# Patient Record
Sex: MALE | Race: WHITE
[De-identification: names, ages, dates, MRNs, and addresses within clinical notes are randomized per-mention and may not be internally consistent; named-entity substitution may affect disease eponyms.]

---

## 2017-01-10 ENCOUNTER — HOSPITAL ENCOUNTER (OUTPATIENT)
Dept: HOSPITAL 47 - RADCTMAIN | Age: 57
Discharge: HOME | End: 2017-01-10
Payer: COMMERCIAL

## 2017-01-10 DIAGNOSIS — N13.30: ICD-10-CM

## 2017-01-10 DIAGNOSIS — N28.89: ICD-10-CM

## 2017-01-10 DIAGNOSIS — N28.1: Primary | ICD-10-CM

## 2017-01-10 LAB
BUN SERPL-SCNC: 16 MG/DL (ref 9–20)
NON-AFRICAN AMERICAN GFR(MDRD): >60

## 2017-01-10 PROCEDURE — 84520 ASSAY OF UREA NITROGEN: CPT

## 2017-01-10 PROCEDURE — 36415 COLL VENOUS BLD VENIPUNCTURE: CPT

## 2017-01-10 PROCEDURE — 74178 CT ABD&PLV WO CNTR FLWD CNTR: CPT

## 2017-01-10 PROCEDURE — 82565 ASSAY OF CREATININE: CPT

## 2017-01-10 NOTE — CT
EXAMINATION TYPE: CT abdomen pelvis wo/w con

 

DATE OF EXAM: 1/10/2017 5:12 PM

 

COMPARISON: NONE

 

HISTORY: Pt states of renal cysts.

 

CT DLP: 1239.2 mGycm

Automated exposure control for dose reduction was used.

 

TECHNIQUE:  Helical acquisition of images was performed from the lung bases through the pelvis.

 

CONTRAST: 

Performed with Oral Contrast and with IV Contrast, patient injected with 100 mL of Omnipaque 300.

 

FINDINGS: 

 

There are multiple subpleural densities in the lower lung fields that measure up to 2.5 cm. There are
 small bilateral pleural effusions.

 

There is no pericardial effusion.

 

Liver shows no focal defect. Spleen appears normal. There is no pancreatic mass. Gallbladder is contr
acted. There is no adrenal mass. Kidneys show satisfactory contrast opacification. There is no eviden
ce of a cortical mass. There are bilateral large calculi at the ureteropelvic junction of both kidney
s. These measure up to 1.5 cm. Ureters are not dilated. There is a 3 mm calcification in the lower po
le left kidney. There is some fullness of the left renal collecting system. There is a 3 cm cortical 
cyst on the upper pole left kidney. There is no retroperitoneal adenopathy.

There is no ascites. Bladder distends smoothly. There is no sign of a pelvic mass. I see no intestina
l wall thickening. There are no dilated loops. Appendix is not seen. There is no sign of appendicitis
. There is degenerative disc space narrowing at L4-5.

IMPRESSION: 

BILATERAL LARGE CALCIFICATIONS AT THE URETEROPELVIC JUNCTION. MILD LEFT-SIDED HYDRONEPHROSIS. NO BENTON
L ATROPHY. LEFT RENAL CORTICAL CYST. THERE IS EVIDENCE OF A MILD OBSTRUCTION OF THE LEFT KIDNEY. I SE
E NO EVIDENCE OF OBSTRUCTION OF THE RIGHT KIDNEY.

 

PLEURAL EFFUSIONS WITH MULTIPLE SUBPLEURAL MASSES IN THE VISUALIZED LOWER LOBES. The clinical signifi
cance of these densities is not clear. I would consider both inflammatory disease and neoplasm in the
 differential diagnosis.

## 2017-02-27 ENCOUNTER — HOSPITAL ENCOUNTER (OUTPATIENT)
Dept: HOSPITAL 47 - RADXRMAIN | Age: 57
End: 2017-02-27
Payer: COMMERCIAL

## 2017-02-27 DIAGNOSIS — N20.0: Primary | ICD-10-CM

## 2017-02-27 PROCEDURE — 74000: CPT

## 2017-02-27 NOTE — XR
EXAMINATION TYPE: XR KUB

 

DATE OF EXAM: 2/27/2017 12:10 PM

 

COMPARISON: 12/12/2016 KUB, CT scan 1/10/2017

 

HISTORY: Renal stone

 

TECHNIQUE: One view abdominal series

 

FINDINGS:  

The osseous structures are intact.  The bowel gas pattern is nonspecific. Changes of arthropathy of t
he hips and sacroiliitis noted. Degenerative change of the spine.

 

Right kidney:

1. There is a stable appearing 1.8 cm calcification lower pole.

2. 9 mm lower pole calcification.

 

Left kidney:

1. There is an 11 mm lower pole calcification.

2. 2 mm lower pole calcification.

3. Suspected mid pole 3 mm calcification.

 

Pelvis: Stable vascular appearing calcification in the right hemipelvis.

 

Subsegmental consolidation noted both lung bases.

 

 

IMPRESSION:  

1. Stable bilateral renal calculi.

2. Bilateral subsegmental atelectasis or infiltrate

## 2017-03-13 ENCOUNTER — HOSPITAL ENCOUNTER (OUTPATIENT)
Dept: HOSPITAL 47 - RADXRMAIN | Age: 57
Discharge: HOME | End: 2017-03-13
Payer: COMMERCIAL

## 2017-03-13 DIAGNOSIS — N20.0: Primary | ICD-10-CM

## 2017-03-13 PROCEDURE — 74000: CPT

## 2017-03-13 NOTE — XR
Abdomen

 

HISTORY: Renal calculus

 

Frontal view of the abdomen on 2 images correlated to previous exam 27th of February 2017 plain film 
abdomen, CT scan 10 January 2017

 

The largest calculus on the right within the renal pelvis measures approximately 2 cm. The lower pole
 calculus on the right measures approximately 11 to 12 mm in greatest dimension by 7 to 8 mm. Proxima
l left ureteral calculus measures approximately 11.5 x 6 mm.

 

There is a bone island in the left ilium. Phlebolith is present in the right hemipelvis, there are pr
ostatic calcifications. Degenerative disc changes in the visualized spine. Lung bases show pleural ef
fusion, patient has lung nodules on prior CT that are not seen definitively but are suggested posteri
karlos.

 

IMPRESSION: Bilateral nephrolithiasis. Findings at the lung bases as described.

## 2017-03-27 ENCOUNTER — HOSPITAL ENCOUNTER (OUTPATIENT)
Dept: HOSPITAL 47 - RADXRMAIN | Age: 57
Discharge: HOME | End: 2017-03-27
Payer: COMMERCIAL

## 2017-03-27 DIAGNOSIS — N20.0: Primary | ICD-10-CM

## 2017-03-27 PROCEDURE — 74000: CPT

## 2017-03-27 NOTE — XR
EXAMINATION TYPE: XR abdomen 1V

 

DATE OF EXAM ORDERED: 3/27/2017 12:08 PM

 

HISTORY: N20.0 kidney stones.

 

COMPARISON: Previous study dated 3/13/2017.

 

FINDINGS:  There is bilateral nephrolithiasis. The largest calcification on the right measures 2.2 cm
. Largest on the left measures 5.6 mm. There are phleboliths within the pelvis. There is amorphous ca
lcification overlying the left psoas shadow. This has migrated somewhat distally in comparison with t
he previous study..

 

There are degenerative changes in the lower lumbar spine.

 

IMPRESSION: 

1. BILATERAL NEPHROLITHIASIS.

2. SOMEWHAT IRREGULAR LEFT URETERIC CALCULUS HAS MIGRATED SOMEWHAT DISTALLY IN COMPARISON TO PREVIOUS
.

## 2017-03-30 ENCOUNTER — HOSPITAL ENCOUNTER (OUTPATIENT)
Dept: HOSPITAL 47 - OR | Age: 57
Discharge: HOME | End: 2017-03-30
Payer: COMMERCIAL

## 2017-03-30 VITALS — DIASTOLIC BLOOD PRESSURE: 89 MMHG | SYSTOLIC BLOOD PRESSURE: 138 MMHG | RESPIRATION RATE: 18 BRPM | HEART RATE: 103 BPM

## 2017-03-30 VITALS — TEMPERATURE: 98.1 F

## 2017-03-30 VITALS — BODY MASS INDEX: 30.9 KG/M2

## 2017-03-30 DIAGNOSIS — Z79.52: ICD-10-CM

## 2017-03-30 DIAGNOSIS — N13.2: Primary | ICD-10-CM

## 2017-03-30 DIAGNOSIS — Z79.899: ICD-10-CM

## 2017-03-30 DIAGNOSIS — K21.9: ICD-10-CM

## 2017-03-30 DIAGNOSIS — Z87.891: ICD-10-CM

## 2017-03-30 DIAGNOSIS — R31.0: ICD-10-CM

## 2017-03-30 DIAGNOSIS — E03.9: ICD-10-CM

## 2017-03-30 DIAGNOSIS — Z87.442: ICD-10-CM

## 2017-03-30 DIAGNOSIS — M06.9: ICD-10-CM

## 2017-03-30 DIAGNOSIS — M41.9: ICD-10-CM

## 2017-03-30 LAB — GLUCOSE BLD-MCNC: 79 MG/DL (ref 75–99)

## 2017-03-30 PROCEDURE — 50590 FRAGMENTING OF KIDNEY STONE: CPT

## 2017-03-30 PROCEDURE — 52332 CYSTOSCOPY AND TREATMENT: CPT

## 2017-03-30 PROCEDURE — 74000: CPT

## 2017-03-30 NOTE — XR
EXAMINATION TYPE: XR KUB

 

DATE OF EXAM: 3/30/2017 1:24 PM

 

COMPARISON: 3/27/2017

 

HISTORY: Renal stone

 

TECHNIQUE: One view abdominal series

 

FINDINGS:  

The osseous structures are intact.  The bowel gas pattern is nonspecific. Pleural-based thickening an
d emphysematous changes are seen involving both lung bases. Hypertrophic change of the spine noted.

 

Right kidney: There remain 2 calcifications stable in appearance measuring 12 mm and 8 mm overlying t
he mid and lower pole the right kidney.

 

Left kidney: Punctate 2 to 3 mm mid pole calcification suspected. Calcification along the left parasp
inal line may lie outside the course of the ureter but appears stable from the previous exam.

 

Pelvis: Calcification overlying the right hemipelvis is likely vascular.

 

 

 

IMPRESSION:  

1.  Nonspecific abdomen. Stable nephrolithiasis.

## 2017-03-30 NOTE — P.OP
Date of Procedure: 03/30/17


Preoperative Diagnosis: 


Left ureteral calculi, left renal calculi


Postoperative Diagnosis: 


Same


Procedure(s) Performed: 


Cystoscopy, left ureteroscopy with Holmium laser lithotripsy, left ureteral 

stent insertion


Anesthesia: REMI


Surgeon: Tanmay Lira


Estimated Blood Loss (ml): 5


IV fluids (ml): 1,300


Pathology: none sent


Condition: stable


Disposition: PACU


Indications for Procedure: 


He is a 56-year-old male with recurrent gross hematuria. A CT scan of the 

abdomen and pelvis showed multiple right renal calculi, one of which he passed 

in 2014. Urine cytology was suspicious in 2014, and cstoscopy was normal. He 

was given dietary advice for kidney stone prevention. He now presents back with 

gross hematuria, associated with right flank pain. A KUB x-ray and CT scan show 

a 12 mm left UPJ calculus causing mild left hydronephrosis, as well as an 18 mm 

right UPJ calculus. Treatment options were reviewed in detail, and he underwent 

left ESWL. The left stone is smaller and is causing obstruction, and this is 

the reason for treating it first. The calculus fragmented incompletely, and I 

suggested he undergo repeat left ESWL. However, the calculus is essentially 

unchanged in appearance, and I have thus adivsed him to undergo left 

ureteroscopy with laser lithotripsy.


Operative Findings: 


Large left mid ureteral calculus, with a second calculus immediately proximal 

to it.  Several small left renal calculi.  All calculi fragmented to completion.


Description of Procedure: 


The patient was taken to the operating room and placed in the dorsolithotomy 

position, with legs supported in Garth stirrups.  The external genitalia was 

prepped and draped sterilely.  The 30 lens was used to introduce the 19-Beninese 

Stortz cystoscopic sheath through the urethra and into the bladder under direct 

vision.  The prostatic urethra showed evidence of mild lateral lobe 

enlargement.  The bladder was examined in its entirety.  Both ureteral orifices 

were normal anatomic location and configuration, and clear urine effluxed from 

both.  No tumors or foreign bodies were seen.   





A 0.038 inch Glidewire was passed through the cystoscope.  The left ureteral 

orifice was cannulated, and the Glidewire was slowly advanced.  It passed 

beyond the mid ureteral calculus and up to the left renal pelvis.  The 

cystoscope was removed, and an 11/13-Beninese ureteral access catheter was passed 

over the wire, up to the mid ureter.  The Olympus flexible ureteroscope was 

then passed through the ureteral access catheter sheath.  The ureteroscope was 

advanced up to the level of the calculus. The 200 micron Holmium laser probe 

was passed through the ureteroscope, and lithotripsy was performed.  The 

calculus fragmented well.  Once the calculus had been fragmented, a second 

calculus immediately cephalad to this was identified.  This also was 

fragmented.  Lithotripsy was continued until all calculus fragments were no 

larger than 1-2 mm in size.  The ureteroscope was then slowly advanced up to 

the left renal pelvis.  Each calyx was examined.  Several small renal calculi 

were identified, all of which were fragmented to completion.  The ureteroscope 

was then slowly withdrawn under direct vision.  Any remaining calculi exceeding 

1-2 mm in size were fragmented at this time, and most of the calculus fragments 

passed distally into the bladder.  There was no evidence of ureteral 

perforation.





The cystoscope was replaced into the bladder.  The Glidewire was passed up to 

the left renal pelvis, and a 28 cm, 4.8-Beninese double-J ureteral stent was 

placed over the wire.  Proper stent positioning was verified fluoroscopically 

and endoscopically.  The bladder was emptied and the cystoscope removed.  The 

patient tolerated the procedure well and was taken to the recovery room in 

stable condition.

## 2017-03-31 NOTE — FL
EXAMINATION TYPE: FL guidance operating room

 

DATE OF EXAM: 3/30/2017 6:50 PM

 

HISTORY: Flouroscopy  time

 

Less than 1 minute of fluoroscopy provided. 

 

IMPRESSION:

1. Fluoroscopy time.

## 2017-04-19 ENCOUNTER — HOSPITAL ENCOUNTER (OUTPATIENT)
Dept: HOSPITAL 47 - LABPAT | Age: 57
Discharge: HOME | End: 2017-04-19
Payer: COMMERCIAL

## 2017-04-19 DIAGNOSIS — E03.9: ICD-10-CM

## 2017-04-19 DIAGNOSIS — N20.0: ICD-10-CM

## 2017-04-19 DIAGNOSIS — R35.0: ICD-10-CM

## 2017-04-19 DIAGNOSIS — Z01.812: Primary | ICD-10-CM

## 2017-04-19 LAB
ANION GAP SERPL CALC-SCNC: 10 MMOL/L
BASOPHILS # BLD AUTO: 0.1 K/UL (ref 0–0.2)
BASOPHILS NFR BLD AUTO: 1 %
BUN SERPL-SCNC: 16 MG/DL (ref 9–20)
CALCIUM SPEC-MCNC: 9 MG/DL (ref 8.4–10.2)
CH: 27.7
CHCM: 30.7
CHLORIDE SERPL-SCNC: 107 MMOL/L (ref 98–107)
CO2 SERPL-SCNC: 24 MMOL/L (ref 22–30)
EOSINOPHIL # BLD AUTO: 0.2 K/UL (ref 0–0.7)
EOSINOPHIL NFR BLD AUTO: 2 %
ERYTHROCYTE [DISTWIDTH] IN BLOOD BY AUTOMATED COUNT: 4.81 M/UL (ref 4.3–5.9)
ERYTHROCYTE [DISTWIDTH] IN BLOOD: 16.7 % (ref 11.5–15.5)
GLUCOSE SERPL-MCNC: 98 MG/DL (ref 74–99)
HCT VFR BLD AUTO: 43.6 % (ref 39–53)
HDW: 3.03
HGB BLD-MCNC: 13.2 GM/DL (ref 13–17.5)
LUC NFR BLD AUTO: 1 %
LYMPHOCYTES # SPEC AUTO: 1.4 K/UL (ref 1–4.8)
LYMPHOCYTES NFR SPEC AUTO: 16 %
MCH RBC QN AUTO: 27.4 PG (ref 25–35)
MCHC RBC AUTO-ENTMCNC: 30.3 G/DL (ref 31–37)
MCV RBC AUTO: 90.6 FL (ref 80–100)
MONOCYTES # BLD AUTO: 0.6 K/UL (ref 0–1)
MONOCYTES NFR BLD AUTO: 7 %
NEUTROPHILS # BLD AUTO: 6.8 K/UL (ref 1.3–7.7)
NEUTROPHILS NFR BLD AUTO: 74 %
NON-AFRICAN AMERICAN GFR(MDRD): >60
PARTICLE COUNT: 1618
PH UR: 5.5 [PH] (ref 5–8)
POTASSIUM SERPL-SCNC: 3.7 MMOL/L (ref 3.5–5.1)
RBC UR QL: 141 /HPF (ref 0–5)
SODIUM SERPL-SCNC: 141 MMOL/L (ref 137–145)
SP GR UR: 1.01 (ref 1–1.03)
UA BILLING (MACRO VS. MICRO): (no result)
UROBILINOGEN UR QL STRIP: <2 MG/DL (ref ?–2)
WBC # BLD AUTO: 0.1 10*3/UL
WBC # BLD AUTO: 9.2 K/UL (ref 3.8–10.6)
WBC #/AREA URNS HPF: 29 /HPF (ref 0–5)
WBC (PEROX): 9.39

## 2017-04-19 PROCEDURE — 85025 COMPLETE CBC W/AUTO DIFF WBC: CPT

## 2017-04-19 PROCEDURE — 86900 BLOOD TYPING SEROLOGIC ABO: CPT

## 2017-04-19 PROCEDURE — 86850 RBC ANTIBODY SCREEN: CPT

## 2017-04-19 PROCEDURE — 81001 URINALYSIS AUTO W/SCOPE: CPT

## 2017-04-19 PROCEDURE — 80048 BASIC METABOLIC PNL TOTAL CA: CPT

## 2017-04-19 PROCEDURE — 86901 BLOOD TYPING SEROLOGIC RH(D): CPT

## 2017-04-26 ENCOUNTER — HOSPITAL ENCOUNTER (OUTPATIENT)
Dept: HOSPITAL 47 - OR | Age: 57
Setting detail: OBSERVATION
LOS: 1 days | Discharge: HOME | End: 2017-04-27
Payer: COMMERCIAL

## 2017-04-26 VITALS — RESPIRATION RATE: 16 BRPM

## 2017-04-26 VITALS — BODY MASS INDEX: 30.9 KG/M2

## 2017-04-26 DIAGNOSIS — Z87.891: ICD-10-CM

## 2017-04-26 DIAGNOSIS — E03.9: ICD-10-CM

## 2017-04-26 DIAGNOSIS — Z79.899: ICD-10-CM

## 2017-04-26 DIAGNOSIS — M41.9: ICD-10-CM

## 2017-04-26 DIAGNOSIS — K21.9: ICD-10-CM

## 2017-04-26 DIAGNOSIS — Z79.84: ICD-10-CM

## 2017-04-26 DIAGNOSIS — Z80.1: ICD-10-CM

## 2017-04-26 DIAGNOSIS — M06.9: ICD-10-CM

## 2017-04-26 DIAGNOSIS — N20.0: Primary | ICD-10-CM

## 2017-04-26 DIAGNOSIS — Z80.8: ICD-10-CM

## 2017-04-26 DIAGNOSIS — Z79.52: ICD-10-CM

## 2017-04-26 PROCEDURE — 86850 RBC ANTIBODY SCREEN: CPT

## 2017-04-26 PROCEDURE — 86901 BLOOD TYPING SEROLOGIC RH(D): CPT

## 2017-04-26 PROCEDURE — 86900 BLOOD TYPING SEROLOGIC ABO: CPT

## 2017-04-26 PROCEDURE — 50395: CPT

## 2017-04-26 PROCEDURE — 50080 PERQ NL/PL LITHOTRP SMPL<2CM: CPT

## 2017-04-26 PROCEDURE — 50432 PLMT NEPHROSTOMY CATHETER: CPT

## 2017-04-26 PROCEDURE — 96376 TX/PRO/DX INJ SAME DRUG ADON: CPT

## 2017-04-26 PROCEDURE — 74000: CPT

## 2017-04-26 PROCEDURE — 96374 THER/PROPH/DIAG INJ IV PUSH: CPT

## 2017-04-26 PROCEDURE — 82365 CALCULUS SPECTROSCOPY: CPT

## 2017-04-26 RX ADMIN — POTASSIUM CHLORIDE SCH MLS: 14.9 INJECTION, SOLUTION INTRAVENOUS at 08:55

## 2017-04-26 RX ADMIN — POTASSIUM CHLORIDE SCH: 14.9 INJECTION, SOLUTION INTRAVENOUS at 13:53

## 2017-04-26 RX ADMIN — KETOROLAC TROMETHAMINE SCH MG: 30 INJECTION, SOLUTION INTRAMUSCULAR at 12:11

## 2017-04-26 RX ADMIN — HYDROMORPHONE HYDROCHLORIDE PRN MG: 1 INJECTION, SOLUTION INTRAMUSCULAR; INTRAVENOUS; SUBCUTANEOUS at 12:11

## 2017-04-26 RX ADMIN — POTASSIUM CHLORIDE SCH MLS/HR: 14.9 INJECTION, SOLUTION INTRAVENOUS at 22:41

## 2017-04-26 RX ADMIN — KETOROLAC TROMETHAMINE SCH MG: 30 INJECTION, SOLUTION INTRAMUSCULAR at 18:17

## 2017-04-26 RX ADMIN — HYDROMORPHONE HYDROCHLORIDE PRN MG: 1 INJECTION, SOLUTION INTRAMUSCULAR; INTRAVENOUS; SUBCUTANEOUS at 12:05

## 2017-04-26 RX ADMIN — KETOROLAC TROMETHAMINE SCH MG: 30 INJECTION, SOLUTION INTRAMUSCULAR at 23:28

## 2017-04-26 NOTE — FL
Fluoroscopy

 

INDICATION: Pain

 

FINDINGS:

 

Fluoroscopy time: 11 minutes 49 seconds.

 

Images obtained: 2.

 

IMPRESSIONS:

1. Documentation of fluoroscopy.

## 2017-04-26 NOTE — XR
EXAMINATION TYPE: XR KUB

 

DATE OF EXAM: 4/26/2017 7:38 AM

 

COMPARISON: 3/30/2017

 

INDICATION: Kidney stones

 

TECHNIQUE: Single view abdomen supine

 

FINDINGS:  

There is a normal bowel gas pattern.

Psoas margins are normal.

No organomegaly is present.

2 right-sided renal calcifications. Present measuring 1.9 and 1.0 cm present previously. Phlebolith i
s within the right hemipelvis.

 

IMPRESSION: 

1. Right renal stones

## 2017-04-26 NOTE — P.OP
Date of Procedure: 04/26/17


Preoperative Diagnosis: 


Right renal calculi large


Postoperative Diagnosis: 


Same


Procedure(s) Performed: 


Cystoscopy, placement of 5-Bangladeshi occluding balloon catheter right, 

percutaneous nephrostomy (Dr. sandhu), percutaneous nephrostolithotomy with 

ultrasound, placement of 10-Bangladeshi J nephrostomy


Anesthesia: REMI


Surgeon: Dewayne Love


Estimated Blood Loss (ml): 50


Pathology: other (Stones)


Condition: stable


Disposition: PACU


Indications for Procedure: 


The patient is a 56-year-old gentleman with active kidney stone disease.  He 

has an 18 mm and 10 mm right  renal pelvic stone and comes for percutaneous 

nephrostolithotomy


Description of Procedure: 


The patient is brought to the operating suite he is given a successful general 

endotracheal anesthesia on the transport gurney.  He's placed in a frog 

position with rolls under his hips a sterile prep and drape.  Cystoscopy with 

Foroblique lens and 22-Bangladeshi sheath identifies a normal anterior urethra a 

minimally obstructing prostate.  The right ureteral orifice is identified and 

intubated with a 5-Bangladeshi occluding balloon catheter which is passed up in the 

renal pelvis it is secured to a Marino catheter after the cystoscope was removed





The patient is placed in a prone position with care to airways and extremities.

  Dr. Sandhu of radiology performed percutaneous nephrostomy access to a right 

middle pole calyx.  I then dilate the tract to 30-Bangladeshi and introduced the 

working sheath into the collecting system.  I'm able to see the larger of the 2 

stones and with the ultrasonic probe grind and suction as well as grasp the 

larger fragments out of the collecting system.  I then advanced the rigid scope 

further and identify the second stone in the renal pelvis and similarly used 

the ultrasound to fracture and remove the stone.  At the end of the procedure I 

look through the collecting system with the flexible scope and see no remaining 

fragments.  I fluoroscoped the renal pelvis and do not see any remaining 

fragments.  I then place a 10-Bangladeshi J nephrostomy tube over working wire that 

coils in the renal pelvis and the secured the skin with 2-0 silk.  The patient'

s awake and returned recovery room good condition.  Blood loss is approximately 

50 mL.  Patient will be placed in the hospital postoperatively.

## 2017-04-27 VITALS — TEMPERATURE: 97.6 F | HEART RATE: 85 BPM | SYSTOLIC BLOOD PRESSURE: 129 MMHG | DIASTOLIC BLOOD PRESSURE: 84 MMHG

## 2017-04-27 RX ADMIN — KETOROLAC TROMETHAMINE SCH MG: 30 INJECTION, SOLUTION INTRAMUSCULAR at 06:26

## 2017-04-27 RX ADMIN — POTASSIUM CHLORIDE SCH: 14.9 INJECTION, SOLUTION INTRAVENOUS at 04:46

## 2017-04-27 RX ADMIN — POTASSIUM CHLORIDE SCH: 14.9 INJECTION, SOLUTION INTRAVENOUS at 09:57

## 2017-04-27 NOTE — P.DS
Providers


Date of admission: 


04/26/17 16:54





Attending physician: 


Dewayne Love





Primary care physician: 


Stated None





Hospital Course: 





The patient was admitted 04/26/2017 for right percutaneous nephrostolithotomy.  

He underwent this without event.  Postoperatively he tolerated his diet, 

ambulated and had minimal discomfort.  He'll be discharged home later this 

morning with his nephrostomy tube.  He'll follow-up in the office Monday for 

nephrostomy tube removal.  Postoperative instructions have been given.  His 

diet is regular, his activity is limited, he has pain medicine at home.  His 

condition is good upon discharge.


Patient Condition at Discharge: Good





Plan - Discharge Summary


Discharge Medication List





Hydroxychloroquine Sulfate [Plaquenil] 200 mg PO DAILY 03/29/17 [History]


Leflunomide [Arava] 20 mg PO DAILY 03/29/17 [History]


Levothyroxine Sodium [Synthroid] 150 mcg PO TUWETH 03/29/17 [History]


Levothyroxine Sodium [Synthroid] 175 mcg PO SUMOFRSA 03/29/17 [History]


Tofacitinib Citrate [Xeljanz] 5 mg PO DAILY 03/29/17 [History]


methylPREDNISolone [Medrol] 4 mg PO DAILY 03/29/17 [History]








Follow up Appointment(s)/Referral(s): 


Dewayne Love MD [STAFF PHYSICIAN] - 05/01/17


Activity/Diet/Wound Care/Special Instructions: 


The patient may shower, resume his home medications,


Discharge Disposition: HOME SELF-CARE

## 2018-02-27 ENCOUNTER — HOSPITAL ENCOUNTER (OUTPATIENT)
Dept: HOSPITAL 47 - RADCTMAIN | Age: 58
Discharge: HOME | End: 2018-02-27
Payer: COMMERCIAL

## 2018-02-27 DIAGNOSIS — M77.31: ICD-10-CM

## 2018-02-27 DIAGNOSIS — S93.321A: Primary | ICD-10-CM

## 2018-02-27 DIAGNOSIS — M19.071: ICD-10-CM

## 2018-02-27 NOTE — CT
History foot pain. The left comparison none.

 

TECHNIQUE:

Multiple axial sections were obtained from the distal tibia to the bottom of the foot with no contras
t. There are reconstructed images. Images include all the metatarsals.

 

FINDINGS:

There is some deformity and fragmentation at the tarsometatarsal joints involving the second third fo
urth and fifth metatarsals. There is an old ununited transverse fracture of the base of the fifth met
atarsal. The first tarsometatarsal joint is anatomic. There is lateral subluxation of the second thir
d fourth and fifth metatarsal bases.

 

There is an Achilles calcaneal spur. There is a small plantar calcaneal spur. Calcaneus is intact. Th
e talus is intact. The distal tibia and fibula appear intact. The talonavicular joint is anatomic.

 

CONCLUSION:

Fragmentation and subluxation seen at the 2nd-5th tarsometatarsal joints. This is consistent with a L
isfranc deformity. The possibility of neuropathic arthritis should also be considered. Old ununited p
roximal fifth metatarsal fracture.

 

Calcaneal spurring.

 

There is mild osteoarthritis at the first MP joint. I do not see subluxation at the MP joints to vivi
est rheumatoid arthritis.

## 2018-04-13 ENCOUNTER — HOSPITAL ENCOUNTER (OUTPATIENT)
Dept: HOSPITAL 47 - RADECHMAIN | Age: 58
Discharge: HOME | End: 2018-04-13
Payer: COMMERCIAL

## 2018-04-13 DIAGNOSIS — I70.8: ICD-10-CM

## 2018-04-13 DIAGNOSIS — I07.1: Primary | ICD-10-CM

## 2018-04-13 PROCEDURE — 93306 TTE W/DOPPLER COMPLETE: CPT

## 2018-04-17 NOTE — ECHOF
Referral Reason:I52.7 cardiomegaly



MEASUREMENTS

--------

HEIGHT: 185.4 cm

WEIGHT: 111.1 kg

BP: 188/104

RVIDd:   3.4 cm     (< 3.3)

IVSd:   0.9 cm     (0.6 - 1.1)

LVIDd:   5.4 cm     (3.9 - 5.3)

LVPWd:   1.0 cm     (0.6 - 1.1)

IVSs:   1.4 cm

LVIDs:   4.1 cm

LVPWs:   1.4 cm

LA Diam:   3.6 cm     (2.7 - 3.8)

LAESV Index (A-L):   26.70 ml/m

Ao Diam:   3.8 cm     (2.0 - 3.7)

AV Cusp:   2.7 cm     (1.5 - 2.6)

MV EXCURSION:   21.171 mm     (> 18.000)

MV EF SLOPE:   191 mm/s     (70 - 150)

EPSS:   0.9 cm

MV E Chance:   1.12 m/s

MV DecT:   122 ms

MV A Chance:   0.60 m/s

MV E/A Ratio:   1.85 

RAP:   5.00 mmHg

RVSP:   24.14 mmHg







FINDINGS

--------

Resting tachycardia (HR>100bpm).

This was a technically adequate study.

The left ventricular size is normal.   Left ventricular wall thickness is normal.   Overall left vent
ricular systolic function is low-normal with, an EF between 50 - 55 %.

The right ventricle is mildly enlarged.

Normal LA  size by volume 22+/-6 ml/m2.

The right atrium is normal in size.

The aortic valve is trileaflet and appears structurally normal.

Mild mitral annular calcification present.

Mild tricuspid regurgitation present.   Right ventricular systolic pressure is normal at < 35 mmHg.

The pulmonic valve was not well visualized.

The aortic root is dilated measuring 3.8cm.

The inferior vena cava is mildly dilated.

There is no pericardial effusion.



CONCLUSIONS

--------

1. Resting tachycardia (HR>100bpm).

2. This was a technically adequate study.

3. The left ventricular size is normal.

4. Left ventricular wall thickness is normal.

5. Overall left ventricular systolic function is low-normal with, an EF between 50 - 55 %.

6. The right ventricle is mildly enlarged.

7. Normal LA size by volume 22+/-6 ml/m2.

8. The right atrium is normal in size.

9. The aortic valve is trileaflet and appears structurally normal.

10. Mild mitral annular calcification present.

11. Mild tricuspid regurgitation present.

12. Right ventricular systolic pressure is normal at < 35 mmHg.

13. The pulmonic valve was not well visualized.

14. The aortic root is dilated measuring 3.8cm.

15. The inferior vena cava is mildly dilated.

16. There is no pericardial effusion.





SONOGRAPHER: Isabel Lozano RDCS

## 2018-04-19 ENCOUNTER — HOSPITAL ENCOUNTER (OUTPATIENT)
Dept: HOSPITAL 47 - RADCTMAIN | Age: 58
Discharge: HOME | End: 2018-04-19
Payer: COMMERCIAL

## 2018-04-19 DIAGNOSIS — J90: Primary | ICD-10-CM

## 2018-04-19 DIAGNOSIS — R91.8: ICD-10-CM

## 2018-04-19 DIAGNOSIS — J92.9: ICD-10-CM

## 2018-04-19 PROCEDURE — 71250 CT THORAX DX C-: CPT

## 2018-04-19 NOTE — CT
EXAMINATION TYPE: CT chest wo con

 

DATE OF EXAM: 4/19/2018

 

COMPARISON: NONE

 

HISTORY: Shortness of breath for 1-2 months

 

CT DLP: 302 mGycm

 

High-resolution noncontrast CT of the chest was performed with the patient in the prone and supine po
sitions.  Lung and mediastinal window settings are submitted. 

 

There are multiple pulmonary nodules most of which could be pleural-based and some of which demonstra
te cavitation. On the left the number of nodules is estimated at 16-18 with the largest nodules ident
ified within the left lower lobe measuring approximately 2.5 cm. There is associated left sided pleur
al thickening and left pleural effusion. Right-sided pulmonary nodules a total approximately 10-12 an
d number with the largest nodule identified within the right lower lobe with underlying cavitation an
d measures 2.5 cm. Small right-sided pleural effusion and pleural thickening noted.

 

Incidental azygos lobe and fissure.

 

Mild lower lobe bronchiectasis.

 

No evidence for fibrosis.

 

No evidence of adenopathy.

 

Mild cardiomegaly.

 

Simple cyst upper pole left kidney.

 

IMPRESSION:

1. Multiple bilateral pulmonary nodules some of which demonstrate cavitation could reflect rheumatoid
 nodules. Nodules of other etiology including metastatic disease not excluded.

2. Small pleural effusions with associated pleural thickening.

## 2018-05-09 ENCOUNTER — HOSPITAL ENCOUNTER (OUTPATIENT)
Dept: HOSPITAL 47 - LABWHC1 | Age: 58
Discharge: HOME | End: 2018-05-09
Payer: COMMERCIAL

## 2018-05-09 DIAGNOSIS — E05.90: Primary | ICD-10-CM

## 2018-05-09 LAB — T4 FREE SERPL-MCNC: 1.28 NG/DL (ref 0.78–2.19)

## 2018-05-09 PROCEDURE — 84443 ASSAY THYROID STIM HORMONE: CPT

## 2018-05-09 PROCEDURE — 84439 ASSAY OF FREE THYROXINE: CPT

## 2018-05-09 PROCEDURE — 36415 COLL VENOUS BLD VENIPUNCTURE: CPT

## 2018-05-24 ENCOUNTER — HOSPITAL ENCOUNTER (OUTPATIENT)
Dept: HOSPITAL 47 - LABPAT | Age: 58
Discharge: HOME | End: 2018-05-24
Payer: COMMERCIAL

## 2018-05-24 DIAGNOSIS — Z01.812: Primary | ICD-10-CM

## 2018-05-24 DIAGNOSIS — I25.10: ICD-10-CM

## 2018-05-24 LAB
ANION GAP SERPL CALC-SCNC: 12 MMOL/L
BUN SERPL-SCNC: 16 MG/DL (ref 9–20)
CHLORIDE SERPL-SCNC: 106 MMOL/L (ref 98–107)
CO2 SERPL-SCNC: 27 MMOL/L (ref 22–30)
ERYTHROCYTE [DISTWIDTH] IN BLOOD BY AUTOMATED COUNT: 4.72 M/UL (ref 4.3–5.9)
ERYTHROCYTE [DISTWIDTH] IN BLOOD: 17.8 % (ref 11.5–15.5)
HCT VFR BLD AUTO: 40.2 % (ref 39–53)
HGB BLD-MCNC: 12.5 GM/DL (ref 13–17.5)
MCH RBC QN AUTO: 26.4 PG (ref 25–35)
MCHC RBC AUTO-ENTMCNC: 31.1 G/DL (ref 31–37)
MCV RBC AUTO: 85 FL (ref 80–100)
PLATELET # BLD AUTO: 331 K/UL (ref 150–450)
POTASSIUM SERPL-SCNC: 4.1 MMOL/L (ref 3.5–5.1)
SODIUM SERPL-SCNC: 145 MMOL/L (ref 137–145)
WBC # BLD AUTO: 11.7 K/UL (ref 3.8–10.6)

## 2018-05-24 PROCEDURE — 84520 ASSAY OF UREA NITROGEN: CPT

## 2018-05-24 PROCEDURE — 36415 COLL VENOUS BLD VENIPUNCTURE: CPT

## 2018-05-24 PROCEDURE — 80051 ELECTROLYTE PANEL: CPT

## 2018-05-24 PROCEDURE — 85027 COMPLETE CBC AUTOMATED: CPT

## 2018-06-01 ENCOUNTER — HOSPITAL ENCOUNTER (OUTPATIENT)
Dept: HOSPITAL 47 - CATHCVL | Age: 58
End: 2018-06-01
Payer: COMMERCIAL

## 2018-06-01 VITALS — BODY MASS INDEX: 33.2 KG/M2

## 2018-06-01 VITALS — RESPIRATION RATE: 18 BRPM | TEMPERATURE: 98.1 F

## 2018-06-01 VITALS — DIASTOLIC BLOOD PRESSURE: 68 MMHG | SYSTOLIC BLOOD PRESSURE: 130 MMHG | HEART RATE: 81 BPM

## 2018-06-01 DIAGNOSIS — M05.9: ICD-10-CM

## 2018-06-01 DIAGNOSIS — I10: ICD-10-CM

## 2018-06-01 DIAGNOSIS — Z79.52: ICD-10-CM

## 2018-06-01 DIAGNOSIS — M05.10: ICD-10-CM

## 2018-06-01 DIAGNOSIS — Z79.899: ICD-10-CM

## 2018-06-01 DIAGNOSIS — E05.90: ICD-10-CM

## 2018-06-01 DIAGNOSIS — I25.110: Primary | ICD-10-CM

## 2018-06-01 DIAGNOSIS — Z87.891: ICD-10-CM

## 2018-06-01 DIAGNOSIS — Z79.890: ICD-10-CM

## 2018-06-01 PROCEDURE — 93458 L HRT ARTERY/VENTRICLE ANGIO: CPT

## 2018-06-01 RX ADMIN — VERAPAMIL HYDROCHLORIDE ONE ML: 2.5 INJECTION, SOLUTION INTRAVENOUS at 11:19

## 2018-06-01 RX ADMIN — VERAPAMIL HYDROCHLORIDE ONE ML: 2.5 INJECTION, SOLUTION INTRAVENOUS at 11:35

## 2018-06-01 NOTE — CC
CARDIAC CATHETERIZATION REPORT



DATE OF SERVICE:

06/01/2018.



PROCEDURE:

Left heart catheterization, coronary angiography and left ventriculography.



PERFORMED BY:

Dr. VALDO Santo.



ANESTHESIA:

Moderate conscious sedation time was 23 minutes.  Patient was given fentanyl and

Benadryl.



CLINICAL INFORMATION:

Mr. Trevon Kaufman is a 57-year-old gentleman with rheumatoid arthritis and rheumatoid

lung.  He also has history of chest pain with an abnormal stress test suggestive of

probably nonischemic cardiomyopathy but areas of reversibility.  He was therefore

advised coronary angiography.  Risks, benefits, options and rationale were explained.



PROCEDURE NOTE:

Under local anesthesia and strict aseptic precautions, a 6-Bulgarian introducer was placed

in the right radial artery.  Using an Ultimate 1 catheter, I performed selective

coronary angiography of both coronary arteries and a pigtail catheter was used to check

LV pressures and LV-gram was performed in 30-degree VALLADARES projection.  Patient tolerated

the procedure well without complications.  The sheath was taken out and TR band applied

as per protocol and saturation in the fingers of the right hand was about 94%.



CARDIAC CATHETERIZATION FINDINGS:

The left ventricular end-diastolic pressure was about 20 mmHg without any gradient

across the aortic valve.



CORONARY ANGIOGRAPHY FINDINGS:

RIGHT CORONARY ARTERY:  Technically a dominant vessel.  No significant disease, gives

off a large acute marginal branch and then distally a large PLV, smaller PDA, minor

irregularities.  No significant disease is noted.



LEFT MAIN CORONARY ARTERY: Short patent disease-free vessel that bifurcates into LAD

and circumflex.



LEFT ANTERIOR DESCENDING CORONARY ARTERY:  Good caliber vessel gives off septal and

diagonal branches.  There is a large diagonal and next to it, the second diagonal is

somewhat smaller.  Both of these are free of significant disease and supplies a sizable

amount of myocardium.  No significant disease is noted in the LAD system other than

minor irregularities up to 30% or so.



LAD:  Therefore is relatively disease-free system with minor irregularities and a good-

sized septal and diagonal branch is noted which has no significant disease.



LEFT POSTERIOR CIRCUMFLEX CORONARY ARTERY:  Technically, a nondominant vessel, smaller

in distribution but fair in caliber, gives off a single obtuse marginal that runs

laterally.  Then there is an AV groove branch and left atrial circumflex branch.  All

of these are free of significant disease with minor irregularities.



LEFT VENTRICULOGRAM: This was performed in 30-degree VALLADARES projection, revealed left

ventricle is of normal size with good systolic function.  Ejection fraction is at the

at the low end of normal of 50% without mitral regurgitation.



FINAL IMPRESSION:

This patient has a right dominant system, minor irregularities, but no significant

obstructive CAD.  Ejection fraction is 50% which is at low end of normal.  Filling

pressures are mildly elevated.



RECOMMENDATION:

Continued medical therapy with risk factor modification is advised.  I will add 50 mg

of losartan to his regimen.  He is advised to take aspirin 81 mg daily and continue the

beta blocker at about 75 mg b.i.d. as ordered.

Patient will be discharged later on today if he remains stable.





MMODL / IJN: [id number] / Job#: [id number]

## 2018-06-01 NOTE — LTR
DATE OF SERVICE:  06/01/2018





RE:  Trevon Kaufman





Dear Dr. Smith:



Thank you for the opportunity to participate in the care of Mr. Kaufman.  I am pleased

to report to you that he does not have any obstructive CAD.  He has what seems to be an

early nonischemic cardiomyopathy-type picture.  I have added losartan to beta blocker.

Will continue same medications.  I will discharge him later on today and will see him

in the office in one week.



I have advised him to follow up with you on a regular basis.



Thank you for your referral and please call for questions.  With kindest regards,



Sincerely yours,





VITA Santo MD





MMBEAUL / KATLYNN: [id number] / Job#: [id number]

## 2018-11-26 ENCOUNTER — HOSPITAL ENCOUNTER (OUTPATIENT)
Dept: HOSPITAL 47 - RADCTMAIN | Age: 58
Discharge: HOME | End: 2018-11-26
Attending: FAMILY MEDICINE
Payer: COMMERCIAL

## 2018-11-26 DIAGNOSIS — R91.8: ICD-10-CM

## 2018-11-26 DIAGNOSIS — J90: ICD-10-CM

## 2018-11-26 DIAGNOSIS — J98.11: Primary | ICD-10-CM

## 2018-11-26 PROCEDURE — 71270 CT THORAX DX C-/C+: CPT

## 2018-11-27 NOTE — CT
EXAMINATION TYPE: CT chest wo/w con

 

DATE OF EXAM: 11/27/2018

 

COMPARISON: 4/19/2018

 

HISTORY: lung mass. Pt pre-op

 

CT DLP: 1212.50 mGycm

Automated exposure control for dose reduction was used.

 

CONTRAST: 

CT scan of the chest is performed without and with with IV Contrast, patient injected with 100 mL of 
Isovue 300.

 

FINDINGS:

 

LUNGS: Multiple pulmonary nodules redemonstrated most of which are pleural-based. Several of the nodu
les again demonstrate cavitation. The overall number of the nodules is felt to be stable with an jc
mated 16-18 nodules noted within the left lung and at 10-12 nodules right lung. The largest nodule le
ft lower lobe measured 2.5 cm previously currently measures 2.4 cm. The largest nodule within the rig
ht lung is noted within the right lower lobe posteriorly and demonstrates cavitation currently measur
es 2.1 cm versus 2.5 cm previously. Small loculated pleural effusions noted.

 

MEDIASTINUM: There are no greater than 1 cm hilar or mediastinal lymph nodes.   No pericardial effusi
on is seen.  Thoracic aorta is of normal caliber. The heart is not enlarged.

 

UPPER ABDOMEN: Simple cyst upper pole left kidney measures 2.9 cm.

 

OTHER:  No additional significant abnormality is seen.

 

IMPRESSION:

1. Overall number of pulmonary nodules remains essentially unchanged although several of the nodules 
appear slightly smaller in size. Areas of cavitation persist. Small pleural effusions and mild compre
ssive atelectasis again noted.

## 2019-01-04 ENCOUNTER — HOSPITAL ENCOUNTER (INPATIENT)
Dept: HOSPITAL 47 - EC | Age: 59
LOS: 3 days | Discharge: HOME | DRG: 863 | End: 2019-01-07
Attending: HOSPITALIST | Admitting: HOSPITALIST
Payer: COMMERCIAL

## 2019-01-04 VITALS — BODY MASS INDEX: 30.9 KG/M2

## 2019-01-04 DIAGNOSIS — E83.42: ICD-10-CM

## 2019-01-04 DIAGNOSIS — T81.40XA: Primary | ICD-10-CM

## 2019-01-04 DIAGNOSIS — E66.9: ICD-10-CM

## 2019-01-04 DIAGNOSIS — E03.9: ICD-10-CM

## 2019-01-04 DIAGNOSIS — Z87.442: ICD-10-CM

## 2019-01-04 DIAGNOSIS — E87.6: ICD-10-CM

## 2019-01-04 DIAGNOSIS — L03.115: ICD-10-CM

## 2019-01-04 DIAGNOSIS — Z79.899: ICD-10-CM

## 2019-01-04 DIAGNOSIS — M06.9: ICD-10-CM

## 2019-01-04 DIAGNOSIS — Z79.890: ICD-10-CM

## 2019-01-04 DIAGNOSIS — E46: ICD-10-CM

## 2019-01-04 DIAGNOSIS — Z87.891: ICD-10-CM

## 2019-01-04 DIAGNOSIS — Z98.1: ICD-10-CM

## 2019-01-04 DIAGNOSIS — N17.9: ICD-10-CM

## 2019-01-04 DIAGNOSIS — Z80.9: ICD-10-CM

## 2019-01-04 DIAGNOSIS — D63.8: ICD-10-CM

## 2019-01-04 DIAGNOSIS — E86.0: ICD-10-CM

## 2019-01-04 LAB
ALBUMIN SERPL-MCNC: 2.6 G/DL (ref 3.5–5)
ALP SERPL-CCNC: 138 U/L (ref 38–126)
ALT SERPL-CCNC: 32 U/L (ref 21–72)
ANION GAP SERPL CALC-SCNC: 10 MMOL/L
APTT BLD: 28.5 SEC (ref 22–30)
AST SERPL-CCNC: 73 U/L (ref 17–59)
BASOPHILS # BLD AUTO: 0.1 K/UL (ref 0–0.2)
BASOPHILS NFR BLD AUTO: 1 %
BUN SERPL-SCNC: 10 MG/DL (ref 9–20)
CALCIUM SPEC-MCNC: 8.9 MG/DL (ref 8.4–10.2)
CHLORIDE SERPL-SCNC: 105 MMOL/L (ref 98–107)
CK SERPL-CCNC: 90 U/L (ref 55–170)
CO2 SERPL-SCNC: 22 MMOL/L (ref 22–30)
D DIMER PPP FEU-MCNC: 3.13 MG/L FEU (ref ?–0.6)
EOSINOPHIL # BLD AUTO: 1.2 K/UL (ref 0–0.7)
EOSINOPHIL NFR BLD AUTO: 14 %
ERYTHROCYTE [DISTWIDTH] IN BLOOD BY AUTOMATED COUNT: 4.46 M/UL (ref 4.3–5.9)
ERYTHROCYTE [DISTWIDTH] IN BLOOD: 16.3 % (ref 11.5–15.5)
GLUCOSE SERPL-MCNC: 131 MG/DL (ref 74–99)
HCT VFR BLD AUTO: 38.1 % (ref 39–53)
HGB BLD-MCNC: 11.6 GM/DL (ref 13–17.5)
INR PPP: 2.4 (ref ?–1.2)
LYMPHOCYTES # SPEC AUTO: 1.2 K/UL (ref 1–4.8)
LYMPHOCYTES NFR SPEC AUTO: 13 %
MAGNESIUM SPEC-SCNC: 1.2 MG/DL (ref 1.6–2.3)
MCH RBC QN AUTO: 26.1 PG (ref 25–35)
MCHC RBC AUTO-ENTMCNC: 30.5 G/DL (ref 31–37)
MCV RBC AUTO: 85.4 FL (ref 80–100)
MONOCYTES # BLD AUTO: 0.7 K/UL (ref 0–1)
MONOCYTES NFR BLD AUTO: 7 %
NEUTROPHILS # BLD AUTO: 5.6 K/UL (ref 1.3–7.7)
NEUTROPHILS NFR BLD AUTO: 64 %
PLATELET # BLD AUTO: 245 K/UL (ref 150–450)
POTASSIUM SERPL-SCNC: 3.2 MMOL/L (ref 3.5–5.1)
PROT SERPL-MCNC: 6.3 G/DL (ref 6.3–8.2)
PT BLD: 23.2 SEC (ref 9–12)
SODIUM SERPL-SCNC: 137 MMOL/L (ref 137–145)
TROPONIN I SERPL-MCNC: <0.012 NG/ML (ref 0–0.03)
WBC # BLD AUTO: 8.9 K/UL (ref 3.8–10.6)

## 2019-01-04 PROCEDURE — 87086 URINE CULTURE/COLONY COUNT: CPT

## 2019-01-04 PROCEDURE — 87040 BLOOD CULTURE FOR BACTERIA: CPT

## 2019-01-04 PROCEDURE — 87324 CLOSTRIDIUM AG IA: CPT

## 2019-01-04 PROCEDURE — 85379 FIBRIN DEGRADATION QUANT: CPT

## 2019-01-04 PROCEDURE — 81003 URINALYSIS AUTO W/O SCOPE: CPT

## 2019-01-04 PROCEDURE — 36415 COLL VENOUS BLD VENIPUNCTURE: CPT

## 2019-01-04 PROCEDURE — 71046 X-RAY EXAM CHEST 2 VIEWS: CPT

## 2019-01-04 PROCEDURE — 80053 COMPREHEN METABOLIC PANEL: CPT

## 2019-01-04 PROCEDURE — 96360 HYDRATION IV INFUSION INIT: CPT

## 2019-01-04 PROCEDURE — 96361 HYDRATE IV INFUSION ADD-ON: CPT

## 2019-01-04 PROCEDURE — 87502 INFLUENZA DNA AMP PROBE: CPT

## 2019-01-04 PROCEDURE — 85610 PROTHROMBIN TIME: CPT

## 2019-01-04 PROCEDURE — 83880 ASSAY OF NATRIURETIC PEPTIDE: CPT

## 2019-01-04 PROCEDURE — 84484 ASSAY OF TROPONIN QUANT: CPT

## 2019-01-04 PROCEDURE — 82550 ASSAY OF CK (CPK): CPT

## 2019-01-04 PROCEDURE — 93005 ELECTROCARDIOGRAM TRACING: CPT

## 2019-01-04 PROCEDURE — 83735 ASSAY OF MAGNESIUM: CPT

## 2019-01-04 PROCEDURE — 85730 THROMBOPLASTIN TIME PARTIAL: CPT

## 2019-01-04 PROCEDURE — 85025 COMPLETE CBC W/AUTO DIFF WBC: CPT

## 2019-01-04 PROCEDURE — 82553 CREATINE MB FRACTION: CPT

## 2019-01-04 PROCEDURE — 80048 BASIC METABOLIC PNL TOTAL CA: CPT

## 2019-01-04 PROCEDURE — 99285 EMERGENCY DEPT VISIT HI MDM: CPT

## 2019-01-04 PROCEDURE — 83605 ASSAY OF LACTIC ACID: CPT

## 2019-01-04 RX ADMIN — POTASSIUM CHLORIDE SCH MLS/HR: 14.9 INJECTION, SOLUTION INTRAVENOUS at 23:18

## 2019-01-04 NOTE — XR
EXAMINATION TYPE: XR chest 2V

 

DATE OF EXAM: 1/4/2019

 

COMPARISON: 4/13/2018

 

HISTORY: Hypoxemia

 

TECHNIQUE:  Frontal and lateral views of the chest are obtained.

 

FINDINGS:  There is patchy pleural thickening along the left and right lateral chest wall. There is n
o heart failure. Heart is enlarged. There is chest leads. There is some blunting of the costophrenic 
angles.

 

IMPRESSION:  There are pleural and peripheral pulmonary infiltrates similar to old exam. Small pleura
l effusions. No heart failure. The vascularity is improved compared to last exam.

## 2019-01-04 NOTE — ED
Recheck HPI





- General


Chief Complaint: Recheck/Abnormal Lab/Rx


Stated Complaint: low blood pressure


Time Seen by Provider: 01/04/19 18:55


Source: patient, RN notes reviewed, old records reviewed


Mode of arrival: wheelchair


Limitations: no limitations





- History of Present Illness


Initial Comments: 





This is a 58-year-old male to the ER for evaluation.  States presenting for 

evaluation regards to weakness.  Not eating, weight loss, anorexia.  Patient 

does have, can recent medical history with right foot surgery multiple postop 

complications including infection.  Patient has noted fever at home, was seen 

by home care nurse today and sent in for evaluation secondary to low blood 

pressure, low oxygen level, fever.  Patient himself states he just feels weak 

denies cough congestion or shortness of breath.  No is no spreading erythema 

from his right lower extremity.  No nausea vomiting or diarrhea


MD Complaint: other (Evaluation regarding abnormal vital signs)


-: unknown


Initial Visit For: other (Right lower extremity surgery, right foot surgery 

with follow-up)


Returns Today for: wound recheck, other (Abnormal vital signs)


Symptoms Since Prior Visit: no new symptoms


Associated Symptoms: fever, chills


Treatments Prior to Arrival: other





- Related Data


 Home Medications











 Medication  Instructions  Recorded  Confirmed


 


Leflunomide [Arava] 20 mg PO DAILY 03/29/17 01/04/19


 


Levothyroxine Sodium [Synthroid] 150 mcg PO DAILY 03/29/17 01/04/19


 


Tofacitinib Citrate [Xeljanz] 11 mg PO DAILY 03/29/17 01/04/19


 


Amoxic-Pot Clav 875-125Mg 1 tab PO Q12HR 01/04/19 01/04/19





[Augmentin 875-125]   


 


Doxycycline [Vibramycin] 100 mg PO BID 01/04/19 01/04/19


 


Ibuprofen [Motrin Ib] 800 mg PO Q4H PRN 01/04/19 01/04/19


 


Metoprolol Tartrate [Lopressor] 25 mg PO BID 01/04/19 01/04/19


 


Sildenafil Citrate [Viagra] 100 mg PO ONCE 01/04/19 01/04/19


 


Sodium Chloride [Ocean] 1 spray EA NOSTRIL DAILY 01/04/19 01/04/19











 Allergies











Allergy/AdvReac Type Severity Reaction Status Date / Time


 


No Known Allergies Allergy   Verified 01/04/19 19:00














Review of Systems


ROS Statement: 


Those systems with pertinent positive or pertinent negative responses have been 

documented in the HPI.





ROS Other: All systems not noted in ROS Statement are negative.





Past Medical History


Past Medical History: Rheumatoid Arthritis (RA), Thyroid Disorder


Additional Past Medical History / Comment(s): right foot infection, KIDNEY 

STONES


History of Any Multi-Drug Resistant Organisms: None Reported


Past Surgical History: Tonsillectomy


Additional Past Surgical History / Comment(s): right foot surgery


Past Anesthesia/Blood Transfusion Reactions: No Reported Reaction


Past Psychological History: No Psychological Hx Reported


Smoking Status: Former smoker


Past Alcohol Use History: Occasional


Past Drug Use History: None Reported





- Past Family History


  ** Mother


Family Medical History: Cancer





General Exam





- General Exam Comments


Initial Comments: 





she does have right lower extremity wound bandaged currently.


Limitations: no limitations


General appearance: alert, lethargic, in distress


Head exam: Present: atraumatic, normocephalic, normal inspection


Eye exam: Present: normal appearance, PERRL, EOMI.  Absent: scleral icterus, 

conjunctival injection, periorbital swelling


ENT exam: Present: normal exam, mucous membranes dry


Neck exam: Present: normal inspection.  Absent: tenderness, meningismus, 

lymphadenopathy


Respiratory exam: Present: normal lung sounds bilaterally.  Absent: respiratory 

distress, wheezes, rales, rhonchi, stridor


Cardiovascular Exam: Present: normal rhythm, tachycardia, normal heart sounds.  

Absent: systolic murmur, diastolic murmur, rubs, gallop, clicks


GI/Abdominal exam: Present: soft, normal bowel sounds.  Absent: distended, 

tenderness, guarding, rebound, rigid


Extremities exam: Present: normal inspection, full ROM, normal capillary 

refill.  Absent: tenderness, pedal edema, joint swelling, calf tenderness


Back exam: Present: normal inspection


Neurological exam: Present: alert, oriented X3, CN II-XII intact


Psychiatric exam: Present: normal affect, normal mood


Skin exam: Present: warm, dry, intact, normal color.  Absent: rash





Course


 Vital Signs











  01/04/19 01/04/19 01/04/19





  18:37 19:10 19:35


 


Temperature 98.6 F  98.3 F


 


Pulse Rate 114 H 112 H 


 


Respiratory 18 18 





Rate   


 


Blood Pressure 108/69 110/75 


 


O2 Sat by Pulse 95 97 





Oximetry   














  01/04/19





  20:30


 


Temperature 


 


Pulse Rate 109 H


 


Respiratory 20





Rate 


 


Blood Pressure 118/72


 


O2 Sat by Pulse 97





Oximetry 














- Reevaluation(s)


Reevaluation #1: 





01/04/19 20:25


Medical record is reviewed, prior pictures of right foot injury are reviewed


Reevaluation #2: 





01/04/19 21:43


Spoke with family at length, patient states he still feels weak deathly denies 

appetite.  Remains tachycardic and malnourished.  Patient will be admitted for 

continued treatment





- Consultations


Consultation #1: 





Spoke with Dr. Vasquez, acceptable of admission





Medical Decision Making





- Medical Decision Making





58 male the ER for evaluation.  Patient presents today for evaluation regards 

to weakness.  Patient is significantly dehydrated not eating well malnourished 

and not completing his ADLs.  Patient is to be admitted for rehydration R 

replacement and continued IV antibiotics





- Lab Data


Result diagrams: 


 01/04/19 19:20





 01/04/19 19:20


 Lab Results











  01/04/19 01/04/19 01/04/19 Range/Units





  19:20 19:20 19:20 


 


WBC   8.9   (3.8-10.6)  k/uL


 


RBC   4.46   (4.30-5.90)  m/uL


 


Hgb   11.6 L   (13.0-17.5)  gm/dL


 


Hct   38.1 L   (39.0-53.0)  %


 


MCV   85.4   (80.0-100.0)  fL


 


MCH   26.1   (25.0-35.0)  pg


 


MCHC   30.5 L   (31.0-37.0)  g/dL


 


RDW   16.3 H   (11.5-15.5)  %


 


Plt Count   245   (150-450)  k/uL


 


Neutrophils %   64   %


 


Lymphocytes %   13   %


 


Monocytes %   7   %


 


Eosinophils %   14   %


 


Basophils %   1   %


 


Neutrophils #   5.6   (1.3-7.7)  k/uL


 


Lymphocytes #   1.2   (1.0-4.8)  k/uL


 


Monocytes #   0.7   (0-1.0)  k/uL


 


Eosinophils #   1.2 H   (0-0.7)  k/uL


 


Basophils #   0.1   (0-0.2)  k/uL


 


Hypochromasia   Slight   


 


Anisocytosis   Slight   


 


PT     (9.0-12.0)  sec


 


INR     (<1.2)  


 


APTT     (22.0-30.0)  sec


 


D-Dimer     (<0.60)  mg/L FEU


 


Sodium    137  (137-145)  mmol/L


 


Potassium    3.2 L  (3.5-5.1)  mmol/L


 


Chloride    105  ()  mmol/L


 


Carbon Dioxide    22  (22-30)  mmol/L


 


Anion Gap    10  mmol/L


 


BUN    10  (9-20)  mg/dL


 


Creatinine    1.51 H  (0.66-1.25)  mg/dL


 


Est GFR (CKD-EPI)AfAm    59  (>60 ml/min/1.73 sqM)  


 


Est GFR (CKD-EPI)NonAf    51  (>60 ml/min/1.73 sqM)  


 


Glucose    131 H  (74-99)  mg/dL


 


Plasma Lactic Acid Naif     (0.7-2.0)  mmol/L


 


Calcium    8.9  (8.4-10.2)  mg/dL


 


Magnesium    1.2 L  (1.6-2.3)  mg/dL


 


Total Bilirubin    1.1  (0.2-1.3)  mg/dL


 


AST    73 H  (17-59)  U/L


 


ALT    32  (21-72)  U/L


 


Alkaline Phosphatase    138 H  ()  U/L


 


Total Creatine Kinase  90    ()  U/L


 


CK-MB (CK-2)  1.2    (0.0-2.4)  ng/mL


 


CK-MB (CK-2) Rel Index  1.3    


 


Troponin I  <0.012    (0.000-0.034)  ng/mL


 


NT-Pro-B Natriuret Pep     pg/mL


 


Total Protein    6.3  (6.3-8.2)  g/dL


 


Albumin    2.6 L  (3.5-5.0)  g/dL


 


Influenza Type A RNA     (Not Detectd)  


 


Influenza Type B (PCR)     (Not Detectd)  














  01/04/19 01/04/19 01/04/19 Range/Units





  19:20 19:20 19:20 


 


WBC     (3.8-10.6)  k/uL


 


RBC     (4.30-5.90)  m/uL


 


Hgb     (13.0-17.5)  gm/dL


 


Hct     (39.0-53.0)  %


 


MCV     (80.0-100.0)  fL


 


MCH     (25.0-35.0)  pg


 


MCHC     (31.0-37.0)  g/dL


 


RDW     (11.5-15.5)  %


 


Plt Count     (150-450)  k/uL


 


Neutrophils %     %


 


Lymphocytes %     %


 


Monocytes %     %


 


Eosinophils %     %


 


Basophils %     %


 


Neutrophils #     (1.3-7.7)  k/uL


 


Lymphocytes #     (1.0-4.8)  k/uL


 


Monocytes #     (0-1.0)  k/uL


 


Eosinophils #     (0-0.7)  k/uL


 


Basophils #     (0-0.2)  k/uL


 


Hypochromasia     


 


Anisocytosis     


 


PT  23.2 H    (9.0-12.0)  sec


 


INR  2.4 H    (<1.2)  


 


APTT  28.5    (22.0-30.0)  sec


 


D-Dimer  3.13 H    (<0.60)  mg/L FEU


 


Sodium     (137-145)  mmol/L


 


Potassium     (3.5-5.1)  mmol/L


 


Chloride     ()  mmol/L


 


Carbon Dioxide     (22-30)  mmol/L


 


Anion Gap     mmol/L


 


BUN     (9-20)  mg/dL


 


Creatinine     (0.66-1.25)  mg/dL


 


Est GFR (CKD-EPI)AfAm     (>60 ml/min/1.73 sqM)  


 


Est GFR (CKD-EPI)NonAf     (>60 ml/min/1.73 sqM)  


 


Glucose     (74-99)  mg/dL


 


Plasma Lactic Acid Naif    2.3 H*  (0.7-2.0)  mmol/L


 


Calcium     (8.4-10.2)  mg/dL


 


Magnesium     (1.6-2.3)  mg/dL


 


Total Bilirubin     (0.2-1.3)  mg/dL


 


AST     (17-59)  U/L


 


ALT     (21-72)  U/L


 


Alkaline Phosphatase     ()  U/L


 


Total Creatine Kinase     ()  U/L


 


CK-MB (CK-2)     (0.0-2.4)  ng/mL


 


CK-MB (CK-2) Rel Index     


 


Troponin I     (0.000-0.034)  ng/mL


 


NT-Pro-B Natriuret Pep   1410   pg/mL


 


Total Protein     (6.3-8.2)  g/dL


 


Albumin     (3.5-5.0)  g/dL


 


Influenza Type A RNA     (Not Detectd)  


 


Influenza Type B (PCR)     (Not Detectd)  














  01/04/19 Range/Units





  20:45 


 


WBC   (3.8-10.6)  k/uL


 


RBC   (4.30-5.90)  m/uL


 


Hgb   (13.0-17.5)  gm/dL


 


Hct   (39.0-53.0)  %


 


MCV   (80.0-100.0)  fL


 


MCH   (25.0-35.0)  pg


 


MCHC   (31.0-37.0)  g/dL


 


RDW   (11.5-15.5)  %


 


Plt Count   (150-450)  k/uL


 


Neutrophils %   %


 


Lymphocytes %   %


 


Monocytes %   %


 


Eosinophils %   %


 


Basophils %   %


 


Neutrophils #   (1.3-7.7)  k/uL


 


Lymphocytes #   (1.0-4.8)  k/uL


 


Monocytes #   (0-1.0)  k/uL


 


Eosinophils #   (0-0.7)  k/uL


 


Basophils #   (0-0.2)  k/uL


 


Hypochromasia   


 


Anisocytosis   


 


PT   (9.0-12.0)  sec


 


INR   (<1.2)  


 


APTT   (22.0-30.0)  sec


 


D-Dimer   (<0.60)  mg/L FEU


 


Sodium   (137-145)  mmol/L


 


Potassium   (3.5-5.1)  mmol/L


 


Chloride   ()  mmol/L


 


Carbon Dioxide   (22-30)  mmol/L


 


Anion Gap   mmol/L


 


BUN   (9-20)  mg/dL


 


Creatinine   (0.66-1.25)  mg/dL


 


Est GFR (CKD-EPI)AfAm   (>60 ml/min/1.73 sqM)  


 


Est GFR (CKD-EPI)NonAf   (>60 ml/min/1.73 sqM)  


 


Glucose   (74-99)  mg/dL


 


Plasma Lactic Acid Naif   (0.7-2.0)  mmol/L


 


Calcium   (8.4-10.2)  mg/dL


 


Magnesium   (1.6-2.3)  mg/dL


 


Total Bilirubin   (0.2-1.3)  mg/dL


 


AST   (17-59)  U/L


 


ALT   (21-72)  U/L


 


Alkaline Phosphatase   ()  U/L


 


Total Creatine Kinase   ()  U/L


 


CK-MB (CK-2)   (0.0-2.4)  ng/mL


 


CK-MB (CK-2) Rel Index   


 


Troponin I   (0.000-0.034)  ng/mL


 


NT-Pro-B Natriuret Pep   pg/mL


 


Total Protein   (6.3-8.2)  g/dL


 


Albumin   (3.5-5.0)  g/dL


 


Influenza Type A RNA  Not Detected  (Not Detectd)  


 


Influenza Type B (PCR)  Not Detected  (Not Detectd)  














- EKG Data


-: EKG Interpreted by Me (EKG shows sinus tachycardia rate of 113, , QRS 

96, QTc 458)





- Radiology Data


Radiology results: report reviewed (Chest x-rays negative for acute disease), 

image reviewed





Disposition


Clinical Impression: 


 Fever, Malnutrition, Dehydration, Hypomagnesemia, Hypokalemia





Disposition: ADMITTED AS IP TO THIS HOSP


Condition: Fair


Is patient prescribed a controlled substance at d/c from ED?: No


Referrals: 


Walker Smith DO [Primary Care Provider] - 1-2 days

## 2019-01-05 LAB
PH UR: 5.5 [PH] (ref 5–8)
SP GR UR: 1 (ref 1–1.03)
UROBILINOGEN UR QL STRIP: <2 MG/DL (ref ?–2)

## 2019-01-05 RX ADMIN — POTASSIUM CHLORIDE SCH MLS/HR: 14.9 INJECTION, SOLUTION INTRAVENOUS at 00:54

## 2019-01-05 RX ADMIN — AMPICILLIN SODIUM AND SULBACTAM SODIUM SCH MLS/HR: 2; 1 INJECTION, POWDER, FOR SOLUTION INTRAMUSCULAR; INTRAVENOUS at 17:31

## 2019-01-05 RX ADMIN — DOXYCYCLINE SCH MLS/HR: 100 INJECTION, POWDER, LYOPHILIZED, FOR SOLUTION INTRAVENOUS at 20:43

## 2019-01-05 RX ADMIN — MAGNESIUM SULFATE IN DEXTROSE SCH MLS/HR: 10 INJECTION, SOLUTION INTRAVENOUS at 00:29

## 2019-01-05 RX ADMIN — PANTOPRAZOLE SODIUM SCH MG: 40 INJECTION, POWDER, FOR SOLUTION INTRAVENOUS at 20:45

## 2019-01-05 RX ADMIN — LEVOTHYROXINE SODIUM SCH MCG: 75 TABLET ORAL at 13:18

## 2019-01-05 RX ADMIN — DOXYCYCLINE SCH MLS/HR: 100 INJECTION, POWDER, LYOPHILIZED, FOR SOLUTION INTRAVENOUS at 08:20

## 2019-01-05 RX ADMIN — MAGNESIUM SULFATE IN DEXTROSE SCH MLS/HR: 10 INJECTION, SOLUTION INTRAVENOUS at 01:21

## 2019-01-05 RX ADMIN — AMPICILLIN SODIUM AND SULBACTAM SODIUM SCH MLS/HR: 2; 1 INJECTION, POWDER, FOR SOLUTION INTRAMUSCULAR; INTRAVENOUS at 11:24

## 2019-01-05 RX ADMIN — AMPICILLIN SODIUM AND SULBACTAM SODIUM SCH MLS/HR: 2; 1 INJECTION, POWDER, FOR SOLUTION INTRAMUSCULAR; INTRAVENOUS at 05:39

## 2019-01-05 RX ADMIN — METOPROLOL TARTRATE SCH MG: 25 TABLET, FILM COATED ORAL at 20:44

## 2019-01-05 NOTE — CT
EXAMINATION TYPE: CT foot RT wo con

 

DATE OF EXAM: 1/5/2019

 

COMPARISON: CT 2/27/2018

 

HISTORY: 58-year-old male with history of surgery to RT foot,, pain, has infection

 

TECHNIQUE: Contiguous axial scanning of the right foot without IV contrast. Coronal and sagittal luca
nstructions performed. 3-D reconstructions generated on a dedicated independent workstation.

 

CT DLP: 315.80 mGycm

Automated exposure control for dose reduction was used.

 

 

FINDINGS: 

Postsurgical changes of midfoot surgical arthrodesis. Bony bridging is incomplete. We demonstrate ext
ensive fragmentation along the midfoot. Interval osteotomies at the proximal fourth and fifth metatar
sals.. Postinflammatory or traumatic deformity redemonstrated at the fifth MTP joint, unchanged. Dege
nerative changes at the first MTP joint.

 

Prominent dorsal soft tissue swelling.

 

No soft tissue air. There are extensive metal hardware artifacts are present limiting assessment.

 

 

 

IMPRESSION: 

 

1. Extensive postsurgical midfoot arthrodesis with plate and screw fixation. Extensive metal hardware
 artifact limiting assessment. No obvious large abscess though, again, assessment is limited.

2. Prominent soft tissue swelling along the mid foot.

3. Interval osteotomies at the proximal fourth and fifth metatarsals.

## 2019-01-05 NOTE — HP
HISTORY AND PHYSICAL



DATE OF SERVICE:

01/05/2019



CHIEF COMPLAINTS:

Weakness and  foot infection.



HISTORY OF PRESENT ILLNESS:

This 58-year-old gentleman with a past medical history of multiple medical 
problems

including history of rheumatoid arthritis, hypothyroidism, history of kidney 
stones

being followed by Dr. Smith in the outpatient setting recently had a foot 
surgery

from [facility].  Subsequently patient had infection.  The patient 
admitted with

 for several days and the patient was recently discharged on doxycycline and

amoxicillin.  The details are not noted at this time.  The patient went home and

subsequently was noted to have weakness and fever.  The patient came to [facility] and admitted for further evaluation.  Patient also had some weight 
loss also.

The  foot is infected, swollen and the patient's white count is 8.9 and 
hemoglobin

11.6.  Lactic acid is elevated and INR is 2.4.  A chest x-ray done on admission 
showed

some old pleural pulmonary infiltrates and small effusions.  There is no 
history of any

headache, loss of consciousness or seizures. No history of chest pain, 
palpitations,

hematochezia or melena at this time.



PAST MEDICAL HISTORY:

History of recent foot surgery, history of rheumatoid arthritis, hypothyroidism
, kidney

stone, tonsillectomy.



MEDICATIONS:

Medications are home medications are:

1. Ocean spray daily.

2. Viagra p.r.n.

3. Motrin 800 mg q.4 p.r.n.

4. Vibramycin 100 mg p.o. b.i.d.

5. Xeljanz 11 mg p.o. daily.

6. Lopressor 25 mg p.o. b.i.d.

7. Synthroid 150 mcg p.o. daily.

8. Arava 20 mg p.o. daily.

9. Augmentin 875 mg 1 p.o. b.i.d.



ALLERGIES:

Allergies are none.



FAMILY HISTORY:

History of cancer in the family.



SOCIAL HISTORY:

Previous history of smoking. No history of current smoking or alcohol intake.



REVIEW OF SYSTEMS:

ENT: No diminished hearing or diminished vision.

CARDIOVASCULAR SYSTEM: No angina.

RESPIRATORY SYSTEM: As mentioned earlier.

GI: No nausea.

: As mentioned earlier.

NERVOUS SYSTEM:  No numbness or weakness.

ALLERGY/IMMUNOLOGY:  No history of asthma.

MUSCULOSKELETAL: As mentioned earlier.

HEMATOLOGY/ONCOLOGY:  No history of anemia.

ENDOCRINE: No history of diabetes mellitus.  Hypothyroidism.

CONSTITUTIONAL:  As mentioned earlier.

DERMATOLOGY: Negative.

RHEUMATOLOGY:  As mentioned earlier.

PSYCHIATRY: As mentioned earlier.



PHYSICAL EXAMINATION:

The patient is alert and oriented x3. Pulse 106, blood pressure 100/65, 
respiration 18,

temperature 98.2, pulse ox 92% on room air.

HEENT: Conjunctivae normal. Oral mucosa moist.

Neck is no jugular venous distention.  No carotid bruit. No lymph node 
enlargement.

CARDIOVASCULAR:  S1, S2 muffled.

RESPIRATORY SYSTEM: Breath sounds diminished at the bases. A few scattered 
rhonchi.  No

crackles.

ABDOMEN:  Soft, obese, nontender.

LEGS:  leg significant swelling and infection, erythema and also sutured wounds

also 2 wounds present. Minimal discharge noted.  Pulses diminished.

NERVOUS SYSTEM:  Higher function as mentioned. Moves all 4 limbs. No focal motor

deficit.

LYMPHATICS:  No lymphadenopathy of the neck, axillae or groin.

SKIN:  No ulcer, rash or bleeding.



LABS:

WBC 8.9, hemoglobin 11.6. INR 2.4.  Sodium 137, potassium 3.2.  Creatinine is 
1.51.

Plasma lactic acid 2.3.



ASSESSMENT:

1. rt foot infection with failure of outpatient treatment with possible sepsis.

2. History of recent foot surgery infection from [facility].

3. Increased creatinine with acute renal failure possibly prerenal acute tubular

    necrosis.

4. Hypokalemia.

5. Anemia, normocytic anemia of chronic disease.

6. History of rheumatoid arthritis.

7. Hypothyroidism.

8. History of kidney stones.

9. Coumadin monitoring.



RECOMMENDATIONS AND DISCUSSION:

This 58-year-old gentleman who presented with multiple complex medical issues, 
will

monitor the patient closely. Continue the current medications. Continues 
symptomatic

treatment. Recommend to resume the home medication, IV fluids, potassium

supplementation, IV antibiotics.  I would also recommend a CAT scan of the foot 
and

Infectious Disease and as well as Orthopedic evaluation also.  Prognosis guarded

because of multiple complex medical issues. Further recommendations to follow. 
A copy

of dictation forwarded to Dr. Smith, who is the primary physician.





MMBEAUL / KATLYNN: [id number] / Job#: [id number]

MTDD

## 2019-01-06 LAB
ANION GAP SERPL CALC-SCNC: 8 MMOL/L
BASOPHILS # BLD AUTO: 0.1 K/UL (ref 0–0.2)
BASOPHILS NFR BLD AUTO: 1 %
BUN SERPL-SCNC: 8 MG/DL (ref 9–20)
CALCIUM SPEC-MCNC: 8.8 MG/DL (ref 8.4–10.2)
CHLORIDE SERPL-SCNC: 105 MMOL/L (ref 98–107)
CO2 SERPL-SCNC: 25 MMOL/L (ref 22–30)
EOSINOPHIL # BLD AUTO: 1.3 K/UL (ref 0–0.7)
EOSINOPHIL NFR BLD AUTO: 17 %
ERYTHROCYTE [DISTWIDTH] IN BLOOD BY AUTOMATED COUNT: 3.86 M/UL (ref 4.3–5.9)
ERYTHROCYTE [DISTWIDTH] IN BLOOD: 16.2 % (ref 11.5–15.5)
GLUCOSE SERPL-MCNC: 100 MG/DL (ref 74–99)
HCT VFR BLD AUTO: 33.8 % (ref 39–53)
HGB BLD-MCNC: 10.1 GM/DL (ref 13–17.5)
INR PPP: 2 (ref ?–1.2)
LYMPHOCYTES # SPEC AUTO: 1 K/UL (ref 1–4.8)
LYMPHOCYTES NFR SPEC AUTO: 14 %
MCH RBC QN AUTO: 26.2 PG (ref 25–35)
MCHC RBC AUTO-ENTMCNC: 30 G/DL (ref 31–37)
MCV RBC AUTO: 87.6 FL (ref 80–100)
MONOCYTES # BLD AUTO: 0.5 K/UL (ref 0–1)
MONOCYTES NFR BLD AUTO: 7 %
NEUTROPHILS # BLD AUTO: 4.2 K/UL (ref 1.3–7.7)
NEUTROPHILS NFR BLD AUTO: 58 %
PLATELET # BLD AUTO: 204 K/UL (ref 150–450)
POTASSIUM SERPL-SCNC: 3.5 MMOL/L (ref 3.5–5.1)
PT BLD: 19.3 SEC (ref 9–12)
SODIUM SERPL-SCNC: 138 MMOL/L (ref 137–145)
WBC # BLD AUTO: 7.2 K/UL (ref 3.8–10.6)

## 2019-01-06 RX ADMIN — SALINE NASAL SPRAY SCH SPRAY: 1.5 SOLUTION NASAL at 09:26

## 2019-01-06 RX ADMIN — AMPICILLIN SODIUM AND SULBACTAM SODIUM SCH MLS/HR: 2; 1 INJECTION, POWDER, FOR SOLUTION INTRAMUSCULAR; INTRAVENOUS at 23:43

## 2019-01-06 RX ADMIN — PANTOPRAZOLE SODIUM SCH MG: 40 INJECTION, POWDER, FOR SOLUTION INTRAVENOUS at 20:21

## 2019-01-06 RX ADMIN — ASPIRIN SCH: 325 TABLET ORAL at 09:26

## 2019-01-06 RX ADMIN — AMPICILLIN SODIUM AND SULBACTAM SODIUM SCH MLS/HR: 2; 1 INJECTION, POWDER, FOR SOLUTION INTRAMUSCULAR; INTRAVENOUS at 05:21

## 2019-01-06 RX ADMIN — DOXYCYCLINE SCH MLS/HR: 100 INJECTION, POWDER, LYOPHILIZED, FOR SOLUTION INTRAVENOUS at 20:21

## 2019-01-06 RX ADMIN — METOPROLOL TARTRATE SCH MG: 25 TABLET, FILM COATED ORAL at 20:21

## 2019-01-06 RX ADMIN — HYDROCODONE BITARTRATE AND ACETAMINOPHEN PRN EACH: 5; 325 TABLET ORAL at 20:25

## 2019-01-06 RX ADMIN — DOXYCYCLINE SCH MLS/HR: 100 INJECTION, POWDER, LYOPHILIZED, FOR SOLUTION INTRAVENOUS at 09:27

## 2019-01-06 RX ADMIN — AMPICILLIN SODIUM AND SULBACTAM SODIUM SCH MLS/HR: 2; 1 INJECTION, POWDER, FOR SOLUTION INTRAMUSCULAR; INTRAVENOUS at 00:40

## 2019-01-06 RX ADMIN — AMPICILLIN SODIUM AND SULBACTAM SODIUM SCH MLS/HR: 2; 1 INJECTION, POWDER, FOR SOLUTION INTRAMUSCULAR; INTRAVENOUS at 11:42

## 2019-01-06 RX ADMIN — METOPROLOL TARTRATE SCH MG: 25 TABLET, FILM COATED ORAL at 09:26

## 2019-01-06 RX ADMIN — LEVOTHYROXINE SODIUM SCH MCG: 75 TABLET ORAL at 05:21

## 2019-01-06 RX ADMIN — PANTOPRAZOLE SODIUM SCH MG: 40 INJECTION, POWDER, FOR SOLUTION INTRAVENOUS at 09:25

## 2019-01-06 RX ADMIN — AMPICILLIN SODIUM AND SULBACTAM SODIUM SCH MLS/HR: 2; 1 INJECTION, POWDER, FOR SOLUTION INTRAMUSCULAR; INTRAVENOUS at 17:21

## 2019-01-06 NOTE — P.CNOR
History of Present Illness





- \[facility]\""


Consult date: 01/06/19


Consult reason: other (Right foot post surgical infection)


History of present illness: 





The patient is a 58-year-old male who presented to the hospital yesterday with 

a fever and weakness.  The patient states that he did undergo a mid foot fusion 

on 12/12/2018 by a physician at [place].  The patient was seen postoperatively 

and was placed in a cast.  The patient follow-up 1 week later for cast removal 

and was found to have cellulitis to the foot.  The patient was admitted at 

[place] for IV antibiotics.  The patient was released 2 days ago and was seen 

by home care yesterday.  The patient was weak and hypotensive, and the nurse 

suggested him go to the emergency department.  The patient was admitted for IV 

antibiotics and further evaluation.  The patient has been seen by Dr. Sheets, 

infectious disease.  Antibiotics have been adjusted to Unasyn and doxycycline.  

Today, the patient states that he is feeling slightly better.  He denies fever, 

chills, rigors, shortness breath, abdominal pain, and chest pain today.





Review of Systems


Constitutional: Denies chills, Denies fatigue, Denies fever


Cardiovascular: Denies chest pain, Denies shortness of breath


Respiratory: Denies cough


Gastrointestinal: Denies diarrhea, Denies nausea, Denies vomiting


Musculoskeletal: right: foot pain, foot stiffness, foot swelling





Past Medical History


Past Medical History: Rheumatoid Arthritis (RA), Thyroid Disorder


Additional Past Medical History / Comment(s): right foot infection, KIDNEY 

STONES


History of Any Multi-Drug Resistant Organisms: None Reported


Past Surgical History: Tonsillectomy


Additional Past Surgical History / Comment(s): right foot surgery


Past Anesthesia/Blood Transfusion Reactions: No Reported Reaction


Past Psychological History: No Psychological Hx Reported


Smoking Status: Former smoker


Past Alcohol Use History: Occasional


Additional Past Alcohol Use History / Comment(s): QUIT SMOKING 2004


Past Drug Use History: None Reported





- Past Family History


  ** Mother


Family Medical History: Cancer





Medications and Allergies


 Home Medications











 Medication  Instructions  Recorded  Confirmed  Type


 


Leflunomide [Arava] 20 mg PO DAILY 03/29/17 01/04/19 History


 


Levothyroxine Sodium [Synthroid] 150 mcg PO DAILY 03/29/17 01/04/19 History


 


Tofacitinib Citrate [Xeljanz] 11 mg PO DAILY 03/29/17 01/04/19 History


 


Amoxic-Pot Clav 875-125Mg 1 tab PO Q12HR 01/04/19 01/04/19 History





[Augmentin 875-125]    


 


Doxycycline [Vibramycin] 100 mg PO BID 01/04/19 01/04/19 History


 


Ibuprofen [Motrin Ib] 800 mg PO Q4H PRN 01/04/19 01/04/19 History


 


Metoprolol Tartrate [Lopressor] 25 mg PO BID 01/04/19 01/04/19 History


 


Sildenafil Citrate [Viagra] 100 mg PO ONCE 01/04/19 01/04/19 History


 


Sodium Chloride [Ocean] 1 spray EA NOSTRIL DAILY 01/04/19 01/04/19 History











 Allergies











Allergy/AdvReac Type Severity Reaction Status Date / Time


 


No Known Allergies Allergy   Verified 01/04/19 19:00














Physical Examination





The patient does not appear in acute distress.  Alert and orientated x3.  

Incisions appear fine.  There appears to be old blister sites that are healing.

  No obvious purulence noted. There is diffuse swelling and slight erythema to 

the foot.  No abscess is seen.  Sutures are still in place.  Calf is soft and 

nontender.  He is able to wiggle his toes slightly without difficulty.  

Sensation and circulatory status is intact.





Results





- Labs


Labs: 


 Abnormal Lab Results - Last 24 Hours (Table)











  01/05/19 01/06/19 01/06/19 Range/Units





  14:51 06:29 06:29 


 


RBC   3.86 L   (4.30-5.90)  m/uL


 


Hgb   10.1 L   (13.0-17.5)  gm/dL


 


Hct   33.8 L   (39.0-53.0)  %


 


MCHC   30.0 L   (31.0-37.0)  g/dL


 


RDW   16.2 H   (11.5-15.5)  %


 


Eosinophils #   1.3 H   (0-0.7)  k/uL


 


PT    19.3 H  (9.0-12.0)  sec


 


INR    2.0 H  (<1.2)  


 


BUN     (9-20)  mg/dL


 


Creatinine     (0.66-1.25)  mg/dL


 


Glucose     (74-99)  mg/dL


 


Urine Protein  Trace H    (Negative)  














  01/06/19 Range/Units





  06:29 


 


RBC   (4.30-5.90)  m/uL


 


Hgb   (13.0-17.5)  gm/dL


 


Hct   (39.0-53.0)  %


 


MCHC   (31.0-37.0)  g/dL


 


RDW   (11.5-15.5)  %


 


Eosinophils #   (0-0.7)  k/uL


 


PT   (9.0-12.0)  sec


 


INR   (<1.2)  


 


BUN  8 L  (9-20)  mg/dL


 


Creatinine  1.40 H  (0.66-1.25)  mg/dL


 


Glucose  100 H  (74-99)  mg/dL


 


Urine Protein   (Negative)  








 Microbiology - Last 24 Hours (Table)











 01/04/19 19:20 Blood Culture - Preliminary





 Blood    No Growth after 24 hours


 


 01/05/19 02:30 Urine Culture - Preliminary





 Urine,Clean Catch 








 H & H











  01/04/19 01/06/19 Range/Units





  19:20 06:29 


 


Hgb  11.6 L  10.1 L  (13.0-17.5)  gm/dL


 


Hct  38.1 L  33.8 L  (39.0-53.0)  %








 Coagulation











  01/04/19 01/06/19 Range/Units





  19:20 06:29 


 


INR  2.4 H  2.0 H  (<1.2)  











Result Diagrams: 


 01/06/19 06:29





 01/06/19 06:29





- Diagnostic results


Ankle/Foot CT: image reviewed (No large abscesses or fluid collections noted.  

The patient is status post midfoot arthrodesis.)





Assessment and Plan


(1) Right foot infection


Current Visit: Yes   Status: Acute   Code(s): L08.9 - LOCAL INFECTION OF THE 

SKIN AND SUBCUTANEOUS TISSUE, UNSP   SNOMED Code(s): 417025566


   





(2) Fever


Current Visit: Yes   Status: Acute   Code(s): R50.9 - FEVER, UNSPECIFIED   

SNOMED Code(s): 078697590


   


Plan: 





The clinical and CT findings were discussed with the patient.  The case was 

also discussed with Dr. Yonathan Mederos.  No surgical intervention is warranted at 

this time.  The patient will continue IV antibiotics per infectious disease.  

He was encouraged to elevate the foot.  Continue nonweightbearing to the right 

lower extremity.  The patient does have a follow-up with his surgeon on 

Thursday.  Ideally, if we can get the patient discharged home on either oral or 

IV antibiotics with a follow-up with his surgeon this week.  If the infection 

continues to worsen, the patient may be need a transfer to [facility].  

We will continue to follow patient closely and make further recommendations as 

needed.

## 2019-01-06 NOTE — CONS
CONSULTATION



DATE OF SERVICE:

01/05/2019



REASON FOR CONSULTATION:

Left foot postsurgical infection.



HISTORY OF PRESENT ILLNESS:

The patient is a 58-year-old  male who is status post a left ankle 
fusion done

at [facility].  The patient did mention that his postop course was 
complicated

by developing of an infection that needs debridement and drainage of an abscess.

However, the patient is not sure about the name of this infection.  He did have 
another

admission to the hospital and was just released from the [facility] 
yesterday

on oral Augmentin and doxycycline.  The patient did have a home care arranged. 
When the

home care nurse came to open him to home care,  noted the patient was slightly

hypertensive.  The patient said he was feeling weak and tired and no energy.  
The home

care nurse did advise the patient to go to the hospital to be evaluated.  The 
patient

denies high-grade fever, rigors or chills.  The patient did have mild dull 
aching pain

to his right foot with intensity of about 4 to 5/10, and no radiation.  The 
patient did

have a necrotic patch on the dorsum of his right foot as well as on his middle 
part of

the foot.  The patient said that his surgeon was keeping it dry and let the 
skin dry

off and peel by itself and was not applying any specific lotions to it.  Once 
again,

the patient did not remember if there was any specific bacteria growing in those

cultures.  However, he was advised Augmentin and doxycycline.  The patient 
denies

having any chest pain or shortness of breath.  No cough.  No abdominal pain.  No

nausea, vomiting, or any diarrhea.  The patient was evaluated by the ER 
physician.  The

patient was started on Unasyn and Doxycycline.  Infectious Disease  was 
consulted

for further recommendation regarding antibiotic therapy.  The patient did have 
a CT of

the right foot completed which shows extensive postsurgical midfoot arthrodesis 
with

plate and screw fixation extensive metal hardware.  Soft tissue swelling but no 
large

abscess.



REVIEW OF SYSTEMS:

CONSTITUTIONAL: Positive for weakness.  Denies any fever.  Eyes no complaint.  
ENT no

complaint.  Respiratory no complaint.  Cardiovascular no complaint.  
Genitourinary no

complaint.

GASTROINTESTINAL:  No complaint. Musculoskeletal as per HPI.

INTEGUMENTARY as per HPI.  Psychological no complaint.  Endocrine no complaint.

Neurological no complaint.



PAST MEDICAL HISTORY:

Rheumatoid arthritis, hypothyroidism, kidneys stones.  Right foot infection.



PAST SURGICAL HISTORY:

Tonsillectomy, right foot arthrodesis.



SOCIAL HISTORY:

Remote history of smoking.  Occasionally drinks.  No drug use.



FAMILY HISTORY:

Mother with history of cancer.



ALLERGIES:

No known drug allergies.



MEDICATION:

Medications include the patient is currently on Norco, Unasyn 3 g q.6h, 
Doxycycline

 Dilaudid. Synthroid, Lopressor, Zofran, Protonix and Restoril.



EXAMINATION:

Blood pressure is 120/60 with a pulse of 111, temperature 98.1.  He is 94% on 
room air.

General description is a middle-aged male lying in bed in no distress.  No 
tachypnea or

accessory muscles of respiration use.

HEENT:  Shows slight pallor.  No scleral icterus.  Oral mucosa membranes are 
dry.  No

pharyngeal erythema or thrush.  Neck:  Trachea central.  No thyromegaly.  Lungs

unlabored breathing.  Clear to auscultation anteriorly.  Heart S1, S2.  Regular 
rate

and rhythm.

ABDOMEN:  Soft, no tenderness.  Extremities:  No edema of the feet.  
Examination of the

right foot,  the patient did have a necrotic wound on dorsum of the right foot 
with

minimal surrounding swelling.  No significant redness.  Minimal warm to touch 
and foul-

smelling drainage.  Neurological:  Patient is awake, alert, oriented times 
three. Mood

and affect normal.



LABS:

Hemoglobin is 11.1, white count of 8.9 with a BUN of 10, creatinine 1.51.  AST 
slightly

elevated.  UA has been negative.  Lactic acid 2.3.  CT report as mentioned 
above.



DIAGNOSTIC IMPRESSION AND PLAN:

Patient with recent right foot arthrodesis, fusion in this patient whose 
clinical

course complicated by postop infection for which the patient was managed at 
[facility].  The patient was supposed to be on oral Augmentin in the outpatient 
setting.

The patient not sure the type of infection he has.  CT of the foot did not show 
any

deep abscess.



PLAN:

1. We will try to obtain the culture data from [facility].

2. We will keep the patient on Unasyn 3 g q.6h and Doxycycline which can be

    transitioned to oral.

3. We will apply dry dressings to keep the area dry.  Ideally, would have 
advised

    Santyl to the necrotic skin to take the slough off.  However, the patient 
did

    mention that his orthopedic surgeon did not want him having any cream 
applied to

    it.

4. The patient may benefit to be seen by a general surgeon and possible 
transfer to

    the [facility].

5. We will follow up on his clinical condition to further adjust medication if 
needed.

Thank you for this consultation. We will follow this patient with you.





AMIRAL / IJN: [id number] / Job#: [id number]

MTDD

## 2019-01-06 NOTE — PN
PROGRESS NOTE



DATE OF SERVICE:

01/06/2019.



REASON FOR FOLLOWUP:

Left foot postsurgical infection.



INTERVAL HISTORY:

The patient is currently afebrile.  He has been breathing comfortably.  Denies 
having

any chest pain or shortness of breath, cough, abdominal pain. Mostly has pain 
to the

left foot area.



PHYSICAL EXAMINATION:

Blood pressure 110/72 with a pulse of 105, temperature 98.4.

GENERAL DESCRIPTION: A middle-aged male lying in bed in no distress.

RESPIRATORY SYSTEM:  Unlabored breathing. Clear to auscultation anteriorly.

HEART: S1, S2.  Regular rate and rhythm.

ABDOMEN: Soft, no tenderness.

EXTREMITIES: Right foot is currently dressed up, no obvious drainage on the 
dressing.



LABS:

Blood culture has been negative so far.  No cultures has been provided from 
[facility].



DIAGNOSTIC IMPRESSION AND PLAN:

Patient with right foot fusion with postop surgical infection.  The patient did 
have a

necrotic patch on the dorsum of the right foot with necrotic patch seen on the 
right foot

lateral border.  The patient is currently running low-grade fever, however, 
white count

has been normal.  Blood culture has been negative.  We will try to obtain the 
micro

data from [facility] tomorrow and the patient remains to be afebrile he 
can

transitioned to oral antibiotics and maybe to go home with instructions to 
follow up

with his physician at [facility].  Continue supportive care.





MMODL / IJN: [id number] / Job#: [id number]

MTDD

## 2019-01-07 VITALS — RESPIRATION RATE: 20 BRPM | SYSTOLIC BLOOD PRESSURE: 110 MMHG | TEMPERATURE: 98 F | DIASTOLIC BLOOD PRESSURE: 72 MMHG

## 2019-01-07 LAB
ANION GAP SERPL CALC-SCNC: 7 MMOL/L
BASOPHILS # BLD AUTO: 0 K/UL (ref 0–0.2)
BASOPHILS NFR BLD AUTO: 1 %
BUN SERPL-SCNC: 6 MG/DL (ref 9–20)
CALCIUM SPEC-MCNC: 8.5 MG/DL (ref 8.4–10.2)
CHLORIDE SERPL-SCNC: 107 MMOL/L (ref 98–107)
CO2 SERPL-SCNC: 26 MMOL/L (ref 22–30)
EOSINOPHIL # BLD AUTO: 1.2 K/UL (ref 0–0.7)
EOSINOPHIL NFR BLD AUTO: 14 %
ERYTHROCYTE [DISTWIDTH] IN BLOOD BY AUTOMATED COUNT: 3.82 M/UL (ref 4.3–5.9)
ERYTHROCYTE [DISTWIDTH] IN BLOOD: 16.2 % (ref 11.5–15.5)
GLUCOSE SERPL-MCNC: 99 MG/DL (ref 74–99)
HCT VFR BLD AUTO: 32.7 % (ref 39–53)
HGB BLD-MCNC: 9.9 GM/DL (ref 13–17.5)
INR PPP: 1.9 (ref ?–1.2)
LYMPHOCYTES # SPEC AUTO: 1.1 K/UL (ref 1–4.8)
LYMPHOCYTES NFR SPEC AUTO: 14 %
MCH RBC QN AUTO: 25.9 PG (ref 25–35)
MCHC RBC AUTO-ENTMCNC: 30.3 G/DL (ref 31–37)
MCV RBC AUTO: 85.6 FL (ref 80–100)
MONOCYTES # BLD AUTO: 0.6 K/UL (ref 0–1)
MONOCYTES NFR BLD AUTO: 7 %
NEUTROPHILS # BLD AUTO: 5 K/UL (ref 1.3–7.7)
NEUTROPHILS NFR BLD AUTO: 62 %
PLATELET # BLD AUTO: 212 K/UL (ref 150–450)
POTASSIUM SERPL-SCNC: 3.1 MMOL/L (ref 3.5–5.1)
PT BLD: 18.7 SEC (ref 9–12)
SODIUM SERPL-SCNC: 140 MMOL/L (ref 137–145)
WBC # BLD AUTO: 8 K/UL (ref 3.8–10.6)

## 2019-01-07 RX ADMIN — HYDROCODONE BITARTRATE AND ACETAMINOPHEN PRN EACH: 5; 325 TABLET ORAL at 08:36

## 2019-01-07 RX ADMIN — SALINE NASAL SPRAY SCH SPRAY: 1.5 SOLUTION NASAL at 08:33

## 2019-01-07 RX ADMIN — PANTOPRAZOLE SODIUM SCH MG: 40 INJECTION, POWDER, FOR SOLUTION INTRAVENOUS at 08:33

## 2019-01-07 RX ADMIN — DOXYCYCLINE SCH MLS/HR: 100 INJECTION, POWDER, LYOPHILIZED, FOR SOLUTION INTRAVENOUS at 08:33

## 2019-01-07 RX ADMIN — LEVOTHYROXINE SODIUM SCH MCG: 75 TABLET ORAL at 06:01

## 2019-01-07 RX ADMIN — METOPROLOL TARTRATE SCH MG: 25 TABLET, FILM COATED ORAL at 08:33

## 2019-01-07 RX ADMIN — ASPIRIN SCH: 325 TABLET ORAL at 08:32

## 2019-01-07 RX ADMIN — AMPICILLIN SODIUM AND SULBACTAM SODIUM SCH MLS/HR: 2; 1 INJECTION, POWDER, FOR SOLUTION INTRAMUSCULAR; INTRAVENOUS at 13:21

## 2019-01-07 RX ADMIN — AMPICILLIN SODIUM AND SULBACTAM SODIUM SCH MLS/HR: 2; 1 INJECTION, POWDER, FOR SOLUTION INTRAMUSCULAR; INTRAVENOUS at 06:01

## 2019-01-07 NOTE — PN
PROGRESS NOTE



DATE OF SERVICE:

01/06/2019



HISTORY OF PRESENT ILLNESS:

This 58-year-old gentleman admitted with right foot infection with failure of

outpatient treatment possible sepsis. Multiple consultants are following the 
patient

closely.  Infectious Disease, Dr. Sheets, has also seen the patient.  Patient had

previous surgery, recent surgery at [facility]. The patient was 
also

admitted at [facility] for some infection, details are not 
available at

this time.  The patient came over the weekend. The patient had necrotic  on the

dorsum of the foot.



PHYSICAL EXAMINATION:

On exam, alert and oriented x3.  Pulse is 105, blood pressure 110/62, 
respirations 16,

temperature 99.4, pulse ox 96% on room air.

HEENT: Conjunctivae normal.

NECK: No jugular venous distention.

CARDIOVASCULAR:  S1, S2 muffled.

RESPIRATORY:  Breath sounds diminished at the bases. No rhonchi, no crackles.

ABDOMEN: Soft, nontender.

LEGS: No edema, no swelling.

NERVOUS SYSTEM: No focal deficits.



The foot CAT scan showed extensive postsurgical midfoot arthrodesis and 
extensive

hardware and interval osteotomies also noted.



ASSESSMENT:

1. Right foot infection post surgery with failure of outpatient treatment with

    possible sepsis, present on admission.

2. History of recent foot surgery and infection at [facility].

3. Increased creatinine with acute renal failure, possibly prerenal, acute 
tubular

    necrosis, present on admission.

4. Hypokalemia.

5. Anemia, normocytic anemia of chronic disease.

6. History of rheumatoid arthritis.

7. History of hypothyroidism.

8. History of kidney stones.

9. Coumadin monitoring.



RECOMMENDATIONS AND DISCUSSION:

Recommend to continue current medications. Continued with monitoring and 
symptomatic

treatment.  Otherwise cultures are negative so far.  Continue with broad-
spectrum IV

antibiotics and we will follow the patient closely with consultants and will 
consider

transfer to [facility]. Guarded prognosis. Further 
recommendations to

follow.





MMODL / IJN: [id number] / Job#: [id number]

SURAJ

## 2019-01-07 NOTE — PN
PROGRESS NOTE



DATE OF SERVICE:

01/07/2019



REASON FOR FOLLOWUP:

Right foot cellulitis, post surgical.



INTERVAL HISTORY:

The patient is currently afebrile.  He is breathing comfortably.  Denies having any

chest pain or shortness of breath or cough. No abdominal pain or any worsening pain to

the right foot area.



PHYSICAL EXAMINATION:

Blood pressure 110/72 with a pulse of 98, temperature 98. He is 93% on room air.

General description is a middle-aged male lying in bed in no distress.

RESPIRATORY SYSTEM: Unlabored breathing. Clear to auscultation anteriorly.

HEART: S1, S2.  Regular rate and rhythm.

ABDOMEN:  Soft.

Right foot overall swelling and redness have improved.  He did have a necrotic wound on

the dorsum of the right foot but no surrounding cellulitis or any drainage.  The right

foot lateral medial border incision is currently intact with some dried-up blood but no

definite cellulitis.



LABS:

Hemoglobin is 9.9, white count 8.0, with a BUN of 6, creatinine 1.12.



DIAGNOSTIC IMPRESSION AND PLAN:

Patient with right foot cellulitis, post surgical.  The patient did have extensive

surgery done at [facility] and was on oral Augmentin and doxycycline, currently

on Unasyn and doxycycline. That will be transitioned to oral Augmentin and doxycycline.

Local care with non-adherent dressing and mild Ace wrap to keep some of the swelling

down. Followup with his surgeon next week.  Plan of care was discussed in detail with

the family members as well as the attending physician working on discharge.





MMODL / IJN: [id number] / Job#: [id number]

## 2019-01-08 NOTE — DS
DISCHARGE SUMMARY



DATE OF ADMISSION:

January 4, 2019.



DATE OF DISCHARGE:

January 7, 2019.



FINAL DIAGNOSES:

1. Acute right foot wound with cellulitis from recent surgery.

2. Obesity BMI 31.0.

3. Recent right foot surgery done at [facility] on December 11.

4. Chronic rheumatoid arthritis.

5. Hypothyroid.



CONSULTATIONS:

Dr. Sheets from Infectious Disease, Dr. Mederos from [facility].



HOSPITAL COURSE:

This patient had a foot surgery done at [facility] around December 11, which was

primarily a midfoot fusion.  The patient was put in a cast and after a week, the cast

was removed and patient found have cellulitis of the foot.  The patient was admitted to

[place] for IV antibiotics and was discharged 2 days prior to admission here with

Augmentin and doxycycline.  The patient was admitted here and started on antibiotics

and responded well to the same.  Doing much better.  Today, the wound was looking much

better than when he had come in.  I discussed with Dr. Sheets from Infectious Disease.

The patient is to be discharged on Augmentin and doxycycline and follow up with the

surgeon, which he has an appointment in the next 3 days.  The patient also seen by

Orthopedics and no further surgical intervention by them.  Overall feeling better.

Pain is much controlled.  The patient's blood cultures were negative.



PHYSICAL EXAMINATION:

Temperature 98, pulse 98, respiration 20, blood pressure 110/72, pulse ox 93 percent.

Right foot in a dressing.  Lungs fair entry.



LABS:

White count 8, hemoglobin 9.9, potassium 3.1, BUN 6, creatinine 1.12.



Discussion and discharge planning more than 35 minutes.



DISCHARGE MEDICATIONS:

1. Arava 20 mg daily.

2. Synthroid 150 mcg daily.

3. Xeljanz 11 mg p.o. daily.

4. Augmentin 875 1 tablet p.o. q.12h.

5. Doxycycline 100 mg b.i.d.

6. Motrin 800 mg q.4 p.r.n.

7. Lopressor 25 mg b.i.d.

8. Viagra p.r.n.

9. Ocean spray as before.



FOLLOWUP:

With his [place], where he had surgery in 3 days.  Dr. Smith in 1 week.  [facility] to continue.



Copy to Dr. Smith.





MMODL / IJN: [id number] / Job#: [id number]

## 2019-01-09 NOTE — CDI
Documentation Clarification Form



Date: 1/9/2019 9:31:38 AM

From: Katja Celestin

Phone: If you have a question about this query, please contact Katelynn Nagy 
 at [phone] between 8am and 5pm.

MRN: [id number]

Admit Date: 1/4/2019 9:43:00 PM

Patient Name: Trevon Kaufman

Visit Number: [id number]

Discharge Date:  1/7/2019 5:55:00 PM



ATTENTION: The Clinical Documentation Specialists (CDI) and [facility] Coding Staff 
appreciate your assistance in clarifying documentation. Please respond to the 
clarification below the line at the bottom and electronically sign. The CDI & 
[facility] Coding staff will review the response and follow-up if needed. Please note: 
Queries are made part of the Legal Health Record. If you have any questions, 
please contact the author of this message via ITS.

Dr. Tatianna Myers



Acute Renal failure possibly prerenal ATN was documented in the H and P.  
Please clarify if patient at prerenal ATN

Patients admitting BUN/CR/GFR:  BUN 10  Creat/ 1.51  GRF 51

Clinical Indicators:  dehydration, hypomag, malnutrit

Treatment:   hydration

IVF   YES





In order to capture the severity of condition, please clarify if the condition 
signifies:

 Acute renal failure, Please specify etiology (if known): 

       Cortical Necrosis

?    Medullary Necrosis

?    Tubular Necrosis

   Other, please specify _________

Unable to determine





__________________________________________________________________________



Unable to determine
MTDD

## 2019-01-09 NOTE — CDI
Documentation Clarification Form



Date: 1/9/2019 9:15:35 AM

From: Katja Celestin

Phone: If you have a question about this query, please contact Katelynn Nagy, 
 at [phone] between 8am and 5pm.

Katelynn Nagy MBA, RHIA, CCS, CCDS



[facility]

Juan Arias

[email or web address]

[phone]

MRN: [id number]

Admit Date: 1/4/2019 9:43:00 PM

Patient Name: Trevon Kaufman

Visit Number: [id number]

Discharge Date:  1/7/2019 5:55:00 PM



ATTENTION: The Clinical Documentation Specialists (CDI) and [facility] Coding Staff 
appreciate your assistance in clarifying documentation. Please respond to the 
clarification below the line at the bottom and electronically sign. The CDI & 
[facility] Coding staff will review the response and follow-up if needed. Please note: 
Queries are made part of the Legal Health Record. If you have any questions, 
please contact the author of this message via ITS.

Dr. Myers:



The patient presented with the following post foot fusion cellulitis.  H and P 
documents possible sepsis.  Please clarify if patient had sepsis or was it 
ruled out.  Not carried to [facility].

History/Risk Factors: postoperative cellulitis

Clinical Indicators: failed outpatient treatment, cellulitis, tachcardia 114

WBC 8.9       

Lactic acid: 

Blood cultures: negative

Vitals signs on admission: 98.6F, 114, 18, 108/69, 95%

Treatment: IV abx.

ID Consult: post of infection

Antibiotics: Unasyn and Doxicycline

Other: Monitored

In your professional opinion, please clarify if patient had sepsis or was it 
ruled out.

Condition

   Sepsis ruled out

      Sepsis

      Other, please specify  _____________

   Unable to determine

SIRS Criteria (2 or more of the following may indicate SIRS):

-Temperature   < 96.8F (36C) or > 101.0F (38.3C)

-Heart Rate   > 90 bpm

-Respiratory Rate   > 20 breaths/min or PaCO2 < 32 mmHg

-White Blood Cell Count   > 12,000 or < 4,000 cells/mm3 or > 10% bands

-Lactate   >2.0 mmol/L (>4.0 is equivalent to septic shock)

___________________________________________________________________

Unable to determine
MTDD

## 2019-09-04 ENCOUNTER — HOSPITAL ENCOUNTER (OUTPATIENT)
Dept: HOSPITAL 47 - RADCTMAIN | Age: 59
End: 2019-09-04
Attending: INTERNAL MEDICINE
Payer: COMMERCIAL

## 2019-09-04 DIAGNOSIS — J90: ICD-10-CM

## 2019-09-04 DIAGNOSIS — R91.8: ICD-10-CM

## 2019-09-04 DIAGNOSIS — M05.10: ICD-10-CM

## 2019-09-04 DIAGNOSIS — I31.3: Primary | ICD-10-CM

## 2019-09-04 PROCEDURE — 71260 CT THORAX DX C+: CPT

## 2019-09-05 NOTE — CT
EXAMINATION TYPE: CT chest w con

 

DATE OF EXAM: 9/4/2019

 

COMPARISON: 11/26/2018

 

HISTORY: Rheumatoid lung disease. Cough.

 

CT DLP: 845.6 mGycm, Automated exposure control for dose reduction was used.

 

CONTRAST: Performed injected with 100ml mL of Isovue 300.

 

TECHNIQUE: Axial images were obtained at 5 mm thick sections.  Reconstructed images are reviewed on [facility] computer in the coronal plane. 

 

FINDINGS: Portion of the thyroid visualized is normal.

 

Tiny pleural-based nodule may be present in the upper outer right chest. This was present previously 
and unchanged. Series 4 image 16. 

 

Azygos fissure is present, normal variant.

 

There is a 0.5 cm nodule within the medial left upper lobe. Series 4 image 21. This is a new finding 
from 2018. There is a 0.7 cm nodule adjacent to the aortic arch and the left apex, series 4 image 21.
 This is a new finding.

 

There is a stable pleural-based nodular type density measuring 0.9 cm anterior left lung. Series 4 im
age 29.

 

There is a 0.9 cm nodular density which appears pleural-based in the posterior left mid lung. Series 
4 image 34. This was present previously. A lobular density in the periphery of the right posterior la
teral lung measures 1.8 cm and is somewhat smaller than the comparison study. Series 4 image 35. Smal
l consolidation or density is in the posterior medial left lung, series 4 image 35. This area is smal
ler than the comparison.

 

There is a cavitary lesion within the periphery of the left mid to lower lung field adjacent to the p
leural margin measuring 2.9 cm. This area is stable in size and cavitation appears to be new. A simil
ar cavitary area is in the posterior medial left lung measures 1.3 cm and is smaller than the compari
son study, Cavitation is new. Pleural-based density measuring 1.3 cm and the left lower lobe. Series 
4 image 42 has a crescentic lucency may be a small developing cavitary lesion. This nodule is smaller
 than comparison.

 

There is a 0.9 cm nodule in the right lower lobe. Series 4 image 42.  This may be slightly larger and
 more dense than the comparison's 0.5 cm.

 

There is a pleural-based nodule measuring 2.9 cm x 1.2 cm which is smaller than the comparison study.
 Series 4 image 45. There are larger cavitary lesions present previously in the posterior bilateral l
heladio bases. The cavitation on the right may be new. Series 4 image 54. This pleural-based nodule measu
res 2.2 cm which is slightly larger than 1.8 cm previous.

 

There is a lobular density within the posterior medial right lower lobe measuring 2.5 x 1.1 cm presen
t previously.

 

No enlarged mediastinal or hilar adenopathy is evident.   The ascending aorta diameter at the level o
f the main pulmonary artery is 4.0 cm.  The main pulmonary artery diameter at the bifurcation is 3.2 
cm.

 

Very subtle minimal pericardial effusion may be present. Small lateral pleural effusions are present 
slightly larger on the left.

 

Limited CT sections are obtained through the upper abdomen. A cyst is superior lateral left kidney cm
perior pole measuring 3.8 cm and 7 Hounsfield units

 

IMPRESSIONS:

1. Multiple bilateral lung nodules. Couple of small subcentimeter nodules are new. There is some new 
nodular cavitations present. Majority of the nodules previously identified remain present and stable 
from comparison.

2. Minimal pericardial effusion may be present. Small left and minimal right pleural effusion may be 
present.

## 2019-09-28 ENCOUNTER — HOSPITAL ENCOUNTER (INPATIENT)
Dept: HOSPITAL 47 - EC | Age: 59
LOS: 3 days | Discharge: HOME | DRG: 872 | End: 2019-10-01
Attending: HOSPITALIST | Admitting: HOSPITALIST
Payer: COMMERCIAL

## 2019-09-28 VITALS — BODY MASS INDEX: 32.9 KG/M2

## 2019-09-28 DIAGNOSIS — L84: ICD-10-CM

## 2019-09-28 DIAGNOSIS — E03.9: ICD-10-CM

## 2019-09-28 DIAGNOSIS — Z98.1: ICD-10-CM

## 2019-09-28 DIAGNOSIS — Z79.52: ICD-10-CM

## 2019-09-28 DIAGNOSIS — L03.115: ICD-10-CM

## 2019-09-28 DIAGNOSIS — E87.2: ICD-10-CM

## 2019-09-28 DIAGNOSIS — Z79.899: ICD-10-CM

## 2019-09-28 DIAGNOSIS — D63.8: ICD-10-CM

## 2019-09-28 DIAGNOSIS — Z87.442: ICD-10-CM

## 2019-09-28 DIAGNOSIS — Z79.890: ICD-10-CM

## 2019-09-28 DIAGNOSIS — E66.9: ICD-10-CM

## 2019-09-28 DIAGNOSIS — L97.419: ICD-10-CM

## 2019-09-28 DIAGNOSIS — G89.29: ICD-10-CM

## 2019-09-28 DIAGNOSIS — I10: ICD-10-CM

## 2019-09-28 DIAGNOSIS — S90.821A: ICD-10-CM

## 2019-09-28 DIAGNOSIS — A41.9: Primary | ICD-10-CM

## 2019-09-28 DIAGNOSIS — Z87.891: ICD-10-CM

## 2019-09-28 DIAGNOSIS — R91.8: ICD-10-CM

## 2019-09-28 DIAGNOSIS — M06.9: ICD-10-CM

## 2019-09-28 LAB
ALBUMIN SERPL-MCNC: 3.3 G/DL (ref 3.5–5)
ALP SERPL-CCNC: 123 U/L (ref 38–126)
ALT SERPL-CCNC: 68 U/L (ref 21–72)
ANION GAP SERPL CALC-SCNC: 9 MMOL/L
AST SERPL-CCNC: 50 U/L (ref 17–59)
BASOPHILS # BLD AUTO: 0.1 K/UL (ref 0–0.2)
BASOPHILS NFR BLD AUTO: 1 %
BUN SERPL-SCNC: 21 MG/DL (ref 9–20)
CALCIUM SPEC-MCNC: 8.8 MG/DL (ref 8.4–10.2)
CHLORIDE SERPL-SCNC: 106 MMOL/L (ref 98–107)
CO2 SERPL-SCNC: 27 MMOL/L (ref 22–30)
EOSINOPHIL # BLD AUTO: 0.1 K/UL (ref 0–0.7)
EOSINOPHIL NFR BLD AUTO: 1 %
ERYTHROCYTE [DISTWIDTH] IN BLOOD BY AUTOMATED COUNT: 4.43 M/UL (ref 4.3–5.9)
ERYTHROCYTE [DISTWIDTH] IN BLOOD: 18.3 % (ref 11.5–15.5)
GLUCOSE SERPL-MCNC: 101 MG/DL (ref 74–99)
HCT VFR BLD AUTO: 39 % (ref 39–53)
HGB BLD-MCNC: 12.3 GM/DL (ref 13–17.5)
LYMPHOCYTES # SPEC AUTO: 1.1 K/UL (ref 1–4.8)
LYMPHOCYTES NFR SPEC AUTO: 7 %
MCH RBC QN AUTO: 27.8 PG (ref 25–35)
MCHC RBC AUTO-ENTMCNC: 31.6 G/DL (ref 31–37)
MCV RBC AUTO: 87.9 FL (ref 80–100)
MONOCYTES # BLD AUTO: 0.8 K/UL (ref 0–1)
MONOCYTES NFR BLD AUTO: 5 %
NEUTROPHILS # BLD AUTO: 13.2 K/UL (ref 1.3–7.7)
NEUTROPHILS NFR BLD AUTO: 85 %
PLATELET # BLD AUTO: 177 K/UL (ref 150–450)
POTASSIUM SERPL-SCNC: 3.8 MMOL/L (ref 3.5–5.1)
PROT SERPL-MCNC: 6.5 G/DL (ref 6.3–8.2)
SODIUM SERPL-SCNC: 142 MMOL/L (ref 137–145)
WBC # BLD AUTO: 15.5 K/UL (ref 3.8–10.6)

## 2019-09-28 PROCEDURE — 36415 COLL VENOUS BLD VENIPUNCTURE: CPT

## 2019-09-28 PROCEDURE — 96368 THER/DIAG CONCURRENT INF: CPT

## 2019-09-28 PROCEDURE — 83605 ASSAY OF LACTIC ACID: CPT

## 2019-09-28 PROCEDURE — 80202 ASSAY OF VANCOMYCIN: CPT

## 2019-09-28 PROCEDURE — 80048 BASIC METABOLIC PNL TOTAL CA: CPT

## 2019-09-28 PROCEDURE — 96365 THER/PROPH/DIAG IV INF INIT: CPT

## 2019-09-28 PROCEDURE — 96376 TX/PRO/DX INJ SAME DRUG ADON: CPT

## 2019-09-28 PROCEDURE — 99285 EMERGENCY DEPT VISIT HI MDM: CPT

## 2019-09-28 PROCEDURE — 86140 C-REACTIVE PROTEIN: CPT

## 2019-09-28 PROCEDURE — 87040 BLOOD CULTURE FOR BACTERIA: CPT

## 2019-09-28 PROCEDURE — 96366 THER/PROPH/DIAG IV INF ADDON: CPT

## 2019-09-28 PROCEDURE — 96375 TX/PRO/DX INJ NEW DRUG ADDON: CPT

## 2019-09-28 PROCEDURE — 85025 COMPLETE CBC W/AUTO DIFF WBC: CPT

## 2019-09-28 PROCEDURE — 80053 COMPREHEN METABOLIC PANEL: CPT

## 2019-09-28 RX ADMIN — LEVOTHYROXINE SODIUM SCH MCG: 75 TABLET ORAL at 14:05

## 2019-09-28 RX ADMIN — ACETAMINOPHEN PRN MG: 325 TABLET, FILM COATED ORAL at 17:19

## 2019-09-28 RX ADMIN — CEFAZOLIN SCH MLS/HR: 330 INJECTION, POWDER, FOR SOLUTION INTRAMUSCULAR; INTRAVENOUS at 20:40

## 2019-09-28 RX ADMIN — SODIUM CHLORIDE SCH MLS/HR: 9 INJECTION, SOLUTION INTRAVENOUS at 22:12

## 2019-09-28 RX ADMIN — ASPIRIN SCH: 325 TABLET ORAL at 13:46

## 2019-09-28 RX ADMIN — MORPHINE SULFATE PRN MG: 4 INJECTION, SOLUTION INTRAMUSCULAR; INTRAVENOUS at 14:29

## 2019-09-28 RX ADMIN — LEFLUNOMIDE SCH MG: 20 TABLET ORAL at 14:04

## 2019-09-28 RX ADMIN — MORPHINE SULFATE PRN MG: 4 INJECTION, SOLUTION INTRAMUSCULAR; INTRAVENOUS at 19:26

## 2019-09-28 RX ADMIN — CEFAZOLIN SCH MLS/HR: 330 INJECTION, POWDER, FOR SOLUTION INTRAMUSCULAR; INTRAVENOUS at 10:44

## 2019-09-28 RX ADMIN — ENOXAPARIN SODIUM SCH MG: 40 INJECTION SUBCUTANEOUS at 22:19

## 2019-09-28 RX ADMIN — MORPHINE SULFATE PRN MG: 4 INJECTION, SOLUTION INTRAMUSCULAR; INTRAVENOUS at 09:15

## 2019-09-28 RX ADMIN — METOPROLOL SUCCINATE SCH MG: 50 TABLET, EXTENDED RELEASE ORAL at 14:04

## 2019-09-28 RX ADMIN — HYDROCORTISONE SODIUM SUCCINATE SCH MG: 100 INJECTION, POWDER, FOR SOLUTION INTRAMUSCULAR; INTRAVENOUS at 22:19

## 2019-09-28 NOTE — P.HPIM
History of Present Illness


H&P Date: 19


Chief Complaint: Right foot pain


History of present complaint:


This is a very pleasant 58-year-old patient of Dr. Smith.  Follows with 

rheumatologist Dr. Florecita Rider.  Patient lost his arch of his right foot 

and had surgery done at [facility] in 2018.  This was mid foot 

fusion.  Patient had a wound and was admitted in January of this year to our 

hospital.  Tensing the Dr. Sheets from infectious disease.  And was discharged 

back to her surgeon.  Patient continued to well.  Patient about a new set of 

compression stockings 3 days ago.  Had it on for work the next day.  Came home 

took a shower and then he noticed right foot to be swollen and painful.  Also 

developed a fever.  No drainage from the foot.  Admitted for the same.  Patient 

started on antibiotics..  Appetite is decreased.  No chills.





Review of systems:


GEN.:  Fever


EYES: None


HEENT: None


NECK: None


RESPIRATORY: None


CARDIOVASCULAR: None


GASTROINTESTINAL: None


GENITOURINARY: None


MUSCULOSKELETAL: As above


LYMPHATICS: None


HEMATOLOGICAL: None  


PSYCHIATRY: None


NEUROLOGICAL: None





Past medical history to include:


Rheumatoid arthritis, hypothyroid, right foot infection, kidney stones, 

bilateral lung nodules, hypertension





Social history:


Does smoke in the past.  Stopped in .  Alcohol occasionally.





Family history:


Reviewed, noncontributory to presentation








Physical examination:


VITAL SIGNS: 100.3, 116, 16, 140/82, 96% room air


GENERAL: BMI 33.0, laying in bed a bit anxious.


EYES: Pupils equal.  Conjunctiva normal.


HEENT: External appearance of nose and ears normal, oral cavity grossly normal.


NECK: JVD not raised; masses not palpable.


HEART: First and second heart sounds are normal;  no edema.  


LUNGS: Respiratory rate normal; clear to auscultation.  


ABDOMEN: Soft,  nontender, liver spleen not palpable, no masses palpable.  


PSYCH: Alert and oriented x3;  mood  and affect normal.  


NEUROLOGICAL: Cranial nerves grossly intact; no facial asymmetry,   power and se

nsation grossly intact. 


LYMPHATICS: No lymph nodes palpable in the axilla and neck


MUSCULOSKELETAL: Right foot is swollen, some tenderness just anterior to the 

ankle.  Slight increased redness





INVESTIGATIONS, reviewed in the clinical context:


White count 15.5 hemoglobin 12.3 potassium 3.8 121 creatinine 1.03


Lactic acid 2.2


Right foot x-ray-shows some soft tissue swelling evidence of previous surgery





Assessment:


-This patient with known rheumatoid arthritis who had loss of arch followed by 

mid foot fusion, border.  A new compression stockings and patient wore it for a 

day.  By evening patient's pain and swelling in the foot had increased also 

developed fever with a sepsis-like picture.  Patient definitely has cellulitis 

cannot rule out underlying infection.  Patient has sepsis


-Lactic acidosis from sepsis


-Chronic rheumatoid arthritis


-Hypothyroid


-Essential hypertension


-Obesity BMI 33.0





Plan:


Consultation orthopedics and infectious disease was done.  Patient is on IV 

vancomycin.  Home medications resumed.  Patient be put on stress dose of IV 

hydrocortisone.  Care was discussed with the patient.  Questions were answered. 

Lovenox for DVT prophylaxis.  Patient put on IV fluids.





Past Medical History


Past Medical History: Hypertension, Pneumonia, Rheumatoid Arthritis (RA), 

Thyroid Disorder


Additional Past Medical History / Comment(s): right foot infection, KIDNEY 

STONES, nodules in bilat lungs.


History of Any Multi-Drug Resistant Organisms: None Reported


Past Surgical History: Back Surgery, Tonsillectomy


Additional Past Surgical History / Comment(s): right foot surgery, ganglion cyst

removal from bilat wrists.  back surgery secondary to herniated disc.  arthritis

nodule removal from bilat arms and right thigh.


Past Anesthesia/Blood Transfusion Reactions: No Reported Reaction


Past Psychological History: No Psychological Hx Reported


Smoking Status: Former smoker


Past Alcohol Use History: Occasional


Additional Past Alcohol Use History / Comment(s): QUIT SMOKING 


Past Drug Use History: None Reported





- Past Family History


  ** Mother


Family Medical History: Cancer





  ** Father


Family Medical History: No Reported History


Additional Family Medical History / Comment(s): states, "he just ."





Medications and Allergies


                                Home Medications











 Medication  Instructions  Recorded  Confirmed  Type


 


Leflunomide [Arava] 20 mg PO DAILY 17 History


 


Levothyroxine Sodium [Synthroid] 150 mcg PO DAILY 17 History


 


Metoprolol Succinate (ER) [Toprol 50 mg PO DAILY 19 History





Xl]    


 


Tofacitinib Citrate [Xeljanz Xr] 11 mg PO DAILY 19 History


 


methylPREDNISolone [Medrol] 4 mg PO DAILY 19 History








                                    Allergies











Allergy/AdvReac Type Severity Reaction Status Date / Time


 


No Known Allergies Allergy   Verified 19 08:21














Physical Exam


Vitals: 


                                   Vital Signs











  Temp Pulse Pulse Resp BP BP Pulse Ox


 


 19 14:36  100.3 F H   116 H  16   140/82 


 


 19 13:49  98.2 F   117 H  20   154/77  96


 


 19 13:21   70   16  129/76  


 


 19 09:19   60   16  111/68  


 


 19 05:32  98.6 F  109 H   20  100/64   96








                                Intake and Output











 19





 06:59 14:59 22:59


 


Other:   


 


  Weight 113.398 kg  














Results


CBC & Chem 7: 


                                 19 07:10





                                 19 07:10


Labs: 


                  Abnormal Lab Results - Last 24 Hours (Table)











  19 Range/Units





  07:10 07:10 16:11 


 


WBC  15.5 H    (3.8-10.6)  k/uL


 


Hgb  12.3 L    (13.0-17.5)  gm/dL


 


RDW  18.3 H    (11.5-15.5)  %


 


Neutrophils #  13.2 H    (1.3-7.7)  k/uL


 


BUN   21 H   (9-20)  mg/dL


 


Glucose   101 H   (74-99)  mg/dL


 


Plasma Lactic Acid Naif    2.2 H*  (0.7-2.0)  mmol/L


 


Total Bilirubin   1.4 H   (0.2-1.3)  mg/dL


 


Albumin   3.3 L   (3.5-5.0)  g/dL














  19 Range/Units





  20:14 


 


WBC   (3.8-10.6)  k/uL


 


Hgb   (13.0-17.5)  gm/dL


 


RDW   (11.5-15.5)  %


 


Neutrophils #   (1.3-7.7)  k/uL


 


BUN   (9-20)  mg/dL


 


Glucose   (74-99)  mg/dL


 


Plasma Lactic Acid Naif  2.2 H*  (0.7-2.0)  mmol/L


 


Total Bilirubin   (0.2-1.3)  mg/dL


 


Albumin   (3.5-5.0)  g/dL














Thrombosis Risk Factor Assmnt





- Choose All That Apply


Any of the Below Risk Factors Present?: Yes


Each Factor Represents 1 point: Age 41-60 years, Obesity (BMI >25), Swollen legs

 (current)


Other Risk Factors: Yes


Each Risk Factor Represents 2 Points: Patient confined to bed


Other congenital or acquired thrombophilia - If yes, enter type in comment: No


Thrombosis Risk Factor Assessment Total Risk Factor Score: 5


Thrombosis Risk Factor Assessment Level: High Risk

## 2019-09-28 NOTE — XR
EXAMINATION TYPE: XR foot complete RT

 

DATE OF EXAM: 9/28/2019

 

COMPARISON: NONE

 

HISTORY: Foot pain

 

TECHNIQUE: 3 views

 

FINDINGS: There is a plate with multiple screws fixing the talonavicular joint and the first tarsomet
atarsal joint. There is plate fixing also the second and third tarsometatarsal joints. There is later
al subluxation of the second third fourth fifth metatarsals. There are plantar and Achilles calcaneal
 spurs. There is soft tissue swelling of the forefoot. There is erosion of the fifth metatarsal head.
 There is narrowing and spurring at the first MP joint. There is mild pes planus.

 

IMPRESSION: Midfoot extensive fusion surgery. Pes planus. There is Lisfranc dislocations of the tarso
metatarsal joints. Comparison with an old exam would be helpful. No definite sign of osteomyelitis. S
oft tissue swelling.

## 2019-09-28 NOTE — P.CNOR
History of Present Illness





- HPI


Consult date: 09/28/19


Consult reason: joint pain


History of present illness: 





Patient is a 58-year-old male who presented to [facility] early

this morning with regards to pain and swelling involving his right foot.  Jessie osei is a very extensive past medical history involving his right foot.  He 

underwent a significant reconstructive surgery on his right foot in December 2018 by foot and ankle orthopedic specialist at [facility] in [place].  He 

did develop an infection involving the dorsal incision at 2 weeks, and this was 

treated with antibiotics and is done well since then.  He's had chronic swelling

in that right foot since the surgery.  He does utilize a compression stocking 

normally which does help.  He recently purchased a new pair of compression 

stockings this week, and this seemed to really help.





Patient does well for his primary job, he is on his feet quite a bit.  He also 

cut lawns on the side.  He states after purchasing the new compression 

stockings, the swelling was significantly improved.  He noted when he got home 

on Friday after work and took the compression stockings off after the shower he 

noted significant swelling.





Patient had developed a blood blister on the plantar aspect of the right foot in

August, he has utilized callus pads in the area.





He currently denies any fevers or chills.  He notes most discomfort when 

ambulating.  No discomfort when no weight is placed on the foot.  He has no 

other orthopedic complaints at this time.





Review of Systems


Constitutional: Reports as per HPI





Past Medical History


Past Medical History: Rheumatoid Arthritis (RA), Thyroid Disorder


Additional Past Medical History / Comment(s): right foot infection, KIDNEY 

STONES


History of Any Multi-Drug Resistant Organisms: None Reported


Past Surgical History: Tonsillectomy


Additional Past Surgical History / Comment(s): right foot surgery


Past Anesthesia/Blood Transfusion Reactions: No Reported Reaction


Past Psychological History: No Psychological Hx Reported


Smoking Status: Former smoker


Past Alcohol Use History: Occasional


Past Drug Use History: None Reported





- Past Family History


  ** Mother


Family Medical History: Cancer





Medications and Allergies


                                Home Medications











 Medication  Instructions  Recorded  Confirmed  Type


 


Leflunomide [Arava] 20 mg PO DAILY 03/29/17 09/28/19 History


 


Levothyroxine Sodium [Synthroid] 150 mcg PO DAILY 03/29/17 09/28/19 History


 


Metoprolol Succinate (ER) [Toprol 50 mg PO DAILY 09/28/19 09/28/19 History





Xl]    


 


Tofacitinib Citrate [Xeljanz Xr] 11 mg PO DAILY 09/28/19 09/28/19 History


 


methylPREDNISolone [Medrol] 4 mg PO DAILY 09/28/19 09/28/19 History








                                    Allergies











Allergy/AdvReac Type Severity Reaction Status Date / Time


 


No Known Allergies Allergy   Verified 09/28/19 08:21














Physical Examination





Right lower extremity:


Multiple surgical scars are noted, one on the dorsal aspect, one on the medial 

aspect and one on the lateral aspect of the foot.  All are well-healed at this 

time.  There is no significant areas of erythema or soft tissue swelling 

surrounding those areas, there is no tenderness with palpation in those areas.  

Obvious soft tissue swelling noted throughout the foot.  There is a callus 

present on the plantar aspect of the foot, near the first metatarsal.  There is 

some slight tenderness with palpation in that area.  I am unable to appreciate 

any fluctuance in that area, there is no active drainage present from the 

callus.  Skin is warm to touch at this time, dorsal pedis pulses 2+.  Calf is 

soft, no tenderness with palpation.  Logroll maneuver of the hip reproduces no 

discomfort, no joint effusion or tenderness with palpation surrounding the knee.





Results





- Labs


Labs: 


                  Abnormal Lab Results - Last 24 Hours (Table)











  09/28/19 09/28/19 Range/Units





  07:10 07:10 


 


WBC  15.5 H   (3.8-10.6)  k/uL


 


Hgb  12.3 L   (13.0-17.5)  gm/dL


 


RDW  18.3 H   (11.5-15.5)  %


 


Neutrophils #  13.2 H   (1.3-7.7)  k/uL


 


BUN   21 H  (9-20)  mg/dL


 


Glucose   101 H  (74-99)  mg/dL


 


Total Bilirubin   1.4 H  (0.2-1.3)  mg/dL


 


Albumin   3.3 L  (3.5-5.0)  g/dL








                                      H & H











  09/28/19 Range/Units





  07:10 


 


Hgb  12.3 L  (13.0-17.5)  gm/dL


 


Hct  39.0  (39.0-53.0)  %











Result Diagrams: 


                                 09/28/19 07:10





                                 09/28/19 07:10





- Diagnostic results


Ankle/Foot x-ray: report reviewed, image reviewed





Assessment and Plan


Plan: 





Imaging:


Multiple views of the right foot were obtained.  Evidence of significant 

surgical hardware noted throughout the foot.  Report notes no obvious 

osteomyelitis changes.





Assessment:


1.  Right foot pain/swelling


2.  History of significant right foot reconstructive surgery


3.  Other medical comorbidities





Plan:


I was able to discuss the case, including both physical exam findings and 

imaging studies my attending Dr. Garcia.  No orthopedic surgical intervention

 recommended at this time.  





Recommend further workup from other medical specialties regarding his current 

physical exam findings and lab studies





Recommended icing and elevating along with use of compression stocking





We'll go any further questions or in this patient


Time with Patient: Less than 30

## 2019-09-28 NOTE — P.CONS
History of Present Illness





- Reason for Consult


Consult date: 19


right foot cellulitis 


Requesting physician: Jerry Grijalva





- Chief Complaint


right foot pain , swelling and redness x few days





- History of Present Illness


Patient is a 58-year-old  male with a past medical history significant 

for extensive right foot surgery and previous history of infection to the same 

foot patient presenting to the [facility] ER with chief complaints of 

right foot pain patient apparently has been using offloading shoes and has devel

oped a callus on the plantar aspect of his right foot however there is currently

no drainage subsequently developing pain swelling and redness of the right foot 

area pain describing to be throbbing to dull aching intensity about 5-6 out of 

10 and no radiation and pain on walking patient having currently complaining of 

fever with rigors and chills with the symptom the patient presented to the ER on

arrival to the ER patient has been running low-grade fever of 100.3 he was 

slightly tachycardic and did have elevated white count and elevated lactic acid 

patient did have x-rays of the right foot with tissues extensive soft tissue 

swelling but no osseous signs of osteomyelitis lower extremity Doppler was 

negative for DVT patient has been started on vancomycin infectious disease was 

consulted for further recommendation about antibiotic therapy.








Review of Systems





CONSTITUTIONAL: Positive for weakness. low grade Fever


EYES:  No complaint. 


ENT:No complaint.  


RESPIRATORY: No complaint. 


CARDIOVASCULAR: No complaint. 


GENITOURINARY: No complaint. 


GASTROINTESTINAL: No complaint. 


MUSCULOSKELET :as per history of present illness


INTEGUMENTARY: as per history of present illness


PSYCHOLOGICAL: No complaint. 


ENDOCRINE: No complaint. 


NEUROLOGIC: No complaint.








Past Medical History


Past Medical History: Rheumatoid Arthritis (RA), Thyroid Disorder


Additional Past Medical History / Comment(s): right foot infection, KIDNEY 

STONES


History of Any Multi-Drug Resistant Organisms: None Reported


Past Surgical History: Tonsillectomy


Additional Past Surgical History / Comment(s): right foot surgery


Past Anesthesia/Blood Transfusion Reactions: No Reported Reaction


Past Psychological History: No Psychological Hx Reported


Smoking Status: Former smoker


Past Alcohol Use History: Occasional


Past Drug Use History: None Reported





- Past Family History


  ** Mother


Family Medical History: Cancer





  ** Father


Family Medical History: No Reported History


Additional Family Medical History / Comment(s): states, "he just ."





Medications and Allergies


                                Home Medications











 Medication  Instructions  Recorded  Confirmed  Type


 


Leflunomide [Arava] 20 mg PO DAILY 17 History


 


Levothyroxine Sodium [Synthroid] 150 mcg PO DAILY 17 History


 


Metoprolol Succinate (ER) [Toprol 50 mg PO DAILY 19 History





Xl]    


 


Tofacitinib Citrate [Xeljanz Xr] 11 mg PO DAILY 19 History


 


methylPREDNISolone [Medrol] 4 mg PO DAILY 19 History








                                    Allergies











Allergy/AdvReac Type Severity Reaction Status Date / Time


 


No Known Allergies Allergy   Verified 19 08:21














Physical Exam


Vitals: 


                                   Vital Signs











  Temp Pulse Resp BP Pulse Ox


 


 19 09:   60  16  111/68 


 


 19 05:32  98.6 F  109 H  20  100/64  96








                                Intake and Output











 19





 22:59 06:59 14:59


 


Other:   


 


  Weight  113.398 kg 














GENERAL DESCRIPTION: Middle-aged male lying in bed, no distress. No tachypnea or

 accessory muscle of respiration use.


HEENT: Shows Pallor , no scleral icterus. Oral mucous membrane is dry. No 

pharyngeal erythema or thrush


NECK: Trachea central, no thyromegaly.


LUNGS: Unlabored breathing. Clear to auscultation anteriorly. No wheeze or 

crackle.


HEART: S1, S2, regular rate and rhythm. No loud murmur


ABDOMEN: Soft, no tenderness , guarding or rigidity, no organomegaly


EXTREMITIES right foot planter calus , right foot dorsum is swollen , warm and 

tender to touch


SKIN: No rash, no masses palpable.


NEUROLOGICAL: The patient is awake, alert, oriented x3, mood and affect normal.














Results


CBC & Chem 7: 


                                 19 07:10





                                 19 07:10


Labs: 


                  Abnormal Lab Results - Last 24 Hours (Table)











  19 Range/Units





  07:10 07:10 


 


WBC  15.5 H   (3.8-10.6)  k/uL


 


Hgb  12.3 L   (13.0-17.5)  gm/dL


 


RDW  18.3 H   (11.5-15.5)  %


 


Neutrophils #  13.2 H   (1.3-7.7)  k/uL


 


BUN   21 H  (9-20)  mg/dL


 


Glucose   101 H  (74-99)  mg/dL


 


Total Bilirubin   1.4 H  (0.2-1.3)  mg/dL


 


Albumin   3.3 L  (3.5-5.0)  g/dL














Assessment and Plan


Assessment: 


-patient admitted hospital with sepsis in this patient who did have a fever 

elevated white count tachycardia source is extensive right foot cellulitis in 

this patient who do have a complicated history of right foot multiple surgeries 

and infection with recent developing callus on the plantar aspect likely the 

source of entry of this infection and likely from gram-positive skin sophia 

suggest strep and will need to cover for MRSA in view of previous history of 

infections and antibiotic exposure





(1) Sepsis


Current Visit: Yes   Status: Acute   Code(s): A41.9 - SEPSIS, UNSPECIFIED 

ORGANISM   SNOMED Code(s): 64802090


   





(2) Cellulitis of right foot


Current Visit: Yes   Status: Acute   Code(s): L03.115 - CELLULITIS OF RIGHT 

LOWER LIMB   SNOMED Code(s): 472149050


   


Plan: 


1-vancomycin pharmacy to dose with a target trough of 15 while watching his 

kidney function and Vanco trough closely


2-marked the area of the redness


3-obtain baseline CRP and sed rate


4-May benefit from a CT to make sure no evidence of any abscess though 

clinically doubt so


We will follow on clinical condition and cultures to further adjust medication 

if needed


Thank you for this consultation we will follow the patient along with you





Time with Patient: Greater than 30

## 2019-09-28 NOTE — ED
Extremity Problem HPI





- General


Source: patient


Mode of arrival: ambulatory


Limitations: no limitations





<Paula Ferrara - Last Filed: 09/28/19 09:23>





<Henri Jordan - Last Filed: 09/28/19 09:45>





- General


Chief complaint: Extremity Problem,Nontraumatic


Stated complaint: R foot pain


Time Seen by Provider: 09/28/19 06:09





- History of Present Illness


Initial comments: 


58-year-old male history of hypertension and rheumatoid arthritis as well as 

previous right foot reconstruction-performed a [facility] presenting to 

emergency department today for chief complaint of right foot pain.  Patient 

states that he has been struggling with chronic foot pain and swelling since 

December 2018 after a reconstructive surgery was performed due to bone 

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  She 

states that since the surgery/1 post operative infection patient has had chronic

swelling of the foot and has been wearing compression stockings.  His wife 

states that she brought him a new pair of tighter compression stockings that he 

began wearing Friday, patient states that Friday throughout the day he had no 

pain. When he took the stockings off and patient had increasing pain. Patient 

denies coolness or pallor, denies increased swelling, redness, fever, flu like 

symptoms. Patient states that pain is diffuse, however he did note some draining

and pain at the site of a calus with an ulcerated center. Denies calf pain or 

swelling. Denies history of DVT/PE. Remaining ROS (-) no chest pain or SOB, 

abdominal pain. Upon arrival patient appears well. No signs of acute distress


 (Paula Ferrara)





- Related Data


                                Home Medications











 Medication  Instructions  Recorded  Confirmed


 


Leflunomide [Arava] 20 mg PO DAILY 03/29/17 09/28/19


 


Levothyroxine Sodium [Synthroid] 150 mcg PO DAILY 03/29/17 09/28/19


 


Metoprolol Succinate (ER) [Toprol 50 mg PO DAILY 09/28/19 09/28/19





Xl]   


 


Tofacitinib Citrate [Xeljanz Xr] 11 mg PO DAILY 09/28/19 09/28/19


 


methylPREDNISolone [Medrol] 4 mg PO DAILY 09/28/19 09/28/19











                                    Allergies











Allergy/AdvReac Type Severity Reaction Status Date / Time


 


No Known Allergies Allergy   Verified 09/28/19 08:21














Review of Systems


ROS Other: All systems not noted in ROS Statement are negative.





<Paula Ferrara - Last Filed: 09/28/19 09:23>


ROS Other: All systems not noted in ROS Statement are negative.





<Henri Jordan - Last Filed: 09/28/19 09:45>


ROS Statement: 


Those systems with pertinent positive or pertinent negative responses have been 

documented in the HPI.








Past Medical History


Past Medical History: Rheumatoid Arthritis (RA), Thyroid Disorder


Additional Past Medical History / Comment(s): right foot infection, KIDNEY 

STONES


History of Any Multi-Drug Resistant Organisms: None Reported


Past Surgical History: Tonsillectomy


Additional Past Surgical History / Comment(s): right foot surgery


Past Anesthesia/Blood Transfusion Reactions: No Reported Reaction


Past Psychological History: No Psychological Hx Reported


Smoking Status: Former smoker


Past Alcohol Use History: Occasional


Past Drug Use History: None Reported





- Past Family History


  ** Mother


Family Medical History: Cancer





<Paula Ferrara - Last Filed: 09/28/19 09:23>





General Exam


Limitations: no limitations





<Paula Ferrara - Last Filed: 09/28/19 09:23>





- General Exam Comments


Initial Comments: 


General:  The patient is awake and alert, in no distress, and does not appear 

acutely ill. 


Eye:  +3 mm pupils are equal, round and reactive to light, extra-ocular 

movements are intact.  No nystagmus.  There is normal conjunctiva bilaterally.  

No signs of icterus.  


Ears, nose, mouth and throat:  There are moist mucous membranes and no oral 

lesions. 


Neck:  The neck is supple, there is no tenderness or JVD.  


Cardiovascular:  There is a regular rate and rhythm. No murmur, rub or gallop is

 appreciated.


Respiratory:  Lungs are clear to auscultation, respirations are non-labored, 

breath sounds are equal.  No wheezes, stridor, rales, or rhonchi.


Musculoskeletal:   On inspection of the feet bilaterally there is significant 

soft tissue swelling of the right foot in comparison the left with scarring.  No

 erythema.  warmth to palpation and a hard callus with soft center, dried serous

 fluid present. +2 DP pulse b/l.


Neurological:  A&O x 3. CN II-XII grossly. There are no obvious motor or sensory

 deficits. Coordination appears grossly intact. Speech is normal.


Skin:  Skin is warm and dry and no rashes or lesions are noted. 


Psychiatric:  Cooperative, appropriate mood & affect, normal judgment.  


 (Paula Ferrara)





Course





                                   Vital Signs











  09/28/19 09/28/19





  05:32 09:19


 


Temperature 98.6 F 


 


Pulse Rate 109 H 60


 


Respiratory 20 16





Rate  


 


Blood Pressure 100/64 111/68


 


O2 Sat by Pulse 96 





Oximetry  














Medical Decision Making





- Lab Data


Result diagrams: 


                                 09/28/19 07:10





                                 09/28/19 07:10





<Paula Ferrara - Last Filed: 09/28/19 09:23>





- Lab Data


Result diagrams: 


                                 09/28/19 07:10





                                 09/28/19 07:10





<Henri Jordan - Last Filed: 09/28/19 09:45>





- Medical Decision Making


38-year-old male presenting for evaluation of increasing right foot pain.  

Patient states he is unable to weight-bear.  Patient states he appears to 

reverse without difficulty.  Patient has noted that he has a small callus with 

central ulceration that has spontaneously drained.  Patient states he is unsure 

if this is the cause.  Patient denies a fluids symptoms fever does not appear 

toxic.  Patient does have significant warmth to palpation of the foot increasing

 pain and inability to weight-bear.  Laboratory studies show leukocytosis.  

Patient was evaluated and person him attending provider who recommends admission

 with vancomycin and Rocephin for possible infection of a foot ulceration. 

Patient is agreeable with admission. Attending spoke with Dr. Grijalva who a

ccepted admission. 


 (Paula Ferrara)


Patient reevaluated by myself, Dr. Jordan.  Patient does have chronic right foot 

changes.  Patient does have callus formation on the right plantar aspect.  There

 is some mild erythema.  Patient states he is unable to bear weight.  There is 

concern for early infection.  Case was discussed in detail with Dr. Grijalva, who

 will admit covering for Dr. Smith.  Patient reevaluated and reexamined by 

myself.  I do agree with PA findings.  This includes diagnostic interpretation 

and treatment plan. (Henri Jordan)





- Lab Data





                                   Lab Results











  09/28/19 09/28/19 Range/Units





  07:10 07:10 


 


WBC  15.5 H   (3.8-10.6)  k/uL


 


RBC  4.43   (4.30-5.90)  m/uL


 


Hgb  12.3 L   (13.0-17.5)  gm/dL


 


Hct  39.0   (39.0-53.0)  %


 


MCV  87.9   (80.0-100.0)  fL


 


MCH  27.8   (25.0-35.0)  pg


 


MCHC  31.6   (31.0-37.0)  g/dL


 


RDW  18.3 H   (11.5-15.5)  %


 


Plt Count  177   (150-450)  k/uL


 


Neutrophils %  85   %


 


Lymphocytes %  7   %


 


Monocytes %  5   %


 


Eosinophils %  1   %


 


Basophils %  1   %


 


Neutrophils #  13.2 H   (1.3-7.7)  k/uL


 


Lymphocytes #  1.1   (1.0-4.8)  k/uL


 


Monocytes #  0.8   (0-1.0)  k/uL


 


Eosinophils #  0.1   (0-0.7)  k/uL


 


Basophils #  0.1   (0-0.2)  k/uL


 


Hypochromasia  Marked   


 


Anisocytosis  Slight   


 


Sodium   142  (137-145)  mmol/L


 


Potassium   3.8  (3.5-5.1)  mmol/L


 


Chloride   106  ()  mmol/L


 


Carbon Dioxide   27  (22-30)  mmol/L


 


Anion Gap   9  mmol/L


 


BUN   21 H  (9-20)  mg/dL


 


Creatinine   1.03  (0.66-1.25)  mg/dL


 


Est GFR (CKD-EPI)AfAm   >90  (>60 ml/min/1.73 sqM)  


 


Est GFR (CKD-EPI)NonAf   81  (>60 ml/min/1.73 sqM)  


 


Glucose   101 H  (74-99)  mg/dL


 


Calcium   8.8  (8.4-10.2)  mg/dL


 


Total Bilirubin   1.4 H  (0.2-1.3)  mg/dL


 


AST   50  (17-59)  U/L


 


ALT   68  (21-72)  U/L


 


Alkaline Phosphatase   123  ()  U/L


 


Total Protein   6.5  (6.3-8.2)  g/dL


 


Albumin   3.3 L  (3.5-5.0)  g/dL














Disposition


Is patient prescribed a controlled substance at d/c from ED?: No


Time of Disposition: 09:01


Decision to Admit Reason: Admit from EC


Decision Date: 09/28/19


Decision Time: 09:01





<Paula Ferrara L - Last Filed: 09/28/19 09:23>





<Henri Jordan - Last Filed: 09/28/19 09:45>


Clinical Impression: 


 Foot ulcer, Foot pain, Foot swelling, Leukocytosis





Disposition: ADMITTED AS IP TO THIS [facility]


Condition: Good


Referrals: 


Walker Smith DO [Primary Care Provider] - 1-2 days

## 2019-09-28 NOTE — US
EXAMINATION TYPE: US venous doppler duplex LE RT

 

DATE OF EXAM: 9/28/2019 6:40 AM

 

COMPARISON: NONE

 

CLINICAL HISTORY: swelling right foot. Right ankle pain and swelling x 2 days

 

SIDE PERFORMED: Right  

 

TECHNIQUE:  The lower extremity deep venous system is examined utilizing real time linear array sonog
paul with graded compression, doppler sonography and color-flow sonography.

 

VESSELS IMAGED:

External Iliac Vein (EIV)

Common Femoral Vein

Deep Femoral Vein

Greater Saphenous Vein *

Femoral Vein

Popliteal Vein

Small Saphenous Vein *

Proximal Calf Veins

(* superficial vessels)

 

Right Leg:  Appears negative for DVT

 

No popliteal fossa lesion is seen.

 

IMPRESSION:

 

THIS EXAMINATION IS NEGATIVE FOR DVT IN THE RIGHT LEG.

## 2019-09-29 RX ADMIN — ENOXAPARIN SODIUM SCH MG: 40 INJECTION SUBCUTANEOUS at 20:46

## 2019-09-29 RX ADMIN — HYDROCORTISONE SODIUM SUCCINATE SCH MG: 100 INJECTION, POWDER, FOR SOLUTION INTRAMUSCULAR; INTRAVENOUS at 13:54

## 2019-09-29 RX ADMIN — SODIUM CHLORIDE SCH MLS/HR: 9 INJECTION, SOLUTION INTRAVENOUS at 07:43

## 2019-09-29 RX ADMIN — HYDROCORTISONE SODIUM SUCCINATE SCH MG: 100 INJECTION, POWDER, FOR SOLUTION INTRAMUSCULAR; INTRAVENOUS at 20:46

## 2019-09-29 RX ADMIN — METOPROLOL SUCCINATE SCH MG: 50 TABLET, EXTENDED RELEASE ORAL at 07:51

## 2019-09-29 RX ADMIN — SODIUM CHLORIDE SCH MLS/HR: 9 INJECTION, SOLUTION INTRAVENOUS at 21:00

## 2019-09-29 RX ADMIN — HYDROCORTISONE SODIUM SUCCINATE SCH MG: 100 INJECTION, POWDER, FOR SOLUTION INTRAMUSCULAR; INTRAVENOUS at 06:19

## 2019-09-29 RX ADMIN — LEVOTHYROXINE SODIUM SCH MCG: 75 TABLET ORAL at 06:25

## 2019-09-29 RX ADMIN — ACETAMINOPHEN PRN MG: 325 TABLET, FILM COATED ORAL at 20:46

## 2019-09-29 RX ADMIN — CEFAZOLIN SCH MLS/HR: 330 INJECTION, POWDER, FOR SOLUTION INTRAMUSCULAR; INTRAVENOUS at 05:50

## 2019-09-29 RX ADMIN — CEFAZOLIN SCH: 330 INJECTION, POWDER, FOR SOLUTION INTRAMUSCULAR; INTRAVENOUS at 13:54

## 2019-09-29 RX ADMIN — ASPIRIN SCH: 325 TABLET ORAL at 07:51

## 2019-09-29 RX ADMIN — LEFLUNOMIDE SCH MG: 20 TABLET ORAL at 07:50

## 2019-09-29 NOTE — P.PN
Progress Note - Text


Progress Note Date: 09/29/19





Chief Complaint: Right foot pain





History of present complaint:


This is a very pleasant 58-year-old patient of Dr. Smith.  Follows with 

rheumatologist Dr. Florecita Rider.  Patient lost his arch of his right foot 

and had surgery done at [facility] in December 2018.  This was mid foot 

fusion.  Patient had a wound and was admitted in January of this year to our 

hospital.  Tensing the Dr. Sheets from infectious disease.  And was discharged 

back to her surgeon.  Patient continued to well.  Patient about a new set of 

compression stockings 3 days ago.  Had it on for work the next day.  Came home 

took a shower and then he noticed right foot to be swollen and painful.  Also 

developed a fever.  No drainage from the foot.  Admitted for the same.  Patient 

started on antibiotics..  Appetite is decreased.  No chills.


Admitted with acute infection of the right foot


Today-some decrease in pain and swelling of the right foot.  Could move it bit 

better.  Did tolerate some diet.





Review of systems: Was done for constitutional, cardiovascular, GI, pulmonary. 

relevant finding as above





Active Medications





Acetaminophen (Tylenol Tab)  650 mg PO Q6HR PRN


   PRN Reason: Fever and/ or Pain


   Last Admin: 09/29/19 20:46 Dose:  650 mg


   Documented by: 


Enoxaparin Sodium (Lovenox)  40 mg SQ Q24H [facility]


   Last Admin: 09/29/19 20:46 Dose:  40 mg


   Documented by: 


Hydrocortisone Sodium Succinate (Solu-Cortef)  50 mg IV Q8H [facility]


   Last Admin: 09/29/19 20:46 Dose:  50 mg


   Documented by: 


Sodium Chloride (Saline 0.9%)  1,000 mls @ 100 mls/hr IV .Q10H [facility]


   Last Admin: 09/29/19 13:54 Dose:  Not Given


   Documented by: 


Vancomycin HCl 2,250 mg/ (Sodium Chloride)  500 mls @ 167 mls/hr IVPB Q12H [facility]


   Last Admin: 09/29/19 21:00 Dose:  167 mls/hr


   Documented by: 


Leflunomide (Arava)  20 mg PO DAILY [facility]


   Last Admin: 09/29/19 07:50 Dose:  20 mg


   Documented by: 


Levothyroxine Sodium (Synthroid)  150 mcg PO DAILY@0630 [facility]


   Last Admin: 09/29/19 06:25 Dose:  150 mcg


   Documented by: 


Metoprolol Succinate (Toprol Xl)  50 mg PO DAILY [facility]


   Last Admin: 09/29/19 07:51 Dose:  50 mg


   Documented by: 


Miscellaneous Information (Vancomycin Trough Due)  0 each MISCELLANE AS DIRECTED

ONE


   Stop: 09/30/19 08:01


Morphine Sulfate (Morphine Sulfate (Inj))  4 mg IV Q4HR PRN


   PRN Reason: Severe Pain


   Last Admin: 09/28/19 19:26 Dose:  4 mg


   Documented by: 


Naloxone HCl (Narcan)  0.2 mg IV Q2M PRN


   PRN Reason: Opioid Reversal


Non-Formulary Medication (Tofacitinib Citrate [Xeljanz Xr])  11 mg PO DAILY [facility]


   Last Admin: 09/29/19 07:51 Dose:  Not Given


   Documented by: 














Physical examination:


VITAL SIGNS: 99.4, 104, 16, 125/81, 97% room air


GENERAL: Laying in bed,


EYES: Pupils equal.  Conjunctiva normal.


HEENT: External appearance of nose and ears normal, oral cavity grossly normal.


NECK: JVD not raised; masses not palpable.


HEART: First and second heart sounds are normal;  no edema.  


LUNGS: Respiratory rate normal; clear to auscultation.  


ABDOMEN: Soft,  nontender, liver spleen not palpable, no masses palpable.  


PSYCH: Alert and oriented x3;  mood  and affect normal.  


MUSCULOSKELETAL: Right foot is swollen, some tenderness just anterior to the 

ankle.  Slight increased redness





INVESTIGATIONS, reviewed in the clinical context:


White count 15.5 hemoglobin 12.3 potassium 3.8 121 creatinine 1.03


Lactic acid 2.2


Right foot x-ray-shows some soft tissue swelling evidence of previous surgery





Assessment:


-This patient with known rheumatoid arthritis who had loss of arch followed by 

mid foot fusion, border.  A new compression stockings and patient wore it for a 

day.  By evening patient's pain and swelling in the foot had increased also 

developed fever with a sepsis-like picture.  Patient definitely has cellulitis 

cannot rule out underlying early abscess.  Patient has sepsis, slow to respond


-Lactic acidosis from sepsis


-Chronic rheumatoid arthritis


-Hypothyroid


-Essential hypertension


-Obesity BMI 33.0





Plan:


Patient is on IV vancomycin.  Computed tomography scan is being ordered by ID.  

Other medications to continue.  Care was discussed with the patient.  Repeat 

labs.

## 2019-09-29 NOTE — P.PN
Subjective


Progress Note Date: 09/29/19


Principal diagnosis: 





Right foot pain/swelling, history of significant reconstructive surgery right 

foot, other medical comorbidities





Patient evaluated at bedside today, he is resting comfortably.  He notes that 

the discomfort has significantly improved in the foot since yesterday.  Denies 

any current fevers or chills. 





Objective





- Vital Signs


Vital signs: 


                                   Vital Signs











Temp  98.0 F   09/29/19 11:41


 


Pulse  101 H  09/29/19 11:41


 


Resp  20   09/29/19 11:41


 


BP  129/88   09/29/19 11:41


 


Pulse Ox  94 L  09/29/19 11:41








                                 Intake & Output











 09/28/19 09/29/19 09/29/19





 18:59 06:59 18:59


 


Intake Total  590 


 


Balance  590 


 


Intake:   


 


  Oral  590 


 


Other:   


 


  Voiding Method  Urinal Bedside Commode


 


  # Voids  2 














- Exam





Right foot:


Generalized swelling of the foot remains, I'm unable to appreciate any 

significant areas of erythema, unable to appreciate any areas of fluctuance.  No

significant tenderness with palpation throughout the foot.  Distal neurovascular

exam is intact





- Labs


CBC & Chem 7: 


                                 09/28/19 07:10





                                 09/28/19 07:10


Labs: 


                  Abnormal Lab Results - Last 24 Hours (Table)











  09/28/19 09/28/19 Range/Units





  16:11 20:14 


 


Plasma Lactic Acid Naif  2.2 H*  2.2 H*  (0.7-2.0)  mmol/L














Assessment and Plan


Plan: 





Assessment:


1.  Right foot pain/swelling


2.  History of significant right foot reconstructive surgery


3.  Other medical comorbidities





Plan:


On a general orthopedic standpoint, no orthopedic surgical intervention 

recommended.  I discussed with the patient that he needs to contact his foot and

ankle specialist that did initial surgery and schedule a follow-up to discuss 

most recent issue with his foot.





Recommended icing and elevating along with use of compression stocking





Weight-bear as tolerated





At this time will be signing off patient, available for any further questions


Time with Patient: Less than 30

## 2019-09-30 LAB
ANION GAP SERPL CALC-SCNC: 7 MMOL/L
BASOPHILS # BLD AUTO: 0 K/UL (ref 0–0.2)
BASOPHILS NFR BLD AUTO: 0 %
BUN SERPL-SCNC: 12 MG/DL (ref 9–20)
CALCIUM SPEC-MCNC: 8.8 MG/DL (ref 8.4–10.2)
CHLORIDE SERPL-SCNC: 110 MMOL/L (ref 98–107)
CO2 SERPL-SCNC: 26 MMOL/L (ref 22–30)
EOSINOPHIL # BLD AUTO: 0.1 K/UL (ref 0–0.7)
EOSINOPHIL NFR BLD AUTO: 1 %
ERYTHROCYTE [DISTWIDTH] IN BLOOD BY AUTOMATED COUNT: 3.94 M/UL (ref 4.3–5.9)
ERYTHROCYTE [DISTWIDTH] IN BLOOD: 17.9 % (ref 11.5–15.5)
GLUCOSE SERPL-MCNC: 100 MG/DL (ref 74–99)
HCT VFR BLD AUTO: 34 % (ref 39–53)
HGB BLD-MCNC: 10.8 GM/DL (ref 13–17.5)
LYMPHOCYTES # SPEC AUTO: 0.6 K/UL (ref 1–4.8)
LYMPHOCYTES NFR SPEC AUTO: 6 %
MCH RBC QN AUTO: 27.5 PG (ref 25–35)
MCHC RBC AUTO-ENTMCNC: 31.8 G/DL (ref 31–37)
MCV RBC AUTO: 86.4 FL (ref 80–100)
MONOCYTES # BLD AUTO: 0.4 K/UL (ref 0–1)
MONOCYTES NFR BLD AUTO: 5 %
NEUTROPHILS # BLD AUTO: 8 K/UL (ref 1.3–7.7)
NEUTROPHILS NFR BLD AUTO: 87 %
PLATELET # BLD AUTO: 162 K/UL (ref 150–450)
POTASSIUM SERPL-SCNC: 3.6 MMOL/L (ref 3.5–5.1)
SODIUM SERPL-SCNC: 143 MMOL/L (ref 137–145)
WBC # BLD AUTO: 9.2 K/UL (ref 3.8–10.6)

## 2019-09-30 RX ADMIN — ASPIRIN SCH: 325 TABLET ORAL at 08:03

## 2019-09-30 RX ADMIN — SODIUM CHLORIDE SCH MLS/HR: 9 INJECTION, SOLUTION INTRAVENOUS at 21:16

## 2019-09-30 RX ADMIN — LEFLUNOMIDE SCH MG: 20 TABLET ORAL at 08:02

## 2019-09-30 RX ADMIN — SODIUM CHLORIDE SCH MLS/HR: 9 INJECTION, SOLUTION INTRAVENOUS at 08:13

## 2019-09-30 RX ADMIN — HYDROCORTISONE SODIUM SUCCINATE SCH MG: 100 INJECTION, POWDER, FOR SOLUTION INTRAMUSCULAR; INTRAVENOUS at 05:42

## 2019-09-30 RX ADMIN — ENOXAPARIN SODIUM SCH MG: 40 INJECTION SUBCUTANEOUS at 21:16

## 2019-09-30 RX ADMIN — CEFAZOLIN SCH MLS/HR: 330 INJECTION, POWDER, FOR SOLUTION INTRAMUSCULAR; INTRAVENOUS at 23:39

## 2019-09-30 RX ADMIN — LEVOTHYROXINE SODIUM SCH MCG: 75 TABLET ORAL at 05:42

## 2019-09-30 RX ADMIN — CEFAZOLIN SCH MLS/HR: 330 INJECTION, POWDER, FOR SOLUTION INTRAMUSCULAR; INTRAVENOUS at 04:03

## 2019-09-30 RX ADMIN — ACETAMINOPHEN PRN MG: 325 TABLET, FILM COATED ORAL at 19:06

## 2019-09-30 RX ADMIN — CEFAZOLIN SCH: 330 INJECTION, POWDER, FOR SOLUTION INTRAMUSCULAR; INTRAVENOUS at 08:14

## 2019-09-30 RX ADMIN — METOPROLOL SUCCINATE SCH MG: 50 TABLET, EXTENDED RELEASE ORAL at 08:02

## 2019-09-30 RX ADMIN — HYDROCORTISONE SODIUM SUCCINATE SCH MG: 100 INJECTION, POWDER, FOR SOLUTION INTRAMUSCULAR; INTRAVENOUS at 16:03

## 2019-09-30 RX ADMIN — HYDROCORTISONE SODIUM SUCCINATE SCH MG: 100 INJECTION, POWDER, FOR SOLUTION INTRAMUSCULAR; INTRAVENOUS at 21:17

## 2019-09-30 NOTE — PN
PROGRESS NOTE



DATE OF SERVICE:

09/29/2019



REASON FOR FOLLOWUP:

Right foot cellulitis.



INTERVAL HISTORY:

The patient is currently afebrile.  Patient has been breathing comfortably.

Right foot pain, swelling and redness and discomfort have improved.  No nausea,

vomiting. No abdominal pain, no diarrhea.



PHYSICAL EXAMINATION:

Blood pressure is 115/76, pulse 107, temperature 99.1, he is 95% on room air.

General description is a middle-aged male, lying in bed in no distress.

RESPIRATORY SYSTEM: Unlabored breathing, clear to auscultation anteriorly.

HEART:  S1, S2.  Regular rate and rhythm.

ABDOMEN:  Soft, no tenderness.

EXTREMITIES:  Right foot still has some swelling, redness has improved.  _____ on

admission.



LABS:

Blood cultures have been negative so far.  No blood work done.



DIAGNOSTIC IMPRESSION AND PLAN:

Patient with right foot cellulitis.  The patient did have a complicated history in the

past with multiple surgeries with cellulitis, possible source being plantar callus.

The patient to continue with vancomycin, pharmacy to dose.  Repeat CBC and CRP in the

morning.  Continue supportive care.





MMODL / IJN: [id number] / Job#: [id number]

## 2019-09-30 NOTE — P.PN
Progress Note - Text


Progress Note Date: 09/30/19





Chief Complaint: Right foot pain





History of present complaint:


This is a very pleasant 58-year-old patient of Dr. Smith.  Follows with 

rheumatologist Dr. Florecita Rider.  Patient lost his arch of his right foot 

and had surgery done at [facility] in December 2018.  This was mid foot 

fusion.  Patient had a wound and was admitted in January of this year to our 

hospital.  Tensing the Dr. Sheets from infectious disease.  And was discharged 

back to her surgeon.  Patient continued to well.  Patient about a new set of 

compression stockings 3 days ago.  Had it on for work the next day.  Came home 

took a shower and then he noticed right foot to be swollen and painful.  Also 

developed a fever.  No drainage from the foot.  Admitted for the same.  Patient 

started on antibiotics..  Appetite is decreased.  No chills.


Admitted with acute infection of the right foot


Today-some further improvement in patient's foot pain.  Did actually walker a 

bit.  Today decreased fever.





Review of systems: Was done for constitutional, cardiovascular, GI, pulmonary. 

relevant finding as above





Active Medications





Acetaminophen (Tylenol Tab)  650 mg PO Q6HR PRN


   PRN Reason: Fever and/ or Pain


   Last Admin: 09/30/19 19:06 Dose:  650 mg


   Documented by: 


Enoxaparin Sodium (Lovenox)  40 mg SQ Q24H [facility]


   Last Admin: 09/30/19 21:16 Dose:  40 mg


   Documented by: 


Hydrocortisone Sodium Succinate (Solu-Cortef)  50 mg IV Q8H [facility]


   Last Admin: 09/30/19 21:17 Dose:  50 mg


   Documented by: 


Sodium Chloride (Saline 0.9%)  1,000 mls @ 100 mls/hr IV .Q10H [facility]


   Last Admin: 09/30/19 08:14 Dose:  Not Given


   Documented by: 


Vancomycin HCl 2,250 mg/ (Sodium Chloride)  500 mls @ 167 mls/hr IVPB Q12H [facility]


   Last Admin: 09/30/19 21:16 Dose:  167 mls/hr


   Documented by: 


Leflunomide (Arava)  20 mg PO DAILY [facility]


   Last Admin: 09/30/19 08:02 Dose:  20 mg


   Documented by: 


Levothyroxine Sodium (Synthroid)  150 mcg PO DAILY@0630 [facility]


   Last Admin: 09/30/19 05:42 Dose:  150 mcg


   Documented by: 


Metoprolol Succinate (Toprol Xl)  50 mg PO DAILY [facility]


   Last Admin: 09/30/19 08:02 Dose:  50 mg


   Documented by: 


Morphine Sulfate (Morphine Sulfate (Inj))  4 mg IV Q4HR PRN


   PRN Reason: Severe Pain


   Last Admin: 09/28/19 19:26 Dose:  4 mg


   Documented by: 


Naloxone HCl (Narcan)  0.2 mg IV Q2M PRN


   PRN Reason: Opioid Reversal


Non-Formulary Medication (Tofacitinib Citrate [Xeljanz Xr])  11 mg PO DAILY [facility]


   Last Admin: 09/30/19 08:03 Dose:  Not Given


   Documented by: 








Physical examination:


VITAL SIGNS: afebrile, 94, 18, 102/66, 95% on room air


GENERAL: Laying in bed,comfortable


EYES: Pupils equal.  Conjunctiva normal.


HEENT: External appearance of nose and ears normal, oral cavity grossly normal.


NECK: JVD not raised; masses not palpable.


HEART: First and second heart sounds are normal;  no edema.  


LUNGS: Respiratory rate normal; clear to auscultation.  


ABDOMEN: Soft,  nontender, liver spleen not palpable, no masses palpable.  


PSYCH: Alert and oriented x3;  mood  and affect normal.  


MUSCULOSKELETAL: Right foot is swollen, much decreased tenderness





INVESTIGATIONS, reviewed in the clinical context:


White count 9.2 hemoglobin 10.8 potassium 3.6 creatinine 0.69 C-reactive protein

241





Previous testing:


White count 15.5 hemoglobin 12.3 potassium 3.8 121 creatinine 1.03


Lactic acid 2.2


Right foot x-ray-shows some soft tissue swelling evidence of previous surgery





Assessment:


-This patient with known rheumatoid arthritis who had loss of arch followed by 

mid foot fusion, border.  A new compression stockings and patient wore it for a 

day.  By evening patient's pain and swelling in the foot had increased also 

developed fever with a sepsis-like picture.  Patient definitely has cellulitis 

with sepsis.


-Lactic acidosis from sepsis


-Chronic rheumatoid arthritis


-Hypothyroid


-Essential hypertension


-Obesity BMI 33.0


-Normocytic anemia likely of chronic disease





Plan:


patient is responding to IV vancomycin.  Discussed with Dr. sheets from ID.plan 

is to continue the same for at least another 24 hours.  This was  discussed with

the patient.

## 2019-09-30 NOTE — PN
PROGRESS NOTE



DATE OF SERVICE:

09/30/2019



REASON FOR FOLLOWUP:

Right foot cellulitis.



INTERVAL HISTORY:

The patient is currently afebrile.  Patient has been breathing comfortably.  The

patient said he is feeling much better and is insisting on going home.  He is able to

walk on the right foot without any pain.  Currently with no open wound or any drainage.

No chest pain.  No abdominal pain, no diarrhea.



PHYSICAL EXAMINATION:

Blood pressure 102/66, pulse of 94, temperature 97.2, he is 95% on room air.

General description is a middle-aged male, lying in bed in no distress.

RESPIRATORY SYSTEM: Unlabored breathing, clear to auscultation anteriorly.

HEART:  S1, S2.  Regular rate and rhythm..

ABDOMEN:  Soft.

EXTREMITY:  Right foot swelling persists.  Redness is improved, not as warm to touch.

No fluctuation, induration.



LABS:

Hemoglobin is 10.2, white count of 9.2 with BUN of 12, creatinine 0.69.  Blood culture

has been negative.



DIAGNOSTIC IMPRESSION AND PLAN:

Patient with acute right foot cellulitis in this patient did have a complicated history

as far as right foot infection is concerned.  The patient has been been advised to stay

in hospital for another 24 hours for IV antibiotic therapy; however, the patient is

insisting on going home today.  He is scheduled to see his surgeon this Thursday,

Bactrim DS prescription has been sent to the pharmacy with instructions if any

worsening, swelling, or redness to let me know.





MMODL / IJN: [id number] / Job#: [id number]

## 2019-10-01 VITALS
TEMPERATURE: 97.3 F | RESPIRATION RATE: 18 BRPM | HEART RATE: 95 BPM | SYSTOLIC BLOOD PRESSURE: 131 MMHG | DIASTOLIC BLOOD PRESSURE: 95 MMHG

## 2019-10-01 RX ADMIN — CEFAZOLIN SCH MLS/HR: 330 INJECTION, POWDER, FOR SOLUTION INTRAMUSCULAR; INTRAVENOUS at 07:55

## 2019-10-01 RX ADMIN — LEVOTHYROXINE SODIUM SCH MCG: 75 TABLET ORAL at 06:03

## 2019-10-01 RX ADMIN — ASPIRIN SCH: 325 TABLET ORAL at 07:50

## 2019-10-01 RX ADMIN — METOPROLOL SUCCINATE SCH MG: 50 TABLET, EXTENDED RELEASE ORAL at 07:50

## 2019-10-01 RX ADMIN — LEFLUNOMIDE SCH MG: 20 TABLET ORAL at 07:54

## 2019-10-01 RX ADMIN — SODIUM CHLORIDE SCH MLS/HR: 9 INJECTION, SOLUTION INTRAVENOUS at 07:50

## 2019-10-01 RX ADMIN — HYDROCORTISONE SODIUM SUCCINATE SCH MG: 100 INJECTION, POWDER, FOR SOLUTION INTRAMUSCULAR; INTRAVENOUS at 06:02

## 2019-10-01 NOTE — PN
PROGRESS NOTE



DATE OF SERVICE:

10/01/2019



REASON FOR FOLLOWUP:

Right foot cellulitis.



INTERVAL HISTORY:

The patient is currently afebrile.  Patient has been breathing comfortably.  Denies

having chest pain, no cough, no nausea, no vomiting or _____ has improved.



PHYSICAL EXAMINATION:

Blood pressure is 131/95 with a pulse of 95, temperature 97.3, he is 95% on room air.

General description is a middle-aged male up in the chair in no distress.

RESPIRATORY SYSTEM: Unlabored breathing, clear to auscultation.

HEART: S1, S2.  Regular rate and rhythm.

ABDOMEN:  Soft, no tenderness.

Right foot wound and redness has improved.  No open wound drainage.



LABS:

Hemoglobin 7.8, white count of 9.2 with a BUN of 12, creatinine 0.69, as of yesterday.

No blood cultures done today.



DIAGNOSTIC IMPRESSION AND PLAN:

Patient with acute right foot cellulitis.  This patient did have a complicated history

of right foot surgeries and hardware.  The patient is scheduled to see his surgeon on

Thursday.  Overall cellulitis has improved.  Will transition him to oral Bactrim DS for

infection. Will follow this conservatively right away and continue supportive care.





MMODL / IJN: [id number] / Job#: [id number]

## 2019-10-01 NOTE — P.DS
Providers


Date of admission: 


09/29/19 11:29





Expected date of discharge: 10/01/19


Attending physician: 


Jerry Grijalva





Consults: 





                                        





09/28/19 10:19


Consult Physician Routine 


   Consulting Provider: Param Garcia


   Consult Reason/Comments: foot pain- consult per herlinda


   Do you want consulting provider notified?: Yes, Notify in am





09/28/19 10:47


Consult Physician Routine 


   Consulting Provider: Monika Sheets


   Consult Reason/Comments: foot infection


   Do you want consulting provider notified?: Yes











Primary care physician: 


Walker Smith





[facility] Course: 





Chief Complaint: Right foot pain





Hospital course:


This is a very pleasant 58-year-old patient of Dr. Smith.  Follows with 

rheumatologist Dr. Florecita Rider.  Patient lost his arch of his right foot 

and had surgery done at [facility] in December 2018.  This was mid foot 

fusion.  Patient had a wound and was admitted in January of this year to our 

hospital.  Tensing the Dr. Sheets from infectious disease.  And was discharged 

back to her surgeon.  Patient continued to well.  Patient about a new set of 

compression stockings 3 days ago.  Had it on for work the next day.  Came home 

took a shower and then he noticed right foot to be swollen and painful.  Also 

developed a fever.  No drainage from the foot.  Admitted for the same.  Patient 

started on antibiotics..  Appetite is decreased.  No chills.


Admitted with acute infection of the right foot/cellulitis. treated with 

vancomycin.  Much improved.


today-D much better. up to the bathroom.  Pain greatly improved.very keen to go 

home  Discussed with Dr. Charles





Consultation:


Dr. valdes ID








Physical examination:


VITAL SIGNS: 97.3, 95, 18, 131/95, 95% room air


GENERAL: Laying in bed,comfortable


EYES: Pupils equal.  Conjunctiva normal.


HEENT: External appearance of nose and ears normal, oral cavity grossly normal.


NECK: JVD not raised; masses not palpable.


HEART: First and second heart sounds are normal;  no edema.  


LUNGS: Respiratory rate normal; clear to auscultation.  


ABDOMEN: Soft,  nontender, liver spleen not palpable, no masses palpable.  


PSYCH: Alert and oriented x3;  mood  and affect normal.  


MUSCULOSKELETAL: Right foot is less swollen, much decreased tenderness





INVESTIGATIONS, reviewed in the clinical context:


White count 9.2 hemoglobin 10.8 potassium 3.6 creatinine 0.69 C-reactive protein

241


blood culture negative


Previous testing:


White count 15.5 hemoglobin 12.3 potassium 3.8 121 creatinine 1.03


Lactic acid 2.2


Right foot x-ray-shows some soft tissue swelling evidence of previous surgery





discharge diagnosis:


-acute right foot cellulitis causing sepsis, POA


-Lactic acidosis from sepsis


-Chronic rheumatoid arthritis


-Hypothyroid


-Essential hypertension


-Obesity BMI 33.0


-Normocytic anemia likely of chronic disease





disposition:


home





Patient Condition at Discharge: Stable





Plan - Discharge Summary


Discharge Rx Participant: No


New Discharge Prescriptions: 


New


   Sulfamethox-Tmp 800-160Mg [Bactrim -160 mg] 1 tab PO Q12HR #20 tab





Continue


   Leflunomide [Arava] 20 mg PO DAILY


   Levothyroxine Sodium [Synthroid] 150 mcg PO DAILY


   methylPREDNISolone [Medrol] 4 mg PO DAILY


   Tofacitinib Citrate [Xeljanz Xr] 11 mg PO DAILY


   Metoprolol Succinate (ER) [Toprol XL] 50 mg PO DAILY


Discharge Medication List





Leflunomide [Arava] 20 mg PO DAILY 03/29/17 [History]


Levothyroxine Sodium [Synthroid] 150 mcg PO DAILY 03/29/17 [History]


Metoprolol Succinate (ER) [Toprol XL] 50 mg PO DAILY 09/28/19 [History]


Tofacitinib Citrate [Xeljanz Xr] 11 mg PO DAILY 09/28/19 [History]


methylPREDNISolone [Medrol] 4 mg PO DAILY 09/28/19 [History]


Sulfamethox-Tmp 800-160Mg [Bactrim -160 mg] 1 tab PO Q12HR #20 tab 

09/30/19 [Rx]








Follow up Appointment(s)/Referral(s): 


orthodic-dr chucho [Other] - 10/03/19


Walker Smith DO [Primary Care Provider] - 1-2 days


Patient Instructions/Handouts:  Cellulitis (DC)


Activity/Diet/Wound Care/Special Instructions: 


activity as tolerated





regular diet as tolerated





elevate right foot  


Discharge Disposition: HOME SELF-CARE

## 2019-11-05 ENCOUNTER — HOSPITAL ENCOUNTER (OUTPATIENT)
Dept: HOSPITAL 47 - CATHCVL | Age: 59
Discharge: HOME HEALTH | End: 2019-11-05
Attending: RADIOLOGY
Payer: COMMERCIAL

## 2019-11-05 VITALS — BODY MASS INDEX: 33.2 KG/M2

## 2019-11-05 VITALS
HEART RATE: 86 BPM | SYSTOLIC BLOOD PRESSURE: 170 MMHG | TEMPERATURE: 98.5 F | DIASTOLIC BLOOD PRESSURE: 98 MMHG | RESPIRATION RATE: 16 BRPM

## 2019-11-05 DIAGNOSIS — L03.115: Primary | ICD-10-CM

## 2019-11-05 DIAGNOSIS — M06.9: ICD-10-CM

## 2019-11-05 DIAGNOSIS — I10: ICD-10-CM

## 2019-11-05 DIAGNOSIS — Z79.899: ICD-10-CM

## 2019-11-05 DIAGNOSIS — Z91.018: ICD-10-CM

## 2019-11-05 DIAGNOSIS — Z79.890: ICD-10-CM

## 2019-11-05 DIAGNOSIS — E07.9: ICD-10-CM

## 2019-11-05 LAB
ANION GAP SERPL CALC-SCNC: 11 MMOL/L
BASOPHILS # BLD AUTO: 0.1 K/UL (ref 0–0.2)
BASOPHILS NFR BLD AUTO: 1 %
BUN SERPL-SCNC: 19 MG/DL (ref 9–20)
CHLORIDE SERPL-SCNC: 108 MMOL/L (ref 98–107)
CO2 SERPL-SCNC: 23 MMOL/L (ref 22–30)
EOSINOPHIL # BLD AUTO: 0.3 K/UL (ref 0–0.7)
EOSINOPHIL NFR BLD AUTO: 3 %
ERYTHROCYTE [DISTWIDTH] IN BLOOD BY AUTOMATED COUNT: 4.7 M/UL (ref 4.3–5.9)
ERYTHROCYTE [DISTWIDTH] IN BLOOD: 16.7 % (ref 11.5–15.5)
HCT VFR BLD AUTO: 42.2 % (ref 39–53)
HGB BLD-MCNC: 12.8 GM/DL (ref 13–17.5)
LYMPHOCYTES # SPEC AUTO: 2 K/UL (ref 1–4.8)
LYMPHOCYTES NFR SPEC AUTO: 18 %
MCH RBC QN AUTO: 27.2 PG (ref 25–35)
MCHC RBC AUTO-ENTMCNC: 30.3 G/DL (ref 31–37)
MCV RBC AUTO: 89.8 FL (ref 80–100)
MONOCYTES # BLD AUTO: 0.7 K/UL (ref 0–1)
MONOCYTES NFR BLD AUTO: 6 %
NEUTROPHILS # BLD AUTO: 7.6 K/UL (ref 1.3–7.7)
NEUTROPHILS NFR BLD AUTO: 70 %
PLATELET # BLD AUTO: 242 K/UL (ref 150–450)
POTASSIUM SERPL-SCNC: 4.1 MMOL/L (ref 3.5–5.1)
SODIUM SERPL-SCNC: 142 MMOL/L (ref 137–145)
WBC # BLD AUTO: 10.8 K/UL (ref 3.8–10.6)

## 2019-11-05 PROCEDURE — 85025 COMPLETE CBC W/AUTO DIFF WBC: CPT

## 2019-11-05 PROCEDURE — 84520 ASSAY OF UREA NITROGEN: CPT

## 2019-11-05 PROCEDURE — 36573 INSJ PICC RS&I 5 YR+: CPT

## 2019-11-05 PROCEDURE — 80051 ELECTROLYTE PANEL: CPT

## 2019-11-05 PROCEDURE — 82565 ASSAY OF CREATININE: CPT

## 2019-11-05 NOTE — IR
EXAMINATION TYPE: IR cvc insert >=5 years

 

DATE OF EXAM: 11/5/2019

 

COMPARISON: NONE

 

CLINICAL HISTORY: Infection Needs long-term intravenous access for antibiotics.

 

PROCEDURE:

After informed consent, the skin overlying the left basilic vein was localized with ultrasound and no
maia to be compressible and patent.  An ultrasound image was obtained and submitted on the patient's c
cruz.  The overlying skin was prepped and draped and Lidocaine was used for local anesthesia.  A skin
 nick was made with a scalpel.  Access was gained to the vein under ultrasound guidance with a 21 gau
ge needle and a 0.018 inch wire was advanced.  Access site was dilated with Peel-Away sheath and cath
eter tailored to the appropriate length and advanced such that the distal tip is at the cavoatrial ju
nction.  Spot image was obtained verifying placement.  Catheter was fixed to the skin and a sterile d
ressing was placed following hemostasis.  Catheter was aspirated and flushed with saline.  Patient wa
s discharged in stable condition without complication. Maximal barrier technique is utilized.  Ultras
ound image is documented on the chart. Ultrasound used with sterile technique. 

 

Fluoro time and fluoroscopic images submitted to document procedure: 4 intraoperative C-arm images do
cument the procedure, 0.7 minutes fluoroscopy time

 

IMPRESSION: STATUS POST ULTRASOUND AND FLUOROSCOPIC GUIDED PICC LINE PLACEMENT, READY FOR USE.  THIS 
PROCEDURE WAS PERFORMED BY THE UNDERSIGNED.

## 2019-11-16 ENCOUNTER — HOSPITAL ENCOUNTER (EMERGENCY)
Dept: HOSPITAL 47 - EC | Age: 59
Discharge: HOME | End: 2019-11-16
Payer: COMMERCIAL

## 2019-11-16 VITALS — TEMPERATURE: 98.2 F

## 2019-11-16 VITALS — SYSTOLIC BLOOD PRESSURE: 131 MMHG | RESPIRATION RATE: 18 BRPM | DIASTOLIC BLOOD PRESSURE: 83 MMHG | HEART RATE: 87 BPM

## 2019-11-16 DIAGNOSIS — Z98.890: ICD-10-CM

## 2019-11-16 DIAGNOSIS — Z79.890: ICD-10-CM

## 2019-11-16 DIAGNOSIS — Z87.891: ICD-10-CM

## 2019-11-16 DIAGNOSIS — M06.9: ICD-10-CM

## 2019-11-16 DIAGNOSIS — E07.9: ICD-10-CM

## 2019-11-16 DIAGNOSIS — Z95.828: ICD-10-CM

## 2019-11-16 DIAGNOSIS — I10: ICD-10-CM

## 2019-11-16 DIAGNOSIS — L08.9: ICD-10-CM

## 2019-11-16 DIAGNOSIS — Z79.52: ICD-10-CM

## 2019-11-16 DIAGNOSIS — Z79.899: ICD-10-CM

## 2019-11-16 DIAGNOSIS — I82.622: Primary | ICD-10-CM

## 2019-11-16 PROCEDURE — 99283 EMERGENCY DEPT VISIT LOW MDM: CPT

## 2019-11-16 NOTE — ED
Recheck HPI





- General


Chief Complaint: Recheck/Abnormal Lab/Rx


Stated Complaint: picc line swelling


Time Seen by Provider: 19 12:19


Source: patient


Mode of arrival: ambulatory


Limitations: no limitations





- History of Present Illness


Initial Comments: 





Patient is a 59-year-old male presenting to the emergency Department with 

complaints of pain and swelling in his left arm since yesterday.  Patient states

he does have a PICC line in place secondary to a foot infection.  PICC line was 

placed approximately 2 weeks ago.  Patient denies any fever, chills, nausea, 

vomiting.  Patient states he spoke with his PCP today regarding the pain and 

swelling and they recommended him coming to the ER for an ultrasound to rule out

a clot.  Patient denies history of blood clots.  Patient has no other complaints

today.  Upon arrival to ER, vital signs are stable.





- Related Data


                                Home Medications











 Medication  Instructions  Recorded  Confirmed


 


Levothyroxine Sodium [Synthroid] 150 mcg PO DAILY 03/29/17 10/31/19


 


Metoprolol Succinate (ER) [Toprol 50 mg PO DAILY 09/28/19 10/31/19





XL]   


 


methylPREDNISolone [Medrol] 4 mg PO DAILY 09/28/19 10/31/19








                                  Previous Rx's











 Medication  Instructions  Recorded


 


Rivaroxaban [Xarelto] 15 mg PO BID 21 Days #42 tab 19











                                    Allergies











Allergy/AdvReac Type Severity Reaction Status Date / Time


 


No Known Allergies Allergy   Verified 19 12:16














Review of Systems


ROS Statement: 


Those systems with pertinent positive or pertinent negative responses have been 

documented in the HPI.





ROS Other: All systems not noted in ROS Statement are negative.





Past Medical History


Past Medical History: Hypertension, Rheumatoid Arthritis (RA), Thyroid Disorder


Additional Past Medical History / Comment(s): right foot infection, KIDNEY 

STONES


History of Any Multi-Drug Resistant Organisms: None Reported


Past Surgical History: Back Surgery, Tonsillectomy


Additional Past Surgical History / Comment(s): right foot surgery, repair 

herniated disc, plate removed rt foot


Past Anesthesia/Blood Transfusion Reactions: No Reported Reaction


Past Psychological History: No Psychological Hx Reported


Smoking Status: Former smoker





- Past Family History


  ** Mother


Family Medical History: Cancer





  ** Father


Family Medical History: No Reported History


Additional Family Medical History / Comment(s): states, "he just ."





General Exam





- General Exam Comments


Initial Comments: 





GENERAL: 


Well-appearing, well-nourished and in no acute distress.





HEAD: 


Atraumatic, normocephalic.





EYES:


Pupils equal round and reactive to light, extraocular movements intact, sclera 

anicteric, conjunctiva are normal.





ENT: 


 Moist mucous membranes.





NECK: 


Normal range of motion, supple without lymphadenopathy or JVD.





LUNGS:


 Breath sounds clear to auscultation bilaterally and equal.  No wheezes rales or

 rhonchi.





HEART:


Regular rate and rhythm without murmurs, rubs or gallops.





ABDOMEN: 


Soft, nontender, normoactive bowel sounds.  No guarding, no rebound.  No masses 

appreciated.





EXTREMITIES: 


Patient has a mild pain with palpation of the left antecubital area as well as 

some mild erythema and swelling.  Patient has full range of motion.  There is a 

PICC line in place in the left antecubital space.  





NEUROLOGICAL: 


Cranial nerves II through XII grossly intact.  Normal speech, normal gait.





PSYCH:


Normal mood, normal affect.





SKIN:


 Warm, Dry, normal turgor, no rashes or lesions noted.


Limitations: no limitations





Course


                                   Vital Signs











  19





  12:15 14:08


 


Temperature 98.2 F 


 


Pulse Rate 69 87


 


Respiratory 16 18





Rate  


 


Blood Pressure 111/73 131/83


 


O2 Sat by Pulse 98 98





Oximetry  














Medical Decision Making





- Medical Decision Making





Patient is a 59-year-old male presenting with mild pain, erythema, swelling of 

his left antecubital area 2 days.  Patient has had a PICC line in place for an

tibiotics for the past 2 weeks secondary to a foot infection.  Patient denies 

history of DVTs.  Ultrasound of the left upper extremity states the exam is 

negative for acute occlusive thrombus however is positive for a chronic 

nonocclusive thrombus.  This finding was discussed with the patient and given 

the PICC line has only been in place for 10 days, we will treat this as an acute

 DVT.  Patient will be started on Xaralto and will follow-up with his PCP on 

Monday.  Patient is agreeable with this plan of care.  Case was discussed with 

Dr. Morales who agrees this plan of care.  Patient is stable for discharge at this 

time.  Return parameters were discussed with the patient and he verbalized 

understanding.





Disposition


Clinical Impression: 


 Left upper extremity deep vein thrombosis





Disposition: HOME SELF-CARE


Condition: Stable


Instructions (If sedation given, give patient instructions):  Deep Vein 

Thrombosis (ED)


Additional Instructions: 


Please return to the Emergency Department if symptoms worsen or any other 

concerns.


Start Xarelto as prescribed.


Follow-up with PCP or Dr. Lance as discussed.


Prescriptions: 


Rivaroxaban [Xarelto] 15 mg PO BID 21 Days #42 tab


Is patient prescribed a controlled substance at d/c from ED?: No


Referrals: 


Walker Smith DO [Primary Care Provider] - 1-2 days


Monika Sheets MD [Family Provider] - 1-2 days

## 2019-11-16 NOTE — US
EXAMINATION TYPE: US venous Doppler duplex UE LT

 

DATE OF EXAM: 11/16/2019

 

COMPARISON: NONE

 

CLINICAL HISTORY: pain, PICC line present . Patient noted pain and swelling x 3 days at left upper ar
m PICC site; receiving antibiotics for foot infection

 

SIDE PERFORMED: left

 

Left Arm: Is Positive for non occluding DVT in distal Left Axillary Vein as echoes are noted surround
ing PICC line, but posterior color flow present.  Then wall echoes are seen in superficial Left Basil
ic Vein surrounding PICC with minimal color flow present in superficial Left Basilic Vein. 

 

IMPRESSION:

1. THIS EXAMINATION IS NEGATIVE FOR ACUTE OCCLUSIVE THROMBUS WITHIN THE LEFT ARM.

2. THIS EXAMINATION IS POSITIVE FOR CHRONIC NONOCCLUSIVE THROMBUS WITHIN THE LEFT ARM.

## 2019-11-20 ENCOUNTER — HOSPITAL ENCOUNTER (OUTPATIENT)
Dept: HOSPITAL 47 - CATHCVL | Age: 59
End: 2019-11-20
Attending: INTERNAL MEDICINE
Payer: COMMERCIAL

## 2019-11-20 DIAGNOSIS — I10: ICD-10-CM

## 2019-11-20 DIAGNOSIS — E07.9: ICD-10-CM

## 2019-11-20 DIAGNOSIS — Z79.890: ICD-10-CM

## 2019-11-20 DIAGNOSIS — Z79.899: ICD-10-CM

## 2019-11-20 DIAGNOSIS — M86.171: ICD-10-CM

## 2019-11-20 DIAGNOSIS — M06.9: ICD-10-CM

## 2019-11-20 DIAGNOSIS — L03.115: ICD-10-CM

## 2019-11-20 DIAGNOSIS — B95.61: ICD-10-CM

## 2019-11-20 DIAGNOSIS — I80.8: Primary | ICD-10-CM

## 2019-11-20 PROCEDURE — 76937 US GUIDE VASCULAR ACCESS: CPT

## 2019-11-20 PROCEDURE — 36410 VNPNXR 3YR/> PHY/QHP DX/THER: CPT

## 2020-02-06 ENCOUNTER — HOSPITAL ENCOUNTER (OUTPATIENT)
Dept: HOSPITAL 47 - ORWHC2ENDO | Age: 60
Discharge: HOME | End: 2020-02-06
Attending: INTERNAL MEDICINE
Payer: COMMERCIAL

## 2020-02-06 VITALS — BODY MASS INDEX: 33 KG/M2

## 2020-02-06 VITALS — HEART RATE: 107 BPM | RESPIRATION RATE: 18 BRPM | DIASTOLIC BLOOD PRESSURE: 67 MMHG | SYSTOLIC BLOOD PRESSURE: 106 MMHG

## 2020-02-06 VITALS — TEMPERATURE: 98.7 F

## 2020-02-06 DIAGNOSIS — Z79.899: ICD-10-CM

## 2020-02-06 DIAGNOSIS — M05.10: Primary | ICD-10-CM

## 2020-02-06 DIAGNOSIS — Z87.891: ICD-10-CM

## 2020-02-06 DIAGNOSIS — J20.9: ICD-10-CM

## 2020-02-06 DIAGNOSIS — Z98.890: ICD-10-CM

## 2020-02-06 DIAGNOSIS — Z79.1: ICD-10-CM

## 2020-02-06 DIAGNOSIS — R91.8: ICD-10-CM

## 2020-02-06 DIAGNOSIS — I10: ICD-10-CM

## 2020-02-06 DIAGNOSIS — E03.9: ICD-10-CM

## 2020-02-06 DIAGNOSIS — Z80.9: ICD-10-CM

## 2020-02-06 DIAGNOSIS — Z90.89: ICD-10-CM

## 2020-02-06 DIAGNOSIS — J84.9: ICD-10-CM

## 2020-02-06 DIAGNOSIS — Z79.890: ICD-10-CM

## 2020-02-06 DIAGNOSIS — Z87.09: ICD-10-CM

## 2020-02-06 LAB
CELL CNT PNL FLD: 100
DEPRECATED POLYS # FLD: 85 %
GLUCOSE BLD-MCNC: 88 MG/DL (ref 75–99)
MONONUC CELLS # FLD: 15 %
NUC CELL # FLD: 88 /UL

## 2020-02-06 PROCEDURE — 87102 FUNGUS ISOLATION CULTURE: CPT

## 2020-02-06 PROCEDURE — 87206 SMEAR FLUORESCENT/ACID STAI: CPT

## 2020-02-06 PROCEDURE — 87529 HSV DNA AMP PROBE: CPT

## 2020-02-06 PROCEDURE — 87116 MYCOBACTERIA CULTURE: CPT

## 2020-02-06 PROCEDURE — 87496 CYTOMEG DNA AMP PROBE: CPT

## 2020-02-06 PROCEDURE — 87077 CULTURE AEROBIC IDENTIFY: CPT

## 2020-02-06 PROCEDURE — 94640 AIRWAY INHALATION TREATMENT: CPT

## 2020-02-06 PROCEDURE — 87186 SC STD MICRODIL/AGAR DIL: CPT

## 2020-02-06 PROCEDURE — 88108 CYTOPATH CONCENTRATE TECH: CPT

## 2020-02-06 PROCEDURE — 31624 DX BRONCHOSCOPE/LAVAGE: CPT

## 2020-02-06 PROCEDURE — 87798 DETECT AGENT NOS DNA AMP: CPT

## 2020-02-06 PROCEDURE — 88305 TISSUE EXAM BY PATHOLOGIST: CPT

## 2020-02-06 PROCEDURE — 89050 BODY FLUID CELL COUNT: CPT

## 2020-02-06 PROCEDURE — 87205 SMEAR GRAM STAIN: CPT

## 2020-02-06 PROCEDURE — 87502 INFLUENZA DNA AMP PROBE: CPT

## 2020-02-06 PROCEDURE — 87634 RSV DNA/RNA AMP PROBE: CPT

## 2020-02-06 PROCEDURE — 87070 CULTURE OTHR SPECIMN AEROBIC: CPT

## 2020-02-06 PROCEDURE — 87498 ENTEROVIRUS PROBE&REVRS TRNS: CPT

## 2020-02-06 PROCEDURE — 87252 VIRUS INOCULATION TISSUE: CPT

## 2020-02-06 NOTE — PCN
PROCEDURE NOTE



PROCEDURE:

Bronchoscopy, airway examination, therapeutic lavage, BAL right middle lobe.



PREOPERATIVE DIAGNOSIS:

Rheumatoid lung disease.



POSTOPERATIVE DIAGNOSIS:

Rheumatoid lung disease.



The patient's procedure too place in room #3. There was informed consent and universal

timeout.



ANESTHESIA:

Anesthesia provided general anesthesia/unconscious sedation.



DESCRIPTION OF PROCEDURE:

After the patient was adequately sedated and being fully monitored, the bronchoscope

was inserted through the right nostril.  It passed through the right nasopharynx into

the oropharynx.  Next, it went into the hypopharyngeal area.  The hypopharynx was

normal and the structures including anterior commissure, true cords, false cords,

arytenoids, piriform sinuses, right and left valleculae and epiglottis all appeared

normal.  After topicalization of the glottic opening, the bronchoscope was pushed

through the glottic opening into the trachea.  The trachea appeared normal.  There were

some purulent looking secretions noted in the trachea.  Tracheal mariza was sharp.  The

right and left mainstem were topicalized.  Right upper lobe and its 3 segments, right

middle lobe and its 2 segments, right lower lobe and its 5 segments, left upper lobe

proper and its 2 segments, lingula and its 2 segments and left lower lobe and its 4

segments all had similar findings of diffuse airway erythema and hyperemia.  There was

mucosal friability.  There were thick purulent secretions noted.  They were suctioned.

The bronchoscope was wedged into the right middle lobe, 30 mL was recovered and sent to

the laboratory for analysis.  Additional saline was used to remove any additional

secretions.  The patient tolerated the procedure well.  There was no dominant mass or

tumor.  There was no significant bleeding.  The bronchoscope was withdrawn and the

patient will be recovered.





MMODL / IJN: [id number] / Job#: [id number]

## 2020-06-10 ENCOUNTER — HOSPITAL ENCOUNTER (INPATIENT)
Dept: HOSPITAL 47 - EC | Age: 60
LOS: 6 days | Discharge: HOME HEALTH | DRG: 178 | End: 2020-06-16
Attending: HOSPITALIST | Admitting: HOSPITALIST
Payer: COMMERCIAL

## 2020-06-10 ENCOUNTER — HOSPITAL ENCOUNTER (OUTPATIENT)
Dept: HOSPITAL 47 - RADCTMAIN | Age: 60
Discharge: HOME | End: 2020-06-10
Attending: INTERNAL MEDICINE
Payer: COMMERCIAL

## 2020-06-10 DIAGNOSIS — R91.8: Primary | ICD-10-CM

## 2020-06-10 DIAGNOSIS — J98.4: ICD-10-CM

## 2020-06-10 DIAGNOSIS — B95.3: ICD-10-CM

## 2020-06-10 DIAGNOSIS — E66.9: ICD-10-CM

## 2020-06-10 DIAGNOSIS — Z79.899: ICD-10-CM

## 2020-06-10 DIAGNOSIS — Z80.1: ICD-10-CM

## 2020-06-10 DIAGNOSIS — J86.9: Primary | ICD-10-CM

## 2020-06-10 DIAGNOSIS — J98.11: ICD-10-CM

## 2020-06-10 DIAGNOSIS — R09.02: ICD-10-CM

## 2020-06-10 DIAGNOSIS — Z86.19: ICD-10-CM

## 2020-06-10 DIAGNOSIS — M05.10: ICD-10-CM

## 2020-06-10 DIAGNOSIS — Z20.828: ICD-10-CM

## 2020-06-10 DIAGNOSIS — E03.9: ICD-10-CM

## 2020-06-10 DIAGNOSIS — Z87.891: ICD-10-CM

## 2020-06-10 DIAGNOSIS — Z79.52: ICD-10-CM

## 2020-06-10 DIAGNOSIS — Z87.442: ICD-10-CM

## 2020-06-10 DIAGNOSIS — Z98.890: ICD-10-CM

## 2020-06-10 DIAGNOSIS — Z79.890: ICD-10-CM

## 2020-06-10 DIAGNOSIS — Z87.01: ICD-10-CM

## 2020-06-10 DIAGNOSIS — I11.9: ICD-10-CM

## 2020-06-10 DIAGNOSIS — Z98.1: ICD-10-CM

## 2020-06-10 DIAGNOSIS — Z90.89: ICD-10-CM

## 2020-06-10 DIAGNOSIS — J94.8: ICD-10-CM

## 2020-06-10 LAB
ALBUMIN SERPL-MCNC: 3.2 G/DL (ref 3.5–5)
ALP SERPL-CCNC: 208 U/L (ref 38–126)
ALT SERPL-CCNC: 71 U/L (ref 4–49)
ANION GAP SERPL CALC-SCNC: 10 MMOL/L
APTT BLD: 22.5 SEC (ref 22–30)
AST SERPL-CCNC: 29 U/L (ref 17–59)
BASOPHILS # BLD AUTO: 0.1 K/UL (ref 0–0.2)
BASOPHILS NFR BLD AUTO: 1 %
BUN SERPL-SCNC: 26 MG/DL (ref 9–20)
CALCIUM SPEC-MCNC: 8.6 MG/DL (ref 8.4–10.2)
CHLORIDE SERPL-SCNC: 101 MMOL/L (ref 98–107)
CO2 SERPL-SCNC: 27 MMOL/L (ref 22–30)
EOSINOPHIL # BLD AUTO: 0 K/UL (ref 0–0.7)
EOSINOPHIL NFR BLD AUTO: 0 %
ERYTHROCYTE [DISTWIDTH] IN BLOOD BY AUTOMATED COUNT: 4.86 M/UL (ref 4.3–5.9)
ERYTHROCYTE [DISTWIDTH] IN BLOOD: 16.3 % (ref 11.5–15.5)
GLUCOSE SERPL-MCNC: 115 MG/DL (ref 74–99)
HCT VFR BLD AUTO: 42.7 % (ref 39–53)
HGB BLD-MCNC: 12.8 GM/DL (ref 13–17.5)
INR PPP: 1 (ref ?–1.2)
LYMPHOCYTES # SPEC AUTO: 1.1 K/UL (ref 1–4.8)
LYMPHOCYTES NFR SPEC AUTO: 8 %
MCH RBC QN AUTO: 26.4 PG (ref 25–35)
MCHC RBC AUTO-ENTMCNC: 30.1 G/DL (ref 31–37)
MCV RBC AUTO: 87.8 FL (ref 80–100)
MONOCYTES # BLD AUTO: 1.2 K/UL (ref 0–1)
MONOCYTES NFR BLD AUTO: 9 %
NEUTROPHILS # BLD AUTO: 11.2 K/UL (ref 1.3–7.7)
NEUTROPHILS NFR BLD AUTO: 81 %
PLATELET # BLD AUTO: 333 K/UL (ref 150–450)
POTASSIUM SERPL-SCNC: 3.7 MMOL/L (ref 3.5–5.1)
PROT SERPL-MCNC: 6.8 G/DL (ref 6.3–8.2)
PT BLD: 10.3 SEC (ref 9–12)
SODIUM SERPL-SCNC: 138 MMOL/L (ref 137–145)
WBC # BLD AUTO: 13.8 K/UL (ref 3.8–10.6)

## 2020-06-10 PROCEDURE — 87502 INFLUENZA DNA AMP PROBE: CPT

## 2020-06-10 PROCEDURE — 82945 GLUCOSE OTHER FLUID: CPT

## 2020-06-10 PROCEDURE — 80202 ASSAY OF VANCOMYCIN: CPT

## 2020-06-10 PROCEDURE — 89050 BODY FLUID CELL COUNT: CPT

## 2020-06-10 PROCEDURE — 85025 COMPLETE CBC W/AUTO DIFF WBC: CPT

## 2020-06-10 PROCEDURE — 36415 COLL VENOUS BLD VENIPUNCTURE: CPT

## 2020-06-10 PROCEDURE — 71045 X-RAY EXAM CHEST 1 VIEW: CPT

## 2020-06-10 PROCEDURE — 83615 LACTATE (LD) (LDH) ENZYME: CPT

## 2020-06-10 PROCEDURE — 87116 MYCOBACTERIA CULTURE: CPT

## 2020-06-10 PROCEDURE — 36573 INSJ PICC RS&I 5 YR+: CPT

## 2020-06-10 PROCEDURE — 93005 ELECTROCARDIOGRAM TRACING: CPT

## 2020-06-10 PROCEDURE — 88108 CYTOPATH CONCENTRATE TECH: CPT

## 2020-06-10 PROCEDURE — 87070 CULTURE OTHR SPECIMN AEROBIC: CPT

## 2020-06-10 PROCEDURE — 87040 BLOOD CULTURE FOR BACTERIA: CPT

## 2020-06-10 PROCEDURE — 85730 THROMBOPLASTIN TIME PARTIAL: CPT

## 2020-06-10 PROCEDURE — 85610 PROTHROMBIN TIME: CPT

## 2020-06-10 PROCEDURE — 87206 SMEAR FLUORESCENT/ACID STAI: CPT

## 2020-06-10 PROCEDURE — 87798 DETECT AGENT NOS DNA AMP: CPT

## 2020-06-10 PROCEDURE — 87252 VIRUS INOCULATION TISSUE: CPT

## 2020-06-10 PROCEDURE — 71260 CT THORAX DX C+: CPT

## 2020-06-10 PROCEDURE — 80053 COMPREHEN METABOLIC PANEL: CPT

## 2020-06-10 PROCEDURE — 76942 ECHO GUIDE FOR BIOPSY: CPT

## 2020-06-10 PROCEDURE — 87498 ENTEROVIRUS PROBE&REVRS TRNS: CPT

## 2020-06-10 PROCEDURE — 87102 FUNGUS ISOLATION CULTURE: CPT

## 2020-06-10 PROCEDURE — 83605 ASSAY OF LACTIC ACID: CPT

## 2020-06-10 PROCEDURE — 96361 HYDRATE IV INFUSION ADD-ON: CPT

## 2020-06-10 PROCEDURE — 84145 PROCALCITONIN (PCT): CPT

## 2020-06-10 PROCEDURE — 82565 ASSAY OF CREATININE: CPT

## 2020-06-10 PROCEDURE — 88305 TISSUE EXAM BY PATHOLOGIST: CPT

## 2020-06-10 PROCEDURE — 87077 CULTURE AEROBIC IDENTIFY: CPT

## 2020-06-10 PROCEDURE — 87186 SC STD MICRODIL/AGAR DIL: CPT

## 2020-06-10 PROCEDURE — 76604 US EXAM CHEST: CPT

## 2020-06-10 PROCEDURE — 87496 CYTOMEG DNA AMP PROBE: CPT

## 2020-06-10 PROCEDURE — 71046 X-RAY EXAM CHEST 2 VIEWS: CPT

## 2020-06-10 PROCEDURE — 80048 BASIC METABOLIC PNL TOTAL CA: CPT

## 2020-06-10 PROCEDURE — 87075 CULTR BACTERIA EXCEPT BLOOD: CPT

## 2020-06-10 PROCEDURE — 84484 ASSAY OF TROPONIN QUANT: CPT

## 2020-06-10 PROCEDURE — 83880 ASSAY OF NATRIURETIC PEPTIDE: CPT

## 2020-06-10 PROCEDURE — 87634 RSV DNA/RNA AMP PROBE: CPT

## 2020-06-10 PROCEDURE — 87205 SMEAR GRAM STAIN: CPT

## 2020-06-10 PROCEDURE — 87529 HSV DNA AMP PROBE: CPT

## 2020-06-10 PROCEDURE — 84157 ASSAY OF PROTEIN OTHER: CPT

## 2020-06-10 PROCEDURE — 96365 THER/PROPH/DIAG IV INF INIT: CPT

## 2020-06-10 PROCEDURE — 99285 EMERGENCY DEPT VISIT HI MDM: CPT

## 2020-06-10 RX ADMIN — METOPROLOL TARTRATE SCH MG: 50 TABLET, FILM COATED ORAL at 22:14

## 2020-06-10 RX ADMIN — CEFAZOLIN SCH MLS/HR: 330 INJECTION, POWDER, FOR SOLUTION INTRAMUSCULAR; INTRAVENOUS at 22:15

## 2020-06-10 NOTE — ED
Recheck HPI





- General


Chief Complaint: Recheck/Abnormal Lab/Rx


Stated Complaint: Abnormal CT


Time Seen by Provider: 06/10/20 17:24


Source: patient


Mode of arrival: ambulatory


Limitations: no limitations





- History of Present Illness


Initial Comments: 


59-year-old male with history of pulmonary rheumatoid arthritis, HTN, thyroid 

disorder presents today for chief complaint of sent in by pulmonologist.  

Patient states that he had a bronchoscopy in February, 2 weeks after the 

bronchoscopy developed increased sputum he had seen Dr. Vides and at this 

time because his primary pulmonologist was not available.  Patient states that 

he was placed on a Z-Cesar steroids and had an IM shot of steroids in the office. 

Patient states he had minimal improvement he saw Dr. Gómez today who saw on x-

ray with an abnormality he was then ordered a CT which revealed hy

dropneumothorax bilaterally increased left side on comparison with the right. 

Patient states he has not had fevers, cough. Admits to sputum production. 

Patient denies significant shortness of breath increase, states he has SOB at 

baseline, denies leg swelling, hemoptysis, denies chest pain. Remaining ROS (-).








- Related Data


                                Home Medications











 Medication  Instructions  Recorded  Confirmed


 


Levothyroxine Sodium [Synthroid] 150 mcg PO DAILY 03/29/17 06/10/20


 


methylPREDNISolone [Medrol] 4 mg PO DAILY 09/28/19 06/10/20


 


Leflunomide [Arava] 20 mg PO DAILY 02/05/20 06/10/20


 


Tofacitinib Citrate [Xeljanz Xr] 11 mg PO DAILY 02/05/20 06/10/20


 


Albuterol Inhaler [Ventolin Hfa 1 puff INHALATION RT-Q4H PRN 06/10/20 06/10/20





Inhaler]   


 


Ibuprofen [Motrin] 800 mg PO TID PRN 06/10/20 06/10/20


 


Losartan-Hctz 50-12.5 mg [Hyzaar 1 tab PO DAILY 06/10/20 06/10/20





50-12.5]   


 


Metoprolol Tartrate [Lopressor] 50 mg PO TID 06/10/20 06/10/20











                                    Allergies











Allergy/AdvReac Type Severity Reaction Status Date / Time


 


No Known Allergies Allergy   Verified 06/10/20 19:03














Review of Systems


ROS Statement: 


Those systems with pertinent positive or pertinent negative responses have been 

documented in the HPI.





ROS Other: All systems not noted in ROS Statement are negative.





Past Medical History


Past Medical History: Hypertension, Rheumatoid Arthritis (RA), Thyroid Disorder


Additional Past Medical History / Comment(s): Mult Rt foot infections after 

surgery, last time 2019, had PICC Line for AB and developed bloot clot. Hx 

kidney stones. 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

 pneumonia."


History of Any Multi-Drug Resistant Organisms: None Reported


Past Surgical History: Back Surgery, Tonsillectomy


Additional Past Surgical History / Comment(s): Right foot surgery, repair 

herniated disc, plate removed rt foot.   CTR donny.  Mult RA nodules removed.


Past Anesthesia/Blood Transfusion Reactions: Previous Problems w/ Anesthesia


Additional Past Anesthesia/Blood Transfusion Reaction / Comment(s): slow to 

awaken x1.


Past Psychological History: No Psychological Hx Reported


Smoking Status: Former smoker


Past Alcohol Use History: None Reported


Past Drug Use History: None Reported





- Past Family History


  ** Mother


Family Medical History: Cancer





  ** Father


Family Medical History: No Reported History


Additional Family Medical History / Comment(s): states, "he just ."





General Exam





- General Exam Comments


Initial Comments: 


General:  The patient is awake and alert, in no distress


Eye:  +3 mm pupils are equal, round and reactive to light, extra-ocular mov

ements are intact.  No nystagmus.  There is normal conjunctiva bilaterally.  No 

signs of icterus.  


Ears, nose, mouth and throat:  There are moist mucous membranes and no oral 

lesions. 


Neck:  The neck is supple, there is no tenderness or JVD.  


Cardiovascular:  There is a regular rate and rhythm. No murmur, rub or gallop is

 appreciated.


Respiratory:  Respirations are non-labored, breath sounds are equal. Significant

 rales at base in right lung and left lower to mid lung.  No wheezes, stridor.


Gastrointestinal:  Soft, non-distended, non-tender abdomen without masses or 

organomegaly noted. There is no rebound or guarding present. 


Musculoskeletal:  Normal ROM, no tenderness.  Strength 5/5. Sensation intact. 

Radial pulses equal bilaterally 2+.  


Neurological:  A&O x 3. CN II-XII intact grossly, There are no obvious motor or 

sensory deficits. Coordination appears grossly intact. Speech is normal.


Skin:  Skin is warm and dry and no rashes or lesions are noted. 


Psychiatric:  Cooperative, appropriate mood & affect, normal judgment.  





Limitations: no limitations





Course


                                   Vital Signs











  06/10/20 06/10/20 06/10/20





  17:02 17:21 18:59


 


Temperature 98.7 F 98.1 F 


 


Pulse Rate 100 100 105 H


 


Respiratory 20  20





Rate   


 


Blood Pressure 95/61 111/73 121/73


 


O2 Sat by Pulse 92 L 93 L 





Oximetry   














Medical Decision Making





- Medical Decision Making


59-year-old male presenting today for chief complaint of sent by pulmonologist. 

CT outpatient revealed bilateral hydropneumothorax. Patient 92% on RA, no 

increased RR, does not appear in distress. Patient given levoquin in his 

physicians office. Patient states does not appears toxic. Patient had rocephin 

initiated in the ER. Imaging reviewd with my attending provider. Patient 

agreeable to admission. Dr. Gómez on consultation.








- Lab Data


Result diagrams: 


                                 06/10/20 18:28





                                 06/10/20 18:28


                                   Lab Results











  06/10/20 06/10/20 06/10/20 Range/Units





  18:28 18:28 18:28 


 


WBC  13.8 H    (3.8-10.6)  k/uL


 


RBC  4.86    (4.30-5.90)  m/uL


 


Hgb  12.8 L    (13.0-17.5)  gm/dL


 


Hct  42.7    (39.0-53.0)  %


 


MCV  87.8    (80.0-100.0)  fL


 


MCH  26.4    (25.0-35.0)  pg


 


MCHC  30.1 L    (31.0-37.0)  g/dL


 


RDW  16.3 H    (11.5-15.5)  %


 


Plt Count  333    (150-450)  k/uL


 


Hypochromasia  Moderate    


 


Anisocytosis  Slight    


 


PT   10.3   (9.0-12.0)  sec


 


INR   1.0   (<1.2)  


 


APTT   22.5   (22.0-30.0)  sec


 


Sodium    138  (137-145)  mmol/L


 


Potassium    3.7  (3.5-5.1)  mmol/L


 


Chloride    101  ()  mmol/L


 


Carbon Dioxide    27  (22-30)  mmol/L


 


Anion Gap    10  mmol/L


 


BUN    26 H  (9-20)  mg/dL


 


Creatinine    0.97  (0.66-1.25)  mg/dL


 


Est GFR (CKD-EPI)AfAm    >90  (>60 ml/min/1.73 sqM)  


 


Est GFR (CKD-EPI)NonAf    86  (>60 ml/min/1.73 sqM)  


 


Glucose    115 H  (74-99)  mg/dL


 


Plasma Lactic Acid Naif     (0.7-2.0)  mmol/L


 


Calcium    8.6  (8.4-10.2)  mg/dL


 


Total Bilirubin    0.7  (0.2-1.3)  mg/dL


 


AST    29  (17-59)  U/L


 


ALT    71 H  (4-49)  U/L


 


Alkaline Phosphatase    208 H  ()  U/L


 


Troponin I     (0.000-0.034)  ng/mL


 


Total Protein    6.8  (6.3-8.2)  g/dL


 


Albumin    3.2 L  (3.5-5.0)  g/dL














  06/10/20 06/10/20 Range/Units





  18:28 18:28 


 


WBC    (3.8-10.6)  k/uL


 


RBC    (4.30-5.90)  m/uL


 


Hgb    (13.0-17.5)  gm/dL


 


Hct    (39.0-53.0)  %


 


MCV    (80.0-100.0)  fL


 


MCH    (25.0-35.0)  pg


 


MCHC    (31.0-37.0)  g/dL


 


RDW    (11.5-15.5)  %


 


Plt Count    (150-450)  k/uL


 


Hypochromasia    


 


Anisocytosis    


 


PT    (9.0-12.0)  sec


 


INR    (<1.2)  


 


APTT    (22.0-30.0)  sec


 


Sodium    (137-145)  mmol/L


 


Potassium    (3.5-5.1)  mmol/L


 


Chloride    ()  mmol/L


 


Carbon Dioxide    (22-30)  mmol/L


 


Anion Gap    mmol/L


 


BUN    (9-20)  mg/dL


 


Creatinine    (0.66-1.25)  mg/dL


 


Est GFR (CKD-EPI)AfAm    (>60 ml/min/1.73 sqM)  


 


Est GFR (CKD-EPI)NonAf    (>60 ml/min/1.73 sqM)  


 


Glucose    (74-99)  mg/dL


 


Plasma Lactic Acid Naif  1.2   (0.7-2.0)  mmol/L


 


Calcium    (8.4-10.2)  mg/dL


 


Total Bilirubin    (0.2-1.3)  mg/dL


 


AST    (17-59)  U/L


 


ALT    (4-49)  U/L


 


Alkaline Phosphatase    ()  U/L


 


Troponin I   <0.012  (0.000-0.034)  ng/mL


 


Total Protein    (6.3-8.2)  g/dL


 


Albumin    (3.5-5.0)  g/dL














Disposition


Clinical Impression: 


 Hydropneumothorax, Dyspnea, Hypoxia, Leukocytosis





Disposition: ADMITTED AS IP TO THIS HOSP


Condition: Stable


Is patient prescribed a controlled substance at d/c from ED?: No


Referrals: 


Walker Smith DO [Primary Care Provider] - 1-2 days


Time of Disposition: 19:24


Decision to Admit Reason: Admit from EC


Decision Date: 06/10/20


Decision Time: 19:24

## 2020-06-10 NOTE — XR
EXAMINATION TYPE: XR chest 2V

 

DATE OF EXAM: 6/10/2020

 

COMPARISON: Today

 

HISTORY: Cough

 

TECHNIQUE: 2 views

 

FINDINGS: There are bilateral air-fluid levels in the posterior lung fields consistent with some locu
lated hydropneumothorax. This is seen on the posterior chest wall. Trachea is midline. There is infil
trate and atelectasis adjacent to the loculated pneumothoraces. There are chest leads. Heart size is 
normal. There is no heart failure.

 

IMPRESSION: Loculated bilateral posterior hydropneumothorax not changed compared to CT scan 4 hours a
go. Bilateral lower lobe infiltrates and atelectasis adjacent to the fluid.

## 2020-06-10 NOTE — CT
EXAMINATION TYPE: CT chest w con

 

DATE OF EXAM: 6/10/2020

 

COMPARISON: 9/4/2019

 

HISTORY: fluid in lungs, cough, abnormal cxr at office

 

CT DLP: 558.3 mGycm, Automated exposure control for dose reduction was used.

 

CONTRAST: Performed injected with 100 mL of Isovue 300.

 

TECHNIQUE: Axial images were obtained at 5 mm thick sections.  Reconstructed images are reviewed on [employer] computer in the coronal plane. 

 

FINDINGS: Portion of the thyroid visualized is normal.

 

No suspicious lung nodules or focal infiltrates are present. There is an azygos fissure, normal varia
nt.

 

There is a 0.4 cm peripheral nodule in the right apex. Series 4 image 16. This was present previously
.

There is an irregular 0.4 cm density anterior left upper lobe. Series 4 image 17.

There is a 0.8 cm nodule within the anterior left upper lung field. Series 4 image 24. This is slight
ly smaller than comparison.

There is an irregular pleural-based density measuring 2.1 x 1.1 cm posterior lateral right midlung. S
eries 4 image 33. This has enlarged from comparison.

Additional nodules previously identified are not as apparent. Cavitary lesion to have diminished in s
ize over the interval.

 

Posterior pleural thickening and atelectasis is present. Loculated pneumothorax is present posteriorl
y with hydropneumothorax within these areas. These aren't interval finding from comparison.

 

No enlarged mediastinal or hilar adenopathy is evident.   The ascending aorta diameter at the level o
f the main pulmonary artery is 3.9 cm.  The main pulmonary artery diameter at the bifurcation is 3.2 
cm.

 

Limited CT sections are obtained through the upper abdomen. No suspicious acute changes are evident w
ithin the abdomen.

 

Report was called and case discussed with Dr. Gómez by telephone 1445 hours 6/10/2020 by Dr. Smith.
 As requested by Dr. Gómez, patient was instructed to follow-up with Dr. Gómez at his scheduled appoi
ntment the next day.

 

IMPRESSIONS:

1. There appears to be interval development of a loculated hydropneumothorax bilaterally at the lung 
bases. This has a depth of approximately 3.5 cm on the right and 4.8 cm on the left.

2. Multiple bilateral pulmonary nodules. Larger prior cavitary lesions are smaller or resolved.

3. One irregular density along the right posterior lateral has enlarged over the interval.

## 2020-06-11 LAB
CELL CNT PNL FLD: 100
DEPRECATED POLYS # FLD: 96 %
GLUCOSE FLD-MCNC: 50 MG/DL
MONONUC CELLS # FLD: 4 %
NUC CELL # FLD: (no result) /UL
PROT FLD-MCNC: 1520 MG/DL

## 2020-06-11 PROCEDURE — 0W9930Z DRAINAGE OF RIGHT PLEURAL CAVITY WITH DRAINAGE DEVICE, PERCUTANEOUS APPROACH: ICD-10-PCS

## 2020-06-11 RX ADMIN — CEFAZOLIN SCH MLS/HR: 330 INJECTION, POWDER, FOR SOLUTION INTRAMUSCULAR; INTRAVENOUS at 20:00

## 2020-06-11 RX ADMIN — LEFLUNOMIDE SCH MG: 20 TABLET ORAL at 09:03

## 2020-06-11 RX ADMIN — ENOXAPARIN SODIUM SCH MG: 40 INJECTION SUBCUTANEOUS at 19:55

## 2020-06-11 RX ADMIN — SODIUM CHLORIDE SCH MLS/HR: 9 INJECTION, SOLUTION INTRAVENOUS at 22:34

## 2020-06-11 RX ADMIN — CEFAZOLIN SCH: 330 INJECTION, POWDER, FOR SOLUTION INTRAMUSCULAR; INTRAVENOUS at 05:07

## 2020-06-11 RX ADMIN — METOPROLOL TARTRATE SCH MG: 50 TABLET, FILM COATED ORAL at 22:34

## 2020-06-11 RX ADMIN — PIPERACILLIN AND TAZOBACTAM SCH MLS/HR: 3; .375 INJECTION, POWDER, FOR SOLUTION INTRAVENOUS at 12:48

## 2020-06-11 RX ADMIN — CEFAZOLIN SCH MLS/HR: 330 INJECTION, POWDER, FOR SOLUTION INTRAMUSCULAR; INTRAVENOUS at 14:45

## 2020-06-11 RX ADMIN — SODIUM CHLORIDE SCH MLS/HR: 9 INJECTION, SOLUTION INTRAVENOUS at 13:10

## 2020-06-11 RX ADMIN — METOPROLOL TARTRATE SCH MG: 50 TABLET, FILM COATED ORAL at 16:58

## 2020-06-11 RX ADMIN — PIPERACILLIN AND TAZOBACTAM SCH MLS/HR: 3; .375 INJECTION, POWDER, FOR SOLUTION INTRAVENOUS at 19:56

## 2020-06-11 RX ADMIN — LOSARTAN POTASSIUM AND HYDROCHLOROTHIAZIDE SCH EACH: 12.5; 5 TABLET ORAL at 09:03

## 2020-06-11 RX ADMIN — METOPROLOL TARTRATE SCH MG: 50 TABLET, FILM COATED ORAL at 09:03

## 2020-06-11 RX ADMIN — LEVOTHYROXINE SODIUM SCH MCG: 75 TABLET ORAL at 06:32

## 2020-06-11 NOTE — US
Ultrasound

 

INDICATION: Procedure guidance, bilateral hydropneumothorax

 

FINDINGS: Real-time ultrasound is performed provided to the procedure physician for procedure sandip delgado

 

IMPRESSIONS:

1. Documentation of ultrasound for procedure guidance.

## 2020-06-11 NOTE — P.HPIM
History of Present Illness


H&P Date: 20


Chief Complaint: Cough, congested





History of present complaint:


This is a very pleasant 59-year-old patient of Dr. Smith.  Follows with 

rheumatologist Dr. Florecita Rider.  Patient lost his arch of his right foot 

and had surgery done at [facility] in 2018.  This was mid foot 

fusion.  Patient had a wound and was admitted in 2019 to our hospital.  

Also did cellulitis of the right foot.  2019.  Patient also has known 

rheumatoid lung disease and pleural effusions.  He had a bronchoscopy with 

lavage in 2020.  He states since December he did have a respiratory 

symptoms off and on.  Including cough and congestion.  No obvious fever and 

chills.  In the interim he is recently see Dr. Vides and given some 

antibiotics.  Also be of this year he was seen by Dr. Kaye from cardiothoracic

surgery and given a Z-Cesar and steroids.  SLIGHT improvement.  Patient was seen 

by Dr. Almanza yesterday and a computed tomography scan of the chest revealed 

loculated hydropneumothorax bilaterally.  There are no pulmonary abnormalities 

on computed tomography scan.  Patient had about 20 mL of left-sided 

thoracentesis done today.  Possible empyema.  Rheumatoid fluid cannot be ruled 

out.  Patient's appetite has been okay.  Bronchoscopy cultures in February of 

this year did reveal Streptococcus pneumoniae and Candida albicans.  This was 

done by Dr. Almanza.





Review of systems:


GEN.:  Tired.


EYES: None


HEENT: None


NECK: None


RESPIRATORY: Congested cough shortness of breath.  No sputum production.


CARDIOVASCULAR: None


GASTROINTESTINAL: None


GENITOURINARY: None


MUSCULOSKELETAL: Joint pains


LYMPHATICS: None


HEMATOLOGICAL: None  


PSYCHIATRY: None


NEUROLOGICAL: None





Past medical history to include:


Rheumatoid arthritis, rheumatoid lung, hypothyroid, right foot infection, kidney

stones, bilateral lung nodules, hypertension





Social history:


Does smoke in the past.  Stopped in .  Alcohol occasionally.





Family history:


Reviewed, noncontributory to presentation








Physical examination:


VITAL SIGNS: 98.7, 100, 20, 111 was 73, 93% on room air


GENERAL: BMI 31.5, sitting up in a chair, awake


EYES: Pupils equal.  Conjunctiva normal.


HEENT: External appearance of nose and ears normal, oral cavity grossly normal.


NECK: JVD not raised; masses not palpable.


HEART: First and second heart sounds are normal;  no edema.  


LUNGS: Respiratory rate increased, diminished breath sounds


ABDOMEN: Soft,  nontender, liver spleen not palpable, no masses palpable.  


PSYCH: Alert and oriented x3;  mood  and affect normal.  


NEUROLOGICAL: Cranial nerves grossly intact; no facial asymmetry,   power and 

sensation grossly intact. 


LYMPHATICS: No lymph nodes palpable in the axilla and neck








INVESTIGATIONS, reviewed in the clinical context:


White count 13.8 hemoglobin 12.8 potassium 3.7 creatinine 0.97 albumin 3.2


COVID -19 PCF-not detected


EKG tracing personally reviewed by me-normal sinus rhythm nonspecific T-wave 

changes


Chest x-ray film personally reviewed by me-bilateral florid with horizontal 

level





Assessment:


-This patient with known rheumatoid arthritis .  Patient had respiratory 

symptoms on and off for last 6 months.  Patient also had a bronchoscopy in 

February of this year growing Candida albicans and Streptococcus pneumoniae.  H

as had intermittent courses ofantibiotics.  Chest x-ray did show bilateral 

hydropneumothorax.  Puslike material was drained today.  Rheumatoid pleural 

effusion cannot be ruled out.


-Chronic rheumatoid arthritis with rheumatoid lung


-Hypothyroid


-Essential hypertension


-Obesity BMI 31.5





Plan:


Status post thoracentesis.  Fluid has been sent off.  Home medications resumed. 

Patient started on vancomycin and Zosyn.  Discussed with the patient.  

Cardiothoracic was consulted.  And pulmonary.





Past Medical History


Past Medical History: Hypertension, Rheumatoid Arthritis (RA), Thyroid Disorder


Additional Past Medical History / Comment(s): Mult Rt foot infections after 

surgery, last time 2019, had PICC Line for AB and developed bloot clot. Hx 

kidney stones. 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

pneumonia."


History of Any Multi-Drug Resistant Organisms: None Reported


Past Surgical History: Back Surgery, Tonsillectomy


Additional Past Surgical History / Comment(s): Right foot surgery, repair 

herniated disc, plate removed rt foot.   CTR donny.  Mult RA nodules removed.


Past Anesthesia/Blood Transfusion Reactions: Previous Problems w/ Anesthesia


Additional Past Anesthesia/Blood Transfusion Reaction / Comment(s): slow to 

awaken x1.


Past Psychological History: No Psychological Hx Reported


Smoking Status: Former smoker


Past Alcohol Use History: None Reported


Additional Past Alcohol Use History / Comment(s): QUIT SMOKING , smoked 15 

years, 1 - 1 1/2 ppd


Past Drug Use History: None Reported





- Past Family History


  ** Mother


Family Medical History: Cancer





  ** Father


Family Medical History: No Reported History


Additional Family Medical History / Comment(s): states, "he just ."





Medications and Allergies


                                Home Medications











 Medication  Instructions  Recorded  Confirmed  Type


 


Levothyroxine Sodium [Synthroid] 150 mcg PO DAILY 03/29/17 06/10/20 History


 


methylPREDNISolone [Medrol] 4 mg PO DAILY 09/28/19 06/10/20 History


 


Leflunomide [Arava] 20 mg PO DAILY 02/05/20 06/10/20 History


 


Tofacitinib Citrate [Xeljanz Xr] 11 mg PO DAILY 02/05/20 06/10/20 History


 


Albuterol Inhaler [Ventolin Hfa 1 puff INHALATION RT-Q4H PRN 06/10/20 06/10/20 

History





Inhaler]    


 


Ibuprofen [Motrin] 800 mg PO TID PRN 06/10/20 06/10/20 History


 


Losartan-Hctz 50-12.5 mg [Hyzaar 1 tab PO DAILY 06/10/20 06/10/20 History





50-12.5]    


 


Metoprolol Tartrate [Lopressor] 50 mg PO TID 06/10/20 06/10/20 History








                                    Allergies











Allergy/AdvReac Type Severity Reaction Status Date / Time


 


No Known Allergies Allergy   Verified 06/10/20 19:03














Physical Exam


Vitals: 


                                   Vital Signs











  Temp Pulse Pulse Resp BP BP Pulse Ox


 


 20 07:47  98.3 F   63  16   134/79  97


 


 20 04:00  98.2 F   104 H  19   115/68  92 L


 


 20 00:00  98.2 F   78  19   126/83  95


 


 06/10/20 20:35  98.5 F   101 H  19   141/75  96


 


 06/10/20 20:00  98.1 F  101 H   20  108/72   94 L


 


 06/10/20 18:59   105 H   20  121/73  


 


 06/10/20 17:21  98.1 F  100    111/73   93 L


 


 06/10/20 17:02  98.7 F  100   20  95/61   92 L








                                Intake and Output











 06/10/20 06/11/20 06/11/20





 22:59 06:59 14:59


 


Other:   


 


  Voiding Method  Toilet 


 


  # Voids  3 


 


  Weight 111.13 kg 108.3 kg 














Results


CBC & Chem 7: 


                                 06/10/20 18:28





                                 06/10/20 18:28


Labs: 


                  Abnormal Lab Results - Last 24 Hours (Table)











  06/10/20 06/10/20 Range/Units





  18:28 18:28 


 


WBC  13.8 H   (3.8-10.6)  k/uL


 


Hgb  12.8 L   (13.0-17.5)  gm/dL


 


MCHC  30.1 L   (31.0-37.0)  g/dL


 


RDW  16.3 H   (11.5-15.5)  %


 


Neutrophils #  11.2 H   (1.3-7.7)  k/uL


 


Monocytes #  1.2 H   (0-1.0)  k/uL


 


BUN   26 H  (9-20)  mg/dL


 


Glucose   115 H  (74-99)  mg/dL


 


ALT   71 H  (4-49)  U/L


 


Alkaline Phosphatase   208 H  ()  U/L


 


Albumin   3.2 L  (3.5-5.0)  g/dL














Thrombosis Risk Factor Assmnt





- Choose All That Apply


Each Factor Represents 1 point: Age 41-60 years


Thrombosis Risk Factor Assessment Total Risk Factor Score: 1


Thrombosis Risk Factor Assessment Level: Low Risk

## 2020-06-11 NOTE — P.CNPUL
History of Present Illness


Consult date: 20


Requesting physician: Jerry Grijalva


Reason for consult: dyspnea, cough, abnormal CXR/CT


Chief complaint: Shortness of breath, cough, fatigue


History of present illness: 





This is a very pleasant 59-year-old gentleman who follows with Dr. Smith as 

his primary care provider.  He has a history of hypertension, hypothyroidism, 

rheumatoid arthritis and is maintained on prednisone and Xeljanz.  He has a 

history of rheumatoid lung disease with pleural effusions and previous 

Streptococcus pneumoniae found in a bronchoscopy wash and 2020.  

Recently he had been having issues with increasing shortness of breath cough, 

fatigue and congestion with greenish productive sputum.  He had been seen by Dr. Kaye on 2020 and prescribed a Z-Cesar and treated with steroids.  He 

improved briefly but then symptoms had recurred and he was seen yesterday by Dr. Gómez with ongoing complaints of greenish yellow phlegm with chest congestion.  

He had an abnormal chest x-ray and was sent for computed tomography scan of the 

chest which revealed development of loculated hydropneumothorax bilaterally at 

the lung bases.  There was the multiple bilateral pulmonary nodules and the 

larger prior cavitary lesions were resolved.  However 1 irregular density on the

right posterior lateral area has enlarged.  He was informed to report to the 

emergency room and subsequently admitted.  He is seen today in consultation on 

the selective care unit.  He is currently sitting up in a chair at the bedside. 

Awake and alert in no acute distress.  Maintaining O2 saturations in the 90s on 

room air.  He's been afebrile.  Slightly tachycardic.  White count 13.8.  

Hemoglobin 12.8.  Sodium 138.  Potassium 3.7.  Creatinine 0.97.  AST 29.  ALT 

71.  Troponin negative.  ProBNP 230.  Ultrasound of the chest reveals a small 

right pleural effusion measuring 1.8 cm.  There is a left pleural effusion 

measuring 4.9 cm.  Hydropneumothorax noted.  Dr. De La Rosa did perform a left-sided

thoracentesis today and the return appeared to resemble empyema versus 

rheumatoid fluid.  Dyspneic, yellowish-green fluid.  Approximately 270 mL 

removed.  Cultures, fluid analysis and pH were sent.





Review of Systems





REVIEW OF SYSTEMS:


CONSTITUTIONAL: Increased fatigue and weakness. Denies any recent significant 

weight loss or weight gain.


EYES: Denies change in vision.


EARS, NOSE, MOUTH, THROAT: Denies headaches, denies sore throat.


CARDIOVASCULAR: Denies chest pain, palpitations or syncopal episodes.


RESPIRATORY Positive for  shortness of breath, cough, congestion no hemoptysis.


GASTROINTESTINAL: Denies change in appetite, denies abdominal pain


GENITOURINARY: Denies hematuria, denies infections.


MUSKULOSKELETAL: Denies pain, denies swelling.


INTEGUMENTARY: Denies rash, denies eczema.


NEUROLOGICAL: Denies recent memory loss, no recent seizure activity. 


PSYCHIATRIC: Denies anxiety, denies depression.


HEMATOLOGIC/LYMPHATIC: Denies anemia, denies enlarged lymph nodes.








Past Medical History


Past Medical History: Hypertension, Rheumatoid Arthritis (RA), Thyroid Disorder


Additional Past Medical History / Comment(s): Mult Rt foot infections after 

surgery, last time 2019, had PICC Line for AB and developed bloot clot. Hx 

kidney stones. 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

pneumonia."


History of Any Multi-Drug Resistant Organisms: None Reported


Past Surgical History: Back Surgery, Tonsillectomy


Additional Past Surgical History / Comment(s): Right foot surgery, repair 

herniated disc, plate removed rt foot.   CTR donny.  Mult RA nodules removed.


Past Anesthesia/Blood Transfusion Reactions: Previous Problems w/ Anesthesia


Additional Past Anesthesia/Blood Transfusion Reaction / Comment(s): slow to 

awaken x1.


Past Psychological History: No Psychological Hx Reported


Smoking Status: Former smoker


Past Alcohol Use History: None Reported


Additional Past Alcohol Use History / Comment(s): QUIT SMOKING , smoked 15 

years, 1 - 1 1/2 ppd


Past Drug Use History: None Reported





- Past Family History


  ** Mother


Family Medical History: Cancer





  ** Father


Family Medical History: No Reported History


Additional Family Medical History / Comment(s): states, "he just ."





Medications and Allergies


                                Home Medications











 Medication  Instructions  Recorded  Confirmed  Type


 


Levothyroxine Sodium [Synthroid] 150 mcg PO DAILY 03/29/17 06/10/20 History


 


methylPREDNISolone [Medrol] 4 mg PO DAILY 09/28/19 06/10/20 History


 


Leflunomide [Arava] 20 mg PO DAILY 02/05/20 06/10/20 History


 


Tofacitinib Citrate [Xeljanz Xr] 11 mg PO DAILY 02/05/20 06/10/20 History


 


Albuterol Inhaler [Ventolin Hfa 1 puff INHALATION RT-Q4H PRN 06/10/20 06/10/20 

History





Inhaler]    


 


Ibuprofen [Motrin] 800 mg PO TID PRN 06/10/20 06/10/20 History


 


Losartan-Hctz 50-12.5 mg [Hyzaar 1 tab PO DAILY 06/10/20 06/10/20 History





50-12.5]    


 


Metoprolol Tartrate [Lopressor] 50 mg PO TID 06/10/20 06/10/20 History








                                    Allergies











Allergy/AdvReac Type Severity Reaction Status Date / Time


 


No Known Allergies Allergy   Verified 06/10/20 19:03














Physical Exam


Vitals: 


                                   Vital Signs











  Temp Pulse Pulse Resp BP BP Pulse Ox


 


 20 10:17  98.4 F   60  16   124/85  98


 


 20 07:47  98.3 F   63  16   134/79  97


 


 20 04:00  98.2 F   104 H  19   115/68  92 L


 


 20 00:00  98.2 F   78  19   126/83  95


 


 06/10/20 20:35  98.5 F   101 H  19   141/75  96


 


 06/10/20 20:00  98.1 F  101 H   20  108/72   94 L


 


 06/10/20 18:59   105 H   20  121/73  


 


 06/10/20 17:21  98.1 F  100    111/73   93 L


 


 06/10/20 17:02  98.7 F  100   20  95/61   92 L








                                Intake and Output











 06/10/20 06/11/20 06/11/20





 22:59 06:59 14:59


 


Other:   


 


  Voiding Method  Toilet 


 


  # Voids  3 


 


  Weight 111.13 kg 108.3 kg 














GENERAL EXAM: Alert, very pleasant 59-year-old gentleman, on room air, 

comfortable in no apparent distress.


HEAD: Normocephalic.


EYES: Normal reaction of pupils, equal size.


NOSE: Clear with pink turbinates.


THROAT: No erythema or exudates.


NECK: No masses, no JVD.


CHEST: No chest wall deformity.


LUNGS: Equal air entry with basilar crackles, diminished, more so on the left.


CVS: S1 and S2 normal with no audible murmur, regular rhythm.


ABDOMEN: No hepatosplenomegaly, normal bowel sounds, no guarding or rigidity.


SPINE: No scoliosis or deformity


SKIN: No rashes


CENTRAL NERVOUS SYSTEM: No focal deficits, tone is normal in all 4 extremities.


EXTREMITIES: There is no peripheral edema.  No clubbing, no cyanosis.  

Peripheral pulses are intact.





Results





- Laboratory Findings


CBC and BMP: 


                                 06/10/20 18:28





                                 06/10/20 18:28


PT/INR, D-dimer











PT  10.3 sec (9.0-12.0)   06/10/20  18:    


 


INR  1.0  (<1.2)   06/10/20  18:28    








Abnormal lab findings: 


                                  Abnormal Labs











  06/10/20 06/10/20





  18:28 18:28


 


WBC  13.8 H 


 


Hgb  12.8 L 


 


MCHC  30.1 L 


 


RDW  16.3 H 


 


Neutrophils #  11.2 H 


 


Monocytes #  1.2 H 


 


BUN   26 H


 


Glucose   115 H


 


ALT   71 H


 


Alkaline Phosphatase   208 H


 


Albumin   3.2 L














- Diagnostic Findings


Chest x-ray: image reviewed


CT scan - chest: image reviewed





Assessment and Plan


Assessment: 





1 Dyspnea, cough congestion secondary to loculated hydropneumothorax bilaterally

 at the lung bases.  Left greater than right.





2 History of Streptococcus pneumoniae a on bronchial wash in 2020





3 Multiple bilateral pulmonary nodules including one irregular density along the

 right posterior lateral that has enlarged over time, rheumatoid lung





4 Rheumatoid arthritis maintained on Xeljanz and prednisone in the outpatient 

setting





5 Hypertension 





6 Hypothyroidism 





7 Former smoker





Plan:





The patient was seen and evaluated by Dr. De La Rosa


Chest x-ray, CAT scans and labs reviewed


Left-sided thoracentesis performed, possible empyema versus rheumatoid lung


Cultures, fluid analysis, cytology, pH pending


Initiated vancomycin and Zosyn


Consult infectious disease


Consult cardiothoracic surgery


We'll continue to follow make further recommendations based on his clinical 

status





I, the cosigning physician, performed a history & physical examination of the 

patient. Lungs with crackles in the bilateral bases left greater than right, 

diminished left greater than right.  Maintaining good O2 saturations in the 90s 

on room air.  I discussed the assessment and plan of care with my nurse 

practitioner, Rupa Flood. I attest to the above consultation as dictated by her.





Time with Patient: Greater than 30

## 2020-06-11 NOTE — XR
EXAMINATION TYPE: XR chest 1V portable

 

DATE OF EXAM: 6/11/2020

 

COMPARISON: 6/10/2020 chest x-ray

 

INDICATION: Status post left thoracentesis

 

TECHNIQUE: Single frontal view of the chest is obtained.

 

FINDINGS:  

The heart size is enlarged.  

The pulmonary vasculature is normal.  

No suspicious pneumothorax is evident. Mild bibasilar infiltrates are present. Previous Hydro pneumot
horax is not identified.  

 

 

IMPRESSION:  

1. No pneumothorax postthoracentesis.

## 2020-06-11 NOTE — US
EXAMINATION TYPE: US chest

 

DATE OF EXAM: 6/11/2020

 

COMPARISON: CXR

 

CLINICAL HISTORY: pleural effusions. Effusions 

 

TECHNIQUE:  Targeted ultrasound of the posterior lower bilateral hemithoraces

 

EXAM MEASUREMENTS:

 

Right Pleural Effusion pocket size:  1.8 cm

 

Left Pleural Effusion pocket size:  4.9 cm

**  Left skin surface to fluid distance:  4.6 cm

 

 

Right side NOT marked for possible thoracentesis outside the dept.

 

Left side marked for possible thoracentesis outside the dept.

 

Pulmonologists are able to review the images in the patient?s EMR.  

 

 

 

 

 

IMPRESSIONS: 

1. Pleural effusions. Consider hydropneumothorax within the differential. Please see CT report 6/10/2
020.

## 2020-06-11 NOTE — OP
OPERATIVE REPORT



OPERATIVE PROCEDURE:

Left-sided thoracentesis.



PREOPERATIVE DIAGNOSIS:

Left-sided hydropneumothorax.



POSTOPERATIVE DIAGNOSIS:

Suspect left-sided empyema.



ANESTHESIA USED:

2 mL of 1% lidocaine.



PROCEDURE DESCRIPTION:

The patient was placed in a sitting-upright position. The area below the left scapula

was prepared in a sterile fashion and drapes were applied.  The area which was marked

by ultrasound technician was locally anesthetized.  Then a 26-gauge needle was inserted

at the right side, advanced into the pleural space, and multiple attempts were made;

however, could not reach the fluid pocket.  Then we called the ultrasound technician

again, and a bedside ultrasound was done while I was at the patient's bedside.  The

other ultrasound which was done while I was at bedside showed a pocket of fluid just 1-

2 cm below the initial markings.  The area was again locally anesthetized, and a needle

was inserted into the pleural space; could not reach the fluid.  Then a small tiny

incision was made at that site, and a standard thoracentesis catheter and needle were

used, advanced into the pleural space, and as soon as the pleural space was entered, I

advanced the catheter out of the needle, and the needle was pulled out of the pleural

space.  Roughly 270 mL of thick purulent fluid was drained from the left pleural space,

and it is clearly suspicious for empyema.  No more fluid could be drained after 270 mL

were drained, and I was able to evacuate some of the loculated pneumothorax which was

present before the procedure.  The fluid was sent for different diagnostic studies.

Postoperative chest x-ray showed complete resolution of the fluid. There is some

possibly small tiny loculation of pneumothorax in the left lower lobe. Although the

radiologist felt that there was no evidence of pneumothorax, but I am convinced there

is a loculated tiny pneumothorax at the left base.  This is again not iatrogenic

pneumothorax.  This was present before the procedure.  The procedure again was well

tolerated, and the fluid was sent for different diagnostic studies.  Patient was

started on antibiotics for presumptive empyema. Infectious disease consultation was

initiated. Thoracic surgery consultation was also initiated.





MMODL / KATLYNN: [id number] / Job#: [id number]

## 2020-06-11 NOTE — P.CONS
History of Present Illness





- Reason for Consult


Consult date: 20


empyema


Requesting physician: Jay De La Rosa





- Chief Complaint


cough and shortness of breath x weeks





- History of Present Illness


Patient is a 59-year  male with a past medical history pertinent for 

rheumatoid arthritis rheumatoid lung disease he was admitted at this facility 

back in February with visual diagnosed with history of progressive MRI pneumonia

for the patient has completed his antibiotic therapy patient did mention that he

did have some improvement initially however his symptoms completely did not 

resolve patient complaining of persistent cough which has been moderate 

intensity and has been bringing up some yellowish to green sputum no hemoptysis 

some lower rib cage pleuritic chest pain from coughing patient has been 

evaluated outpatient setting by his pulmonologist with the patient has been 

treated with a Z-Cesar and a tapering course of steroids without any significant 

improvement patient has been evaluated by Dr. Gilbert from in the office for 

follow-up patient was sent for a CT of the chest which did shows interval 

development of loculated hydro-pneumothorax bilaterally at the lung base with a 

depth of 3.5 cm and multiple pulmonary nodules large prior cavitary lesion are 

smaller or resolved patient has been advised to go to the hospital for admission

and IV divided therapy patient has been admitted to hospital he did have a chest

ultrasound in the status post thoracocentesis with removal of cloudy pleural 

fluid patient has been started on vancomycin and Zosyn infectious disease has 

been consulted for further management of antibiotic therapy patient did have 

elevated from 13.8 blood cultures and pleural fluid cultures are currently 

pending.








Review of Systems


Positive point has been mentioned in HPI rest of the systems are negative








Past Medical History


Past Medical History: Hypertension, Rheumatoid Arthritis (RA), Thyroid Disorder


Additional Past Medical History / Comment(s): Mult Rt foot infections after 

surgery, last time 2019, had PICC Line for AB and developed bloot clot. Hx 

kidney stones. 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

pneumonia."


History of Any Multi-Drug Resistant Organisms: None Reported


Past Surgical History: Back Surgery, Tonsillectomy


Additional Past Surgical History / Comment(s): Right foot surgery, repair 

herniated disc, plate removed rt foot.   CTR donny.


Past Anesthesia/Blood Transfusion Reactions: Previous Problems w/ Anesthesia


Additional Past Anesthesia/Blood Transfusion Reaction / Comm: slow to awaken x1.


Past Psychological History: No Psychological Hx Reported


Smoking Status: Former smoker


Past Alcohol Use History: None Reported


Additional Past Alcohol Use History / Comment(s): QUIT SMOKING , smoked 15 

years, 1 - 1 1/2 ppd


Past Drug Use History: None Reported





- Past Family History


  ** Mother


Family Medical History: Cancer





  ** Father


Family Medical History: No Reported History


Additional Family Medical History / Comment(s): states, "he just ."





Medications and Allergies


                                Home Medications











 Medication  Instructions  Recorded  Confirmed  Type


 


Levothyroxine Sodium [Synthroid] 150 mcg PO DAILY 03/29/17 06/10/20 History


 


methylPREDNISolone [Medrol] 4 mg PO DAILY 09/28/19 06/10/20 History


 


Leflunomide [Arava] 20 mg PO DAILY 02/05/20 06/10/20 History


 


Tofacitinib Citrate [Xeljanz Xr] 11 mg PO DAILY 02/05/20 06/10/20 History


 


Albuterol Inhaler [Ventolin Hfa 1 puff INHALATION RT-Q4H PRN 06/10/20 06/10/20 

History





Inhaler]    


 


Ibuprofen [Motrin] 800 mg PO TID PRN 06/10/20 06/10/20 History


 


Losartan-Hctz 50-12.5 mg [Hyzaar 1 tab PO DAILY 06/10/20 06/10/20 History





50-12.5]    


 


Metoprolol Tartrate [Lopressor] 50 mg PO TID 06/10/20 06/10/20 History








                                    Allergies











Allergy/AdvReac Type Severity Reaction Status Date / Time


 


No Known Allergies Allergy   Verified 06/10/20 19:03














Physical Exam


Vitals: 


                                   Vital Signs











  Temp Pulse Pulse Resp BP BP Pulse Ox


 


 20 15:41  98.2 F   53 L  16   114/65  92 L


 


 20 12:00  98.2 F   106 H  16   113/76  93 L


 


 20 10:17  98.4 F   60  16   124/85  98


 


 20 07:47  98.3 F   63  16   134/79  97


 


 20 04:00  98.2 F   104 H  19   115/68  92 L


 


 20 00:00  98.2 F   78  19   126/83  95


 


 06/10/20 20:35  98.5 F   101 H  19   141/75  96


 


 06/10/20 20:00  98.1 F  101 H   20  108/72   94 L


 


 06/10/20 18:59   105 H   20  121/73  


 


 06/10/20 17:21  98.1 F  100    111/73   93 L


 


 06/10/20 17:02  98.7 F  100   20  95/61   92 L








                                Intake and Output











 20





 06:59 14:59 22:59


 


Intake Total  740 


 


Balance  740 


 


Intake:   


 


  Intake, IV Titration  520 





  Amount   


 


    Sodium Chloride 0.9% 1,  520 





    000 ml @ 999 mls/hr IV .   





    Q1H1M ONE Rx#:[id number]   


 


  Oral  220 


 


Other:   


 


  Voiding Method Toilet  


 


  # Voids 3  


 


  Weight 108.3 kg  











GENERAL DESCRIPTION: Middle-aged male up in the chair, no distress. No tachypnea

 or accessory muscle of respiration use.


HEENT: Shows Pallor , no scleral icterus. Oral mucous membrane is dry.


NECK: Trachea central, no thyromegaly.


LUNGS: Unlabored breathing.  Decreased breath sound at the base. No wheeze or 

crackle.


HEART: S1, S2, regular rate and rhythm.


ABDOMEN: Soft, no tenderness , guarding or rigidity


EXTREMITIES: No edema of feet.


SKIN: No rash, no masses palpable.


NEUROLOGICAL: The patient is awake, alert, oriented x3, mood and affect normal.








Results


CBC & Chem 7: 


                                 06/10/20 18:28





                                 06/10/20 18:28


Labs: 


                  Abnormal Lab Results - Last 24 Hours (Table)











  06/10/20 06/10/20 Range/Units





  18:28 18:28 


 


WBC  13.8 H   (3.8-10.6)  k/uL


 


Hgb  12.8 L   (13.0-17.5)  gm/dL


 


MCHC  30.1 L   (31.0-37.0)  g/dL


 


RDW  16.3 H   (11.5-15.5)  %


 


Neutrophils #  11.2 H   (1.3-7.7)  k/uL


 


Monocytes #  1.2 H   (0-1.0)  k/uL


 


BUN   26 H  (9-20)  mg/dL


 


Glucose   115 H  (74-99)  mg/dL


 


ALT   71 H  (4-49)  U/L


 


Alkaline Phosphatase   208 H  ()  U/L


 


Albumin   3.2 L  (3.5-5.0)  g/dL








                      Microbiology - Last 24 Hours (Table)











 20 10:58 Body Fluid Culture - Preliminary





 Pleural Fluid 


 


 20 10:58 Anaerobic Culture - Preliminary





 Pleural Fluid 


 


 20 10:58 Fungal Culture - Preliminary





 Pleural Fluid 


 


 20 10:58 Acid Fast Bacilli Culture - Preliminary





 Pleural Fluid 














Assessment and Plan


Assessment: 


patient presented to hospital with chronic cough increasing shortness of breath 

and this patient did have evidence of hydropneumothorax loculated status post 

thoracocentesis with evidence of purulent pleural fluid with the history of 

pneumonia few months ago with strep pneumo questionable same or different 

pathogen





(1) Empyema


Current Visit: Yes   Status: Acute   Code(s): J86.9 - PYOTHORAX WITHOUT FISTULA 

  SNOMED Code(s): 159694923


   


Plan: 


1-vancomycin pharmacy to dose her with a target trough of 15 while watching her 

kidney function and Vanco trough closely.  However switch Zosyn to cefepime to 

decrease risk of nephrotoxicity


2-patient may benefit from VATS procedure in view of the loculated pleural fluid


3-patient likely need PICC line outpatient antibiotic therapy


We will follow on clinical condition and cultures to further adjust medication 

if needed


Thank you for this consultation we will follow the patient along with you





Time with Patient: Greater than 30

## 2020-06-11 NOTE — P.GSCN
History of Present Illness


Consult date: 20


Reason for Consult: 





Bilateral empyema, status post left thoracentesis


Requesting physician: Jay De La Rosa


History of present illness: 





This is a 59-year-old active gentleman who follows on an outpatient basis with 

Dr. Smith and Dr. Gómez.  He has a previous medical history of rheumatoid 

arthritis treated with prednisone and Xeljanz, hypertension, hypothyroidism, 

previous foot surgery with subsequent infection and treatment with long-term IV 

antibiotics, current rheumatoid nodules in the lung, previous tobacco depen

dence, and family history of lung cancer.  He has been having issues with 

increased shortness of breath and productive cough.  He underwent bronchoscopy 

with bronchiolar lavage in 2020 which grew Streptococcus pneumoniae.  

He felt better for a short time, but unfortunately again began having productive

cough with greenish sputum and increased shortness of breath.  He was seen by 

pulmonology in May and placed on steroids and Z-Cesar.  Again his symptoms 

improved briefly but returned and he followed in the office with Dr. Gómez who 

sent the patient for computed tomography scan demonstrating bilateral loculated 

hydropneumothorax, left greater than right, multiple bilateral pulmonary nodu

les, and irregular density along the right posterior lateral right mid lung.  He

was encouraged to report to [facility] emergency room for admission with

consultation placed to pulmonology.  The patient denied any symptoms of fever, 

chills, lower extremity edema, or chest pain.  White blood cell count 13.8, 

hemoglobin 12.8, creatinine 0.97, lactic acid 1.2, Coronavirus PCR negative.  He

was afebrile and hemodynamically stable.  This morning he was seen by Dr. De La Rosa, left-sided thoracentesis was completed with removal of approximately 270

mL yellowish-green infected-looking fluid which was sent for cultures and fluid 

analysis.  Due to concern for empyema consultation was placed to Dr. Kaye for 

surgical recommendations as well as infectious disease.





Review of Systems





Review of systems was completed and was negative except as noted





- Respiratory


Reports as per HPI, Reports cough with sputum, Reports dyspnea, Reports excessiv

e sputum





Past Medical History


Past Medical History: Hypertension, Rheumatoid Arthritis (RA), Thyroid Disorder


Additional Past Medical History / Comment(s): Mult Rt foot infections after 

surgery, last time 2019, had PICC Line for AB and developed bloot clot. Hx 

kidney stones. 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

pneumonia."


History of Any Multi-Drug Resistant Organisms: None Reported


Past Surgical History: Back Surgery, Tonsillectomy


Additional Past Surgical History / Comment(s): Right foot surgery, repair 

herniated disc, plate removed rt foot.   CTR donny.


Past Anesthesia/Blood Transfusion Reactions: Previous Problems w/ Anesthesia


Additional Past Anesthesia/Blood Transfusion Reaction / Comm: slow to awaken x1.


Past Psychological History: No Psychological Hx Reported


Smoking Status: Former smoker


Past Alcohol Use History: None Reported


Additional Past Alcohol Use History / Comment(s): QUIT SMOKING , smoked 15 

years, 1 - 1 1/2 ppd


Past Drug Use History: None Reported





- Past Family History


  ** Mother


Family Medical History: Cancer





  ** Father


Family Medical History: No Reported History


Additional Family Medical History / Comment(s): states, "he just ."





Medications and Allergies


                                Home Medications











 Medication  Instructions  Recorded  Confirmed  Type


 


Levothyroxine Sodium [Synthroid] 150 mcg PO DAILY 03/29/17 06/10/20 History


 


methylPREDNISolone [Medrol] 4 mg PO DAILY 09/28/19 06/10/20 History


 


Leflunomide [Arava] 20 mg PO DAILY 02/05/20 06/10/20 History


 


Tofacitinib Citrate [Xeljanz Xr] 11 mg PO DAILY 02/05/20 06/10/20 History


 


Albuterol Inhaler [Ventolin Hfa 1 puff INHALATION RT-Q4H PRN 06/10/20 06/10/20 

History





Inhaler]    


 


Ibuprofen [Motrin] 800 mg PO TID PRN 06/10/20 06/10/20 History


 


Losartan-Hctz 50-12.5 mg [Hyzaar 1 tab PO DAILY 06/10/20 06/10/20 History





50-12.5]    


 


Metoprolol Tartrate [Lopressor] 50 mg PO TID 06/10/20 06/10/20 History








                                    Allergies











Allergy/AdvReac Type Severity Reaction Status Date / Time


 


No Known Allergies Allergy   Verified 06/10/20 19:03














Surgical - Exam


                                   Vital Signs











Temp Pulse Resp BP Pulse Ox


 


 98.7 F   100   20   95/61   92 L


 


 06/10/20 17:02  06/10/20 17:02  06/10/20 17:02  06/10/20 17:02  06/10/20 17:02














- General


well developed, well nourished, no distress, no pain





- Eyes


PERRL, normal ocular movement





- ENT


no hearing loss





- Neck


no masses, no bruits, trachea midline





- Respiratory





Lungs sounds diminished bilaterally with faint expiratory wheezes heard in the 

bases.  Respirations even, nonlabored.  Currently on room air with oxygen 

saturation 93%.  No chest wall deformities.





- Cardiovascular





S1, S2 present.  Regular rate and rhythm, sinus rhythm with occasional PACs on 

telemetry.  Palpable peripheral pulses bilaterally.  No edema present. 





- Abdomen


Abdomen: soft, non tender, bowel sounds





- Genitourinary





Deferred





- Rectum





Deferred





- Integumentary





Multiple areas of ecchymosis to bilateral arms


no rash, no growths





- Neurologic


normal sensation





- Musculoskeletal


normal posture





- Psychiatric


oriented to time, oriented to person, oriented to place, speech is normal, 

memory intact





Results





- Labs





                                 06/10/20 18:28





                                 06/10/20 18:28


                  Abnormal Lab Results - Last 24 Hours (Table)











  06/10/20 06/10/20 Range/Units





  18:28 18:28 


 


WBC  13.8 H   (3.8-10.6)  k/uL


 


Hgb  12.8 L   (13.0-17.5)  gm/dL


 


MCHC  30.1 L   (31.0-37.0)  g/dL


 


RDW  16.3 H   (11.5-15.5)  %


 


Neutrophils #  11.2 H   (1.3-7.7)  k/uL


 


Monocytes #  1.2 H   (0-1.0)  k/uL


 


BUN   26 H  (9-20)  mg/dL


 


Glucose   115 H  (74-99)  mg/dL


 


ALT   71 H  (4-49)  U/L


 


Alkaline Phosphatase   208 H  ()  U/L


 


Albumin   3.2 L  (3.5-5.0)  g/dL








                                 Diabetes panel











  06/10/20 Range/Units





  18:28 


 


Sodium  138  (137-145)  mmol/L


 


Potassium  3.7  (3.5-5.1)  mmol/L


 


Chloride  101  ()  mmol/L


 


Carbon Dioxide  27  (22-30)  mmol/L


 


BUN  26 H  (9-20)  mg/dL


 


Creatinine  0.97  (0.66-1.25)  mg/dL


 


Glucose  115 H  (74-99)  mg/dL


 


Calcium  8.6  (8.4-10.2)  mg/dL


 


AST  29  (17-59)  U/L


 


ALT  71 H  (4-49)  U/L


 


Alkaline Phosphatase  208 H  ()  U/L


 


Total Protein  6.8  (6.3-8.2)  g/dL


 


Albumin  3.2 L  (3.5-5.0)  g/dL








                                  Calcium panel











  06/10/20 Range/Units





  18:28 


 


Calcium  8.6  (8.4-10.2)  mg/dL


 


Albumin  3.2 L  (3.5-5.0)  g/dL








                                 Pituitary panel











  06/10/20 Range/Units





  18:28 


 


Sodium  138  (137-145)  mmol/L


 


Potassium  3.7  (3.5-5.1)  mmol/L


 


Chloride  101  ()  mmol/L


 


Carbon Dioxide  27  (22-30)  mmol/L


 


BUN  26 H  (9-20)  mg/dL


 


Creatinine  0.97  (0.66-1.25)  mg/dL


 


Glucose  115 H  (74-99)  mg/dL


 


Calcium  8.6  (8.4-10.2)  mg/dL








                                  Adrenal panel











  06/10/20 Range/Units





  18:28 


 


Sodium  138  (137-145)  mmol/L


 


Potassium  3.7  (3.5-5.1)  mmol/L


 


Chloride  101  ()  mmol/L


 


Carbon Dioxide  27  (22-30)  mmol/L


 


BUN  26 H  (9-20)  mg/dL


 


Creatinine  0.97  (0.66-1.25)  mg/dL


 


Glucose  115 H  (74-99)  mg/dL


 


Calcium  8.6  (8.4-10.2)  mg/dL


 


Total Bilirubin  0.7  (0.2-1.3)  mg/dL


 


AST  29  (17-59)  U/L


 


ALT  71 H  (4-49)  U/L


 


Alkaline Phosphatase  208 H  ()  U/L


 


Total Protein  6.8  (6.3-8.2)  g/dL


 


Albumin  3.2 L  (3.5-5.0)  g/dL














- Imaging


Chest x-ray: report reviewed, image reviewed


CT scan - chest: report reviewed, image reviewed





Assessment and Plan


Assessment: 





1.  Loculated bilateral hydropneumothorax, left wrist and right, status post 

left-sided thoracentesis, possible empyema, history of strep pneumoniae on 

bronchiolar lavage in 2020


2.  Rheumatoid arthritis with rheumatoid nodules in the lung, currently treated 

with prednisone and Xeljanz


3.  Hypertension


4.  Hypothyroidism


5.  Previous tobacco dependence


6.  Family history of lung cancer





Plan: 





The patient was seen and examined on the cardiac stepdown unit.  

Chart/diagnostics were reviewed.  The case will be discussed in detail with Dr. Kaye.  Recommend continuing IV antibiotic per infectious disease.  Incentive 

spirometry ordered and encouraged.  Medical management per primary care service.

  More recommendations to follow regarding appropriateness/timing for surgical 

intervention.





Thank you Dr. De La Rosa for this consult.  We look forward to working with you in 

the care of your patient.


Time with Patient: Greater than 30

## 2020-06-12 PROCEDURE — 0W9900Z DRAINAGE OF RIGHT PLEURAL CAVITY WITH DRAINAGE DEVICE, OPEN APPROACH: ICD-10-PCS

## 2020-06-12 PROCEDURE — 0W9B00Z DRAINAGE OF LEFT PLEURAL CAVITY WITH DRAINAGE DEVICE, OPEN APPROACH: ICD-10-PCS

## 2020-06-12 RX ADMIN — METOPROLOL TARTRATE SCH MG: 50 TABLET, FILM COATED ORAL at 20:52

## 2020-06-12 RX ADMIN — LOSARTAN POTASSIUM AND HYDROCHLOROTHIAZIDE SCH EACH: 12.5; 5 TABLET ORAL at 10:35

## 2020-06-12 RX ADMIN — LEFLUNOMIDE SCH MG: 20 TABLET ORAL at 10:35

## 2020-06-12 RX ADMIN — SODIUM CHLORIDE SCH MLS/HR: 9 INJECTION, SOLUTION INTRAVENOUS at 13:08

## 2020-06-12 RX ADMIN — CEFAZOLIN SCH MLS/HR: 330 INJECTION, POWDER, FOR SOLUTION INTRAMUSCULAR; INTRAVENOUS at 18:44

## 2020-06-12 RX ADMIN — POTASSIUM CHLORIDE ONE MLS: 14.9 INJECTION, SOLUTION INTRAVENOUS at 14:39

## 2020-06-12 RX ADMIN — PIPERACILLIN AND TAZOBACTAM SCH MLS/HR: 3; .375 INJECTION, POWDER, FOR SOLUTION INTRAVENOUS at 03:47

## 2020-06-12 RX ADMIN — ENOXAPARIN SODIUM SCH: 40 INJECTION SUBCUTANEOUS at 10:35

## 2020-06-12 RX ADMIN — POTASSIUM CHLORIDE ONE: 14.9 INJECTION, SOLUTION INTRAVENOUS at 18:43

## 2020-06-12 RX ADMIN — LEVOTHYROXINE SODIUM SCH MCG: 75 TABLET ORAL at 06:07

## 2020-06-12 RX ADMIN — METOPROLOL TARTRATE SCH MG: 50 TABLET, FILM COATED ORAL at 18:43

## 2020-06-12 RX ADMIN — METOPROLOL TARTRATE SCH MG: 50 TABLET, FILM COATED ORAL at 10:34

## 2020-06-12 RX ADMIN — CEFAZOLIN SCH MLS/HR: 330 INJECTION, POWDER, FOR SOLUTION INTRAMUSCULAR; INTRAVENOUS at 03:51

## 2020-06-12 RX ADMIN — CEFAZOLIN SCH MLS/HR: 330 INJECTION, POWDER, FOR SOLUTION INTRAMUSCULAR; INTRAVENOUS at 10:36

## 2020-06-12 RX ADMIN — CEFEPIME HYDROCHLORIDE SCH MLS/HR: 2 INJECTION, POWDER, FOR SOLUTION INTRAVENOUS at 20:53

## 2020-06-12 RX ADMIN — CEFEPIME HYDROCHLORIDE SCH MLS/HR: 2 INJECTION, POWDER, FOR SOLUTION INTRAVENOUS at 10:34

## 2020-06-12 NOTE — XR
EXAMINATION TYPE: XR chest 1V portable

 

DATE OF EXAM: 6/12/2020

 

COMPARISON: Today

 

HISTORY: Empyema. Chest tube placement

 

TECHNIQUE: Single view

 

FINDINGS: There are bilateral chest tubes. There is pleural thickening along the lower lateral chest 
walls. There is some atelectasis and infiltrate at the lung bases. There is no sign of pneumothorax. 
There is no gross heart failure. There are chest leads.

 

IMPRESSION: No pneumothorax. Pleural fluid improved compared to exam earlier this morning. Infiltrate
 and atelectasis at the lung bases unchanged.

## 2020-06-12 NOTE — P.PN
Progress Note - Text


Progress Note Date: 06/12/20





Chief Complaint: Cough, congested





History of present complaint:


This is a very pleasant 59-year-old patient of Dr. Smith.  Follows with 

rheumatologist Dr. Florecita Rider.  Patient lost his arch of his right foot 

and had surgery done at [facility] in December 2018.  This was mid foot 

fusion.  Patient had a wound and was admitted in January 2019 to our hospital.  

Also did cellulitis of the right foot.  September 2019.  Patient also has known 

rheumatoid lung disease and pleural effusions.  He had a bronchoscopy with 

lavage in February 2020.  He states since December he did have a respiratory 

symptoms off and on.  Including cough and congestion.  No obvious fever and 

chills.  In the interim he is recently see Dr. Vides and given some 

antibiotics.  Also be of this year he was seen by Dr. Kaye from cardiothoracic

surgery and given a Z-Cesar and steroids.  SLIGHT improvement.  Patient was seen 

by Dr. Almanza yesterday and a computed tomography scan of the chest revealed 

loculated hydropneumothorax bilaterally.  There are no pulmonary abnormalities o

n computed tomography scan.  Patient had about 270 mL of left-sided 

thoracentesis done today.  Possible empyema.  Rheumatoid fluid cannot be ruled 

out.  Patient's appetite has been okay.  Bronchoscopy cultures in February of 

this year did reveal Streptococcus pneumoniae and Candida albicans.  This was 

done by Dr. Almanza.


Today-so the patient this morning.  Sitting upon a chair.  Some shortness of 

breath.  Slight cough.  Pending chest tube placement on both the sides.  There 

was done later this afternoon.  This was for empyema.





Review of systems: Was done for constitutional, cardiovascular, GI, pulmonary. 

relevant finding as above





Active Medications





Enoxaparin Sodium (Lovenox)  40 mg SQ DAILY [facility]


   Last Admin: 06/12/20 10:35 Dose:  Not Given


   Documented by: 


HCTZ/Losartan Potassium (Hyzaar 50-12.5)  1 each PO DAILY [facility]ABBY


   Last Admin: 06/12/20 10:35 Dose:  1 each


   Documented by: 


Sodium Chloride (Saline 0.9%)  1,000 mls @ 130 mls/hr IV .Q7H42M [facility]ABBY


   Last Admin: 06/12/20 18:44 Dose:  130 mls/hr


   Documented by: 


Vancomycin HCl 1,750 mg/ (Sodium Chloride)  500 mls @ 167 mls/hr IVPB Q12H [facility]


   Last Admin: 06/12/20 13:08 Dose:  167 mls/hr


   Documented by: 


Cefepime HCl 2 gm/ Sodium (Chloride)  100 mls @ 200 mls/hr IVPB Q12HR [facility]


   Last Admin: 06/12/20 10:34 Dose:  200 mls/hr


   Documented by: 


Leflunomide (Arava)  20 mg PO DAILY [facility]


   Last Admin: 06/12/20 10:35 Dose:  20 mg


   Documented by: 


Levothyroxine Sodium (Synthroid)  150 mcg PO DAILY@0630 [facility]


   Last Admin: 06/12/20 06:07 Dose:  150 mcg


   Documented by: 


Methylprednisolone (Medrol)  4 mg PO DAILY [facility]


   Last Admin: 06/12/20 13:07 Dose:  4 mg


   Documented by: 


Metoprolol Tartrate (Lopressor)  50 mg PO TID [facility]


   Last Admin: 06/12/20 18:43 Dose:  50 mg


   Documented by: 


Naloxone HCl (Narcan)  0.2 mg IV Q2M PRN


   PRN Reason: Opioid Reversal


Tofacitinib Citrate ([Xeljanz Xr] 11 Mg)  11 mg PO DAILY [facility]


   Last Admin: 06/12/20 10:36 Dose:  Not Given


   Documented by: 














Physical examination:


VITAL SIGNS: 97.8, 64, 113/77, 92% room air


GENERAL: Sitting upon a chair, awake


EYES: Pupils equal.  Conjunctiva normal.


HEENT: External appearance of nose and ears normal, oral cavity grossly normal.


NECK: JVD not raised; masses not palpable.


HEART: First and second heart sounds are normal;  no edema.  


LUNGS: Respiratory rate increased, diminished breath sounds


ABDOMEN: Soft,  nontender, liver spleen not palpable, no masses palpable.  


PSYCH: Alert and oriented x3;  mood  and affect normal.  











INVESTIGATIONS, reviewed in the clinical context:


Pro calcitonin 0.13





Previous testing


White count 13.8 hemoglobin 12.8 potassium 3.7 creatinine 0.97 albumin 3.2


COVID -19 PCF-not detected


EKG tracing personally reviewed by me-normal sinus rhythm nonspecific T-wave 

changes


Chest x-ray film personally reviewed by me-bilateral florid with horizontal 

level





Assessment:


-Bilateral empyema with thoracentesis of 270 mL.  On the left side.  Today June 12 bilateral chest tubes were placed


-Chronic rheumatoid arthritis with rheumatoid lung


-Hypothyroid


-Essential hypertension


-Obesity BMI 31.5





Plan:


-Patient currently on vancomycin and cefepime.  Other medications to continue.  

Care was discussed with the patient.  Cut back rate of IV fluids.  Repeat labs 

in the morning.

## 2020-06-12 NOTE — P.PN
Subjective


Progress Note Date: 06/12/20


Principal diagnosis: 





Dyspnea secondary to bilateral pleural effusion





This is a very pleasant 59-year-old gentleman who follows with Dr. Smith as 

his primary care provider.  He has a history of hypertension, hypothyroidism, 

rheumatoid arthritis and is maintained on prednisone and Xeljanz.  He has a 

history of rheumatoid lung disease with pleural effusions and previous Strepto

coccus pneumoniae found in a bronchoscopy wash and February 2020.  Recently he 

had been having issues with increasing shortness of breath cough, fatigue and 

congestion with greenish productive sputum.  He had been seen by Dr. Vides on

05/28/2020 and prescribed a Z-Cesar and treated with steroids.  He improved 

briefly but then symptoms had recurred and he was seen yesterday by Dr. Gómez 

with ongoing complaints of greenish yellow phlegm with chest congestion.  He had

an abnormal chest x-ray and was sent for computed tomography scan of the chest 

which revealed development of loculated hydropneumothorax bilaterally at the 

lung bases.  There was the multiple bilateral pulmonary nodules and the larger 

prior cavitary lesions were resolved.  However 1 irregular density on the right 

posterior lateral area has enlarged.  He was informed to report to the emergency

room and subsequently admitted.  He is seen today in consultation on the 

selective care unit.  He is currently sitting up in a chair at the bedside.  

Awake and alert in no acute distress.  Maintaining O2 saturations in the 90s on 

room air.  He's been afebrile.  Slightly tachycardic.  White count 13.8.  

Hemoglobin 12.8.  Sodium 138.  Potassium 3.7.  Creatinine 0.97.  AST 29.  ALT 

71.  Troponin negative.  ProBNP 230.  Ultrasound of the chest reveals a small 

right pleural effusion measuring 1.8 cm.  There is a left pleural effusion 

measuring 4.9 cm.  Hydropneumothorax noted.  Dr. De La Rosa did perform a left-sided

thoracentesis today and the return appeared to resemble empyema versus 

rheumatoid fluid.  Dyspneic, yellowish-green fluid.  Approximately 270 mL 

removed.  Cultures, fluid analysis and pH were sent.





The patient is seen today 06/12/2020 in follow-up on the selective care unit.  

He is currently sitting up in a chair at the bedside.  Awake and alert in no 

acute distress.  He is maintaining good O2 saturations in the 90s on room air.  

He remains afebrile.  Hemodynamically stable.  Pleural fluid cultures are 

pending.  Preliminary results reveal many gram-positive cocci and few gram-

negative bacilli.  Blood cultures reveal no growth to date.  Creatinine 0.71.  

He remains on cefepime and vancomycin.  ID is on the case.  Today's chest x-ray 

reveals well-defined air-fluid levels at the lung bases.  Suspect hydropne

umothoraces.  Superior or midlung pneumothorax components are not identified.  

Diffuse interstitial opacities persist.





Objective





- Vital Signs


Vital signs: 


                                   Vital Signs











Temp  97.9 F   06/12/20 04:00


 


Pulse  85   06/12/20 04:00


 


Resp  16   06/12/20 04:00


 


BP  119/77   06/12/20 04:00


 


Pulse Ox  93 L  06/12/20 04:00








                                 Intake & Output











 06/11/20 06/12/20 06/12/20





 18:59 06:59 18:59


 


Intake Total 740 2780 


 


Balance 740 2780 


 


Weight  107.9 kg 


 


Intake:   


 


  Intake, IV Titration 520 2780 





  Amount   


 


    Piperacillin-Tazobactam 3  200 





    .375 gm In Sodium   





    Chloride 0.9% 100 ml @ 25   





    mls/hr IVPB Q8H [facility] Rx#:   





    [id number]   


 


    Sodium Chloride 0.9% 1,  2080 





    000 ml @ 130 mls/hr IV .   





    Q7H42M [facility] Rx#:[id number]   


 


    Sodium Chloride 0.9% 1, 520  





    000 ml @ 999 mls/hr IV .   





    Q1H1M [facility] Rx#:[id number]   


 


    Vancomycin 1,750 mg In  500 





    Sodium Chloride 0.9% 500   





    ml 500 ml @ 167 mls/hr   





    IVPB Q12H [facility] Rx#:   





    [id number]   


 


  Oral 220  


 


Other:   


 


  Voiding Method  Toilet 


 


  # Voids  2 














- Exam





GENERAL EXAM: Alert, very pleasant 59-year-old gentleman, on room air, 

comfortable in no apparent distress.


HEAD: Normocephalic.


EYES: Normal reaction of pupils, equal size.


NOSE: Clear with pink turbinates.


THROAT: No erythema or exudates.


NECK: No masses, no JVD.


CHEST: No chest wall deformity.


LUNGS: Equal air entry with basilar crackles, diminished, more so on the left.


CVS: S1 and S2 normal with no audible murmur, regular rhythm.


ABDOMEN: No hepatosplenomegaly, normal bowel sounds, no guarding or rigidity.


SPINE: No scoliosis or deformity


SKIN: No rashes


CENTRAL NERVOUS SYSTEM: No focal deficits, tone is normal in all 4 extremities.


EXTREMITIES: There is no peripheral edema.  No clubbing, no cyanosis.  

Peripheral pulses are intact.





- Labs


CBC & Chem 7: 


                                 06/10/20 18:28





                                 06/12/20 06:37


Labs: 


                      Microbiology - Last 24 Hours (Table)











 06/11/20 10:58 Acid Fast Bacilli Smear - Final





 Pleural Fluid Acid Fast Bacilli Culture - Preliminary


 


 06/10/20 18:55 Blood Culture - Preliminary





 Blood    No Growth after 24 hours


 


 06/11/20 10:58 Gram Stain - Preliminary





 Pleural Fluid Body Fluid Culture - Preliminary


 


 06/11/20 10:58 Anaerobic Culture - Preliminary





 Pleural Fluid 


 


 06/11/20 10:58 Fungal Culture - Preliminary





 Pleural Fluid 














Assessment and Plan


Assessment: 





1 Dyspnea, cough congestion secondary to loculated hydropneumothorax bilaterally

at the lung bases.  Left greater than right.  Status post thoracentesis with 

fluid resembling empyema, possible rheumatoid lung.  Cultures pending





2 History of Streptococcus pneumoniae a on bronchial wash in February 2020





3 Multiple bilateral pulmonary nodules including one irregular density along the

right posterior lateral that has enlarged over time, rheumatoid lung





4 Rheumatoid arthritis maintained on Xeljanz and prednisone in the outpatient 

setting





5 Hypertension 





6 Hypothyroidism 





7 Former smoker





Plan:





The patient was seen and evaluated by Dr. De La Rosa


Chest x-ray and labs reviewed


Cultures, cytology, pH pending


Continued on vancomycin and cefepime


Plan is for possible bilateral VATS procedures with the chest tube placements 

today


We'll continue to follow and make further recommendations based on his clinical 

status





I, the cosigning physician, performed a history & physical examination of the 

patient. Lungs with crackles in the bilateral bases left greater than right, 

diminished left greater than right.  Maintaining good O2 saturations in the 90s 

on room air.  I discussed the assessment and plan of care with my nurse 

practitioner, Rupa Flood. I attest to the above note as dictated by her.

## 2020-06-12 NOTE — P.PN
Subjective


Progress Note Date: 06/12/20


Principal diagnosis: 





This is a 59-year-old active gentleman who follows on an outpatient basis with 

Dr. Smith and Dr. Gómez.  He has a previous medical history of rheumatoid arth

ritis treated with prednisone and Xeljanz, hypertension, hypothyroidism, 

previous foot surgery with subsequent infection and treatment with long-term IV 

antibiotics, current rheumatoid nodules in the lung, previous tobacco 

dependence, and family history of lung cancer.  He has been having issues with 

increased shortness of breath and productive cough.  He underwent bronchoscopy 

with bronchiolar lavage in February 2020 which grew Streptococcus pneumoniae.  

He felt better for a short time, but unfortunately again began having productive

cough with greenish sputum and increased shortness of breath.  He was seen by 

pulmonology in May and placed on steroids and Z-Cesar.  Again his symptoms 

improved briefly but returned and he followed in the office with Dr. Gómez who 

sent the patient for computed tomography scan demonstrating bilateral loculated 

hydropneumothorax, left greater than right, multiple bilateral pulmonary 

nodules, and irregular density along the right posterior lateral right mid lung.

 He was encouraged to report to [facility] emergency room for admission 

with consultation placed to pulmonology.  The patient denied any symptoms of 

fever, chills, lower extremity edema, or chest pain.  White blood cell count 

13.8, hemoglobin 12.8, creatinine 0.97, lactic acid 1.2, Coronavirus PCR 

negative.  He was afebrile and hemodynamically stable.  This morning he was seen

by Dr. De La Rosa, left-sided thoracentesis was completed with removal of 

approximately 270 mL yellowish-green infected-looking fluid which was sent for 

cultures and fluid analysis.  Due to concern for empyema consultation was placed

to Dr. Kaye for surgical recommendations as well as infectious disease.








the patient is currently ambulating in his room on the cardiac stepdown unit in 

no acute distress.  Denies any pain, no increased shortness of breath.  States 

he feels little better everyday.  Remainsafebrile.  Currently receiving 

vancomycin and cefepime per infectious disease.  Continues with steroids, 

Xeljanz and Arava for his rheumatoid arthritis.  No new questions.





Objective





- Vital Signs


Vital signs: 


                                   Vital Signs











Temp  97.9 F   06/12/20 04:00


 


Pulse  85   06/12/20 04:00


 


Resp  16   06/12/20 04:00


 


BP  119/77   06/12/20 04:00


 


Pulse Ox  93 L  06/12/20 04:00








                                 Intake & Output











 06/11/20 06/12/20 06/12/20





 18:59 06:59 18:59


 


Intake Total 740 2780 


 


Balance 740 2780 


 


Weight  107.9 kg 


 


Intake:   


 


  Intake, IV Titration 520 2780 





  Amount   


 


    Piperacillin-Tazobactam 3  200 





    .375 gm In Sodium   





    Chloride 0.9% 100 ml @ 25   





    mls/hr IVPB Q8H [facility] Rx#:   





    [id number]   


 


    Sodium Chloride 0.9% 1,  2080 





    000 ml @ 130 mls/hr IV .   





    Q7H42M [facility] Rx#:[id number]   


 


    Sodium Chloride 0.9% 1, 520  





    000 ml @ 999 mls/hr IV .   





    Q1H1M [facility] Rx#:[id number]   


 


    Vancomycin 1,750 mg In  500 





    Sodium Chloride 0.9% 500   





    ml 500 ml @ 167 mls/hr   





    IVPB Q12H [facility] Rx#:   





    [id number]   


 


  Oral 220  


 


Other:   


 


  Voiding Method  Toilet 


 


  # Voids  2 














- Constitutional


General appearance: Present: cooperative, no acute distress





- Respiratory


Details: 





lungs sounds diminished bilaterally with faint expiratory wheezes heard 

posteriorly.  Respirations even, nonlabored.currently on room air with oxygen 

saturation 93-94%.  Able to achieve 1250 mL on his incentive spirometry.  Strong

cough.





- Cardiovascular


Details: 





S1, S2 present.  Regular rate and rhythm, sinus rhythm on telemetry with heart 

rate in the 90s.  Palpable peripheral pulses bilaterally.  No edema present. 








- Gastrointestinal


Gastrointestinal Comment(s): 





abdomen soft, nontender, nondistended.  Active bowel sounds present 4 

quadrants.  Tolerating diet.





- Genitourinary


Genitourinary Comment(s): 





continues to void





- Integumentary


Integumentary Comment(s): 





skin is warm and dry with evidence of good perfusion.  Multiple areas of ecc

hymosis present bilateral arms





- Neurologic


Neurologic: Present: CNII-XII intact





- Musculoskeletal


Musculoskeletal: Present: gait normal, strength equal bilaterally





- Psychiatric


Psychiatric: Present: A&O x's 3, appropriate affect, intact judgment & insight





- Allied health notes


Allied health notes reviewed: nursing





- Labs


CBC & Chem 7: 


                                 06/10/20 18:28





                                 06/12/20 06:37


Labs: 


                      Microbiology - Last 24 Hours (Table)











 06/11/20 10:58 Acid Fast Bacilli Smear - Final





 Pleural Fluid Acid Fast Bacilli Culture - Preliminary


 


 06/10/20 18:55 Blood Culture - Preliminary





 Blood    No Growth after 24 hours


 


 06/11/20 10:58 Gram Stain - Preliminary





 Pleural Fluid Body Fluid Culture - Preliminary


 


 06/11/20 10:58 Anaerobic Culture - Preliminary





 Pleural Fluid 


 


 06/11/20 10:58 Fungal Culture - Preliminary





 Pleural Fluid 














- Imaging and Cardiology


Chest x-ray: report reviewed, image reviewed





Assessment and Plan


Assessment: 





1.  Loculated bilateral hydropneumothorax, left wrist and right, status post 

left-sided thoracentesis, possible empyema, history of strep pneumoniae on 

bronchiolar lavage in February 2020


2.  Rheumatoid arthritis with rheumatoid nodules in the lung, currently treated 

with prednisone and Xeljanz


3.  Hypertension


4.  Hypothyroidism


5.  Previous tobacco dependence


6.  Family history of lung cancer





Plan: 





1.  Continue IV antibiotics per infectious disease


2.  Encourage incentive spirometry use 10 times every hour while awake


3.  Increase activity, ambulate as tolerated


4.  Patient will be seen with Dr. Kaye and recommendations will be made for 

surgery


5.  More recommendations to follow





Time with Patient: Greater than 30

## 2020-06-12 NOTE — P.OP
Date of Procedure: 06/12/20


Preoperative Diagnosis: 


Bilateral bibasilar hydropneumothorax with documented empyema on the left


Postoperative Diagnosis: 


Same


Procedure(s) Performed: 


Bilateral chest tube placement


Implants: 


Bilateral 28-Guyanese chest tube


Anesthesia: MAC


Surgeon: Arnold Kaye


Estimated Blood Loss (ml): 5


Pathology: none sent


Condition: stable


Disposition: PACU


Indications for Procedure: 


59-year-old male with rheumatoid disease on steroids and other anti-suppressants

with recent history of streptococcal pneumonia presents with CT demonstrating 

bilateral hydropneumothoraces at the bases.  Left thoracentesis was performed 

yesterday and demonstrated possible with positive bacteria including gram 

positives and gram negatives.  Suspicion of bilateral empyema was held and 

certainly we knew we had left-sided empyema and bilateral chest tube placement 

was indicated for drainage.


Operative Findings: 


Bilaterally the pleural spaces were fibrotic and we had to disconnect 

posteriorly in order to get into the free pleural spaces at the bases.  On the 

right there was minimal fluid and no purulence.  On the left there was 

purulence.  Both sides were irrigated out.  Both sides were connected to 

separate Pleur-evacs.


Description of Procedure: 


Patient was brought to the operating room placed supine the operating table.  IV

sedation was given.  The anterior chest was sterilely prepped and draped.  

Incision was made in the anterior axillary line first on the right.  Marcaine 

anesthesia was used.  Dissection was carried over the seventh rib posteriorly 

and into the pleural space.  Total space was fused.  Blunt dissection was used 

to dissected posteriorly into the free pleural space.  28-Guyanese chest tube was 

then placed in the pleural space and advanced.  Minimal fluid return came.  We 

irrigated and got some relatively serous looking returned.  Chest tube was 

secured with 2 suture ligatures of 0 Ethibond.  Was connected to a Pleur-evac.  

We then placed a similar chest tube on the left.  Again the pleural space was 

fairly fibrotic and dissection was carried posteriorly into the free pleural 

space.  This time we encountered a large amount of pus.  28-Guyanese chest tube 

was advanced.  Was secured with 2 Ethibond sutures.  We then irrigated out all 

the pus until clear with several 100 mL of warm saline.  This was connected to a

separate Pleur-evac.  Dry sterile dressings were applied and the patient was 

transferred to recovery room.

## 2020-06-12 NOTE — XR
EXAMINATION TYPE: XR chest 2V

 

DATE OF EXAM: 6/12/2020

 

COMPARISON: 6/11/2020

 

HISTORY: 59-year-old male empyema

 

TECHNIQUE:  PA and lateral views

 

FINDINGS:  

Heart borderline to mildly enlarged. Normal variant azygos fissure. Mild interstitial prominence guero
lar. Some slight increased patchy density in the lower lungs. Very well-defined air-fluid levels of t
he lung bases.

 

 

IMPRESSION:  

Well-defined air-fluid levels at the lung bases. Findings suggest localized hydropneumothoraces. Supe
rior or mid lung pneumothorax components are not identified. Clinically correlate. Diffuse interstiti
al opacities persist. Possible interstitial pulmonary edema.

## 2020-06-12 NOTE — PN
PROGRESS NOTE



DATE OF SERVICE:

06/12/2020



REASON FOR FOLLOWUP:

Empyema.



INTERVAL HISTORY:

The patient is currently afebrile.  The patient is breathing comfortably.  He is

complaining of cough with some sputum production.  No hemoptysis.  No nausea, no

vomiting.  No abdominal pain or diarrhea.



PHYSICAL EXAMINATION:

Blood pressure 117/68 with a pulse of 92, temperature 98.1.  He is 95% on room air.

General description is a middle-aged male up in the chair in no distress.

RESPIRATORY SYSTEM: Unlabored breathing with decreased breath sounds at the base. No

wheeze.

HEART: S1, S2.  Regular rate and rhythm.

ABDOMEN: Soft. No tenderness.



LABS:

Creatinine 0.71.  The pleural fluid culture so far pending.  Blood culture negative.



DIAGNOSTIC IMPRESSION AND PLAN:

Patient with recent history of Strep pneumonia, now admitted to hospital with

persistent and worsening symptoms with evidence of left-sided hydropneumothorax, status

post thoracocentesis.  Plan for chest tube placement.  Patient at this time is covered

with cefepime and vancomycin while waiting for the culture to finalize. Continue

supportive care.





MMODL / IJN: [id number] / Job#: [id number]

## 2020-06-13 LAB
ALBUMIN SERPL-MCNC: 2.9 G/DL (ref 3.5–5)
ALP SERPL-CCNC: 189 U/L (ref 38–126)
ALT SERPL-CCNC: 43 U/L (ref 4–49)
ANION GAP SERPL CALC-SCNC: 6 MMOL/L
AST SERPL-CCNC: 26 U/L (ref 17–59)
BUN SERPL-SCNC: 14 MG/DL (ref 9–20)
CALCIUM SPEC-MCNC: 8.6 MG/DL (ref 8.4–10.2)
CELLS COUNTED: 200
CHLORIDE SERPL-SCNC: 106 MMOL/L (ref 98–107)
CO2 SERPL-SCNC: 24 MMOL/L (ref 22–30)
EOSINOPHIL # BLD MANUAL: 0.15 K/UL (ref 0–0.7)
ERYTHROCYTE [DISTWIDTH] IN BLOOD BY AUTOMATED COUNT: 4.77 M/UL (ref 4.3–5.9)
ERYTHROCYTE [DISTWIDTH] IN BLOOD: 16 % (ref 11.5–15.5)
GLUCOSE SERPL-MCNC: 89 MG/DL (ref 74–99)
HCT VFR BLD AUTO: 42.7 % (ref 39–53)
HGB BLD-MCNC: 12.6 GM/DL (ref 13–17.5)
LYMPHOCYTES # BLD MANUAL: 0.76 K/UL (ref 1–4.8)
MCH RBC QN AUTO: 26.5 PG (ref 25–35)
MCHC RBC AUTO-ENTMCNC: 29.6 G/DL (ref 31–37)
MCV RBC AUTO: 89.4 FL (ref 80–100)
METAMYELOCYTES # BLD: 0.15 K/UL
MONOCYTES # BLD MANUAL: 1.52 K/UL (ref 0–1)
MYELOCYTES # BLD MANUAL: 0.61 K/UL
NEUTROPHILS NFR BLD MANUAL: 77 %
NEUTS SEG # BLD MANUAL: 12.3 K/UL (ref 1.3–7.7)
PLATELET # BLD AUTO: 294 K/UL (ref 150–450)
POTASSIUM SERPL-SCNC: 4.3 MMOL/L (ref 3.5–5.1)
PROT SERPL-MCNC: 6.4 G/DL (ref 6.3–8.2)
SODIUM SERPL-SCNC: 136 MMOL/L (ref 137–145)
WBC # BLD AUTO: 15.2 K/UL (ref 3.8–10.6)

## 2020-06-13 RX ADMIN — CEFEPIME HYDROCHLORIDE SCH MLS/HR: 2 INJECTION, POWDER, FOR SOLUTION INTRAVENOUS at 20:23

## 2020-06-13 RX ADMIN — ENOXAPARIN SODIUM SCH MG: 40 INJECTION SUBCUTANEOUS at 09:31

## 2020-06-13 RX ADMIN — POTASSIUM CHLORIDE SCH MLS/HR: 14.9 INJECTION, SOLUTION INTRAVENOUS at 09:35

## 2020-06-13 RX ADMIN — SODIUM CHLORIDE, SODIUM LACTATE, POTASSIUM CHLORIDE, AND CALCIUM CHLORIDE SCH: .6; .31; .03; .02 INJECTION, SOLUTION INTRAVENOUS at 17:00

## 2020-06-13 RX ADMIN — METOPROLOL TARTRATE SCH MG: 50 TABLET, FILM COATED ORAL at 16:22

## 2020-06-13 RX ADMIN — LEFLUNOMIDE SCH MG: 20 TABLET ORAL at 09:31

## 2020-06-13 RX ADMIN — SODIUM CHLORIDE SCH MLS/HR: 9 INJECTION, SOLUTION INTRAVENOUS at 00:02

## 2020-06-13 RX ADMIN — METOPROLOL TARTRATE SCH MG: 50 TABLET, FILM COATED ORAL at 20:24

## 2020-06-13 RX ADMIN — POTASSIUM CHLORIDE SCH MLS/HR: 14.9 INJECTION, SOLUTION INTRAVENOUS at 00:02

## 2020-06-13 RX ADMIN — SODIUM CHLORIDE SCH MLS/HR: 9 INJECTION, SOLUTION INTRAVENOUS at 12:45

## 2020-06-13 RX ADMIN — CEFEPIME HYDROCHLORIDE SCH MLS/HR: 2 INJECTION, POWDER, FOR SOLUTION INTRAVENOUS at 09:36

## 2020-06-13 RX ADMIN — LOSARTAN POTASSIUM AND HYDROCHLOROTHIAZIDE SCH EACH: 12.5; 5 TABLET ORAL at 09:33

## 2020-06-13 RX ADMIN — METOPROLOL TARTRATE SCH MG: 50 TABLET, FILM COATED ORAL at 09:33

## 2020-06-13 RX ADMIN — LEVOTHYROXINE SODIUM SCH MCG: 75 TABLET ORAL at 06:10

## 2020-06-13 RX ADMIN — POTASSIUM CHLORIDE SCH MLS/HR: 14.9 INJECTION, SOLUTION INTRAVENOUS at 12:45

## 2020-06-13 NOTE — P.PN
Subjective


Progress Note Date: 06/13/20


Principal diagnosis: 





Bilateral bibasilar hydropneumothorax with documented empyema on the left, 

status post left sided thoracentesis.  Present medical history of strep 

pneumonia on bronchiolar lavage in February 2028, rheumatoid arthritis with 

rheumatoid nodules in the lung currently treated with prednisone, Xeljanz, and 

Arava, history of hypertension, hypothyroidism, previous tobacco dependence, and

family history of lung cancer.





POD #1 bilateral chest tube placement








The patient is currently sitting up in the recliner on the cardiac stepdown unit

in no acute distress.  Denies any pain, shortness of breath.  States he feels 

little better everyday.  Bilateral chest tubes present, minimal drainage from 

the right chest tube, positive drainage left chest tube with air leak present.  

Remains afebrile.  Currently receiving vancomycin and cefepime per infectious 

disease.  Continues with steroids, Xeljanz and Arava for his rheumatoid 

arthritis.  No new concerns.





Objective





- Vital Signs


Vital signs: 


                                   Vital Signs











Temp  98.1 F   06/13/20 03:51


 


Pulse  110 H  06/13/20 03:51


 


Resp  18   06/13/20 03:51


 


BP  97/71   06/13/20 03:51


 


Pulse Ox  94 L  06/13/20 03:51








                                 Intake & Output











 06/12/20 06/13/20 06/13/20





 18:59 06:59 18:59


 


Intake Total 1880  


 


Output Total 10 1185 


 


Balance 1870 -1185 


 


Weight  108 kg 


 


Intake:   


 


    


 


  Intake, IV Titration 1140  





  Amount   


 


    Cefepime 2 gm In Sodium 100  





    Chloride 0.9% 100 ml @   





    200 mls/hr IVPB Q12HR [facility]   





    Rx#:[id number]   


 


    Sodium Chloride 0.9% 1, 1040  





    000 ml @ 130 mls/hr IV .   





    Q7H42M [facility] Rx#:[id number]   


 


  Oral 240  


 


Output:   


 


  Chest Tube Drainage  185 


 


    Chest Tube Left Anterior  125 





    Chest   


 


    Chest Tube Right Anterior  60 





    Chest   


 


  Urine  1000 


 


  Estimated Blood Loss 10  


 


Other:   


 


  Voiding Method  Toilet 


 


  # Voids  400 














- Constitutional


General appearance: Present: cooperative, no acute distress





- Respiratory


Details: 





Lungs sounds diminished bilaterally with faint expiratory wheezes heard 

posteriorly.  Respirations even, nonlabored.currently on room air with oxygen 

saturation 94%.  Able to achieve 1000 mL on his incentive spirometry.  Strong 

cough.  Right and left pleural chest tubes present to continuous wall suction, 

right pleural chest tube with 60 mL serosanguineous drainage overnight, 65 mL 

since surgery, no air leak present.  Left pleural chest tube with 125 mL 

serosanguineous drainage overnight, 140 mL since surgery, positive positional 

air leak present with expiration.





- Cardiovascular


Details: 





S1, S2 present.  Regular rate and rhythm, sinus rhythm on telemetry with heart 

rate in the 90s.  Palpable peripheral pulses bilaterally.  No edema present. 











- Gastrointestinal


Gastrointestinal Comment(s): 





abdomen soft, nontender, nondistended.  Active bowel sounds present 4 

quadrants.  Tolerating diet.








- Genitourinary


Genitourinary Comment(s): 





continues to void





- Integumentary


Integumentary Comment(s): 





skin is warm and dry with evidence of good perfusion.  Multiple areas of 

ecchymosis present bilateral arms








- Neurologic


Neurologic: Present: CNII-XII intact





- Musculoskeletal


Musculoskeletal: Present: gait normal, strength equal bilaterally





- Psychiatric


Psychiatric: Present: A&O x's 3, appropriate affect, intact judgment & insight





- Allied health notes


Allied health notes reviewed: nursing





- Labs


CBC & Chem 7: 


                                 06/13/20 07:29





                                 06/13/20 07:29


Labs: 


                  Abnormal Lab Results - Last 24 Hours (Table)











  06/12/20 06/13/20 Range/Units





  06:37 07:29 


 


Sodium   136 L  (137-145)  mmol/L


 


Creatinine   0.63 L  (0.66-1.25)  mg/dL


 


Alkaline Phosphatase   189 H  ()  U/L


 


Albumin   2.9 L  (3.5-5.0)  g/dL


 


Procalcitonin  0.13 H   (0.02-0.09)  ng/mL








                      Microbiology - Last 24 Hours (Table)











 06/10/20 18:55 Blood Culture - Preliminary





 Blood    No Growth after 48 hours


 


 06/11/20 10:58 Acid Fast Bacilli Smear - Final





 Pleural Fluid Acid Fast Bacilli Culture - Preliminary














- Imaging and Cardiology


Chest x-ray: report reviewed, image reviewed





Assessment and Plan


Assessment: 





1.  Loculated bilateral hydropneumothorax, left wrist and right, status post 

left-sided thoracentesis, possible empyema, history of strep pneumoniae on 

bronchiolar lavage in February 2020, status post bilateral chest tube placement


2.  Rheumatoid arthritis with rheumatoid nodules in the lung, currently treated 

with prednisone and Xeljanz


3.  Hypertension


4.  Hypothyroidism


5.  Previous tobacco dependence


6.  Family history of lung cancer





Plan: 





1.  Will place right chest tube to water seal, continue left chest tube to wall 

suction for another 24 hours.  Will monitor drainage and resolution of air leak 

in the left chest tube.


2.  Repeat chest x-ray in the morning


3.  Continue IV antibiotics per infectious disease


4.  Encourage incentive spirometry use 10 times every hour while awake


5.  Increase activity, ambulate as tolerated.  May take chest tubes off suction 

periodically for patient to ambulate.


6.  Continue medical management of other comorbidities per primary care service


7.  More recommendations to follow





Time with Patient: Greater than 30

## 2020-06-13 NOTE — P.PN
Subjective


Progress Note Date: 06/13/20


Principal diagnosis: 





Dyspnea secondary to bilateral pleural effusion





This is a very pleasant 59-year-old gentleman who follows with Dr. Smith as 

his primary care provider.  He has a history of hypertension, hypothyroidism, 

rheumatoid arthritis and is maintained on prednisone and Xeljanz.  He has a 

history of rheumatoid lung disease with pleural effusions and previous Strepto

coccus pneumoniae found in a bronchoscopy wash and February 2020.  Recently he 

had been having issues with increasing shortness of breath cough, fatigue and 

congestion with greenish productive sputum.  He had been seen by Dr. Vides on

05/28/2020 and prescribed a Z-Cesar and treated with steroids.  He improved 

briefly but then symptoms had recurred and he was seen yesterday by Dr. Gómez 

with ongoing complaints of greenish yellow phlegm with chest congestion.  He had

an abnormal chest x-ray and was sent for computed tomography scan of the chest 

which revealed development of loculated hydropneumothorax bilaterally at the 

lung bases.  There was the multiple bilateral pulmonary nodules and the larger 

prior cavitary lesions were resolved.  However 1 irregular density on the right 

posterior lateral area has enlarged.  He was informed to report to the emergency

room and subsequently admitted.  He is seen today in consultation on the 

selective care unit.  He is currently sitting up in a chair at the bedside.  

Awake and alert in no acute distress.  Maintaining O2 saturations in the 90s on 

room air.  He's been afebrile.  Slightly tachycardic.  White count 13.8.  

Hemoglobin 12.8.  Sodium 138.  Potassium 3.7.  Creatinine 0.97.  AST 29.  ALT 

71.  Troponin negative.  ProBNP 230.  Ultrasound of the chest reveals a small 

right pleural effusion measuring 1.8 cm.  There is a left pleural effusion 

measuring 4.9 cm.  Hydropneumothorax noted.  Dr. De La Rosa did perform a left-sided

thoracentesis today and the return appeared to resemble empyema versus 

rheumatoid fluid.  Dyspneic, yellowish-green fluid.  Approximately 270 mL 

removed.  Cultures, fluid analysis and pH were sent.





The patient is seen today 06/12/2020 in follow-up on the selective care unit.  

He is currently sitting up in a chair at the bedside.  Awake and alert in no 

acute distress.  He is maintaining good O2 saturations in the 90s on room air.  

He remains afebrile.  Hemodynamically stable.  Pleural fluid cultures are 

pending.  Preliminary results reveal many gram-positive cocci and few gram-

negative bacilli.  Blood cultures reveal no growth to date.  Creatinine 0.71.  

He remains on cefepime and vancomycin.  ID is on the case.  Today's chest x-ray 

reveals well-defined air-fluid levels at the lung bases.  Suspect hydropne

umothoraces.  Superior or midlung pneumothorax components are not identified.  

Diffuse interstitial opacities persist.





The patient is seen today 06/13/2020 in follow-up on the selective care unit.  JOSE sims did undergo bilateral chest tube placements yesterday. He is currently sitting

up in a chair at the bedside.  Awake and alert in no acute distress.  

Maintaining good O2 saturations in the mid 90s on room air.  He's afebrile.  

Hemodynamically stable.  Chest x-ray shows bibasilar lateral airspace disease 

with small effusions.  Preliminary pleural fluid cultures reveal many gram-

positive cocci, few gram-negative bacilli.  White count 15.2.  Hemoglobin 12.6. 

Sodium 136.  Potassium 4.3.  Creatinine 0.63.  He is continued on cefepime and 

vancomycin.  He is working well with the incentive spirometer.  Lovenox for DVT 

prophylaxis.





Objective





- Vital Signs


Vital signs: 


                                   Vital Signs











Temp  98.1 F   06/13/20 08:00


 


Pulse  100   06/13/20 08:00


 


Resp  16   06/13/20 08:00


 


BP  126/72   06/13/20 08:00


 


Pulse Ox  95   06/13/20 08:00








                                 Intake & Output











 06/12/20 06/13/20 06/13/20





 18:59 06:59 18:59


 


Intake Total 1880  


 


Output Total 10 1185 


 


Balance 1870 -1185 


 


Weight  108 kg 


 


Intake:   


 


    


 


  Intake, IV Titration 1140  





  Amount   


 


    Cefepime 2 gm In Sodium 100  





    Chloride 0.9% 100 ml @   





    200 mls/hr IVPB Q12HR [facility]   





    Rx#:[id number]   


 


    Sodium Chloride 0.9% 1, 1040  





    000 ml @ 130 mls/hr IV .   





    Q7H42M [facility] Rx#:[id number]   


 


  Oral 240  


 


Output:   


 


  Chest Tube Drainage  185 


 


    Chest Tube Left Anterior  125 





    Chest   


 


    Chest Tube Right Anterior  60 





    Chest   


 


  Urine  1000 


 


  Estimated Blood Loss 10  


 


Other:   


 


  Voiding Method  Toilet 


 


  # Voids  400 














- Exam





GENERAL EXAM: Alert, very pleasant 59-year-old gentleman, on room air, 

comfortable in no apparent distress.


HEAD: Normocephalic.


EYES: Normal reaction of pupils, equal size.


NOSE: Clear with pink turbinates.


THROAT: No erythema or exudates.


NECK: No masses, no JVD.


CHEST: No chest wall deformity.  Bilateral chest tubes remain in place.


LUNGS: Equal air entry with basilar crackles, diminished, more so on the left.


CVS: S1 and S2 normal with no audible murmur, regular rhythm.


ABDOMEN: No hepatosplenomegaly, normal bowel sounds, no guarding or rigidity.


SPINE: No scoliosis or deformity


SKIN: No rashes


CENTRAL NERVOUS SYSTEM: No focal deficits, tone is normal in all 4 extremities.


EXTREMITIES: There is no peripheral edema.  No clubbing, no cyanosis.  

Peripheral pulses are intact.





- Labs


CBC & Chem 7: 


                                 06/13/20 07:29





                                 06/13/20 07:29


Labs: 


                  Abnormal Lab Results - Last 24 Hours (Table)











  06/12/20 06/13/20 06/13/20 Range/Units





  06:37 07:29 07:29 


 


WBC    15.2 H  (3.8-10.6)  k/uL


 


Hgb    12.6 L  (13.0-17.5)  gm/dL


 


MCHC    29.6 L  (31.0-37.0)  g/dL


 


RDW    16.0 H  (11.5-15.5)  %


 


Neutrophils # (Manual)    12.30 H  (1.3-7.7)  k/uL


 


Lymphocytes # (Manual)    0.76 L  (1.0-4.8)  k/uL


 


Monocytes # (Manual)    1.52 H  (0-1.0)  k/uL


 


Metamyelocytes # (Man)    0.15 H  (0)  k/uL


 


Myelocytes # (Manual)    0.61 H  (0)  k/uL


 


Sodium   136 L   (137-145)  mmol/L


 


Creatinine   0.63 L   (0.66-1.25)  mg/dL


 


Alkaline Phosphatase   189 H   ()  U/L


 


Albumin   2.9 L   (3.5-5.0)  g/dL


 


Procalcitonin  0.13 H    (0.02-0.09)  ng/mL








                      Microbiology - Last 24 Hours (Table)











 06/10/20 18:55 Blood Culture - Preliminary





 Blood    No Growth after 48 hours














Assessment and Plan


Assessment: 





1 Dyspnea, cough congestion secondary to loculated hydropneumothorax bilaterally

at the lung bases.  Left greater than right.  Status post thoracentesis on 

06/11/2020 with fluid resembling empyema, possible rheumatoid lung.  Status post

bilateral chest tube placements on 06/12/2020.  Preliminary cultures are 

revealing many gram positive cocci and few negatives bacilli. 





2 History of Streptococcus pneumoniae a on bronchial wash in February 2020





3 Multiple bilateral pulmonary nodules including one irregular density along the

right posterior lateral that has enlarged over time, rheumatoid lung





4 Rheumatoid arthritis maintained on Xeljanz and prednisone in the outpatient 

setting





5 Hypertension 





6 Hypothyroidism 





7 Former smoker





Plan:





The patient was seen and evaluated by Dr. De La Rosa


Chest x-ray and labs reviewed


Status post bilateral chest tube placements per CT services


Cultures, cytology, pH pending


Continued on vancomycin and cefepime


Encouraged regarding the increased use the incentive spirometer and cough and 

deep breathing exercises


We'll continue to follow and make further recommendations based on his clinical 

status





I, the cosigning physician, performed a history & physical examination of the 

patient. Lungs with crackles in the bilateral bases left greater than right, 

diminished left greater than right.  Maintaining good O2 saturations in the 90s 

on room air.  I discussed the assessment and plan of care with my nurse 

practitioner, Rupa Flood. I attest to the above note as dictated by her.

## 2020-06-13 NOTE — PN
PROGRESS NOTE



DATE OF SERVICE:

06/13/2020



REASON FOR FOLLOWUP:

Left-sided empyema.



INTERVAL HISTORY:

The patient is currently afebrile.  The patient is status post chest tube placement

yesterday and patient tolerated the procedure.  Complaining of some chest pain but no

worsening.  Breathing has improved. Continues to have some cough but decreased

intensity.  No vomiting or diarrhea.



PHYSICAL EXAMINATION:

Blood pressure 110/75 with a pulse of 81, temperature 98, he is 95% on room air.

General description is a middle-aged male up in the chair in no distress. Respiratory

system: Unlabored breathing, decreased breath sounds in the base, no wheeze.  Heart S1,

S2.  Regular rate and rhythm.  Abdomen soft, no tenderness.



LABS:

Hemoglobin is 12.1, white count 15.2, BUN of 14, creatinine 0.63.  Fluid culture so far

pending.  Blood culture negative.



DIAGNOSTIC IMPRESSION AND PLAN:

Patient with left-sided empyema, status post chest tube placement.  Waiting for the

culture to finalize.  Patient is covered with cefepime and Zosyn to continue and

monitor his clinical course closely.





MMODL / IJN: [id number] / Job#: [id number]

## 2020-06-13 NOTE — P.PN
Progress Note - Text


Progress Note Date: 06/13/20





Chief Complaint: Cough, congested





History of present complaint:


This is a very pleasant 59-year-old patient of Dr. Smith.  Follows with 

rheumatologist Dr. Florecita Rider.  Patient lost his arch of his right foot 

and had surgery done at [facility] in December 2018.  This was mid foot 

fusion.  Patient had a wound and was admitted in January 2019 to our hospital.  

Also did cellulitis of the right foot.  September 2019.  Patient also has known 

rheumatoid lung disease and pleural effusions.  He had a bronchoscopy with 

lavage in February 2020.  He states since December he did have a respiratory 

symptoms off and on.  Including cough and congestion.  No obvious fever and 

chills.  In the interim he is recently see Dr. Vides and given some 

antibiotics.  Also be of this year he was seen by Dr. Kaye from cardiothoracic

surgery and given a Z-Cesar and steroids.  SLIGHT improvement.  Patient was seen 

by Dr. Almanza yesterday and a computed tomography scan of the chest revealed 

loculated hydropneumothorax bilaterally.  There are no pulmonary abnormalities o

n computed tomography scan.  Patient had about 270 mL of left-sided 

thoracentesis done today.  Possible empyema.  Rheumatoid fluid cannot be ruled 

out.  Patient's appetite has been okay.  Bronchoscopy cultures in February of 

this year did reveal Streptococcus pneumoniae and Candida albicans.  This was 

done by Dr. Almanza.


Today-chest tubes in place.  Breathing is fine.  Slight cough.  Tolerating a 

diet.  Watching television.





Review of systems: Was done for constitutional, cardiovascular, GI, pulmonary. 

relevant finding as above





Active Medications





Enoxaparin Sodium (Lovenox)  40 mg SQ DAILY [facility]


   Last Admin: 06/13/20 09:31 Dose:  40 mg


   Documented by: 


HCTZ/Losartan Potassium (Hyzaar 50-12.5)  1 each PO DAILY [facility]


   Last Admin: 06/13/20 09:33 Dose:  1 each


   Documented by: 


Vancomycin HCl 1,750 mg/ (Sodium Chloride)  500 mls @ 167 mls/hr IVPB Q12H [facility]


   Last Admin: 06/13/20 12:45 Dose:  167 mls/hr


   Documented by: 


Cefepime HCl 2 gm/ Sodium (Chloride)  100 mls @ 200 mls/hr IVPB Q12HR [facility]


   Last Admin: 06/13/20 09:36 Dose:  200 mls/hr


   Documented by: 


Lactated Ringer's (Lactated Ringers)  1,000 mls @ 125 mls/hr IV .Q8H [facility]


   Last Admin: 06/13/20 12:45 Dose:  125 mls/hr


   Documented by: 


Leflunomide (Arava)  20 mg PO DAILY [facility]


   Last Admin: 06/13/20 09:31 Dose:  20 mg


   Documented by: 


Levothyroxine Sodium (Synthroid)  150 mcg PO DAILY@0630 [facility]


   Last Admin: 06/13/20 06:10 Dose:  150 mcg


   Documented by: 


Methylprednisolone (Medrol)  4 mg PO DAILY [facility]


   Last Admin: 06/13/20 09:32 Dose:  4 mg


   Documented by: 


Metoprolol Tartrate (Lopressor)  50 mg PO TID [facility]


   Last Admin: 06/13/20 16:22 Dose:  50 mg


   Documented by: 


Miscellaneous Information (Vancomycin Trough Due)  0 each MISCELLANE AS DIRECTED

ONE


   Stop: 06/13/20 23:01


Naloxone HCl (Narcan)  0.2 mg IV Q2M PRN


   PRN Reason: Opioid Reversal


Tofacitinib Citrate ([Xeljanz Xr] 11 Mg)  11 mg PO DAILY [facility]


   Last Admin: 06/13/20 09:36 Dose:  Not Given


   Documented by: 


Tramadol HCl (Ultram)  50 mg PO Q6H PRN


   PRN Reason: Pain


   Last Admin: 06/12/20 21:43 Dose:  50 mg


   Documented by: 











Physical examination:


VITAL SIGNS: 98.5, 14, 89, 104/70, 95% room air


GENERAL: Sitting upon a chair, awake


EYES: Pupils equal.  Conjunctiva normal.


HEENT: External appearance of nose and ears normal, oral cavity grossly normal.


NECK: JVD not raised; masses not palpable.


HEART: First and second heart sounds are normal;  no edema.  


LUNGS: Respiratory rate increased, diminished breath sounds, right chest tube to

waterseal, left chest tube to suction


ABDOMEN: Soft,  nontender, liver spleen not palpable, no masses palpable.  


PSYCH: Alert and oriented x3;  mood  and affect normal.  











INVESTIGATIONS, reviewed in the clinical context:


White count 15.2 hemoglobin 12.6potassium 4.3 creatinine 0.63


pleural fluid cultures pending





Previous testing


White count 13.8 hemoglobin 12.8 potassium 3.7 creatinine 0.97 albumin 3.2


COVID -19 PCF-not detected


EKG tracing personally reviewed by me-normal sinus rhythm nonspecific T-wave 

changes


Chest x-ray film personally reviewed by me-bilateral florid with horizontal 

level





Assessment:


-Bilateral empyema with thoracentesis of 270 mL.  On the left side.  June 12 

bilateral chest tubes were placed


-Chronic rheumatoid arthritis with rheumatoid lung


-Hypothyroid


-Essential hypertension


-Obesity BMI 31.5





Plan:


-right chest tube to waterseal and left chest tube to suction.continue IV 

antibiotics including vancomycin and cefepime.  Cut back IV fluids.

## 2020-06-13 NOTE — XR
EXAMINATION TYPE: XR chest 1V portable

 

DATE OF EXAM: 6/13/2020

 

HISTORY: empyema.

 

REFERENCE: Previous study dated 6/12/2020.

 

FINDINGS: The heart is enlarged. There is worsening bibasilar airspace disease. A right pleural drain
 remains in place. There are small, bilateral effusions.

 

IMPRESSION: 

1. CARDIOMEGALY.

2. WORSENING BIBASILAR LATERAL AIRSPACE DISEASE.

3. SMALL, BILATERAL EFFUSIONS.

## 2020-06-14 RX ADMIN — METOPROLOL TARTRATE SCH MG: 50 TABLET, FILM COATED ORAL at 15:22

## 2020-06-14 RX ADMIN — SODIUM CHLORIDE SCH MLS/HR: 9 INJECTION, SOLUTION INTRAVENOUS at 11:37

## 2020-06-14 RX ADMIN — SODIUM CHLORIDE SCH MLS/HR: 9 INJECTION, SOLUTION INTRAVENOUS at 00:40

## 2020-06-14 RX ADMIN — METOPROLOL TARTRATE SCH MG: 50 TABLET, FILM COATED ORAL at 20:44

## 2020-06-14 RX ADMIN — SODIUM CHLORIDE SCH MLS/HR: 9 INJECTION, SOLUTION INTRAVENOUS at 20:44

## 2020-06-14 RX ADMIN — ENOXAPARIN SODIUM SCH MG: 40 INJECTION SUBCUTANEOUS at 08:11

## 2020-06-14 RX ADMIN — SODIUM CHLORIDE, SODIUM LACTATE, POTASSIUM CHLORIDE, AND CALCIUM CHLORIDE SCH: .6; .31; .03; .02 INJECTION, SOLUTION INTRAVENOUS at 11:39

## 2020-06-14 RX ADMIN — LEVOTHYROXINE SODIUM SCH MCG: 75 TABLET ORAL at 06:25

## 2020-06-14 RX ADMIN — LOSARTAN POTASSIUM AND HYDROCHLOROTHIAZIDE SCH EACH: 12.5; 5 TABLET ORAL at 08:12

## 2020-06-14 RX ADMIN — CEFEPIME HYDROCHLORIDE SCH MLS/HR: 2 INJECTION, POWDER, FOR SOLUTION INTRAVENOUS at 08:10

## 2020-06-14 RX ADMIN — CEFEPIME HYDROCHLORIDE SCH MLS/HR: 2 INJECTION, POWDER, FOR SOLUTION INTRAVENOUS at 20:02

## 2020-06-14 RX ADMIN — LEFLUNOMIDE SCH MG: 20 TABLET ORAL at 08:11

## 2020-06-14 RX ADMIN — METOPROLOL TARTRATE SCH MG: 50 TABLET, FILM COATED ORAL at 08:12

## 2020-06-14 NOTE — P.PN
Progress Note - Text


Progress Note Date: 06/14/20





Chief Complaint: Cough, congested





History of present complaint:


This is a very pleasant 59-year-old patient of Dr. Smith.  Follows with 

rheumatologist Dr. Florecita Rider.  Patient lost his arch of his right foot 

and had surgery done at [facility] in December 2018.  This was mid foot 

fusion.  Patient had a wound and was admitted in January 2019 to our hospital.  

Also did cellulitis of the right foot.  September 2019.  Patient also has known 

rheumatoid lung disease and pleural effusions.  He had a bronchoscopy with 

lavage in February 2020.  He states since December he did have a respiratory 

symptoms off and on.  Including cough and congestion.  No obvious fever and 

chills.  In the interim he is recently see Dr. Vides and given some 

antibiotics.  Also be of this year he was seen by Dr. Kaye from cardiothoracic

surgery and given a Z-Cesar and steroids.  SLIGHT improvement.  Patient was seen 

by Dr. Almanza yesterday and a computed tomography scan of the chest revealed 

loculated hydropneumothorax bilaterally.  There are no pulmonary abnormalities o

n computed tomography scan.  Patient had about 270 mL of left-sided 

thoracentesis done today.  Possible empyema.  Rheumatoid fluid cannot be ruled 

out.  Patient's appetite has been okay.  Bronchoscopy cultures in February of 

this year did reveal Streptococcus pneumoniae and Candida albicans.  

Bronchoscopy-by Dr. Gómez.


Today-right chest tube was removed today.  Left chest tube to wall suction.  Br

eathing better.  Slight cough.  Eating well.  Up in a chair.  Pain control.





Review of systems: Was done for constitutional, cardiovascular, GI, pulmonary. 

relevant finding as above





Active Medications





Enoxaparin Sodium (Lovenox)  40 mg SQ DAILY [facility]


   Last Admin: 06/14/20 08:11 Dose:  40 mg


   Documented by: 


HCTZ/Losartan Potassium (Hyzaar 50-12.5)  1 each PO DAILY [facility]


   Last Admin: 06/14/20 08:12 Dose:  1 each


   Documented by: 


Cefepime HCl 2 gm/ Sodium (Chloride)  100 mls @ 200 mls/hr IVPB Q12HR [facility]


   Last Admin: 06/14/20 08:10 Dose:  200 mls/hr


   Documented by: 


Lactated Ringer's (Lactated Ringers)  500 mls @ 20 mls/hr IV .Q24H [facility]


   Last Admin: 06/14/20 11:39 Dose:  Not Given


   Documented by: 


Vancomycin HCl 1,750 mg/ (Sodium Chloride)  500 mls @ 167 mls/hr IVPB Q8H [facility]


Leflunomide (Arava)  20 mg PO DAILY [facility]


   Last Admin: 06/14/20 08:11 Dose:  20 mg


   Documented by: 


Levothyroxine Sodium (Synthroid)  150 mcg PO DAILY@0630 [facility]


   Last Admin: 06/14/20 06:25 Dose:  150 mcg


   Documented by: 


Methylprednisolone (Medrol)  4 mg PO DAILY [facility]


   Last Admin: 06/14/20 09:38 Dose:  4 mg


   Documented by: 


Metoprolol Tartrate (Lopressor)  50 mg PO TID [facility]


   Last Admin: 06/14/20 15:22 Dose:  50 mg


   Documented by: 


Naloxone HCl (Narcan)  0.2 mg IV Q2M PRN


   PRN Reason: Opioid Reversal


Tofacitinib Citrate ([Xeljanz Xr] 11 Mg)  11 mg PO DAILY [facility]


   Last Admin: 06/14/20 08:13 Dose:  Not Given


   Documented by: 


Tramadol HCl (Ultram)  50 mg PO Q6H PRN


   PRN Reason: Pain


   Last Admin: 06/12/20 21:43 Dose:  50 mg


   Documented by: 











Physical examination:


VITAL SIGNS: 98.5, 89, 16, 111/70, 95% on room air


GENERAL: Sitting upon a chair, comfortable


EYES: Pupils equal.  Conjunctiva normal.


HEENT: External appearance of nose and ears normal, oral cavity grossly normal.


NECK: JVD not raised; masses not palpable.


HEART: First and second heart sounds are normal;  no edema.  


LUNGS: Respiratory rate increased, diminished breath sounds, right chest tube-

removed, left chest tube to suction


ABDOMEN: Soft,  nontender, liver spleen not palpable, no masses palpable.  


PSYCH: Alert and oriented x3;  mood  and affect normal.  











INVESTIGATIONS, reviewed in clinical context:


White count 15.2 hemoglobin 12.6potassium 4.3 creatinine 0.63


pleural fluid cultures pending





Previous testing


White count 13.8 hemoglobin 12.8 potassium 3.7 creatinine 0.97 albumin 3.2


COVID -19 PCF-not detected


EKG tracing personally reviewed by me-normal sinus rhythm nonspecific T-wave 

changes


Chest x-ray film personally reviewed by me-bilateral florid with horizontal 

level





Assessment:


-Bilateral empyema with thoracentesis of 270 mL.  On the left side.  June 12 

bilateral chest tubes were placed


-Chronic rheumatoid arthritis with rheumatoid lung


-Hypothyroid


-Essential hypertension


-Obesity BMI 31.5





Plan:


-right chest tube removed today.  left chest tube to suction.continue IV 

antibiotics including vancomycin and cefepime.  Pleural fluid cultures pending. 

Discussed with the patient.  Repeat labs in the morning.

## 2020-06-14 NOTE — P.PN
Subjective


Progress Note Date: 06/14/20


Principal diagnosis: 





Dyspnea secondary to bilateral pleural effusion





This is a very pleasant 59-year-old gentleman who follows with Dr. Smith as 

his primary care provider.  He has a history of hypertension, hypothyroidism, 

rheumatoid arthritis and is maintained on prednisone and Xeljanz.  He has a 

history of rheumatoid lung disease with pleural effusions and previous Strepto

coccus pneumoniae found in a bronchoscopy wash and February 2020.  Recently he 

had been having issues with increasing shortness of breath cough, fatigue and 

congestion with greenish productive sputum.  He had been seen by Dr. Vides on

05/28/2020 and prescribed a Z-Cesar and treated with steroids.  He improved 

briefly but then symptoms had recurred and he was seen yesterday by Dr. Gómez 

with ongoing complaints of greenish yellow phlegm with chest congestion.  He had

an abnormal chest x-ray and was sent for computed tomography scan of the chest 

which revealed development of loculated hydropneumothorax bilaterally at the 

lung bases.  There was the multiple bilateral pulmonary nodules and the larger 

prior cavitary lesions were resolved.  However 1 irregular density on the right 

posterior lateral area has enlarged.  He was informed to report to the emergency

room and subsequently admitted.  He is seen today in consultation on the 

selective care unit.  He is currently sitting up in a chair at the bedside.  

Awake and alert in no acute distress.  Maintaining O2 saturations in the 90s on 

room air.  He's been afebrile.  Slightly tachycardic.  White count 13.8.  

Hemoglobin 12.8.  Sodium 138.  Potassium 3.7.  Creatinine 0.97.  AST 29.  ALT 

71.  Troponin negative.  ProBNP 230.  Ultrasound of the chest reveals a small 

right pleural effusion measuring 1.8 cm.  There is a left pleural effusion 

measuring 4.9 cm.  Hydropneumothorax noted.  Dr. De La Rosa did perform a left-sided

thoracentesis today and the return appeared to resemble empyema versus 

rheumatoid fluid.  Dyspneic, yellowish-green fluid.  Approximately 270 mL 

removed.  Cultures, fluid analysis and pH were sent.





The patient is seen today 06/12/2020 in follow-up on the selective care unit.  

He is currently sitting up in a chair at the bedside.  Awake and alert in no 

acute distress.  He is maintaining good O2 saturations in the 90s on room air.  

He remains afebrile.  Hemodynamically stable.  Pleural fluid cultures are 

pending.  Preliminary results reveal many gram-positive cocci and few gram-

negative bacilli.  Blood cultures reveal no growth to date.  Creatinine 0.71.  

He remains on cefepime and vancomycin.  ID is on the case.  Today's chest x-ray 

reveals well-defined air-fluid levels at the lung bases.  Suspect hydropne

umothoraces.  Superior or midlung pneumothorax components are not identified.  

Diffuse interstitial opacities persist.





The patient is seen today 06/13/2020 in follow-up on the selective care unit.  JOSE sims did undergo bilateral chest tube placements yesterday. He is currently sitting

up in a chair at the bedside.  Awake and alert in no acute distress.  

Maintaining good O2 saturations in the mid 90s on room air.  He's afebrile.  

Hemodynamically stable.  Chest x-ray shows bibasilar lateral airspace disease 

with small effusions.  Preliminary pleural fluid cultures reveal many gram-

positive cocci, few gram-negative bacilli.  White count 15.2.  Hemoglobin 12.6. 

Sodium 136.  Potassium 4.3.  Creatinine 0.63.  He is continued on cefepime and 

vancomycin.  He is working well with the incentive spirometer.  Lovenox for DVT 

prophylaxis.





The patient is seen today 06/14/2020 in follow-up on the selective care unit.  

He is currently awake and alert in no acute distress.  He's been up in the 

chair.  He's been up ambulating in the hallway with assistance.  No worsening 

shortness of breath, cough or congestion.  Working well with the incentive 

spirometer.  Right sided chest tube was removed this morning.  Left chest tube 

remains in place.  Chest x-ray continues to show basilar airspace disease left 

greater than right.  Preliminary pleural fluid cultures reveal many gram-

positive cocci, few gram-negative bacilli.  Final ID still pending.  Creatinine 

0.62.  He remains on vancomycin and cefepime.  Lovenox for DVT prophylaxis.





Objective





- Vital Signs


Vital signs: 


                                   Vital Signs











Temp  98.2 F   06/14/20 11:46


 


Pulse  85   06/14/20 11:46


 


Resp  18   06/14/20 11:46


 


BP  112/73   06/14/20 11:46


 


Pulse Ox  95   06/14/20 11:46








                                 Intake & Output











 06/13/20 06/14/20 06/14/20





 18:59 06:59 18:59


 


Intake Total 1725 840 100


 


Output Total 638 560 380


 


Balance 1087 280 -280


 


Weight  107.7 kg 


 


Intake:   


 


  Intake, IV Titration 1125  100





  Amount   


 


    Cefepime 2 gm In Sodium 100  100





    Chloride 0.9% 100 ml @   





    200 mls/hr IVPB Q12HR GABBY   





    Rx#:[id number]   


 


    Lactated Ringers 1,000 ml 525  





    @ 125 mls/hr IV .Q8H GABBY   





    Rx#:[id number]   


 


    Vancomycin 1,750 mg In 500  





    Sodium Chloride 0.9% 500   





    ml 500 ml @ 167 mls/hr   





    IVPB Q12H GABBY Rx#:   





    [id number]   


 


  Oral 600 840 


 


Output:   


 


  Chest Tube Drainage 288 58 


 


    Chest Tube Left Anterior 279 52 





    Chest   


 


    Chest Tube Right Anterior 9 6 





    Chest   


 


  Urine 350 502 380


 


Other:   


 


  Voiding Method Urinal Urinal Urinal


 


  # Voids 1 900 














- Exam





GENERAL EXAM: Alert, very pleasant 59-year-old gentleman, on room air, 

comfortable in no apparent distress.


HEAD: Normocephalic.


EYES: Normal reaction of pupils, equal size.


NOSE: Clear with pink turbinates.


THROAT: No erythema or exudates.


NECK: No masses, no JVD.


CHEST: No chest wall deformity.  Right-sided chest tube removed.  Left chest 

tube remains in place.


LUNGS: Equal air entry with basilar crackles, diminished, more so on the left.


CVS: S1 and S2 normal with no audible murmur, regular rhythm.


ABDOMEN: No hepatosplenomegaly, normal bowel sounds, no guarding or rigidity.


SPINE: No scoliosis or deformity


SKIN: No rashes


CENTRAL NERVOUS SYSTEM: No focal deficits, tone is normal in all 4 extremities.


EXTREMITIES: There is no peripheral edema.  No clubbing, no cyanosis.  

Peripheral pulses are intact.





- Labs


CBC & Chem 7: 


                                 06/13/20 07:29





                                 06/14/20 10:37


Labs: 


                  Abnormal Lab Results - Last 24 Hours (Table)











  06/14/20 Range/Units





  10:37 


 


Creatinine  0.62 L  (0.66-1.25)  mg/dL








                      Microbiology - Last 24 Hours (Table)











 06/10/20 18:55 Blood Culture - Preliminary





 Blood    No Growth after 72 hours


 


 06/11/20 10:58 Anaerobic Culture - Preliminary





 Pleural Fluid 














Assessment and Plan


Assessment: 





1 Dyspnea, cough congestion secondary to loculated hydropneumothorax bilaterally

at the lung bases.  Left greater than right.  Status post thoracentesis on 

06/11/2020 with fluid resembling empyema, possible rheumatoid lung.  Status post

bilateral chest tube placements on 06/12/2020.  Preliminary cultures are 

revealing many gram positive cocci and few negatives bacilli.  Final ID still 

pending.





2 History of Streptococcus pneumoniae a on bronchial wash in February 2020





3 Multiple bilateral pulmonary nodules including one irregular density along the

right posterior lateral that has enlarged over time, rheumatoid lung





4 Rheumatoid arthritis maintained on Xeljanz and prednisone in the outpatient 

setting





5 Hypertension 





6 Hypothyroidism 





7 Former smoker





Plan:





The patient was seen and evaluated by Dr. De La Rosa


Right-sided chest tube removed, left chest tube remains in place


May need alteplase to the left chest


Chest x-ray in a.m.


Final culture ID and pH level pending, cytology negative for malignancy


Continued on vancomycin and cefepime


Encouraged regarding the increased use the incentive spirometer and cough and 

deep breathing exercises


We'll continue to follow and make further recommendations based on his clinical 

status





I, the cosigning physician, performed a history & physical examination of the 

patient. Lungs with crackles in the bilateral bases left greater than right, 

diminished left greater than right.  Maintaining good O2 saturations in the 90s 

on room air.  I discussed the assessment and plan of care with my nurse 

practitioner, Rupa Flood. I attest to the above note as dictated by her.

## 2020-06-14 NOTE — P.PN
Subjective


Progress Note Date: 06/14/20


Principal diagnosis: 





Bilateral bibasilar hydropneumothorax with documented empyema on the left, 

status post left sided thoracentesis.  Present medical history of strep 

pneumonia on bronchiolar lavage in February 2028, rheumatoid arthritis with 

rheumatoid nodules in the lung currently treated with prednisone, Xeljanz, and 

Arava, history of hypertension, hypothyroidism, previous tobacco dependence, and

family history of lung cancer.





POD #2 bilateral chest tube placement








The patient is currently sitting up in the recliner on the cardiac stepdown unit

in no acute distress.  Denies any pain, shortness of breath.  States he feels 

better everyday.  Bilateral chest tubes present, minimal drainage from the right

chest tube, positive drainage left chest tube with air leak present, right chest

tube placed to waterseal yesterday.  Remains afebrile.  Currently receiving 

vancomycin and cefepime per infectious disease.  Continues with steroids, 

Xeljanz and Arava for his rheumatoid arthritis.  No new concerns.





Objective





- Vital Signs


Vital signs: 


                                   Vital Signs











Temp  97.4 F L  06/14/20 08:00


 


Pulse  94   06/14/20 08:00


 


Resp  18   06/14/20 08:00


 


BP  124/83   06/14/20 08:00


 


Pulse Ox  95   06/14/20 08:00








                                 Intake & Output











 06/13/20 06/14/20 06/14/20





 18:59 06:59 18:59


 


Intake Total 1725 840 100


 


Output Total 638 560 380


 


Balance 1087 280 -280


 


Weight  107.7 kg 


 


Intake:   


 


  Intake, IV Titration 1125  100





  Amount   


 


    Cefepime 2 gm In Sodium 100  100





    Chloride 0.9% 100 ml @   





    200 mls/hr IVPB Q12HR GABBY   





    Rx#:[id number]   


 


    Lactated Ringers 1,000 ml 525  





    @ 125 mls/hr IV .Q8H GABBY   





    Rx#:[id number]   


 


    Vancomycin 1,750 mg In 500  





    Sodium Chloride 0.9% 500   





    ml 500 ml @ 167 mls/hr   





    IVPB Q12H GABBY Rx#:   





    [id number]   


 


  Oral 600 840 


 


Output:   


 


  Chest Tube Drainage 288 58 


 


    Chest Tube Left Anterior 279 52 





    Chest   


 


    Chest Tube Right Anterior 9 6 





    Chest   


 


  Urine 350 502 380


 


Other:   


 


  Voiding Method Urinal Urinal Urinal


 


  # Voids 1 900 














- Constitutional


General appearance: Present: cooperative, no acute distress





- Respiratory


Details: 





Lungs sounds diminished bilaterally with faint expiratory wheezes heard 

posteriorly.  Respirations even, nonlabored.currently on room air with oxygen 

saturation 92%.  Able to achieve 1250 mL on his incentive spirometry.  Strong 

cough.  Right and left pleural chest tubes present.  Right pleural chest tube to

waterseal with 10 mL serosanguineous drainage in the last 24 hours, no air leak 

present.  Left pleural chest tube to continuous wall suction with 315 mL 

serosanguineous drainage in the last 24 hours, positive positional air leak 

present with a strong cough.








- Cardiovascular


Details: 





S1, S2 present.  Regular rate and rhythm, sinus rhythm on telemetry with heart 

rate in the 80s.  Palpable peripheral pulses bilaterally.  No edema present. 








- Gastrointestinal


Gastrointestinal Comment(s): 





abdomen soft, nontender, nondistended.  Active bowel sounds present 4 quadrant

s.  Tolerating diet.





- Genitourinary


Genitourinary Comment(s): 





continues to void





- Integumentary


Integumentary Comment(s): 





skin is warm and dry with evidence of good perfusion.  Multiple areas of 

ecchymosis present bilateral arms








- Neurologic


Neurologic: Present: CNII-XII intact





- Musculoskeletal


Musculoskeletal: Present: gait normal, strength equal bilaterally





- Psychiatric


Psychiatric: Present: A&O x's 3, appropriate affect, intact judgment & insight





- Allied health notes


Allied health notes reviewed: nursing





- Labs


CBC & Chem 7: 


                                 06/13/20 07:29





                                 06/13/20 07:29


Labs: 


                  Abnormal Lab Results - Last 24 Hours (Table)











  06/13/20 Range/Units





  07:29 


 


Neutrophils # (Manual)  12.30 H  (1.3-7.7)  k/uL


 


Lymphocytes # (Manual)  0.76 L  (1.0-4.8)  k/uL


 


Monocytes # (Manual)  1.52 H  (0-1.0)  k/uL


 


Metamyelocytes # (Man)  0.15 H  (0)  k/uL


 


Myelocytes # (Manual)  0.61 H  (0)  k/uL








                      Microbiology - Last 24 Hours (Table)











 06/10/20 18:55 Blood Culture - Preliminary





 Blood    No Growth after 72 hours


 


 06/11/20 10:58 Anaerobic Culture - Preliminary





 Pleural Fluid 














- Imaging and Cardiology


Chest x-ray: report reviewed, image reviewed





Assessment and Plan


Assessment: 





1.  Loculated bilateral hydropneumothorax, left wrist and right, status post 

left-sided thoracentesis, possible empyema, history of strep pneumoniae on 

bronchiolar lavage in February 2020, status post bilateral chest tube placement


2.  Rheumatoid arthritis with rheumatoid nodules in the lung, currently treated 

with prednisone and Xeljanz


3.  Hypertension


4.  Hypothyroidism


5.  Previous tobacco dependence


6.  Family history of lung cancer





Plan: 





1.  Right pleural chest tube discontinued without incident, continue left chest 

tube to wall suction for another 24 hours.  Will monitor drainage and resolution

of air leak.


2.  Repeat chest x-ray in the morning


3.  Continue IV antibiotics per infectious disease


4.  Encourage incentive spirometry use 10 times every hour while awake


5.  Increase activity, ambulate as tolerated.  May take chest tube off suction 

periodically for patient to ambulate.


6.  Continue medical management of other comorbidities per primary care service


7.  More recommendations to follow





Time with Patient: Greater than 30

## 2020-06-14 NOTE — XR
EXAMINATION TYPE: XR chest 1V portable

 

DATE OF EXAM: 6/14/2020

 

HISTORY: empyema.

 

REFERENCE: Previous study dated 6/13/2020.

 

FINDINGS: Right pleural drain is in place.

 

The heart is enlarged. There is bibasilar airspace disease. There are bilateral effusions.

 

IMPRESSION: 

 

NO SIGNIFICANT INTERVAL CHANGE IN APPEARANCE OF THE CHEST.

## 2020-06-15 LAB
ANION GAP SERPL CALC-SCNC: 5 MMOL/L
BUN SERPL-SCNC: 13 MG/DL (ref 9–20)
CALCIUM SPEC-MCNC: 8.7 MG/DL (ref 8.4–10.2)
CELLS COUNTED: 200
CHLORIDE SERPL-SCNC: 104 MMOL/L (ref 98–107)
CO2 SERPL-SCNC: 29 MMOL/L (ref 22–30)
EOSINOPHIL # BLD MANUAL: 0.15 K/UL (ref 0–0.7)
ERYTHROCYTE [DISTWIDTH] IN BLOOD BY AUTOMATED COUNT: 4.67 M/UL (ref 4.3–5.9)
ERYTHROCYTE [DISTWIDTH] IN BLOOD: 15.7 % (ref 11.5–15.5)
GLUCOSE SERPL-MCNC: 90 MG/DL (ref 74–99)
HCT VFR BLD AUTO: 42.2 % (ref 39–53)
HGB BLD-MCNC: 12.9 GM/DL (ref 13–17.5)
LYMPHOCYTES # BLD MANUAL: 1.17 K/UL (ref 1–4.8)
MCH RBC QN AUTO: 27.6 PG (ref 25–35)
MCHC RBC AUTO-ENTMCNC: 30.6 G/DL (ref 31–37)
MCV RBC AUTO: 90.3 FL (ref 80–100)
METAMYELOCYTES # BLD: 0.29 K/UL
MONOCYTES # BLD MANUAL: 0.88 K/UL (ref 0–1)
MYELOCYTES # BLD MANUAL: 0.44 K/UL
NEUTROPHILS NFR BLD MANUAL: 81 %
NEUTS SEG # BLD MANUAL: 11.83 K/UL (ref 1.3–7.7)
PLATELET # BLD AUTO: 283 K/UL (ref 150–450)
POTASSIUM SERPL-SCNC: 4.2 MMOL/L (ref 3.5–5.1)
SODIUM SERPL-SCNC: 138 MMOL/L (ref 137–145)
WBC # BLD AUTO: 14.6 K/UL (ref 3.8–10.6)

## 2020-06-15 RX ADMIN — FAMOTIDINE SCH MG: 10 INJECTION, SOLUTION INTRAVENOUS at 22:28

## 2020-06-15 RX ADMIN — LOSARTAN POTASSIUM AND HYDROCHLOROTHIAZIDE SCH EACH: 12.5; 5 TABLET ORAL at 09:11

## 2020-06-15 RX ADMIN — LEFLUNOMIDE SCH MG: 20 TABLET ORAL at 09:12

## 2020-06-15 RX ADMIN — METOPROLOL TARTRATE SCH MG: 50 TABLET, FILM COATED ORAL at 15:45

## 2020-06-15 RX ADMIN — CEFEPIME HYDROCHLORIDE SCH MLS/HR: 2 INJECTION, POWDER, FOR SOLUTION INTRAVENOUS at 09:10

## 2020-06-15 RX ADMIN — ENOXAPARIN SODIUM SCH MG: 40 INJECTION SUBCUTANEOUS at 09:12

## 2020-06-15 RX ADMIN — SODIUM CHLORIDE, SODIUM LACTATE, POTASSIUM CHLORIDE, AND CALCIUM CHLORIDE SCH: .6; .31; .03; .02 INJECTION, SOLUTION INTRAVENOUS at 15:45

## 2020-06-15 RX ADMIN — METOPROLOL TARTRATE SCH MG: 50 TABLET, FILM COATED ORAL at 22:28

## 2020-06-15 RX ADMIN — LEVOTHYROXINE SODIUM SCH MCG: 75 TABLET ORAL at 05:55

## 2020-06-15 RX ADMIN — METOPROLOL TARTRATE SCH MG: 50 TABLET, FILM COATED ORAL at 09:11

## 2020-06-15 RX ADMIN — SODIUM CHLORIDE SCH MLS/HR: 9 INJECTION, SOLUTION INTRAVENOUS at 12:18

## 2020-06-15 RX ADMIN — SODIUM CHLORIDE SCH MLS/HR: 9 INJECTION, SOLUTION INTRAVENOUS at 03:42

## 2020-06-15 NOTE — PN
PROGRESS NOTE



DATE OF SERVICE:

06/15/2020



REASON FOR FOLLOWUP:

Left-sided empyema.



INTERVAL HISTORY:

The patient is currently afebrile.  The patient is breathing comfortably.  Chest pain

is currently controlled.  Minimal cough.  No nausea, no vomiting.  No abdominal pain or

diarrhea.



PHYSICAL EXAMINATION:

Blood pressure 105/70 with a pulse of 89, temperature is 97.9. He is 97% on room air.

General description is a middle-aged male up in the chair in no distress.

RESPIRATORY SYSTEM: Unlabored breathing, decreased breath sounds at the bases. No

wheeze.

HEART: S1, S2.  Regular rate and rhythm.

ABDOMEN: Soft, no tenderness.



LABS:

Hemoglobin is 12.9, white count 14.6, creatinine 0.65.  Pleural fluid cultures did show

strep pneumo.



DIAGNOSTIC IMPRESSION AND PLAN:

Patient with left-sided empyema.  Culture positive for Streptococcus pneumoniae, which

is sensitive pathogen.  Antibiotic will be adjusted to Rocephin 2 grams daily.  The

patient had a PICC line for outpatient IV antibiotic and monitor his clinical course

closely.  Continue supportive care.





MMODL / IJN: [id number] / Job#: [id number]

## 2020-06-15 NOTE — P.PN
Subjective





on-call hospitalist covering for Dr. Grijalva


From the records


This is a very pleasant 59-year-old patient of Dr. Smith.  Follows with 

rheumatologist Dr. Florecita Rider.  Patient lost his arch of his right foot 

and had surgery done at [facility] in December 2018.  This was mid foot 

fusion.  Patient had a wound and was admitted in January 2019 to our hospital.  

Also did cellulitis of the right foot.  September 2019.  Patient also has known 

rheumatoid lung disease and pleural effusions.  He had a bronchoscopy with 

lavage in February 2020.  He states since December he did have a respiratory 

symptoms off and on.  Including cough and congestion.  No obvious fever and 

chills.  In the interim he is recently see Dr. Vides and given some 

antibiotics.  Also be of this year he was seen by Dr. Kaye from cardiothoracic

surgery and given a Z-Cesar and steroids.  SLIGHT improvement.  Patient was seen 

by Dr. Almanza yesterday and a computed tomography scan of the chest revealed 

loculated hydropneumothorax bilaterally.  There are no pulmonary abnormalities 

on computed tomography scan.  Patient had about 270 mL of left-sided 

thoracentesis done today.  Possible empyema.  Rheumatoid fluid cannot be ruled 

out.  Patient's appetite has been okay.  Bronchoscopy cultures in February of 

this year did reveal Streptococcus pneumoniae and Candida albicans.  

Bronchoscopy-by Dr. Gómez.


Today-right chest tube was removed today.  Left chest tube to wall suction.  

Breathing better.  Slight cough.  Eating well.  Up in a chair.  Pain control.''





subjective


06/15/2020


patient admitted with bilateral empyema status post bilateral chest tube, the r

ight chest tube was removed yesterday on 6/14, however the left chest tube still

in place due to leaking air through the tube.patient also with the lateral 

pulmonary nodules secondary to rheumatoid lung.


Patient is currently sitting in chair with no distress.  No chest pain or 

dyspnea.vitals are stable.  WBC is 14.6 K,BMP is unremarkable.


pleural fluid culture is growing Streptococcus pneumoniae, is currently on 

cefepime.  The vancomycin was stopped.  Also he is on Medrol 4 mg daily by 

mouth.





Review of systems


CONSTITUTIONAL: No fever, no malaise, no fatigue. 


HEENT: No recent visual problems or hearing problems. Denied any sore throat. 


CARDIOVASCULAR: No  orthopnea, PND, no palpitations, no syncope. 


PULMONARY: No shortness of breath, no cough, no hemoptysis. 


GASTROINTESTINAL: No diarrhea, no nausea, no vomiting, no abdominal pain. 

Normoactive bowel sounds. 


NEUROLOGICAL: No headaches, no weakness, no numbness. 


HEMATOLOGICAL: Denies any bleeding or petechiae. 


GENITOURINARY: Denies any burning micturition, frequency, or urgency. 


MUSCULOSKELETAL/RHEUMATOLOGICAL: Denies any joint pain, swelling, or any muscle 

pain. 


ENDOCRINE: Denies any polyuria or polydipsia.


                               Active Medications











Generic Name Dose Route Start Last Admin





  Trade Name Freq  PRN Reason Stop Dose Admin


 


Enoxaparin Sodium  40 mg  06/11/20 19:00  06/15/20 09:12





  Lovenox  SQ   40 mg





  DAILY GABBY   Administration


 


HCTZ/Losartan Potassium  1 each  06/11/20 09:00  06/15/20 09:11





  Hyzaar 50-12.5  PO   1 each





  DAILY GABBY   Administration


 


Cefepime HCl 2 gm/ Sodium  100 mls @ 200 mls/hr  06/12/20 09:00  06/15/20 09:10





  Chloride  IVPB   200 mls/hr





  Q12HR GABBY   Administration


 


Lactated Ringer's  500 mls @ 20 mls/hr  06/13/20 17:00  06/14/20 11:39





  Lactated Ringers  IV   Not Given





  .Q24H GABBY  


 


Vancomycin HCl 1,750 mg/  500 mls @ 167 mls/hr  06/14/20 20:00  06/15/20 12:18





  Sodium Chloride  IVPB   167 mls/hr





  Q8H GABBY   Administration


 


Leflunomide  20 mg  06/11/20 09:00  06/15/20 09:12





  Arava  PO   20 mg





  DAILY GABBY   Administration


 


Levothyroxine Sodium  150 mcg  06/11/20 06:30  06/15/20 05:55





  Synthroid  PO   150 mcg





  DAILY@0630 GABBY   Administration


 


Methylprednisolone  4 mg  06/11/20 09:00  06/15/20 09:11





  Medrol  PO   4 mg





  DAILY GABBY   Administration


 


Metoprolol Tartrate  50 mg  06/10/20 22:00  06/15/20 09:11





  Lopressor  PO   50 mg





  TID GABBY   Administration


 


Miscellaneous Information  0 each  06/16/20 11:00 





  Vancomycin Trough Due  MISCELLANE  06/16/20 11:01 





  AS DIRECTED ONE  


 


Naloxone HCl  0.2 mg  06/10/20 19:12 





  Narcan  IV  





  Q2M PRN  





  Opioid Reversal  


 


Tofacitinib Citrate  11 mg  06/11/20 09:00  06/15/20 09:22





  [Xeljanz Xr] 11 Mg  PO   Not Given





  DAILY [facility]  


 


Tramadol HCl  50 mg  06/12/20 21:14  06/12/20 21:43





  Ultram  PO   50 mg





  Q6H PRN   Administration





  Pain  














Objective





- Vital Signs


Vital signs: 


                                   Vital Signs











Temp  97.9 F   06/15/20 12:00


 


Pulse  89   06/15/20 12:00


 


Resp  16   06/15/20 12:00


 


BP  105/70   06/15/20 12:00


 


Pulse Ox  97   06/15/20 12:00








                                 Intake & Output











 06/14/20 06/15/20 06/15/20





 18:59 06:59 18:59


 


Intake Total 400 1500 600


 


Output Total 1240 1210 


 


Balance -840 290 600


 


Weight  107.9 kg 


 


Intake:   


 


  Intake, IV Titration 100 1100 600





  Amount   


 


    Cefepime 2 gm In Sodium 100 100 100





    Chloride 0.9% 100 ml @   





    200 mls/hr IVPB Q12HR GABBY   





    Rx#:[id number]   


 


    Vancomycin 1,750 mg In  1000 500





    Sodium Chloride 0.9% 500   





    ml 500 ml @ 167 mls/hr   





    IVPB Q8H GABBY Rx#:   





    [id number]   


 


  Oral 300 400 


 


Output:   


 


  Chest Tube Drainage 10 10 


 


    Chest Tube Left Anterior 10 10 





    Chest   


 


  Urine 1230 1200 


 


Other:   


 


  Voiding Method Urinal Urinal Urinal


 


  # Voids 1  1














- Exam





GENERAL: The patient is alert and oriented x3, not in any acute distress. Well 

developed, well nourished. 


HEENT: Pupils are round and equally reacting to light. EOMI. No scleral icterus.

No conjunctival pallor. Normocephalic, atraumatic. No pharyngeal erythema. No 

thyromegaly. 


CARDIOVASCULAR: S1 and S2 present. No murmurs, rubs, or gallops. 


-PULMONARY: Chest is clear to auscultation, .  Bilateral crepitation.  This 

chest tube is in place


ABDOMEN: Soft, nontender, nondistended, normoactive bowel sounds. No palpable 

organomegaly. 


MUSCULOSKELETAL: No joint swelling or deformity. 


EXTREMITIES: No cyanosis, clubbing, or pedal edema. 


NEUROLOGICAL: Gross neurological examination did not reveal any focal deficits. 


SKIN: No rashes. no petechiae.





- Labs


CBC & Chem 7: 


                                 06/15/20 06:52





                                 06/15/20 06:52


Labs: 


                  Abnormal Lab Results - Last 24 Hours (Table)











  06/15/20 06/15/20 Range/Units





  06:52 06:52 


 


WBC  14.6 H   (3.8-10.6)  k/uL


 


Hgb  12.9 L   (13.0-17.5)  gm/dL


 


MCHC  30.6 L   (31.0-37.0)  g/dL


 


RDW  15.7 H   (11.5-15.5)  %


 


Neutrophils # (Manual)  11.83 H   (1.3-7.7)  k/uL


 


Metamyelocytes # (Man)  0.29 H   (0)  k/uL


 


Myelocytes # (Manual)  0.44 H   (0)  k/uL


 


Creatinine   0.65 L  (0.66-1.25)  mg/dL








                      Microbiology - Last 24 Hours (Table)











 06/11/20 10:58 Anaerobic Culture - Final





 Pleural Fluid 


 


 06/11/20 10:58 Gram Stain - Final





 Pleural Fluid Body Fluid Culture - Final





    Streptococcus pneumoniae


 


 06/10/20 18:55 Blood Culture - Preliminary





 Blood    No Growth after 96 hours














Assessment and Plan


Assessment: 





-Bilateral empyema with thoracentesis of 270 mL. Status post bilateral chest 

tubes were placed.R chest tube was removed


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


-Chronic rheumatoid arthritis 


-Hypothyroid


-Essential hypertension


-Obesity BMI 31.5


Plan: 





this is a pleasant 59 years old male who presents with bilateral empyema and 

hydropneumothorax secondary to bilateral lung nodules from rheumatoid lung.  

Continue with cefepime for Streptococcus pneumonia of the 30 fluids culture.  

Infectious disease and pulmonary team on the case.  Cardiothoracic surgery R 

following the patient while on chest tube.


Labs and medication were reviewed..  Continue same treatment.  Continue with 

symptomatic treatment.  Resume home medication.  Monitor lytes and vitals.  DVT 

and GI prophylaxis.  Further recommendations of the clinical course of the 

patient


DVT prophylaxis: Subcutaneous Lovenox


GI Prophylaxis: Pepcid

## 2020-06-15 NOTE — PN
PROGRESS NOTE



DATE OF SERVICE:

06/14/2020



REASON FOR FOLLOWUP:

Left-sided empyema.



INTERVAL HISTORY:

The patient is currently afebrile, has been breathing comfortably. One of the chest

tubes has been discontinued.  Denies having any chest pain.  Minimal cough.  No nausea,

no vomiting.  No abdominal pain, no diarrhea.



PHYSICAL EXAMINATION:

Blood pressure 108/71 with a pulse of 94, temperature 98.3.  He is 93% on room air.

General description is a middle-aged male lying in bed in no distress.

RESPIRATORY SYSTEM: Unlabored breathing, decreased breath sounds in the bases.  No

wheeze.

HEART: S1, S2.  Regular rate and rhythm.

ABDOMEN: Soft, no tenderness.



LABS:

Hemoglobin is 12.6, white count 15.2, creatinine 0.62. Pleural fluid culture so far

pending.



DIAGNOSTIC IMPRESSION AND PLAN:

Patient with left-sided empyema, status post chest tubes. One of them has been

discontinued. Culture so far pending.  Patient is covered cefepime and vancomycin to

continue and monitor clinical course closely.





MMODL / IJN: [id number] / Job#: [id number]

## 2020-06-15 NOTE — XR
EXAMINATION TYPE: XR chest 1V portable

 

DATE OF EXAM: 6/15/2020

 

Comparison: 6/14/2020

 

Clinical History: 59-year-old male Effusion

 

Findings:

Heart mildly enlarged. Normal variant azygos fissure. Diffuse interstitial density. Small left greate
r than right pleural effusions with bibasilar opacity. Left basilar chest tube remains in place. No a
ppreciable pneumothorax. Right-sided chest tube removed in the interval.

 

 

Impression:

 

1. Interstitial densities persist. Correlate for CHF with pulmonary vascular congestion.

2. Small left greater than right pleural effusions with adjacent atelectasis and/or consolidation sim
ilar to prior.

3. Right-sided chest tube removed. Left chest tube remains in place. No appreciable pneumothorax.

## 2020-06-15 NOTE — PN
PROGRESS NOTE



PULMONARY/CRITICAL CARE PROGRESS NOTE:



DATE OF SERVICE:

06/15/2020



This is a 59-year-old gentleman with a history of bilateral hydro 
pneumothoraces.  He

had bilateral chest tubes placed by Dr. Kaye.  He also had a left 
thoracentesis

performed by Dr. De La Rosa.  The fluid appeared to be infected.  So far 
microbiology is

negative.  The right chest tube has come out.  The left chest tube is still in 
place.

There is a small leak.  The patient has a previous history of Streptococcus 
pneumoniae

infection of the lung back in February 2020.  On May 6, he was seen by Dr. Vides in

the office and treated with Zithromax and steroids.  I saw him in the office and

switched him to Levaquin and ordered a CT scan which revealed the abnormalities.
 The

patient does have a history of rheumatoid arthritis and rheumatoid pulmonary 
nodules,

hypertension, hypothyroidism, and previous tobacco use.



PHYSICAL EXAMINATION:

VITAL SIGNS: Current vital signs are reviewed temperature 97.7 heart rate 80,

respiratory rate 15 blood pressure 107/72 mean 83, room air saturation 95%.  
Appears in

no acute distress HEENT: Examination is grossly unremarkable.

NECK:  Supple full range of motion.  No adenopathy.  Neck veins are flat.

CARDIOVASCULAR: Examination reveals regular rhythm and rate.  S1, S2 normal.

LUNGS: Reveal diffuse coarse bilateral rhonchi.  The abdomen abnormal.  Breath 
sounds

are worse on the left than on the right.  No crackles or wheezes.  Left chest 
tube

remains in place.

ABDOMEN:  Soft bowel sounds are heard.

EXTREMITIES are intact.  No cyanosis, clubbing, or edema.

SKIN: Without rash.

NEUROLOGIC: Examination is brief but nonfocal.



LABS:

Reviewed.  White count 14.6, hemoglobin 12.9, hematocrit 42.2, platelet count 
283,000.

Electrolytes look normal.

Pleural fluid is clearly negative today.  Total protein of 1.5 g and LDH greater
than

4500.



Microbiology is now showing strep pneumoniae in the pleural fluid.



Chest x-ray still showing small left greater than right pleural effusion.  
Current

antibiotics include Maxipime and vancomycin.



ASSESSMENT:

1. Strep pneumoniae, empyema with bilateral hydropneumothorax, status post

    thoracentesis on June 11 on the left and subsequent bilateral chest tube 
placement

    by Dr. Kaye.

2. Previous history of strep pneumoniae pneumonia in February 2020.

3. History of rheumatoid of pulmonary nodules in the lungs.

4. History of rheumatoid arthritis.

5. Essential hypertension.

6. Hypothyroidism.

7. Prior tobacco use.



Plan date 6/15/2020





    Currently, the microbiology is showing evidence of strep pneumoniae from the

    pleural space.  It is sensitive to just about everything including 
vancomycin and

    cefepime.  Additional recommendations and suggestions are forthcoming.  We 
will

    continue to follow.  ID is on the case.  The right chest tube has come out. 
Left

    to chest tube still has a small leak.  Hopefully that be able to come out 
tomorrow.

    The patient is anxious to get home.





MMODL / IJN: [id number] / Job#: [id number]

MTDDOM

## 2020-06-15 NOTE — P.PN
Subjective


Progress Note Date: 06/15/20


Principal diagnosis: 





Bilateral bibasilar hydropneumothorax with documented empyema on the left, 

status post left sided thoracentesis on 06/11/2020.  Past medical history 

significant for strep pneumoniae on culture from bronchiolar lavage in 

02/06/2020, rheumatoid arthritis with rheumatoid nodules in the lung currently 

treated with prednisone, Xeljanz, and Arava, history of hypertension, 

hypothyroidism, previous tobacco dependence, and family history of lung cancer.





POD #3 bilateral chest tube placement.





Patient was seen in follow-up today 06/15/2020 at his bedside on the cardiac 

stepdown unit.  Currently he is sitting up to the bedside chair and is in no 

acute distress.  He denies any complaints of pain or shortness of breath, oxygen

saturations are currently 94% on room air and he is achieving 1500 mL on his 

incentive spirometry.  Left pleural chest tube remains in place to low 

continuous wall suction -20 cm H2O.  Air leak is present.  Draining thin 

serosanguineous drainage with 30 mL output last 8 hours, and 120 mL output in 

the last 24 hours.  The patient is anxious to be discharged home.  He continues 

on Maxipime and vancomycin for antibiotic coverage which is managed by 

infectious disease.  He remains afebrile and he is hemodynamically stable.





Objective





- Vital Signs


Vital signs: 


                                   Vital Signs











Temp  97.9 F   06/15/20 03:46


 


Pulse  80   06/15/20 03:46


 


Resp  14   06/15/20 03:47


 


BP  110/72   06/15/20 03:46


 


Pulse Ox  94 L  06/15/20 03:46








                                 Intake & Output











 06/14/20 06/15/20 06/15/20





 18:59 06:59 18:59


 


Intake Total 400 1500 


 


Output Total 1240 1210 


 


Balance -840 290 


 


Weight  107.9 kg 


 


Intake:   


 


  Intake, IV Titration 100 1100 





  Amount   


 


    Cefepime 2 gm In Sodium 100 100 





    Chloride 0.9% 100 ml @   





    200 mls/hr IVPB Q12HR GABBY   





    Rx#:[id number]   


 


    Vancomycin 1,750 mg In  1000 





    Sodium Chloride 0.9% 500   





    ml 500 ml @ 167 mls/hr   





    IVPB Q8H GABBY Rx#:   





    [id number]   


 


  Oral 300 400 


 


Output:   


 


  Chest Tube Drainage 10 10 


 


    Chest Tube Left Anterior 10 10 





    Chest   


 


  Urine 1230 1200 


 


Other:   


 


  Voiding Method Urinal Urinal 


 


  # Voids 1  














- Constitutional


General appearance: Present: cooperative, no acute distress, obese





- EENT


Eyes: Present: PERRLA, normal appearance.  Absent: scleral icterus


ENT: Present: hearing grossly normal





- Neck


Details: 





Neck is supple, no lymphadenopathy.





- Respiratory


Details: 





Lung sounds with scattered with some expiratory wheezes throughout and 

diminished to his bilateral bases left greater than right.  Respirations are 

symmetrical and nonlabored.  Oxygen saturation is 94% on room air.  Left pleural

chest tube remains in place to low continuous wall suction -20 cm H2O.  Air leak

is present.  Draining thin serosanguineous drainage with 30 mL output in the 

last 8 hours and 120 mL output in the last 24 hours.  Achieving 1500 mL on his 

incentive spirometry.





- Cardiovascular


Details: 





Regular rhythm and rate.  S1 and S2 present, negative for S3, gallop or murmur. 

No edema present.





- Gastrointestinal


Gastrointestinal Comment(s): 





Abdomen is soft, nontender and nondistended.  Active bowel sounds present in all

4 abdominal quadrants.  No guarding or rigidity.  No organomegaly appreciated.





- Genitourinary


Genitourinary Comment(s): 





Voiding clear brittni urine.





- Integumentary


Integumentary Comment(s): 





Skin is warm and dry.  No clubbing or cyanosis present.  No rash or abnormal 

pigmentation is present.  Dressing is clean and dry to his previous right chest 

tube insertion site.  Band-Aid and placed to his left posterior chest from 

thoracentesis.  Dressing is clean and dry to his left chest tube insertion site.





- Neurologic


Neurologic: Present: CNII-XII intact





- Musculoskeletal


Musculoskeletal: Present: gait normal, strength equal bilaterally





- Psychiatric


Psychiatric: Present: A&O x's 3, appropriate affect, intact judgment & insight





- Allied health notes


Allied health notes reviewed: nursing





- Labs


CBC & Chem 7: 


                                 06/15/20 06:52





                                 06/15/20 06:52


Labs: 


                  Abnormal Lab Results - Last 24 Hours (Table)











  06/14/20 06/15/20 06/15/20 Range/Units





  10:37 06:52 06:52 


 


WBC   14.6 H   (3.8-10.6)  k/uL


 


Hgb   12.9 L   (13.0-17.5)  gm/dL


 


MCHC   30.6 L   (31.0-37.0)  g/dL


 


RDW   15.7 H   (11.5-15.5)  %


 


Neutrophils # (Manual)   11.83 H   (1.3-7.7)  k/uL


 


Metamyelocytes # (Man)   0.29 H   (0)  k/uL


 


Myelocytes # (Manual)   0.44 H   (0)  k/uL


 


Creatinine  0.62 L   0.65 L  (0.66-1.25)  mg/dL








                      Microbiology - Last 24 Hours (Table)











 06/10/20 18:55 Blood Culture - Preliminary





 Blood    No Growth after 96 hours














- Imaging and Cardiology


Chest x-ray: report reviewed, image reviewed





Assessment and Plan


Assessment: 





1.  Loculated bilateral hydropneumothorax, left worst then right, status post 

left-sided thoracentesis, possible empyema, history of strep pneumoniae on 

bronchiolar lavage in February 2020, status post bilateral chest tube placement


2.  Rheumatoid arthritis with rheumatoid nodules in the lung, currently treated 

with prednisone and Xeljanz


3.  Hypertension


4.  Hypothyroidism


5.  Previous tobacco dependence


6.  Family history of lung cancer


Plan: 





1.  Keep left pleural chest tube in place, place left pleural chest tube to 

waterseal.  Continue to monitor for airleak resolution.


2.  Repeat chest x-ray in the morning 06/16/2020.


3.  Continue IV antibiotics managed by infectious disease.


4.  Encourage incentive spirometry use 10 times every hour while awake.


5.  Increase activity, ambulate as tolerated.  Out of bed for all meals.


6.  Continue medical management of other comorbidities per primary care service.


7.  Methylprednisolone management per pulmonary care service.


8.  More recommendations to follow based on patient's clinical course.


Time with Patient: Less than 30

## 2020-06-16 VITALS
SYSTOLIC BLOOD PRESSURE: 102 MMHG | DIASTOLIC BLOOD PRESSURE: 68 MMHG | RESPIRATION RATE: 14 BRPM | TEMPERATURE: 97 F | HEART RATE: 98 BPM

## 2020-06-16 LAB
CELLS COUNTED: 200
ERYTHROCYTE [DISTWIDTH] IN BLOOD BY AUTOMATED COUNT: 4.95 M/UL (ref 4.3–5.9)
ERYTHROCYTE [DISTWIDTH] IN BLOOD: 16.2 % (ref 11.5–15.5)
HCT VFR BLD AUTO: 44 % (ref 39–53)
HGB BLD-MCNC: 13.2 GM/DL (ref 13–17.5)
INR PPP: 0.9 (ref ?–1.2)
LYMPHOCYTES # BLD MANUAL: 0.6 K/UL (ref 1–4.8)
MCH RBC QN AUTO: 26.7 PG (ref 25–35)
MCHC RBC AUTO-ENTMCNC: 30 G/DL (ref 31–37)
MCV RBC AUTO: 88.9 FL (ref 80–100)
METAMYELOCYTES # BLD: 0.15 K/UL
MONOCYTES # BLD MANUAL: 1.34 K/UL (ref 0–1)
MYELOCYTES # BLD MANUAL: 0.45 K/UL
NEUTROPHILS NFR BLD MANUAL: 82 %
NEUTS SEG # BLD MANUAL: 12.5 K/UL (ref 1.3–7.7)
PLATELET # BLD AUTO: 292 K/UL (ref 150–450)
PROMYELOCYTES # BLD: 0.15 K/UL
PT BLD: 9.8 SEC (ref 9–12)
WBC # BLD AUTO: 14.9 K/UL (ref 3.8–10.6)

## 2020-06-16 PROCEDURE — 02HV33Z INSERTION OF INFUSION DEVICE INTO SUPERIOR VENA CAVA, PERCUTANEOUS APPROACH: ICD-10-PCS

## 2020-06-16 RX ADMIN — METOPROLOL TARTRATE SCH MG: 50 TABLET, FILM COATED ORAL at 16:13

## 2020-06-16 RX ADMIN — LEVOTHYROXINE SODIUM SCH MCG: 75 TABLET ORAL at 06:22

## 2020-06-16 RX ADMIN — LEFLUNOMIDE SCH MG: 20 TABLET ORAL at 08:53

## 2020-06-16 RX ADMIN — METOPROLOL TARTRATE SCH MG: 50 TABLET, FILM COATED ORAL at 08:53

## 2020-06-16 RX ADMIN — LOSARTAN POTASSIUM AND HYDROCHLOROTHIAZIDE SCH EACH: 12.5; 5 TABLET ORAL at 08:53

## 2020-06-16 RX ADMIN — FAMOTIDINE SCH MG: 10 INJECTION, SOLUTION INTRAVENOUS at 08:53

## 2020-06-16 RX ADMIN — ENOXAPARIN SODIUM SCH MG: 40 INJECTION SUBCUTANEOUS at 08:53

## 2020-06-16 RX ADMIN — SODIUM CHLORIDE, SODIUM LACTATE, POTASSIUM CHLORIDE, AND CALCIUM CHLORIDE SCH: .6; .31; .03; .02 INJECTION, SOLUTION INTRAVENOUS at 16:39

## 2020-06-16 NOTE — PN
PROGRESS NOTE



DATE OF SERVICE:

06/16/2020



REASON FOR FOLLOWUP:

Empyema.



INTERVAL HISTORY:

The patient is currently afebrile.  The patient is breathing more comfortably.  The

chest tube has been discontinued.  Denies significant cough or sputum production.  No

nausea.  No vomiting. No abdominal pain or diarrhea.



PHYSICAL EXAMINATION:

Blood pressure 112/78 with a pulse of 82, temperature 97.5.  He is 96% on room air.

General description is a middle-aged male up in the chair in no distress.

RESPIRATORY SYSTEM: Unlabored breathing with decreased breath sounds at the base. No

wheeze.

HEART: S1, S2.  Regular rate and rhythm.

ABDOMEN: Soft. No tenderness.



LABS:

Hemoglobin 13.2, white count 14.9, creatinine 0.65. Vancomycin trough is low. Blood

culture negative.



DIAGNOSTIC IMPRESSION AND PLAN:

Patient with Streptococcus pneumoniae left-sided empyema, status post chest tube which

was subsequently discontinued, as _____ now stable. The patient is covered with

Rocephin 2 grams daily; to continue for at least 4 weeks and close outpatient followup.

Continue with supportive care.





MMODL / IJN: [id number] / Job#: [id number]

## 2020-06-16 NOTE — P.PN
Subjective


Progress Note Date: 06/16/20


Principal diagnosis: 





Bilateral bibasilar hydropneumothorax with documented empyema on the left, 

status post left sided thoracentesis.  Present medical history of strep 

pneumonia on bronchiolar lavage in February 2028, rheumatoid arthritis with 

rheumatoid nodules in the lung currently treated with prednisone, Xeljanz, and 

Arava, history of hypertension, hypothyroidism, previous tobacco dependence, and

family history of lung cancer.





POD #4 bilateral chest tube placement








The patient is currently sitting up in the recliner on the cardiac stepdown unit

in no acute distress.  Denies any pain, shortness of breath.  Left pleural chest

tube placed to waterseal yesterday, minimal drainage in the last 24 hours.  

Remains afebrile.  Culture growing strep pneumoniae, antibiotics changed to 

ceftriaxone per infectious disease.  Continues with steroids, Xeljanz and Arava 

for his rheumatoid arthritis.  Patient is anxious to go home, otherwise no new 

concerns.





Objective





- Vital Signs


Vital signs: 


                                   Vital Signs











Temp  97.5 F L  06/16/20 04:00


 


Pulse  87   06/16/20 04:00


 


Resp  18   06/16/20 04:00


 


BP  114/80   06/16/20 04:00


 


Pulse Ox  95   06/16/20 04:00








                                 Intake & Output











 06/15/20 06/16/20 06/16/20





 18:59 06:59 18:59


 


Intake Total 837  


 


Output Total 40 425 


 


Balance 797 -425 


 


Weight  107 kg 


 


Intake:   


 


  Intake, IV Titration 600  





  Amount   


 


    Cefepime 2 gm In Sodium 100  





    Chloride 0.9% 100 ml @   





    200 mls/hr IVPB Q12HR GABBY   





    Rx#:[id number]   


 


    Vancomycin 1,750 mg In 500  





    Sodium Chloride 0.9% 500   





    ml 500 ml @ 167 mls/hr   





    IVPB Q8H GABBY Rx#:   





    [id number]   


 


  Oral 237  


 


Output:   


 


  Chest Tube Drainage 40 0 


 


    Chest Tube Left Anterior 40 0 





    Chest   


 


  Urine  425 


 


Other:   


 


  Voiding Method Urinal Urinal 


 


  # Voids 2 1 














- Constitutional


General appearance: Present: cooperative, no acute distress





- Respiratory


Details: 





Lungs sounds diminished bilaterally with faint expiratory wheezes heard 

posteriorly.  Respirations even, nonlabored.currently on room air with oxygen 

saturation 95%.  Able to achieve 1500 mL on his incentive spirometry.  Strong 

cough.  Left pleural chest tube present to waterseal with no drainage overnight,

40 mL of serous drainage in the last 24 hours, trace positional air leak present

with a strong cough.








- Cardiovascular


Details: 





S1, S2 present.  Regular rate and rhythm, sinus rhythm on telemetry with heart 

rate in the 80s.  Palpable peripheral pulses bilaterally.  No edema present. 








- Gastrointestinal


Gastrointestinal Comment(s): 





Abdomen soft, nontender, nondistended.  Active bowel sounds present 4 quadrant

s.  Tolerating diet.





- Genitourinary


Genitourinary Comment(s): 





Continues to void clear, yellow urine per urinal





- Integumentary


Integumentary Comment(s): 





Skin is warm and dry with evidence of good perfusion





- Neurologic


Neurologic: Present: CNII-XII intact





- Musculoskeletal


Musculoskeletal: Present: gait normal, strength equal bilaterally





- Psychiatric


Psychiatric: Present: A&O x's 3, appropriate affect, intact judgment & insight





- Allied health notes


Allied health notes reviewed: nursing





- Labs


CBC & Chem 7: 


                                 06/16/20 06:16





                                 06/15/20 06:52


Labs: 


                  Abnormal Lab Results - Last 24 Hours (Table)











  06/15/20 06/16/20 Range/Units





  06:52 06:16 


 


WBC  14.6 H  14.9 H  (3.8-10.6)  k/uL


 


Hgb  12.9 L   (13.0-17.5)  gm/dL


 


MCHC  30.6 L  30.0 L  (31.0-37.0)  g/dL


 


RDW  15.7 H  16.2 H  (11.5-15.5)  %


 


Neutrophils # (Manual)  11.83 H   (1.3-7.7)  k/uL


 


Metamyelocytes # (Man)  0.29 H   (0)  k/uL


 


Myelocytes # (Manual)  0.44 H   (0)  k/uL








                      Microbiology - Last 24 Hours (Table)











 06/10/20 18:55 Blood Culture - Preliminary





 Blood    No Growth after 120 hours


 


 06/11/20 10:58 Anaerobic Culture - Final





 Pleural Fluid 


 


 06/11/20 10:58 Gram Stain - Final





 Pleural Fluid Body Fluid Culture - Final





    Streptococcus pneumoniae














- Imaging and Cardiology


Chest x-ray: report reviewed, image reviewed





Assessment and Plan


Assessment: 





1.  Loculated bilateral hydropneumothorax, left wrist and right, status post 

left-sided thoracentesis, possible empyema, history of strep pneumoniae on 

bronchiolar lavage in February 2020, status post bilateral chest tube placement,

culture positive for strep pneumoniae, pathology demonstrates acute inflammatory

cells consistent with empyema


2.  Rheumatoid arthritis with rheumatoid nodules in the lung, currently treated 

with prednisone and Xeljanz


3.  Hypertension


4.  Hypothyroidism


5.  Previous tobacco dependence


6.  Family history of lung cancer





Plan: 





1.  Will discontinue left pleural chest tube.  Repeat chest x-ray in 2 hours.  

If stable may discharge to home from cardiothoracic standpoint when okay with 

other services


2.  Continue IV antibiotics per infectious disease


3.  Encourage incentive spirometry use 10 times every hour while awake


4.  Increase activity, ambulate as tolerated.  


5.  Continue medical management of other comorbidities per primary care service


6.  More recommendations to follow





Time with Patient: Greater than 30

## 2020-06-16 NOTE — P.DS
Providers


Date of admission: 


06/12/20 08:56





Attending physician: 


Jerry Grijalva





Consults: 





                                        





06/10/20 19:13


Consult Physician Routine 


   Consulting Provider: Shad Gómez


   Consult Reason/Comments: hydropneumothorax


   Do you want consulting provider notified?: Yes





06/11/20 11:30


Consult Physician Routine 


   Consulting Provider: Arnold Kaye


   Consult Reason/Comments: bilateral empyema, S/P left thoracentesis


   Do you want consulting provider notified?: Yes





06/11/20 11:45


Consult Physician Routine 


   Consulting Provider: Monika Sheets


   Consult Reason/Comments: empyema


   Do you want consulting provider notified?: Yes











Primary care physician: 


[facility] Course: 








Diagnoses:


-Bilateral empyema with thoracentesis of 270 mL. Status post bilateral chest 

tubes were placed.R chest tube was removed


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


-Chronic rheumatoid arthritis 


-Hypothyroid


-Essential hypertension


-Obesity BMI 31.5





Hospital course:


This is a very pleasant 59-year-old patient of Dr. Smith.  Follows with 

rheumatologist Dr. Florecita Ridre.  Patient with past medical history of 

rheumatoid arthritis, hypertension, hypothyroidism.  Presents with respiratory 

symptoms like cough and chest congestion, computed tomography scan of the chest 

done by Dr. Stern his pulmonologist showing  loculated hydropneumothorax 

bilaterally.  Thoracocentesis was suspicious for empyema.  Bilateral chest tubes

were placed, registerable came out spontaneously and later on the left side was 

taken off.  Patient is followed closely by pulmonologist and cardiothoracic 

surgeon while in-house.  ID team also evaluated the patient and he was switched 

to cefepime and IV vancomycin went away and the culture came back positive for 

Streptococcus pneumoniae he was switched to IV ceftriaxone.


Eventually patient showed interval improvement and he was eager to go home or 

the last 2-3 days, he denies chest pain or dyspnea.  Only minimal cough.  No 

abdominal pain.  No nausea vomiting.  No change in urine or bowel habits.  No 

fever.


Patient was cleared for discharge by ID team, pulmonologist and cardiothoracic 

surgeon.  Patient will be discharged on antibiotics as per ID team with 

ceftriaxone intravenously, PICC line was placed for the patient and atelectasis 

the procedure well.  Patient told me he is able to infuse the IV medicine by 

himself as he has done before when he had foot infection, also patient will be 

discharged with home health care.


Problems and management plan were discussed with the patient and he verbalized 

understanding and acceptance


Patient was found stable and can be discharged home however he needs follow-up 

as an outpatient. Patient was instructed to follow up with PCP Dr. Smith 

within one week and patient agrees.  Also patient was instructed to follow up wi

th his pulmonologist Dr. Gómez in 1-2 weeks and his rheumatologist Dr. Bernabe in 

1-2 weeks and he agrees.  I reviewed the appointments Dr. Gómez and Dr. Sheets 

with the patient and he agrees to quit them and the timing and states he will 

follow-up


Patient did not need of prescription





Gen: patient is a AAOx3, no distress


CVS: S1-S2, RRR, no murmur


Lungs: B/L CTA, no wheezing


Abdomen: soft, no distention, no tenderness, positive bowel sounds


Extremity: no leg edema or induration.  PICC line in a Place





Time spent more than 35 minutes








Patient Condition at Discharge: Stable





Plan - Discharge Summary


Discharge Rx Participant: No


New Discharge Prescriptions: 


New


   cefTRIAXone [Rocephin] 2,000 mg IVP Q24HR #28 vial


   Famotidine [Pepcid] 20 mg PO BID #60 tablet





Continue


   Levothyroxine Sodium [Synthroid] 150 mcg PO DAILY


   methylPREDNISolone [Medrol] 4 mg PO DAILY


   Leflunomide [Arava] 20 mg PO DAILY


   Tofacitinib Citrate [Xeljanz Xr] 11 mg PO DAILY


   Albuterol Inhaler [Ventolin Hfa Inhaler] 1 puff INHALATION RT-Q4H PRN


     PRN Reason: Shortness Of Breath


   Losartan-Hctz 50-12.5 mg [Hyzaar 50-12.5] 1 tab PO DAILY


   Metoprolol Tartrate [Lopressor] 50 mg PO TID





Discontinued


   Ibuprofen [Motrin] 800 mg PO TID PRN


     PRN Reason: Pain


Discharge Medication List





Levothyroxine Sodium [Synthroid] 150 mcg PO DAILY 03/29/17 [History]


methylPREDNISolone [Medrol] 4 mg PO DAILY 09/28/19 [History]


Leflunomide [Arava] 20 mg PO DAILY 02/05/20 [History]


Tofacitinib Citrate [Xeljanz Xr] 11 mg PO DAILY 02/05/20 [History]


Albuterol Inhaler [Ventolin Hfa Inhaler] 1 puff INHALATION RT-Q4H PRN 06/10/20 

[History]


Losartan-Hctz 50-12.5 mg [Hyzaar 50-12.5] 1 tab PO DAILY 06/10/20 [History]


Metoprolol Tartrate [Lopressor] 50 mg PO TID 06/10/20 [History]


Famotidine [Pepcid] 20 mg PO BID #60 tablet 06/16/20 [Rx]


cefTRIAXone [Rocephin] 2,000 mg IVP Q24HR #28 vial 06/16/20 [Rx]








Follow up Appointment(s)/Referral(s): 


[facility], [NON-STAFF] - 


Walker Smith DO [Primary Care Provider] - 1-2 days


(office will call for follow-up appt.


)


Shad Gómez DO [Doctor of Osteopathic Medicine] - 06/26/20 9:45 am


(Follow-up on Friday June 26.


)


[facility] [NON-STAFF] - 


Monika Sheets MD [STAFF PHYSICIAN] - 06/23/20 10:00 am


(Follow-up on Tuesday June 23.


)


Florecita Jack DO [REFERRING] - 2 Weeks (Your rheumatologist)


Ambulatory/Diagnostic Orders: 


Basic Metabolic Panel [LAB.AMB] Location: None Selected


C Reactive Protein [LAB.AMB] Location: None Selected


Complete Blood Count w/diff [LAB.AMB] Location: None Selected


Erythrocyte Sedimentation Rate [LAB.AMB] Location: None Selected


Activity/Diet/Wound Care/Special Instructions: 


DISCHARGE INSTRUCTIONS: 


1.  No driving for 2 weeks, or until physician gives their ok.


2.  No lifting, pushing, or pulling more than 10 pounds for 2 weeks.  The 

physician will advise of any restriction changes.


3.  Continue pain control per as needed orders.  Alternate acetaminophen 

(Tylenol) and ibuprofen (Motrin/Advil) for pain.


4.  Continue with incentive spirometry and splinting until otherwise directed by

 the physician.


5.  Leave chest tube dressing for 48 hours.  After that, remove all dressings 

and shower daily.


6.  Routine incision care. No powders, lotions, ointments on incisions.


7.  Please call surgeon/NP for temp greater than 101 F or purulent drainage from

 incisions.


8.  Smoking cessation counseling and program information provided.





For any surgical questions or concerns please call Kamla NP at [phone] 

ordered David NP at [phone]





Discharge Disposition: HOME WITH HOME HEALTH SERVICES

## 2020-06-16 NOTE — XR
EXAMINATION TYPE: XR chest 1V portable

 

DATE OF EXAM: 6/16/2020

 

HISTORY: Shortness of breath. History of empyema

 

COMPARISON: 6/15/2020

 

TECHNIQUE: Single view of the chest is submitted.

 

FINDINGS:

Left-sided chest tube is noted and unchanged in position. Pleural-parenchymal density left lung base 
is unchanged. Increased density right lung base laterally.

 

The heart is stable.

 

Hilar and mediastinal structures are within normal limits.  

 

Degenerative changes are seen of the dorsal spine. 

 

 IMPRESSION: 

 

1.  Stable chest.

## 2020-06-16 NOTE — P.PN
Subjective


Progress Note Date: 06/16/20


Principal diagnosis: 


 Dyspnea related to bilateral pleural effusions, and empyema





This is a very pleasant 59-year-old gentleman who follows with Dr. Smith as 

his primary care provider.  He has a history of hypertension, hypothyroidism, 

rheumatoid arthritis and is maintained on prednisone and Xeljanz.  He has a 

history of rheumatoid lung disease with pleural effusions and previous 

Streptococcus pneumoniae found in a bronchoscopy wash and February 2020.  

Recently he had been having issues with increasing shortness of breath cough, 

fatigue and congestion with greenish productive sputum.  He had been seen by Dr. Vides on 05/28/2020 and prescribed a Z-Cesar and treated with steroids.  He 

improved briefly but then symptoms had recurred and he was seen yesterday by Dr. Gómez with ongoing complaints of greenish yellow phlegm with chest congestion.  

He had an abnormal chest x-ray and was sent for computed tomography scan of the 

chest which revealed development of loculated hydropneumothorax bilaterally at 

the lung bases.  There was the multiple bilateral pulmonary nodules and the 

larger prior cavitary lesions were resolved.  However 1 irregular density on the

right posterior lateral area has enlarged.  He was informed to report to the 

emergency room and subsequently admitted.  He is seen today in consultation on 

the selective care unit.  He is currently sitting up in a chair at the bedside. 

Awake and alert in no acute distress.  Maintaining O2 saturations in the 90s on 

room air.  He's been afebrile.  Slightly tachycardic.  White count 13.8.  

Hemoglobin 12.8.  Sodium 138.  Potassium 3.7.  Creatinine 0.97.  AST 29.  ALT 

71.  Troponin negative.  ProBNP 230.  Ultrasound of the chest reveals a small 

right pleural effusion measuring 1.8 cm.  There is a left pleural effusion 

measuring 4.9 cm.  Hydropneumothorax noted.  Dr. De La Rosa did perform a left-sided

thoracentesis today and the return appeared to resemble empyema versus 

rheumatoid fluid.  Dyspneic, yellowish-green fluid.  Approximately 270 mL 

removed.  Cultures, fluid analysis and pH were sent.





The patient is seen today 06/12/2020 in follow-up on the selective care unit.  

He is currently sitting up in a chair at the bedside.  Awake and alert in no 

acute distress.  He is maintaining good O2 saturations in the 90s on room air.  

He remains afebrile.  Hemodynamically stable.  Pleural fluid cultures are 

pending.  Preliminary results reveal many gram-positive cocci and few gram-

negative bacilli.  Blood cultures reveal no growth to date.  Creatinine 0.71.  

He remains on cefepime and vancomycin.  ID is on the case.  Today's chest x-ray 

reveals well-defined air-fluid levels at the lung bases.  Suspect 

hydropneumothoraces.  Superior or midlung pneumothorax components are not 

identified.  Diffuse interstitial opacities persist.





The patient is seen today 06/13/2020 in follow-up on the selective care unit.  

He did undergo bilateral chest tube placements yesterday. He is currently 

sitting up in a chair at the bedside.  Awake and alert in no acute distress.  

Maintaining good O2 saturations in the mid 90s on room air.  He's afebrile.  

Hemodynamically stable.  Chest x-ray shows bibasilar lateral airspace disease 

with small effusions.  Preliminary pleural fluid cultures reveal many 

gram-positive cocci, few gram-negative bacilli.  White count 15.2.  Hemoglobin 

12.6.  Sodium 136.  Potassium 4.3.  Creatinine 0.63.  He is continued on 

cefepime and vancomycin.  He is working well with the incentive spirometer.  

Lovenox for DVT prophylaxis.





The patient is seen today 06/14/2020 in follow-up on the selective care unit.  

He is currently awake and alert in no acute distress.  He's been up in the 

chair.  He's been up ambulating in the hallway with assistance.  No worsening 

shortness of breath, cough or congestion.  Working well with the incentive 

spirometer.  Right sided chest tube was removed this morning.  Left chest tube 

remains in place.  Chest x-ray continues to show basilar airspace disease left 

greater than right.  Preliminary pleural fluid cultures reveal many gram-

positive cocci, few gram-negative bacilli.  Final ID still pending.  Creatinine 

0.62.  He remains on vancomycin and cefepime.  Lovenox for DVT prophylaxis.





On 06/16/2020 patient seen in follow-up on selective care unit, he is calm and 

comfortable, is sitting up on the couch by the window, breathing is comfortable,

room air pulse ox is 96%, his been afebrile since admission.  His left chest 

tube has put out no output in the last 24 hours, and was discontinued.  Today's 

chest x-ray shows pleural parenchymal density at the left lung base, and 

increased density at the right lung base overall stable chest.  Patient 

continues on antibiotics, currently on ceftriaxone per ID service 

recommendations, pleural fluid cultures are positive for Streptococcus 

pneumonia.  Today's labs have been noted showing white blood cell count of 14.9,

hemoglobin at 13.2.  No acute events overnight.  Patient is awaiting placement 

of PICC line catheter and he will go home on IV Rocephin 2 g daily








Objective





- Vital Signs


Vital signs: 


                                   Vital Signs











Temp  97.5 F L  06/16/20 11:03


 


Pulse  82   06/16/20 11:03


 


Resp  18   06/16/20 11:03


 


BP  88/56   06/16/20 11:03


 


Pulse Ox  96   06/16/20 11:03








                                 Intake & Output











 06/15/20 06/16/20 06/16/20





 18:59 06:59 18:59


 


Intake Total 837  240


 


Output Total 40 425 


 


Balance 797 -425 240


 


Weight  107 kg 


 


Intake:   


 


  Intake, IV Titration 600  





  Amount   


 


    Cefepime 2 gm In Sodium 100  





    Chloride 0.9% 100 ml @   





    200 mls/hr IVPB Q12HR GABBY   





    Rx#:[id number]   


 


    Vancomycin 1,750 mg In 500  





    Sodium Chloride 0.9% 500   





    ml 500 ml @ 167 mls/hr   





    IVPB Q8H GABBY Rx#:   





    [id number]   


 


  Oral 237  240


 


Output:   


 


  Chest Tube Drainage 40 0 


 


    Chest Tube Left Anterior 40 0 





    Chest   


 


  Urine  425 


 


Other:   


 


  Voiding Method Urinal Urinal Toilet





   Urinal


 


  # Voids 2 1 














- Exam


 GENERAL EXAM: Alert, very pleasant, 59-year-old white male on room air the 

pulse ox of 95-96% comfortable in no apparent distress.


HEAD: Normocephalic/atraumatic.


EYES: Normal reaction of pupils, equal size.  Conjunctiva pink, sclera white.


NOSE: Clear with pink turbinates.


THROAT: No erythema or exudates.


NECK: No masses, no JVD, no thyroid enlargement, no adenopathy.


CHEST: No chest wall deformity.  Symmetrical expansion.  Right and left anterior

chest tube sites are clean dry and intact


LUNGS: Equal air entry with no crackles, wheeze, rhonchi or dullness.


CVS: Regular rate and rhythm, normal S1 and S2, no gallops, no murmurs, no rubs


ABDOMEN: Soft, nontender.  No hepatosplenomegaly, normal bowel sounds, no 

guarding or rigidity.


EXTREMITIES: No clubbing, no edema, no cyanosis, 2+ pulses and upper and lower 

extremities.


MUSCULOSKELETAL: Muscle strength and tone normal.


SPINE: No scoliosis or deformity


SKIN: No rashes


CENTRAL NERVOUS SYSTEM: Alert and oriented -3.  No focal deficits, tone is 

normal in all 4 extremities.


PSYCHIATRIC: Alert and oriented -3.  Appropriate affect.  Intact judgment and 

insight.











- Labs


CBC & Chem 7: 


                                 06/16/20 06:16





                                 06/15/20 06:52


Labs: 


                  Abnormal Lab Results - Last 24 Hours (Table)











  06/16/20 Range/Units





  06:16 


 


WBC  14.9 H  (3.8-10.6)  k/uL


 


MCHC  30.0 L  (31.0-37.0)  g/dL


 


RDW  16.2 H  (11.5-15.5)  %


 


Neutrophils # (Manual)  12.50 H  (1.3-7.7)  k/uL


 


Lymphocytes # (Manual)  0.60 L  (1.0-4.8)  k/uL


 


Monocytes # (Manual)  1.34 H  (0-1.0)  k/uL


 


Metamyelocytes # (Man)  0.15 H  (0)  k/uL


 


Myelocytes # (Manual)  0.45 H  (0)  k/uL


 


Promyelocytes # (Man)  0.15 H  (0)  k/uL








                      Microbiology - Last 24 Hours (Table)











 06/10/20 18:55 Blood Culture - Preliminary





 Blood    No Growth after 120 hours


 


 06/11/20 10:58 Anaerobic Culture - Final





 Pleural Fluid 


 


 06/11/20 10:58 Gram Stain - Final





 Pleural Fluid Body Fluid Culture - Final





    Streptococcus pneumoniae














Assessment and Plan


Plan: 


 Assessment:





1 Dyspnea, cough congestion secondary to loculated hydropneumothorax bilaterally

at the lung bases.  Left greater than right.  Status post thoracentesis on 

06/11/2020 with fluid resembling empyema, possible rheumatoid lung.  Status post

bilateral chest tube placements on 06/12/2020.  Pleural fluid cultures are 

revealing Streptococcus pneumonia.  Currently on Rocephin





2 History of Streptococcus pneumoniae a on bronchial wash in February 2020





3 Multiple bilateral pulmonary nodules including one irregular density along the

right posterior lateral that has enlarged over time, rheumatoid lung





4 Rheumatoid arthritis maintained on Xeljanz and prednisone in the outpatient 

setting





5 Hypertension 





6 Hypothyroidism 





7 Former smoker





Plan:





Patient is doing well, his left-sided chest tube has been removed, today's chest

x-ray has been reviewed showing stable small left greater than right pleural 

effusion.  Final pleural fluid culture identified strep pneumonia, patient is 

currently on Rocephin 2 g daily, patient is awaiting PICC line placement to go 

home on IV antibiotics.  No fever or chills, he is working on incentive 

spirometer, he is tolerating ambulation.  No acute events overnight, she will 

need follow-up in the office with Dr. Gómez next week.  Stable for discharge 

home from pulmonary perspective





I performed a history & physical examination of the patient and discussed their 

management with my nurse practitioner, Lorena Bonilla.  I reviewed the nurse 

practitioner's note and agree with the documented findings and plan of care.  

Lung sounds are positive for diminished breath sounds at the bases  The findings

and the impression was discussed with the patient.  I attest to the 

documentation by the nurse practitioner. 





Time with Patient: Less than 30

## 2020-06-16 NOTE — IR
EXAMINATION TYPE: IR cvc insert >=5 years

 

DATE OF EXAM: 6/16/2020

 

COMPARISON: NONE

 

CLINICAL HISTORY: Infection Needs long-term intravenous access for antibiotics.

 

PROCEDURE: Hand hygiene obtained with soap and water and alcohol-based hand rub.

After informed consent, the skin overlying the left basilic vein was localized with ultrasound and no
maia to be compressible and patent.  An ultrasound image was obtained and submitted on the patient's c
cruz.  The overlying skin was prepped and draped and Lidocaine was used for local anesthesia.  A skin
 nick was made with a scalpel.  Access was gained to the vein under ultrasound guidance with a 21 gau
ge needle and a 0.018 inch wire was advanced.  Access site was dilated with Peel-Away sheath and cath
eter tailored to the appropriate length and advanced such that the distal tip is at the cavoatrial ju
nction.  Spot image was obtained verifying placement.  Catheter was fixed to the skin and a sterile d
ressing was placed following hemostasis.  Catheter was aspirated and flushed with saline.  Patient wa
s discharged in stable condition without complication. Maximal barrier technique is utilized.  Ultras
ound image is documented on the chart. Ultrasound used with sterile technique. 

 

Fluoro time and fluoroscopic images submitted to document procedure: 63 intraoperative images, 0.4 mi
nutes fluoroscopy time

 

IMPRESSION: STATUS POST ULTRASOUND AND FLUOROSCOPIC GUIDED PICC LINE PLACEMENT, READY FOR USE.  THIS 
PROCEDURE WAS PERFORMED BY THE UNDERSIGNED.

## 2020-07-08 ENCOUNTER — HOSPITAL ENCOUNTER (OUTPATIENT)
Dept: HOSPITAL 47 - RADXRMAIN | Age: 60
Discharge: HOME | End: 2020-07-08
Attending: INTERNAL MEDICINE
Payer: COMMERCIAL

## 2020-07-08 DIAGNOSIS — J86.9: Primary | ICD-10-CM

## 2020-07-08 DIAGNOSIS — J30.81: ICD-10-CM

## 2020-07-08 PROCEDURE — 71046 X-RAY EXAM CHEST 2 VIEWS: CPT

## 2020-07-08 NOTE — XR
EXAMINATION TYPE: XR chest 2V

 

DATE OF EXAM: 7/8/2020

 

COMPARISON: Prior chest x-ray 6/16/2020, 6/26/2020

 

HISTORY: Pneumothorax

 

TECHNIQUE:  Frontal and lateral views of the chest are obtained.

 

FINDINGS:  The loculated hydropneumothoraces at the lung bases show a similar appearance. Left-sided 
PICC line is present in the distal tip is overlying the superior vena cava. Biapical pleural thickeni
ng is present, there is an azygos lobe present. Heart remains enlarged. Aorta is dense.

 

IMPRESSION:  No significant interval change. Basilar loculated hydropneumothoraces persist.

## 2020-10-28 ENCOUNTER — HOSPITAL ENCOUNTER (OUTPATIENT)
Dept: HOSPITAL 47 - ORWHC2ENDO | Age: 60
Discharge: HOME | End: 2020-10-28
Attending: INTERNAL MEDICINE
Payer: COMMERCIAL

## 2020-10-28 VITALS — HEART RATE: 119 BPM | DIASTOLIC BLOOD PRESSURE: 77 MMHG | SYSTOLIC BLOOD PRESSURE: 110 MMHG

## 2020-10-28 VITALS — TEMPERATURE: 98.4 F | RESPIRATION RATE: 18 BRPM

## 2020-10-28 VITALS — BODY MASS INDEX: 33 KG/M2

## 2020-10-28 DIAGNOSIS — Z87.891: ICD-10-CM

## 2020-10-28 DIAGNOSIS — Z98.890: ICD-10-CM

## 2020-10-28 DIAGNOSIS — Z87.01: ICD-10-CM

## 2020-10-28 DIAGNOSIS — I50.9: ICD-10-CM

## 2020-10-28 DIAGNOSIS — I11.0: ICD-10-CM

## 2020-10-28 DIAGNOSIS — M05.10: Primary | ICD-10-CM

## 2020-10-28 DIAGNOSIS — Z90.89: ICD-10-CM

## 2020-10-28 DIAGNOSIS — E03.9: ICD-10-CM

## 2020-10-28 DIAGNOSIS — Z79.1: ICD-10-CM

## 2020-10-28 DIAGNOSIS — R91.8: ICD-10-CM

## 2020-10-28 DIAGNOSIS — Z80.9: ICD-10-CM

## 2020-10-28 DIAGNOSIS — Z87.09: ICD-10-CM

## 2020-10-28 DIAGNOSIS — Z79.890: ICD-10-CM

## 2020-10-28 DIAGNOSIS — Z79.899: ICD-10-CM

## 2020-10-28 LAB
CELL CNT PNL FLD: 100
DEPRECATED POLYS # FLD: 66 %
GLUCOSE BLD-MCNC: 95 MG/DL (ref 75–99)
MONONUC CELLS # FLD: 33 %
NUC CELL # FLD: 115 /UL

## 2020-10-28 PROCEDURE — 87252 VIRUS INOCULATION TISSUE: CPT

## 2020-10-28 PROCEDURE — 87205 SMEAR GRAM STAIN: CPT

## 2020-10-28 PROCEDURE — 87498 ENTEROVIRUS PROBE&REVRS TRNS: CPT

## 2020-10-28 PROCEDURE — 89050 BODY FLUID CELL COUNT: CPT

## 2020-10-28 PROCEDURE — 88108 CYTOPATH CONCENTRATE TECH: CPT

## 2020-10-28 PROCEDURE — 87502 INFLUENZA DNA AMP PROBE: CPT

## 2020-10-28 PROCEDURE — 87206 SMEAR FLUORESCENT/ACID STAI: CPT

## 2020-10-28 PROCEDURE — 87798 DETECT AGENT NOS DNA AMP: CPT

## 2020-10-28 PROCEDURE — 31624 DX BRONCHOSCOPE/LAVAGE: CPT

## 2020-10-28 PROCEDURE — 88305 TISSUE EXAM BY PATHOLOGIST: CPT

## 2020-10-28 PROCEDURE — 87496 CYTOMEG DNA AMP PROBE: CPT

## 2020-10-28 PROCEDURE — 87529 HSV DNA AMP PROBE: CPT

## 2020-10-28 PROCEDURE — 87634 RSV DNA/RNA AMP PROBE: CPT

## 2020-10-28 PROCEDURE — 87070 CULTURE OTHR SPECIMN AEROBIC: CPT

## 2020-10-28 PROCEDURE — 87116 MYCOBACTERIA CULTURE: CPT

## 2020-10-28 PROCEDURE — 87075 CULTR BACTERIA EXCEPT BLOOD: CPT

## 2020-10-28 RX ADMIN — LIDOCAINE HYDROCHLORIDE ONE MG: 20 SOLUTION ORAL; TOPICAL at 14:10

## 2020-10-28 RX ADMIN — ATROPINE SULFATE ONE MG: 0.4 INJECTION, SOLUTION INTRAMUSCULAR; INTRAVENOUS; SUBCUTANEOUS at 14:03

## 2020-10-28 RX ADMIN — LIDOCAINE HYDROCHLORIDE ONE MG: 20 SOLUTION ORAL; TOPICAL at 14:03

## 2020-10-28 RX ADMIN — ATROPINE SULFATE ONE MG: 0.4 INJECTION, SOLUTION INTRAMUSCULAR; INTRAVENOUS; SUBCUTANEOUS at 14:08

## 2020-10-28 NOTE — OP
OPERATIVE REPORT



PULMONARY/CRITICAL CARE PROCEDURE NOTE:



PROCEDURE:

Bronchoscopy, airway examination, therapeutic lavage, BAL, right middle lobe.



PREOPERATIVE DIAGNOSIS:

Rheumatoid lung, retained secretions.



POSTOPERATIVE DIAGNOSIS:

Rheumatoid lung, retained secretions.



ANESTHESIA PROVIDED:

General anesthesia.



The patient's procedure took place in room #1. There was informed consent and universal

timeout.



:

Dr. Gómez.



PROCEDURE DESCRIPTION:

After the patient was adequately sedated and being fully monitored, the bronchoscope

was inserted through the right nostril.  It passed through the right nasopharynx into

the oropharynx.  The hypopharynx was identified and topicalized.  The hypopharyngeal

structures, including anterior commissure, true cords, false cords, arytenoids,

piriform sinuses, right and left valleculae and epiglottis, all appeared relatively

normal.  After topicalization, the bronchoscope was pushed through the glottic opening

into the trachea.  There were thick secretions noted throughout the trachea.  They were

suctioned with some difficulty.  The tracheal mariza was sharp.  The right and left

mainstems were normal, but there were thick secretions noted in both the right and left

mainstems.  The right and left mainstems were topicalized.  The right upper lobe and

its 3 segments, the right middle lobe and its 2 segments, the right lower lobe and its

5 segments, left upper lobe proper and its 2 segments, lingula and its 2 segments and

left lower lobe and its 4 segments all had similar findings of diffuse airway erythema

and hyperemia.  There were thick secretions noted throughout.  There was mucosal

friability.  There was vascular engorgement.  There was no dominant mass or tumor.  The

secretions were suctioned with some difficulty with saline lavage.  There was thicker

and more significant secretions noted on the left side than on the right side.  After

all the secretions were suctioned, the bronchoscope was wedged into the right middle

lobe and BAL took place.  The patient tolerated the procedure well.  Thirty milliliters

was recovered for the laboratory.  There was no immediate complication.  Again, the

patient tolerated the procedure very well.  The bronchoscope was withdrawn and the

patient will be recovered.





MMODL / IJN: [id number] / Job#: [id number]

## 2020-12-23 ENCOUNTER — HOSPITAL ENCOUNTER (INPATIENT)
Dept: HOSPITAL 47 - EC | Age: 60
LOS: 1 days | Discharge: HOME | DRG: 194 | End: 2020-12-24
Payer: COMMERCIAL

## 2020-12-23 DIAGNOSIS — M06.30: ICD-10-CM

## 2020-12-23 DIAGNOSIS — Z79.890: ICD-10-CM

## 2020-12-23 DIAGNOSIS — J94.8: ICD-10-CM

## 2020-12-23 DIAGNOSIS — Z87.891: ICD-10-CM

## 2020-12-23 DIAGNOSIS — I11.9: ICD-10-CM

## 2020-12-23 DIAGNOSIS — Z98.890: ICD-10-CM

## 2020-12-23 DIAGNOSIS — Z87.39: ICD-10-CM

## 2020-12-23 DIAGNOSIS — M06.9: ICD-10-CM

## 2020-12-23 DIAGNOSIS — Z87.01: ICD-10-CM

## 2020-12-23 DIAGNOSIS — Z90.89: ICD-10-CM

## 2020-12-23 DIAGNOSIS — Z20.828: ICD-10-CM

## 2020-12-23 DIAGNOSIS — J18.9: Primary | ICD-10-CM

## 2020-12-23 DIAGNOSIS — Z86.718: ICD-10-CM

## 2020-12-23 DIAGNOSIS — Z79.899: ICD-10-CM

## 2020-12-23 DIAGNOSIS — Z87.442: ICD-10-CM

## 2020-12-23 DIAGNOSIS — Z80.8: ICD-10-CM

## 2020-12-23 DIAGNOSIS — E03.9: ICD-10-CM

## 2020-12-23 DIAGNOSIS — Z80.9: ICD-10-CM

## 2020-12-23 DIAGNOSIS — M19.90: ICD-10-CM

## 2020-12-23 LAB
ALBUMIN SERPL-MCNC: 3.2 G/DL (ref 3.5–5)
ALP SERPL-CCNC: 116 U/L (ref 38–126)
ALT SERPL-CCNC: 19 U/L (ref 4–49)
ANION GAP SERPL CALC-SCNC: 9 MMOL/L
APTT BLD: 23.1 SEC (ref 22–30)
AST SERPL-CCNC: 22 U/L (ref 17–59)
BUN SERPL-SCNC: 16 MG/DL (ref 9–20)
CALCIUM SPEC-MCNC: 8.8 MG/DL (ref 8.4–10.2)
CELLS COUNTED: 200
CHLORIDE SERPL-SCNC: 101 MMOL/L (ref 98–107)
CO2 SERPL-SCNC: 25 MMOL/L (ref 22–30)
EOSINOPHIL # BLD MANUAL: 0.82 K/UL (ref 0–0.7)
ERYTHROCYTE [DISTWIDTH] IN BLOOD BY AUTOMATED COUNT: 4.32 M/UL (ref 4.3–5.9)
ERYTHROCYTE [DISTWIDTH] IN BLOOD: 17.8 % (ref 11.5–15.5)
GLUCOSE SERPL-MCNC: 100 MG/DL (ref 74–99)
HCT VFR BLD AUTO: 37.5 % (ref 39–53)
HGB BLD-MCNC: 11.9 GM/DL (ref 13–17.5)
INR PPP: 1.1 (ref ?–1.2)
LDH SPEC-CCNC: 593 U/L (ref 313–618)
LYMPHOCYTES # BLD MANUAL: 3.06 K/UL (ref 1–4.8)
MAGNESIUM SPEC-SCNC: 2 MG/DL (ref 1.6–2.3)
MCH RBC QN AUTO: 27.5 PG (ref 25–35)
MCHC RBC AUTO-ENTMCNC: 31.7 G/DL (ref 31–37)
MCV RBC AUTO: 86.7 FL (ref 80–100)
METAMYELOCYTES # BLD: 0.2 K/UL
MONOCYTES # BLD MANUAL: 1.84 K/UL (ref 0–1)
MYELOCYTES # BLD MANUAL: 0.41 K/UL
NEUTROPHILS NFR BLD MANUAL: 67 %
NEUTS SEG # BLD MANUAL: 14.6 K/UL (ref 1.3–7.7)
PLATELET # BLD AUTO: 352 K/UL (ref 150–450)
POTASSIUM SERPL-SCNC: 3.5 MMOL/L (ref 3.5–5.1)
PROT SERPL-MCNC: 6.7 G/DL (ref 6.3–8.2)
PT BLD: 10.9 SEC (ref 9–12)
SODIUM SERPL-SCNC: 135 MMOL/L (ref 137–145)
WBC # BLD AUTO: 20.4 K/UL (ref 3.8–10.6)

## 2020-12-23 PROCEDURE — 96367 TX/PROPH/DG ADDL SEQ IV INF: CPT

## 2020-12-23 PROCEDURE — 84484 ASSAY OF TROPONIN QUANT: CPT

## 2020-12-23 PROCEDURE — 86140 C-REACTIVE PROTEIN: CPT

## 2020-12-23 PROCEDURE — 85610 PROTHROMBIN TIME: CPT

## 2020-12-23 PROCEDURE — 85025 COMPLETE CBC W/AUTO DIFF WBC: CPT

## 2020-12-23 PROCEDURE — 96365 THER/PROPH/DIAG IV INF INIT: CPT

## 2020-12-23 PROCEDURE — 83615 LACTATE (LD) (LDH) ENZYME: CPT

## 2020-12-23 PROCEDURE — 87635 SARS-COV-2 COVID-19 AMP PRB: CPT

## 2020-12-23 PROCEDURE — 83735 ASSAY OF MAGNESIUM: CPT

## 2020-12-23 PROCEDURE — 71046 X-RAY EXAM CHEST 2 VIEWS: CPT

## 2020-12-23 PROCEDURE — 83605 ASSAY OF LACTIC ACID: CPT

## 2020-12-23 PROCEDURE — 99285 EMERGENCY DEPT VISIT HI MDM: CPT

## 2020-12-23 PROCEDURE — 80053 COMPREHEN METABOLIC PANEL: CPT

## 2020-12-23 PROCEDURE — 85730 THROMBOPLASTIN TIME PARTIAL: CPT

## 2020-12-23 PROCEDURE — 94640 AIRWAY INHALATION TREATMENT: CPT

## 2020-12-23 PROCEDURE — 87040 BLOOD CULTURE FOR BACTERIA: CPT

## 2020-12-23 PROCEDURE — 36415 COLL VENOUS BLD VENIPUNCTURE: CPT

## 2020-12-23 PROCEDURE — 93005 ELECTROCARDIOGRAM TRACING: CPT

## 2020-12-23 PROCEDURE — 83880 ASSAY OF NATRIURETIC PEPTIDE: CPT

## 2020-12-23 RX ADMIN — PIPERACILLIN AND TAZOBACTAM SCH MLS/HR: 3; .375 INJECTION, POWDER, FOR SOLUTION INTRAVENOUS at 17:57

## 2020-12-23 NOTE — ED
General Adult HPI





- General


Chief complaint: Shortness of Breath


Stated complaint: weakness


Time Seen by Provider: 20 10:40


Source: patient, family, RN notes reviewed


Mode of arrival: ambulatory


Limitations: no limitations





- History of Present Illness


Initial comments: 


This a 60-year-old male presents emergency Department chief complaint of body ac

hes, fatigue, generalized weakness not feeling well.  Patient states that she 

had an echocardiogram on Friday states he left office and felt very sick, states

that he basically been in bed ever since.  He states that he's been on multiple 

rounds of antibiotics, steroids and had 3 bronchoscopies with washing for 

recurrent pneumonia.  Patient states he ended his antibiotics on Friday when 

symptoms started.  He reports no definite fever.  He states he's had no sick 

contacts.  He denies any GI symptoms including nausea vomiting diarrhea 

constipation no chest pain.








- Related Data


                                Home Medications











 Medication  Instructions  Recorded  Confirmed


 


Levothyroxine Sodium [Synthroid] 150 mcg PO DAILY 17


 


Albuterol Inhaler [Ventolin Hfa 2 puff INHALATION RT-QID PRN 06/10/20 12/23/20





Inhaler]   


 


Losartan-Hctz 50-12.5 mg [Hyzaar 1 tab PO DAILY 06/10/20 12/23/20





50-12.5]   


 


Metoprolol Tartrate [Lopressor] 50 mg PO HS 06/10/20 12/23/20


 


Ascorbic Acid [Vitamin C] 2,000 mg PO HS 20


 


Cholecalciferol [Vitamin D3 (25 2,000 unit PO HS 20





Mcg = 1000 Iu)]   


 


Ipratropium-Albuterol Nebulize 3 ml INHALATION RT-QID PRN 20





[Duoneb 0.5 mg-3 mg/3 ml Soln]   











                                    Allergies











Allergy/AdvReac Type Severity Reaction Status Date / Time


 


No Known Allergies Allergy   Verified 20 11:47














Review of Systems


ROS Statement: 


Those systems with pertinent positive or pertinent negative responses have been 

documented in the HPI.





ROS Other: All systems not noted in ROS Statement are negative.





Past Medical History


Past Medical History: Hypertension, Rheumatoid Arthritis (RA), Thyroid Disorder


Additional Past Medical History / Comment(s): Mult Rt foot infections after 

surgery, last time 2019, had PICC Line for AB and developed bloot clot. Hx 

kidney stones. 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

pneumonia."


History of Any Multi-Drug Resistant Organisms: None Reported


Past Surgical History: Back Surgery, Tonsillectomy


Additional Past Surgical History / Comment(s): Right foot surgery, repair 

herniated disc, plate removed rt foot.   CTR donny,bronchoscopy


Past Anesthesia/Blood Transfusion Reactions: Previous Problems w/ Anesthesia


Additional Past Anesthesia/Blood Transfusion Reaction / Comment(s): slow to 

awaken x1.


Past Psychological History: No Psychological Hx Reported


Smoking Status: Former smoker





- Past Family History


  ** Mother


Family Medical History: Cancer


Additional Family Medical History / Comment(s): lung with mets brain





  ** Father


Family Medical History: No Reported History


Additional Family Medical History / Comment(s): states, "he just ."





General Exam


Limitations: no limitations


General appearance: alert, in no apparent distress


Head exam: Present: atraumatic, normocephalic, normal inspection


Eye exam: Present: normal appearance, PERRL, EOMI.  Absent: scleral icterus, c

onjunctival injection, periorbital swelling


ENT exam: Present: normal exam, normal oropharynx, mucous membranes moist


Neck exam: Present: normal inspection, full ROM.  Absent: tenderness, 

meningismus, lymphadenopathy


Respiratory exam: Present: rhonchi, decreased breath sounds.  Absent: normal 

lung sounds bilaterally, respiratory distress, wheezes, rales, stridor


Cardiovascular Exam: Present: normal rhythm, tachycardia, normal heart sounds.  

Absent: systolic murmur, diastolic murmur, rubs, gallop, clicks


GI/Abdominal exam: Present: soft, normal bowel sounds.  Absent: distended, 

tenderness, guarding, rebound, rigid





Course


                                   Vital Signs











  20





  10:34 11:08


 


Temperature 98.9 F 


 


Pulse Rate 110 H 106 H


 


Respiratory 20 18





Rate  


 


Blood Pressure 99/66 97/59


 


O2 Sat by Pulse 91 L 93 L





Oximetry  














EKG Findings





- EKG Comments:


EKG Findings:: EKG performed at 10:46 sinus tachycardia, rate of 1:15  QRS

88 QT//477 there is some ST depression noted in V4 through V6





Medical Decision Making





- Medical Decision Making


60-year-old presented increase weakness, fatigue.  Patient has bilateral 

pneumonia, patient has recurrent pneumonia.  Patient is shown multiple broad-

spectrum advice.  Patient has leukocytosis with a white count of 20.  Patient 

had multiple recent bronchoscopies and recurrent pneumonia.  Patient be admitted

for IV antibiotics, consult to his pulmonologist Dr. Gómez.








- Lab Data


Result diagrams: 


                                 20 10:59





                                 20 10:59


                                   Lab Results











  20 Range/Units





  10:59 10:59 10:59 


 


WBC  20.4 H    (3.8-10.6)  k/uL


 


RBC  4.32    (4.30-5.90)  m/uL


 


Hgb  11.9 L    (13.0-17.5)  gm/dL


 


Hct  37.5 L    (39.0-53.0)  %


 


MCV  86.7    (80.0-100.0)  fL


 


MCH  27.5    (25.0-35.0)  pg


 


MCHC  31.7    (31.0-37.0)  g/dL


 


RDW  17.8 H    (11.5-15.5)  %


 


Plt Count  352    (150-450)  k/uL


 


MPV  9.0    


 


Neutrophils % (Manual)  67    %


 


Band Neuts % (Manual)  5    %


 


Lymphocytes % (Manual)  15    %


 


Monocytes % (Manual)  9    %


 


Eosinophils % (Manual)  4    %


 


Metamyelocytes %  1    %


 


Myelocytes %  2    %


 


Neutrophils # (Manual)  14.60 H    (1.3-7.7)  k/uL


 


Lymphocytes # (Manual)  3.06    (1.0-4.8)  k/uL


 


Monocytes # (Manual)  1.84 H    (0-1.0)  k/uL


 


Eosinophils # (Manual)  0.82 H    (0-0.7)  k/uL


 


Metamyelocytes # (Man)  0.20 H    (0)  k/uL


 


Myelocytes # (Manual)  0.41 H    (0)  k/uL


 


Nucleated RBCs  0    (0-0)  /100 WBC


 


Manual Slide Review  Performed    


 


Hypochromasia  Slight    


 


Anisocytosis  Slight    


 


PT   10.9   (9.0-12.0)  sec


 


INR   1.1   (<1.2)  


 


APTT   23.1   (22.0-30.0)  sec


 


Sodium    135 L  (137-145)  mmol/L


 


Potassium    3.5  (3.5-5.1)  mmol/L


 


Chloride    101  ()  mmol/L


 


Carbon Dioxide    25  (22-30)  mmol/L


 


Anion Gap    9  mmol/L


 


BUN    16  (9-20)  mg/dL


 


Creatinine    1.37 H  (0.66-1.25)  mg/dL


 


Est GFR (CKD-EPI)AfAm    65  (>60 ml/min/1.73 sqM)  


 


Est GFR (CKD-EPI)NonAf    56  (>60 ml/min/1.73 sqM)  


 


Glucose    100 H  (74-99)  mg/dL


 


Plasma Lactic Acid Naif     (0.7-2.0)  mmol/L


 


Calcium    8.8  (8.4-10.2)  mg/dL


 


Magnesium    2.0  (1.6-2.3)  mg/dL


 


Total Bilirubin    1.5 H  (0.2-1.3)  mg/dL


 


AST    22  (17-59)  U/L


 


ALT    19  (4-49)  U/L


 


Alkaline Phosphatase    116  ()  U/L


 


Lactate Dehydrogenase    593  (313-618)  U/L


 


Troponin I     (0.000-0.034)  ng/mL


 


C-Reactive Protein    384.3 H  (<10.0)  mg/L


 


NT-Pro-B Natriuret Pep     pg/mL


 


Total Protein    6.7  (6.3-8.2)  g/dL


 


Albumin    3.2 L  (3.5-5.0)  g/dL


 


Coronavirus (PCR)     (Not Detectd)  














  20 Range/Units





  10:59 10:59 10:59 


 


WBC     (3.8-10.6)  k/uL


 


RBC     (4.30-5.90)  m/uL


 


Hgb     (13.0-17.5)  gm/dL


 


Hct     (39.0-53.0)  %


 


MCV     (80.0-100.0)  fL


 


MCH     (25.0-35.0)  pg


 


MCHC     (31.0-37.0)  g/dL


 


RDW     (11.5-15.5)  %


 


Plt Count     (150-450)  k/uL


 


MPV     


 


Neutrophils % (Manual)     %


 


Band Neuts % (Manual)     %


 


Lymphocytes % (Manual)     %


 


Monocytes % (Manual)     %


 


Eosinophils % (Manual)     %


 


Metamyelocytes %     %


 


Myelocytes %     %


 


Neutrophils # (Manual)     (1.3-7.7)  k/uL


 


Lymphocytes # (Manual)     (1.0-4.8)  k/uL


 


Monocytes # (Manual)     (0-1.0)  k/uL


 


Eosinophils # (Manual)     (0-0.7)  k/uL


 


Metamyelocytes # (Man)     (0)  k/uL


 


Myelocytes # (Manual)     (0)  k/uL


 


Nucleated RBCs     (0-0)  /100 WBC


 


Manual Slide Review     


 


Hypochromasia     


 


Anisocytosis     


 


PT     (9.0-12.0)  sec


 


INR     (<1.2)  


 


APTT     (22.0-30.0)  sec


 


Sodium     (137-145)  mmol/L


 


Potassium     (3.5-5.1)  mmol/L


 


Chloride     ()  mmol/L


 


Carbon Dioxide     (22-30)  mmol/L


 


Anion Gap     mmol/L


 


BUN     (9-20)  mg/dL


 


Creatinine     (0.66-1.25)  mg/dL


 


Est GFR (CKD-EPI)AfAm     (>60 ml/min/1.73 sqM)  


 


Est GFR (CKD-EPI)NonAf     (>60 ml/min/1.73 sqM)  


 


Glucose     (74-99)  mg/dL


 


Plasma Lactic Acid Naif  1.1    (0.7-2.0)  mmol/L


 


Calcium     (8.4-10.2)  mg/dL


 


Magnesium     (1.6-2.3)  mg/dL


 


Total Bilirubin     (0.2-1.3)  mg/dL


 


AST     (17-59)  U/L


 


ALT     (4-49)  U/L


 


Alkaline Phosphatase     ()  U/L


 


Lactate Dehydrogenase     (313-618)  U/L


 


Troponin I   <0.012   (0.000-0.034)  ng/mL


 


C-Reactive Protein     (<10.0)  mg/L


 


NT-Pro-B Natriuret Pep    232  pg/mL


 


Total Protein     (6.3-8.2)  g/dL


 


Albumin     (3.5-5.0)  g/dL


 


Coronavirus (PCR)     (Not Detectd)  














  20 Range/Units





  10:59 


 


WBC   (3.8-10.6)  k/uL


 


RBC   (4.30-5.90)  m/uL


 


Hgb   (13.0-17.5)  gm/dL


 


Hct   (39.0-53.0)  %


 


MCV   (80.0-100.0)  fL


 


MCH   (25.0-35.0)  pg


 


MCHC   (31.0-37.0)  g/dL


 


RDW   (11.5-15.5)  %


 


Plt Count   (150-450)  k/uL


 


MPV   


 


Neutrophils % (Manual)   %


 


Band Neuts % (Manual)   %


 


Lymphocytes % (Manual)   %


 


Monocytes % (Manual)   %


 


Eosinophils % (Manual)   %


 


Metamyelocytes %   %


 


Myelocytes %   %


 


Neutrophils # (Manual)   (1.3-7.7)  k/uL


 


Lymphocytes # (Manual)   (1.0-4.8)  k/uL


 


Monocytes # (Manual)   (0-1.0)  k/uL


 


Eosinophils # (Manual)   (0-0.7)  k/uL


 


Metamyelocytes # (Man)   (0)  k/uL


 


Myelocytes # (Manual)   (0)  k/uL


 


Nucleated RBCs   (0-0)  /100 WBC


 


Manual Slide Review   


 


Hypochromasia   


 


Anisocytosis   


 


PT   (9.0-12.0)  sec


 


INR   (<1.2)  


 


APTT   (22.0-30.0)  sec


 


Sodium   (137-145)  mmol/L


 


Potassium   (3.5-5.1)  mmol/L


 


Chloride   ()  mmol/L


 


Carbon Dioxide   (22-30)  mmol/L


 


Anion Gap   mmol/L


 


BUN   (9-20)  mg/dL


 


Creatinine   (0.66-1.25)  mg/dL


 


Est GFR (CKD-EPI)AfAm   (>60 ml/min/1.73 sqM)  


 


Est GFR (CKD-EPI)NonAf   (>60 ml/min/1.73 sqM)  


 


Glucose   (74-99)  mg/dL


 


Plasma Lactic Acid Naif   (0.7-2.0)  mmol/L


 


Calcium   (8.4-10.2)  mg/dL


 


Magnesium   (1.6-2.3)  mg/dL


 


Total Bilirubin   (0.2-1.3)  mg/dL


 


AST   (17-59)  U/L


 


ALT   (4-49)  U/L


 


Alkaline Phosphatase   ()  U/L


 


Lactate Dehydrogenase   (313-618)  U/L


 


Troponin I   (0.000-0.034)  ng/mL


 


C-Reactive Protein   (<10.0)  mg/L


 


NT-Pro-B Natriuret Pep   pg/mL


 


Total Protein   (6.3-8.2)  g/dL


 


Albumin   (3.5-5.0)  g/dL


 


Coronavirus (PCR)  Not Detected  (Not Detectd)  














Disposition


Clinical Impression: 


 Bilateral pneumonia, Hydropneumothorax, Leukocytosis





Disposition: ADMITTED AS IP TO THIS HOSP


Condition: Fair


Referrals: 


Walker Smith DO [Primary Care Provider] - 1-2 days

## 2020-12-23 NOTE — XR
EXAMINATION TYPE: XR chest 2V

 

DATE OF EXAM: 12/23/2020

 

COMPARISON: 7/8/2020

 

HISTORY: Shortness of breath

 

TECHNIQUE:  Frontal and lateral views of the chest are obtained.

 

FINDINGS:

 

Scattered senescent parenchymal changes noted. Hyperinflation compatible with COPD. 

 

Patchy perihilar and basilar infiltrates compatible with underlying pneumonia.

 

Heart size is stable.

 

Mediastinal structures are stable and grossly unremarkable.

 

No evidence for hilar prominence.

 

Degenerative changes dorsal spine. 

 

IMPRESSION:

1. Patchy perihilar and basilar infiltrates compatible with underlying pneumonia.

## 2020-12-24 VITALS — SYSTOLIC BLOOD PRESSURE: 103 MMHG | TEMPERATURE: 98.2 F | DIASTOLIC BLOOD PRESSURE: 57 MMHG

## 2020-12-24 VITALS — RESPIRATION RATE: 18 BRPM

## 2020-12-24 VITALS — HEART RATE: 72 BPM

## 2020-12-24 RX ADMIN — PIPERACILLIN AND TAZOBACTAM SCH MLS/HR: 3; .375 INJECTION, POWDER, FOR SOLUTION INTRAVENOUS at 00:00

## 2020-12-24 RX ADMIN — PIPERACILLIN AND TAZOBACTAM SCH: 3; .375 INJECTION, POWDER, FOR SOLUTION INTRAVENOUS at 13:21

## 2020-12-24 NOTE — P.HPIM
History of Present Illness


H&P Date: 20


Chief Complaint: Shortness of breath cough


 This is 60-year-old white male with history of hypertension, hypothyroidism, 

rheumatoid arthritis, rheumatoid lung disease, previous history of loculated 

bilateral empyema requiring is chest tube placement last 2020.  At that t

sh[place] patient was treated conservatively, did not require VATS, was treated with 

IV antibiotics, and recovered.  Patient is an ex-smoker, patient follows with 

Dr. Gómez in the pulmonary clinic for his pulmonary issues.  The past used to 

take prednisone and Arav4 rheumatoid arthritis, currently is off of them.  On 

2020 patient came into the emergency department with complaints of body 

aches, fatigue, generalized weakness and not feeling well.  Patient really had 

echocardiogram on Friday and he felt very sick after she left the office, and he

states he had been in bed ever since.  He had recently been on multiple rounds 

of antibiotics, he had bronchoscopy with BAL on 10/28/2020 by Dr. Gómez for 

rheumatoid lung, and retained secretions.  His cultures were negative except for

Candida albicans in the BAL.  No recent antibiotics or steroids.  His chest x-

ray showed patchy perihilar and basilar infiltrates compatible with underlying 

pneumonia.  He ruled out for COVID 19.  His white count was 20.4, hemoglobin was

11.9, sodium is 135, the rest of the electrolytes were within normal limits, BUN

was 16 creatinine is 1.37, troponin was less than 0.012, CRP was 384, LDH was 

593.  He did have a low-grade fevers on admission, he is afebrile this morning, 

hemodynamically stable, overall he states he is feeling much better.  Recently 

department he was started on antibiotics in the form of Zosyn and vancomycin.  

He received breathing treatments and IV hydration.  He sitting up in the chair, 

appears to be very comfortable, some bibasilar crackles, but no rhonchi or 

wheezing. 








Review of Systems


All systems: negative





Past Medical History


Past Medical History: Deep Vein Thrombosis (DVT), Hypertension, Rheumatoid 

Arthritis (RA), Thyroid Disorder


Additional Past Medical History / Comment(s): Mult Rt foot infections after 

surgery, last time 2019, had PICC Line for AB and developed bloot clot. Hx 

kidney stones. 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

pneumonia.


History of Any Multi-Drug Resistant Organisms: None Reported


Past Surgical History: Back Surgery, Tonsillectomy


Additional Past Surgical History / Comment(s): Right foot surgery, repair 

herniated disc, plate removed rt foot.   CTR donny,bronchoscopy X4


Past Anesthesia/Blood Transfusion Reactions: Previous Problems w/ Anesthesia


Additional Past Anesthesia/Blood Transfusion Reaction / Comment(s): slow to 

awaken x1.


Past Psychological History: No Psychological Hx Reported


Smoking Status: Former smoker


Past Alcohol Use History: None Reported


Additional Past Alcohol Use History / Comment(s): QUIT SMOKING , smoked 15 

years, 1 - 1 1/2 ppd


Past Drug Use History: None Reported





- Past Family History


  ** Mother


Family Medical History: Cancer


Additional Family Medical History / Comment(s): lung with mets brain





  ** Father


Family Medical History: No Reported History


Additional Family Medical History / Comment(s): states, "he just ."





Medications and Allergies


                                Home Medications











 Medication  Instructions  Recorded  Confirmed  Type


 


Levothyroxine Sodium [Synthroid] 150 mcg PO DAILY 17 History


 


Albuterol Inhaler [Ventolin Hfa 2 puff INHALATION RT-QID PRN 06/10/20 12/23/20 

History





Inhaler]    


 


Losartan-Hctz 50-12.5 mg [Hyzaar 1 tab PO DAILY 06/10/20 12/23/20 History





50-12.5]    


 


Metoprolol Tartrate [Lopressor] 50 mg PO HS 06/10/20 12/23/20 History


 


Ascorbic Acid [Vitamin C] 2,000 mg PO HS 20 History


 


Cholecalciferol [Vitamin D3 (25 2,000 unit PO HS 20 History





Mcg = 1000 Iu)]    


 


Ipratropium-Albuterol Nebulize 3 ml INHALATION RT-QID PRN 20 

History





[Duoneb 0.5 mg-3 mg/3 ml Soln]    


 


Azithromycin [Zithromax] 500 mg PO DAILY 10 Days #10 tab 20  Rx


 


Cefdinir [Omnicef] 300 mg PO Q12HR 10 Days #20 capsule 20  Rx


 


predniSONE 40 mg PO AS DIRECTED 7 Days #7 tab 20  Rx








                                    Allergies











Allergy/AdvReac Type Severity Reaction Status Date / Time


 


No Known Allergies Allergy   Verified 20 11:47














Physical Exam


Vitals: 


                                   Vital Signs











  Temp Pulse Pulse Resp BP BP Pulse Ox


 


 20 11:17     18    90 L


 


 20 08:51   72     


 


 20 08:35   72     


 


 20 08:00  98.2 F   100  16   103/57  94 L


 


 20 02:02  99.1 F   104 H    95/57  95


 


 20 20:00     18   


 


 20 17:40  99.1 F   109 H  18   94/56  97


 


 20 14:46  100.3 F H   112 H  19   104/69  88 L


 


 20 14:07  99.3 F  109 H   18  106/63   93 L


 


 20 13:26   105 H   18  114/72   94 L








                                Intake and Output











 20





 22:59 06:59 14:59


 


Other:   


 


  Voiding Method Toilet  


 


  # Voids 2  














- Constitutional


General appearance: average body habitus, disheveled





- EENT


Eyes: PERRLA


Ears: bilateral: normal





- Neck


Carotids: bilateral: upstroke normal


Thyroid: bilateral: normal size





- Respiratory


Respiratory: bilateral: diminished





- Cardiovascular


Rhythm: regular


Heart sounds: normal: S1, S2





- Gastrointestinal


General gastrointestinal: normal bowel sounds, soft





- Neurologic


Neurologic: CNII-XII intact





- Musculoskeletal


Musculoskeletal: gait normal, generalized weakness, strength equal bilaterally





- Psychiatric


Psychiatric: A&O x's 3, appropriate affect, intact judgment & insight





Results


CBC & Chem 7: 


                                 20 10:59





                                 20 10:59


Chest x-ray: report reviewed, image reviewed (Patchy perihilar and bibasilar 

infiltrate consistent with pneumonia)





Thrombosis Risk Factor Assmnt





- Choose All That Apply


Each Factor Represents 1 point: Age 41-60 years, Obesity (BMI >25), Serious lung

 disease incl. pneumonia (< 1month)


Each Risk Factor Represents 3 Points: History of DVT/PE


Thrombosis Risk Factor Assessment Total Risk Factor Score: 6


Thrombosis Risk Factor Assessment Level: High Risk





Assessment and Plan


Assessment: 


Community-acquired pneumonia





Advanced with diet arthritis with history of rheumatoid lung





Hypertension hypertensive cardiovascular disease





Hypothyroidism





History of prior immunosuppression with chronic steroid and Arava currently off 

of it





Plan: 





Broad-spectrum IV antibiotics





Primary Pulmonary evaluation





Continue home medicines





Other recommendations pending plan of care as per clinical response of patient


Time with Patient: Greater than 30

## 2020-12-24 NOTE — P.CNPUL
History of Present Illness


Consult date: 20


Reason for consult: dyspnea


Chief complaint: Dyspnea


History of present illness: 


 This is 60-year-old white male with history of hypertension, hypothyroidism, 

rheumatoid arthritis, rheumatoid lung disease, previous history of loculated 

bilateral empyema requiring is chest tube placement last 2020.  At that 

time patient was treated conservatively, did not require VATS, was treated with 

IV antibiotics, and recovered.  Patient is an ex-smoker, patient follows with 

Dr. Gómez in the pulmonary clinic for his pulmonary issues.  The past used to 

take prednisone and Arav4 rheumatoid arthritis, currently is off of them.  On 

2020 patient came into the emergency department with complaints of body 

aches, fatigue, generalized weakness and not feeling well.  Patient really had 

echocardiogram on Friday and he felt very sick after she left the office, and he

states he had been in bed ever since.  He had recently been on multiple rounds 

of antibiotics, he had bronchoscopy with BAL on 10/28/2020 by Dr. Gómez for r

heumatoid lung, and retained secretions.  His cultures were negative except for 

Candida albicans in the BAL.  No recent antibiotics or steroids.  His chest x-

ray showed patchy perihilar and basilar infiltrates compatible with underlying 

pneumonia.  He ruled out for COVID 19.  His white count was 20.4, hemoglobin was

11.9, sodium is 135, the rest of the electrolytes were within normal limits, BUN

was 16 creatinine is 1.37, troponin was less than 0.012, CRP was 384, LDH was 

593.  He did have a low-grade fevers on admission, he is afebrile this morning, 

hemodynamically stable, overall he states he is feeling much better.  Recently 

department he was started on antibiotics in the form of Zosyn and vancomycin.  

He received breathing treatments and IV hydration.  He sitting up in the chair, 

appears to be very comfortable, some bibasilar crackles, but no rhonchi or 

wheezing.  








Review of Systems


All systems: negative


Constitutional: Reports fatigue, Reports fever, Reports weakness, Denies chills


Eyes: denies blurred vision, denies pain


Ears, nose, mouth and throat: Denies headache, Denies sore throat


Cardiovascular: Denies chest pain, Denies shortness of breath


Respiratory: Reports dyspnea, Denies cough


Gastrointestinal: Denies abdominal pain, Denies diarrhea, Denies nausea, Denies 

vomiting


Musculoskeletal: Denies myalgias


Integumentary: Denies pruritus, Denies rash


Neurological: Denies numbness, Denies weakness


Psychiatric: Denies anxiety, Denies depression


Endocrine: Denies fatigue, Denies weight change





Past Medical History


Past Medical History: Deep Vein Thrombosis (DVT), Hypertension, Rheumatoid 

Arthritis (RA), Thyroid Disorder


Additional Past Medical History / Comment(s): Mult Rt foot infections after 

surgery, last time 2019, had PICC Line for AB and developed bloot clot. Hx 

kidney stones. 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

pneumonia.


History of Any Multi-Drug Resistant Organisms: None Reported


Past Surgical History: Back Surgery, Tonsillectomy


Additional Past Surgical History / Comment(s): Right foot surgery, repair 

herniated disc, plate removed rt foot.   CTR donny,bronchoscopy X4


Past Anesthesia/Blood Transfusion Reactions: Previous Problems w/ Anesthesia


Additional Past Anesthesia/Blood Transfusion Reaction / Comment(s): slow to 

awaken x1.


Past Psychological History: No Psychological Hx Reported


Smoking Status: Former smoker


Past Alcohol Use History: None Reported


Additional Past Alcohol Use History / Comment(s): QUIT SMOKING , smoked 15 

years, 1 - 1 1/2 ppd


Past Drug Use History: None Reported





- Past Family History


  ** Mother


Family Medical History: Cancer


Additional Family Medical History / Comment(s): lung with mets brain





  ** Father


Family Medical History: No Reported History


Additional Family Medical History / Comment(s): states, "he just ."





Medications and Allergies


                                Home Medications











 Medication  Instructions  Recorded  Confirmed  Type


 


Levothyroxine Sodium [Synthroid] 150 mcg PO DAILY 17 History


 


Albuterol Inhaler [Ventolin Hfa 2 puff INHALATION RT-QID PRN 06/10/20 12/23/20 

History





Inhaler]    


 


Losartan-Hctz 50-12.5 mg [Hyzaar 1 tab PO DAILY 06/10/20 12/23/20 History





50-12.5]    


 


Metoprolol Tartrate [Lopressor] 50 mg PO HS 06/10/20 12/23/20 History


 


Ascorbic Acid [Vitamin C] 2,000 mg PO HS 20 History


 


Cholecalciferol [Vitamin D3 (25 2,000 unit PO HS 20 History





Mcg = 1000 Iu)]    


 


Ipratropium-Albuterol Nebulize 3 ml INHALATION RT-QID PRN 20 

History





[Duoneb 0.5 mg-3 mg/3 ml Soln]    


 


Azithromycin [Zithromax] 500 mg PO DAILY 10 Days #10 tab 20  Rx


 


Cefdinir [Omnicef] 300 mg PO Q12HR 10 Days #20 capsule 20  Rx


 


predniSONE 40 mg PO AS DIRECTED 7 Days #7 tab 20  Rx








                                    Allergies











Allergy/AdvReac Type Severity Reaction Status Date / Time


 


No Known Allergies Allergy   Verified 20 11:47














Physical Exam


Vitals: 


                                   Vital Signs











  Temp Pulse Pulse Resp BP BP Pulse Ox


 


 20 11:17     18    90 L


 


 20 08:51   72     


 


 20 08:35   72     


 


 20 08:00  98.2 F   100  16   103/57  94 L


 


 20 02:02  99.1 F   104 H    95/57  95


 


 20 20:00     18   


 


 20 17:40  99.1 F   109 H  18   94/56  97


 


 20 14:46  100.3 F H   112 H  19   104/69  88 L


 


 20 14:07  99.3 F  109 H   18  106/63   93 L


 


 20 13:26   105 H   18  114/72   94 L








                                Intake and Output











 20





 22:59 06:59 14:59


 


Other:   


 


  Voiding Method Toilet  


 


  # Voids 2  











 GENERAL EXAM: Alert, very pleasant, 60-year-old white male, on room air, with 

pulse ox of 95% comfortable in no apparent distress.


HEAD: Normocephalic/atraumatic.


EYES: Normal reaction of pupils, equal size.  Conjunctiva pink, sclera white.


NOSE: Clear with pink turbinates.


THROAT: No erythema or exudates.


NECK: No masses, no JVD, no thyroid enlargement, no adenopathy.


CHEST: No chest wall deformity.  Symmetrical expansion. 


LUNGS: Equal air entry with some bibasilar crackles, no major wheezing or 

coughing or rhonchi


CVS: Regular rate and rhythm, normal S1 and S2, no gallops, no murmurs, no rubs


ABDOMEN: Soft, nontender.  No hepatosplenomegaly, normal bowel sounds, no 

guarding or rigidity.


EXTREMITIES: No clubbing, no edema, no cyanosis, 2+ pulses and upper and lower 

extremities.


MUSCULOSKELETAL: Muscle strength and tone normal.


SPINE: No scoliosis or deformity


SKIN: No rashes


CENTRAL NERVOUS SYSTEM: Alert and oriented -3.  No focal deficits, tone is 

normal in all 4 extremities.


PSYCHIATRIC: Alert and oriented -3.  Appropriate affect.  Intact judgment and 

insight.











Results





- Laboratory Findings


CBC and BMP: 


                                 20 10:59





                                 20 10:59


PT/INR, D-dimer











PT  10.9 sec (9.0-12.0)   20  10:59    


 


INR  1.1  (<1.2)   20  10:59    








Abnormal lab findings: 


                                  Abnormal Labs











  20





  10:59 10:59


 


WBC  20.4 H 


 


Hgb  11.9 L 


 


Hct  37.5 L 


 


RDW  17.8 H 


 


Neutrophils # (Manual)  14.60 H 


 


Monocytes # (Manual)  1.84 H 


 


Eosinophils # (Manual)  0.82 H 


 


Metamyelocytes # (Man)  0.20 H 


 


Myelocytes # (Manual)  0.41 H 


 


Sodium   135 L


 


Creatinine   1.37 H


 


Glucose   100 H


 


Total Bilirubin   1.5 H


 


C-Reactive Protein   384.3 H


 


Albumin   3.2 L














- Diagnostic Findings


Chest x-ray: report reviewed, image reviewed





Assessment and Plan


Plan: 


 Assessment:





#1.  Possible community-acquired pneumonia, COVID 19 was negative.





#2.  Fever, weakness, fatigue, shortness of breath, related to the above, 

improved





#3.  History of rheumatoid arthritis, and rheumatoid lung, previously on Arava 

and chronic prednisone, currently off 





#4.  Previous history of bilateral lung empyema related to strep pneumonia in 

2020, treated with done chest tube drainage, and IV antibiotics, did not 

require VATS





#5. Former smoker





#6.  Hypertension





#7.  Hypothyroidism





Plan:





Patient is feeling much better, breathing easier, afebrile this morning, vital 

signs are stable, altered mentation, no worsening dyspnea, from pulmonary pers

pective he can be discharged home on a 10 day course of Omnicef 300 mg twice 

daily for 10 days and azithromycin 500 mg daily for 10 days, and prednisone 40 

mg daily in addition to his regular updrafts.  Patient will be seen by Dr. Gómez

 in the office on 2020 at 8:30 in the morning. 





I performed a history & physical examination of the patient and discussed their 

management with my nurse practitioner, Lorena Bonilla.  I reviewed the nurse 

practitioner's note and agree with the documented findings and plan of care.  

Lung sounds are positive for bibasilar crackles.  The findings and the imp

ression was discussed with the patient.  I attest to the documentation by the 

nurse practitioner. 





Time with Patient: Greater than 30

## 2020-12-24 NOTE — P.DS
Providers


Date of admission: 


12/23/20 12:30





Expected date of discharge: 12/24/20


Attending physician: 


Melvin Thompson





Consults: 





                                        





12/23/20 12:32


Consult Physician Urgent 


   Consulting Provider: Shad Gómez Reason/Comments: Established patient, recent bronchoscopy, pneumonia


   Do you want consulting provider notified?: Yes











Primary care physician: 


[facility] Course: 


12/24/2020, patient sitting upright on the chair breathing comfortably, at room 

air, denies any chest pain cough congestion much better now, patient has been 

evaluated by his primary pulmonologist who recommended to discharge on oral 

Omnicef , oral steroids and Zithromax with follow-up in the outpatient, patient 

agree with current plan of care he will be discharged to follow-up with him and 

primary service





This is 60-year-old white male with history of hypertension, hypothyroidism, 

rheumatoid arthritis, rheumatoid lung disease, previous history of loculated 

bilateral empyema requiring is chest tube placement last June 2020.  At that 

time patient was treated conservatively, did not require VATS, was treated with 

IV antibiotics, and recovered.  Patient is an ex-smoker, patient follows with Dr Tai Gómez in the pulmonary clinic for his pulmonary issues.  The past used to take

prednisone and Arav4 rheumatoid arthritis, currently is off of them.  On 

12/23/2020 patient came into the emergency department with complaints of body 

aches, fatigue, generalized weakness and not feeling well.  Patient really had 

echocardiogram on Friday and he felt very sick after she left the office, and he

states he had been in bed ever since.  He had recently been on multiple rounds 

of antibiotics, he had bronchoscopy with BAL on 10/28/2020 by Dr. Gómez for 

rheumatoid lung, and retained secretions.  His cultures were negative except for

Candida albicans in the BAL.  No recent antibiotics or steroids.  His chest x-

ray showed patchy perihilar and basilar infiltrates compatible with underlying 

pneumonia.  He ruled out for COVID 19.  His white count was 20.4, hemoglobin was

11.9, sodium is 135, the rest of the electrolytes were within normal limits, BUN

was 16 creatinine is 1.37, troponin was less than 0.012, CRP was 384, LDH was 

593.  He did have a low-grade fevers on admission, he is afebrile this morning, 

hemodynamically stable, overall he states he is feeling much better.  Recently 

department he was started on antibiotics in the form of Zosyn and vancomycin.  

He received breathing treatments and IV hydration.  He sitting up in the chair, 

appears to be very comfortable, some bibasilar crackles, but no rhonchi or 

wheezing. 


Assessment: 


Community-acquired pneumonia





Leukocytosis





Advanced with diet arthritis with history of rheumatoid lung





Hypertension hypertensive cardiovascular disease





Hypothyroidism





History of prior immunosuppression with chronic steroid and Arava currently off 

of it


Patient Condition at Discharge: Good





Plan - Discharge Summary


New Discharge Prescriptions: 


New


   Cefdinir [Omnicef] 300 mg PO Q12HR 10 Days #20 capsule


   predniSONE 40 mg PO AS DIRECTED 7 Days #7 tab


   Azithromycin [Zithromax] 500 mg PO DAILY 10 Days #10 tab





Continue


   Levothyroxine Sodium [Synthroid] 150 mcg PO DAILY


   Albuterol Inhaler [Ventolin Hfa Inhaler] 2 puff INHALATION RT-QID PRN


     PRN Reason: Shortness Of Breath


   Losartan-Hctz 50-12.5 mg [Hyzaar 50-12.5] 1 tab PO DAILY


   Metoprolol Tartrate [Lopressor] 50 mg PO HS


   Cholecalciferol [Vitamin D3 (25 Mcg = 1000 Iu)] 2,000 unit PO HS


   Ipratropium-Albuterol Nebulize [Duoneb 0.5 mg-3 mg/3 ml Soln] 3 ml INHALATION

RT-QID PRN


     PRN Reason: Shortness Of Breath


   Ascorbic Acid [Vitamin C] 2,000 mg PO HS


Discharge Medication List





Levothyroxine Sodium [Synthroid] 150 mcg PO DAILY 03/29/17 [History]


Albuterol Inhaler [Ventolin Hfa Inhaler] 2 puff INHALATION RT-QID PRN 06/10/20 [

History]


Losartan-Hctz 50-12.5 mg [Hyzaar 50-12.5] 1 tab PO DAILY 06/10/20 [History]


Metoprolol Tartrate [Lopressor] 50 mg PO HS 06/10/20 [History]


Ascorbic Acid [Vitamin C] 2,000 mg PO HS 12/23/20 [History]


Cholecalciferol [Vitamin D3 (25 Mcg = 1000 Iu)] 2,000 unit PO HS 12/23/20 

[History]


Ipratropium-Albuterol Nebulize [Duoneb 0.5 mg-3 mg/3 ml Soln] 3 ml INHALATION 

RT-QID PRN 12/23/20 [History]


Azithromycin [Zithromax] 500 mg PO DAILY 10 Days #10 tab 12/24/20 [Rx]


Cefdinir [Omnicef] 300 mg PO Q12HR 10 Days #20 capsule 12/24/20 [Rx]


predniSONE 40 mg PO AS DIRECTED 7 Days #7 tab 12/24/20 [Rx]








Follow up Appointment(s)/Referral(s): 


Walker Smith DO [Primary Care Provider] - 1-2 days (PLEASE CALL OFFICE AND 

SCHEDULE APPOINTMENT )


Shad Gómez DO [Doctor of Osteopathic Medicine] - 12/29/20 8:00 am (CALL 

OFFICE TO VERIFY TIME )


Patient Instructions/Handouts:  Bacterial Pneumonia (DC)


Discharge Disposition: HOME SELF-CARE

## 2020-12-28 ENCOUNTER — HOSPITAL ENCOUNTER (INPATIENT)
Dept: HOSPITAL 47 - EC | Age: 60
LOS: 5 days | Discharge: HOME | DRG: 871 | End: 2021-01-02
Payer: COMMERCIAL

## 2020-12-28 DIAGNOSIS — Z87.891: ICD-10-CM

## 2020-12-28 DIAGNOSIS — E87.2: ICD-10-CM

## 2020-12-28 DIAGNOSIS — J18.9: ICD-10-CM

## 2020-12-28 DIAGNOSIS — J44.9: ICD-10-CM

## 2020-12-28 DIAGNOSIS — Z20.822: ICD-10-CM

## 2020-12-28 DIAGNOSIS — Z87.442: ICD-10-CM

## 2020-12-28 DIAGNOSIS — J96.01: ICD-10-CM

## 2020-12-28 DIAGNOSIS — I13.0: ICD-10-CM

## 2020-12-28 DIAGNOSIS — Z79.890: ICD-10-CM

## 2020-12-28 DIAGNOSIS — E86.0: ICD-10-CM

## 2020-12-28 DIAGNOSIS — R65.21: ICD-10-CM

## 2020-12-28 DIAGNOSIS — Z90.89: ICD-10-CM

## 2020-12-28 DIAGNOSIS — A41.9: Primary | ICD-10-CM

## 2020-12-28 DIAGNOSIS — E86.1: ICD-10-CM

## 2020-12-28 DIAGNOSIS — E78.5: ICD-10-CM

## 2020-12-28 DIAGNOSIS — E87.5: ICD-10-CM

## 2020-12-28 DIAGNOSIS — Z80.1: ICD-10-CM

## 2020-12-28 DIAGNOSIS — E83.42: ICD-10-CM

## 2020-12-28 DIAGNOSIS — N17.0: ICD-10-CM

## 2020-12-28 DIAGNOSIS — Z86.718: ICD-10-CM

## 2020-12-28 DIAGNOSIS — Z79.2: ICD-10-CM

## 2020-12-28 DIAGNOSIS — M05.10: ICD-10-CM

## 2020-12-28 DIAGNOSIS — I50.9: ICD-10-CM

## 2020-12-28 DIAGNOSIS — E03.9: ICD-10-CM

## 2020-12-28 DIAGNOSIS — Z79.899: ICD-10-CM

## 2020-12-28 DIAGNOSIS — Z87.01: ICD-10-CM

## 2020-12-28 DIAGNOSIS — E66.01: ICD-10-CM

## 2020-12-28 DIAGNOSIS — J44.0: ICD-10-CM

## 2020-12-28 DIAGNOSIS — N18.9: ICD-10-CM

## 2020-12-28 LAB
ALBUMIN SERPL-MCNC: 2.8 G/DL (ref 3.5–5)
ALP SERPL-CCNC: 116 U/L (ref 38–126)
ALT SERPL-CCNC: 14 U/L (ref 4–49)
ANION GAP SERPL CALC-SCNC: 15 MMOL/L
APTT BLD: 27.6 SEC (ref 22–30)
AST SERPL-CCNC: 30 U/L (ref 17–59)
BASOPHILS # BLD AUTO: 0.1 K/UL (ref 0–0.2)
BASOPHILS NFR BLD AUTO: 1 %
BILIRUB UR QL STRIP.AUTO: (no result)
BUN SERPL-SCNC: 35 MG/DL (ref 9–20)
CALCIUM SPEC-MCNC: 8 MG/DL (ref 8.4–10.2)
CHLORIDE SERPL-SCNC: 102 MMOL/L (ref 98–107)
CO2 SERPL-SCNC: 18 MMOL/L (ref 22–30)
EOSINOPHIL # BLD AUTO: 0.7 K/UL (ref 0–0.7)
EOSINOPHIL NFR BLD AUTO: 5 %
ERYTHROCYTE [DISTWIDTH] IN BLOOD BY AUTOMATED COUNT: 3.83 M/UL (ref 4.3–5.9)
ERYTHROCYTE [DISTWIDTH] IN BLOOD: 17.4 % (ref 11.5–15.5)
GLUCOSE BLD-MCNC: 105 MG/DL (ref 75–99)
GLUCOSE SERPL-MCNC: 106 MG/DL (ref 74–99)
HCT VFR BLD AUTO: 33.5 % (ref 39–53)
HGB BLD-MCNC: 10.3 GM/DL (ref 13–17.5)
HYALINE CASTS UR QL AUTO: 11 /LPF (ref 0–2)
INR PPP: 1.2 (ref ?–1.2)
LYMPHOCYTES # SPEC AUTO: 1.6 K/UL (ref 1–4.8)
LYMPHOCYTES NFR SPEC AUTO: 10 %
MAGNESIUM SPEC-SCNC: 2.2 MG/DL (ref 1.6–2.3)
MCH RBC QN AUTO: 26.9 PG (ref 25–35)
MCHC RBC AUTO-ENTMCNC: 30.8 G/DL (ref 31–37)
MCV RBC AUTO: 87.5 FL (ref 80–100)
MONOCYTES # BLD AUTO: 0.5 K/UL (ref 0–1)
MONOCYTES NFR BLD AUTO: 4 %
NEUTROPHILS # BLD AUTO: 12.1 K/UL (ref 1.3–7.7)
NEUTROPHILS NFR BLD AUTO: 79 %
PH UR: 5 [PH] (ref 5–8)
PLATELET # BLD AUTO: 262 K/UL (ref 150–450)
POTASSIUM SERPL-SCNC: 3.9 MMOL/L (ref 3.5–5.1)
PROT SERPL-MCNC: 6.2 G/DL (ref 6.3–8.2)
PROT UR QL: (no result)
PT BLD: 12.4 SEC (ref 9–12)
RBC UR QL: 4 /HPF (ref 0–5)
SODIUM SERPL-SCNC: 135 MMOL/L (ref 137–145)
SP GR UR: 1.03 (ref 1–1.03)
UROBILINOGEN UR QL STRIP: <2 MG/DL (ref ?–2)
WBC # BLD AUTO: 15.3 K/UL (ref 3.8–10.6)
WBC #/AREA URNS HPF: 10 /HPF (ref 0–5)

## 2020-12-28 PROCEDURE — 83605 ASSAY OF LACTIC ACID: CPT

## 2020-12-28 PROCEDURE — 87635 SARS-COV-2 COVID-19 AMP PRB: CPT

## 2020-12-28 PROCEDURE — 74019 RADEX ABDOMEN 2 VIEWS: CPT

## 2020-12-28 PROCEDURE — 87040 BLOOD CULTURE FOR BACTERIA: CPT

## 2020-12-28 PROCEDURE — 84484 ASSAY OF TROPONIN QUANT: CPT

## 2020-12-28 PROCEDURE — 36415 COLL VENOUS BLD VENIPUNCTURE: CPT

## 2020-12-28 PROCEDURE — 76770 US EXAM ABDO BACK WALL COMP: CPT

## 2020-12-28 PROCEDURE — 96368 THER/DIAG CONCURRENT INF: CPT

## 2020-12-28 PROCEDURE — 94640 AIRWAY INHALATION TREATMENT: CPT

## 2020-12-28 PROCEDURE — 99291 CRITICAL CARE FIRST HOUR: CPT

## 2020-12-28 PROCEDURE — 90935 HEMODIALYSIS ONE EVALUATION: CPT

## 2020-12-28 PROCEDURE — 71046 X-RAY EXAM CHEST 2 VIEWS: CPT

## 2020-12-28 PROCEDURE — 71250 CT THORAX DX C-: CPT

## 2020-12-28 PROCEDURE — 80048 BASIC METABOLIC PNL TOTAL CA: CPT

## 2020-12-28 PROCEDURE — 81001 URINALYSIS AUTO W/SCOPE: CPT

## 2020-12-28 PROCEDURE — 85027 COMPLETE CBC AUTOMATED: CPT

## 2020-12-28 PROCEDURE — 71045 X-RAY EXAM CHEST 1 VIEW: CPT

## 2020-12-28 PROCEDURE — 93005 ELECTROCARDIOGRAM TRACING: CPT

## 2020-12-28 PROCEDURE — 96367 TX/PROPH/DG ADDL SEQ IV INF: CPT

## 2020-12-28 PROCEDURE — 83735 ASSAY OF MAGNESIUM: CPT

## 2020-12-28 PROCEDURE — 96366 THER/PROPH/DIAG IV INF ADDON: CPT

## 2020-12-28 PROCEDURE — 85379 FIBRIN DEGRADATION QUANT: CPT

## 2020-12-28 PROCEDURE — 85610 PROTHROMBIN TIME: CPT

## 2020-12-28 PROCEDURE — 86706 HEP B SURFACE ANTIBODY: CPT

## 2020-12-28 PROCEDURE — 85025 COMPLETE CBC W/AUTO DIFF WBC: CPT

## 2020-12-28 PROCEDURE — 96365 THER/PROPH/DIAG IV INF INIT: CPT

## 2020-12-28 PROCEDURE — 87340 HEPATITIS B SURFACE AG IA: CPT

## 2020-12-28 PROCEDURE — 80053 COMPREHEN METABOLIC PANEL: CPT

## 2020-12-28 PROCEDURE — 85730 THROMBOPLASTIN TIME PARTIAL: CPT

## 2020-12-28 PROCEDURE — 83880 ASSAY OF NATRIURETIC PEPTIDE: CPT

## 2020-12-28 RX ADMIN — NOREPINEPHRINE BITARTRATE ONE MLS/HR: 1 INJECTION, SOLUTION, CONCENTRATE INTRAVENOUS at 12:45

## 2020-12-28 RX ADMIN — OXYCODONE HYDROCHLORIDE AND ACETAMINOPHEN SCH MG: 500 TABLET ORAL at 20:02

## 2020-12-28 RX ADMIN — METHYLPREDNISOLONE SODIUM SUCCINATE SCH MG: 40 INJECTION, POWDER, FOR SOLUTION INTRAMUSCULAR; INTRAVENOUS at 20:02

## 2020-12-28 RX ADMIN — IPRATROPIUM BROMIDE AND ALBUTEROL SULFATE SCH ML: .5; 3 SOLUTION RESPIRATORY (INHALATION) at 20:27

## 2020-12-28 RX ADMIN — NOREPINEPHRINE BITARTRATE ONE MLS/HR: 1 INJECTION, SOLUTION, CONCENTRATE INTRAVENOUS at 20:07

## 2020-12-28 NOTE — ED
SOB HPI





- General


Source: EMS


Mode of arrival: EMS


Limitations: no limitations





<Paula Ferrara - Last Filed: 20 13:14>





<Justin Morales - Last Filed: 20 13:28>





- General


Chief Complaint: Shortness of Breath


Stated Complaint: SOB


Time Seen by Provider: 20 10:19





- History of Present Illness


Initial Comments: 


60-year-old male history of hypertension dyslipidemia, left-sided hyphema in the

past from suspected rheumatoid disease, recent diagnosis of pneumonia and 

discharged from the hospital presents emergency room today for chief complaint 

of worsening shortness of breath.  Patient states that he was admitted and 

diagnosed with pneumonia after experiencing shortness of breath approximately o

ne week ago.  Patient states he is discharged on antibiotic steroids and 

breathing treatments and states he do not seem to be helping.  She states his 

lips are extremely dry he has not been eating or drinking very much for the past

few days.  Patient states he cannot remember the last time he urinated.  Patient

denies fevers cough he states he does have some difficulty lying flat.  He 

states he has noticed his legs are more swollen than usual.  Patient denies any 

chest pain pressure jaw pain or arm pain and back pain.  He denies any 

indigestion nausea upper abdominal pain.  Patient denies any pain deep 

inspiration hemoptysis.  Patient states he had a DVT in the past secondary to a 

PICC line he states he is not currently on any anticoagulation therapy. Pt 

denies additional complaints. Upon arrival pt hypoxic but can speak complete 

sentences and went up to 100%  on 5L nasal cannula. 


 (Paula Ferrara)





- Related Data


                                Home Medications











 Medication  Instructions  Recorded  Confirmed


 


Levothyroxine Sodium [Synthroid] 150 mcg PO DAILY 17


 


Albuterol Inhaler [Ventolin Hfa 2 puff INHALATION RT-QID PRN 06/10/20 12/28/20





Inhaler]   


 


Losartan-Hctz 50-12.5 mg [Hyzaar 1 tab PO DAILY 06/10/20 12/28/20





50-12.5]   


 


Metoprolol Tartrate [Lopressor] 50 mg PO HS 06/10/20 12/28/20


 


Ascorbic Acid [Vitamin C] 2,000 mg PO HS 20


 


Cholecalciferol [Vitamin D3 (25 2,000 unit PO HS 20





Mcg = 1000 Iu)]   


 


Ipratropium-Albuterol Nebulize 3 ml INHALATION RT-QID PRN 20





[Duoneb 0.5 mg-3 mg/3 ml Soln]   








                                  Previous Rx's











 Medication  Instructions  Recorded


 


Azithromycin [Zithromax] 500 mg PO DAILY 10 Days #10 tab 20


 


Cefdinir [Omnicef] 300 mg PO Q12HR 10 Days #20 capsule 20











                                    Allergies











Allergy/AdvReac Type Severity Reaction Status Date / Time


 


No Known Allergies Allergy   Verified 20 10:37














Review of Systems


ROS Other: All systems not noted in ROS Statement are negative.





<Paula Ferrara - Last Filed: 20 13:14>


ROS Other: All systems not noted in ROS Statement are negative.





<Justin Morales - Last Filed: 20 13:28>


ROS Statement: 


Those systems with pertinent positive or pertinent negative responses have been 

documented in the HPI.








Past Medical History


Past Medical History: Deep Vein Thrombosis (DVT), Hypertension, Rheumatoid 

Arthritis (RA), Thyroid Disorder


Additional Past Medical History / Comment(s): Mult Rt foot infections after 

surgery, last time 2019, had PICC Line for AB and developed bloot clot. Hx 

kidney stones. 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

 pneumonia.


History of Any Multi-Drug Resistant Organisms: None Reported


Past Surgical History: Back Surgery, Tonsillectomy


Additional Past Surgical History / Comment(s): Right foot surgery, repair 

herniated disc, plate removed rt foot.   CTR donny,bronchoscopy X4


Past Anesthesia/Blood Transfusion Reactions: Previous Problems w/ Anesthesia


Additional Past Anesthesia/Blood Transfusion Reaction / Comment(s): slow to 

awaken x1.


Past Psychological History: No Psychological Hx Reported


Smoking Status: Former smoker


Past Alcohol Use History: None Reported


Past Drug Use History: None Reported





- Past Family History


  ** Mother


Family Medical History: Cancer


Additional Family Medical History / Comment(s): lung with mets brain





  ** Father


Family Medical History: No Reported History


Additional Family Medical History / Comment(s): states, "he just ."





<Paula Ferrara - Last Filed: 12/28/20 13:14>





General Exam


Limitations: no limitations





<VjlarsPaula JUSTIN - Last Filed: 20 13:14>





- General Exam Comments


Initial Comments: 


General:  The patient is awake and alert, in no distress


Eye:  Pupils are equal, round and reactive to light, extra-ocular movements are 

intact.  No nystagmus.  There is normal conjunctiva bilaterally.  No signs of 

icterus.  


Ears, nose, mouth and throat:  There are moist mucous membranes and no oral 

lesions. 


Neck:  The neck is supple, there is no tenderness or JVD.  


Cardiovascular:  There is a regular rate and rhythm. No murmur, rub or gallop is

 appreciated.


Respiratory:  Lungs are clear to auscultation, respirations are non-labored, 

breath sounds are equal. Mild wheeze, diffuse crackles. NO retractions or 

abdominal breathing.


Gastrointestinal:  Soft, non-distended, non-tender abdomen without masses or 

organomegaly noted. There is no rebound or guarding present. 


Musculoskeletal:  Normal ROM, no tenderness.  Strength 5/5. Sensation intact. 

Radial and DP pulses equal bilaterally 2+.  


Neurological:  A&O x 3. CN II-XII intact grossly, There are no obvious motor or 

sensory deficits. Coordination appears grossly intact. Speech is normal.


Skin:  Skin is warm and dry and no rashes or lesions are noted. There is siffuse

 LE edema, left slightly worse than the right.


Psychiatric:  Cooperative, appropriate mood & affect, normal judgment.  


 (Paula Ferrara)





Course





<AlemPaula JUSTIN - Last Filed: 20 13:14>





                                   Vital Signs











  20





  10:05 11:33 12:16


 


Temperature 97.5 F L  


 


Pulse Rate 107 H 96 98


 


Respiratory 18 18 22





Rate   


 


Blood Pressure 99/56 75/48 91/52


 


O2 Sat by Pulse 89 L 96 96





Oximetry   














  20





  12:57


 


Temperature 


 


Pulse Rate 98


 


Respiratory 20





Rate 


 


Blood Pressure 74/51


 


O2 Sat by Pulse 96





Oximetry 














- Reevaluation(s)


Reevaluation #1: 


Nurse updated BP, low, discussed with attending Dr Morales immediately-- he will 

assist with choice of vasopressor.


20 11:56


 (Paula Ferrara)


Reevaluation #2: 


nephrology and vascular on consult, Dr Morales spoke with nephrology. CUrrently 

contacted intensivist


20 13:14


 (Paula Ferrara)





Medical Decision Making





- Lab Data


Result diagrams: 


                                 20 10:32





                                 20 10:32





<Paula Ferrara - Last Filed: 20 13:14>





- Lab Data


Result diagrams: 


                                 20 10:32





                                 20 10:32





<Justin Morales - Last Filed: 20 13:28>





- Medical Decision Making


Surprisingly pleasant nontoxic appearing 59yo male. cc dyspnea. hx of recurrent 

left-sided hyphema. recent pneumonia diagnosis. appears to be in ARF of at this 

time unknown etiology, no production of urine in 3-4 days and patient 

catheterization in the emergency department revealed only 100 mL. US no 

obstructive process. no hydronephrosis. pt CXR concerning for pneumonia, vscular

 congestion. Pt case discussed at length and great detail with attending Dr. Morales who recommended peripheral levophed for BP management and IV abx (levoquin 

and zosyn).  (Paula Ferrara)


I, Bonifacio Morales, personally saw and examined the patient. I have reviewed 

and agree with the PA findings, including all diagnostic interpretations and 

treatment plans as written unless otherwise stated. I was present for the key 

portions of any procedures performed and  the inclusive time noted for any 

critical care statement. 





I spoke with Dr. Pearl about dialyzing the patient I also spoke with Dr. Marino 

about a dialysis  catheter.  I also spoke with Dr. Vides about sending the 

patient to the intensive care unit.  Patient was started on Levophed for 

hypotension. (Justin Morales)





- Lab Data





                                   Lab Results











  20 Range/Units





  10:32 10:32 10:32 


 


WBC  15.3 H    (3.8-10.6)  k/uL


 


RBC  3.83 L    (4.30-5.90)  m/uL


 


Hgb  10.3 L    (13.0-17.5)  gm/dL


 


Hct  33.5 L    (39.0-53.0)  %


 


MCV  87.5    (80.0-100.0)  fL


 


MCH  26.9    (25.0-35.0)  pg


 


MCHC  30.8 L    (31.0-37.0)  g/dL


 


RDW  17.4 H    (11.5-15.5)  %


 


Plt Count  262    (150-450)  k/uL


 


MPV  8.8    


 


Neutrophils %  79    %


 


Lymphocytes %  10    %


 


Monocytes %  4    %


 


Eosinophils %  5    %


 


Basophils %  1    %


 


Neutrophils #  12.1 H    (1.3-7.7)  k/uL


 


Lymphocytes #  1.6    (1.0-4.8)  k/uL


 


Monocytes #  0.5    (0-1.0)  k/uL


 


Eosinophils #  0.7    (0-0.7)  k/uL


 


Basophils #  0.1    (0-0.2)  k/uL


 


Hypochromasia  Marked    


 


Anisocytosis  Slight    


 


PT   12.4 H   (9.0-12.0)  sec


 


INR   1.2 H   (<1.2)  


 


APTT   27.6   (22.0-30.0)  sec


 


D-Dimer   3.20 H   (<0.60)  mg/L FEU


 


Sodium    135 L  (137-145)  mmol/L


 


Potassium    3.9  (3.5-5.1)  mmol/L


 


Chloride    102  ()  mmol/L


 


Carbon Dioxide    18 L  (22-30)  mmol/L


 


Anion Gap    15  mmol/L


 


BUN    35 H  (9-20)  mg/dL


 


Creatinine    11.38 H*  (0.66-1.25)  mg/dL


 


Est GFR (CKD-EPI)AfAm    5  (>60 ml/min/1.73 sqM)  


 


Est GFR (CKD-EPI)NonAf    4  (>60 ml/min/1.73 sqM)  


 


Glucose    106 H  (74-99)  mg/dL


 


Lactic Ac Sepsis Rflx     


 


Plasma Lactic Acid Naif     (0.7-2.0)  mmol/L


 


Calcium    8.0 L  (8.4-10.2)  mg/dL


 


Magnesium    2.2  (1.6-2.3)  mg/dL


 


Total Bilirubin    0.7  (0.2-1.3)  mg/dL


 


AST    30  (17-59)  U/L


 


ALT    14  (4-49)  U/L


 


Alkaline Phosphatase    116  ()  U/L


 


Troponin I     (0.000-0.034)  ng/mL


 


NT-Pro-B Natriuret Pep     pg/mL


 


Total Protein    6.2 L  (6.3-8.2)  g/dL


 


Albumin    2.8 L  (3.5-5.0)  g/dL


 


Urine Color     


 


Urine Appearance     (Clear)  


 


Urine pH     (5.0-8.0)  


 


Ur Specific Gravity     (1.001-1.035)  


 


Urine Protein     (Negative)  


 


Urine Glucose (UA)     (Negative)  


 


Urine Ketones     (Negative)  


 


Urine Blood     (Negative)  


 


Urine Nitrite     (Negative)  


 


Urine Bilirubin     (Negative)  


 


Urine Urobilinogen     (<2.0)  mg/dL


 


Ur Leukocyte Esterase     (Negative)  


 


Urine RBC     (0-5)  /hpf


 


Urine WBC     (0-5)  /hpf


 


Amorphous Sediment     (None)  /hpf


 


Urine Bacteria     (None)  /hpf


 


Hyaline Casts     (0-2)  /lpf


 


Urine Mucus     (None)  /hpf


 


Coronavirus (PCR)     (Not Detectd)  














  20 Range/Units





  10:32 10:32 10:32 


 


WBC     (3.8-10.6)  k/uL


 


RBC     (4.30-5.90)  m/uL


 


Hgb     (13.0-17.5)  gm/dL


 


Hct     (39.0-53.0)  %


 


MCV     (80.0-100.0)  fL


 


MCH     (25.0-35.0)  pg


 


MCHC     (31.0-37.0)  g/dL


 


RDW     (11.5-15.5)  %


 


Plt Count     (150-450)  k/uL


 


MPV     


 


Neutrophils %     %


 


Lymphocytes %     %


 


Monocytes %     %


 


Eosinophils %     %


 


Basophils %     %


 


Neutrophils #     (1.3-7.7)  k/uL


 


Lymphocytes #     (1.0-4.8)  k/uL


 


Monocytes #     (0-1.0)  k/uL


 


Eosinophils #     (0-0.7)  k/uL


 


Basophils #     (0-0.2)  k/uL


 


Hypochromasia     


 


Anisocytosis     


 


PT     (9.0-12.0)  sec


 


INR     (<1.2)  


 


APTT     (22.0-30.0)  sec


 


D-Dimer     (<0.60)  mg/L FEU


 


Sodium     (137-145)  mmol/L


 


Potassium     (3.5-5.1)  mmol/L


 


Chloride     ()  mmol/L


 


Carbon Dioxide     (22-30)  mmol/L


 


Anion Gap     mmol/L


 


BUN     (9-20)  mg/dL


 


Creatinine     (0.66-1.25)  mg/dL


 


Est GFR (CKD-EPI)AfAm     (>60 ml/min/1.73 sqM)  


 


Est GFR (CKD-EPI)NonAf     (>60 ml/min/1.73 sqM)  


 


Glucose     (74-99)  mg/dL


 


Lactic Ac Sepsis Rflx     


 


Plasma Lactic Acid Naif  2.1 H*    (0.7-2.0)  mmol/L


 


Calcium     (8.4-10.2)  mg/dL


 


Magnesium     (1.6-2.3)  mg/dL


 


Total Bilirubin     (0.2-1.3)  mg/dL


 


AST     (17-59)  U/L


 


ALT     (4-49)  U/L


 


Alkaline Phosphatase     ()  U/L


 


Troponin I   0.040 H*   (0.000-0.034)  ng/mL


 


NT-Pro-B Natriuret Pep    4190  pg/mL


 


Total Protein     (6.3-8.2)  g/dL


 


Albumin     (3.5-5.0)  g/dL


 


Urine Color     


 


Urine Appearance     (Clear)  


 


Urine pH     (5.0-8.0)  


 


Ur Specific Gravity     (1.001-1.035)  


 


Urine Protein     (Negative)  


 


Urine Glucose (UA)     (Negative)  


 


Urine Ketones     (Negative)  


 


Urine Blood     (Negative)  


 


Urine Nitrite     (Negative)  


 


Urine Bilirubin     (Negative)  


 


Urine Urobilinogen     (<2.0)  mg/dL


 


Ur Leukocyte Esterase     (Negative)  


 


Urine RBC     (0-5)  /hpf


 


Urine WBC     (0-5)  /hpf


 


Amorphous Sediment     (None)  /hpf


 


Urine Bacteria     (None)  /hpf


 


Hyaline Casts     (0-2)  /lpf


 


Urine Mucus     (None)  /hpf


 


Coronavirus (PCR)     (Not Detectd)  














  20 Range/Units





  10:54 11:38 12:20 


 


WBC     (3.8-10.6)  k/uL


 


RBC     (4.30-5.90)  m/uL


 


Hgb     (13.0-17.5)  gm/dL


 


Hct     (39.0-53.0)  %


 


MCV     (80.0-100.0)  fL


 


MCH     (25.0-35.0)  pg


 


MCHC     (31.0-37.0)  g/dL


 


RDW     (11.5-15.5)  %


 


Plt Count     (150-450)  k/uL


 


MPV     


 


Neutrophils %     %


 


Lymphocytes %     %


 


Monocytes %     %


 


Eosinophils %     %


 


Basophils %     %


 


Neutrophils #     (1.3-7.7)  k/uL


 


Lymphocytes #     (1.0-4.8)  k/uL


 


Monocytes #     (0-1.0)  k/uL


 


Eosinophils #     (0-0.7)  k/uL


 


Basophils #     (0-0.2)  k/uL


 


Hypochromasia     


 


Anisocytosis     


 


PT     (9.0-12.0)  sec


 


INR     (<1.2)  


 


APTT     (22.0-30.0)  sec


 


D-Dimer     (<0.60)  mg/L FEU


 


Sodium     (137-145)  mmol/L


 


Potassium     (3.5-5.1)  mmol/L


 


Chloride     ()  mmol/L


 


Carbon Dioxide     (22-30)  mmol/L


 


Anion Gap     mmol/L


 


BUN     (9-20)  mg/dL


 


Creatinine     (0.66-1.25)  mg/dL


 


Est GFR (CKD-EPI)AfAm     (>60 ml/min/1.73 sqM)  


 


Est GFR (CKD-EPI)NonAf     (>60 ml/min/1.73 sqM)  


 


Glucose     (74-99)  mg/dL


 


Lactic Ac Sepsis Rflx  Y    


 


Plasma Lactic Acid Naif     (0.7-2.0)  mmol/L


 


Calcium     (8.4-10.2)  mg/dL


 


Magnesium     (1.6-2.3)  mg/dL


 


Total Bilirubin     (0.2-1.3)  mg/dL


 


AST     (17-59)  U/L


 


ALT     (4-49)  U/L


 


Alkaline Phosphatase     ()  U/L


 


Troponin I     (0.000-0.034)  ng/mL


 


NT-Pro-B Natriuret Pep     pg/mL


 


Total Protein     (6.3-8.2)  g/dL


 


Albumin     (3.5-5.0)  g/dL


 


Urine Color    Dark Brown  


 


Urine Appearance    Cloudy  (Clear)  


 


Urine pH    5.0  (5.0-8.0)  


 


Ur Specific Gravity    1.028  (1.001-1.035)  


 


Urine Protein    1+ H  (Negative)  


 


Urine Glucose (UA)    Trace H  (Negative)  


 


Urine Ketones    Trace H  (Negative)  


 


Urine Blood    Small H  (Negative)  


 


Urine Nitrite    Negative  (Negative)  


 


Urine Bilirubin    1+ H  (Negative)  


 


Urine Urobilinogen    <2.0  (<2.0)  mg/dL


 


Ur Leukocyte Esterase    Trace H  (Negative)  


 


Urine RBC    4  (0-5)  /hpf


 


Urine WBC    10 H  (0-5)  /hpf


 


Amorphous Sediment    Rare H  (None)  /hpf


 


Urine Bacteria    Rare H  (None)  /hpf


 


Hyaline Casts    11 H  (0-2)  /lpf


 


Urine Mucus    Rare H  (None)  /hpf


 


Coronavirus (PCR)   Not Detected   (Not Detectd)  














Critical Care Time


Critical Care Time: Yes


Total Critical Care Time: 35





<Justin Morales - Last Filed: 20 13:28>





Disposition


Is patient prescribed a controlled substance at d/c from ED?: No


Time of Disposition: 13:15


Decision to Admit Reason: Admit from EC


Decision Date: 20


Decision Time: 13:16





<Paula Ferrara - Last Filed: 20 13:14>





<Justin Morales - Last Filed: 20 13:28>


Clinical Impression: 


 Acute renal failure, Pneumonia, Pleural effusion, Leg swelling, Dyspnea, Passes

 no urine, Hypomagnesemia, Hypotension, Leukocytosis





Disposition: ADMITTED AS IP TO THIS [facility]


Condition: Serious


Referrals: 


Walker Smith DO [Primary Care Provider] - 1-2 days

## 2020-12-28 NOTE — US
EXAMINATION TYPE: US venous doppler duplex LE LT

 

DATE OF EXAM: 12/28/2020 10:29 AM

 

COMPARISON: NONE

 

CLINICAL HISTORY: swelling.

 

SIDE PERFORMED: Left  

 

TECHNIQUE:  The lower extremity deep venous system is examined utilizing real time linear array sonog
paul with graded compression, doppler sonography and color-flow sonography.

 

VESSELS IMAGED:

Common Femoral Vein

Deep Femoral Vein

Greater Saphenous Vein *

Femoral Vein

Popliteal Vein

Small Saphenous Vein *

Proximal Calf Veins

(* superficial vessels)

 

 

 

Rouleaux flow noted throughout leg.

 

Left Leg:  Currently appears negative for DVT.

 

Grayscale, color doppler, spectral doppler imaging performed of the deep veins of the left lower extr
emity.  There is normal flow, compressibility, vascular waveforms.

 

 

IMPRESSION:  No ultrasound evidence for acute DVT in left lower extremity.

## 2020-12-28 NOTE — XR
EXAMINATION TYPE: XR chest 2V

 

DATE OF EXAM: 12/28/2020

 

COMPARISON: Chest x-ray 4 days ago on older studies. Most recent CT Lisa 10, 2020

 

HISTORY: History of rheumatoid lung disease. Shortness of breath.

 

TECHNIQUE:  Frontal and lateral views of the chest are obtained.

 

FINDINGS:  Persistent azygos lobe/fissure. Osseous structures remain intact. Persistent small sized p
osterior bilateral pleural spaces containing air and fluid left slightly larger than right seen best 
on lateral x-ray. Background cardiomegaly and chronic parenchymal changes with increasing bilateral o
pacities.

 

IMPRESSION:  Worsening bilateral edema and/or infiltrates, correlate for worsening CHF exacerbation o
r bilateral acute infectious spread or progression. Background pleural collections containing air and
 fluid posterior lung bases is presumed product of rheumatoid disease. Correlate clinically.

## 2020-12-28 NOTE — P.PCN
Description of Procedure: 


Preoperative diagnoses is acute chronic renal failure with be on of 35 and 

creatinine of 11.38 posterior diabetes same





Procedure patient was seen in the emergency room right groin were prepped and 

draped applied usual sterile manner.  1% lidocaine for an infected the right 

groin ultrasound-guided micropuncture introducer right femoral vein.  After that

4-Georgian dilator advanced on the top of guidewire then we passed a regular 

guidewire without any resistance dilator was advanced and then we placed a 

dialysis catheter free flow noted flushed with heparin saline and Hep-Lock 

secured with 3-0 nylon patient are to the procedure well

## 2020-12-28 NOTE — XR
EXAMINATION TYPE: XR chest 2V

 

DATE OF EXAM: 12/28/2020

 

COMPARISON: Chest x-ray from yesterday and older studies. Chest CT Lisa 10, 2020.

 

HISTORY: Pneumonia.

 

TECHNIQUE:  Frontal and lateral views of the chest are obtained.

 

FINDINGS:  Persistent azygos lobe/fissure. Osseous structures remain intact. Persistent small sized p
osterior bilateral pleural spaces containing air and fluid left slightly larger than right. Backgroun
d mild cardiomegaly with central opacities bilaterally. Fluid-filled layering in fissures is noted. P
osterior basilar volume loss is redemonstrated.

 

IMPRESSION:  Chronic parenchymal changes and cardiomegaly with persistent bilateral perihilar edema a
nd/or infiltrates. Latter similar to most recent x-ray. Favor CHF exacerbation. 5 focal infiltrates n
ot excluded. There are posterior basilar pleural collections of air and fluid unchanged from multiple
 prior studies.

## 2020-12-28 NOTE — P.HPIM
History of Present Illness


H&P Date: 20


Chief Complaint: Generalized weakness fatigue





This is a 60-year-old obese male with past medical history of dyslipidemia 

hypertension hypertensive cardiovascular disease also has a history of the 

rheumatoid arthritis with recent pneumonia, came into the hospital with increas

ing shortness of breath high doesn't fatigue, it appears that patient presented 

with pneumonia a week ago was treated with outpatient antibiotics and steroids 

symptoms did not resolve but continued to get worse, he start retaining more 

fluids increased swelling of the lower extremity has been noted, his significant

labs were elevated BUN and creatinine nephrology has been consulted along with 

vascular surgery to put hemodialysis catheter and start hemodialysis currently 

at the time of evaluation patient is undergoing hemodialysis he is on small dose

7 mics of levo fed to keep blood pressure/map over 65-75, 





Review of Systems


All systems: negative





Past Medical History


Past Medical History: Deep Vein Thrombosis (DVT), Hypertension, Rheumatoid 

Arthritis (RA), Thyroid Disorder


Additional Past Medical History / Comment(s): Mult Rt foot infections after 

surgery, last time 2019, had PICC Line for AB and developed bloot clot. Hx 

kidney stones. 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

pneumonia.


History of Any Multi-Drug Resistant Organisms: None Reported


Past Surgical History: Back Surgery, Tonsillectomy


Additional Past Surgical History / Comment(s): Right foot surgery, repair javed

iated disc, plate removed rt foot.   CTR donny,bronchoscopy X4


Past Anesthesia/Blood Transfusion Reactions: Previous Problems w/ Anesthesia


Additional Past Anesthesia/Blood Transfusion Reaction / Comment(s): slow to 

awaken x1.


Past Psychological History: No Psychological Hx Reported


Smoking Status: Former smoker


Past Alcohol Use History: None Reported


Additional Past Alcohol Use History / Comment(s): QUIT SMOKING , smoked 15 

years, 1 - 1 1/2 ppd


Past Drug Use History: None Reported





- Past Family History


  ** Mother


Family Medical History: Cancer


Additional Family Medical History / Comment(s): lung with mets brain





  ** Father


Family Medical History: No Reported History


Additional Family Medical History / Comment(s): states, "he just ."





Medications and Allergies


                                Home Medications











 Medication  Instructions  Recorded  Confirmed  Type


 


Levothyroxine Sodium [Synthroid] 150 mcg PO DAILY 17 History


 


Albuterol Inhaler [Ventolin Hfa 2 puff INHALATION RT-QID PRN 06/10/20 12/28/20 

History





Inhaler]    


 


Losartan-Hctz 50-12.5 mg [Hyzaar 1 tab PO DAILY 06/10/20 12/28/20 History





50-12.5]    


 


Metoprolol Tartrate [Lopressor] 50 mg PO HS 06/10/20 12/28/20 History


 


Ascorbic Acid [Vitamin C] 2,000 mg PO HS 20 History


 


Cholecalciferol [Vitamin D3 (25 2,000 unit PO HS 20 History





Mcg = 1000 Iu)]    


 


Ipratropium-Albuterol Nebulize 3 ml INHALATION RT-QID PRN 20 

History





[Duoneb 0.5 mg-3 mg/3 ml Soln]    


 


Azithromycin [Zithromax] 500 mg PO DAILY 10 Days #10 tab 20 Rx


 


Cefdinir [Omnicef] 300 mg PO Q12HR 10 Days #20 capsule 20 Rx








                                    Allergies











Allergy/AdvReac Type Severity Reaction Status Date / Time


 


No Known Allergies Allergy   Verified 20 10:37














Physical Exam


Vitals: 


                                   Vital Signs











  Temp Pulse Resp BP Pulse Ox


 


 20 17:30   104 H  25 H  81/46  96


 


 20 17:00   105 H  19  102/70  93 L


 


 20 16:30   101 H  26 H  97/52  92 L


 


 20 16:00  97.7 F  98  18  84/48  94 L


 


 20 15:33  97.2 F L    


 


 20 15:30   95  22  103/69  98


 


 20 15:00  97.2 F L  97  22  94/82  96


 


 20 14:52  98.6 F  96  20  94/68  97


 


 20 13:58   96  22  95/52  96


 


 20 12:57   98  20  74/51  96


 


 20 12:16   98  22  91/52  96


 


 20 11:33   96  18  75/48  96


 


 20 10:05  97.5 F L  107 H  18  99/56  89 L








                                Intake and Output











 20





 06:59 14:59 22:59


 


Intake Total  100 518.001


 


Output Total   110


 


Balance  100 408.001


 


Intake:   


 


  IV  100 400


 


    Sodium Chloride 0.9% 1,  100 400





    000 ml @ 75 mls/hr IV .   





    [id number] STA Rx#:[id number]   


 


  Intake, IV Titration   118.001





  Amount   


 


    Norepinephrine 4 mg In   118.001





    Sodium Chloride 0.9% 250   





    ml @ 0.05 MCG/KG/MIN 20.   





    738 mls/hr IV .[id number]   





    ONE Rx#:[id number]   


 


Output:   


 


  Urine   110


 


Other:   


 


  Voiding Method   Indwelling Catheter


 


  Weight  108.862 kg 110.7 kg














- Constitutional


General appearance: morbidly obese





- EENT


Eyes: EOMI, PERRLA


Ears: bilateral: normal





- Neck


Carotids: bilateral: upstroke normal


Thyroid: bilateral: normal size





- Respiratory


Respiratory: bilateral: diminished





- Cardiovascular


Rhythm: regular


Heart sounds: normal: S1, S2





- Gastrointestinal


General gastrointestinal: distended





- Integumentary


Integumentary: decreased turgor





- Neurologic


Neurologic: CNII-XII intact





- Musculoskeletal


Musculoskeletal: gait normal, generalized weakness, strength equal bilaterally





- Psychiatric


Psychiatric: A&O x's 3, appropriate affect, intact judgment & insight





Results


CBC & Chem 7: 


                                 20 10:32





                                 20 10:32


Labs: 


                  Abnormal Lab Results - Last 24 Hours (Table)











  20 Range/Units





  10:32 10:32 10:32 


 


WBC  15.3 H    (3.8-10.6)  k/uL


 


RBC  3.83 L    (4.30-5.90)  m/uL


 


Hgb  10.3 L    (13.0-17.5)  gm/dL


 


Hct  33.5 L    (39.0-53.0)  %


 


MCHC  30.8 L    (31.0-37.0)  g/dL


 


RDW  17.4 H    (11.5-15.5)  %


 


Neutrophils #  12.1 H    (1.3-7.7)  k/uL


 


PT   12.4 H   (9.0-12.0)  sec


 


INR   1.2 H   (<1.2)  


 


D-Dimer   3.20 H   (<0.60)  mg/L FEU


 


Sodium    135 L  (137-145)  mmol/L


 


Carbon Dioxide    18 L  (22-30)  mmol/L


 


BUN    35 H  (9-20)  mg/dL


 


Creatinine    11.38 H*  (0.66-1.25)  mg/dL


 


Glucose    106 H  (74-99)  mg/dL


 


POC Glucose (mg/dL)     (75-99)  mg/dL


 


Plasma Lactic Acid Naif     (0.7-2.0)  mmol/L


 


Calcium    8.0 L  (8.4-10.2)  mg/dL


 


Troponin I     (0.000-0.034)  ng/mL


 


Total Protein    6.2 L  (6.3-8.2)  g/dL


 


Albumin    2.8 L  (3.5-5.0)  g/dL


 


Urine Protein     (Negative)  


 


Urine Glucose (UA)     (Negative)  


 


Urine Ketones     (Negative)  


 


Urine Blood     (Negative)  


 


Urine Bilirubin     (Negative)  


 


Ur Leukocyte Esterase     (Negative)  


 


Urine WBC     (0-5)  /hpf


 


Amorphous Sediment     (None)  /hpf


 


Urine Bacteria     (None)  /hpf


 


Hyaline Casts     (0-2)  /lpf


 


Urine Mucus     (None)  /hpf














  20 Range/Units





  10:32 10:32 12:20 


 


WBC     (3.8-10.6)  k/uL


 


RBC     (4.30-5.90)  m/uL


 


Hgb     (13.0-17.5)  gm/dL


 


Hct     (39.0-53.0)  %


 


MCHC     (31.0-37.0)  g/dL


 


RDW     (11.5-15.5)  %


 


Neutrophils #     (1.3-7.7)  k/uL


 


PT     (9.0-12.0)  sec


 


INR     (<1.2)  


 


D-Dimer     (<0.60)  mg/L FEU


 


Sodium     (137-145)  mmol/L


 


Carbon Dioxide     (22-30)  mmol/L


 


BUN     (9-20)  mg/dL


 


Creatinine     (0.66-1.25)  mg/dL


 


Glucose     (74-99)  mg/dL


 


POC Glucose (mg/dL)     (75-99)  mg/dL


 


Plasma Lactic Acid Naif  2.1 H*    (0.7-2.0)  mmol/L


 


Calcium     (8.4-10.2)  mg/dL


 


Troponin I   0.040 H*   (0.000-0.034)  ng/mL


 


Total Protein     (6.3-8.2)  g/dL


 


Albumin     (3.5-5.0)  g/dL


 


Urine Protein    1+ H  (Negative)  


 


Urine Glucose (UA)    Trace H  (Negative)  


 


Urine Ketones    Trace H  (Negative)  


 


Urine Blood    Small H  (Negative)  


 


Urine Bilirubin    1+ H  (Negative)  


 


Ur Leukocyte Esterase    Trace H  (Negative)  


 


Urine WBC    10 H  (0-5)  /hpf


 


Amorphous Sediment    Rare H  (None)  /hpf


 


Urine Bacteria    Rare H  (None)  /hpf


 


Hyaline Casts    11 H  (0-2)  /lpf


 


Urine Mucus    Rare H  (None)  /hpf














  20 Range/Units





  15:11 


 


WBC   (3.8-10.6)  k/uL


 


RBC   (4.30-5.90)  m/uL


 


Hgb   (13.0-17.5)  gm/dL


 


Hct   (39.0-53.0)  %


 


MCHC   (31.0-37.0)  g/dL


 


RDW   (11.5-15.5)  %


 


Neutrophils #   (1.3-7.7)  k/uL


 


PT   (9.0-12.0)  sec


 


INR   (<1.2)  


 


D-Dimer   (<0.60)  mg/L FEU


 


Sodium   (137-145)  mmol/L


 


Carbon Dioxide   (22-30)  mmol/L


 


BUN   (9-20)  mg/dL


 


Creatinine   (0.66-1.25)  mg/dL


 


Glucose   (74-99)  mg/dL


 


POC Glucose (mg/dL)  105 H  (75-99)  mg/dL


 


Plasma Lactic Acid Naif   (0.7-2.0)  mmol/L


 


Calcium   (8.4-10.2)  mg/dL


 


Troponin I   (0.000-0.034)  ng/mL


 


Total Protein   (6.3-8.2)  g/dL


 


Albumin   (3.5-5.0)  g/dL


 


Urine Protein   (Negative)  


 


Urine Glucose (UA)   (Negative)  


 


Urine Ketones   (Negative)  


 


Urine Blood   (Negative)  


 


Urine Bilirubin   (Negative)  


 


Ur Leukocyte Esterase   (Negative)  


 


Urine WBC   (0-5)  /hpf


 


Amorphous Sediment   (None)  /hpf


 


Urine Bacteria   (None)  /hpf


 


Hyaline Casts   (0-2)  /lpf


 


Urine Mucus   (None)  /hpf











Chest x-ray: report reviewed, image reviewed





Thrombosis Risk Factor Assmnt





- Choose All That Apply


Any of the Below Risk Factors Present?: Yes


Each Factor Represents 1 point: Age 41-60 years


Other Risk Factors: Yes


Each Risk Factor Represents 2 Points: Central venous access


Other congenital or acquired thrombophilia - If yes, enter type in comment: No


Thrombosis Risk Factor Assessment Total Risk Factor Score: 3


Thrombosis Risk Factor Assessment Level: Moderate Risk





Assessment and Plan


Assessment: 





Acute renal failure





Hypotension





Recent pneumonia





COPD





Hypertension hypertensive cardiovascular disease








Plan: 





Hemodialysis as planned





Vasopressors as needed





Repeat labs in the morning including chest x-ray





Consult vascular surgery renal and intensivist


Time with Patient: Greater than 30

## 2020-12-28 NOTE — P.GSCN
History of Present Illness


History of present illness: 


60-year-old white male, patient came with history of shortness of breath history

of pneumonia.  Patient also has history of hypertension patient came with began 

of 35 and creatinine was 11.38 hours consulted for placement of a dialysis 

catheter





Neck examination neck is supple no bruit appreciated





Chest patient has a crackles at the border both lung bases first and second 

sound is normal





Abdomen is soft nontender





Vascular examination femorals are palpable bilateral





Impression is acute chronic renal failure plan is placement dialysis catheter 

risk and complication discussed








Past Medical History


Past Medical History: Deep Vein Thrombosis (DVT), Hypertension, Rheumatoid 

Arthritis (RA), Thyroid Disorder


Additional Past Medical History / Comment(s): Mult Rt foot infections after 

surgery, last time 2019, had PICC Line for AB and developed bloot clot. Hx 

kidney stones. 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

pneumonia.


History of Any Multi-Drug Resistant Organisms: None Reported


Past Surgical History: Back Surgery, Tonsillectomy


Additional Past Surgical History / Comment(s): Right foot surgery, repair herni

ated disc, plate removed rt foot.   CTR donny,bronchoscopy X4


Past Anesthesia/Blood Transfusion Reactions: Previous Problems w/ Anesthesia


Additional Past Anesthesia/Blood Transfusion Reaction / Comm: slow to awaken x1.


Past Psychological History: No Psychological Hx Reported


Smoking Status: Former smoker


Past Alcohol Use History: None Reported


Additional Past Alcohol Use History / Comment(s): QUIT SMOKING , smoked 15 

years, 1 - 1 1/2 ppd


Past Drug Use History: None Reported





- Past Family History


  ** Mother


Family Medical History: Cancer


Additional Family Medical History / Comment(s): lung with mets brain





  ** Father


Family Medical History: No Reported History


Additional Family Medical History / Comment(s): states, "he just ."





Medications and Allergies


                                Home Medications











 Medication  Instructions  Recorded  Confirmed  Type


 


Levothyroxine Sodium [Synthroid] 150 mcg PO DAILY 17 History


 


Albuterol Inhaler [Ventolin Hfa 2 puff INHALATION RT-QID PRN 06/10/20 12/28/20 

History





Inhaler]    


 


Losartan-Hctz 50-12.5 mg [Hyzaar 1 tab PO DAILY 06/10/20 12/28/20 History





50-12.5]    


 


Metoprolol Tartrate [Lopressor] 50 mg PO HS 06/10/20 12/28/20 History


 


Ascorbic Acid [Vitamin C] 2,000 mg PO HS 20 History


 


Cholecalciferol [Vitamin D3 (25 2,000 unit PO HS 20 History





Mcg = 1000 Iu)]    


 


Ipratropium-Albuterol Nebulize 3 ml INHALATION RT-QID PRN 20 

History





[Duoneb 0.5 mg-3 mg/3 ml Soln]    


 


Azithromycin [Zithromax] 500 mg PO DAILY 10 Days #10 tab 20 Rx


 


Cefdinir [Omnicef] 300 mg PO Q12HR 10 Days #20 capsule 20 Rx








                                    Allergies











Allergy/AdvReac Type Severity Reaction Status Date / Time


 


No Known Allergies Allergy   Verified 20 10:37














Surgical - Exam


                                   Vital Signs











Temp Pulse Resp BP Pulse Ox


 


 97.5 F L  107 H  18   99/56   89 L


 


 20 10:05  20 10:05  20 10:05  20 10:05  20 10:05














Results





- Labs





                                 20 10:32





                                 20 10:32


                  Abnormal Lab Results - Last 24 Hours (Table)











  20 Range/Units





  10:32 10:32 10:32 


 


WBC  15.3 H    (3.8-10.6)  k/uL


 


RBC  3.83 L    (4.30-5.90)  m/uL


 


Hgb  10.3 L    (13.0-17.5)  gm/dL


 


Hct  33.5 L    (39.0-53.0)  %


 


MCHC  30.8 L    (31.0-37.0)  g/dL


 


RDW  17.4 H    (11.5-15.5)  %


 


Neutrophils #  12.1 H    (1.3-7.7)  k/uL


 


PT   12.4 H   (9.0-12.0)  sec


 


INR   1.2 H   (<1.2)  


 


D-Dimer   3.20 H   (<0.60)  mg/L FEU


 


Sodium    135 L  (137-145)  mmol/L


 


Carbon Dioxide    18 L  (22-30)  mmol/L


 


BUN    35 H  (9-20)  mg/dL


 


Creatinine    11.38 H*  (0.66-1.25)  mg/dL


 


Glucose    106 H  (74-99)  mg/dL


 


POC Glucose (mg/dL)     (75-99)  mg/dL


 


Plasma Lactic Acid Naif     (0.7-2.0)  mmol/L


 


Calcium    8.0 L  (8.4-10.2)  mg/dL


 


Troponin I     (0.000-0.034)  ng/mL


 


Total Protein    6.2 L  (6.3-8.2)  g/dL


 


Albumin    2.8 L  (3.5-5.0)  g/dL


 


Urine Protein     (Negative)  


 


Urine Glucose (UA)     (Negative)  


 


Urine Ketones     (Negative)  


 


Urine Blood     (Negative)  


 


Urine Bilirubin     (Negative)  


 


Ur Leukocyte Esterase     (Negative)  


 


Urine WBC     (0-5)  /hpf


 


Amorphous Sediment     (None)  /hpf


 


Urine Bacteria     (None)  /hpf


 


Hyaline Casts     (0-2)  /lpf


 


Urine Mucus     (None)  /hpf














  20 Range/Units





  10:32 10:32 12:20 


 


WBC     (3.8-10.6)  k/uL


 


RBC     (4.30-5.90)  m/uL


 


Hgb     (13.0-17.5)  gm/dL


 


Hct     (39.0-53.0)  %


 


MCHC     (31.0-37.0)  g/dL


 


RDW     (11.5-15.5)  %


 


Neutrophils #     (1.3-7.7)  k/uL


 


PT     (9.0-12.0)  sec


 


INR     (<1.2)  


 


D-Dimer     (<0.60)  mg/L FEU


 


Sodium     (137-145)  mmol/L


 


Carbon Dioxide     (22-30)  mmol/L


 


BUN     (9-20)  mg/dL


 


Creatinine     (0.66-1.25)  mg/dL


 


Glucose     (74-99)  mg/dL


 


POC Glucose (mg/dL)     (75-99)  mg/dL


 


Plasma Lactic Acid Naif  2.1 H*    (0.7-2.0)  mmol/L


 


Calcium     (8.4-10.2)  mg/dL


 


Troponin I   0.040 H*   (0.000-0.034)  ng/mL


 


Total Protein     (6.3-8.2)  g/dL


 


Albumin     (3.5-5.0)  g/dL


 


Urine Protein    1+ H  (Negative)  


 


Urine Glucose (UA)    Trace H  (Negative)  


 


Urine Ketones    Trace H  (Negative)  


 


Urine Blood    Small H  (Negative)  


 


Urine Bilirubin    1+ H  (Negative)  


 


Ur Leukocyte Esterase    Trace H  (Negative)  


 


Urine WBC    10 H  (0-5)  /hpf


 


Amorphous Sediment    Rare H  (None)  /hpf


 


Urine Bacteria    Rare H  (None)  /hpf


 


Hyaline Casts    11 H  (0-2)  /lpf


 


Urine Mucus    Rare H  (None)  /hpf














  20 Range/Units





  15:11 


 


WBC   (3.8-10.6)  k/uL


 


RBC   (4.30-5.90)  m/uL


 


Hgb   (13.0-17.5)  gm/dL


 


Hct   (39.0-53.0)  %


 


MCHC   (31.0-37.0)  g/dL


 


RDW   (11.5-15.5)  %


 


Neutrophils #   (1.3-7.7)  k/uL


 


PT   (9.0-12.0)  sec


 


INR   (<1.2)  


 


D-Dimer   (<0.60)  mg/L FEU


 


Sodium   (137-145)  mmol/L


 


Carbon Dioxide   (22-30)  mmol/L


 


BUN   (9-20)  mg/dL


 


Creatinine   (0.66-1.25)  mg/dL


 


Glucose   (74-99)  mg/dL


 


POC Glucose (mg/dL)  105 H  (75-99)  mg/dL


 


Plasma Lactic Acid Naif   (0.7-2.0)  mmol/L


 


Calcium   (8.4-10.2)  mg/dL


 


Troponin I   (0.000-0.034)  ng/mL


 


Total Protein   (6.3-8.2)  g/dL


 


Albumin   (3.5-5.0)  g/dL


 


Urine Protein   (Negative)  


 


Urine Glucose (UA)   (Negative)  


 


Urine Ketones   (Negative)  


 


Urine Blood   (Negative)  


 


Urine Bilirubin   (Negative)  


 


Ur Leukocyte Esterase   (Negative)  


 


Urine WBC   (0-5)  /hpf


 


Amorphous Sediment   (None)  /hpf


 


Urine Bacteria   (None)  /hpf


 


Hyaline Casts   (0-2)  /lpf


 


Urine Mucus   (None)  /hpf








                                 Diabetes panel











  20 Range/Units





  10:32 


 


Sodium  135 L  (137-145)  mmol/L


 


Potassium  3.9  (3.5-5.1)  mmol/L


 


Chloride  102  ()  mmol/L


 


Carbon Dioxide  18 L  (22-30)  mmol/L


 


BUN  35 H  (9-20)  mg/dL


 


Creatinine  11.38 H*  (0.66-1.25)  mg/dL


 


Glucose  106 H  (74-99)  mg/dL


 


Calcium  8.0 L  (8.4-10.2)  mg/dL


 


AST  30  (17-59)  U/L


 


ALT  14  (4-49)  U/L


 


Alkaline Phosphatase  116  ()  U/L


 


Total Protein  6.2 L  (6.3-8.2)  g/dL


 


Albumin  2.8 L  (3.5-5.0)  g/dL








                                  Calcium panel











  20 Range/Units





  10:32 


 


Calcium  8.0 L  (8.4-10.2)  mg/dL


 


Albumin  2.8 L  (3.5-5.0)  g/dL








                                 Pituitary panel











  20 Range/Units





  10:32 


 


Sodium  135 L  (137-145)  mmol/L


 


Potassium  3.9  (3.5-5.1)  mmol/L


 


Chloride  102  ()  mmol/L


 


Carbon Dioxide  18 L  (22-30)  mmol/L


 


BUN  35 H  (9-20)  mg/dL


 


Creatinine  11.38 H*  (0.66-1.25)  mg/dL


 


Glucose  106 H  (74-99)  mg/dL


 


Calcium  8.0 L  (8.4-10.2)  mg/dL








                                  Adrenal panel











  20 Range/Units





  10:32 


 


Sodium  135 L  (137-145)  mmol/L


 


Potassium  3.9  (3.5-5.1)  mmol/L


 


Chloride  102  ()  mmol/L


 


Carbon Dioxide  18 L  (22-30)  mmol/L


 


BUN  35 H  (9-20)  mg/dL


 


Creatinine  11.38 H*  (0.66-1.25)  mg/dL


 


Glucose  106 H  (74-99)  mg/dL


 


Calcium  8.0 L  (8.4-10.2)  mg/dL


 


Total Bilirubin  0.7  (0.2-1.3)  mg/dL


 


AST  30  (17-59)  U/L


 


ALT  14  (4-49)  U/L


 


Alkaline Phosphatase  116  ()  U/L


 


Total Protein  6.2 L  (6.3-8.2)  g/dL


 


Albumin  2.8 L  (3.5-5.0)  g/dL

## 2020-12-28 NOTE — US
EXAMINATION TYPE: US renals and bladder

 

DATE OF EXAM: 12/28/2020

 

COMPARISON: NONE

 

CLINICAL HISTORY: ION.

 

EXAM MEASUREMENTS:

 

Right Kidney:  14.4 x 6.1 x 6.4 cm

Left Kidney: 14.0 x 6.4 x 7.8 cm

 

 

 

Right Kidney: No hydronephrosis or masses seen  

Left Kidney: exophytic cyst upper pole measures 3.9 x 4.1 x 4.6 cm   

Bladder: wnl

**Bilateral Jets seen: No

 

 

There is no evidence for hydronephrosis at this point in time.  No nephrolithiasis is seen. Technolog
ist marked 4.1 cm exophytic simple appearing thin-walled cyst upper pole left kidney.  The urinary bl
adder is satisfactorily distended.  Bilateral ureteral jets are not seen.

 

 

 

IMPRESSION: No hydronephrosis is noted bilaterally.

## 2020-12-29 LAB
ANION GAP SERPL CALC-SCNC: 19 MMOL/L
BASOPHILS # BLD AUTO: 0.1 K/UL (ref 0–0.2)
BASOPHILS NFR BLD AUTO: 1 %
BUN SERPL-SCNC: 26 MG/DL (ref 9–20)
CALCIUM SPEC-MCNC: 7.9 MG/DL (ref 8.4–10.2)
CHLORIDE SERPL-SCNC: 103 MMOL/L (ref 98–107)
CO2 SERPL-SCNC: 14 MMOL/L (ref 22–30)
EOSINOPHIL # BLD AUTO: 0 K/UL (ref 0–0.7)
EOSINOPHIL NFR BLD AUTO: 0 %
ERYTHROCYTE [DISTWIDTH] IN BLOOD BY AUTOMATED COUNT: 3.94 M/UL (ref 4.3–5.9)
ERYTHROCYTE [DISTWIDTH] IN BLOOD: 17.4 % (ref 11.5–15.5)
GLUCOSE SERPL-MCNC: 163 MG/DL (ref 74–99)
HBV SURFACE AB SERPL IA-ACNC: 3.5 MIU/ML
HCT VFR BLD AUTO: 36.2 % (ref 39–53)
HGB BLD-MCNC: 10.6 GM/DL (ref 13–17.5)
LYMPHOCYTES # SPEC AUTO: 0.4 K/UL (ref 1–4.8)
LYMPHOCYTES NFR SPEC AUTO: 2 %
MCH RBC QN AUTO: 26.9 PG (ref 25–35)
MCHC RBC AUTO-ENTMCNC: 29.3 G/DL (ref 31–37)
MCV RBC AUTO: 91.7 FL (ref 80–100)
MONOCYTES # BLD AUTO: 0.2 K/UL (ref 0–1)
MONOCYTES NFR BLD AUTO: 1 %
NEUTROPHILS # BLD AUTO: 19.2 K/UL (ref 1.3–7.7)
NEUTROPHILS NFR BLD AUTO: 96 %
PLATELET # BLD AUTO: 308 K/UL (ref 150–450)
POTASSIUM SERPL-SCNC: 5.2 MMOL/L (ref 3.5–5.1)
SODIUM SERPL-SCNC: 136 MMOL/L (ref 137–145)
WBC # BLD AUTO: 20.1 K/UL (ref 3.8–10.6)

## 2020-12-29 RX ADMIN — IPRATROPIUM BROMIDE AND ALBUTEROL SULFATE SCH ML: .5; 3 SOLUTION RESPIRATORY (INHALATION) at 20:19

## 2020-12-29 RX ADMIN — METHYLPREDNISOLONE SODIUM SUCCINATE SCH MG: 40 INJECTION, POWDER, FOR SOLUTION INTRAMUSCULAR; INTRAVENOUS at 20:34

## 2020-12-29 RX ADMIN — NOREPINEPHRINE BITARTRATE SCH MLS/HR: 1 INJECTION, SOLUTION, CONCENTRATE INTRAVENOUS at 19:04

## 2020-12-29 RX ADMIN — NOREPINEPHRINE BITARTRATE SCH MLS/HR: 1 INJECTION, SOLUTION, CONCENTRATE INTRAVENOUS at 20:22

## 2020-12-29 RX ADMIN — POTASSIUM CHLORIDE SCH MLS/HR: 14.9 INJECTION, SOLUTION INTRAVENOUS at 11:40

## 2020-12-29 RX ADMIN — IPRATROPIUM BROMIDE AND ALBUTEROL SULFATE SCH ML: .5; 3 SOLUTION RESPIRATORY (INHALATION) at 13:37

## 2020-12-29 RX ADMIN — OXYCODONE HYDROCHLORIDE AND ACETAMINOPHEN SCH MG: 500 TABLET ORAL at 20:33

## 2020-12-29 RX ADMIN — LEVOTHYROXINE SODIUM SCH MCG: 75 TABLET ORAL at 06:35

## 2020-12-29 RX ADMIN — NOREPINEPHRINE BITARTRATE SCH MLS/HR: 1 INJECTION, SOLUTION, CONCENTRATE INTRAVENOUS at 03:48

## 2020-12-29 RX ADMIN — CEFAZOLIN SCH MLS/HR: 330 INJECTION, POWDER, FOR SOLUTION INTRAMUSCULAR; INTRAVENOUS at 00:00

## 2020-12-29 RX ADMIN — NOREPINEPHRINE BITARTRATE SCH MLS/HR: 1 INJECTION, SOLUTION, CONCENTRATE INTRAVENOUS at 07:11

## 2020-12-29 RX ADMIN — METHYLPREDNISOLONE SODIUM SUCCINATE SCH MG: 40 INJECTION, POWDER, FOR SOLUTION INTRAMUSCULAR; INTRAVENOUS at 08:35

## 2020-12-29 RX ADMIN — IPRATROPIUM BROMIDE AND ALBUTEROL SULFATE SCH ML: .5; 3 SOLUTION RESPIRATORY (INHALATION) at 07:57

## 2020-12-29 RX ADMIN — CEFAZOLIN SCH: 330 INJECTION, POWDER, FOR SOLUTION INTRAMUSCULAR; INTRAVENOUS at 11:09

## 2020-12-29 RX ADMIN — NOREPINEPHRINE BITARTRATE SCH MLS/HR: 1 INJECTION, SOLUTION, CONCENTRATE INTRAVENOUS at 11:21

## 2020-12-29 NOTE — XR
EXAMINATION TYPE: XR abdomen 2V

 

DATE OF EXAM: 12/29/2020

 

COMPARISON: 4/26/2017

 

INDICATION: Abdominal distention

 

TECHNIQUE: Abdomen examined in the upright view. Supine view is obtained. Respiratory motion limits t
he exam

 

FINDINGS:  

There is within the stomach. There is air within the colon. Nonspecific small bowel gas is present. N
o suspicious differential air-fluid levels are evident. Free air is not evident.

Psoas margins are normal.

No organomegaly is present.

Right femoral catheter is evident.

 

IMPRESSION: 

1. Nonspecific abdomen.

## 2020-12-29 NOTE — CONS
CONSULTATION



REASON FOR CONSULT:

Renal failure.



HISTORY OF PRESENT ILLNESS:

Patient is a 60-year-old male who was admitted to the hospital yesterday with

complaints of generalized weakness and fatigue.  He has a history of recent pneumonia.

The patient did have some cough. He was increasingly short of breath. At this time he

was not sure if he had good urine output prior to admission.



Serum creatinine was noted to be 11.3 mg/dL on admission.  Previous creatinine on

12/23/2020 was 1.37.  The patient was noted to be hypotensive, and his lactic acid was

also elevated.  Chest x-ray showed bilateral infiltrates with pleural effusions, and

since patient was quite dyspneic yesterday with low urine output, he did get dialysis

yesterday.  Overnight, however, urine output has picked up with urine output at 45 to

50 mL/hour now.  The patient had been maintained on IV fluids.  It appears that most of

the infiltrates are infectious and patient also has a previous history of pneumonia

with air trapping in the lungs previously.  There is no ongoing GI bleed.  No

significant nausea or vomiting.  Serum creatinine today was 7.4.



PAST MEDICAL HISTORY:

DVT, hypertension, rheumatoid arthritis, hypothyroidism, recent pneumonia, rheumatoid

nodules in the lungs.



PAST SURGICAL HISTORY:

Tonsillectomy, right foot surgery, repair of herniated disc, previous bronchoscopies.



SOCIAL HISTORY:

Positive for previous smoking; recently quit.  No history of alcohol abuse.



MEDICATIONS:

Medications prior to admission included Hyzaar, Lopressor, albuterol, Synthroid,

vitamin C, Zithromax, Omnicef.



ALLERGIES:

NONE.



REVIEW OF SYSTEMS:

As per HPI.  Other systems negative.



PHYSICAL EXAMINATION:

Patient is currently comfortable, awake. He is not in any acute distress.  He was seen

this morning. Blood pressure was 102/57, heart rate 90 per minute. Patient is afebrile.

EXAMINATION OF THE HEART: S1 and S2.

EXAMINATION OF LUNGS: Decreased breath sounds at bilateral bases. No wheezing is heard.

ABDOMEN:  Distended, soft, non-tender.

Examination of lower extremities shows no significant edema.

CNS exam shows patient is moving all 4 extremities.



LABS:

Labs show sodium 136, potassium 5.2, chloride 103. CO2 is 14, BUN 26, serum creatinine

7.47.  UA shows trace small blood and 1+ protein.  Calcium 7.9, hemoglobin 10.6, white

cell count 20.1.



ASSESSMENT:

1. Acute kidney injury, mostly acute tubular necrosis, associated with ongoing sepsis,

    hypotension.  No evidence of obstruction on ultrasound.  Urine output has improved.

    I will hold off on hemodialysis today and repeat labs tomorrow.  Continue to hold

    off on the ACE inhibitors and avoid any other nephrotoxic agents.

2. Sepsis, most likely secondary to pneumonia, maintained on antibiotics.  Chest CT

    has been ordered.

3. Metabolic acidosis, anion gap associated with renal failure.  There is also lactic

    acidosis.  I will start the patient on bicarb drip.

4. Mild hyperkalemia associated acute kidney injury and metabolic acidosis.  Expect

    improvement with correction of acidosis and improving urine output.

5. Rheumatoid arthritis with history of rheumatoid nodules.

6. Hypothyroidism.

7. Acute hypoxic respiratory failure, currently somewhat improved.



PLAN:

Continue IV fluids.  Continue empiric antibiotics.  Await chest CT result.  Hold off on

dialysis.  Start bicarb drip.  Repeat labs in a.m.



Thank you for this consultation.  Will continue to follow the patient with you during

his hospitalization.





MMODL / IJN: [id number] / Job#: [id number]

## 2020-12-29 NOTE — P.PN
Subjective


Progress Note Date: 12/29/20








A 60-year-old male patient who came into the emergency department with worsening

shortness of breath.  The patient also had diminished urine output and he was 

getting progressively more dry and he was not eating or drinking much over the 

past few days.  He cannot even remember the last time he urinated.  The patient 

denied having any fever.  No reported cough.  He did have some exertional 

dyspnea and shortness of breath whenever he laid down flat.  He also noted that 

his legs are somewhat more swollen than the usual.  Denied having any chest 

pain.  No jaw pain.  No back pain.  No nausea.  No vomiting.  No abdominal pain.

 No altered mentation.  The patient when he came into the emergency, he was 

quite hypoxic and was unable to speak up.  This is and he was placed on 5 L 

about 2 by nasal cannula.  Noted the patient was in the hospital back in 

12/24/2020 and at that time the patient was seen by our service.  He came in for

symptoms of generalized weakness and fatigue and not feeling well.  The patient 

has been known to our practice and he was seen by Dr. fang and the patient was 

ruled out for coronary bypass/Covid 19 infection.  His chest x-ray showed some 

patchy perihilar and basilar pulmonary infiltrates compatible with pneumonia.  

The patient was discharged home with the next 24 hours on a combination of 

Omnicef and Zithromax and the patient was also given a prednisone burst taper.  

Nevertheless, it seems that his condition has progressed.  Note that the patient

is known to have rheumatoid arthritis and previous history of rheumatoid lung 

disease in addition to previous history of a loculated empyema requiring a chest

tube placement back in July 2020.  This was attributed to Streptococcus 

pneumonia.  He has history of hypertension, hypothyroidism and previous history 

of DVT.  He has had multiple bronchoscopies in the past and the last 

bronchoscopy was done in November 2070 and at that time the patient had some 

Candida albicans.  Note that the current admission showed significantly abnormal

blood work.  The patient had seen a bicarb of 18 with an anion gap of 15.  

Creatinine was 11.3, lactic acid level was at 2.1 drop down to 1.6.  Troponin 

was at 0.04, proBNP level was 4190.  D-dimer is at 3.2.  Correlation profile is 

within normal limits.  The white cell count is at 15.3 with a hemoglobin of 10.3

and platelets are at 262.  UA was also abnormal and showed +1 bilirubin, rare 

WBCs and bacteria and rare RBCs.  There was hiding casts.  Coronavirus, Covid 19

screening came back negative.  The patient is being seen by nephrology.  The 

patient is being considered for hemodialysis.  The chest x-ray showed perihilar 

pulmonary infiltrates and bilateral pleural effusions and there is worsening and

bilateral edema/infiltrate consistent with fluid overload.  There is also an 

air-fluid level in the left lung base that was seen on previous chest x-rays.  





On 12/29/2020 the patient is currently in the intensive care unit.  The patient 

got admitted to the ICU and the patient was given a session of hemodialysis.  He

has a temporary dialysis cath in his right femoral vein.  A total of 1.5 L of 

fluid was ultrafiltrate that along with dialysis.  On today's blood work, his 

creatinine is at 7.47 and his potassium level is at 5.2 and the sodium level is 

at 136.  Serum bicarbonate of 15.  His IV fluids are running at the rate of 100 

mL an hour of normal saline.  Urine output is improving.  He was quite low 

overnight and currently is in the order of 10-20 mL an hour his UA did not 

indicate any potential infection.  There was +1 bilirubin, 10 WBCs, 4 RBCs.  His

lactic acid level is down to 1.6.  His white cell count is at 20.1 with a 

hemoglobin of 10.6.  He is resting comfortably in bed.  Hemodynamically stable. 

He is requiring norepinephrine infusion which is running at 0.19 micrograms per 

kilogram per minute.  I feel like his abdomen is slightly distended.  

Nevertheless, the patient although this is his normal abdominal distention and 

he denies having any nausea or emesis.  His last bowel movement was few days 

back.





Objective





- Vital Signs


Vital signs: 


                                   Vital Signs











Temp  99 F   12/29/20 04:00


 


Pulse  84   12/29/20 07:00


 


Resp  21   12/29/20 07:00


 


BP  116/75   12/29/20 07:00


 


Pulse Ox  94 L  12/29/20 07:00








                                 Intake & Output











 12/28/20 12/29/20 12/29/20





 18:59 06:59 18:59


 


Intake Total 447.039 8253.747 


 


Output Total 1910 435 


 


Balance -502.087 7030.747 


 


Weight 110.7 kg 112 kg 


 


Intake:   


 


   1300 


 


    .9  800 


 


    Sodium Chloride 0.9% 1, 500 500 





    000 ml @ 75 mls/hr IV .   





    [id number] [facility] Rx#:[id number]   


 


  Intake, IV Titration 167.150 484.747 





  Amount   


 


    Norepinephrine 4 mg In 167.150 230.747 





    Sodium Chloride 0.9% 250   





    ml @ 0.05 MCG/KG/MIN 20.   





    738 mls/hr IV .[id number]   





    ONE Rx#:[id number]   


 


    Norepinephrine 4 mg In  254 





    Sodium Chloride 0.9% 250   





    ml @ 0.05 MCG/KG/MIN 21.   





    088 mls/hr IV .Q12H3M [facility]   





    Rx#:[id number]   


 


  Hemodialysis 300  


 


Output:   


 


  Urine 110 435 


 


  Hemodialysis 1800  


 


Other:   


 


  Voiding Method Indwelling Catheter Indwelling Catheter 














- Exam








 GENERAL EXAM: Alert, very pleasant, 60-year-old white male, on 2 L about 2 by 

nasal cannula with pulse ox of 95% comfortable in no apparent distress.


HEAD: Normocephalic/atraumatic.


EYES: Normal reaction of pupils, equal size.  Conjunctiva pink, sclera white.


NOSE: Clear with pink turbinates.


THROAT: No erythema or exudates.


NECK: No masses, no JVD, no thyroid enlargement, no adenopathy.


CHEST: No chest wall deformity.  Symmetrical expansion. 


LUNGS: Equal air entry with some bibasilar crackles, no major wheezing or 

coughing or rhonchi


CVS: Regular rate and rhythm, normal S1 and S2, no gallops, no murmurs, no rubs


ABDOMEN: Soft, nontender.  No hepatosplenomegaly, normal bowel sounds, no 

guarding or rigidity.  Abdomen is slightly distended and tympanic on palpation.


EXTREMITIES: No clubbing, no edema, no cyanosis, 2+ pulses and upper and lower 

extremities.


MUSCULOSKELETAL: Muscle strength and tone normal.


SPINE: No scoliosis or deformity


SKIN: No rashes


CENTRAL NERVOUS SYSTEM: Alert and oriented -3.  No focal deficits, tone is 

normal in all 4 extremities.


PSYCHIATRIC: Alert and oriented -3.  Appropriate affect.  Intact judgment and 

insight.





- Labs


CBC & Chem 7: 


                                 12/29/20 03:21





                                 12/29/20 03:21


Labs: 


                  Abnormal Lab Results - Last 24 Hours (Table)











  12/28/20 12/28/20 12/28/20 Range/Units





  10:32 10:32 10:32 


 


WBC  15.3 H    (3.8-10.6)  k/uL


 


RBC  3.83 L    (4.30-5.90)  m/uL


 


Hgb  10.3 L    (13.0-17.5)  gm/dL


 


Hct  33.5 L    (39.0-53.0)  %


 


MCHC  30.8 L    (31.0-37.0)  g/dL


 


RDW  17.4 H    (11.5-15.5)  %


 


Neutrophils #  12.1 H    (1.3-7.7)  k/uL


 


Lymphocytes #     (1.0-4.8)  k/uL


 


PT   12.4 H   (9.0-12.0)  sec


 


INR   1.2 H   (<1.2)  


 


D-Dimer   3.20 H   (<0.60)  mg/L FEU


 


Sodium    135 L  (137-145)  mmol/L


 


Potassium     (3.5-5.1)  mmol/L


 


Carbon Dioxide    18 L  (22-30)  mmol/L


 


BUN    35 H  (9-20)  mg/dL


 


Creatinine    11.38 H*  (0.66-1.25)  mg/dL


 


Glucose    106 H  (74-99)  mg/dL


 


POC Glucose (mg/dL)     (75-99)  mg/dL


 


Plasma Lactic Acid Naif     (0.7-2.0)  mmol/L


 


Calcium    8.0 L  (8.4-10.2)  mg/dL


 


Troponin I     (0.000-0.034)  ng/mL


 


Total Protein    6.2 L  (6.3-8.2)  g/dL


 


Albumin    2.8 L  (3.5-5.0)  g/dL


 


Urine Protein     (Negative)  


 


Urine Glucose (UA)     (Negative)  


 


Urine Ketones     (Negative)  


 


Urine Blood     (Negative)  


 


Urine Bilirubin     (Negative)  


 


Ur Leukocyte Esterase     (Negative)  


 


Urine WBC     (0-5)  /hpf


 


Amorphous Sediment     (None)  /hpf


 


Urine Bacteria     (None)  /hpf


 


Hyaline Casts     (0-2)  /lpf


 


Urine Mucus     (None)  /hpf














  12/28/20 12/28/20 12/28/20 Range/Units





  10:32 10:32 12:20 


 


WBC     (3.8-10.6)  k/uL


 


RBC     (4.30-5.90)  m/uL


 


Hgb     (13.0-17.5)  gm/dL


 


Hct     (39.0-53.0)  %


 


MCHC     (31.0-37.0)  g/dL


 


RDW     (11.5-15.5)  %


 


Neutrophils #     (1.3-7.7)  k/uL


 


Lymphocytes #     (1.0-4.8)  k/uL


 


PT     (9.0-12.0)  sec


 


INR     (<1.2)  


 


D-Dimer     (<0.60)  mg/L FEU


 


Sodium     (137-145)  mmol/L


 


Potassium     (3.5-5.1)  mmol/L


 


Carbon Dioxide     (22-30)  mmol/L


 


BUN     (9-20)  mg/dL


 


Creatinine     (0.66-1.25)  mg/dL


 


Glucose     (74-99)  mg/dL


 


POC Glucose (mg/dL)     (75-99)  mg/dL


 


Plasma Lactic Acid Naif  2.1 H*    (0.7-2.0)  mmol/L


 


Calcium     (8.4-10.2)  mg/dL


 


Troponin I   0.040 H*   (0.000-0.034)  ng/mL


 


Total Protein     (6.3-8.2)  g/dL


 


Albumin     (3.5-5.0)  g/dL


 


Urine Protein    1+ H  (Negative)  


 


Urine Glucose (UA)    Trace H  (Negative)  


 


Urine Ketones    Trace H  (Negative)  


 


Urine Blood    Small H  (Negative)  


 


Urine Bilirubin    1+ H  (Negative)  


 


Ur Leukocyte Esterase    Trace H  (Negative)  


 


Urine WBC    10 H  (0-5)  /hpf


 


Amorphous Sediment    Rare H  (None)  /hpf


 


Urine Bacteria    Rare H  (None)  /hpf


 


Hyaline Casts    11 H  (0-2)  /lpf


 


Urine Mucus    Rare H  (None)  /hpf














  12/28/20 12/29/20 12/29/20 Range/Units





  15:11 03:21 03:21 


 


WBC   20.1 H   (3.8-10.6)  k/uL


 


RBC   3.94 L   (4.30-5.90)  m/uL


 


Hgb   10.6 L   (13.0-17.5)  gm/dL


 


Hct   36.2 L   (39.0-53.0)  %


 


MCHC   29.3 L   (31.0-37.0)  g/dL


 


RDW   17.4 H   (11.5-15.5)  %


 


Neutrophils #   19.2 H   (1.3-7.7)  k/uL


 


Lymphocytes #   0.4 L   (1.0-4.8)  k/uL


 


PT     (9.0-12.0)  sec


 


INR     (<1.2)  


 


D-Dimer     (<0.60)  mg/L FEU


 


Sodium    136 L  (137-145)  mmol/L


 


Potassium    5.2 H  (3.5-5.1)  mmol/L


 


Carbon Dioxide    14 L  (22-30)  mmol/L


 


BUN    26 H  (9-20)  mg/dL


 


Creatinine    7.47 H*  (0.66-1.25)  mg/dL


 


Glucose    163 H  (74-99)  mg/dL


 


POC Glucose (mg/dL)  105 H    (75-99)  mg/dL


 


Plasma Lactic Acid Naif     (0.7-2.0)  mmol/L


 


Calcium    7.9 L  (8.4-10.2)  mg/dL


 


Troponin I     (0.000-0.034)  ng/mL


 


Total Protein     (6.3-8.2)  g/dL


 


Albumin     (3.5-5.0)  g/dL


 


Urine Protein     (Negative)  


 


Urine Glucose (UA)     (Negative)  


 


Urine Ketones     (Negative)  


 


Urine Blood     (Negative)  


 


Urine Bilirubin     (Negative)  


 


Ur Leukocyte Esterase     (Negative)  


 


Urine WBC     (0-5)  /hpf


 


Amorphous Sediment     (None)  /hpf


 


Urine Bacteria     (None)  /hpf


 


Hyaline Casts     (0-2)  /lpf


 


Urine Mucus     (None)  /hpf














Assessment and Plan


Plan: 








1 acute kidney injury.  The patient's creatinine was up to 11 and the patient is

a component of metabolic acidosis and the creatinine is down to 7.47 and the 

serum bicarb is at 14.  The patient had an ultrasound the kidneys that showed no

evidence of any hydronephrosis.  Consider ATN.  No signs of uremia.  The patient

arrived to emergency department the patient was also hypoxemic and the patient 

was hypotensive.  The patient is still on normal saline infusion.  The patient 

underwent hemodialysis yesterday with 1.5 L of ultrafiltration.  The patient had

a temporary dialysis catheter in place.  Urine output is improving.  We are 

going to give him another session of hemodialysis today.





2 bilateral perihilar pulmonary infiltrates, consider interstitial edema versus 

pneumonia of the Covid 19 testing came back negative.  





3 history of left lung empyema related to Streptococcus pneumonia back in June 2020 requiring insertion and subsequent removal of chest tube





4 air-fluid level involving the left lung base, consider ongoing fluid collecti

on/abscess





5 acute hypoxemic respiratory failure currently on 2 L of oxygen by nasal 

cannula





6 hypotension, likely secondary to intravascular volume depletion/dehydration.  

.  The patient remains on IV fluids at others an hour of normal saline and the 

patient is still requiring low-dose pressors.





7 rheumatoid arthritis history of rheumatoid lungs/fibrosis with previous 

treatment with Arava and prednisone





8 hypertension





9 hypothyroidism





10 generalized  weakness and fatigue and shortness of breath or related to above





Plan





Continue IV fluids and wean off the pressors as the patient has been started on 

norepinephrine infusion for blood pressure control.


Initiate hemodialysis per nephrology a second session is anticipated today 


Monitor electrolytes


CAT scan of the chest without contrast to evaluate the air-fluid level seen in 

the left lung this will be ordered for today in addition to platinum of the 

abdomen 


Covid 19 evaluation came back negative


No need for any active antibiotic treatment for now.  The patient completed a 

course of Omnicef and Zithromax on outpatient basis in addition to a prednisone 

burst taper.  He needs to be placed back on some degree of steroids and I would 

suggest putting him back on 40 mg of Isordil Medrol every 12 hours.


Resume Synthroid


Resume DuoNeb nebulizer was around the clock 4 times a day


Hold metoprolol based on his underlying hypotension


Hold Hyzaar for now


 keep the patient and the intensive care unit and will continue to follow.

## 2020-12-29 NOTE — CT
EXAMINATION TYPE: CT chest wo con

 

DATE OF EXAM: 12/29/2020

 

COMPARISON: Chest CT Lisa 10, 2020 and older CTs.

 

HISTORY: Pneumonia, history of rheumatoid lung.

 

CT DLP: 617.4 mGycm.  Automated Exposure Control for Dose Reduction was Utilized.

 

 

TECHNIQUE:  CT scan of the thorax is performed without IV contrast.

 

FINDINGS:

 

LUNGS: Redemonstration of azygos lobe/fissure which is normal variant. Redemonstration mild emphysema
tous change in the upper lungs. There are persistent irregular thick-walled pleural spaces and the po
sterior lung bases containing fluid and air with adjacent anterior lung chronic consolidation or atel
ectasis. In the left mid lung there is no consolidation with air bronchograms extending from the hilu
m. There are additional multifocal areas of groundglass opacity and organizing consolidations majorit
y in the right upper lobe.

 

MEDIASTINUM: Lack of IV contrast is noted to limit evaluation for mediastinal and especially hilar ad
enopathy. There are no definitive greater than 1 cm hilar or mediastinal lymph nodes. Tiny pericardia
l effusion is stable. Cardiomegaly is redemonstrated. Coronary artery calcification again seen. Enlar
ged main pulmonary artery of 0.7 cm axial image 22, CT findings consistent with underlying pulmonary 
hypertension.

 

OTHER: Visualized liver is low dense consistent with diffuse fatty infiltration. There is 3.7 cm thin
-walled cyst exophytically from the upper lateral aspect left kidney.

 

IMPRESSION: Chronic changes to lower lungs thought to be product of rheumatoid lung disease. New cons
olidation with air bronchograms left hilar region. New multifocal groundglass opacities organizing co
nsolidation in the right upper lobe.

## 2020-12-29 NOTE — P.PN
Subjective


Progress Note Date: 12/29/20


Principal diagnosis: 


Acute renal failure





Hypotension





Recent pneumonia





COPD





Hypertension hypertensive cardiovascular disease








December 29 2020, patient seen eval examined during the rounds labs reviewed 

medications reviewed, patient has no dialysis today, hemodynamic status status 

still marginal remains on 10 mics of levo fed, patient will be monitored observe

in the ICU





This is a 60-year-old obese male with past medical history of dyslipidemia 

hypertension hypertensive cardiovascular disease also has a history of the 

rheumatoid arthritis with recent pneumonia, came into the hospital with 

increasing shortness of breath high doesn't fatigue, it appears that patient pre

sented with pneumonia a week ago was treated with outpatient antibiotics and 

steroids symptoms did not resolve but continued to get worse, he start retaining

more fluids increased swelling of the lower extremity has been noted, his 

significant labs were elevated BUN and creatinine nephrology has been consulted 

along with vascular surgery to put hemodialysis catheter and start hemodialysis 

currently at the time of evaluation patient is undergoing hemodialysis he is on 

small dose 7 mics of levo fed to keep blood pressure/map over 65-75, 











Objective





- Vital Signs


Vital signs: 


                                   Vital Signs











Temp  98.4 F   12/29/20 08:00


 


Pulse  84   12/29/20 11:00


 


Resp  19   12/29/20 11:00


 


BP  104/75   12/29/20 11:00


 


Pulse Ox  96   12/29/20 11:00








                                 Intake & Output











 12/28/20 12/29/20 12/29/20





 18:59 06:59 18:59


 


Intake Total 957.746 1303.747 554.000


 


Output Total 1910 435 225


 


Balance -357.387 0165.747 329.000


 


Weight 110.7 kg 112 kg 


 


Intake:   


 


   1300 


 


    .9  800 


 


    Sodium Chloride 0.9% 1, 500 500 





    000 ml @ 75 mls/hr IV .   





    [id number] [facility] Rx#:[id number]   


 


  Intake, IV Titration 167.150 484.747 554.000





  Amount   


 


    Norepinephrine 4 mg In 167.150 230.747 





    Sodium Chloride 0.9% 250   





    ml @ 0.05 MCG/KG/MIN 20.   





    738 mls/hr IV .[id number]   





    ONE Rx#:[id number]   


 


    Norepinephrine 4 mg In  254 254.000





    Sodium Chloride 0.9% 250   





    ml @ 0.05 MCG/KG/MIN 21.   





    088 mls/hr IV .Q12H3M [facility]   





    Rx#:[id number]   


 


    Sodium Chloride 0.9% 1,   300





    000 ml @ 100 mls/hr IV .   





    Q10H [facility] Rx#:[id number]   


 


  Hemodialysis 300  


 


Output:   


 


  Urine 110 435 225


 


  Hemodialysis 1800  


 


Other:   


 


  Voiding Method Indwelling Catheter Indwelling Catheter Indwelling Catheter














- Exam


- Constitutional


General appearance: morbidly obese





- EENT


Eyes: EOMI, PERRLA


Ears: bilateral: normal





- Neck


Carotids: bilateral: upstroke normal


Thyroid: bilateral: normal size





- Respiratory


Respiratory: bilateral: diminished





- Cardiovascular


Rhythm: regular


Heart sounds: normal: S1, S2





- Gastrointestinal


General gastrointestinal: distended





- Integumentary


Integumentary: decreased turgor





- Neurologic


Neurologic: CNII-XII intact





- Musculoskeletal


Musculoskeletal: gait normal, generalized weakness, strength equal bilaterally





- Psychiatric


Psychiatric: A&O x's 3, appropriate affect, intact judgment & insight











- Labs


CBC & Chem 7: 


                                 12/29/20 03:21





                                 12/29/20 03:21


Labs: 


                  Abnormal Lab Results - Last 24 Hours (Table)











  12/28/20 12/29/20 12/29/20 Range/Units





  15:11 03:21 03:21 


 


WBC   20.1 H   (3.8-10.6)  k/uL


 


RBC   3.94 L   (4.30-5.90)  m/uL


 


Hgb   10.6 L   (13.0-17.5)  gm/dL


 


Hct   36.2 L   (39.0-53.0)  %


 


MCHC   29.3 L   (31.0-37.0)  g/dL


 


RDW   17.4 H   (11.5-15.5)  %


 


Neutrophils #   19.2 H   (1.3-7.7)  k/uL


 


Lymphocytes #   0.4 L   (1.0-4.8)  k/uL


 


Sodium    136 L  (137-145)  mmol/L


 


Potassium    5.2 H  (3.5-5.1)  mmol/L


 


Carbon Dioxide    14 L  (22-30)  mmol/L


 


BUN    26 H  (9-20)  mg/dL


 


Creatinine    7.47 H*  (0.66-1.25)  mg/dL


 


Glucose    163 H  (74-99)  mg/dL


 


POC Glucose (mg/dL)  105 H    (75-99)  mg/dL


 


Calcium    7.9 L  (8.4-10.2)  mg/dL








                      Microbiology - Last 24 Hours (Table)











 12/28/20 10:48 Blood Culture - Preliminary





 Blood    No Growth after 24 hours














Assessment and Plan


Assessment: 





Acute renal failure





Hypotension





Recent pneumonia





COPD





Hypertension hypertensive cardiovascular disease








Plan: 





Hemodialysis as planned





Vasopressors as needed





Repeat labs in the morning including chest x-ray





Consult vascular surgery renal and intensivist


Time with Patient: Greater than 30

## 2020-12-29 NOTE — XR
EXAMINATION TYPE: XR chest 1V portable

 

DATE OF EXAM: 12/29/2020

 

COMPARISON: 12/28/2020

 

INDICATION: ICU management difficulty breathing abdominal distention

 

TECHNIQUE: Single frontal view of the chest is obtained.

 

FINDINGS:  

The heart size is enlarged.  

The pulmonary vasculature is normal.  

Patchy infiltrates are present within the perihilar regions and in the periphery extending into the r
ight lower lobe. Findings are similar to comparison. Azygos fissure is noted, a normal variant.

 

 

IMPRESSION:  

1. Patchy peripheral midlung infiltrates. Correlate for atypical pneumonia.

## 2020-12-30 LAB
ANION GAP SERPL CALC-SCNC: 8 MMOL/L
BUN SERPL-SCNC: 39 MG/DL (ref 9–20)
CALCIUM SPEC-MCNC: 7.8 MG/DL (ref 8.4–10.2)
CHLORIDE SERPL-SCNC: 102 MMOL/L (ref 98–107)
CO2 SERPL-SCNC: 25 MMOL/L (ref 22–30)
ERYTHROCYTE [DISTWIDTH] IN BLOOD BY AUTOMATED COUNT: 3.52 M/UL (ref 4.3–5.9)
ERYTHROCYTE [DISTWIDTH] IN BLOOD: 17.6 % (ref 11.5–15.5)
GLUCOSE SERPL-MCNC: 265 MG/DL (ref 74–99)
HCT VFR BLD AUTO: 31.5 % (ref 39–53)
HGB BLD-MCNC: 9.2 GM/DL (ref 13–17.5)
MCH RBC QN AUTO: 26.2 PG (ref 25–35)
MCHC RBC AUTO-ENTMCNC: 29.3 G/DL (ref 31–37)
MCV RBC AUTO: 89.4 FL (ref 80–100)
PLATELET # BLD AUTO: 236 K/UL (ref 150–450)
POTASSIUM SERPL-SCNC: 3.8 MMOL/L (ref 3.5–5.1)
SODIUM SERPL-SCNC: 135 MMOL/L (ref 137–145)
WBC # BLD AUTO: 10.1 K/UL (ref 3.8–10.6)

## 2020-12-30 RX ADMIN — OXYCODONE HYDROCHLORIDE AND ACETAMINOPHEN SCH MG: 500 TABLET ORAL at 21:30

## 2020-12-30 RX ADMIN — IPRATROPIUM BROMIDE AND ALBUTEROL SULFATE SCH ML: .5; 3 SOLUTION RESPIRATORY (INHALATION) at 07:45

## 2020-12-30 RX ADMIN — CEFAZOLIN SCH MLS/HR: 330 INJECTION, POWDER, FOR SOLUTION INTRAMUSCULAR; INTRAVENOUS at 21:30

## 2020-12-30 RX ADMIN — CEFAZOLIN SCH MLS/HR: 330 INJECTION, POWDER, FOR SOLUTION INTRAMUSCULAR; INTRAVENOUS at 09:19

## 2020-12-30 RX ADMIN — IPRATROPIUM BROMIDE AND ALBUTEROL SULFATE SCH ML: .5; 3 SOLUTION RESPIRATORY (INHALATION) at 21:45

## 2020-12-30 RX ADMIN — POTASSIUM CHLORIDE SCH MEQ: 20 TABLET, EXTENDED RELEASE ORAL at 12:05

## 2020-12-30 RX ADMIN — METHYLPREDNISOLONE SODIUM SUCCINATE SCH MG: 40 INJECTION, POWDER, FOR SOLUTION INTRAMUSCULAR; INTRAVENOUS at 09:18

## 2020-12-30 RX ADMIN — POTASSIUM CHLORIDE SCH MLS/HR: 14.9 INJECTION, SOLUTION INTRAVENOUS at 00:05

## 2020-12-30 RX ADMIN — IPRATROPIUM BROMIDE AND ALBUTEROL SULFATE SCH ML: .5; 3 SOLUTION RESPIRATORY (INHALATION) at 11:30

## 2020-12-30 RX ADMIN — LEVOTHYROXINE SODIUM SCH MCG: 75 TABLET ORAL at 07:14

## 2020-12-30 RX ADMIN — METHYLPREDNISOLONE SODIUM SUCCINATE SCH MG: 40 INJECTION, POWDER, FOR SOLUTION INTRAMUSCULAR; INTRAVENOUS at 21:30

## 2020-12-30 RX ADMIN — POTASSIUM CHLORIDE SCH: 20 TABLET, EXTENDED RELEASE ORAL at 15:07

## 2020-12-30 NOTE — XR
EXAMINATION TYPE: XR chest 1V portable

 

DATE OF EXAM: 12/30/2020

 

Comparison: 12/29/2020

 

Clinical History: 60-year-old male shortness of breath

 

Findings:

Heart remains enlarged. Multifocal mid and lower lung opacities persist. There may be slight improvem
ent in aeration of the left midlung but with increasing opacity at the left lower lung.

 

 

Impression:

Continued mid and lower lung areas of patchy and confluent airspace disease.

## 2020-12-30 NOTE — P.PN
Subjective


Progress Note Date: 12/30/20


Principal diagnosis: 


Acute renal failure





Hypotension





Recent pneumonia





COPD





Hypertension hypertensive cardiovascular disease





12/30/2020, awake and alert sitting upright on the bed breathing comfortably, 

presence of pressors are off patient will be transferred out of the ICU patient 

is being monitored off of hemodialysis, labs reviewed





December 29 2020, patient seen eval examined during the rounds labs reviewed 

medications reviewed, patient has no dialysis today, hemodynamic status status 

still marginal remains on 10 mics of levo fed, patient will be monitored observe

in the ICU





This is a 60-year-old obese male with past medical history of dyslipidemia 

hypertension hypertensive cardiovascular disease also has a history of the 

rheumatoid arthritis with recent pneumonia, came into the hospital with 

increasing shortness of breath high doesn't fatigue, it appears that patient 

presented with pneumonia a week ago was treated with outpatient antibiotics and 

steroids symptoms did not resolve but continued to get worse, he start retaining

more fluids increased swelling of the lower extremity has been noted, his 

significant labs were elevated BUN and creatinine nephrology has been consulted 

along with vascular surgery to put hemodialysis catheter and start hemodialysis 

currently at the time of evaluation patient is undergoing hemodialysis he is on 

small dose 7 mics of levo fed to keep blood pressure/map over 65-75, 











Objective





- Vital Signs


Vital signs: 


                                   Vital Signs











Temp  97.5 F L  12/30/20 08:00


 


Pulse  82   12/30/20 11:45


 


Resp  8 L  12/30/20 09:30


 


BP  96/76   12/30/20 09:30


 


Pulse Ox  94 L  12/30/20 09:30








                                 Intake & Output











 12/29/20 12/30/20 12/30/20





 18:59 06:59 18:59


 


Intake Total 1939.371 5827.016 433.310


 


Output Total 600 605 90


 


Balance 1028.000 818.016 343.310


 


Weight  110.8 kg 


 


Intake:   


 


  IV  1100 200


 


    Dextrose 5% in Water 1,  1100 200





    000 ml @ 100 mls/hr IV .   





    [id number] GABBY with Sodium   





    Bicarb (1 Meq/ml) 150 ml   





    Rx#:[id number]   


 


  Intake, IV Titration 1508.000 123.016 113.310





  Amount   


 


    Dextrose 5% in Water 1, 700  





    000 ml @ 100 mls/hr IV .   





    [id number] GABBY with Sodium   





    Bicarb (1 Meq/ml) 150 ml   





    Rx#:[id number]   


 


    Norepinephrine 4 mg In 508.000 123.016 38.310





    Sodium Chloride 0.9% 250   





    ml @ 0.05 MCG/KG/MIN 21.   





    088 mls/hr IV .Q12H3M [facility]   





    Rx#:[id number]   


 


    Sodium Chloride 0.9% 1, 300  





    000 ml @ 100 mls/hr IV .   





    Q10H GABBY Rx#:[id number]   


 


    Sodium Chloride 0.9% 1,   75





    000 ml @ 75 mls/hr IV .   





    [id number] GABBY Rx#:[id number]   


 


  Oral 120 200 120


 


Output:   


 


  Urine 600 605 90


 


Other:   


 


  Voiding Method Indwelling Catheter Indwelling Catheter Indwelling Catheter














- Exam


- Constitutional


General appearance: morbidly obese





- EENT


Eyes: EOMI, PERRLA


Ears: bilateral: normal





- Neck


Carotids: bilateral: upstroke normal


Thyroid: bilateral: normal size





- Respiratory


Respiratory: bilateral: diminished





- Cardiovascular


Rhythm: regular


Heart sounds: normal: S1, S2





- Gastrointestinal


General gastrointestinal: distended





- Integumentary


Integumentary: decreased turgor





- Neurologic


Neurologic: CNII-XII intact





- Musculoskeletal


Musculoskeletal: gait normal, generalized weakness, strength equal bilaterally





- Psychiatric


Psychiatric: A&O x's 3, appropriate affect, intact judgment & insight











- Labs


CBC & Chem 7: 


                                 12/30/20 03:37





                                 12/30/20 03:37


Labs: 


                  Abnormal Lab Results - Last 24 Hours (Table)











  12/30/20 12/30/20 Range/Units





  03:37 03:37 


 


RBC  3.52 L   (4.30-5.90)  m/uL


 


Hgb  9.2 L   (13.0-17.5)  gm/dL


 


Hct  31.5 L   (39.0-53.0)  %


 


MCHC  29.3 L   (31.0-37.0)  g/dL


 


RDW  17.6 H   (11.5-15.5)  %


 


Sodium   135 L  (137-145)  mmol/L


 


BUN   39 H  (9-20)  mg/dL


 


Creatinine   3.81 H  (0.66-1.25)  mg/dL


 


Glucose   265 H  (74-99)  mg/dL


 


Calcium   7.8 L  (8.4-10.2)  mg/dL








                      Microbiology - Last 24 Hours (Table)











 12/28/20 10:48 Blood Culture - Preliminary





 Blood    No Growth after 48 hours














Assessment and Plan


Assessment: 





Acute renal failure





Hypotension





Recent pneumonia





COPD





Hypertension hypertensive cardiovascular disease








Plan: 





Hemodialysis as needed





Vasopressors as needed





Repeat labs in the morning including chest x-ray





Patient can be moved out of the ICU


Time with Patient: Greater than 30

## 2020-12-30 NOTE — CDI
Documentation Clarification Form



Date: 12/30/2020 03:05:40 PM

From: Lizet Gilbert RN CCDS

Phone: [phone]

MRN: [id number]

Admit Date: 12/28/2020 01:24:00 PM

Patient Name: Trevon Kaufman

Visit Number: [id number]

Discharge Date:  



ATTENTION: The Clinical Documentation Specialists (CDI) and [facility] Coding Staff 
appreciate your assistance in clarifying documentation. Please respond to the 
clarification below the line at the bottom and electronically sign. The CDI & 
[facility] Coding staff will review the response and follow-up if needed. Please note: 
Queries are made part of the Legal Health Record. If you have any questions, 
please contact the author of this message via ITS.



Dr. Melvin Thompson



Acute kidney injury, acute tubular necrosis, from sepsis and hypotension 
currently improved. Is documented in the Nephrology consult 12/29 and progress 
note 12/30.



History/Risk Factors: 60-year-old male presents to the ED worsening shortness of
breath. The patient was recently diagnosed with pneumonia approximately a week 
ago. 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 

Clinical Indicators:

12/28 WBC: 15.3  

12/28 Lactic acid: 2.1

12/28 Blood cultures: No growth after 28 hours 

12/28 Vital signs on admission: B/P 99/56; 107; 97.5 F Oral; RR 18; SpO2 89% 3L 
room air

Treatment:

Antibiotics: 12/28 Zosyn Ivpb x 1; Ceftriaxone Ivpb x 1; Levaquin Ivpb x 1

IV Bolus: 12/28 0.9ns 2L bolus followed by 75cc/hr



In your professional opinion, please clarify if these findings signify one of 
the following conditions, whether the condition is POA, and cause, if known:



   

   Sepsis related to Pneumonia



Present on Admission

   Yes

   



Identify the (suspected) organism___unknown___________

Link or clarify if there is associated (due to/with): 

     Organ failure : Hypoxia



 

SIRS Criteria (2 or more of the following may indicate SIRS):

-Temperature   < 96.8F (36C) or > 101.0F (38.3C)

-Heart Rate   > 90 bpm

-Respiratory Rate   > 20 breaths/min or PaCO2 < 32 mmHg

-White Blood Cell Count   > 12,000 or < 4,000 cells/mm3 or > 10% bands

-Lactate   >2.0 mmol/L (>4.0 is equivalent to septic shock)

MTDD

## 2020-12-30 NOTE — PN
PROGRESS NOTE



Patient is seen for followup for acute kidney injury.  The patient was admitted to the

hospital with a creatinine of 11.  He did get dialysis; however, his urine output

started to improve and renal function has improved.  He is maintained on IV fluids.

Currently being treated for pneumonia.  The patient does have a previous history of

pneumonia and chronic changes on his lungs.  He did have a CAT scan of his chest done

yesterday which showed chronic changes to both lower lungs, possibly secondary to

rheumatoid lung disease and new air bronchograms in the left hilar and the right upper

lobe.  The patient is maintained on IV fluids.  He was switched to bicarb drip

yesterday for metabolic acidosis.



Urine output is good, currently at 75 to 30 mL an hour.



PHYSICAL EXAMINATION:

On examination today, patient is comfortable, awake. He is not in any acute distress.

Blood pressure is 96/76, heart rate 84 per minute. He is afebrile.

EXAMINATION OF THE HEART: S1, S2.

EXAMINATION OF THE LUNGS:  Decreased breath sounds at bases.

Abdomen is soft, distended, nontender.

Examination of lower extremities shows no significant edema.

CNS EXAM:  Grossly intact. Patient moving all 4 extremities.



LABS:

Labs show sodium 135, potassium 3.8, chloride 102. BUN 39. Serum creatinine 3.8.

Calcium 7.8.



ASSESSMENT:

1. Acute kidney injury, acute tubular necrosis, from sepsis and hypotension,

    currently improved.

2. Hypovolemia, status post IV fluids.

3. Metabolic acidosis, anion gap associated with renal failure and lactic acidosis,

    currently improved.

4. Hyperkalemia associated with acute kidney injury and acidosis, now improved.

5. Acute hypoxic respiratory failure secondary to pneumonia maintained on antibiotics,

    currently improving.

6. Rheumatoid arthritis with history of rheumatoid lung nodules.



PLAN:

Change IV fluids to normal saline.  Continue to hold off on dialysis.  We will likely

remove the dialysis catheter tomorrow.  Monitor urine output as it has dropped slightly

since the blood pressure was on the lower side.  Patient is off of Levophed.  We can

add midodrine if his blood pressure remains low.  Replace potassium cautiously.





MMODL / IJN: [id number] / Job#: [id number]

## 2020-12-30 NOTE — P.PN
Subjective


Progress Note Date: 12/30/20


Principal diagnosis: 





Acute kidney injury, CHF





A 60-year-old male patient who came into the emergency department with worsening

shortness of breath.  The patient also had diminished urine output and he was 

getting progressively more dry and he was not eating or drinking much over the 

past few days.  He cannot even remember the last time he urinated.  The patient 

denied having any fever.  No reported cough.  He did have some exertional 

dyspnea and shortness of breath whenever he laid down flat.  He also noted that 

his legs are somewhat more swollen than the usual.  Denied having any chest 

pain.  No jaw pain.  No back pain.  No nausea.  No vomiting.  No abdominal pain.

 No altered mentation.  The patient when he came into the emergency, he was 

quite hypoxic and was unable to speak up.  This is and he was placed on 5 L 

about 2 by nasal cannula.  Noted the patient was in the hospital back in 

12/24/2020 and at that time the patient was seen by our service.  He came in for

symptoms of generalized weakness and fatigue and not feeling well.  The patient 

has been known to our practice and he was seen by Dr. fang and the patient was 

ruled out for coronary bypass/Covid 19 infection.  His chest x-ray showed some 

patchy perihilar and basilar pulmonary infiltrates compatible with pneumonia.  

The patient was discharged home with the next 24 hours on a combination of 

Omnicef and Zithromax and the patient was also given a prednisone burst taper.  

Nevertheless, it seems that his condition has progressed.  Note that the patient

is known to have rheumatoid arthritis and previous history of rheumatoid lung 

disease in addition to previous history of a loculated empyema requiring a chest

tube placement back in July 2020.  This was attributed to Streptococcus 

pneumonia.  He has history of hypertension, hypothyroidism and previous history 

of DVT.  He has had multiple bronchoscopies in the past and the last 

bronchoscopy was done in November 2070 and at that time the patient had some 

Candida albicans.  Note that the current admission showed significantly abnormal

blood work.  The patient had seen a bicarb of 18 with an anion gap of 15.  

Creatinine was 11.3, lactic acid level was at 2.1 drop down to 1.6.  Troponin 

was at 0.04, proBNP level was 4190.  D-dimer is at 3.2.  Correlation profile is 

within normal limits.  The white cell count is at 15.3 with a hemoglobin of 10.3

and platelets are at 262.  UA was also abnormal and showed +1 bilirubin, rare 

WBCs and bacteria and rare RBCs.  There was hiding casts.  Coronavirus, Covid 19

screening came back negative.  The patient is being seen by nephrology.  The 

patient is being considered for hemodialysis.  The chest x-ray showed perihilar 

pulmonary infiltrates and bilateral pleural effusions and there is worsening and

bilateral edema/infiltrate consistent with fluid overload.  There is also an 

air-fluid level in the left lung base that was seen on previous chest x-rays.  





On 12/29/2020 the patient is currently in the intensive care unit.  The patient 

got admitted to the ICU and the patient was given a session of hemodialysis.  He

has a temporary dialysis cath in his right femoral vein.  A total of 1.5 L of f

luid was ultrafiltrate that along with dialysis.  On today's blood work, his 

creatinine is at 7.47 and his potassium level is at 5.2 and the sodium level is 

at 136.  Serum bicarbonate of 15.  His IV fluids are running at the rate of 100 

mL an hour of normal saline.  Urine output is improving.  He was quite low 

overnight and currently is in the order of 10-20 mL an hour his UA did not 

indicate any potential infection.  There was +1 bilirubin, 10 WBCs, 4 RBCs.  His

lactic acid level is down to 1.6.  His white cell count is at 20.1 with a 

hemoglobin of 10.6.  He is resting comfortably in bed.  Hemodynamically stable. 

He is requiring norepinephrine infusion which is running at 0.19 micrograms per 

kilogram per minute.  I feel like his abdomen is slightly distended.  

Nevertheless, the patient although this is his normal abdominal distention and 

he denies having any nausea or emesis.  His last bowel movement was few days 

back.





The patient is seen today 12/30/2020 in follow-up in the intensive care unit.  

He is currently awake and alert oriented 3.  Resting comfortably in bed.  He 

did receive hemodialysis times one.  He currently is maintaining O2 saturation 

in the 90s on 2 L/m per nasal cannula.  He has D5W with 3 A of bicarb at 100 

ML's per hour.  Norepinephrine is currently off.  This x-ray continues to reveal

some mid and lower lung areas of patchy and confluent airspace disease.  Blood 

culture reveals no growth.  White count 10.1.  Hemoglobin 9.2.  Platelets 236.  

Sodium 135.  Potassium 3.8.  Bicarb 25.  Creatinine 3.81.  Glucose 265.  He 

remains on bronchodilators, IV Solu-Medrol.





Objective





- Vital Signs


Vital signs: 


                                   Vital Signs











Temp  97.5 F L  12/30/20 08:00


 


Pulse  84   12/30/20 09:30


 


Resp  8 L  12/30/20 09:30


 


BP  96/76   12/30/20 09:30


 


Pulse Ox  94 L  12/30/20 09:30








                                 Intake & Output











 12/29/20 12/30/20 12/30/20





 18:59 06:59 18:59


 


Intake Total 2605.369 2100.016 433.310


 


Output Total 600 605 90


 


Balance 1028.000 818.016 343.310


 


Weight  110.8 kg 


 


Intake:   


 


  IV  1100 200


 


    Dextrose 5% in Water 1,  1100 200





    000 ml @ 100 mls/hr IV .   





    [id number] GABBY with Sodium   





    Bicarb (1 Meq/ml) 150 ml   





    Rx#:[id number]   


 


  Intake, IV Titration 1508.000 123.016 113.310





  Amount   


 


    Dextrose 5% in Water 1, 700  





    000 ml @ 100 mls/hr IV .   





    [id number] GABBY with Sodium   





    Bicarb (1 Meq/ml) 150 ml   





    Rx#:[id number]   


 


    Norepinephrine 4 mg In 508.000 123.016 38.310





    Sodium Chloride 0.9% 250   





    ml @ 0.05 MCG/KG/MIN 21.   





    088 mls/hr IV .Q12H3M GABBY   





    Rx#:[id number]   


 


    Sodium Chloride 0.9% 1, 300  





    000 ml @ 100 mls/hr IV .   





    Q10H GABBY Rx#:[id number]   


 


    Sodium Chloride 0.9% 1,   75





    000 ml @ 75 mls/hr IV .   





    [id number] GABBY Rx#:[id number]   


 


  Oral 120 200 120


 


Output:   


 


  Urine 600 605 90


 


Other:   


 


  Voiding Method Indwelling Catheter Indwelling Catheter Indwelling Catheter














- Exam





GENERAL EXAM: Alert, oriented 60-year-old gentleman, on 2 L nasal cannula, 

comfortable in no apparent distress.


HEAD: Normocephalic.


EYES: Normal reaction of pupils, equal size.


NOSE: Clear with pink turbinates.


THROAT: No erythema or exudates.


NECK: No masses, no JVD.


CHEST: No chest wall deformity.


LUNGS: Equal air entry with coarse crackles in the bilateral posterior bases


CVS: S1 and S2 normal with no audible murmur, regular rhythm.


ABDOMEN: No hepatosplenomegaly, normal bowel sounds, no guarding or rigidity.


SPINE: No scoliosis or deformity


SKIN: No rashes


CENTRAL NERVOUS SYSTEM: No focal deficits, tone is normal in all 4 extremities.


EXTREMITIES: There is 1+ peripheral edema.  No clubbing, no cyanosis.  

Peripheral pulses are intact.





- Labs


CBC & Chem 7: 


                                 12/30/20 03:37





                                 12/30/20 03:37


Labs: 


                  Abnormal Lab Results - Last 24 Hours (Table)











  12/30/20 12/30/20 Range/Units





  03:37 03:37 


 


RBC  3.52 L   (4.30-5.90)  m/uL


 


Hgb  9.2 L   (13.0-17.5)  gm/dL


 


Hct  31.5 L   (39.0-53.0)  %


 


MCHC  29.3 L   (31.0-37.0)  g/dL


 


RDW  17.6 H   (11.5-15.5)  %


 


Sodium   135 L  (137-145)  mmol/L


 


BUN   39 H  (9-20)  mg/dL


 


Creatinine   3.81 H  (0.66-1.25)  mg/dL


 


Glucose   265 H  (74-99)  mg/dL


 


Calcium   7.8 L  (8.4-10.2)  mg/dL








                      Microbiology - Last 24 Hours (Table)











 12/28/20 10:48 Blood Culture - Preliminary





 Blood    No Growth after 24 hours














Assessment and Plan


Assessment: 





1 Acute kidney injury.  The patient's creatinine was up to 11 and the patient is

a component of metabolic acidosis and the creatinine is down to 3.81 and the 

serum bicarb is at 25.  The patient had an ultrasound the kidneys that showed no

evidence of any hydronephrosis.  Consider ATN.  No signs of uremia.  The patient

arrived to emergency department the patient was also hypoxemic and the patient 

was hypotensive. The patient underwent hemodialysis 12/28/2020 with 1.5 L of 

ultrafiltration. Urine output is improving.





2 Bilateral perihilar pulmonary infiltrates, consider interstitial edema versus 

pneumonia of the Covid 19 testing came back negative.  History of rheumatoid 

lung





3 History of left lung empyema related to Streptococcus pneumonia back in June 2020 requiring insertion and subsequent removal of chest tube





4 Air-fluid level involving the left lung base, consider ongoing fluid 

collection/abscess





5 Acute hypoxemic respiratory failure currently on 2 L of oxygen by nasal 

cannula





6 Hypotension, likely secondary to intravascular volume depletion/dehydration.  

Recovered and off pressors





7 Rheumatoid arthritis history of rheumatoid lungs/fibrosis with previous 

treatment with Arava and prednisone





8 Hypertension





9 Hypothyroidism





10 Generalized  weakness and fatigue related to above





Plan





The patient was seen and evaluated by Dr. Gómez


Chest x-ray and labs reviewed


We will discontinue the bicarb drip


Currently off pressors


At 0.9 normal saline at 100 ML's per hour


Increase diet as tolerated


Nephrology is on the case


We will continue to follow





I, the cosigning physician, performed a history & physical examination of the 

patient. Lungs sounds with crackles in the bilateral posterior bases.  

Maintaining good O2 saturations in the 90s on 2 L/m per nasal cannula.  I 

discussed the assessment and plan of care with my nurse practitioner, Rupa Flood. I attest to the above note as dictated by her.

## 2020-12-31 LAB
ANION GAP SERPL CALC-SCNC: 8 MMOL/L
BASOPHILS # BLD AUTO: 0 K/UL (ref 0–0.2)
BASOPHILS NFR BLD AUTO: 0 %
BUN SERPL-SCNC: 52 MG/DL (ref 9–20)
CALCIUM SPEC-MCNC: 8.2 MG/DL (ref 8.4–10.2)
CHLORIDE SERPL-SCNC: 102 MMOL/L (ref 98–107)
CO2 SERPL-SCNC: 25 MMOL/L (ref 22–30)
EOSINOPHIL # BLD AUTO: 0 K/UL (ref 0–0.7)
EOSINOPHIL NFR BLD AUTO: 0 %
ERYTHROCYTE [DISTWIDTH] IN BLOOD BY AUTOMATED COUNT: 3.63 M/UL (ref 4.3–5.9)
ERYTHROCYTE [DISTWIDTH] IN BLOOD: 17.6 % (ref 11.5–15.5)
GLUCOSE SERPL-MCNC: 146 MG/DL (ref 74–99)
HCT VFR BLD AUTO: 31.4 % (ref 39–53)
HGB BLD-MCNC: 9.6 GM/DL (ref 13–17.5)
LYMPHOCYTES # SPEC AUTO: 0.5 K/UL (ref 1–4.8)
LYMPHOCYTES NFR SPEC AUTO: 4 %
MCH RBC QN AUTO: 26.4 PG (ref 25–35)
MCHC RBC AUTO-ENTMCNC: 30.6 G/DL (ref 31–37)
MCV RBC AUTO: 86.4 FL (ref 80–100)
MONOCYTES # BLD AUTO: 0.4 K/UL (ref 0–1)
MONOCYTES NFR BLD AUTO: 3 %
NEUTROPHILS # BLD AUTO: 12.5 K/UL (ref 1.3–7.7)
NEUTROPHILS NFR BLD AUTO: 92 %
PLATELET # BLD AUTO: 251 K/UL (ref 150–450)
POTASSIUM SERPL-SCNC: 3.8 MMOL/L (ref 3.5–5.1)
SODIUM SERPL-SCNC: 135 MMOL/L (ref 137–145)
WBC # BLD AUTO: 13.5 K/UL (ref 3.8–10.6)

## 2020-12-31 RX ADMIN — NOREPINEPHRINE BITARTRATE SCH: 1 INJECTION, SOLUTION, CONCENTRATE INTRAVENOUS at 21:29

## 2020-12-31 RX ADMIN — IPRATROPIUM BROMIDE AND ALBUTEROL SULFATE SCH: .5; 3 SOLUTION RESPIRATORY (INHALATION) at 20:49

## 2020-12-31 RX ADMIN — IPRATROPIUM BROMIDE AND ALBUTEROL SULFATE SCH ML: .5; 3 SOLUTION RESPIRATORY (INHALATION) at 13:23

## 2020-12-31 RX ADMIN — OXYCODONE HYDROCHLORIDE AND ACETAMINOPHEN SCH MG: 500 TABLET ORAL at 21:20

## 2020-12-31 RX ADMIN — METHYLPREDNISOLONE SODIUM SUCCINATE SCH MG: 40 INJECTION, POWDER, FOR SOLUTION INTRAMUSCULAR; INTRAVENOUS at 09:10

## 2020-12-31 RX ADMIN — IPRATROPIUM BROMIDE AND ALBUTEROL SULFATE SCH ML: .5; 3 SOLUTION RESPIRATORY (INHALATION) at 07:25

## 2020-12-31 RX ADMIN — METHYLPREDNISOLONE SODIUM SUCCINATE SCH MG: 40 INJECTION, POWDER, FOR SOLUTION INTRAMUSCULAR; INTRAVENOUS at 21:20

## 2020-12-31 RX ADMIN — NOREPINEPHRINE BITARTRATE SCH: 1 INJECTION, SOLUTION, CONCENTRATE INTRAVENOUS at 16:27

## 2020-12-31 RX ADMIN — LEVOTHYROXINE SODIUM SCH MCG: 75 TABLET ORAL at 06:26

## 2020-12-31 NOTE — XR
EXAMINATION TYPE: XR chest 1V

 

DATE OF EXAM: 12/31/2020

 

COMPARISON: 12/30/2020

 

INDICATION: Follow-up

 

TECHNIQUE: Single frontal view of the chest is obtained.

 

FINDINGS:  

The heart size is enlarged.  

The pulmonary vasculature is normal.  

Bibasilar infiltrates are present. These are stable from comparison  

 

 

IMPRESSION:  

1. Stable bibasilar infiltrates and cardiomegaly.

## 2020-12-31 NOTE — P.PN
Subjective


Progress Note Date: 12/31/20


Principal diagnosis: 





Acute kidney injury, CHF





A 60-year-old male patient who came into the emergency department with worsening

shortness of breath.  The patient also had diminished urine output and he was 

getting progressively more dry and he was not eating or drinking much over the 

past few days.  He cannot even remember the last time he urinated.  The patient 

denied having any fever.  No reported cough.  He did have some exertional 

dyspnea and shortness of breath whenever he laid down flat.  He also noted that 

his legs are somewhat more swollen than the usual.  Denied having any chest 

pain.  No jaw pain.  No back pain.  No nausea.  No vomiting.  No abdominal pain.

 No altered mentation.  The patient when he came into the emergency, he was 

quite hypoxic and was unable to speak up.  This is and he was placed on 5 L 

about 2 by nasal cannula.  Noted the patient was in the hospital back in 

12/24/2020 and at that time the patient was seen by our service.  He came in for

symptoms of generalized weakness and fatigue and not feeling well.  The patient 

has been known to our practice and he was seen by Dr. fang and the patient was 

ruled out for coronary bypass/Covid 19 infection.  His chest x-ray showed some 

patchy perihilar and basilar pulmonary infiltrates compatible with pneumonia.  

The patient was discharged home with the next 24 hours on a combination of 

Omnicef and Zithromax and the patient was also given a prednisone burst taper.  

Nevertheless, it seems that his condition has progressed.  Note that the patient

is known to have rheumatoid arthritis and previous history of rheumatoid lung 

disease in addition to previous history of a loculated empyema requiring a chest

tube placement back in July 2020.  This was attributed to Streptococcus 

pneumonia.  He has history of hypertension, hypothyroidism and previous history 

of DVT.  He has had multiple bronchoscopies in the past and the last 

bronchoscopy was done in November 2070 and at that time the patient had some 

Candida albicans.  Note that the current admission showed significantly abnormal

blood work.  The patient had seen a bicarb of 18 with an anion gap of 15.  

Creatinine was 11.3, lactic acid level was at 2.1 drop down to 1.6.  Troponin 

was at 0.04, proBNP level was 4190.  D-dimer is at 3.2.  Correlation profile is 

within normal limits.  The white cell count is at 15.3 with a hemoglobin of 10.3

and platelets are at 262.  UA was also abnormal and showed +1 bilirubin, rare 

WBCs and bacteria and rare RBCs.  There was hiding casts.  Coronavirus, Covid 19

screening came back negative.  The patient is being seen by nephrology.  The 

patient is being considered for hemodialysis.  The chest x-ray showed perihilar 

pulmonary infiltrates and bilateral pleural effusions and there is worsening and

bilateral edema/infiltrate consistent with fluid overload.  There is also an 

air-fluid level in the left lung base that was seen on previous chest x-rays.  





On 12/29/2020 the patient is currently in the intensive care unit.  The patient 

got admitted to the ICU and the patient was given a session of hemodialysis.  He

has a temporary dialysis cath in his right femoral vein.  A total of 1.5 L of f

luid was ultrafiltrate that along with dialysis.  On today's blood work, his 

creatinine is at 7.47 and his potassium level is at 5.2 and the sodium level is 

at 136.  Serum bicarbonate of 15.  His IV fluids are running at the rate of 100 

mL an hour of normal saline.  Urine output is improving.  He was quite low 

overnight and currently is in the order of 10-20 mL an hour his UA did not 

indicate any potential infection.  There was +1 bilirubin, 10 WBCs, 4 RBCs.  His

lactic acid level is down to 1.6.  His white cell count is at 20.1 with a 

hemoglobin of 10.6.  He is resting comfortably in bed.  Hemodynamically stable. 

He is requiring norepinephrine infusion which is running at 0.19 micrograms per 

kilogram per minute.  I feel like his abdomen is slightly distended.  

Nevertheless, the patient although this is his normal abdominal distention and 

he denies having any nausea or emesis.  His last bowel movement was few days 

back.





The patient is seen today 12/30/2020 in follow-up in the intensive care unit.  

He is currently awake and alert oriented 3.  Resting comfortably in bed.  He 

did receive hemodialysis times one.  He currently is maintaining O2 saturation 

in the 90s on 2 L/m per nasal cannula.  He has D5W with 3 A of bicarb at 100 

ML's per hour.  Norepinephrine is currently off.  This x-ray continues to reveal

some mid and lower lung areas of patchy and confluent airspace disease.  Blood 

culture reveals no growth.  White count 10.1.  Hemoglobin 9.2.  Platelets 236.  

Sodium 135.  Potassium 3.8.  Bicarb 25.  Creatinine 3.81.  Glucose 265.  He 

remains on bronchodilators, IV Solu-Medrol.





The patient is seen today 12/31/2020 in follow-up in the intensive care unit.  

He is awake and alert in no acute distress.  Currently sitting up in a chair at 

the bedside.  Denies any worsening shortness of breath, cough or congestion.  He

is maintaining good O2 saturations in the 90s on 2 L/m per nasal cannula.  0.9 

normal saline at 75 ML's per hour.  He has been off his norepinephrine for 

greater than 12 hours.  Blood cultures reveal no growth to date.  He has 

received hemodialysis once since admission.  White count 13.5.  Hemoglobin 9.6. 

Sodium 135.  Potassium 3.8.  Creatinine 2.77.  His only complaint today is 

difficulty sleeping at night.  Chest x-ray reveals stable bibasilar infiltrates,

cardiomegaly.





Objective





- Vital Signs


Vital signs: 


                                   Vital Signs











Temp  98.2 F   12/31/20 04:00


 


Pulse  96   12/31/20 07:36


 


Resp  19   12/31/20 07:00


 


BP  94/81   12/31/20 07:00


 


Pulse Ox  97   12/31/20 07:00








                                 Intake & Output











 12/30/20 12/31/20 12/31/20





 18:59 06:59 18:59


 


Intake Total 6370.529 3245 75


 


Output Total 330 450 40


 


Balance 1258.310 690 35


 


Intake:   


 


    


 


    Dextrose 5% in Water 1, 200  





    000 ml @ 100 mls/hr IV .   





    [id number] GABBY with Sodium   





    Bicarb (1 Meq/ml) 150 ml   





    Rx#:[id number]   


 


  Intake, IV Titration 788.310 900 75





  Amount   


 


    Norepinephrine 4 mg In 38.310  





    Sodium Chloride 0.9% 250   





    ml @ 0.05 MCG/KG/MIN 21.   





    088 mls/hr IV .Q12H3M GABBY   





    Rx#:[id number]   


 


    Sodium Chloride 0.9% 1, 750 900 75





    000 ml @ 75 mls/hr IV .   





    [id number] GABBY Rx#:[id number]   


 


  Oral 600 240 


 


Output:   


 


  Urine 330 450 40


 


Other:   


 


  Voiding Method Indwelling Catheter Indwelling Catheter 


 


  # Bowel Movements  1 














- Exam





GENERAL EXAM: Alert, oriented 60-year-old gentleman, on 2 L nasal cannula, up in

a chair at the bedside, comfortable in no apparent distress.


HEAD: Normocephalic.


EYES: Normal reaction of pupils, equal size.


NOSE: Clear with pink turbinates.


THROAT: No erythema or exudates.


NECK: No masses, no JVD.


CHEST: No chest wall deformity.


LUNGS: Equal air entry with coarse crackles in the bilateral posterior bases


CVS: S1 and S2 normal with no audible murmur, regular rhythm.


ABDOMEN: No hepatosplenomegaly, normal bowel sounds, no guarding or rigidity.


SPINE: No scoliosis or deformity


SKIN: No rashes


CENTRAL NERVOUS SYSTEM: No focal deficits, tone is normal in all 4 extremities.


EXTREMITIES: There is 1+ peripheral edema.  No clubbing, no cyanosis.  

Peripheral pulses are intact.





- Labs


CBC & Chem 7: 


                                 12/31/20 03:56





                                 12/31/20 03:56


Labs: 


                  Abnormal Lab Results - Last 24 Hours (Table)











  12/31/20 12/31/20 Range/Units





  03:56 03:56 


 


WBC  13.5 H   (3.8-10.6)  k/uL


 


RBC  3.63 L   (4.30-5.90)  m/uL


 


Hgb  9.6 L   (13.0-17.5)  gm/dL


 


Hct  31.4 L   (39.0-53.0)  %


 


MCHC  30.6 L   (31.0-37.0)  g/dL


 


RDW  17.6 H   (11.5-15.5)  %


 


Neutrophils #  12.5 H   (1.3-7.7)  k/uL


 


Lymphocytes #  0.5 L   (1.0-4.8)  k/uL


 


Sodium   135 L  (137-145)  mmol/L


 


BUN   52 H  (9-20)  mg/dL


 


Creatinine   2.77 H  (0.66-1.25)  mg/dL


 


Glucose   146 H  (74-99)  mg/dL


 


Calcium   8.2 L  (8.4-10.2)  mg/dL








                      Microbiology - Last 24 Hours (Table)











 12/28/20 10:48 Blood Culture - Preliminary





 Blood    No Growth after 48 hours














Assessment and Plan


Assessment: 





1 Acute kidney injury.  The patient's creatinine was up to 11 and the patient is

a component of metabolic acidosis and the creatinine is down to 2.77 and the 

serum bicarb is at 25.  The patient had an ultrasound the kidneys that showed no

evidence of any hydronephrosis.  Consider ATN.  No signs of uremia. The patient 

underwent hemodialysis 12/28/2020 with 1.5 L of ultrafiltration. Urine output is

improving.





2 Bilateral perihilar pulmonary infiltrates, consider interstitial edema versus 

pneumonia of the Covid 19 testing came back negative.  History of rheumatoid 

lung





3 History of left lung empyema related to Streptococcus pneumonia back in June 2020 requiring insertion and subsequent removal of chest tube





4 Air-fluid level involving the left lung base, consider ongoing fluid 

collection/abscess





5 Acute hypoxemic respiratory failure currently on 2 L of oxygen by nasal 

cannula





6 Hypotension, likely secondary to intravascular volume depletion/dehydration.  

Recovered and off pressors





7 Rheumatoid arthritis history of rheumatoid lungs/fibrosis with previous thom

atment with Arava and prednisone





8 Hypertension





9 Hypothyroidism





10 Generalized  weakness and fatigue related to above





Plan





The patient was seen and evaluated by Dr. Gómez


Chest x-ray and labs reviewed


Continue bronchodilators and steroids


0.9 normal saline at 75 ML's per hour


Restoril for insomnia


Transfer out of the ICU today


We will continue to follow





I, the cosigning physician, performed a history & physical examination of the 

patient. Lungs sounds with crackles in the bilateral posterior bases.  

Maintaining good O2 saturations in the 90s on 2 L/m per nasal cannula.  I 

discussed the assessment and plan of care with my nurse practitioner, Rupa Flood. I attest to the above note as dictated by her.

## 2020-12-31 NOTE — PN
PROGRESS NOTE



Patient is seen for followup for acute kidney injury.  The patient is currently sitting

up on a bedside chair.  He is comfortable.  He denies any significant complaints.  He

is maintained on IV fluids.



EXAMINATION:

Blood pressure was on examination 116/78, heart rate 89 per minute.  He is afebrile.

Urine output is at about 40-50 mL an hour.  Examination of the heart S1, S2.

Examination of the lungs, bilateral breath sounds are heard.  Abdomen is soft,

nontender.  Examination of lower extremities shows edema 2+ bilaterally.  CNS exam

grossly intact.



LABS:

Show sodium 135, potassium 3.8, chloride 102, BUN 52, creatinine 2.7, hemoglobin 9.6

g/dL.



ASSESSMENT:

1. Acute kidney injury, acute tubular necrosis  currently improving, status post one

    treatment of hemodialysis.  Urine output is maintained and renal function continues

    to improve.  I will discontinue the IV fluids.

2. Pneumonia maintained on antibiotics.

3. Acute hypoxic respiratory failure, currently improved.

4. Sepsis with septic shock, currently off pressors, now improved.

5. History of left lung empyema related to Streptococcus pneumonia in June of 2020

    with placement of chest tube and removal of chest tube.

6. Metabolic acidosis, now resolved.  The patient is status post bicarb drip.

7. Rheumatoid arthritis with history of rheumatoid lung and nodules and fibrosis.

8. Hypervolemia.



PLAN:

Discontinue IV fluids Lasix IV x1.  Repeat labs in a.m. Continue to avoid nephrotoxic

agents.  I will discontinue the dialysis catheter as well.





MMODL / IJN: [id number] / Job#: [id number]

## 2021-01-01 LAB
ALBUMIN SERPL-MCNC: 3.1 G/DL (ref 3.5–5)
ALP SERPL-CCNC: 99 U/L (ref 38–126)
ALT SERPL-CCNC: 16 U/L (ref 4–49)
ANION GAP SERPL CALC-SCNC: 10 MMOL/L
AST SERPL-CCNC: 26 U/L (ref 17–59)
BASOPHILS # BLD AUTO: 0 K/UL (ref 0–0.2)
BASOPHILS NFR BLD AUTO: 0 %
BUN SERPL-SCNC: 65 MG/DL (ref 9–20)
CALCIUM SPEC-MCNC: 8.7 MG/DL (ref 8.4–10.2)
CHLORIDE SERPL-SCNC: 105 MMOL/L (ref 98–107)
CO2 SERPL-SCNC: 25 MMOL/L (ref 22–30)
EOSINOPHIL # BLD AUTO: 0 K/UL (ref 0–0.7)
EOSINOPHIL NFR BLD AUTO: 0 %
ERYTHROCYTE [DISTWIDTH] IN BLOOD BY AUTOMATED COUNT: 3.9 M/UL (ref 4.3–5.9)
ERYTHROCYTE [DISTWIDTH] IN BLOOD: 17.4 % (ref 11.5–15.5)
GLUCOSE SERPL-MCNC: 154 MG/DL (ref 74–99)
HCT VFR BLD AUTO: 34.1 % (ref 39–53)
HGB BLD-MCNC: 10.6 GM/DL (ref 13–17.5)
LYMPHOCYTES # SPEC AUTO: 0.5 K/UL (ref 1–4.8)
LYMPHOCYTES NFR SPEC AUTO: 5 %
MCH RBC QN AUTO: 27.2 PG (ref 25–35)
MCHC RBC AUTO-ENTMCNC: 31.1 G/DL (ref 31–37)
MCV RBC AUTO: 87.5 FL (ref 80–100)
MONOCYTES # BLD AUTO: 0.4 K/UL (ref 0–1)
MONOCYTES NFR BLD AUTO: 4 %
NEUTROPHILS # BLD AUTO: 9.1 K/UL (ref 1.3–7.7)
NEUTROPHILS NFR BLD AUTO: 90 %
PLATELET # BLD AUTO: 279 K/UL (ref 150–450)
POTASSIUM SERPL-SCNC: 3.8 MMOL/L (ref 3.5–5.1)
PROT SERPL-MCNC: 6.7 G/DL (ref 6.3–8.2)
SODIUM SERPL-SCNC: 140 MMOL/L (ref 137–145)
WBC # BLD AUTO: 10.1 K/UL (ref 3.8–10.6)

## 2021-01-01 RX ADMIN — IPRATROPIUM BROMIDE AND ALBUTEROL SULFATE SCH: .5; 3 SOLUTION RESPIRATORY (INHALATION) at 08:01

## 2021-01-01 RX ADMIN — LEVOTHYROXINE SODIUM SCH: 75 TABLET ORAL at 08:37

## 2021-01-01 RX ADMIN — IPRATROPIUM BROMIDE AND ALBUTEROL SULFATE SCH ML: .5; 3 SOLUTION RESPIRATORY (INHALATION) at 11:39

## 2021-01-01 RX ADMIN — OXYCODONE HYDROCHLORIDE AND ACETAMINOPHEN SCH MG: 500 TABLET ORAL at 20:00

## 2021-01-01 RX ADMIN — IPRATROPIUM BROMIDE AND ALBUTEROL SULFATE SCH: .5; 3 SOLUTION RESPIRATORY (INHALATION) at 21:35

## 2021-01-01 NOTE — P.PN
Subjective


Progress Note Date: 01/01/21


Principal diagnosis: 





Acute kidney injury, CHF





A 60-year-old male patient who came into the emergency department with worsening

shortness of breath.  The patient also had diminished urine output and he was 

getting progressively more dry and he was not eating or drinking much over the 

past few days.  He cannot even remember the last time he urinated.  The patient 

denied having any fever.  No reported cough.  He did have some exertional 

dyspnea and shortness of breath whenever he laid down flat.  He also noted that 

his legs are somewhat more swollen than the usual.  Denied having any chest 

pain.  No jaw pain.  No back pain.  No nausea.  No vomiting.  No abdominal pain.

 No altered mentation.  The patient when he came into the emergency, he was 

quite hypoxic and was unable to speak up.  This is and he was placed on 5 L 

about 2 by nasal cannula.  Noted the patient was in the hospital back in 

12/24/2020 and at that time the patient was seen by our service.  He came in for

symptoms of generalized weakness and fatigue and not feeling well.  The patient 

has been known to our practice and he was seen by Dr. fang and the patient was 

ruled out for coronary bypass/Covid 19 infection.  His chest x-ray showed some 

patchy perihilar and basilar pulmonary infiltrates compatible with pneumonia.  

The patient was discharged home with the next 24 hours on a combination of 

Omnicef and Zithromax and the patient was also given a prednisone burst taper.  

Nevertheless, it seems that his condition has progressed.  Note that the patient

is known to have rheumatoid arthritis and previous history of rheumatoid lung 

disease in addition to previous history of a loculated empyema requiring a chest

tube placement back in July 2020.  This was attributed to Streptococcus 

pneumonia.  He has history of hypertension, hypothyroidism and previous history 

of DVT.  He has had multiple bronchoscopies in the past and the last 

bronchoscopy was done in November 2070 and at that time the patient had some 

Candida albicans.  Note that the current admission showed significantly abnormal

blood work.  The patient had seen a bicarb of 18 with an anion gap of 15.  

Creatinine was 11.3, lactic acid level was at 2.1 drop down to 1.6.  Troponin 

was at 0.04, proBNP level was 4190.  D-dimer is at 3.2.  Correlation profile is 

within normal limits.  The white cell count is at 15.3 with a hemoglobin of 10.3

and platelets are at 262.  UA was also abnormal and showed +1 bilirubin, rare 

WBCs and bacteria and rare RBCs.  There was hiding casts.  Coronavirus, Covid 19

screening came back negative.  The patient is being seen by nephrology.  The 

patient is being considered for hemodialysis.  The chest x-ray showed perihilar 

pulmonary infiltrates and bilateral pleural effusions and there is worsening and

bilateral edema/infiltrate consistent with fluid overload.  There is also an 

air-fluid level in the left lung base that was seen on previous chest x-rays.  





On 12/29/2020 the patient is currently in the intensive care unit.  The patient 

got admitted to the ICU and the patient was given a session of hemodialysis.  He

has a temporary dialysis cath in his right femoral vein.  A total of 1.5 L of f

luid was ultrafiltrate that along with dialysis.  On today's blood work, his 

creatinine is at 7.47 and his potassium level is at 5.2 and the sodium level is 

at 136.  Serum bicarbonate of 15.  His IV fluids are running at the rate of 100 

mL an hour of normal saline.  Urine output is improving.  He was quite low 

overnight and currently is in the order of 10-20 mL an hour his UA did not 

indicate any potential infection.  There was +1 bilirubin, 10 WBCs, 4 RBCs.  His

lactic acid level is down to 1.6.  His white cell count is at 20.1 with a 

hemoglobin of 10.6.  He is resting comfortably in bed.  Hemodynamically stable. 

He is requiring norepinephrine infusion which is running at 0.19 micrograms per 

kilogram per minute.  I feel like his abdomen is slightly distended.  

Nevertheless, the patient although this is his normal abdominal distention and 

he denies having any nausea or emesis.  His last bowel movement was few days 

back.





The patient is seen today 12/30/2020 in follow-up in the intensive care unit.  

He is currently awake and alert oriented 3.  Resting comfortably in bed.  He 

did receive hemodialysis times one.  He currently is maintaining O2 saturation 

in the 90s on 2 L/m per nasal cannula.  He has D5W with 3 A of bicarb at 100 

ML's per hour.  Norepinephrine is currently off.  This x-ray continues to reveal

some mid and lower lung areas of patchy and confluent airspace disease.  Blood 

culture reveals no growth.  White count 10.1.  Hemoglobin 9.2.  Platelets 236.  

Sodium 135.  Potassium 3.8.  Bicarb 25.  Creatinine 3.81.  Glucose 265.  He 

remains on bronchodilators, IV Solu-Medrol.





The patient is seen today 12/31/2020 in follow-up in the intensive care unit.  

He is awake and alert in no acute distress.  Currently sitting up in a chair at 

the bedside.  Denies any worsening shortness of breath, cough or congestion.  He

is maintaining good O2 saturations in the 90s on 2 L/m per nasal cannula.  0.9 

normal saline at 75 ML's per hour.  He has been off his norepinephrine for 

greater than 12 hours.  Blood cultures reveal no growth to date.  He has 

received hemodialysis once since admission.  White count 13.5.  Hemoglobin 9.6. 

Sodium 135.  Potassium 3.8.  Creatinine 2.77.  His only complaint today is 

difficulty sleeping at night.  Chest x-ray reveals stable bibasilar infiltrates,

cardiomegaly.





The patient is seen today 01/01/2021 in follow-up in the intensive care unit.  

He is currently sitting up at the bedside.  Awake and alert in no acute 

distress.  Maintaining O2 saturation in the 90s on room air.  No IV fluids.  

Appetite is good.  He did have some episode of confusion last night after 

receiving Restoril for his prior complaints of inability to sleep.  Safety 

sitter is at the bedside.  This morning he is appropriate neurologically.  Blood

cultures reveal no growth.  White count 10.1.  Hemoglobin 10.6.  Sodium 140.  

Potassium 3.8.  Creatinine 1.93.  He is making good urine on his own.  Still 

with some lower extremity edema.





Objective





- Vital Signs


Vital signs: 


                                   Vital Signs











Temp  96.5 F L  01/01/21 02:00


 


Pulse  68   01/01/21 02:00


 


Resp  20   01/01/21 02:00


 


BP  115/72   01/01/21 02:00


 


Pulse Ox  98   01/01/21 02:00








                                 Intake & Output











 12/31/20 01/01/21 01/01/21





 18:59 06:59 18:59


 


Intake Total 450  


 


Output Total 1070 300 450


 


Balance -620 -300 -450


 


Intake:   


 


  Intake, IV Titration 300  





  Amount   


 


    Sodium Chloride 0.9% 1, 300  





    000 ml @ 75 mls/hr IV .   





    [id number] [facility] Rx#:[id number]   


 


  Oral 150  


 


Output:   


 


  Urine 1070 300 450


 


Other:   


 


  Voiding Method Toilet Toilet Toilet





 Urinal Urinal Urinal


 


  # Voids 1 1 


 


  # Bowel Movements 1  














- Exam





GENERAL EXAM: Alert, oriented 60-year-old gentleman, on room air, up in a chair 

at the bedside, comfortable in no apparent distress.


HEAD: Normocephalic.


EYES: Normal reaction of pupils, equal size.


NOSE: Clear with pink turbinates.


THROAT: No erythema or exudates.


NECK: No masses, no JVD.


CHEST: No chest wall deformity.


LUNGS: Equal air entry with coarse crackles in the bilateral posterior bases


CVS: S1 and S2 normal with no audible murmur, regular rhythm.


ABDOMEN: No hepatosplenomegaly, normal bowel sounds, no guarding or rigidity.


SPINE: No scoliosis or deformity


SKIN: No rashes


CENTRAL NERVOUS SYSTEM: No focal deficits, tone is normal in all 4 extremities.


EXTREMITIES: There is 1+ peripheral edema.  No clubbing, no cyanosis.  Periph

eral pulses are intact.





- Labs


CBC & Chem 7: 


                                 01/01/21 08:03





                                 01/01/21 08:03


Labs: 


                  Abnormal Lab Results - Last 24 Hours (Table)











  01/01/21 01/01/21 Range/Units





  08:03 08:03 


 


RBC  3.90 L   (4.30-5.90)  m/uL


 


Hgb  10.6 L   (13.0-17.5)  gm/dL


 


Hct  34.1 L   (39.0-53.0)  %


 


RDW  17.4 H   (11.5-15.5)  %


 


Neutrophils #  9.1 H   (1.3-7.7)  k/uL


 


Lymphocytes #  0.5 L   (1.0-4.8)  k/uL


 


BUN   65 H  (9-20)  mg/dL


 


Creatinine   1.93 H  (0.66-1.25)  mg/dL


 


Glucose   154 H  (74-99)  mg/dL


 


Albumin   3.1 L  (3.5-5.0)  g/dL








                      Microbiology - Last 24 Hours (Table)











 12/28/20 10:48 Blood Culture - Preliminary





 Blood    No Growth after 72 hours














Assessment and Plan


Assessment: 





1 Acute kidney injury.  The patient's creatinine was up to 11 and the patient is

a component of metabolic acidosis and the creatinine is down to 1.93 and the 

serum bicarb is at 25.  The patient had an ultrasound the kidneys that showed no

evidence of any hydronephrosis.  Consider ATN.  No signs of uremia. The patient 

underwent hemodialysis 12/28/2020 with 1.5 L of ultrafiltration. Urine output is

improving.





2 Bilateral perihilar pulmonary infiltrates, consider interstitial edema versus 

pneumonia of the Covid 19 testing came back negative.  History of rheumatoid 

lung





3 History of left lung empyema related to Streptococcus pneumonia back in June 2020 requiring insertion and subsequent removal of chest tube





4 Air-fluid level involving the left lung base, consider ongoing fluid 

collection/abscess





5 Acute hypoxemic respiratory failure currently on 2 L of oxygen by nasal 

cannula





6 Hypotension, likely secondary to intravascular volume depletion/dehydration.  

Recovered and off pressors





7 Rheumatoid arthritis history of rheumatoid lungs/fibrosis with previous thom

atment with Arava and prednisone





8 Hypertension





9 Hypothyroidism





10 Generalized  weakness and fatigue related to above





Plan





The patient was seen and evaluated by Dr. Gómez


The patient did develop some confusion after receiving Restoril last night


Discontinue Restoril


Discontinue steroids


Lasix 40 mg IVP 1


He remains medical floor overflow


Safety sitter at the bedside


We will continue to follow





I, the cosigning physician, performed a history & physical examination of the 

patient. Lungs sounds with crackles in the bilateral posterior bases.  

Maintaining good O2 saturations in the 90s on room air.  I discussed the 

assessment and plan of care with my nurse practitioner, Rupa Flood. I attest to 

the above note as dictated by her.

## 2021-01-01 NOTE — PN
PROGRESS NOTE



Patient is seen for followup for acute kidney injury.  Patient was admitted to the

hospital with mental status changes, severe renal failure, metabolic acidosis.  He did

have 1 treatment of hemodialysis on initial admission.  Serum creatinine has since then

improved with creatinine going down to 1.9 now from 11.38.  Dialysis catheter was

removed yesterday.  Patient continues to have good urine output.



PHYSICAL EXAMINATION:

On examination today, blood pressure 115/72, heart rate 68 per minute. He is afebrile.

Examination of the heart S1, S2.  Examination of the lungs, bilateral breath sounds are

heard.  Decreased breath sounds at bases.  Abdomen is soft, nontender.  Examination of

lower extremities shows edema 2+ bilaterally.  CNS exam grossly intact.



LAB:

Show sodium 140, potassium 3.8, chloride 105, BUN 65, creatinine 1.93, hemoglobin 10.6

g/dL.



ASSESSMENT:

1. Acute kidney injury, acute tubular necrosis, currently improved.  Dialysis catheter

    has been discontinued.  Patient had only one treatment of dialysis on initial

    admission.  Etiologies acute tubular necrosis.

2. Volume overload.  I will add Lasix.

3. Mild hyperkalemia associated with acute kidney injury, now resolved.

4. Pneumonia, maintained on antibiotics, currently improving.

5. Acute hypoxic respiratory failure secondary to pneumonia, now improved.

6. Sepsis with septic shock, off pressors.

7. History of left lung empyema secondary to Streptococcus pneumonia in June of 2020

    with placement and removal of chest tube.

8. Rheumatoid arthritis with a history of rheumatoid lung and nodules and fibrosis.



PLAN:

Repeat IV Lasix x1 today.  Continue off IV fluids.  Encourage increased oral intake.





MMODL / IJN: [id number] / Job#: [id number]

## 2021-01-01 NOTE — P.PN
Subjective


Progress Note Date: 01/01/21


Principal diagnosis: 


Acute renal failure





Hypotension





Recent pneumonia





COPD





Hypertension hypertensive cardiovascular disease





01/01/2021, patient seen eval examined off of from your sitting upright in chair

breathing comfortably, bedside sitter is present as morning noted to be more 

agitated and confused and refused a new IV line steroids has been switched to by

mouth, patient can be moved out of the ICU appears to having ICU psychosis and 

delirium,





12/31/2020, patient seen eval examined awake and alert sitting upright in chair 

breathing comfortably, no chest pain is present hemodialysis catheter to be 

removed patient to be transferred out of the ICU,





12/30/2020, awake and alert sitting upright on the bed breathing comfortably, 

presence of pressors are off patient will be transferred out of the ICU patient 

is being monitored off of hemodialysis, labs reviewed





December 29 2020, patient seen eval examined during the rounds labs reviewed 

medications reviewed, patient has no dialysis today, hemodynamic status status 

still marginal remains on 10 mics of levo fed, patient will be monitored observe

in the ICU





This is a 60-year-old obese male with past medical history of dyslipidemia 

hypertension hypertensive cardiovascular disease also has a history of the rheu

matoid arthritis with recent pneumonia, came into the hospital with increasing 

shortness of breath high doesn't fatigue, it appears that patient presented with

pneumonia a week ago was treated with outpatient antibiotics and steroids 

symptoms did not resolve but continued to get worse, he start retaining more 

fluids increased swelling of the lower extremity has been noted, his significant

labs were elevated BUN and creatinine nephrology has been consulted along with 

vascular surgery to put hemodialysis catheter and start hemodialysis currently 

at the time of evaluation patient is undergoing hemodialysis he is on small dose

7 mics of levo fed to keep blood pressure/map over 65-75, 











Objective





- Vital Signs


Vital signs: 


                                   Vital Signs











Temp  98 F   01/01/21 14:00


 


Pulse  96   01/01/21 14:00


 


Resp  16   01/01/21 14:00


 


BP  104/83   01/01/21 14:00


 


Pulse Ox  96   01/01/21 14:00








                                 Intake & Output











 12/31/20 01/01/21 01/01/21





 18:59 06:59 18:59


 


Intake Total 450  


 


Output Total 1070 300 450


 


Balance -620 -300 -450


 


Intake:   


 


  Intake, IV Titration 300  





  Amount   


 


    Sodium Chloride 0.9% 1, 300  





    000 ml @ 75 mls/hr IV .   





    [id number] [facility] Rx#:[id number]   


 


  Oral 150  


 


Output:   


 


  Urine 1070 300 450


 


Other:   


 


  Voiding Method Toilet Toilet Toilet





 Urinal Urinal Urinal


 


  # Voids 1 1 


 


  # Bowel Movements 1  














- Exam


- Constitutional


General appearance: morbidly obese





- EENT


Eyes: EOMI, PERRLA


Ears: bilateral: normal





- Neck


Carotids: bilateral: upstroke normal


Thyroid: bilateral: normal size





- Respiratory


Respiratory: bilateral: diminished





- Cardiovascular


Rhythm: regular


Heart sounds: normal: S1, S2





- Gastrointestinal


General gastrointestinal: distended





- Integumentary


Integumentary: decreased turgor





- Neurologic


Neurologic: CNII-XII intact





- Musculoskeletal


Musculoskeletal: gait normal, generalized weakness, strength equal bilaterally





- Psychiatric


Psychiatric: A&O x's 3, appropriate affect, intact judgment & insight











- Labs


CBC & Chem 7: 


                                 01/01/21 08:03





                                 01/01/21 08:03


Labs: 


                  Abnormal Lab Results - Last 24 Hours (Table)











  01/01/21 01/01/21 Range/Units





  08:03 08:03 


 


RBC  3.90 L   (4.30-5.90)  m/uL


 


Hgb  10.6 L   (13.0-17.5)  gm/dL


 


Hct  34.1 L   (39.0-53.0)  %


 


RDW  17.4 H   (11.5-15.5)  %


 


Neutrophils #  9.1 H   (1.3-7.7)  k/uL


 


Lymphocytes #  0.5 L   (1.0-4.8)  k/uL


 


BUN   65 H  (9-20)  mg/dL


 


Creatinine   1.93 H  (0.66-1.25)  mg/dL


 


Glucose   154 H  (74-99)  mg/dL


 


Albumin   3.1 L  (3.5-5.0)  g/dL








                      Microbiology - Last 24 Hours (Table)











 12/28/20 10:48 Blood Culture - Preliminary





 Blood    No Growth after 96 hours














Assessment and Plan


Assessment: 


ICU delirium





Acute renal failure





Hypotension





Recent pneumonia





COPD





Hypertension hypertensive cardiovascular disease








Plan: 


Out of the ICU as planned





Prednisone can be changed to oral and quickly tapered





Hemodialysis as needed





Vasopressors as needed





Repeat labs in the morning including chest x-ray





Time with Patient: Greater than 30

## 2021-01-01 NOTE — P.PN
Subjective


Progress Note Date: 12/31/20


Principal diagnosis: 


Acute renal failure





Hypotension





Recent pneumonia





COPD





Hypertension hypertensive cardiovascular disease





12/31/2020, patient seen eval examined awake and alert sitting upright in chair 

breathing comfortably, no chest pain is present hemodialysis catheter to be 

removed patient to be transferred out of the ICU,





12/30/2020, awake and alert sitting upright on the bed breathing comfortably, 

presence of pressors are off patient will be transferred out of the ICU patient 

is being monitored off of hemodialysis, labs reviewed





December 29 2020, patient seen eval examined during the rounds labs reviewed 

medications reviewed, patient has no dialysis today, hemodynamic status status 

still marginal remains on 10 mics of levo fed, patient will be monitored observe

in the ICU





This is a 60-year-old obese male with past medical history of dyslipidemia 

hypertension hypertensive cardiovascular disease also has a history of the 

rheumatoid arthritis with recent pneumonia, came into the hospital with 

increasing shortness of breath high doesn't fatigue, it appears that patient 

presented with pneumonia a week ago was treated with outpatient antibiotics and 

steroids symptoms did not resolve but continued to get worse, he start retaining

more fluids increased swelling of the lower extremity has been noted, his 

significant labs were elevated BUN and creatinine nephrology has been consulted 

along with vascular surgery to put hemodialysis catheter and start hemodialysis 

currently at the time of evaluation patient is undergoing hemodialysis he is on 

small dose 7 mics of levo fed to keep blood pressure/map over 65-75, 











Objective





- Vital Signs


Vital signs: 


                                   Vital Signs











Temp  97.7 F   12/31/20 15:00


 


Pulse  100   12/31/20 15:00


 


Resp  9 L  12/31/20 15:00


 


BP  115/87   12/31/20 15:00


 


Pulse Ox  93 L  12/31/20 15:00








                                 Intake & Output











 12/30/20 12/31/20 12/31/20





 18:59 06:59 18:59


 


Intake Total 7063.959 9608 350


 


Output Total 330 450 170


 


Balance 1258.310 690 180


 


Weight  110.8 kg 


 


Intake:   


 


    


 


    Dextrose 5% in Water 1, 200  





    000 ml @ 100 mls/hr IV .   





    [id number] with Sodium   





    Bicarb (1 Meq/ml) 150 ml   





    Rx#:[id number]   


 


  Intake, IV Titration 788.310 900 300





  Amount   


 


    Norepinephrine 4 mg In 38.310  





    Sodium Chloride 0.9% 250   





    ml @ 0.05 MCG/KG/MIN 21.   





    088 mls/hr IV .[id number] [facility]   





    Rx#:[id number]   


 


    Sodium Chloride 0.9% 1, 750 900 300





    000 ml @ 75 mls/hr IV .   





    [id number] [facility] Rx#:[id number]   


 


  Oral 600 240 50


 


Output:   


 


  Urine 330 450 170


 


Other:   


 


  Voiding Method Indwelling Catheter Indwelling Catheter Toilet





   Urinal


 


  # Bowel Movements  1 1














- Exam


- Constitutional


General appearance: morbidly obese





- EENT


Eyes: EOMI, PERRLA


Ears: bilateral: normal





- Neck


Carotids: bilateral: upstroke normal


Thyroid: bilateral: normal size





- Respiratory


Respiratory: bilateral: diminished





- Cardiovascular


Rhythm: regular


Heart sounds: normal: S1, S2





- Gastrointestinal


General gastrointestinal: distended





- Integumentary


Integumentary: decreased turgor





- Neurologic


Neurologic: CNII-XII intact





- Musculoskeletal


Musculoskeletal: gait normal, generalized weakness, strength equal bilaterally





- Psychiatric


Psychiatric: A&O x's 3, appropriate affect, intact judgment & insight











- Labs


CBC & Chem 7: 


                                 01/01/21 08:03





                                 01/01/21 08:03


Labs: 


                  Abnormal Lab Results - Last 24 Hours (Table)











  12/31/20 12/31/20 Range/Units





  03:56 03:56 


 


WBC  13.5 H   (3.8-10.6)  k/uL


 


RBC  3.63 L   (4.30-5.90)  m/uL


 


Hgb  9.6 L   (13.0-17.5)  gm/dL


 


Hct  31.4 L   (39.0-53.0)  %


 


MCHC  30.6 L   (31.0-37.0)  g/dL


 


RDW  17.6 H   (11.5-15.5)  %


 


Neutrophils #  12.5 H   (1.3-7.7)  k/uL


 


Lymphocytes #  0.5 L   (1.0-4.8)  k/uL


 


Sodium   135 L  (137-145)  mmol/L


 


BUN   52 H  (9-20)  mg/dL


 


Creatinine   2.77 H  (0.66-1.25)  mg/dL


 


Glucose   146 H  (74-99)  mg/dL


 


Calcium   8.2 L  (8.4-10.2)  mg/dL








                      Microbiology - Last 24 Hours (Table)











 12/28/20 10:48 Blood Culture - Preliminary





 Blood    No Growth after 72 hours














Assessment and Plan


Assessment: 





Acute renal failure





Hypotension





Recent pneumonia





COPD





Hypertension hypertensive cardiovascular disease








Plan: 





Hemodialysis as needed





Vasopressors as needed





Repeat labs in the morning including chest x-ray





Patient can be moved out of the ICU


Time with Patient: Greater than 30

## 2021-01-02 VITALS
DIASTOLIC BLOOD PRESSURE: 65 MMHG | SYSTOLIC BLOOD PRESSURE: 123 MMHG | HEART RATE: 92 BPM | RESPIRATION RATE: 16 BRPM | TEMPERATURE: 97.3 F

## 2021-01-02 RX ADMIN — IPRATROPIUM BROMIDE AND ALBUTEROL SULFATE SCH ML: .5; 3 SOLUTION RESPIRATORY (INHALATION) at 07:45

## 2021-01-02 RX ADMIN — IPRATROPIUM BROMIDE AND ALBUTEROL SULFATE SCH: .5; 3 SOLUTION RESPIRATORY (INHALATION) at 12:34

## 2021-01-02 RX ADMIN — LEVOTHYROXINE SODIUM SCH MCG: 75 TABLET ORAL at 05:24

## 2021-01-02 NOTE — P.PN
Subjective


Progress Note Date: 01/02/21


Principal diagnosis: 





This is a 60-year-old male known with rheumatoid arthritis previous empyema who 

came in with pneumonia and was seen with acute kidney injury from ATN from low 

blood pressure and sepsis, although blood cultures negative he had 1 dialysis 

session and then recovered renal function.





Currently he has minimal cough is on room air denies any shortness of breath is 

able to walk around





Good appetite no nausea vomiting diarrhea





Blood pressure is normal, in the 120s otherwise while signs stable





This baseline creatinine is 0.6 on 07/28/2020.  Was 1.37 on 12/23/2020











Objective





- Vital Signs


Vital signs: 


                                   Vital Signs











Temp  97.3 F L  01/02/21 08:00


 


Pulse  92   01/02/21 08:00


 


Resp  16   01/02/21 08:00


 


BP  123/65   01/02/21 08:00


 


Pulse Ox  92 L  01/02/21 08:00








                                 Intake & Output











 01/01/21 01/02/21 01/02/21





 18:59 06:59 18:59


 


Output Total 450  


 


Balance -450  


 


Output:   


 


  Urine 450  


 


Other:   


 


  Voiding Method Toilet Toilet 





 Urinal Urinal 


 


  # Voids 3 0 








Examination awake alert oriented comfortable on room air





HEENT exam no JVP neck is supple no facial asymmetry





Lungs are significant for bilateral wheezing with fair air entry bilaterally 





Heart sounds unremarkable for any murmur rub gallop





Abdomen soft nontender





Extremity exam was 2+ edema





Neurologically awake alert oriented comfortable





- Labs


CBC & Chem 7: 


                                 01/01/21 08:03





                                 01/01/21 08:03


Labs: 


                  Abnormal Lab Results - Last 24 Hours (Table)











  01/01/21 Range/Units





  08:03 


 


BUN  65 H  (9-20)  mg/dL


 


Creatinine  1.93 H  (0.66-1.25)  mg/dL


 


Glucose  154 H  (74-99)  mg/dL


 


Albumin  3.1 L  (3.5-5.0)  g/dL








                      Microbiology - Last 24 Hours (Table)











 12/28/20 10:48 Blood Culture - Preliminary





 Blood    No Growth after 96 hours














Assessment and Plan


Plan: 





Impression





1.  Acute kidney injury with severe ATN requiring one session of dialysis.  

Etiology is combination of low blood pressure pneumonia.  Improved.  Creatinine 

down to 1.93





2.  Baseline renal function is normal





3.  Blood pressure is controlled





4.  Likely had COPD, has current wheezing.





5.  Edema secondary to COPD, acute kidney injury, possibly cardiomyopathy





Recommendation





1.  We'll start Lasix 20 mg daily to improve his edema but watch carefully with 

blood pressure and renal function





2.  Check urine protein to creatinine ratio

## 2021-01-02 NOTE — P.PN
Subjective


Progress Note Date: 01/02/21


Principal diagnosis: 





Acute kidney injury, CHF





A 60-year-old male patient who came into the emergency department with worsening

shortness of breath.  The patient also had diminished urine output and he was 

getting progressively more dry and he was not eating or drinking much over the 

past few days.  He cannot even remember the last time he urinated.  The patient 

denied having any fever.  No reported cough.  He did have some exertional 

dyspnea and shortness of breath whenever he laid down flat.  He also noted that 

his legs are somewhat more swollen than the usual.  Denied having any chest 

pain.  No jaw pain.  No back pain.  No nausea.  No vomiting.  No abdominal pain.

 No altered mentation.  The patient when he came into the emergency, he was 

quite hypoxic and was unable to speak up.  This is and he was placed on 5 L 

about 2 by nasal cannula.  Noted the patient was in the hospital back in 

12/24/2020 and at that time the patient was seen by our service.  He came in for

symptoms of generalized weakness and fatigue and not feeling well.  The patient 

has been known to our practice and he was seen by Dr. fang and the patient was 

ruled out for coronary bypass/Covid 19 infection.  His chest x-ray showed some 

patchy perihilar and basilar pulmonary infiltrates compatible with pneumonia.  

The patient was discharged home with the next 24 hours on a combination of 

Omnicef and Zithromax and the patient was also given a prednisone burst taper.  

Nevertheless, it seems that his condition has progressed.  Note that the patient

is known to have rheumatoid arthritis and previous history of rheumatoid lung 

disease in addition to previous history of a loculated empyema requiring a chest

tube placement back in July 2020.  This was attributed to Streptococcus 

pneumonia.  He has history of hypertension, hypothyroidism and previous history 

of DVT.  He has had multiple bronchoscopies in the past and the last 

bronchoscopy was done in November 2070 and at that time the patient had some 

Candida albicans.  Note that the current admission showed significantly abnormal

blood work.  The patient had seen a bicarb of 18 with an anion gap of 15.  

Creatinine was 11.3, lactic acid level was at 2.1 drop down to 1.6.  Troponin 

was at 0.04, proBNP level was 4190.  D-dimer is at 3.2.  Correlation profile is 

within normal limits.  The white cell count is at 15.3 with a hemoglobin of 10.3

and platelets are at 262.  UA was also abnormal and showed +1 bilirubin, rare 

WBCs and bacteria and rare RBCs.  There was hiding casts.  Coronavirus, Covid 19

screening came back negative.  The patient is being seen by nephrology.  The 

patient is being considered for hemodialysis.  The chest x-ray showed perihilar 

pulmonary infiltrates and bilateral pleural effusions and there is worsening and

bilateral edema/infiltrate consistent with fluid overload.  There is also an 

air-fluid level in the left lung base that was seen on previous chest x-rays.  





On 12/29/2020 the patient is currently in the intensive care unit.  The patient 

got admitted to the ICU and the patient was given a session of hemodialysis.  He

has a temporary dialysis cath in his right femoral vein.  A total of 1.5 L of f

luid was ultrafiltrate that along with dialysis.  On today's blood work, his 

creatinine is at 7.47 and his potassium level is at 5.2 and the sodium level is 

at 136.  Serum bicarbonate of 15.  His IV fluids are running at the rate of 100 

mL an hour of normal saline.  Urine output is improving.  He was quite low 

overnight and currently is in the order of 10-20 mL an hour his UA did not 

indicate any potential infection.  There was +1 bilirubin, 10 WBCs, 4 RBCs.  His

lactic acid level is down to 1.6.  His white cell count is at 20.1 with a 

hemoglobin of 10.6.  He is resting comfortably in bed.  Hemodynamically stable. 

He is requiring norepinephrine infusion which is running at 0.19 micrograms per 

kilogram per minute.  I feel like his abdomen is slightly distended.  

Nevertheless, the patient although this is his normal abdominal distention and 

he denies having any nausea or emesis.  His last bowel movement was few days 

back.





The patient is seen today 12/30/2020 in follow-up in the intensive care unit.  

He is currently awake and alert oriented 3.  Resting comfortably in bed.  He 

did receive hemodialysis times one.  He currently is maintaining O2 saturation 

in the 90s on 2 L/m per nasal cannula.  He has D5W with 3 A of bicarb at 100 

ML's per hour.  Norepinephrine is currently off.  This x-ray continues to reveal

some mid and lower lung areas of patchy and confluent airspace disease.  Blood 

culture reveals no growth.  White count 10.1.  Hemoglobin 9.2.  Platelets 236.  

Sodium 135.  Potassium 3.8.  Bicarb 25.  Creatinine 3.81.  Glucose 265.  He 

remains on bronchodilators, IV Solu-Medrol.





The patient is seen today 12/31/2020 in follow-up in the intensive care unit.  

He is awake and alert in no acute distress.  Currently sitting up in a chair at 

the bedside.  Denies any worsening shortness of breath, cough or congestion.  He

is maintaining good O2 saturations in the 90s on 2 L/m per nasal cannula.  0.9 

normal saline at 75 ML's per hour.  He has been off his norepinephrine for 

greater than 12 hours.  Blood cultures reveal no growth to date.  He has 

received hemodialysis once since admission.  White count 13.5.  Hemoglobin 9.6. 

Sodium 135.  Potassium 3.8.  Creatinine 2.77.  His only complaint today is 

difficulty sleeping at night.  Chest x-ray reveals stable bibasilar infiltrates,

cardiomegaly.





The patient is seen today 01/01/2021 in follow-up in the intensive care unit.  

He is currently sitting up at the bedside.  Awake and alert in no acute 

distress.  Maintaining O2 saturation in the 90s on room air.  No IV fluids.  

Appetite is good.  He did have some episode of confusion last night after 

receiving Restoril for his prior complaints of inability to sleep.  Safety 

sitter is at the bedside.  This morning he is appropriate neurologically.  Blood

cultures reveal no growth.  White count 10.1.  Hemoglobin 10.6.  Sodium 140.  

Potassium 3.8.  Creatinine 1.93.  He is making good urine on his own.  Still 

with some lower extremity edema.





Patient is seen today 01/02/2021 in follow-up on the regular medical floor.  He 

is currently sitting up in a chair at the bedside.  Awake and alert in no acute 

distress.  He is anxious to go home.  Continue O2 saturations in the 90s on room

air.  He's been afebrile.  Hemodynamically stable.  Blood culture reveals no 

growth.  No new labs today.  Remains on DuoNeb inhalations, Lasix 20 mg oral 

daily.





Objective





- Vital Signs


Vital signs: 


                                   Vital Signs











Temp  97.3 F L  01/02/21 08:00


 


Pulse  92   01/02/21 08:00


 


Resp  16   01/02/21 08:00


 


BP  123/65   01/02/21 08:00


 


Pulse Ox  92 L  01/02/21 08:00








                                 Intake & Output











 01/01/21 01/02/21 01/02/21





 18:59 06:59 18:59


 


Intake Total   100


 


Output Total 450  


 


Balance -450  100


 


Intake:   


 


  Oral   100


 


Output:   


 


  Urine 450  


 


Other:   


 


  Voiding Method Toilet Toilet 





 Urinal Urinal 


 


  # Voids 3 0 














- Exam





GENERAL EXAM: Alert, oriented 60-year-old gentleman, on room air, up in a chair 

at the bedside, comfortable in no apparent distress.


HEAD: Normocephalic.


EYES: Normal reaction of pupils, equal size.


NOSE: Clear with pink turbinates.


THROAT: No erythema or exudates.


NECK: No masses, no JVD.


CHEST: No chest wall deformity.


LUNGS: Equal air entry with coarse crackles in the bilateral posterior bases


CVS: S1 and S2 normal with no audible murmur, regular rhythm.


ABDOMEN: No hepatosplenomegaly, normal bowel sounds, no guarding or rigidity.


SPINE: No scoliosis or deformity


SKIN: No rashes


CENTRAL NERVOUS SYSTEM: No focal deficits, tone is normal in all 4 extremities.


EXTREMITIES: There is 1+ peripheral edema.  No clubbing, no cyanosis.  Perip

heral pulses are intact.





- Labs


CBC & Chem 7: 


                                 01/01/21 08:03





                                 01/01/21 08:03


Labs: 


                      Microbiology - Last 24 Hours (Table)











 12/28/20 10:48 Blood Culture - Preliminary





 Blood    No Growth after 96 hours














Assessment and Plan


Assessment: 





1 Acute kidney injury.  The patient's creatinine was up to 11 and the patient is

a component of metabolic acidosis and the creatinine is down to 1.93 and the 

serum bicarb is at 25.  The patient had an ultrasound the kidneys that showed no

evidence of any hydronephrosis.  Consider ATN.  No signs of uremia. The patient 

underwent hemodialysis 12/28/2020 with 1.5 L of ultrafiltration. Urine output is

improving.





2 Bilateral perihilar pulmonary infiltrates, consider interstitial edema versus 

pneumonia of the Covid 19 testing came back negative.  History of rheumatoid 

lung





3 History of left lung empyema related to Streptococcus pneumonia back in June 2020 requiring insertion and subsequent removal of chest tube





4 Air-fluid level involving the left lung base, consider ongoing fluid 

collection/abscess





5 Acute hypoxemic respiratory failure currently on 2 L of oxygen by nasal 

cannula





6 Hypotension, likely secondary to intravascular volume depletion/dehydration.  

Recovered and off pressors





7 Rheumatoid arthritis history of rheumatoid lungs/fibrosis with previous 

treatment with Arava and prednisone





8 Hypertension





9 Hypothyroidism





10 Generalized  weakness and fatigue related to above





Plan





The patient was seen and evaluated by Dr. Dalia Gustafson for discharge from the pulmonary standpoint


Follow up in our office in 1-2 weeks' time





I, the cosigning physician, performed a history & physical examination of the 

patient. Lungs sounds with crackles in the bilateral posterior bases.  

Maintaining good O2 saturations in the 90s on room air.  I discussed the 

assessment and plan of care with my nurse practitioner, Rupa Flood. I attest to 

the above note as dictated by her.

## 2021-01-02 NOTE — P.DS
Providers


Date of admission: 


12/28/20 13:24





Expected date of discharge: 01/02/21


Attending physician: 


Melvin Thompson





Consults: 





                                        





12/28/20 12:41


Consult Physician Stat 


   Consulting Provider: Andres Vides


   Consult Reason/Comments: acute renal failure, dyspnea, pleural effusions, 

pneumonia


   Do you want consulting provider notified?: Yes


Consult Physician Stat 


   Consulting Provider: Astrid Pearl


   Consult Reason/Comments: ARF


   Do you want consulting provider notified?: Already Contacted





12/28/20 13:40


Consult Physician Routine 


   Consulting Provider: Karli Marino


   Consult Reason/Comments: vsacular access for dialysis


   Do you want consulting provider notified?: Yes, Notify in am





12/28/20 14:45


Consult Physician Routine 


   Consulting Provider: Storm Villalobos


   Consult Reason/Comments: renal failure


   Do you want consulting provider notified?: Already Contacted











Primary care physician: 


[facility] Course: 


01/02/2021, patient seen eval examined, history status remains stable renal 

function continued to improve, patient is stable from renal pulmonary standpoint

for discharge temporary dialysis catheter has been removed by vascular





01/01/2021, patient seen eval examined off of from your sitting upright in chair

breathing comfortably, bedside sitter is present as morning noted to be more 

agitated and confused and refused a new IV line steroids has been switched to by

mouth, patient can be moved out of the ICU appears to having ICU psychosis and 

delirium,





12/31/2020, patient seen eval examined awake and alert sitting upright in chair 

breathing comfortably, no chest pain is present hemodialysis catheter to be 

removed patient to be transferred out of the ICU,





12/30/2020, awake and alert sitting upright on the bed breathing comfortably, 

presence of pressors are off patient will be transferred out of the ICU patient 

is being monitored off of hemodialysis, labs reviewed





December 29 2020, patient seen eval examined during the rounds labs reviewed 

medications reviewed, patient has no dialysis today, hemodynamic status status 

still marginal remains on 10 mics of levo fed, patient will be monitored observe

in the ICU





This is a 60-year-old obese male with past medical history of dyslipidemia 

hypertension hypertensive cardiovascular disease also has a history of the 

rheumatoid arthritis with recent pneumonia, came into the hospital with 

increasing shortness of breath high doesn't fatigue, it appears that patient 

presented with pneumonia a week ago was treated with outpatient antibiotics and 

steroids symptoms did not resolve but continued to get worse, he start retaining

more fluids increased swelling of the lower extremity has been noted, his 

significant labs were elevated BUN and creatinine nephrology has been consulted 

along with vascular surgery to put hemodialysis catheter and start hemodialysis 

currently at the time of evaluation patient is undergoing hemodialysis he is on 

small dose 7 mics of levo fed to keep blood pressure/map over 65-75, 





- Vital Signs


Vital signs: 


                                   Vital Signs











Temp  98 F   01/01/21 14:00


 


Pulse  96   01/01/21 14:00


 


Resp  16   01/01/21 14:00


 


BP  104/83   01/01/21 14:00


 


Pulse Ox  96   01/01/21 14:00








                                 Intake & Output











 12/31/20 01/01/21 01/01/21





 18:59 06:59 18:59


 


Intake Total 450  


 


Output Total 1070 300 450


 


Balance -620 -300 -450


 


Intake:   


 


  Intake, IV Titration 300  





  Amount   


 


    Sodium Chloride 0.9% 1, 300  





    000 ml @ 75 mls/hr IV .   





    [id number] [facility] Rx#:[id number]   


 


  Oral 150  


 


Output:   


 


  Urine 1070 300 450


 


Other:   


 


  Voiding Method Toilet Toilet Toilet





 Urinal Urinal Urinal


 


  # Voids 1 1 


 


  # Bowel Movements 1  














- Exam


- Constitutional


General appearance: morbidly obese





- EENT


Eyes: EOMI, PERRLA


Ears: bilateral: normal





- Neck


Carotids: bilateral: upstroke normal


Thyroid: bilateral: normal size





- Respiratory


Respiratory: bilateral: diminished





- Cardiovascular


Rhythm: regular


Heart sounds: normal: S1, S2





- Gastrointestinal


General gastrointestinal: distended





- Integumentary


Integumentary: decreased turgor





- Neurologic


Neurologic: CNII-XII intact





- Musculoskeletal


Musculoskeletal: gait normal, generalized weakness, strength equal bilaterally





- Psychiatric


Psychiatric: A&O x's 3, appropriate affect, intact judgment & insight





CBC & Chem 7: 


                                 01/01/21 08:03





                                 01/01/21 08:03


Labs: 


                  Abnormal Lab Results - Last 24 Hours (Table)











  01/01/21 01/01/21 Range/Units





  08:03 08:03 


 


RBC  3.90 L   (4.30-5.90)  m/uL


 


Hgb  10.6 L   (13.0-17.5)  gm/dL


 


Hct  34.1 L   (39.0-53.0)  %


 


RDW  17.4 H   (11.5-15.5)  %


 


Neutrophils #  9.1 H   (1.3-7.7)  k/uL


 


Lymphocytes #  0.5 L   (1.0-4.8)  k/uL


 


BUN   65 H  (9-20)  mg/dL


 


Creatinine   1.93 H  (0.66-1.25)  mg/dL


 


Glucose   154 H  (74-99)  mg/dL


 


Albumin   3.1 L  (3.5-5.0)  g/dL








                      Microbiology - Last 24 Hours (Table)











 12/28/20 10:48 Blood Culture - Preliminary





 Blood    No Growth after 96 hours














Assessment: 


ICU delirium





Acute renal failure





Hypotension





Recent pneumonia





COPD





Hypertension hypertensive cardiovascular disease





Procedures: 





Hemodialysis catheter, hemodialysis


Patient Condition at Discharge: Good





Plan - Discharge Summary


Discharge Rx Participant: Yes


New Discharge Prescriptions: 


New


   Furosemide [Lasix] 20 mg PO DAILY #30 tab





Continue


   Levothyroxine Sodium [Synthroid] 150 mcg PO DAILY


   Albuterol Inhaler [Ventolin Hfa Inhaler] 2 puff INHALATION RT-QID PRN


     PRN Reason: Shortness Of Breath


   Losartan-Hctz 50-12.5 mg [Hyzaar 50-12.5] 1 tab PO DAILY


   Metoprolol Tartrate [Lopressor] 50 mg PO HS


   Cholecalciferol [Vitamin D3 (25 Mcg = 1000 Iu)] 2,000 unit PO HS


   Ipratropium-Albuterol Nebulize [Duoneb 0.5 mg-3 mg/3 ml Soln] 3 ml INHALATION

RT-QID PRN


     PRN Reason: Shortness Of Breath


   Ascorbic Acid [Vitamin C] 2,000 mg PO HS


   Cefdinir [Omnicef] 300 mg PO Q12HR 10 Days #20 capsule


   Azithromycin [Zithromax] 500 mg PO DAILY 10 Days #10 tab


Discharge Medication List





Levothyroxine Sodium [Synthroid] 150 mcg PO DAILY 03/29/17 [History]


Albuterol Inhaler [Ventolin Hfa Inhaler] 2 puff INHALATION RT-QID PRN 06/10/20 

[History]


Losartan-Hctz 50-12.5 mg [Hyzaar 50-12.5] 1 tab PO DAILY 06/10/20 [History]


Metoprolol Tartrate [Lopressor] 50 mg PO HS 06/10/20 [History]


Ascorbic Acid [Vitamin C] 2,000 mg PO HS 12/23/20 [History]


Cholecalciferol [Vitamin D3 (25 Mcg = 1000 Iu)] 2,000 unit PO HS 12/23/20 

[History]


Ipratropium-Albuterol Nebulize [Duoneb 0.5 mg-3 mg/3 ml Soln] 3 ml INHALATION 

RT-QID PRN 12/23/20 [History]


Azithromycin [Zithromax] 500 mg PO DAILY 10 Days #10 tab 12/24/20 [Rx]


Cefdinir [Omnicef] 300 mg PO Q12HR 10 Days #20 capsule 12/24/20 [Rx]


Furosemide [Lasix] 20 mg PO DAILY #30 tab 01/02/21 [Rx]








Follow up Appointment(s)/Referral(s): 


Walker Smith DO [Primary Care Provider] - 1-2 days


Discharge Disposition: HOME SELF-CARE

## 2021-01-18 ENCOUNTER — HOSPITAL ENCOUNTER (INPATIENT)
Dept: HOSPITAL 47 - EC | Age: 61
LOS: 3 days | Discharge: HOME | DRG: 871 | End: 2021-01-21
Attending: INTERNAL MEDICINE | Admitting: INTERNAL MEDICINE
Payer: COMMERCIAL

## 2021-01-18 DIAGNOSIS — N17.0: ICD-10-CM

## 2021-01-18 DIAGNOSIS — A41.50: Primary | ICD-10-CM

## 2021-01-18 DIAGNOSIS — E86.1: ICD-10-CM

## 2021-01-18 DIAGNOSIS — Z20.822: ICD-10-CM

## 2021-01-18 DIAGNOSIS — E83.42: ICD-10-CM

## 2021-01-18 DIAGNOSIS — Z87.01: ICD-10-CM

## 2021-01-18 DIAGNOSIS — I50.9: ICD-10-CM

## 2021-01-18 DIAGNOSIS — J98.4: ICD-10-CM

## 2021-01-18 DIAGNOSIS — M05.10: ICD-10-CM

## 2021-01-18 DIAGNOSIS — E78.5: ICD-10-CM

## 2021-01-18 DIAGNOSIS — Z79.890: ICD-10-CM

## 2021-01-18 DIAGNOSIS — E03.9: ICD-10-CM

## 2021-01-18 DIAGNOSIS — Z80.1: ICD-10-CM

## 2021-01-18 DIAGNOSIS — Z87.891: ICD-10-CM

## 2021-01-18 DIAGNOSIS — R65.21: ICD-10-CM

## 2021-01-18 DIAGNOSIS — J15.6: ICD-10-CM

## 2021-01-18 DIAGNOSIS — I11.0: ICD-10-CM

## 2021-01-18 DIAGNOSIS — R57.1: ICD-10-CM

## 2021-01-18 DIAGNOSIS — Z86.718: ICD-10-CM

## 2021-01-18 DIAGNOSIS — E86.0: ICD-10-CM

## 2021-01-18 DIAGNOSIS — I45.10: ICD-10-CM

## 2021-01-18 DIAGNOSIS — Z90.89: ICD-10-CM

## 2021-01-18 DIAGNOSIS — Z79.52: ICD-10-CM

## 2021-01-18 DIAGNOSIS — Z87.442: ICD-10-CM

## 2021-01-18 DIAGNOSIS — E87.2: ICD-10-CM

## 2021-01-18 DIAGNOSIS — K52.9: ICD-10-CM

## 2021-01-18 DIAGNOSIS — J96.01: ICD-10-CM

## 2021-01-18 DIAGNOSIS — J84.89: ICD-10-CM

## 2021-01-18 LAB
ALBUMIN SERPL-MCNC: 2.7 G/DL (ref 3.5–5)
ALP SERPL-CCNC: 112 U/L (ref 38–126)
ALT SERPL-CCNC: 13 U/L (ref 4–49)
ANION GAP SERPL CALC-SCNC: 18 MMOL/L
APTT BLD: 19.3 SEC (ref 22–30)
AST SERPL-CCNC: 19 U/L (ref 17–59)
BASOPHILS # BLD AUTO: 0.1 K/UL (ref 0–0.2)
BASOPHILS NFR BLD AUTO: 1 %
BUN SERPL-SCNC: 72 MG/DL (ref 9–20)
CALCIUM SPEC-MCNC: 8.5 MG/DL (ref 8.4–10.2)
CHLORIDE SERPL-SCNC: 100 MMOL/L (ref 98–107)
CK SERPL-CCNC: 36 U/L (ref 55–170)
CO2 SERPL-SCNC: 16 MMOL/L (ref 22–30)
EOSINOPHIL # BLD AUTO: 0.5 K/UL (ref 0–0.7)
EOSINOPHIL NFR BLD AUTO: 2 %
ERYTHROCYTE [DISTWIDTH] IN BLOOD BY AUTOMATED COUNT: 4.1 M/UL (ref 4.3–5.9)
ERYTHROCYTE [DISTWIDTH] IN BLOOD: 17.5 % (ref 11.5–15.5)
GLUCOSE BLD-MCNC: 83 MG/DL (ref 75–99)
GLUCOSE SERPL-MCNC: 106 MG/DL (ref 74–99)
HCT VFR BLD AUTO: 34.7 % (ref 39–53)
HGB BLD-MCNC: 10.6 GM/DL (ref 13–17.5)
HYALINE CASTS UR QL AUTO: 89 /LPF (ref 0–2)
INR PPP: 1.1 (ref ?–1.2)
LYMPHOCYTES # SPEC AUTO: 3.5 K/UL (ref 1–4.8)
LYMPHOCYTES NFR SPEC AUTO: 17 %
MAGNESIUM SPEC-SCNC: 1.6 MG/DL (ref 1.6–2.3)
MCH RBC QN AUTO: 25.8 PG (ref 25–35)
MCHC RBC AUTO-ENTMCNC: 30.5 G/DL (ref 31–37)
MCV RBC AUTO: 84.7 FL (ref 80–100)
MONOCYTES # BLD AUTO: 1.8 K/UL (ref 0–1)
MONOCYTES NFR BLD AUTO: 9 %
NEUTROPHILS # BLD AUTO: 14.5 K/UL (ref 1.3–7.7)
NEUTROPHILS NFR BLD AUTO: 70 %
PH UR: 5 [PH] (ref 5–8)
PLATELET # BLD AUTO: 345 K/UL (ref 150–450)
POTASSIUM SERPL-SCNC: 3.6 MMOL/L (ref 3.5–5.1)
PROT SERPL-MCNC: 6.5 G/DL (ref 6.3–8.2)
PROT UR QL: (no result)
PT BLD: 11.7 SEC (ref 9–12)
RBC UR QL: 6 /HPF (ref 0–5)
SODIUM SERPL-SCNC: 134 MMOL/L (ref 137–145)
SP GR UR: 1.02 (ref 1–1.03)
SQUAMOUS UR QL AUTO: 1 /HPF (ref 0–4)
UROBILINOGEN UR QL STRIP: <2 MG/DL (ref ?–2)
WBC # BLD AUTO: 20.7 K/UL (ref 3.8–10.6)
WBC # UR AUTO: 19 /HPF (ref 0–5)

## 2021-01-18 PROCEDURE — 93005 ELECTROCARDIOGRAM TRACING: CPT

## 2021-01-18 PROCEDURE — 80048 BASIC METABOLIC PNL TOTAL CA: CPT

## 2021-01-18 PROCEDURE — 87324 CLOSTRIDIUM AG IA: CPT

## 2021-01-18 PROCEDURE — 84100 ASSAY OF PHOSPHORUS: CPT

## 2021-01-18 PROCEDURE — 85027 COMPLETE CBC AUTOMATED: CPT

## 2021-01-18 PROCEDURE — 93306 TTE W/DOPPLER COMPLETE: CPT

## 2021-01-18 PROCEDURE — 96365 THER/PROPH/DIAG IV INF INIT: CPT

## 2021-01-18 PROCEDURE — 71045 X-RAY EXAM CHEST 1 VIEW: CPT

## 2021-01-18 PROCEDURE — 81001 URINALYSIS AUTO W/SCOPE: CPT

## 2021-01-18 PROCEDURE — 85610 PROTHROMBIN TIME: CPT

## 2021-01-18 PROCEDURE — 96361 HYDRATE IV INFUSION ADD-ON: CPT

## 2021-01-18 PROCEDURE — 83735 ASSAY OF MAGNESIUM: CPT

## 2021-01-18 PROCEDURE — 96375 TX/PRO/DX INJ NEW DRUG ADDON: CPT

## 2021-01-18 PROCEDURE — 02HV33Z INSERTION OF INFUSION DEVICE INTO SUPERIOR VENA CAVA, PERCUTANEOUS APPROACH: ICD-10-PCS

## 2021-01-18 PROCEDURE — 87635 SARS-COV-2 COVID-19 AMP PRB: CPT

## 2021-01-18 PROCEDURE — 87070 CULTURE OTHR SPECIMN AEROBIC: CPT

## 2021-01-18 PROCEDURE — 82550 ASSAY OF CK (CPK): CPT

## 2021-01-18 PROCEDURE — 3E033XZ INTRODUCTION OF VASOPRESSOR INTO PERIPHERAL VEIN, PERCUTANEOUS APPROACH: ICD-10-PCS

## 2021-01-18 PROCEDURE — 99291 CRITICAL CARE FIRST HOUR: CPT

## 2021-01-18 PROCEDURE — 96374 THER/PROPH/DIAG INJ IV PUSH: CPT

## 2021-01-18 PROCEDURE — 87040 BLOOD CULTURE FOR BACTERIA: CPT

## 2021-01-18 PROCEDURE — 87205 SMEAR GRAM STAIN: CPT

## 2021-01-18 PROCEDURE — 83605 ASSAY OF LACTIC ACID: CPT

## 2021-01-18 PROCEDURE — 85730 THROMBOPLASTIN TIME PARTIAL: CPT

## 2021-01-18 PROCEDURE — 84484 ASSAY OF TROPONIN QUANT: CPT

## 2021-01-18 PROCEDURE — 80053 COMPREHEN METABOLIC PANEL: CPT

## 2021-01-18 PROCEDURE — 96368 THER/DIAG CONCURRENT INF: CPT

## 2021-01-18 PROCEDURE — 87086 URINE CULTURE/COLONY COUNT: CPT

## 2021-01-18 PROCEDURE — 36415 COLL VENOUS BLD VENIPUNCTURE: CPT

## 2021-01-18 PROCEDURE — 76770 US EXAM ABDO BACK WALL COMP: CPT

## 2021-01-18 PROCEDURE — 36556 INSERT NON-TUNNEL CV CATH: CPT

## 2021-01-18 PROCEDURE — 85025 COMPLETE CBC W/AUTO DIFF WBC: CPT

## 2021-01-18 RX ADMIN — NOREPINEPHRINE BITARTRATE SCH MLS/HR: 1 INJECTION, SOLUTION, CONCENTRATE INTRAVENOUS at 15:42

## 2021-01-18 RX ADMIN — MORPHINE SULFATE PRN MG: 4 INJECTION, SOLUTION INTRAMUSCULAR; INTRAVENOUS at 18:24

## 2021-01-18 RX ADMIN — POTASSIUM CHLORIDE SCH MLS/HR: 14.9 INJECTION, SOLUTION INTRAVENOUS at 20:49

## 2021-01-18 NOTE — ED
General Adult HPI





- General


Stated complaint: Weakness


Time Seen by Provider: 21 13:12


Source: patient, RN notes reviewed


Limitations: no limitations





- History of Present Illness


Initial comments: 





Patient is a pleasant 60-year-old male presenting to the emergency Department 

with general weakness.  Onset of symptoms was over the past few days.  Patient 

has having nausea vomiting and diarrhea.  Patient states he has been vomiting 

for about 3 days, more than 4-5 times a day.  Decreased oral intake.  Patient is

also having diarrhea a couple times per day.  No abdominal pain.  Patient feels 

fatigued and achy throughout.  Patient was in the hospital several weeks ago 

with lung infection.  Patient did not have coronavirus.





- Related Data


                                Home Medications











 Medication  Instructions  Recorded  Confirmed


 


Levothyroxine Sodium [Synthroid] 150 mcg PO DAILY 17


 


Albuterol Inhaler [Ventolin Hfa 2 puff INHALATION RT-QID PRN 06/10/20 01/18/21





Inhaler]   


 


Losartan-Hctz 50-12.5 mg [Hyzaar 1 tab PO DAILY 06/10/20 01/18/21





50-12.5]   


 


Metoprolol Tartrate [Lopressor] 50 mg PO HS 06/10/20 01/18/21


 


Ascorbic Acid [Vitamin C] 2,000 mg PO HS 20


 


Cholecalciferol [Vitamin D3 (25 2,000 unit PO HS 20





Mcg = 1000 Iu)]   


 


Ipratropium-Albuterol Nebulize 3 ml INHALATION RT-QID PRN 20





[Duoneb 0.5 mg-3 mg/3 ml Soln]   


 


Furosemide [Lasix] 20 mg PO DAILY PRN 21











                                    Allergies











Allergy/AdvReac Type Severity Reaction Status Date / Time


 


No Known Allergies Allergy   Verified 21 14:29














Review of Systems


ROS Statement: 


Those systems with pertinent positive or pertinent negative responses have been 

documented in the HPI.





ROS Other: All systems not noted in ROS Statement are negative.


Constitutional: Denies: fever, chills


Eyes: Denies: eye pain


ENT: Denies: ear pain


Respiratory: Denies: dyspnea


Cardiovascular: Denies: chest pain


Endocrine: Reports: fatigue


Gastrointestinal: Reports: nausea, vomiting, diarrhea.  Denies: abdominal pain


Genitourinary: Denies: dysuria


Musculoskeletal: Denies: back pain


Skin: Denies: rash


Neurological: Denies: headache, confusion





Past Medical History


Past Medical History: Deep Vein Thrombosis (DVT), Hypertension, Rheumatoid 

Arthritis (RA), Thyroid Disorder


Additional Past Medical History / Comment(s): Mult Rt foot infections after 

surgery, last time 2019, had PICC Line for AB and developed bloot clot. Hx 

kidney stones. 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

 pneumonia.


History of Any Multi-Drug Resistant Organisms: None Reported


Past Surgical History: Back Surgery, Tonsillectomy


Additional Past Surgical History / Comment(s): Right foot surgery, repair 

herniated disc, plate removed rt foot.   CTR donny,bronchoscopy X4


Past Anesthesia/Blood Transfusion Reactions: Previous Problems w/ Anesthesia


Additional Past Anesthesia/Blood Transfusion Reaction / Comment(s): slow to 

awaken x1.


Past Psychological History: No Psychological Hx Reported


Smoking Status: Former smoker


Past Alcohol Use History: None Reported


Additional Past Alcohol Use History / Comment(s): QUIT SMOKING , smoked 15 

years, 1 - 1 1/2 ppd


Past Drug Use History: None Reported





- Past Family History


  ** Mother


Family Medical History: Cancer


Additional Family Medical History / Comment(s): lung with mets brain





  ** Father


Family Medical History: No Reported History


Additional Family Medical History / Comment(s): states, "he just ."





General Exam


Limitations: no limitations


General appearance: alert, in no apparent distress


Head exam: Present: normocephalic


Eye exam: Present: normal appearance, PERRL, EOMI.  Absent: nystagmus


ENT exam: Present: normal oropharynx


Neck exam: Present: normal inspection


Respiratory exam: Present: normal lung sounds bilaterally


Cardiovascular Exam: Present: tachycardia


GI/Abdominal exam: Present: soft.  Absent: tenderness


Extremities exam: Present: normal inspection.  Absent: pedal edema, calf 

tenderness


Neurological exam: Present: alert, CN II-XII intact.  Absent: motor sensory 

deficit


  ** Expanded


Neurological exam: Present: protecting the airway


Patient oriented to: Present: person, place, time


Speech: Present: fluid speech


Sensory exam: Upper Extremity Light Touch: Normal, Lower Extremity Light Touch: 

Normal


Motor strength exam: RUE: 5, LUE: 5, RLE: 5, LLE: 5


Eye Response: (4) open spontaneously


Motor Response: (6) obeys commands


Verbal Response: (5) oriented


Psychiatric exam: Present: normal affect, normal mood


Skin exam: Present: normal color





Course


                                   Vital Signs











  21





  13:11 13:13 13:20


 


Temperature  98.5 F 


 


Pulse Rate  109 H 111 H


 


Respiratory  18 18





Rate   


 


Blood Pressure  69/43 66/39


 


O2 Sat by Pulse 73 L 95 98





Oximetry   














  21





  13:30 13:40 13:50


 


Temperature   


 


Pulse Rate 110 H 106 H 107 H


 


Respiratory 18 17 17





Rate   


 


Blood Pressure 69/39 65/55 67/46


 


O2 Sat by Pulse 97 97 97





Oximetry   














  21





  14:00 14:10 14:20


 


Temperature   


 


Pulse Rate 106 H 107 H 105 H


 


Respiratory 18 18 17





Rate   


 


Blood Pressure 64/39 70/36 64/40


 


O2 Sat by Pulse 98 97 97





Oximetry   














  21





  14:30


 


Temperature 


 


Pulse Rate 105 H


 


Respiratory 18





Rate 


 


Blood Pressure 70/41


 


O2 Sat by Pulse 97





Oximetry 














- Reevaluation(s)


Reevaluation #1: 





21 15:37


Age remains hypotensive despite 3 and half liters and Decadron.  Central line 

will be placed.


21 15:48


Case was discussed with Dr. Myers, who will admit.  Patient recently transferred

 to his care.  Dr. Vides has been paged for consult.


Ur is concern for septic shock diagnosed at 1540.  Blood culture and lactic acid

 have been ordered.  IV antibiotics have been ordered.  Fluid bolus has been 

done as well as levophed and central line.





EKG Findings





- EKG Comments:


EKG Findings:: Sinus tach cardia 112.  .  QRS 94.  .  .  

Normal axis.  Incomplete right bundle-branch block.  Nonspecific ST-T.





Procedures





- Central Line Placement


  ** Right SC


Consent Obtained: verbal consent, written consent, emergent situation


Patient Placed on Monitor/Pulse Ox: Yes


MD Prep: mask, gown, gloves


Central Line Prep: Chlorhexidine scrub


Local Anesthesia Used: Lidocaine 1%


Amount of Anesthesia Used (mls): 2


Ultrasound Used for Placement: No


Central Line Lumen Inserted: triple


Central Line Position: good blood return, all ports aspirated, flushed, capped, 

sutured in place with 3-0 nylon


Dressing Applied: Tegaderm


Patient Tolerated Procedure: well


Complications: none





Medical Decision Making





- Medical Decision Making





Patient and family were reevaluated multiple times.  Also they are updated on 

results and plan.





- Lab Data


Result diagrams: 


                                 21 13:24





                                 21 13:24


                                   Lab Results











  21 Range/Units





  13:24 13:24 13:24 


 


WBC  20.7 H    (3.8-10.6)  k/uL


 


RBC  4.10 L    (4.30-5.90)  m/uL


 


Hgb  10.6 L    (13.0-17.5)  gm/dL


 


Hct  34.7 L    (39.0-53.0)  %


 


MCV  84.7    (80.0-100.0)  fL


 


MCH  25.8    (25.0-35.0)  pg


 


MCHC  30.5 L    (31.0-37.0)  g/dL


 


RDW  17.5 H    (11.5-15.5)  %


 


Plt Count  345    (150-450)  k/uL


 


MPV  9.5    


 


Neutrophils %  70    %


 


Lymphocytes %  17    %


 


Monocytes %  9    %


 


Eosinophils %  2    %


 


Basophils %  1    %


 


Neutrophils #  14.5 H    (1.3-7.7)  k/uL


 


Lymphocytes #  3.5    (1.0-4.8)  k/uL


 


Monocytes #  1.8 H    (0-1.0)  k/uL


 


Eosinophils #  0.5    (0-0.7)  k/uL


 


Basophils #  0.1    (0-0.2)  k/uL


 


Hypochromasia  Moderate    


 


Anisocytosis  Slight    


 


PT   11.7   (9.0-12.0)  sec


 


INR   1.1   (<1.2)  


 


APTT   19.3 L   (22.0-30.0)  sec


 


Sodium    134 L  (137-145)  mmol/L


 


Potassium    3.6  (3.5-5.1)  mmol/L


 


Chloride    100  ()  mmol/L


 


Carbon Dioxide    16 L  (22-30)  mmol/L


 


Anion Gap    18  mmol/L


 


BUN    72 H  (9-20)  mg/dL


 


Creatinine    7.63 H*  (0.66-1.25)  mg/dL


 


Est GFR (CKD-EPI)AfAm    8  (>60 ml/min/1.73 sqM)  


 


Est GFR (CKD-EPI)NonAf    7  (>60 ml/min/1.73 sqM)  


 


Glucose    106 H  (74-99)  mg/dL


 


Plasma Lactic Acid Naif     (0.7-2.0)  mmol/L


 


Calcium    8.5  (8.4-10.2)  mg/dL


 


Magnesium    1.6  (1.6-2.3)  mg/dL


 


Total Bilirubin    0.7  (0.2-1.3)  mg/dL


 


AST    19  (17-59)  U/L


 


ALT    13  (4-49)  U/L


 


Alkaline Phosphatase    112  ()  U/L


 


Creatine Kinase    36 L  ()  U/L


 


Troponin I     (0.000-0.034)  ng/mL


 


Total Protein    6.5  (6.3-8.2)  g/dL


 


Albumin    2.7 L  (3.5-5.0)  g/dL


 


Coronavirus (PCR)     (Not Detectd)  














  21 Range/Units





  13:24 13:24 13:37 


 


WBC     (3.8-10.6)  k/uL


 


RBC     (4.30-5.90)  m/uL


 


Hgb     (13.0-17.5)  gm/dL


 


Hct     (39.0-53.0)  %


 


MCV     (80.0-100.0)  fL


 


MCH     (25.0-35.0)  pg


 


MCHC     (31.0-37.0)  g/dL


 


RDW     (11.5-15.5)  %


 


Plt Count     (150-450)  k/uL


 


MPV     


 


Neutrophils %     %


 


Lymphocytes %     %


 


Monocytes %     %


 


Eosinophils %     %


 


Basophils %     %


 


Neutrophils #     (1.3-7.7)  k/uL


 


Lymphocytes #     (1.0-4.8)  k/uL


 


Monocytes #     (0-1.0)  k/uL


 


Eosinophils #     (0-0.7)  k/uL


 


Basophils #     (0-0.2)  k/uL


 


Hypochromasia     


 


Anisocytosis     


 


PT     (9.0-12.0)  sec


 


INR     (<1.2)  


 


APTT     (22.0-30.0)  sec


 


Sodium     (137-145)  mmol/L


 


Potassium     (3.5-5.1)  mmol/L


 


Chloride     ()  mmol/L


 


Carbon Dioxide     (22-30)  mmol/L


 


Anion Gap     mmol/L


 


BUN     (9-20)  mg/dL


 


Creatinine     (0.66-1.25)  mg/dL


 


Est GFR (CKD-EPI)AfAm     (>60 ml/min/1.73 sqM)  


 


Est GFR (CKD-EPI)NonAf     (>60 ml/min/1.73 sqM)  


 


Glucose     (74-99)  mg/dL


 


Plasma Lactic Acid Naif  2.4 H*    (0.7-2.0)  mmol/L


 


Calcium     (8.4-10.2)  mg/dL


 


Magnesium     (1.6-2.3)  mg/dL


 


Total Bilirubin     (0.2-1.3)  mg/dL


 


AST     (17-59)  U/L


 


ALT     (4-49)  U/L


 


Alkaline Phosphatase     ()  U/L


 


Creatine Kinase     ()  U/L


 


Troponin I   <0.012   (0.000-0.034)  ng/mL


 


Total Protein     (6.3-8.2)  g/dL


 


Albumin     (3.5-5.0)  g/dL


 


Coronavirus (PCR)    Not Detected  (Not Detectd)  














Critical Care Time


Critical Care Time: Yes


Total Critical Care Time: 35





Disposition


Clinical Impression: 


 Pneumonia, Septic shock, Acute renal failure (ARF)





Disposition: ADMITTED AS IP TO THIS [facility]


Condition: Critical


Is patient prescribed a controlled substance at d/c from ED?: No


Referrals: 


Walker Smith DO [STAFF PHYSICIAN] - 1-2 days


Decision Time: 15:50

## 2021-01-18 NOTE — XR
EXAMINATION TYPE: XR chest 1V portable

 

DATE OF EXAM: 1/18/2021

 

Comparison: 1/18/2021

 

Clinical History: 60-year-old male central line placement

 

Findings:

Right subclavian CVC tip in the mid SVC. Heart mildly enlarged. Patchy mid and lower lung opacities a
nd small left effusion persists. No appreciable pneumothorax.

 

 

Impression:

New right subclavian CVC with tip at the mid SVC. Otherwise, exam unchanged from earlier today.

## 2021-01-18 NOTE — XR
EXAMINATION TYPE: XR chest 1V portable

 

DATE OF EXAM: 1/18/2021

 

Comparison: 12/31/2020

 

Clinical History: 60-year-old male weak

 

Findings:

Heart remains enlarged. Patchy mid and lower lung opacity shows slight improvement. There is redemons
trated small left effusion.

 

 

Impression:

Cardiomegaly with patchy mid and lower lung opacities with small left effusion. Consider pneumonia or
 sequela of CHF.

## 2021-01-18 NOTE — XR
EXAMINATION TYPE: XR chest 1V portable

 

DATE OF EXAM: 1/18/2021

 

COMPARISON: Chest x-ray from earlier today.

 

HISTORY: Some onset chest pain status post central line placement.

 

TECHNIQUE: Single AP portable frontal upright view of the chest is obtained.

 

FINDINGS: Stable right subclavian central venous catheter. Persistent cardiomegaly with atherosclerot
ic thoracic aorta and moderate mid to lower lung opacities bilaterally. Small left pleural effusion r
edemonstrated. Background chronic parenchymal change. Osseous structures intact. An azygos lobe/fissu
re again seen, normal variant.

 

IMPRESSION:  Findings consistent with CHF exacerbation remain present. Persistent cardiomegaly with m
ild to moderate alveolar and interstitial edema and small left pleural effusion. Areas of some underl
nini acute infiltrate not excluded. No significant change from x-rays earlier today.

## 2021-01-18 NOTE — P.CNPUL
History of Present Illness


Consult date: 21


Reason for consult: dyspnea


Chief complaint: Generalized weakness, fatigue, acute kidney injury


History of present illness: 





60-year-old male patient presented emergency department because of generalized 

weakness and fatigue.  Symptoms over the past few days.  He had increased nausea

vomiting and diarrhea.  He was vomiting for the past 3 days around 4-5 times on 

a daily basis.  He also obviously had a diminished oral intake.  He had couple 

of diarrhea episodes on a daily basis.  No abdominal pain.  The patient is known

to have history of rheumatoid arthritis along with previous history of 

rheumatoid lung disease in addition to a previous history of loculated empyema 

requiring chest tube placement back in 2020.  This was attributed to 

streptococcal pneumonia.  He has also history of hypertension, hypothyroidism 

and previous history of DVT.  He has undergone previous bronchoscopies in the 

past and the last bronchoscopy was in 2020 and it showed to have 

Candida albicans.  He was subsequently hospitalized on 2020 and his 

coronavirus/Covid 19 testing came back negative.  He did have an acute kidney 

injury and his creatinine was up to 11 and he also had a component of metabolic 

acidosis.  The patient was given IV fluids and pressors and his condition was 

stabilized.  A CAT scan of the chest that was done at that time showed chronic 

changes in lower lungs related to rheumatoid arthritis.  He also had a new 

consolidation and elbow grams in the left hilar region and new multifocal grou

ndglass organizing pneumonia in the right upper lobe for which she was treated. 

Doppler of the lower extremity showed no evidence of any DVT..





During this current admission, the patient's creatinine was again up to his BUN 

is at 72 and this serum bicarbonate 16 and there is an anion gap of 18.  His CPK

was 36, liver function tests are within normal limits.  Lactic acid level of 2.4

and troponins were negative.  The repeat Covid 19 testing came back again 

negative.  The chest x-ray shows a component of CHF with cardiomegaly and mild 

to moderate alveolar and interstitial edema and a small left-sided pleural 

effusion.





Review of Systems








Constitutional: Reports fatigue, Reports fever, Reports weakness, Denies chills


Eyes: denies blurred vision, denies pain


Ears, nose, mouth and throat: Denies headache, Denies sore throat


Cardiovascular: Denies chest pain, Denies shortness of breath


Respiratory: Reports dyspnea, Denies cough


Gastrointestinal: Denies abdominal pain, Denies diarrhea, Denies nausea, Denies 

vomiting


Musculoskeletal: Denies myalgias


Integumentary: Denies pruritus, Denies rash


Neurological: Denies numbness, Denies weakness


Psychiatric: Denies anxiety, Denies depression


Endocrine: Denies fatigue, Denies weight change





Past Medical History


Past Medical History: Deep Vein Thrombosis (DVT), Hypertension, Neurologic 

Disorder, Rheumatoid Arthritis (RA), Thyroid Disorder


Additional Past Medical History / Comment(s): Rheumatoid arthritis, rheumatoid 

lungs with chronic interstitial lung disease maintained on Arava and chronic 

steroids, previous history of pneumonia/empyema with Streptococcus and the 

patient required chest tube drainage, retention, hypothyroidism, previous 

history of foot infections requiring PICC line insertion, history of DVT, 

nephrolithiasis,


History of Any Multi-Drug Resistant Organisms: None Reported


Past Surgical History: Back Surgery, Tonsillectomy


Additional Past Surgical History / Comment(s): Right foot surgery, repair 

herniated disc, plate removed rt foot.   CTR donny,bronchoscopy X4


Past Anesthesia/Blood Transfusion Reactions: Previous Problems w/ Anesthesia


Additional Past Anesthesia/Blood Transfusion Reaction / Comment(s): slow to 

awaken x1.


Past Psychological History: No Psychological Hx Reported


Smoking Status: Former smoker


Past Alcohol Use History: None Reported


Additional Past Alcohol Use History / Comment(s): QUIT SMOKING , smoked 15 

years, 1 - 1 1/2 ppd


Past Drug Use History: None Reported





- Past Family History


  ** Mother


Family Medical History: Cancer


Additional Family Medical History / Comment(s): lung with mets brain





  ** Father


Family Medical History: No Reported History


Additional Family Medical History / Comment(s): states, "he just ."





Medications and Allergies


                                Home Medications











 Medication  Instructions  Recorded  Confirmed  Type


 


Levothyroxine Sodium [Synthroid] 150 mcg PO DAILY 17 History


 


Albuterol Inhaler [Ventolin Hfa 2 puff INHALATION RT-QID PRN 06/10/20 01/18/21 

History





Inhaler]    


 


Losartan-Hctz 50-12.5 mg [Hyzaar 1 tab PO DAILY 06/10/20 01/18/21 History





50-12.5]    


 


Metoprolol Tartrate [Lopressor] 50 mg PO HS 06/10/20 01/18/21 History


 


Ascorbic Acid [Vitamin C] 2,000 mg PO HS 20 History


 


Cholecalciferol [Vitamin D3 (25 2,000 unit PO HS 20 History





Mcg = 1000 Iu)]    


 


Ipratropium-Albuterol Nebulize 3 ml INHALATION RT-QID PRN 20 

History





[Duoneb 0.5 mg-3 mg/3 ml Soln]    


 


Furosemide [Lasix] 20 mg PO DAILY PRN 21 History








                                    Allergies











Allergy/AdvReac Type Severity Reaction Status Date / Time


 


No Known Allergies Allergy   Verified 21 14:29














Physical Exam


Vitals: 


                                   Vital Signs











  Temp Pulse Resp BP Pulse Ox


 


 21 16:32   103 H  18  71/46  99


 


 21 16:07   107 H   79/52  99


 


 21 16:00   105 H  18  83/55  100


 


 21 15:55   85  18  122/72  99


 


 21 15:49   107 H  18  66/40  95


 


 21 15:35   108 H  18  66/40  95


 


 21 14:30   105 H  18  70/41  97


 


 21 14:20   105 H  17  64/40  97


 


 21 14:10   107 H  18  70/36  97


 


 21 14:00   106 H  18  64/39  98


 


 21 13:50   107 H  17  67/46  97


 


 21 13:40   106 H  17  65/55  97


 


 21 13:30   110 H  18  69/39  97


 


 21 13:20   111 H  18  66/39  98


 


 21 13:13  98.5 F  109 H  18  69/43  95


 


 21 13:11      73 L








                                Intake and Output











 21





 06:59 14:59 22:59


 


Intake Total   2.037


 


Balance   2.037


 


Intake:   


 


  Intake, IV Titration   2.037





  Amount   


 


    Norepinephrine 32 mg In   2.037





    Sodium Chloride 0.9% 218   





    ml @ 0.05 MCG/KG/MIN 2.   





    445 mls/hr IV .Q24H [facility]   





    Rx#:[id number]   


 


Other:   


 


  Weight  104.326 kg 




















 GENERAL EXAM: Alert, very pleasant, 60-year-old white male, on 4 L of Oxymizer 

nasal cannula, with pulse ox of 95% comfortable in no apparent distress.


HEAD: Normocephalic/atraumatic.


EYES: Normal reaction of pupils, equal size.  Conjunctiva pink, sclera white.


NOSE: Clear with pink turbinates.


THROAT: No erythema or exudates.


NECK: No masses, no JVD, no thyroid enlargement, no adenopathy.


CHEST: No chest wall deformity.  Symmetrical expansion. 


LUNGS: Equal air entry with some bibasilar crackles, no major wheezing or 

coughing or rhonchi


CVS: Regular rate and rhythm, normal S1 and S2, no gallops, no murmurs, no rubs


ABDOMEN: Soft, nontender.  No hepatosplenomegaly, normal bowel sounds, no 

guarding or rigidity.


EXTREMITIES: No clubbing, no edema, no cyanosis, 2+ pulses and upper and lower 

extremities.


MUSCULOSKELETAL: Muscle strength and tone normal.


SPINE: No scoliosis or deformity


SKIN: No rashes


CENTRAL NERVOUS SYSTEM: Alert and oriented -3.  No focal deficits, tone is 

normal in all 4 extremities.


PSYCHIATRIC: Alert and oriented -3.  Appropriate affect.  Intact judgment and 

insight.





Results





- Laboratory Findings


CBC and BMP: 


                                 21 13:24





                                 21 13:24


PT/INR, D-dimer











PT  11.7 sec (9.0-12.0)   21  13:24    


 


INR  1.1  (<1.2)   21  13:24    








Abnormal lab findings: 


                                  Abnormal Labs











  21





  13:24 13:24 13:24


 


WBC  20.7 H  


 


RBC  4.10 L  


 


Hgb  10.6 L  


 


Hct  34.7 L  


 


MCHC  30.5 L  


 


RDW  17.5 H  


 


Neutrophils #  14.5 H  


 


Monocytes #  1.8 H  


 


APTT   19.3 L 


 


Sodium    134 L


 


Carbon Dioxide    16 L


 


BUN    72 H


 


Creatinine    7.63 H*


 


Glucose    106 H


 


Plasma Lactic Acid Naif   


 


Creatine Kinase    36 L


 


Albumin    2.7 L














  21





  13:24


 


WBC 


 


RBC 


 


Hgb 


 


Hct 


 


MCHC 


 


RDW 


 


Neutrophils # 


 


Monocytes # 


 


APTT 


 


Sodium 


 


Carbon Dioxide 


 


BUN 


 


Creatinine 


 


Glucose 


 


Plasma Lactic Acid Naif  2.4 H*


 


Creatine Kinase 


 


Albumin 














- Diagnostic Findings


Chest x-ray: image reviewed





Assessment and Plan


Plan: 











1 acute kidney injury secondary to intravascular volume depletion/dehydration.  

Previous ultrasound the kidneys showed no evidence of any hydronephrosis.  The 

patient has had a similar episode of acute kidney injury/ATN on previous 

hospitalization the patient renal function remained recovered with IV fluids.





2 acute hypotension, probably secondary to intravascular volume depletion and 

the patient is currently on IV fluids and pressors





3 chronic perihilar pulmonary infiltrates with increased interstitial changes in

 the lung bases more so on the right, probably a combination of rheumatoid lung 

and chronic scarring related to a previous pneumonia/empyema





4 previous tests normal, and pneumonia back in 2020 requiring a chest tube 

insertion for an empyema





5 acute hypoxic respiratory failure currently on 4 L about 2 by nasal cannula





6 rheumatoid arthritis with rheumatoid lungs/fibrosis maintained on a 

combination of Arava and prednisone outpatient basis





7 hypertension





8 hypothyroidism





9 previous history of foot infections requiring of antibiotics





10 previous history of DVT





11 persistent nausea and emesis and diarrhea.  Consider underlying C. diff 

colitis





12 leukocytosis





13 mild chronic normocytic anemia





14 mild lactic acidosis with anion gap metabolic acidosis








Plan





The patient has already received a total of 3 L.  We'll continue normal saline 

at the rate of 150 mL an hour


Monitor urine output and creatinine


Repeat ultrasound the kidneys


Continue pressors and monitor CVP and wean off the pressors as needed to main

tain a mean arterial pressure above 60


Continue Zosyn continue Levaquin


Resume all medications including Synthroid and hold the metoprolol and Hyzaar 

for now

## 2021-01-19 LAB
ALBUMIN SERPL-MCNC: 2.9 G/DL (ref 3.5–5)
ALP SERPL-CCNC: 131 U/L (ref 38–126)
ALT SERPL-CCNC: 15 U/L (ref 4–49)
ANION GAP SERPL CALC-SCNC: 12 MMOL/L
ANION GAP SERPL CALC-SCNC: 18 MMOL/L
AST SERPL-CCNC: 23 U/L (ref 17–59)
BASOPHILS # BLD AUTO: 0.1 K/UL (ref 0–0.2)
BASOPHILS NFR BLD AUTO: 0 %
BUN SERPL-SCNC: 52 MG/DL (ref 9–20)
BUN SERPL-SCNC: 62 MG/DL (ref 9–20)
CALCIUM SPEC-MCNC: 8.3 MG/DL (ref 8.4–10.2)
CALCIUM SPEC-MCNC: 8.4 MG/DL (ref 8.4–10.2)
CHLORIDE SERPL-SCNC: 102 MMOL/L (ref 98–107)
CHLORIDE SERPL-SCNC: 96 MMOL/L (ref 98–107)
CO2 SERPL-SCNC: 16 MMOL/L (ref 22–30)
CO2 SERPL-SCNC: 30 MMOL/L (ref 22–30)
EOSINOPHIL # BLD AUTO: 0 K/UL (ref 0–0.7)
EOSINOPHIL NFR BLD AUTO: 0 %
ERYTHROCYTE [DISTWIDTH] IN BLOOD BY AUTOMATED COUNT: 4.51 M/UL (ref 4.3–5.9)
ERYTHROCYTE [DISTWIDTH] IN BLOOD: 17.6 % (ref 11.5–15.5)
GLUCOSE SERPL-MCNC: 153 MG/DL (ref 74–99)
GLUCOSE SERPL-MCNC: 266 MG/DL (ref 74–99)
HCT VFR BLD AUTO: 39.8 % (ref 39–53)
HGB BLD-MCNC: 11.9 GM/DL (ref 13–17.5)
LYMPHOCYTES # SPEC AUTO: 0.8 K/UL (ref 1–4.8)
LYMPHOCYTES NFR SPEC AUTO: 5 %
MAGNESIUM SPEC-SCNC: 1.7 MG/DL (ref 1.6–2.3)
MCH RBC QN AUTO: 26.4 PG (ref 25–35)
MCHC RBC AUTO-ENTMCNC: 30 G/DL (ref 31–37)
MCV RBC AUTO: 88.3 FL (ref 80–100)
MONOCYTES # BLD AUTO: 0.5 K/UL (ref 0–1)
MONOCYTES NFR BLD AUTO: 3 %
NEUTROPHILS # BLD AUTO: 16.3 K/UL (ref 1.3–7.7)
NEUTROPHILS NFR BLD AUTO: 92 %
PLATELET # BLD AUTO: 335 K/UL (ref 150–450)
POTASSIUM SERPL-SCNC: 3.5 MMOL/L (ref 3.5–5.1)
POTASSIUM SERPL-SCNC: 4.3 MMOL/L (ref 3.5–5.1)
PROT SERPL-MCNC: 6.8 G/DL (ref 6.3–8.2)
SODIUM SERPL-SCNC: 136 MMOL/L (ref 137–145)
SODIUM SERPL-SCNC: 138 MMOL/L (ref 137–145)
WBC # BLD AUTO: 17.8 K/UL (ref 3.8–10.6)

## 2021-01-19 RX ADMIN — NOREPINEPHRINE BITARTRATE SCH: 1 INJECTION, SOLUTION, CONCENTRATE INTRAVENOUS at 17:39

## 2021-01-19 RX ADMIN — PIPERACILLIN AND TAZOBACTAM SCH MLS/HR: 3; .375 INJECTION, POWDER, FOR SOLUTION INTRAVENOUS at 20:44

## 2021-01-19 RX ADMIN — POTASSIUM CHLORIDE SCH MLS/HR: 14.9 INJECTION, SOLUTION INTRAVENOUS at 04:40

## 2021-01-19 RX ADMIN — POTASSIUM CHLORIDE SCH: 14.9 INJECTION, SOLUTION INTRAVENOUS at 18:41

## 2021-01-19 RX ADMIN — CEFAZOLIN SCH MLS/HR: 330 INJECTION, POWDER, FOR SOLUTION INTRAMUSCULAR; INTRAVENOUS at 18:35

## 2021-01-19 RX ADMIN — MORPHINE SULFATE PRN MG: 4 INJECTION, SOLUTION INTRAMUSCULAR; INTRAVENOUS at 20:29

## 2021-01-19 RX ADMIN — POTASSIUM CHLORIDE SCH MLS/HR: 14.9 INJECTION, SOLUTION INTRAVENOUS at 11:45

## 2021-01-19 RX ADMIN — PANTOPRAZOLE SODIUM SCH MG: 40 INJECTION, POWDER, FOR SOLUTION INTRAVENOUS at 09:46

## 2021-01-19 RX ADMIN — PIPERACILLIN AND TAZOBACTAM SCH MLS/HR: 3; .375 INJECTION, POWDER, FOR SOLUTION INTRAVENOUS at 00:15

## 2021-01-19 RX ADMIN — PIPERACILLIN AND TAZOBACTAM SCH MLS/HR: 3; .375 INJECTION, POWDER, FOR SOLUTION INTRAVENOUS at 09:46

## 2021-01-19 NOTE — ECHOF
Referral Reason:chest pain



MEASUREMENTS

--------

HEIGHT: 180.3 cm

WEIGHT: 94.8 kg

BP: 

IVSd:   0.8 cm     (0.6 - 1.1)

LVIDd:   4.1 cm     (3.9 - 5.3)

LVPWd:   1.1 cm     (0.6 - 1.1)

IVSs:   0.8 cm

LVIDs:   3.5 cm

LVPWs:   1.0 cm

LAESV Index (A-L):   26.30 ml/m

Ao Diam:   3.7 cm     (2.0 - 3.7)

AV Cusp:   2.1 cm     (1.5 - 2.6)

LA Diam:   2.9 cm     (2.7 - 3.8)

MV EXCURSION:   19.783 mm     (> 18.000)

MV EF SLOPE:   100 mm/s     (70 - 150)

EPSS:   2.4 cm

MV E Chance:   0.75 m/s

MV DecT:   217 ms

MV A Chance:   0.75 m/s

MV E/A Ratio:   1.00 

AR PHT:   922 ms

RAP:   5.00 mmHg

RVSP:   14.31 mmHg







FINDINGS

--------

Sinus rhythm.

This was a technically adequate study.

The left ventricular size is normal.   Left ventricular wall thickness is normal.   Overall left vent
ricular systolic function is mild-moderately impaired with, an EF between 40 - 45 %.   Basal inferose
ptal LV wall motion is hypokinetic.    Basal anteroseptal LV wall motion is hypokinetic.    Mid infer
oseptal LV wall motion is hypokinetic.    Mid anteroseptal LV wall motion is hypokinetic.

The right ventricle is normal in size.

Normal LA  size by volume 22+/-6 ml/m2.

The right atrial size is normal.

There is mild aortic valve sclerosis.   There is mild aortic regurgitation.

Mild mitral annular calcification present.   Mild mitral regurgitation is present.

The tricuspid valve appears structurally normal.   Trace tricuspid regurgitation present.   Right argenis
tricular systolic pressure is normal at < 35 mmHg.

The pulmonic valve was not well visualized.   There is no pulmonic regurgitation present.

The aortic root size is normal.

Normal inferior vena cava with normal inspiratory collapse consistent with estimated right atrial pre
ssure of  5 mmHg.

There is no pericardial effusion.



CONCLUSIONS

--------

1. Left ventricular wall thickness is normal.

2. Overall left ventricular systolic function is mild-moderately impaired with, an EF between 40 - 45
 %.

3. Basal inferoseptal LV wall motion is hypokinetic.

4. Basal anteroseptal LV wall motion is hypokinetic.

5. Mid inferoseptal LV wall motion is hypokinetic.

6. Mid anteroseptal LV wall motion is hypokinetic.

7. Normal LA size by volume 22+/-6 ml/m2.

8. There is mild aortic regurgitation.

9. Mild mitral regurgitation is present.

10. Trace tricuspid regurgitation present.

11. There is no pericardial effusion.





SONOGRAPHER: Kamla Flores RDCS

## 2021-01-19 NOTE — P.CRDCN
History of Present Illness


Consult date: 21


History of present illness: 


This is a 60-year-old gentleman who was recently in the hospital for pneumonia, 

comes back to the hospital with complaints of generalized weakness and fatigue. 

Patient had poor appetite and also had diarrhea for 2 days.  Patient denied any 

chest pain.  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  On 

admission patient was found to be in acute renal failure with creatinine of 

about 5.  This may be partly related to dehydration.  Patient was given IV 

fluids with improvement of his general condition.  In fact, he claims that he is

feeling much better.  We're asked to see the patient because of abnormal EKG 

showing some ischemic changes.  However, repeat EKG showed normal findings.  Is 

not having any chest pain or shortness of breath at rest at this time.  His 

troponin 2 are negative.  We're going to get an echocardiogram to assess LV 

function and to see if there are any wall motion normalities.  If there is no 

evidence of wall motion abnormalities, continue current medical therapy to 

stabilize his general condition.  When patient is stable for evaluation to rule 

out underlying ischemic heart disease with a pharmacological stress test could 

be considered.  His repeat echo with test is also came back as negative.  Chest 

x-ray does show some evidence of pneumonia.  His white count is about 17,000








Review of Systems





As per the chart





Past Medical History


Past Medical History: Deep Vein Thrombosis (DVT), Hypertension, Neurologic 

Disorder, Rheumatoid Arthritis (RA), Thyroid Disorder


Additional Past Medical History / Comment(s): Rheumatoid arthritis, rheumatoid 

lungs with chronic interstitial lung disease maintained on Arava and chronic 

steroids, previous history of pneumonia/empyema with Streptococcus and the pat

ient required chest tube drainage - 2020, retention, hypothyroidism, 

previous history of foot infections requiring PICC line insertion, history of 

DVT, nephrolithiasis,


History of Any Multi-Drug Resistant Organisms: None Reported


Past Surgical History: Back Surgery, Tonsillectomy


Additional Past Surgical History / Comment(s): Right foot surgery, repair 

herniated disc, plate removed rt foot.   CTR donny,bronchoscopy X4


Past Anesthesia/Blood Transfusion Reactions: Previous Problems w/ Anesthesia


Additional Past Anesthesia/Blood Transfusion Reaction / Comment(s): slow to 

awaken x1.


Past Psychological History: No Psychological Hx Reported


Smoking Status: Former smoker


Past Alcohol Use History: None Reported


Additional Past Alcohol Use History / Comment(s): QUIT SMOKING , smoked 15 

years, 1 - 1 1/2 ppd


Past Drug Use History: None Reported





- Past Family History


  ** Mother


Family Medical History: Cancer


Additional Family Medical History / Comment(s): lung with mets brain





  ** Father


Family Medical History: No Reported History


Additional Family Medical History / Comment(s): states, "he just ."





Medications and Allergies


                                Home Medications











 Medication  Instructions  Recorded  Confirmed  Type


 


Levothyroxine Sodium [Synthroid] 150 mcg PO DAILY 17 History


 


Albuterol Inhaler [Ventolin Hfa 2 puff INHALATION RT-QID PRN 06/10/20 01/18/21 

History





Inhaler]    


 


Losartan-Hctz 50-12.5 mg [Hyzaar 1 tab PO DAILY 06/10/20 01/18/21 History





50-12.5]    


 


Metoprolol Tartrate [Lopressor] 50 mg PO HS 06/10/20 01/18/21 History


 


Ascorbic Acid [Vitamin C] 2,000 mg PO HS 20 History


 


Cholecalciferol [Vitamin D3 (25 2,000 unit PO HS 20 History





Mcg = 1000 Iu)]    


 


Ipratropium-Albuterol Nebulize 3 ml INHALATION RT-QID PRN 20 

History





[Duoneb 0.5 mg-3 mg/3 ml Soln]    


 


Furosemide [Lasix] 20 mg PO DAILY PRN 21 History








                                    Allergies











Allergy/AdvReac Type Severity Reaction Status Date / Time


 


No Known Allergies Allergy   Verified 21 14:29














Physical Exam


Vitals: 


                                   Vital Signs











  Temp Pulse Resp BP Pulse Ox


 


 21 09:00   81  16  109/79  99


 


 21 08:30   83  18  116/88  100


 


 21 08:00  97.5 F L  85  16  135/81  100


 


 21 07:30   90  17  128/96  99


 


 21 07:00   85  19  132/92  99


 


 21 06:30   77  15  145/111  100


 


 21 06:00   91  20  141/102  100


 


 21 05:30   87  16  137/98  100


 


 21 05:00   88  17  139/87  100


 


 21 04:30   86  20  133/91  100


 


 21 04:00  97.8 F  72  15  137/94  100


 


 21 03:30   70  15  151/98  100


 


 21 03:00   92  14  133/98  98


 


 21 02:30   93  22  143/89  97


 


 21 02:00   89  15  132/81  89 L


 


 21 01:30   76  13  136/77  98


 


 21 01:00   90  13  139/85  94 L


 


 21 00:30   77  12  115/80  98


 


 21 00:00  98.2 F  86  13  117/83  98


 


 21 23:30   89  11 L  120/80  96


 


 21 23:00   88  12  113/78  98


 


 21 22:30   94  16  112/70  83 L


 


 21 22:00   91  14  103/68  95


 


 21 21:35   93  13  110/69  96


 


 21 21:30   96  14  101/69  97


 


 21 21:00   100  16  95/68  93 L


 


 21 20:30   95  18  104/62  96


 


 21 20:00  98.5 F  98  15  101/61  94 L


 


 21 19:30   96  13  95/61  98


 


 21 19:00   98  17  91/54  100


 


 21 18:40   98  20  92/53  100


 


 21 18:20   99  15  89/58  99


 


 21 18:00   102 H  18  83/59  100


 


 21 17:40   100  34 H  93/60  100


 


 21 17:20   104 H  15  96/61  99


 


 21 17:10  98.7 F  103 H  23  80/53  95


 


 21 16:49     76/52 


 


 21 16:32   103 H  18  71/46  99


 


 21 16:07   107 H   79/52  99


 


 21 16:00   105 H  18  83/55  100


 


 21 15:55   85  18  122/72  99


 


 21 15:49   107 H  18  66/40  95


 


 21 15:35   108 H  18  66/40  95


 


 21 14:30   105 H  18  70/41  97


 


 21 14:20   105 H  17  64/40  97


 


 21 14:10   107 H  18  70/36  97


 


 21 14:00   106 H  18  64/39  98


 


 21 13:50   107 H  17  67/46  97


 


 21 13:40   106 H  17  65/55  97


 


 21 13:30   110 H  18  69/39  97


 


 21 13:20   111 H  18  66/39  98


 


 21 13:13  98.5 F  109 H  18  69/43  95


 


 21 13:11      73 L








                                Intake and Output











 21





 22:59 06:59 14:59


 


Intake Total 038.511 7074.392 300


 


Output Total 1075 2725 590


 


Balance -97.535 -1224.608 -290


 


Intake:   


 


   1440 300


 


    0.9 Na Cl KVO 30 90 


 


    Dextrose 5% in Water 1, 450 1350 300





    000 ml @ 150 mls/hr IV .   





    Q7H40M GABBY with Sodium   





    Bicarb (1 Meq/ml) 150 ml   





    Rx#:[id number]   


 


  Intake, IV Titration 497.465 60.392 





  Amount   


 


    Levofloxacin 500Mg-D5w 100  





    Pmx 500 mg In Dextrose/   





    Water 1 100ml.bag @ 100   





    mls/hr IVPB ONCE STA Rx#:   





    [id number]   


 


    Norepinephrine 32 mg In 7.465 60.392 





    Sodium Chloride 0.9% 218   





    ml @ 0.05 MCG/KG/MIN 2.   





    445 mls/hr IV .Q24H GABBY   





    Rx#:[id number]   


 


    Sodium Chloride 0.9% 1, 390  





    000 ml @ 130 mls/hr IV .   





    Q7H42M GABBY Rx#:[id number]   


 


Output:   


 


  Urine 1075 2725 590


 


Other:   


 


  Voiding Method Indwelling Catheter Indwelling Catheter 


 


  Weight 104.326 kg 95 kg 














GENERAL EXAM: Patient is alert and oriented and doesn't appear to be in any 

acute distress


HEENT: Normocephalic. Normal reaction of pupils, equal size, normal range of 

extraocular motion. No erythema or exudates in the throat.


NECK: No masses, no nuchal rigidity.


CHEST: No chest wall deformity.


LUNGS: Equal air entry with no crackles or wheeze.  Scattered rhonchi


HEART: S1 and S2 normal with no audible mumurs or gallops. Regular rhythm, 

femorals equal on both sides..


ABDOMEN: No hepatosplenomegaly, normal bowel sounds, no guarding or rigidity.


SKIN: No rashes


CENTRAL NERVOUS SYSTEM: No focal deficits.


EXTREMITIES: No cyanosis, clubbing or edema.





Results





                                 21 03:15





                                 21 03:15


                                 Cardiac Enzymes











  21 Range/Units





  13:24 13:24 16:04 


 


AST  19    (17-59)  U/L


 


Troponin I   <0.012  <0.012  (0.000-0.034)  ng/mL














  21 Range/Units





  03:15 


 


AST  23  (17-59)  U/L


 


Troponin I   (0.000-0.034)  ng/mL








                                   Coagulation











  21 Range/Units





  13:24 


 


PT  11.7  (9.0-12.0)  sec


 


APTT  19.3 L  (22.0-30.0)  sec








                                       CBC











  21 Range/Units





  13:24 03:15 


 


WBC  20.7 H  17.8 H  (3.8-10.6)  k/uL


 


RBC  4.10 L  4.51  (4.30-5.90)  m/uL


 


Hgb  10.6 L  11.9 L  (13.0-17.5)  gm/dL


 


Hct  34.7 L  39.8  (39.0-53.0)  %


 


Plt Count  345  335  (150-450)  k/uL








                          Comprehensive Metabolic Panel











  21 Range/Units





  13:24 03:15 


 


Sodium  134 L  136 L  (137-145)  mmol/L


 


Potassium  3.6  4.3  (3.5-5.1)  mmol/L


 


Chloride  100  102  ()  mmol/L


 


Carbon Dioxide  16 L  16 L  (22-30)  mmol/L


 


BUN  72 H  62 H  (9-20)  mg/dL


 


Creatinine  7.63 H*  5.13 H  (0.66-1.25)  mg/dL


 


Glucose  106 H  266 H  (74-99)  mg/dL


 


Calcium  8.5  8.3 L  (8.4-10.2)  mg/dL


 


AST  19  23  (17-59)  U/L


 


ALT  13  15  (4-49)  U/L


 


Alkaline Phosphatase  112  131 H  ()  U/L


 


Total Protein  6.5  6.8  (6.3-8.2)  g/dL


 


Albumin  2.7 L  2.9 L  (3.5-5.0)  g/dL








                               Current Medications











Generic Name Dose Route Start Last Admin





  Trade Name Freq  PRN Reason Stop Dose Admin


 


Norepinephrine Bitartrate 32  250 mls @ 2.445 mls/hr  21 15:45  21 

06:49





  mg/ Sodium Chloride  IV   0.07 mcg/kg/min





  .Q24H GABBY   3.423 mls/hr





    Titration





  Protocol  





  0.05 MCG/KG/MIN  


 


Piperacillin Sod/Tazobactam  100 mls @ 25 mls/hr  21 00:00  21 00:15





  Sod 3.375 gm/ Sodium Chloride  IVPB  21 23:00  25 mls/hr





  Q12HR GABBY   Administration


 


Sodium Bicarbonate 150 ml/  1,150 mls @ 150 mls/hr  21 19:00  21 

04:40





  Dextrose/Water  IV   150 mls/hr





  .Q7H40M GABBY   Administration


 


Miscellaneous Information  1 each  21 15:42 





  Pneumonia Protocol Utilized 1 Each Misc  PO  





  ONCE PRN  





  Per Protocol  


 


Morphine Sulfate  3 mg  21 15:44  21 18:24





  Morphine Sulfate 4 Mg/Ml Syringe  IV   3 mg





  Q2HR PRN   Administration





  Pain Scale 6 to 7  


 


Naloxone HCl  0.2 mg  21 15:44 





  Naloxone 0.4 Mg/Ml 1 Ml Vial  IV  





  Q2M PRN  





  Opioid Reversal  


 


Pantoprazole Sodium  40 mg  21 09:00 





  Pantoprazole 40 Mg/10 Ml Vial  IV  





  DAILY GABBY  








                                Intake and Output











 21





 22:59 06:59 14:59


 


Intake Total 719.227 6765.392 300


 


Output Total 1075 5135 590


 


Balance -97.535 -1224.608 -290


 


Intake:   


 


   1440 300


 


    0.9 Na Cl KVO 30 90 


 


    Dextrose 5% in Water 1, 450 1350 300





    000 ml @ 150 mls/hr IV .   





    Q7H40M GABBY with Sodium   





    Bicarb (1 Meq/ml) 150 ml   





    Rx#:[id number]   


 


  Intake, IV Titration 497.465 60.392 





  Amount   


 


    Levofloxacin 500Mg-D5w 100  





    Pmx 500 mg In Dextrose/   





    Water 1 100ml.bag @ 100   





    mls/hr IVPB ONCE STA Rx#:   





    [id number]   


 


    Norepinephrine 32 mg In 7.465 60.392 





    Sodium Chloride 0.9% 218   





    ml @ 0.05 MCG/KG/MIN 2.   





    445 mls/hr IV .Q24H GABBY   





    Rx#:[id number]   


 


    Sodium Chloride 0.9% 1, 390  





    000 ml @ 130 mls/hr IV .   





    Q7H42M GABBY Rx#:[id number]   


 


Output:   


 


  Urine 1075 2725 590


 


Other:   


 


  Voiding Method Indwelling Catheter Indwelling Catheter 


 


  Weight 104.326 kg 95 kg 








                                        





                                 21 03:15 





                                 21 03:15 











EKG Interpretations (text)





Sinus rhythm.  Initial EKG showed mild ST-T abnormalities and repeat EKG was 

normal





Assessment and Plan


(1) Essential hypertension


Current Visit: Yes   Status: Acute   Code(s): I10 - ESSENTIAL (PRIMARY) 

HYPERTENSION   SNOMED Code(s): 57607775


   





(2) Acute renal failure


Current Visit: Yes   Status: Acute   Code(s): N17.9 - ACUTE KIDNEY FAILURE, 

UNSPECIFIED   SNOMED Code(s): 35053235


   





(3) Pneumonia


Current Visit: Yes   Status: Acute   Code(s): J18.9 - PNEUMONIA, UNSPECIFIED 

ORGANISM   SNOMED Code(s): 856400785


   





(4) Dehydration


Current Visit: No   Status: Acute   Code(s): E86.0 - DEHYDRATION   SNOMED 

Code(s): 84849147


   





(5) Abnormal EKG


Current Visit: Yes   Status: Acute   Code(s): R94.31 - ABNORMAL 

ELECTROCARDIOGRAM [ECG] [EKG]   SNOMED Code(s): 394088116


   


Plan: 


At this point patient doesn't have any symptoms of angina and his troponins 2 

are negative.  We will get an echocardiogram to assess LV function and rule out 

any segmental wall motion defects.  If there are no segmental wall motion 

defects, no further evaluation at this time, as long as patient is chest pain-

free.  Continue rest of the supportive care treatment for possible pneumonia

## 2021-01-19 NOTE — P.PN
Subjective


Progress Note Date: 01/19/21








60-year-old male patient presented emergency department because of generalized 

weakness and fatigue.  Symptoms over the past few days.  He had increased nausea

vomiting and diarrhea.  He was vomiting for the past 3 days around 4-5 times on 

a daily basis.  He also obviously had a diminished oral intake.  He had couple 

of diarrhea episodes on a daily basis.  No abdominal pain.  The patient is known

to have history of rheumatoid arthritis along with previous history of 

rheumatoid lung disease in addition to a previous history of loculated empyema 

requiring chest tube placement back in July 2020.  This was attributed to 

streptococcal pneumonia.  He has also history of hypertension, hypothyroidism 

and previous history of DVT.  He has undergone previous bronchoscopies in the 

past and the last bronchoscopy was in November 2020 and it showed to have 

Candida albicans.  He was subsequently hospitalized on 12/28/2020 and his 

coronavirus/Covid 19 testing came back negative.  He did have an acute kidney 

injury and his creatinine was up to 11 and he also had a component of metabolic 

acidosis.  The patient was given IV fluids and pressors and his condition was 

stabilized.  A CAT scan of the chest that was done at that time showed chronic 

changes in lower lungs related to rheumatoid arthritis.  He also had a new 

consolidation and elbow grams in the left hilar region and new multifocal 

groundglass organizing pneumonia in the right upper lobe for which she was 

treated.  Doppler of the lower extremity showed no evidence of any DVT..





During this current admission, the patient's creatinine was again up to his BUN 

is at 72 and this serum bicarbonate 16 and there is an anion gap of 18.  His CPK

was 36, liver function tests are within normal limits.  Lactic acid level of 2.4

and troponins were negative.  The repeat Covid 19 testing came back again 

negative.  The chest x-ray shows a component of CHF with cardiomegaly and mild 

to moderate alveolar and interstitial edema and a small left-sided pleural 

effusion.





His evaluation 01/19/2021, the patient is feeling great.  Overnight, he was 

resuscitated with IV fluids and the patient receiving D5W with sodium 

bicarbonate at the rate of 150 mL an hour.  He is producing urine output 

adequate and the serum bicarb is at 16.  His urine output is in order of 300 mL 

an hour.  The creatinine is down to 5.13.  The rest of the electrolytes are 

essentially within normal limits.  Serum bicarb is at 16.  Anion gap is at 18.  

The UA was showing 19 WBCs and urine cultures are still pending for now.  His 

white cell count is down to 17.8 with a hemoglobin of 11.9.  No further episodes

of nausea or vomiting or diarrhea or abdominal pain.  No chest pain.  His chest 

x-ray from today, is showing no acute abnormalities.  He has chronic changes and

scarring in the right lung base related to previous bouts of pneumonia/empyema. 

Otherwise, the patient is still requiring pressors and he is running on no low-

dose norepinephrine at 0.05 mcg/kg per minute.  He is coronavirus/Covid 19 

testing came back negative.  His lactic acid level is normalized on to 1.1.





Objective





- Vital Signs


Vital signs: 


                                   Vital Signs











Temp  97.8 F   01/19/21 04:00


 


Pulse  85   01/19/21 07:00


 


Resp  19   01/19/21 07:00


 


BP  132/92   01/19/21 07:00


 


Pulse Ox  99   01/19/21 07:00








                                 Intake & Output











 01/18/21 01/19/21 01/19/21





 18:59 06:59 18:59


 


Intake Total 215.090 7110.392 


 


Output Total 325 3475 


 


Balance 172.465 -1494.608 


 


Weight 104.326 kg 95 kg 


 


Intake:   


 


  IV  1920 


 


    0.9 Na Cl KVO  120 


 


    Dextrose 5% in Water 1,  1800 





    000 ml @ 150 mls/hr IV .   





    Q7H40M GABBY with Sodium   





    Bicarb (1 Meq/ml) 150 ml   





    Rx#:[id number]   


 


  Intake, IV Titration 497.465 60.392 





  Amount   


 


    Levofloxacin 500Mg-D5w 100  





    Pmx 500 mg In Dextrose/   





    Water 1 100ml.bag @ 100   





    mls/hr IVPB ONCE STA Rx#:   





    [id number]   


 


    Norepinephrine 32 mg In 7.465 60.392 





    Sodium Chloride 0.9% 218   





    ml @ 0.05 MCG/KG/MIN 2.   





    445 mls/hr IV .Q24H GABBY   





    Rx#:[id number]   


 


    Sodium Chloride 0.9% 1, 390  





    000 ml @ 130 mls/hr IV .   





    Q7H42M GABBY Rx#:[id number]   


 


Output:   


 


  Urine 325 3475 


 


Other:   


 


  Voiding Method Indwelling Catheter Indwelling Catheter 














- Exam

















 GENERAL EXAM: Alert, very pleasant, 60-year-old white male, on 4 L of Oxymizer 

nasal cannula, with pulse ox of 95% comfortable in no apparent distress.


HEAD: Normocephalic/atraumatic.


EYES: Normal reaction of pupils, equal size.  Conjunctiva pink, sclera white.


NOSE: Clear with pink turbinates.


THROAT: No erythema or exudates.


NECK: No masses, no JVD, no thyroid enlargement, no adenopathy.


CHEST: No chest wall deformity.  Symmetrical expansion. 


LUNGS: Equal air entry with some bibasilar crackles, no major wheezing or 

coughing or rhonchi


CVS: Regular rate and rhythm, normal S1 and S2, no gallops, no murmurs, no rubs


ABDOMEN: Soft, nontender.  No hepatosplenomegaly, normal bowel sounds, no 

guarding or rigidity.


EXTREMITIES: No clubbing, no edema, no cyanosis, 2+ pulses and upper and lower 

extremities.


MUSCULOSKELETAL: Muscle strength and tone normal.


SPINE: No scoliosis or deformity


SKIN: No rashes


CENTRAL NERVOUS SYSTEM: Alert and oriented -3.  No focal deficits, tone is 

normal in all 4 extremities.


PSYCHIATRIC: Alert and oriented -3.  Appropriate affect.  Intact judgment and 

insight.








- Labs


CBC & Chem 7: 


                                 01/19/21 03:15





                                 01/19/21 03:15


Labs: 


                  Abnormal Lab Results - Last 24 Hours (Table)











  01/18/21 01/18/21 01/18/21 Range/Units





  13:24 13:24 13:24 


 


WBC  20.7 H    (3.8-10.6)  k/uL


 


RBC  4.10 L    (4.30-5.90)  m/uL


 


Hgb  10.6 L    (13.0-17.5)  gm/dL


 


Hct  34.7 L    (39.0-53.0)  %


 


MCHC  30.5 L    (31.0-37.0)  g/dL


 


RDW  17.5 H    (11.5-15.5)  %


 


Neutrophils #  14.5 H    (1.3-7.7)  k/uL


 


Lymphocytes #     (1.0-4.8)  k/uL


 


Monocytes #  1.8 H    (0-1.0)  k/uL


 


APTT   19.3 L   (22.0-30.0)  sec


 


Sodium    134 L  (137-145)  mmol/L


 


Carbon Dioxide    16 L  (22-30)  mmol/L


 


BUN    72 H  (9-20)  mg/dL


 


Creatinine    7.63 H*  (0.66-1.25)  mg/dL


 


Glucose    106 H  (74-99)  mg/dL


 


Plasma Lactic Acid Naif     (0.7-2.0)  mmol/L


 


Calcium     (8.4-10.2)  mg/dL


 


Phosphorus     (2.5-4.5)  mg/dL


 


Alkaline Phosphatase     ()  U/L


 


Creatine Kinase    36 L  ()  U/L


 


Albumin    2.7 L  (3.5-5.0)  g/dL


 


Urine Protein     (Negative)  


 


Urine Blood     (Negative)  


 


Ur Leukocyte Esterase     (Negative)  


 


Urine RBC     (0-5)  /hpf


 


Urine WBC     (0-5)  /hpf


 


Amorphous Sediment     (None)  /hpf


 


Urine Bacteria     (None)  /hpf


 


Hyaline Casts     (0-2)  /lpf


 


Urine Mucus     (None)  /hpf














  01/18/21 01/18/21 01/19/21 Range/Units





  13:24 17:14 03:15 


 


WBC    17.8 H  (3.8-10.6)  k/uL


 


RBC     (4.30-5.90)  m/uL


 


Hgb    11.9 L  (13.0-17.5)  gm/dL


 


Hct     (39.0-53.0)  %


 


MCHC    30.0 L  (31.0-37.0)  g/dL


 


RDW    17.6 H  (11.5-15.5)  %


 


Neutrophils #    16.3 H  (1.3-7.7)  k/uL


 


Lymphocytes #    0.8 L  (1.0-4.8)  k/uL


 


Monocytes #     (0-1.0)  k/uL


 


APTT     (22.0-30.0)  sec


 


Sodium     (137-145)  mmol/L


 


Carbon Dioxide     (22-30)  mmol/L


 


BUN     (9-20)  mg/dL


 


Creatinine     (0.66-1.25)  mg/dL


 


Glucose     (74-99)  mg/dL


 


Plasma Lactic Acid Naif  2.4 H*    (0.7-2.0)  mmol/L


 


Calcium     (8.4-10.2)  mg/dL


 


Phosphorus     (2.5-4.5)  mg/dL


 


Alkaline Phosphatase     ()  U/L


 


Creatine Kinase     ()  U/L


 


Albumin     (3.5-5.0)  g/dL


 


Urine Protein   1+ H   (Negative)  


 


Urine Blood   Small H   (Negative)  


 


Ur Leukocyte Esterase   Small H   (Negative)  


 


Urine RBC   6 H   (0-5)  /hpf


 


Urine WBC   19 H   (0-5)  /hpf


 


Amorphous Sediment   Rare H   (None)  /hpf


 


Urine Bacteria   Occasional H   (None)  /hpf


 


Hyaline Casts   89 H   (0-2)  /lpf


 


Urine Mucus   Few H   (None)  /hpf














  01/19/21 Range/Units





  03:15 


 


WBC   (3.8-10.6)  k/uL


 


RBC   (4.30-5.90)  m/uL


 


Hgb   (13.0-17.5)  gm/dL


 


Hct   (39.0-53.0)  %


 


MCHC   (31.0-37.0)  g/dL


 


RDW   (11.5-15.5)  %


 


Neutrophils #   (1.3-7.7)  k/uL


 


Lymphocytes #   (1.0-4.8)  k/uL


 


Monocytes #   (0-1.0)  k/uL


 


APTT   (22.0-30.0)  sec


 


Sodium  136 L  (137-145)  mmol/L


 


Carbon Dioxide  16 L  (22-30)  mmol/L


 


BUN  62 H  (9-20)  mg/dL


 


Creatinine  5.13 H  (0.66-1.25)  mg/dL


 


Glucose  266 H  (74-99)  mg/dL


 


Plasma Lactic Acid Naif   (0.7-2.0)  mmol/L


 


Calcium  8.3 L  (8.4-10.2)  mg/dL


 


Phosphorus  8.6 H  (2.5-4.5)  mg/dL


 


Alkaline Phosphatase  131 H  ()  U/L


 


Creatine Kinase   ()  U/L


 


Albumin  2.9 L  (3.5-5.0)  g/dL


 


Urine Protein   (Negative)  


 


Urine Blood   (Negative)  


 


Ur Leukocyte Esterase   (Negative)  


 


Urine RBC   (0-5)  /hpf


 


Urine WBC   (0-5)  /hpf


 


Amorphous Sediment   (None)  /hpf


 


Urine Bacteria   (None)  /hpf


 


Hyaline Casts   (0-2)  /lpf


 


Urine Mucus   (None)  /hpf








                      Microbiology - Last 24 Hours (Table)











 01/18/21 17:14 Urine Culture - Preliminary





 Urine,Clean Catch 














Assessment and Plan


Plan: 











1 acute kidney injury secondary to intravascular volume depletion/dehydration.  

Previous ultrasound the kidneys showed no evidence of any hydronephrosis.  The 

patient has had a similar episode of acute kidney injury/ATN on previous 

hospitalization the patient renal function remained recovered with IV fluids.  

The patient is still being resuscitated IV fluids and the patient is receiving 

bicarb infusion at the rate of 150 mL an hour and a serum bicarbonate 16 and the

patient is producing adequate amount of urine output with improvement in the 

creatinine is down to 5.3.





2 acute hypotension, probably secondary to intravascular volume depletion and th

e patient is currently on IV fluids and pressors approving and the patient is on

lower doses of pressors for now





3 chronic perihilar pulmonary infiltrates with increased interstitial changes in

the lung bases more so on the right, probably a combination of rheumatoid lung 

and chronic scarring related to a previous pneumonia/empyema





4 previous tests normal, and pneumonia back in June 2020 requiring a chest tube 

insertion for an empyema





5 acute hypoxic respiratory failure currently on  2 by nasal cannula





6 rheumatoid arthritis with rheumatoid lungs/fibrosis maintained on a 

combination of Arava and prednisone outpatient basis





7 hypertension





8 hypothyroidism





9 previous history of foot infections requiring of antibiotics





10 previous history of DVT





11 persistent nausea and emesis and diarrhea.  Consider underlying C. diff 

colitis





12 leukocytosis





13 mild chronic normocytic anemia





14 mild lactic acidosis with anion gap metabolic acidosis








Plan





The patient has already received a total of 2 L.  We'll continue the bicarb at 

the rate of 150 mL an hour


Monitor urine output and creatinine


Repeat ultrasound the kidneys


Continue pressors and monitor CVP and wean off the pressors 


Continue Zosyn and stop the Levaquin


Resume all medications including Synthroid and hold the metoprolol and Hyzaar 

for now

## 2021-01-19 NOTE — P.NPCON
History of Present Illness





- Reason for Consult


acute renal failure





- History of Present Illness





 reason for consultation: Acute kidney injury





 history of present illness:


Patient is a 60-year-old male seen in renal consultation for acute kidney 

injury.  Patient's creatinine was 7.63 on admission is 5.13 today.  Patient's 

creatinine from 2020 was 0.8.  It appears patient had a similar episode of

acute kidney injury in the past in 2020 when his creatinine was 11.38 

on admission and subsequently improved to 1.93 upon discharge.  Patient 

presented to the hospital with vomiting and diarrhea going on for about 4 days. 

Patient states he did not eat anything for 4 days.  He also had vomiting and 

diarrhea for 2 days.  He admits to taking one tablet of Motrin.  No abdominal 

pain.  No hematuria or dysuria.  No history of diabetes.  No family history of 

renal disease.  He did receive 3-1/2 L bolus of normal saline on admission and 

is currently maintained on bicarb drip for maintenance fluids.  His bicarb level

from this morning was 16.  Creatinine is down to 5.13.  Nonoliguric.  He is also

on low-dose Levophed.  He was also on Hyzaar as well as Lasix as needed 

outpatient which are both currently held at this time.  





Vital signs are stable.


General: The patient appeared well nourished and normally developed. 


HEENT: Head exam is unremarkable. Neck is without jugular venous distension.


LUNGS: Breath sounds decreased.


HEART: Rate and Rhythm are regular.


ABDOMEN: Soft, nontender.


EXTREMITITES: No edema.





Past Medical History


Past Medical History: Deep Vein Thrombosis (DVT), Hypertension, Neurologic 

Disorder, Rheumatoid Arthritis (RA), Thyroid Disorder


Additional Past Medical History / Comment(s): Rheumatoid arthritis, rheumatoid 

lungs with chronic interstitial lung disease maintained on Arava and chronic 

steroids, previous history of pneumonia/empyema with Streptococcus and the 

patient required chest tube drainage - 2020, retention, hypothyroidism, 

previous history of foot infections requiring PICC line insertion, history of 

DVT, nephrolithiasis,


History of Any Multi-Drug Resistant Organisms: None Reported


Past Surgical History: Back Surgery, Tonsillectomy


Additional Past Surgical History / Comment(s): Right foot surgery, repair 

herniated disc, plate removed rt foot.   CTR donny,bronchoscopy X4


Past Anesthesia/Blood Transfusion Reactions: Previous Problems w/ Anesthesia


Additional Past Anesthesia/Blood Transfusion Reaction / Comment(s): slow to 

awaken x1.


Past Psychological History: No Psychological Hx Reported


Smoking Status: Former smoker


Past Alcohol Use History: None Reported


Additional Past Alcohol Use History / Comment(s): QUIT SMOKING , smoked 15 

years, 1 - 1 1/2 ppd


Past Drug Use History: None Reported





- Past Family History


  ** Mother


Family Medical History: Cancer


Additional Family Medical History / Comment(s): lung with mets brain





  ** Father


Family Medical History: No Reported History


Additional Family Medical History / Comment(s): states, "he just ."





Medications and Allergies


                                Home Medications











 Medication  Instructions  Recorded  Confirmed  Type


 


Levothyroxine Sodium [Synthroid] 150 mcg PO DAILY 17 History


 


Albuterol Inhaler [Ventolin Hfa 2 puff INHALATION RT-QID PRN 06/10/20 01/18/21 

History





Inhaler]    


 


Losartan-Hctz 50-12.5 mg [Hyzaar 1 tab PO DAILY 06/10/20 01/18/21 History





50-12.5]    


 


Metoprolol Tartrate [Lopressor] 50 mg PO HS 06/10/20 01/18/21 History


 


Ascorbic Acid [Vitamin C] 2,000 mg PO HS 20 History


 


Cholecalciferol [Vitamin D3 (25 2,000 unit PO HS 20 History





Mcg = 1000 Iu)]    


 


Ipratropium-Albuterol Nebulize 3 ml INHALATION RT-QID PRN 20 

History





[Duoneb 0.5 mg-3 mg/3 ml Soln]    


 


Furosemide [Lasix] 20 mg PO DAILY PRN 21 History








                                    Allergies











Allergy/AdvReac Type Severity Reaction Status Date / Time


 


No Known Allergies Allergy   Verified 21 14:29














Physical Exam


Vitals: 


                                   Vital Signs











  Temp Pulse Resp BP Pulse Ox


 


 21 09:00   81  16  109/79  99


 


 21 08:30   83  18  116/88  100


 


 21 08:00  97.5 F L  85  16  135/81  100


 


 21 07:30   90  17  128/96  99


 


 21 07:00   85  19  132/92  99


 


 21 06:30   77  15  145/111  100


 


 21 06:00   91  20  141/102  100


 


 21 05:30   87  16  137/98  100


 


 21 05:00   88  17  139/87  100


 


 21 04:30   86  20  133/91  100


 


 21 04:00  97.8 F  72  15  137/94  100


 


 21 03:30   70  15  151/98  100


 


 21 03:00   92  14  133/98  98


 


 21 02:30   93  22  143/89  97


 


 21 02:00   89  15  132/81  89 L


 


 21 01:30   76  13  136/77  98


 


 21 01:00   90  13  139/85  94 L


 


 21 00:30   77  12  115/80  98


 


 21 00:00  98.2 F  86  13  117/83  98


 


 21 23:30   89  11 L  120/80  96


 


 21 23:00   88  12  113/78  98


 


 21 22:30   94  16  112/70  83 L


 


 21 22:00   91  14  103/68  95


 


 21 21:35   93  13  110/69  96


 


 21 21:30   96  14  101/69  97


 


 21 21:00   100  16  95/68  93 L


 


 21 20:30   95  18  104/62  96


 


 21 20:00  98.5 F  98  15  101/61  94 L


 


 21 19:30   96  13  95/61  98


 


 21 19:00   98  17  91/54  100


 


 21 18:40   98  20  92/53  100


 


 21 18:20   99  15  89/58  99


 


 21 18:00   102 H  18  83/59  100


 


 21 17:40   100  34 H  93/60  100


 


 21 17:20   104 H  15  96/61  99


 


 21 17:10  98.7 F  103 H  23  80/53  95


 


 21 16:49     76/52 


 


 21 16:32   103 H  18  71/46  99


 


 21 16:07   107 H   79/52  99


 


 21 16:00   105 H  18  83/55  100


 


 21 15:55   85  18  122/72  99


 


 21 15:49   107 H  18  66/40  95


 


 21 15:35   108 H  18  66/40  95


 


 21 14:30   105 H  18  70/41  97


 


 21 14:20   105 H  17  64/40  97


 


 21 14:10   107 H  18  70/36  97


 


 21 14:00   106 H  18  64/39  98


 


 21 13:50   107 H  17  67/46  97


 


 21 13:40   106 H  17  65/55  97


 


 21 13:30   110 H  18  69/39  97


 


 21 13:20   111 H  18  66/39  98


 


 21 13:13  98.5 F  109 H  18  69/43  95


 


 21 13:11      73 L








                                Intake and Output











 21





 22:59 06:59 14:59


 


Intake Total 676.194 0407.392 300


 


Output Total 1075 4325 590


 


Balance -97.535 -1224.608 -290


 


Intake:   


 


   1440 300


 


    0.9 Na Cl KVO 30 90 


 


    Dextrose 5% in Water 1, 450 1350 300





    000 ml @ 150 mls/hr IV .   





    Q7H40M GABBY with Sodium   





    Bicarb (1 Meq/ml) 150 ml   





    Rx#:[id number]   


 


  Intake, IV Titration 497.465 60.392 





  Amount   


 


    Levofloxacin 500Mg-D5w 100  





    Pmx 500 mg In Dextrose/   





    Water 1 100ml.bag @ 100   





    mls/hr IVPB ONCE STA Rx#:   





    [id number]   


 


    Norepinephrine 32 mg In 7.465 60.392 





    Sodium Chloride 0.9% 218   





    ml @ 0.05 MCG/KG/MIN 2.   





    445 mls/hr IV .Q24H GABBY   





    Rx#:[id number]   


 


    Sodium Chloride 0.9% 1, 390  





    000 ml @ 130 mls/hr IV .   





    Q7H42M GABBY Rx#:[id number]   


 


Output:   


 


  Urine 1075 7826 590


 


Other:   


 


  Voiding Method Indwelling Catheter Indwelling Catheter 


 


  Weight 104.326 kg 95 kg 














Results





- Lab Results


                             Most recent lab results











Calcium  8.3 mg/dL (8.4-10.2)  L  21  03:15    


 


Phosphorus  8.6 mg/dL (2.5-4.5)  H  21  03:15    


 


Magnesium  1.7 mg/dL (1.6-2.3)   21  03:15    














                                 21 03:15





                                 21 03:15





Assessment and Plan


Plan: 





Assessment:


1.  Acute kidney injury mostly prerenal secondary to hypovolemia from vomiting, 

diarrhea and Hyzaar.  Creatinine 7.63 on admission and is 5.13 today.  Baseline 

creatinine near 1 from 2020.


2.  Metabolic acidosis secondary to acute kidney injury and diarrhea.


3.  Shock maintained on Levophed.


4.  History of rheumatoid arthritis.


5.  Possible pneumonia maintained on antibiotics.


6.  Vomiting and diarrhea.  Questionable gastroenteritis.  Rule out C. diff.





Plan:


Maintain bicarb drip.


Wean vasopressors.


Follow-up renal ultrasound.


Avoid nephrotoxins.


Continue to monitor renal function and urine output.


Repeat BMP this evening.





Thank you for the consultation.  I will continue to follow the patient with you 

during his hospital stay.

## 2021-01-19 NOTE — XR
EXAMINATION TYPE: XR chest 1V

 

DATE OF EXAM: 1/19/2021

 

COMPARISON: Chest x-ray 1/18/2021

 

HISTORY: Pneumonia

 

TECHNIQUE: Single frontal view of the chest is obtained.

 

FINDINGS:  Findings are similar to prior exam. Patchy bibasilar density persists. There are overlying
 leads. Right subclavian central venous catheter shows the distal tip overlying the superior vena cav
a level. There is no pneumothorax. There is persistent blunting of the left costophrenic angle. Heart
 remains enlarged. Aorta is dense. Pleural thickening present along the left lateral chest margin.

 

IMPRESSION:  There are likely chronic pleural parenchymal changes, difficult to exclude pneumonia, ae
ration appears improved. Stable cardiomegaly.

## 2021-01-20 LAB
ALBUMIN SERPL-MCNC: 2.6 G/DL (ref 3.5–5)
ALP SERPL-CCNC: 119 U/L (ref 38–126)
ALT SERPL-CCNC: 13 U/L (ref 4–49)
ANION GAP SERPL CALC-SCNC: 7 MMOL/L
AST SERPL-CCNC: 17 U/L (ref 17–59)
BUN SERPL-SCNC: 50 MG/DL (ref 9–20)
CALCIUM SPEC-MCNC: 8.1 MG/DL (ref 8.4–10.2)
CHLORIDE SERPL-SCNC: 99 MMOL/L (ref 98–107)
CO2 SERPL-SCNC: 31 MMOL/L (ref 22–30)
ERYTHROCYTE [DISTWIDTH] IN BLOOD BY AUTOMATED COUNT: 3.51 M/UL (ref 4.3–5.9)
ERYTHROCYTE [DISTWIDTH] IN BLOOD: 17.5 % (ref 11.5–15.5)
GLUCOSE SERPL-MCNC: 128 MG/DL (ref 74–99)
HCT VFR BLD AUTO: 29.5 % (ref 39–53)
HGB BLD-MCNC: 9.4 GM/DL (ref 13–17.5)
MCH RBC QN AUTO: 26.8 PG (ref 25–35)
MCHC RBC AUTO-ENTMCNC: 31.9 G/DL (ref 31–37)
MCV RBC AUTO: 84.1 FL (ref 80–100)
PLATELET # BLD AUTO: 289 K/UL (ref 150–450)
POTASSIUM SERPL-SCNC: 3.8 MMOL/L (ref 3.5–5.1)
PROT SERPL-MCNC: 6.1 G/DL (ref 6.3–8.2)
SODIUM SERPL-SCNC: 137 MMOL/L (ref 137–145)
WBC # BLD AUTO: 16.1 K/UL (ref 3.8–10.6)

## 2021-01-20 RX ADMIN — PANTOPRAZOLE SODIUM SCH MG: 40 INJECTION, POWDER, FOR SOLUTION INTRAVENOUS at 08:14

## 2021-01-20 RX ADMIN — CEFAZOLIN SCH MLS/HR: 330 INJECTION, POWDER, FOR SOLUTION INTRAMUSCULAR; INTRAVENOUS at 15:30

## 2021-01-20 RX ADMIN — MAGNESIUM SULFATE IN DEXTROSE SCH MLS/HR: 10 INJECTION, SOLUTION INTRAVENOUS at 11:36

## 2021-01-20 RX ADMIN — CEFAZOLIN SCH: 330 INJECTION, POWDER, FOR SOLUTION INTRAMUSCULAR; INTRAVENOUS at 06:13

## 2021-01-20 RX ADMIN — MAGNESIUM SULFATE IN DEXTROSE SCH MLS/HR: 10 INJECTION, SOLUTION INTRAVENOUS at 13:08

## 2021-01-20 RX ADMIN — LEVOTHYROXINE SODIUM SCH MCG: 75 TABLET ORAL at 06:26

## 2021-01-20 RX ADMIN — NOREPINEPHRINE BITARTRATE SCH: 1 INJECTION, SOLUTION, CONCENTRATE INTRAVENOUS at 14:05

## 2021-01-20 NOTE — P.HPIM
History of Present Illness


H&P Date: 21


Chief Complaint: Generalized weakness and fatigue, vomiting





This is a 60-year-old  male patient requesting my service with past 

medical history of rheumatoid arthritis on Arava that was diagnosed when he was 

37 year old initially was started on Methotrexate that he could not tolerate 

then placed on Embrel but he developed side effects and finally was tried on 

Humira injection but he developed neurologic sided effects and was hospitaized 

at [facility] for quiet some time, rheumatoid lung disease, 

previous history of loculated empyema with chest tube placement in 2020, 

hypertension, hypothyroidism, history of DVT, remote history of tobacco use and 

dependence, was recently seen by Dr. Gómez and he was placed on two antibiotics 

and 2 steroids and he was feeling better that wa about 2 weeks ago  Patient 

complains of generalized weakness and tingling going on for a few days along 

with nausea, vomiting and diarrhea.  He has been unable to keep any food down.  

He denies having any abdominal pain.  He complains of feeling fatigued and achy 

all over.  Patient was found to be afebrile but mildly tachycardic in the low 

100s, hypotensive with blood pressure 69/43, pulse ox 73% on room air.  Patient 

received 3 L of fluid and Decadron in the emergency center.  A central line was 

placed and patient was started on norepinephrine drip.  EKG was sinus 

tachycardia with incomplete right bundle branch block.  WBC 20.7, hemoglobin 

10.6, platelet count 3 and 45.  Sodium 134, potassium 3.6, chloride 100, CO2 16,

BUN 72 and creatinine 7.63.  Blood sugar 106.  CK 36.  Liver function tests 

normal.  Lactic acid 2.4.  Troponin negative.  Coronavirus PCR not detected.  

Chest x-ray reveals cardiomegaly with patchy mid and lower lung bases with small

left effusion.  Consider pneumonia or sequela of CHF.  Patient received IV 

Levaquin and Zosyn in the emergency center.  He was admitted to the intensive 

care unit and started on a bicarb drip and continued on vasopressors.  Consult 

with pulmonary medicine, nephrology and cardiology.





Review of Systems


Constitutional: Reports fatigue, Reports weakness, Denies anorexia, Denies 

chronic pain


Eyes: denies blurred vision, denies bulging eye, denies decreased vision


Ears, nose, mouth and throat: Denies dysphagia, Denies neck lump, Denies sore 

throat


Cardiovascular: Reports lightheadedness, Reports rapid heart beat, Denies chest 

pain, Denies decreased exercise tolerance, Denies dyspnea on exertion, Denies 

leg edema, Denies shortness of breath, Denies syncope


Respiratory: Denies congestion, Denies cough, Denies cough with sputum, Denies 

home oxygen, Denies sleep apnea, Denies snoring, Denies wheezing


Gastrointestinal: Reports change in bowel habits, Reports diarrhea, Reports 

dyspepsia, Reports early satiety, Reports excessive gas, Reports nausea, Reports

vomiting, Denies abdominal pain, Denies bloating, Denies BRBPR, Denies con

stipation, Denies heartburn, Denies hematemesis, Denies hematochezia, Denies 

jaundice, Denies lactose intolerance, Denies loss of appetite, Denies melena


Genitourinary: Reports nocturia, Denies dysuria


Musculoskeletal: Denies atrophy, Denies fractures, Denies frequent falls


Musculoskeletal: right: ankle pain, ankle stiffness, ankle swelling, foot pain, 

foot stiffness, foot swelling, absent: elbow pain, elbow stiffness, elbow 

swelling, hand pain, hand stiffness, hand swelling, hip pain, hip stiffness, hip

swelling, knee pain, knee stiffness, knee swelling, shoulder pain, shoulder 

stiffness, shoulder swelling, wrist pain, wrist stiffness, wrist swelling


Integumentary: Denies pruritus, Denies rash


Neurological: Denies numbness, Denies weakness


Psychiatric: Denies anxiety, Denies depression





Past Medical History


Past Medical History: Deep Vein Thrombosis (DVT), GERD/Reflux, Hyperlipidemia, 

Hypertension, Neurologic Disorder, Rheumatoid Arthritis (RA), Thyroid Disorder


Additional Past Medical History / Comment(s): Rheumatoid arthritis, rheumatoid 

lungs with chronic interstitial lung disease maintained on Arava and chronic 

steroids, previous history of pneumonia/empyema with Streptococcus and the 

patient required chest tube drainage - 2020, retention, hypothyroidism, 

previous history of foot infections requiring PICC line insertion, history of 

DVT, nephrolithiasis,


History of Any Multi-Drug Resistant Organisms: None Reported


Past Surgical History: Back Surgery, Tonsillectomy


Additional Past Surgical History / Comment(s): Right foot surgery, repair 

herniated disc, plate removed rt foot.   CTR donny,bronchoscopy X4, right foot 

surgery with plate placement due to arch collapse folowed by removal due to 

infection


Past Anesthesia/Blood Transfusion Reactions: Previous Problems w/ Anesthesia


Additional Past Anesthesia/Blood Transfusion Reaction / Comment(s): slow to 

awaken x1.


Past Psychological History: No Psychological Hx Reported


Smoking Status: Former smoker (Patient used to smoke 1+1/2 pack per day started 

at the age of 22 and quit 16 years ago.)


Past Alcohol Use History: Rare


Additional Past Alcohol Use History / Comment(s): QUIT SMOKING , smoked 15 

years, 1 - 1 1/2 ppd


Past Drug Use History: None Reported





- Past Family History


  ** Mother


Family Medical History: Cancer (Mother  at the age 75 from lung ancer with 

brain mets.)


Additional Family Medical History / Comment(s): lung with mets brain





  ** Father


Family Medical History: No Reported History (Father  at the age of 78 at 

[facility] suddenly.)


Additional Family Medical History / Comment(s): states, "he just ."





  ** Brother(s)


Family Medical History: No Reported History (patient has one brother with no 

major health issues.)





  ** Sister(s)


Family Medical History: No Reported History (Patient has one sister with no 

major health issues.)


Additional Family Medical History / Comment(s): Patient has 2 step daughters.





Medications and Allergies


                                Home Medications











 Medication  Instructions  Recorded  Confirmed  Type


 


Levothyroxine Sodium [Synthroid] 150 mcg PO DAILY 17 History


 


Albuterol Inhaler [Ventolin Hfa 2 puff INHALATION RT-QID PRN 06/10/20 01/18/21 

History





Inhaler]    


 


Losartan-Hctz 50-12.5 mg [Hyzaar 1 tab PO DAILY 06/10/20 01/18/21 History





50-12.5]    


 


Metoprolol Tartrate [Lopressor] 50 mg PO HS 06/10/20 01/18/21 History


 


Ascorbic Acid [Vitamin C] 2,000 mg PO HS 20 History


 


Cholecalciferol [Vitamin D3 (25 2,000 unit PO HS 20 History





Mcg = 1000 Iu)]    


 


Ipratropium-Albuterol Nebulize 3 ml INHALATION RT-QID PRN 20 

History





[Duoneb 0.5 mg-3 mg/3 ml Soln]    


 


Furosemide [Lasix] 20 mg PO DAILY PRN 21 History








                                    Allergies











Allergy/AdvReac Type Severity Reaction Status Date / Time


 


No Known Allergies Allergy   Verified 21 14:29














Physical Exam


Vitals: 


                                   Vital Signs











  Temp Pulse Resp BP Pulse Ox


 


 21 17:20   104 H  15  96/61  99


 


 21 17:10  98.7 F  103 H  23  80/53  95


 


 21 16:49     76/52 


 


 21 16:32   103 H  18  71/46  99


 


 21 16:07   107 H   79/52  99


 


 21 16:00   105 H  18  83/55  100


 


 21 15:55   85  18  122/72  99


 


 21 15:49   107 H  18  66/40  95


 


 21 15:35   108 H  18  66/40  95


 


 21 14:30   105 H  18  70/41  97


 


 21 14:20   105 H  17  64/40  97


 


 21 14:10   107 H  18  70/36  97


 


 21 14:00   106 H  18  64/39  98


 


 21 13:50   107 H  17  67/46  97


 


 21 13:40   106 H  17  65/55  97


 


 21 13:30   110 H  18  69/39  97


 


 21 13:20   111 H  18  66/39  98


 


 21 13:13  98.5 F  109 H  18  69/43  95


 


 21 13:11      73 L








                                Intake and Output











 21





 06:59 14:59 22:59


 


Intake Total   137.465


 


Balance   137.465


 


Intake:   


 


  Intake, IV Titration   137.465





  Amount   


 


    Norepinephrine 32 mg In   7.465





    Sodium Chloride 0.9% 218   





    ml @ 0.05 MCG/KG/MIN 2.   





    445 mls/hr IV .Q24H GABBY   





    Rx#:[id number]   


 


    Sodium Chloride 0.9% 1,   130





    000 ml @ 130 mls/hr IV .   





    Q7H42M GABBY Rx#:[id number]   


 


Other:   


 


  Weight  104.326 kg 104.326 kg














Physical examination:








General: patient is 60 year old lying down in bed in moderate distress.





HEENT: head ia atraumatic normocephalic, Pupils were equal round reactive to 

light and accommodations , extra ocular muscle movement were intact, mucous 

membranes of the mouth are somewhat dry.





Neck: supple no JVP.'





Chest: decreased breath sounds at he bases with few ronchi no expiratory wheezes

 no chest wall tendernes or intercostal retractions.





Heart: first heart sound is depressed, second heart sound is normal tachycardic 

there is HOOD 2/6 located at the left sternal border.





Abdomen: soft, mild tenderness to the left lower quadrant positive bowel sounds,

 no guarding or rebound tenderness, no hepatosplenomegaly.





Extremities: there is no edema or calf tenderness DP+ 2 Bilaterally, there is 

what appears to be a charcot joint in the right foot form arch collapse.





Neurologic examination: patient is awake , alert and oriented X 3 CN II-XII are 

grossly intact, muscle power 4/5 in bilateral upper and lower extremities, deep 

tendon reflexes were normal.





Results


CBC & Chem 7: 


                                 21 03:02





                                 21 03:27


Labs: 


                  Abnormal Lab Results - Last 24 Hours (Table)











  21 Range/Units





  13:24 13:24 13:24 


 


WBC  20.7 H    (3.8-10.6)  k/uL


 


RBC  4.10 L    (4.30-5.90)  m/uL


 


Hgb  10.6 L    (13.0-17.5)  gm/dL


 


Hct  34.7 L    (39.0-53.0)  %


 


MCHC  30.5 L    (31.0-37.0)  g/dL


 


RDW  17.5 H    (11.5-15.5)  %


 


Neutrophils #  14.5 H    (1.3-7.7)  k/uL


 


Monocytes #  1.8 H    (0-1.0)  k/uL


 


APTT   19.3 L   (22.0-30.0)  sec


 


Sodium    134 L  (137-145)  mmol/L


 


Carbon Dioxide    16 L  (22-30)  mmol/L


 


BUN    72 H  (9-20)  mg/dL


 


Creatinine    7.63 H*  (0.66-1.25)  mg/dL


 


Glucose    106 H  (74-99)  mg/dL


 


Plasma Lactic Acid Naif     (0.7-2.0)  mmol/L


 


Creatine Kinase    36 L  ()  U/L


 


Albumin    2.7 L  (3.5-5.0)  g/dL


 


Urine Protein     (Negative)  


 


Urine Blood     (Negative)  


 


Ur Leukocyte Esterase     (Negative)  


 


Urine RBC     (0-5)  /hpf


 


Urine WBC     (0-5)  /hpf


 


Amorphous Sediment     (None)  /hpf


 


Urine Bacteria     (None)  /hpf


 


Hyaline Casts     (0-2)  /lpf


 


Urine Mucus     (None)  /hpf














  21 Range/Units





  13:24 17:14 


 


WBC    (3.8-10.6)  k/uL


 


RBC    (4.30-5.90)  m/uL


 


Hgb    (13.0-17.5)  gm/dL


 


Hct    (39.0-53.0)  %


 


MCHC    (31.0-37.0)  g/dL


 


RDW    (11.5-15.5)  %


 


Neutrophils #    (1.3-7.7)  k/uL


 


Monocytes #    (0-1.0)  k/uL


 


APTT    (22.0-30.0)  sec


 


Sodium    (137-145)  mmol/L


 


Carbon Dioxide    (22-30)  mmol/L


 


BUN    (9-20)  mg/dL


 


Creatinine    (0.66-1.25)  mg/dL


 


Glucose    (74-99)  mg/dL


 


Plasma Lactic Acid Naif  2.4 H*   (0.7-2.0)  mmol/L


 


Creatine Kinase    ()  U/L


 


Albumin    (3.5-5.0)  g/dL


 


Urine Protein   1+ H  (Negative)  


 


Urine Blood   Small H  (Negative)  


 


Ur Leukocyte Esterase   Small H  (Negative)  


 


Urine RBC   6 H  (0-5)  /hpf


 


Urine WBC   19 H  (0-5)  /hpf


 


Amorphous Sediment   Rare H  (None)  /hpf


 


Urine Bacteria   Occasional H  (None)  /hpf


 


Hyaline Casts   89 H  (0-2)  /lpf


 


Urine Mucus   Few H  (None)  /hpf














Thrombosis Risk Factor Assmnt





- DVT/VTE Prophylaxis


DVT/VTE Prophylaxis: Pharmacologic Prophylaxis ordered





- Choose All That Apply


Each Factor Represents 1 point: Medical pt on bed rest, Sepsis (< 1month), 

Serious lung disease incl. pneumonia (< 1month)


Each Risk Factor Represents 3 Points: History of DVT/PE


Other congenital or acquired thrombophilia - If yes, enter type in comment: No


Thrombosis Risk Factor Assessment Total Risk Factor Score: 6


Thrombosis Risk Factor Assessment Level: High Risk





Assessment and Plan


Assessment: 


Assessment and plan:








1.  Acute hypoxic respiratory failure secondary to hypovolemia from nausea 

vomiting diarrhea. we will continue with O2 support and IVF along with Levophed 

drip, we ed continue to monitor in the ICU,  is following, we will 

continue with Duoneb qid.





2.  Acute kidney injury secondary to acute tubular necosis due to hypotension 

and fluid loss. we will continue with sodium Bicarb drip and Levophed drip, we 

will continue with following CMP and Nephrology is following.





3.  Anion gap metabolic acidosis and lactic acidosis secondary to renal failure.

 we will continue with Bicarb drip for 1-2 days.





4.  Hypovolemic shock secondary to dehydration requiring vasopressors in thr 

form of Levophed we will continue with IVF as well, and monitoring very closely.





5. SIRS with hypotension, tachycardia and leukocytosis. no identified infection 

so far, we will continue with Levaquin and Zosyn for now, we will obtain sputum 

cultures, blood cultures and we will check stool for C.Diff infection.





6.  Possible pneumonia not completely excluded, scar tissue. we will continue 

with O2 support, Levaquin and Zosyn and we will check sputum cultures.


 


7.  Possible gastroenteritis causing nausea vomiting diarrhea rule out C.diff 

infection, we will check stool for C.Diff Toxins.





8.  Rheumatoid arthritis and rheumatoid lung. we will hold off Xeljanz and arava

 for now.





9.  History of empyema with Streptococcus requiring chest tube.





10. Hypertension and hypertensive cardiovascular disease. currently hypotensive 

we will hold off Losartan and Metoprolol.





11. Hypothyroidism . we will continue with Synthroid 150 mcg orally daily.





12. DVT prophylaxis. we will start Lovenox 30 mg SC daily and bilateral knee 

high maia hose.





13. GI prophylaxis. we will continue with Protonix 40 mg IVP daily.





14. Admits to inpatient , estimated length of stay 2 midnights.





15. Full code.

## 2021-01-20 NOTE — P.PN
Subjective


Progress Note Date: 01/20/21








60-year-old male patient presented emergency department because of generalized 

weakness and fatigue.  Symptoms over the past few days.  He had increased nausea

vomiting and diarrhea.  He was vomiting for the past 3 days around 4-5 times on 

a daily basis.  He also obviously had a diminished oral intake.  He had couple 

of diarrhea episodes on a daily basis.  No abdominal pain.  The patient is known

to have history of rheumatoid arthritis along with previous history of 

rheumatoid lung disease in addition to a previous history of loculated empyema 

requiring chest tube placement back in July 2020.  This was attributed to 

streptococcal pneumonia.  He has also history of hypertension, hypothyroidism 

and previous history of DVT.  He has undergone previous bronchoscopies in the 

past and the last bronchoscopy was in November 2020 and it showed to have 

Candida albicans.  He was subsequently hospitalized on 12/28/2020 and his 

coronavirus/Covid 19 testing came back negative.  He did have an acute kidney 

injury and his creatinine was up to 11 and he also had a component of metabolic 

acidosis.  The patient was given IV fluids and pressors and his condition was 

stabilized.  A CAT scan of the chest that was done at that time showed chronic 

changes in lower lungs related to rheumatoid arthritis.  He also had a new 

consolidation and elbow grams in the left hilar region and new multifocal 

groundglass organizing pneumonia in the right upper lobe for which she was 

treated.  Doppler of the lower extremity showed no evidence of any DVT..





During this current admission, the patient's creatinine was again up to his BUN 

is at 72 and this serum bicarbonate 16 and there is an anion gap of 18.  His CPK

was 36, liver function tests are within normal limits.  Lactic acid level of 2.4

and troponins were negative.  The repeat Covid 19 testing came back again 

negative.  The chest x-ray shows a component of CHF with cardiomegaly and mild 

to moderate alveolar and interstitial edema and a small left-sided pleural 

effusion.





His evaluation 01/19/2021, the patient is feeling great.  Overnight, he was 

resuscitated with IV fluids and the patient receiving D5W with sodium 

bicarbonate at the rate of 150 mL an hour.  He is producing urine output 

adequate and the serum bicarb is at 16.  His urine output is in order of 300 mL 

an hour.  The creatinine is down to 5.13.  The rest of the electrolytes are 

essentially within normal limits.  Serum bicarb is at 16.  Anion gap is at 18.  

The UA was showing 19 WBCs and urine cultures are still pending for now.  His 

white cell count is down to 17.8 with a hemoglobin of 11.9.  No further episodes

of nausea or vomiting or diarrhea or abdominal pain.  No chest pain.  His chest 

x-ray from today, is showing no acute abnormalities.  He has chronic changes and

scarring in the right lung base related to previous bouts of pneumonia/empyema. 

Otherwise, the patient is still requiring pressors and he is running on no low-

dose norepinephrine at 0.05 mcg/kg per minute.  He is coronavirus/Covid 19 

testing came back negative.  His lactic acid level is normalized on to 1.1.





01/20/2021, the patient for a follow-up.  The patient remains in intensive care 

unit.  The patient has been off pressors over the past 24 hours.  IV fluid is 

running was in the form of D5 water with 3 A of bicarb and the patient serum 

bicarb progressively improved and currently is up to 31.  Based on that, the IV 

fluids has been switched to normal saline running at a rate of 100 mL an hour.  

Renal function continues to improve in the creatinine is down to 3.25, and the 

patient is making adequate urine output in order of 51 5200 mL an hour.  White 

cell count at 16.1.  He did have a bout of diarrhea yesterday.  Stool for C. 

diff was sent and it was negative.  He is afebrile.  No nausea.  No emesis.  

Tolerating diet.  No altered mentation.  His lactic acid level has also 

improved.  The patient was taken off the IV Levaquin he was kept on IV Zosyn.  I

think the Zosyn can be also discontinued and there is no clear indication for an

underlying sepsis.  The patient was quite dehydrated and intravascular volume 

was quite dehydrated at time of his admission and he was in hypovolemic shock 

and all these abnormalities improved.





Objective





- Vital Signs


Vital signs: 


                                   Vital Signs











Temp  97.6 F   01/20/21 04:00


 


Pulse  82   01/20/21 07:00


 


Resp  18   01/20/21 07:00


 


BP  107/82   01/20/21 07:00


 


Pulse Ox  97   01/20/21 07:00








                                 Intake & Output











 01/19/21 01/20/21 01/20/21





 18:59 06:59 18:59


 


Intake Total 2051.044 6070 110


 


Output Total 2790 1030 120


 


Balance -928.793 630 -10


 


Weight  94.4 kg 


 


Intake:   


 


  IV 1650 1210 110


 


    0.9 Na Cl @ 100ml/hr  1100 100


 


    0.9 Na Cl KVO  110 10


 


    Dextrose 5% in Water 1, 1650  





    000 ml @ 150 mls/hr IV .   





    Q7H40M GABBY with Sodium   





    Bicarb (1 Meq/ml) 150 ml   





    Rx#:[id number]   


 


  Intake, IV Titration 111.207 100 





  Amount   


 


    Norepinephrine 32 mg In 11.207  





    Sodium Chloride 0.9% 218   





    ml @ 0.05 MCG/KG/MIN 2.   





    445 mls/hr IV .Q24H [facility]   





    Rx#:[id number]   


 


    Piperacillin-Tazobactam 3 100  





    .375 gm In Sodium   





    Chloride 0.9% 100 ml @ 25   





    mls/hr IVPB Q12HR [facility] Rx   





    #:[id number]   


 


    Sodium Chloride 0.9% 1,  100 





    000 ml @ 100 mls/hr IV .   





    Q10H [facility] Rx#:[id number]   


 


  Oral 100 350 


 


Output:   


 


  Urine 2790 1030 120


 


Other:   


 


  Voiding Method Indwelling Catheter Indwelling Catheter 














- Exam

















 GENERAL EXAM: Alert, very pleasant, 60-year-old white male, on room air oxygen 

, no apparent distress.


HEAD: Normocephalic/atraumatic.


EYES: Normal reaction of pupils, equal size.  Conjunctiva pink, sclera white.


NOSE: Clear with pink turbinates.


THROAT: No erythema or exudates.


NECK: No masses, no JVD, no thyroid enlargement, no adenopathy.


CHEST: No chest wall deformity.  Symmetrical expansion. 


LUNGS: Equal air entry with some bibasilar crackles, no major wheezing or 

coughing or rhonchi


CVS: Regular rate and rhythm, normal S1 and S2, no gallops, no murmurs, no rubs


ABDOMEN: Soft, nontender.  No hepatosplenomegaly, normal bowel sounds, no 

guarding or rigidity.


EXTREMITIES: No clubbing, no edema, no cyanosis, 2+ pulses and upper and lower 

extremities.


MUSCULOSKELETAL: Muscle strength and tone normal.


SPINE: No scoliosis or deformity


SKIN: No rashes


CENTRAL NERVOUS SYSTEM: Alert and oriented -3.  No focal deficits, tone is 

normal in all 4 extremities.


PSYCHIATRIC: Alert and oriented -3.  Appropriate affect.  Intact judgment and 

insight.








- Labs


CBC & Chem 7: 


                                 01/20/21 03:02





                                 01/20/21 03:27


Labs: 


                  Abnormal Lab Results - Last 24 Hours (Table)











  01/19/21 01/19/21 01/20/21 Range/Units





  17:27 17:27 03:02 


 


WBC    16.1 H  (3.8-10.6)  k/uL


 


RBC    3.51 L  (4.30-5.90)  m/uL


 


Hgb    9.4 L D  (13.0-17.5)  gm/dL


 


Hct    29.5 L  (39.0-53.0)  %


 


RDW    17.5 H  (11.5-15.5)  %


 


Chloride  96 L    ()  mmol/L


 


Carbon Dioxide     (22-30)  mmol/L


 


BUN  52 H    (9-20)  mg/dL


 


Creatinine  3.77 H    (0.66-1.25)  mg/dL


 


Glucose  153 H    (74-99)  mg/dL


 


Calcium     (8.4-10.2)  mg/dL


 


Magnesium   1.5 L   (1.6-2.3)  mg/dL


 


Total Protein     (6.3-8.2)  g/dL


 


Albumin     (3.5-5.0)  g/dL














  01/20/21 Range/Units





  03:27 


 


WBC   (3.8-10.6)  k/uL


 


RBC   (4.30-5.90)  m/uL


 


Hgb   (13.0-17.5)  gm/dL


 


Hct   (39.0-53.0)  %


 


RDW   (11.5-15.5)  %


 


Chloride   ()  mmol/L


 


Carbon Dioxide  31 H  (22-30)  mmol/L


 


BUN  50 H  (9-20)  mg/dL


 


Creatinine  3.25 H  (0.66-1.25)  mg/dL


 


Glucose  128 H  (74-99)  mg/dL


 


Calcium  8.1 L  (8.4-10.2)  mg/dL


 


Magnesium   (1.6-2.3)  mg/dL


 


Total Protein  6.1 L  (6.3-8.2)  g/dL


 


Albumin  2.6 L  (3.5-5.0)  g/dL








                      Microbiology - Last 24 Hours (Table)











 01/18/21 17:14 Urine Culture - Final





 Urine,Clean Catch 


 


 01/18/21 16:18 Blood Culture - Preliminary





 Blood    No Growth after 24 hours














Assessment and Plan


Plan: 











1 acute kidney injury secondary to intravascular volume depletion/dehydration.  

Previous ultrasound the kidneys showed no evidence of any hydronephrosis.  The 

patient has had a similar episode of acute kidney injury/ATN on previous 

hospitalization the patient renal function remained recovered with IV fluids.  


The patient is producing adequate amount of urine output.  Acute kidney injury 

was related to intravascular volume depletion and the patient's creatinine is 

improving.  The metabolic acidosis is also improved.





2 acute hypotension, probably secondary to intravascular volume depletion , 

recovered





3 chronic perihilar pulmonary infiltrates with increased interstitial changes in

the lung bases more so on the right, probably a combination of rheumatoid lung 

and chronic scarring related to a previous pneumonia/empyema





4 previous   pneumonia back in June 2020 requiring a chest tube insertion for an

empyema





5 acute hypoxic respiratory failure , recovered currently on room air oxygen





6 rheumatoid arthritis with rheumatoid lungs/fibrosis maintained on a 

combination of Arava and prednisone outpatient basis





7 hypertension





8 hypothyroidism





9 previous history of foot infections requiring of antibiotics





10 previous history of DVT





11 persistent nausea and emesis and diarrhea.  Consider underlying C. diff 

colitis , the testing came back negative for C. diff colitis





12 leukocytosis, white cell count is at 16.1





13 mild chronic normocytic anemia





14 mild lactic acidosis with anion gap metabolic acidosis, recovered








Plan





The patient is on  mL an hour


Monitor urine output and creatinine, improving


Repeat ultrasound the kidneys


Continue pressors and monitor CVP and wean off the pressors 


Stop Zosyn  


Resume all medications including Synthroid


Restart metoprolol 


Transfer this patient to a medical floor after he gets his blessing from 

nephrology

## 2021-01-20 NOTE — P.PN
Subjective


Progress Note Date: 01/20/21





This is a 60-year-old  male patient requesting my service with past 

medical history of rheumatoid arthritis on Arava that was diagnosed when he was 

37 year old initially was started on Methotrexate that he could not tolerate 

then placed on Embrel but he developed side effects and finally was tried on 

Humira injection but he developed neurologic sided effects and was hospitaized 

at [facility] for quiet some time, rheumatoid lung disease, 

previous history of loculated empyema with chest tube placement in July 2020, 

hypertension, hypothyroidism, history of DVT, remote history of tobacco use and 

dependence, was recently seen by Dr. Gómez and he was placed on two antibiotics 

and 2 steroids and he was feeling better that wa about 2 weeks ago  Patient 

complains of generalized weakness and tingling going on for a few days along 

with nausea, vomiting and diarrhea.  He has been unable to keep any food down.  

He denies having any abdominal pain.  He complains of feeling fatigued and achy 

all over.  Patient was found to be afebrile but mildly tachycardic in the low 

100s, hypotensive with blood pressure 69/43, pulse ox 73% on room air.  Patient 

received 3 L of fluid and Decadron in the emergency center.  A central line was 

placed and patient was started on norepinephrine drip.  EKG was sinus 

tachycardia with incomplete right bundle branch block.  WBC 20.7, hemoglobin 

10.6, platelet count 3 and 45.  Sodium 134, potassium 3.6, chloride 100, CO2 16,

BUN 72 and creatinine 7.63.  Blood sugar 106.  CK 36.  Liver function tests 

normal.  Lactic acid 2.4.  Troponin negative.  Coronavirus PCR not detected.  

Chest x-ray reveals cardiomegaly with patchy mid and lower lung bases with small

left effusion.  Consider pneumonia or sequela of CHF.  Patient received IV 

Levaquin and Zosyn in the emergency center.  He was admitted to the intensive 

care unit and started on a bicarb drip and continued on vasopressors.  Consult 

with pulmonary medicine, nephrology and cardiology.





1/19: Patient remains in the intensive care unit.  He states that he feels 100% 

better.  He has had good urine output.  He states he has had some nausea with 

eating.  Stools this morning were a little loose.  Patient has been on a bicarb 

drip currently off levo fed.  He states his foot pain is improved from 

yesterday.  Sputum culture has not been obtained.  WBC 17.8, hemoglobin 11.9, 

platelet count 335.  Sodium 136, but isn't 4.3, chloride 102, CO2 16, BUN 62 and

creatinine 5.13.  Blood sugar 266.  Alkaline phosphatase 133.





1/20: Patient remains in intensive care unit but has been cleared for transfer 

to the cardiac stepdown unit.  Patient verbalizes that he thinks he is ready to 

go home.  Diarrhea has resolved.  He denies any nausea vomiting.  He is eating 

well but appetite is only okay.  He has had good urine output.  Pulse ox is 95% 

on room air.  He has been afebrile, heart rate 86, blood pressure 106/80.  

Repeat blood work reveals WBC 16.1, hemoglobin 9.4, platelet count 289.  CO2 31,

BUN 15 creatinine 3.25.  Blood sugar 128.  C. difficile toxin negative.  Patient

is off vasopressor, off sodium bicarb.











Objective





- Vital Signs


Vital signs: 


                                   Vital Signs











Temp  97.5 F L  01/20/21 12:00


 


Pulse  89   01/20/21 12:00


 


Resp  21   01/20/21 12:00


 


BP  142/93   01/20/21 12:00


 


Pulse Ox  95   01/20/21 12:00








                                 Intake & Output











 01/19/21 01/20/21 01/20/21





 18:59 06:59 18:59


 


Intake Total 0920.152 0693 740


 


Output Total 2790 1030 430


 


Balance -928.793 630 310


 


Weight  94.4 kg 


 


Intake:   


 


  IV 1650 1210 740


 


    0.9 Na Cl @ 100ml/hr  1100 500


 


    0.9 Na Cl KVO  110 40


 


    Dextrose 5% in Water 1, 1650  





    000 ml @ 150 mls/hr IV .   





    Q7H40M GABBY with Sodium   





    Bicarb (1 Meq/ml) 150 ml   





    Rx#:[id number]   


 


    Magnesium Sulfate-D5w Pmx   200





    1 gm In Dextrose/Water 1   





    100ml.bag @ 100 mls/hr   





    IVPB Q1H [facility] Rx#:   





    [id number]   


 


  Intake, IV Titration 111.207 100 





  Amount   


 


    Norepinephrine 32 mg In 11.207  





    Sodium Chloride 0.9% 218   





    ml @ 0.05 MCG/KG/MIN 2.   





    445 mls/hr IV .Q24H GABBY   





    Rx#:[id number]   


 


    Piperacillin-Tazobactam 3 100  





    .375 gm In Sodium   





    Chloride 0.9% 100 ml @ 25   





    mls/hr IVPB Q12HR [facility] Rx   





    #:[id number]   


 


    Sodium Chloride 0.9% 1,  100 





    000 ml @ 100 mls/hr IV .   





    Q10H [facility] Rx#:[id number]   


 


  Oral 100 350 


 


Output:   


 


  Urine 2790 1030 430


 


Other:   


 


  Voiding Method Indwelling Catheter Indwelling Catheter Indwelling Catheter














- Exam





 Review of Systems 


Constitutional: Reports fatigue, denies weakness, Denies anorexia, Denies 

chronic pain


Eyes: denies blurred vision, denies bulging eye, denies decreased vision


Ears, nose, mouth and throat: Denies dysphagia, Denies neck lump, Denies sore 

throat


Cardiovascular: Denies rapid heart beat, Denies chest pain, Denies decreased 

exercise tolerance, Denies dyspnea on exertion, Denies leg edema, Denies 

shortness of breath, Denies syncope


Respiratory: Denies congestion, Denies cough, Denies cough with sputum, Denies 

home oxygen, Denies sleep apnea, Denies snoring, Denies wheezing


Gastrointestinal: Reports change in bowel habits, denies diarrhea, Reports 

dyspepsia, Reports early satiety, Reports excessive gas, denies nausea, denies 

vomiting, Denies abdominal pain, Denies bloating, Denies BRBPR, Denies con

stipation, Denies heartburn, Denies hematemesis, Denies hematochezia, Denies 

jaundice, Denies lactose intolerance, Denies loss of appetite, Denies melena


Genitourinary: Reports nocturia, Denies dysuria


Musculoskeletal: Denies atrophy, Denies fractures, Denies frequent falls


Musculoskeletal: right: ankle pain, ankle stiffness, ankle swelling, foot pain, 

foot stiffness, foot swelling, absent: elbow pain, elbow stiffness, elbow 

swelling, hand pain, hand stiffness, hand swelling, hip pain, hip stiffness, hip

swelling, knee pain, knee stiffness, knee swelling, shoulder pain, shoulder 

stiffness, shoulder swelling, wrist pain, wrist stiffness, wrist swelling


Integumentary: Denies pruritus, Denies rash


Neurological: Denies numbness, Denies weakness


Psychiatric: Denies anxiety, Denies depression





Physical examination:





General: patient is 60 year old lying down in ICU bed and appears to be 

comfortable and in no acute distress.  





HEENT: head ia atraumatic normocephalic, Pupils were equal round reactive to 

light and accommodations , extra ocular muscle movement were intact, mucous 

membranes of the mouth are moist.





Neck: supple no JVP.'





Chest: decreased breath sounds at he bases with few ronchi no expiratory wheezes

no chest wall tendernes or intercostal retractions.





Heart: first heart sound is depressed, second heart sound is normal tachycardic 

there is HOOD 2/6 located at the left sternal border.





Abdomen: soft, mild tenderness to the left lower quadrant positive bowel sounds,

no guarding or rebound tenderness, no hepatosplenomegaly.





Extremities: there is no edema or calf tenderness DP+ 2 Bilaterally, there is 

what appears to be a charcot joint in the right foot form arch collapse.





Neurologic examination: patient is awake , alert and oriented X 3 CN II-XII are 

grossly intact, muscle power 4/5 in bilateral upper and lower extremities, deep 

tendon reflexes were normal.











- Labs


CBC & Chem 7: 


                                 01/20/21 03:02





                                 01/20/21 03:27


Labs: 


                  Abnormal Lab Results - Last 24 Hours (Table)











  01/19/21 01/19/21 01/20/21 Range/Units





  17:27 17:27 03:02 


 


WBC    16.1 H  (3.8-10.6)  k/uL


 


RBC    3.51 L  (4.30-5.90)  m/uL


 


Hgb    9.4 L D  (13.0-17.5)  gm/dL


 


Hct    29.5 L  (39.0-53.0)  %


 


RDW    17.5 H  (11.5-15.5)  %


 


Chloride  96 L    ()  mmol/L


 


Carbon Dioxide     (22-30)  mmol/L


 


BUN  52 H    (9-20)  mg/dL


 


Creatinine  3.77 H    (0.66-1.25)  mg/dL


 


Glucose  153 H    (74-99)  mg/dL


 


Calcium     (8.4-10.2)  mg/dL


 


Magnesium   1.5 L   (1.6-2.3)  mg/dL


 


Total Protein     (6.3-8.2)  g/dL


 


Albumin     (3.5-5.0)  g/dL














  01/20/21 01/20/21 Range/Units





  03:27 03:27 


 


WBC    (3.8-10.6)  k/uL


 


RBC    (4.30-5.90)  m/uL


 


Hgb    (13.0-17.5)  gm/dL


 


Hct    (39.0-53.0)  %


 


RDW    (11.5-15.5)  %


 


Chloride    ()  mmol/L


 


Carbon Dioxide  31 H   (22-30)  mmol/L


 


BUN  50 H   (9-20)  mg/dL


 


Creatinine  3.25 H   (0.66-1.25)  mg/dL


 


Glucose  128 H   (74-99)  mg/dL


 


Calcium  8.1 L   (8.4-10.2)  mg/dL


 


Magnesium   1.5 L  (1.6-2.3)  mg/dL


 


Total Protein  6.1 L   (6.3-8.2)  g/dL


 


Albumin  2.6 L   (3.5-5.0)  g/dL








                      Microbiology - Last 24 Hours (Table)











 01/18/21 17:14 Urine Culture - Final





 Urine,Clean Catch 


 


 01/18/21 16:18 Blood Culture - Preliminary





 Blood    No Growth after 24 hours














Assessment and Plan


Plan: 





1.  Acute hypoxic respiratory failure secondary to hypovolemia from nausea 

vomiting diarrhea. we will continue with O2 support and IVF along with Levophed 

drip, we ed continue to monitor in the ICU,  is following, we will 

continue with Duoneb qid.





2.  Acute kidney injury secondary to acute tubular necosis due to hypotension 

and fluid loss.  Bicarb drip discontinued and Nephrology is following.





3.  Anion gap metabolic acidosis and lactic acidosis secondary to renal failure.

 Off bicarb drip.





4.  Hypovolemic shock secondary to dehydration requiring vasopressors in thr 

form of Levophed.  Patient is currently off vasopressor.  Continue with IVF as 

well, and monitoring very closely.





5. SIRS with hypotension, tachycardia and leukocytosis. no identified infection 

so far, Zosyn has been discontinued, we will obtain sputum cultures, blood 

cultures and we will check stool for C.Diff infection.





6.  Possible pneumonia ruled out by pulmonary medicine, scar tissue. we will 

continue with O2 support, off antibiotics.


 


7.  Possible gastroenteritis causing nausea vomiting diarrhea rule out C.diff 

infection, we will check stool for C.Diff Toxins.





8.  Rheumatoid arthritis and rheumatoid lung. we will hold off Xeljanz and arava

for now.





9.  History of empyema with Streptococcus requiring chest tube.





10. Hypertension and hypertensive cardiovascular disease. currently hypotensive 

we will hold off Losartan and Metoprolol.





11. Hypothyroidism . we will continue with Synthroid 150 mcg orally daily.





12. DVT prophylaxis. we will start Lovenox 30 mg SC daily and bilateral knee 

high maia hose.





13. GI prophylaxis. we will continue with Protonix 40 mg IVP daily.





14. Full code.





Discharge plan: Most likely home





Impression and plan of care have been directed as dictated by the signing 

physician.  Phyllis Wylie nurse practitioner acting as scribe for signing 

physician.

## 2021-01-20 NOTE — P.PN
Subjective





Patient is seen in follow for acute kidney injury.  Renal function improving.  

Nonoliguric.  Still has loose bowel movements.  C. diff negative.  Maintained on

IV fluids.  Off vasopressors.





Vital signs are stable.


General: The patient appeared well nourished and normally developed. 


HEENT: Head exam is unremarkable. Neck is without jugular venous distension.


LUNGS: Breath sounds decreased.


HEART: Rate and Rhythm are regular. 


ABDOMEN: Soft, nontender.


EXTREMITITES: No edema.





Objective





- Vital Signs


Vital signs: 


                                   Vital Signs











Temp  97.8 F   01/20/21 08:00


 


Pulse  92   01/20/21 10:00


 


Resp  20   01/20/21 10:00


 


BP  97/77   01/20/21 10:00


 


Pulse Ox  96   01/20/21 10:00








                                 Intake & Output











 01/19/21 01/20/21 01/20/21





 18:59 06:59 18:59


 


Intake Total 9285.895 5222 440


 


Output Total 2790 1030 360


 


Balance -928.793 630 80


 


Weight  94.4 kg 


 


Intake:   


 


  IV 1650 1210 440


 


    0.9 Na Cl @ 100ml/hr  1100 400


 


    0.9 Na Cl KVO  110 40


 


    Dextrose 5% in Water 1, 1650  





    000 ml @ 150 mls/hr IV .   





    Q7H40M [facility] with Sodium   





    Bicarb (1 Meq/ml) 150 ml   





    Rx#:[id number]   


 


  Intake, IV Titration 111.207 100 





  Amount   


 


    Norepinephrine 32 mg In 11.207  





    Sodium Chloride 0.9% 218   





    ml @ 0.05 MCG/KG/MIN 2.   





    445 mls/hr IV .Q24H [facility]   





    Rx#:[id number]   


 


    Piperacillin-Tazobactam 3 100  





    .375 gm In Sodium   





    Chloride 0.9% 100 ml @ 25   





    mls/hr IVPB Q12HR [facility] Rx   





    #:[id number]   


 


    Sodium Chloride 0.9% 1,  100 





    000 ml @ 100 mls/hr IV .   





    Q10H [facility] Rx#:[id number]   


 


  Oral 100 350 


 


Output:   


 


  Urine 2790 1030 360


 


Other:   


 


  Voiding Method Indwelling Catheter Indwelling Catheter Indwelling Catheter














- Labs


CBC & Chem 7: 


                                 01/20/21 03:02





                                 01/20/21 03:27


Labs: 


                  Abnormal Lab Results - Last 24 Hours (Table)











  01/19/21 01/19/21 01/20/21 Range/Units





  17:27 17:27 03:02 


 


WBC    16.1 H  (3.8-10.6)  k/uL


 


RBC    3.51 L  (4.30-5.90)  m/uL


 


Hgb    9.4 L D  (13.0-17.5)  gm/dL


 


Hct    29.5 L  (39.0-53.0)  %


 


RDW    17.5 H  (11.5-15.5)  %


 


Chloride  96 L    ()  mmol/L


 


Carbon Dioxide     (22-30)  mmol/L


 


BUN  52 H    (9-20)  mg/dL


 


Creatinine  3.77 H    (0.66-1.25)  mg/dL


 


Glucose  153 H    (74-99)  mg/dL


 


Calcium     (8.4-10.2)  mg/dL


 


Magnesium   1.5 L   (1.6-2.3)  mg/dL


 


Total Protein     (6.3-8.2)  g/dL


 


Albumin     (3.5-5.0)  g/dL














  01/20/21 01/20/21 Range/Units





  03:27 03:27 


 


WBC    (3.8-10.6)  k/uL


 


RBC    (4.30-5.90)  m/uL


 


Hgb    (13.0-17.5)  gm/dL


 


Hct    (39.0-53.0)  %


 


RDW    (11.5-15.5)  %


 


Chloride    ()  mmol/L


 


Carbon Dioxide  31 H   (22-30)  mmol/L


 


BUN  50 H   (9-20)  mg/dL


 


Creatinine  3.25 H   (0.66-1.25)  mg/dL


 


Glucose  128 H   (74-99)  mg/dL


 


Calcium  8.1 L   (8.4-10.2)  mg/dL


 


Magnesium   1.5 L  (1.6-2.3)  mg/dL


 


Total Protein  6.1 L   (6.3-8.2)  g/dL


 


Albumin  2.6 L   (3.5-5.0)  g/dL








                      Microbiology - Last 24 Hours (Table)











 01/18/21 17:14 Urine Culture - Final





 Urine,Clean Catch 


 


 01/18/21 16:18 Blood Culture - Preliminary





 Blood    No Growth after 24 hours














Assessment and Plan


Plan: 





Assessment:


1.  Acute kidney injury mostly prerenal secondary to hypovolemia from vomiting, 

diarrhea and Hyzaar.  Creatinine 7.63 on admission and is 3.25 today.  Baseline 

creatinine near 1 from July 2020.  No hydronephrosis noted on kidney ultrasound.


2.  Metabolic acidosis secondary to acute kidney injury and diarrhea.  Resolved.

 Status post bicarb drip.


3.  Shock status post Levophed.


4.  History of rheumatoid arthritis.


5.  Vomiting and diarrhea.  Possibly gastroenteritis.  C. diff negative.  

Better.


6.  Hypomagnesemia secondary to GI losses and poor intake.





Plan:


Maintain normal saline.


Avoid nephrotoxins.


Continue to monitor renal function and urine output.


Replace magnesium.  2 g IV today.

## 2021-01-20 NOTE — P.PN
Subjective


Progress Note Date: 01/19/21





This is a 60-year-old  male patient requesting my service with past 

medical history of rheumatoid arthritis on Arava that was diagnosed when he was 

37 year old initially was started on Methotrexate that he could not tolerate 

then placed on Embrel but he developed side effects and finally was tried on 

Humira injection but he developed neurologic sided effects and was hospitaized 

at [facility] for quiet some time, rheumatoid lung disease, 

previous history of loculated empyema with chest tube placement in July 2020, 

hypertension, hypothyroidism, history of DVT, remote history of tobacco use and 

dependence, was recently seen by Dr. Gómez and he was placed on two antibiotics 

and 2 steroids and he was feeling better that wa about 2 weeks ago  Patient 

complains of generalized weakness and tingling going on for a few days along 

with nausea, vomiting and diarrhea.  He has been unable to keep any food down.  

He denies having any abdominal pain.  He complains of feeling fatigued and achy 

all over.  Patient was found to be afebrile but mildly tachycardic in the low 

100s, hypotensive with blood pressure 69/43, pulse ox 73% on room air.  Patient 

received 3 L of fluid and Decadron in the emergency center.  A central line was 

placed and patient was started on norepinephrine drip.  EKG was sinus 

tachycardia with incomplete right bundle branch block.  WBC 20.7, hemoglobin 

10.6, platelet count 3 and 45.  Sodium 134, potassium 3.6, chloride 100, CO2 16,

BUN 72 and creatinine 7.63.  Blood sugar 106.  CK 36.  Liver function tests 

normal.  Lactic acid 2.4.  Troponin negative.  Coronavirus PCR not detected.  

Chest x-ray reveals cardiomegaly with patchy mid and lower lung bases with small

left effusion.  Consider pneumonia or sequela of CHF.  Patient received IV 

Levaquin and Zosyn in the emergency center.  He was admitted to the intensive 

care unit and started on a bicarb drip and continued on vasopressors.  Consult 

with pulmonary medicine, nephrology and cardiology.





1/19: Patient remains in the intensive care unit.  He states that he feels 100% 

better.  He has had good urine output.  He states he has had some nausea with 

eating.  Stools this morning were a little loose.  Patient has been on a bicarb 

drip currently off levo fed.  He states his foot pain is improved from 

yesterday.  Sputum culture has not been obtained.  WBC 17.8, hemoglobin 11.9, 

platelet count 335.  Sodium 136, but isn't 4.3, chloride 102, CO2 16, BUN 62 and

creatinine 5.13.  Blood sugar 266.  Alkaline phosphatase 133.











Objective





- Vital Signs


Vital signs: 


                                   Vital Signs











Temp  97.5 F L  01/20/21 12:00


 


Pulse  89   01/20/21 12:00


 


Resp  21   01/20/21 12:00


 


BP  142/93   01/20/21 12:00


 


Pulse Ox  95   01/20/21 12:00








                                 Intake & Output











 01/19/21 01/20/21 01/20/21





 18:59 06:59 18:59


 


Intake Total 2285.527 9461 740


 


Output Total 2790 1030 430


 


Balance -928.793 630 310


 


Weight  94.4 kg 


 


Intake:   


 


  IV 1650 1210 740


 


    0.9 Na Cl @ 100ml/hr  1100 500


 


    0.9 Na Cl KVO  110 40


 


    Dextrose 5% in Water 1, 1650  





    000 ml @ 150 mls/hr IV .   





    Q7H40M [facility] with Sodium   





    Bicarb (1 Meq/ml) 150 ml   





    Rx#:[id number]   


 


    Magnesium Sulfate-D5w Pmx   200





    1 gm In Dextrose/Water 1   





    100ml.bag @ 100 mls/hr   





    IVPB Q1H [facility] Rx#:   





    [id number]   


 


  Intake, IV Titration 111.207 100 





  Amount   


 


    Norepinephrine 32 mg In 11.207  





    Sodium Chloride 0.9% 218   





    ml @ 0.05 MCG/KG/MIN 2.   





    445 mls/hr IV .Q24H [facility]   





    Rx#:[id number]   


 


    Piperacillin-Tazobactam 3 100  





    .375 gm In Sodium   





    Chloride 0.9% 100 ml @ 25   





    mls/hr IVPB Q12HR [facility] Rx   





    #:[id number]   


 


    Sodium Chloride 0.9% 1,  100 





    000 ml @ 100 mls/hr IV .   





    Q10H [facility] Rx#:[id number]   


 


  Oral 100 350 


 


Output:   


 


  Urine 2790 1030 430


 


Other:   


 


  Voiding Method Indwelling Catheter Indwelling Catheter Indwelling Catheter














- Exam





 Review of Systems 


Constitutional: Reports fatigue, Reports weakness, Denies anorexia, Denies 

chronic pain


Eyes: denies blurred vision, denies bulging eye, denies decreased vision


Ears, nose, mouth and throat: Denies dysphagia, Denies neck lump, Denies sore 

throat


Cardiovascular: Reports lightheadedness, Reports rapid heart beat, Denies chest 

pain, Denies decreased exercise tolerance, Denies dyspnea on exertion, Denies 

leg edema, Denies shortness of breath, Denies syncope


Respiratory: Denies congestion, Denies cough, Denies cough with sputum, Denies 

home oxygen, Denies sleep apnea, Denies snoring, Denies wheezing


Gastrointestinal: Reports change in bowel habits, Reports diarrhea, Reports 

dyspepsia, Reports early satiety, Reports excessive gas, Reports nausea, Reports

vomiting, Denies abdominal pain, Denies bloating, Denies BRBPR, Denies 

constipation, Denies heartburn, Denies hematemesis, Denies hematochezia, Denies 

jaundice, Denies lactose intolerance, Denies loss of appetite, Denies melena


Genitourinary: Reports nocturia, Denies dysuria


Musculoskeletal: Denies atrophy, Denies fractures, Denies frequent falls


Musculoskeletal: right: ankle pain, ankle stiffness, ankle swelling, foot pain, 

foot stiffness, foot swelling, absent: elbow pain, elbow stiffness, elbow 

swelling, hand pain, hand stiffness, hand swelling, hip pain, hip stiffness, hip

swelling, knee pain, knee stiffness, knee swelling, shoulder pain, shoulder stif

fness, shoulder swelling, wrist pain, wrist stiffness, wrist swelling


Integumentary: Denies pruritus, Denies rash


Neurological: Denies numbness, Denies weakness


Psychiatric: Denies anxiety, Denies depression





Physical examination:





General: patient is 60 year old lying down in bed in no distress.





HEENT: head ia atraumatic normocephalic, Pupils were equal round reactive to 

light and accommodations , extra ocular muscle movement were intact, mucous 

membranes of the mouth are somewhat dry.





Neck: supple no JVP.'





Chest: decreased breath sounds at he bases with few ronchi no expiratory wheezes

no chest wall tendernes or intercostal retractions.





Heart: first heart sound is depressed, second heart sound is normal tachycardic 

there is HOOD 2/6 located at the left sternal border.





Abdomen: soft, mild tenderness to the left lower quadrant positive bowel sounds,

no guarding or rebound tenderness, no hepatosplenomegaly.





Extremities: there is no edema or calf tenderness DP+ 2 Bilaterally, there is 

what appears to be a charcot joint in the right foot form arch collapse.





Neurologic examination: patient is awake , alert and oriented X 3 CN II-XII are 

grossly intact, muscle power 4/5 in bilateral upper and lower extremities, deep 

tendon reflexes were normal.








- Labs


CBC & Chem 7: 


                                 01/20/21 03:02





                                 01/20/21 03:27


Labs: 


                  Abnormal Lab Results - Last 24 Hours (Table)











  01/19/21 01/19/21 01/20/21 Range/Units





  17:27 17:27 03:02 


 


WBC    16.1 H  (3.8-10.6)  k/uL


 


RBC    3.51 L  (4.30-5.90)  m/uL


 


Hgb    9.4 L D  (13.0-17.5)  gm/dL


 


Hct    29.5 L  (39.0-53.0)  %


 


RDW    17.5 H  (11.5-15.5)  %


 


Chloride  96 L    ()  mmol/L


 


Carbon Dioxide     (22-30)  mmol/L


 


BUN  52 H    (9-20)  mg/dL


 


Creatinine  3.77 H    (0.66-1.25)  mg/dL


 


Glucose  153 H    (74-99)  mg/dL


 


Calcium     (8.4-10.2)  mg/dL


 


Magnesium   1.5 L   (1.6-2.3)  mg/dL


 


Total Protein     (6.3-8.2)  g/dL


 


Albumin     (3.5-5.0)  g/dL














  01/20/21 01/20/21 Range/Units





  03:27 03:27 


 


WBC    (3.8-10.6)  k/uL


 


RBC    (4.30-5.90)  m/uL


 


Hgb    (13.0-17.5)  gm/dL


 


Hct    (39.0-53.0)  %


 


RDW    (11.5-15.5)  %


 


Chloride    ()  mmol/L


 


Carbon Dioxide  31 H   (22-30)  mmol/L


 


BUN  50 H   (9-20)  mg/dL


 


Creatinine  3.25 H   (0.66-1.25)  mg/dL


 


Glucose  128 H   (74-99)  mg/dL


 


Calcium  8.1 L   (8.4-10.2)  mg/dL


 


Magnesium   1.5 L  (1.6-2.3)  mg/dL


 


Total Protein  6.1 L   (6.3-8.2)  g/dL


 


Albumin  2.6 L   (3.5-5.0)  g/dL








                      Microbiology - Last 24 Hours (Table)











 01/18/21 17:14 Urine Culture - Final





 Urine,Clean Catch 


 


 01/18/21 16:18 Blood Culture - Preliminary





 Blood    No Growth after 24 hours














Assessment and Plan


Assessment: 


Assessment and plan:








1.  Acute hypoxic respiratory failure secondary to hypovolemia from nausea 

vomiting diarrhea. we will continue with O2 support and IVF along with Levophed 

drip, we ed continue to monitor in the ICU,  is following, we will 

continue with Duoneb qid.





2.  Acute kidney injury secondary to acute tubular necosis due to hypotension 

and fluid loss. we will continue with sodium Bicarb drip and Levophed drip, we 

will continue with following CMP and Nephrology is following.





3.  Anion gap metabolic acidosis and lactic acidosis secondary to renal failure.

we will continue with Bicarb drip for 1-2 days.





4.  Hypovolemic shock secondary to dehydration requiring vasopressors in thr 

form of Levophed.  Patient is currently off vasopressor.  Continue with IVF as 

well, and monitoring very closely.





5. SIRS with hypotension, tachycardia and leukocytosis. no identified infection 

so far, we will continue with Levaquin and Zosyn for now, we will obtain sputum 

cultures, blood cultures and we will check stool for C.Diff infection.





6.  Possible pneumonia not completely excluded, scar tissue. we will continue 

with O2 support, Levaquin and Zosyn and we will check sputum cultures.


 


7.  Possible gastroenteritis causing nausea vomiting diarrhea rule out C.diff 

infection, we will check stool for C.Diff Toxins.





8.  Rheumatoid arthritis and rheumatoid lung. we will hold off Xeljanz and arava

for now.





9.  History of empyema with Streptococcus requiring chest tube.





10. Hypertension and hypertensive cardiovascular disease. currently hypotensive 

we will hold off Losartan and Metoprolol.





11. Hypothyroidism . we will continue with Synthroid 150 mcg orally daily.





12. DVT prophylaxis. we will start Lovenox 30 mg SC daily and bilateral knee 

high maia hose.





13. GI prophylaxis. we will continue with Protonix 40 mg IVP daily.





14. Admits to inpatient , estimated length of stay 2 midnights.





15. Full code.

## 2021-01-20 NOTE — P.PN
Subjective


Progress Note Date: 01/20/21


This is a 60-year-old gentleman who was recently treated for pneumonia who came 

back with complaints of generalized weakness and fatigue.  A she was found to be

dehydrated.  Patient was not eating well because of nausea and also have was 

having diarrhea.  Patient was given IV fluids.  Since admission,  he is feeling 

much better.  His creatinine is improving.  Patient's with initial EKG shows 

mild ST-T changes.  However second EKG showed normal findings.  Patient is 

feeling better without any chest pain or shortness of breath.  He may have some 

underlying pneumonia which is being addressed.  Patient is being transferred to 

telemetry unit.  We'll follow








Objective





- Vital Signs


Vital signs: 


                                   Vital Signs











Temp  97.8 F   01/20/21 08:00


 


Pulse  92   01/20/21 10:00


 


Resp  20   01/20/21 10:00


 


BP  97/77   01/20/21 10:00


 


Pulse Ox  96   01/20/21 10:00








                                 Intake & Output











 01/19/21 01/20/21 01/20/21





 18:59 06:59 18:59


 


Intake Total 3862.408 1508 440


 


Output Total 2790 1030 360


 


Balance -928.793 630 80


 


Weight  94.4 kg 


 


Intake:   


 


  IV 1650 1210 440


 


    0.9 Na Cl @ 100ml/hr  1100 400


 


    0.9 Na Cl KVO  110 40


 


    Dextrose 5% in Water 1, 1650  





    000 ml @ 150 mls/hr IV .   





    Q7H40M [facility] with Sodium   





    Bicarb (1 Meq/ml) 150 ml   





    Rx#:[id number]   


 


  Intake, IV Titration 111.207 100 





  Amount   


 


    Norepinephrine 32 mg In 11.207  





    Sodium Chloride 0.9% 218   





    ml @ 0.05 MCG/KG/MIN 2.   





    445 mls/hr IV .Q24H [facility]   





    Rx#:[id number]   


 


    Piperacillin-Tazobactam 3 100  





    .375 gm In Sodium   





    Chloride 0.9% 100 ml @ 25   





    mls/hr IVPB Q12HR [facility] Rx   





    #:[id number]   


 


    Sodium Chloride 0.9% 1,  100 





    000 ml @ 100 mls/hr IV .   





    Q10H [facility] Rx#:[id number]   


 


  Oral 100 350 


 


Output:   


 


  Urine 2790 1030 360


 


Other:   


 


  Voiding Method Indwelling Catheter Indwelling Catheter Indwelling Catheter














- Exam





GENERAL EXAM: Patient is alert and oriented and doesn't appear to be in any 

acute distress


HEENT: Normocephalic. Normal reaction of pupils, equal size, normal range of 

extraocular motion. No erythema or exudates in the throat.


NECK: No masses, no nuchal rigidity.


CHEST: No chest wall deformity.


LUNGS: Equal air entry with no crackles or wheeze.


HEART: S1 and S2 normal with no audible mumurs or gallops. Regular rhythm, 

femorals equal on both sides..


ABDOMEN: No hepatosplenomegaly, normal bowel sounds, no guarding or rigidity.


SKIN: No rashes


CENTRAL NERVOUS SYSTEM: No focal deficits.


EXTREMITIES: No cyanosis, clubbing or edema.





- Labs


CBC & Chem 7: 


                                 01/20/21 03:02





                                 01/20/21 03:27


Labs: 


                  Abnormal Lab Results - Last 24 Hours (Table)











  01/19/21 01/19/21 01/20/21 Range/Units





  17:27 17:27 03:02 


 


WBC    16.1 H  (3.8-10.6)  k/uL


 


RBC    3.51 L  (4.30-5.90)  m/uL


 


Hgb    9.4 L D  (13.0-17.5)  gm/dL


 


Hct    29.5 L  (39.0-53.0)  %


 


RDW    17.5 H  (11.5-15.5)  %


 


Chloride  96 L    ()  mmol/L


 


Carbon Dioxide     (22-30)  mmol/L


 


BUN  52 H    (9-20)  mg/dL


 


Creatinine  3.77 H    (0.66-1.25)  mg/dL


 


Glucose  153 H    (74-99)  mg/dL


 


Calcium     (8.4-10.2)  mg/dL


 


Magnesium   1.5 L   (1.6-2.3)  mg/dL


 


Total Protein     (6.3-8.2)  g/dL


 


Albumin     (3.5-5.0)  g/dL














  01/20/21 01/20/21 Range/Units





  03:27 03:27 


 


WBC    (3.8-10.6)  k/uL


 


RBC    (4.30-5.90)  m/uL


 


Hgb    (13.0-17.5)  gm/dL


 


Hct    (39.0-53.0)  %


 


RDW    (11.5-15.5)  %


 


Chloride    ()  mmol/L


 


Carbon Dioxide  31 H   (22-30)  mmol/L


 


BUN  50 H   (9-20)  mg/dL


 


Creatinine  3.25 H   (0.66-1.25)  mg/dL


 


Glucose  128 H   (74-99)  mg/dL


 


Calcium  8.1 L   (8.4-10.2)  mg/dL


 


Magnesium   1.5 L  (1.6-2.3)  mg/dL


 


Total Protein  6.1 L   (6.3-8.2)  g/dL


 


Albumin  2.6 L   (3.5-5.0)  g/dL








                      Microbiology - Last 24 Hours (Table)











 01/18/21 17:14 Urine Culture - Final





 Urine,Clean Catch 


 


 01/18/21 16:18 Blood Culture - Preliminary





 Blood    No Growth after 24 hours














Assessment and Plan


(1) Essential hypertension


Current Visit: Yes   Status: Acute   Code(s): I10 - ESSENTIAL (PRIMARY) 

HYPERTENSION   SNOMED Code(s): 63629803


   





(2) Acute renal failure


Current Visit: Yes   Status: Acute   Code(s): N17.9 - ACUTE KIDNEY FAILURE, 

UNSPECIFIED   SNOMED Code(s): 66835160


   





(3) Pneumonia


Current Visit: Yes   Status: Acute   Code(s): J18.9 - PNEUMONIA, UNSPECIFIED 

ORGANISM   SNOMED Code(s): 492077476


   





(4) Dehydration


Current Visit: No   Status: Acute   Code(s): E86.0 - DEHYDRATION   SNOMED 

Code(s): 29054182


   





(5) Abnormal EKG


Current Visit: Yes   Status: Acute   Code(s): R94.31 - ABNORMAL 

ELECTROCARDIOGRAM [ECG] [EKG]   SNOMED Code(s): 398378046


   


Plan: 


At this point patient doesn't have any symptoms of angina and his troponins 2 

are negative.  We will get an echocardiogram to assess LV function and rule out 

any segmental wall motion defects.  If there are no segmental wall motion 

defects, no further evaluation at this time, as long as patient is chest pain-fr

ee.  Continue rest of the supportive care treatment for possible pneumonia.











01/20/2021: Patient is feeling much better.  He'll continue current medical 

therapy.  Patient is being transferred to telemetry unit.  Cardiac-wise, patient

 is stable.

## 2021-01-21 VITALS
HEART RATE: 74 BPM | SYSTOLIC BLOOD PRESSURE: 110 MMHG | TEMPERATURE: 97.4 F | DIASTOLIC BLOOD PRESSURE: 69 MMHG | RESPIRATION RATE: 16 BRPM

## 2021-01-21 LAB
ALBUMIN SERPL-MCNC: 2.6 G/DL (ref 3.5–5)
ALP SERPL-CCNC: 104 U/L (ref 38–126)
ALT SERPL-CCNC: 13 U/L (ref 4–49)
ANION GAP SERPL CALC-SCNC: 7 MMOL/L
AST SERPL-CCNC: 16 U/L (ref 17–59)
BUN SERPL-SCNC: 42 MG/DL (ref 9–20)
CALCIUM SPEC-MCNC: 8.5 MG/DL (ref 8.4–10.2)
CHLORIDE SERPL-SCNC: 103 MMOL/L (ref 98–107)
CO2 SERPL-SCNC: 27 MMOL/L (ref 22–30)
GLUCOSE SERPL-MCNC: 92 MG/DL (ref 74–99)
MAGNESIUM SPEC-SCNC: 1.8 MG/DL (ref 1.6–2.3)
POTASSIUM SERPL-SCNC: 3.6 MMOL/L (ref 3.5–5.1)
PROT SERPL-MCNC: 6.2 G/DL (ref 6.3–8.2)
SODIUM SERPL-SCNC: 137 MMOL/L (ref 137–145)

## 2021-01-21 RX ADMIN — MAGNESIUM SULFATE IN DEXTROSE SCH MLS/HR: 10 INJECTION, SOLUTION INTRAVENOUS at 06:53

## 2021-01-21 RX ADMIN — MAGNESIUM SULFATE IN DEXTROSE SCH MLS/HR: 10 INJECTION, SOLUTION INTRAVENOUS at 08:37

## 2021-01-21 RX ADMIN — NOREPINEPHRINE BITARTRATE SCH: 1 INJECTION, SOLUTION, CONCENTRATE INTRAVENOUS at 08:29

## 2021-01-21 RX ADMIN — CEFAZOLIN SCH MLS/HR: 330 INJECTION, POWDER, FOR SOLUTION INTRAMUSCULAR; INTRAVENOUS at 00:00

## 2021-01-21 RX ADMIN — CEFAZOLIN SCH MLS/HR: 330 INJECTION, POWDER, FOR SOLUTION INTRAMUSCULAR; INTRAVENOUS at 08:40

## 2021-01-21 RX ADMIN — LEVOTHYROXINE SODIUM SCH MCG: 75 TABLET ORAL at 06:53

## 2021-01-21 RX ADMIN — PANTOPRAZOLE SODIUM SCH MG: 40 INJECTION, POWDER, FOR SOLUTION INTRAVENOUS at 08:37

## 2021-01-21 NOTE — P.PN
Subjective





Patient is seen in follow for acute kidney injury.  Renal function improving.  

Nonoliguric.  Diarrhea resolved.  C. diff negative.  Maintained on IV fluids.  

No active complaints.





Vital signs are stable.


General: The patient appeared well nourished and normally developed. 


HEENT: Head exam is unremarkable. Neck is without jugular venous distension.


LUNGS: Breath sounds decreased.


HEART: Rate and Rhythm are regular. 


ABDOMEN: Soft, nontender.


EXTREMITITES: No edema.





Objective





- Vital Signs


Vital signs: 


                                   Vital Signs











Temp  97.4 F L  01/21/21 08:00


 


Pulse  98   01/21/21 08:00


 


Resp  16   01/21/21 08:00


 


BP  111/68   01/21/21 08:00


 


Pulse Ox  94 L  01/21/21 08:00








                                 Intake & Output











 01/20/21 01/21/21 01/21/21





 18:59 06:59 18:59


 


Intake Total 1340 1300 


 


Output Total 955 1100 


 


Balance 385 200 


 


Intake:   


 


  IV 1340 1300 


 


    0.9 Na Cl @ 100ml/hr 1100 1200 


 


    0.9 Na Cl KVO 40  


 


    Magnesium Sulfate-D5w Pmx 200 100 





    1 gm In Dextrose/Water 1   





    100ml.bag @ 100 mls/hr   





    IVPB Q1H [facility] Rx#:   





    [id number]   


 


Output:   


 


  Urine 955 1100 


 


Other:   


 


  Voiding Method Indwelling Catheter Indwelling Catheter 


 


  # Bowel Movements  1 














- Labs


CBC & Chem 7: 


                                 01/20/21 03:02





                                 01/21/21 03:43


Labs: 


                  Abnormal Lab Results - Last 24 Hours (Table)











  01/20/21 01/21/21 Range/Units





  03:27 03:43 


 


BUN   42 H  (9-20)  mg/dL


 


Creatinine   1.84 H  (0.66-1.25)  mg/dL


 


Magnesium  1.5 L   (1.6-2.3)  mg/dL


 


AST   16 L  (17-59)  U/L


 


Total Protein   6.2 L  (6.3-8.2)  g/dL


 


Albumin   2.6 L  (3.5-5.0)  g/dL








                      Microbiology - Last 24 Hours (Table)











 01/18/21 16:18 Blood Culture - Preliminary





 Blood    No Growth after 48 hours














Assessment and Plan


Plan: 





Assessment:


1.  Acute kidney injury mostly prerenal secondary to hypovolemia from vomiting, 

diarrhea and Hyzaar.  Creatinine 7.63 on admission and is 1.84 today.  Baseline 

creatinine near 1 from July 2020.  No hydronephrosis noted on kidney ultrasound.


2.  Metabolic acidosis secondary to acute kidney injury and diarrhea.  Resolved.

 Status post bicarb drip.


3.  Shock status post Levophed.


4.  History of rheumatoid arthritis.


5.  Vomiting and diarrhea.  Possibly gastroenteritis.  C. diff negative.  

Better.


6.  Hypomagnesemia secondary to GI losses and poor intake.  Improved post 

replacement.





Plan:


Decrease normal saline to 70 mL an hour.


Avoid nephrotoxins.


Continue to monitor renal function and urine output.


Anticipate discharge soon.  Follow up outpatient in 1-2 weeks.

## 2021-01-21 NOTE — P.PN
Subjective


Progress Note Date: 01/21/21


This is a 60-year-old gentleman who was recently treated for pneumonia who came 

back with complaints of generalized weakness and fatigue.  A she was found to be

dehydrated.  Patient was not eating well because of nausea and also have was 

having diarrhea.  Patient was given IV fluids.  Since admission,  he is feeling 

much better.  His creatinine is improving.  Patient's with initial EKG shows 

mild ST-T changes.  However second EKG showed normal findings.  Patient is 

feeling better without any chest pain or shortness of breath.  He may have some 

underlying pneumonia which is being addressed.  Patient is being transferred to 

telemetry unit.  We'll follow.





01/21/2020: This patient was readmitted with the complaints of weakness and 

fatigue artery recent admission for pneumonia.  He was found to be in acute 

renal failure and dehydration.  Patient received IV fluids with improvement of 

his renal function and also symptoms.  He did not have any chest pain.  Initial 

EKG did show some mild ST-T abnormalities, but subsequent EKGs were normal.  

Echocardiogram wasn't described as showing an ejection fraction of 40-45% with 

some segmental wall motion defects.  However, upon my personal review of the e

chocardiogram, it appeared there is some atypical motion of the septum but 

overall left ankle function is near normal with an ejection fraction about 50 to

55%.  Patient is not having any any chest pain and feeling much better.  Patient

wants to go home.  Patient is restarted on metoprolol.  I will also put him as 

baby aspirin.  Patient needs further evaluation to rule out underlying ischemic 

heart disease.  It could be done as an outpatient.  Follow-up in the office one 

week after discharge.








Objective





- Vital Signs


Vital signs: 


                                   Vital Signs











Temp  97.8 F   01/21/21 02:00


 


Pulse  80   01/21/21 02:00


 


Resp  20   01/21/21 02:00


 


BP  118/64   01/21/21 02:00


 


Pulse Ox  95   01/21/21 02:00








                                 Intake & Output











 01/20/21 01/21/21 01/21/21





 18:59 06:59 18:59


 


Intake Total 1340 1300 


 


Output Total 955 1100 


 


Balance 385 200 


 


Intake:   


 


  IV 1340 1300 


 


    0.9 Na Cl @ 100ml/hr 1100 1200 


 


    0.9 Na Cl KVO 40  


 


    Magnesium Sulfate-D5w Pmx 200 100 





    1 gm In Dextrose/Water 1   





    100ml.bag @ 100 mls/hr   





    IVPB Q1H [facility] Rx#:   





    [id number]   


 


Output:   


 


  Urine 955 1100 


 


Other:   


 


  Voiding Method Indwelling Catheter Indwelling Catheter 


 


  # Bowel Movements  1 














- Exam





GENERAL EXAM: Patient is alert and oriented and doesn't appear to be in any 

acute distress


HEENT: Normocephalic. Normal reaction of pupils, equal size, normal range of 

extraocular motion. No erythema or exudates in the throat.


NECK: No masses, no nuchal rigidity.


CHEST: No chest wall deformity.


LUNGS: Equal air entry with no crackles or wheeze.


HEART: S1 and S2 normal with no audible mumurs or gallops. Regular rhythm, 

femorals equal on both sides..


ABDOMEN: No hepatosplenomegaly, normal bowel sounds, no guarding or rigidity.


SKIN: No rashes


CENTRAL NERVOUS SYSTEM: No focal deficits.


EXTREMITIES: No cyanosis, clubbing or edema.





- Labs


CBC & Chem 7: 


                                 01/20/21 03:02





                                 01/21/21 03:43


Labs: 


                  Abnormal Lab Results - Last 24 Hours (Table)











  01/20/21 01/21/21 Range/Units





  03:27 03:43 


 


BUN   42 H  (9-20)  mg/dL


 


Creatinine   1.84 H  (0.66-1.25)  mg/dL


 


Magnesium  1.5 L   (1.6-2.3)  mg/dL


 


AST   16 L  (17-59)  U/L


 


Total Protein   6.2 L  (6.3-8.2)  g/dL


 


Albumin   2.6 L  (3.5-5.0)  g/dL








                      Microbiology - Last 24 Hours (Table)











 01/18/21 16:18 Blood Culture - Preliminary





 Blood    No Growth after 48 hours














Assessment and Plan


(1) Essential hypertension


Current Visit: Yes   Status: Acute   Code(s): I10 - ESSENTIAL (PRIMARY) 

HYPERTENSION   SNOMED Code(s): 06062206


   





(2) Acute renal failure


Current Visit: Yes   Status: Acute   Code(s): N17.9 - ACUTE KIDNEY FAILURE, 

UNSPECIFIED   SNOMED Code(s): 58545735


   





(3) Pneumonia


Current Visit: Yes   Status: Acute   Code(s): J18.9 - PNEUMONIA, UNSPECIFIED 

ORGANISM   SNOMED Code(s): 947695410


   





(4) Dehydration


Current Visit: No   Status: Acute   Code(s): E86.0 - DEHYDRATION   SNOMED 

Code(s): 15113238


   





(5) Abnormal EKG


Current Visit: Yes   Status: Acute   Code(s): R94.31 - ABNORMAL 

ELECTROCARDIOGRAM [ECG] [EKG]   SNOMED Code(s): 697936198


   





(6) Abnormal echocardiogram


Current Visit: Yes   Status: Acute   Code(s): R93.1 - ABNORMAL FINDINGS ON DX 

IMAGING OF HEART AND COR CIRC   SNOMED Code(s): 435396388


   


Plan: 


Patient is feeling much better.  Denies any chest pain or shortness of breath.  

His echo Cardigan was described as showing ejection fraction of 45-50% segmental

 wall motion defects.  Upon my personal review.  There appears to be some 

atypical motion of the septum but otherwise function is near normal with 

ejection fraction about 50%.  We'll going to restart him on his metoprolol.  

We'll hold off on ACE inhibitor.  Will also start one baby aspirin.  Patient 

could be evaluated to rule out underlying ischemic heart disease free, as an 

outpatient.  Because of recent acute renal failure, would not consider any 

invasive cardiology workup at this time.  Patient is currently asymptomatic.

## 2021-01-21 NOTE — CDI
Documentation Clarification Form



Date: 01/21/2021 11:32:56 AM

From: Lizet Gilbert RN CCDS

Phone: [phone]

MRN: [id number]

Admit Date: 01/18/2021 03:44:00 PM

Patient Name: Trevon Kaufman

Visit Number: [id number]

Discharge Date:  





ATTENTION: The Clinical Documentation Specialists (CDI) and [facility] Coding Staff 
appreciate your assistance in clarifying documentation. Please respond to the 
clarification below the line at the bottom and electronically sign. The CDI & 
[facility] Coding staff will review the response and follow-up if needed. Please note: 
Queries are made part of the Legal Health Record. If you have any questions, 
please contact the author of this message via ITS.



Dr. Lidya Myers



Septic Shock is documented in the ED Note 1/18



History/Risk Factors: 60-year-old male presents to the ED with generalized 
weakness, decreased oral intake, nausea, vomiting and diarrhea. Recent admission
to hospital for lung infection. Medical history: Rheumatoid lungs with chronic 
interstitial disease. RA and HTN.

Clinical Indicators:

Admitting Diagnosis: Acute hypoxic respiratory failure; ION secondary to ATN and
Hypovolemic shock. 

1/18 Labs: Wbc 20.7; Neutrophils 14.5; Cr 7.63; Lactic acid 2; UA Leukocyte 
esterase small Wbc 19.   

1/19 Urine culture: No growth after 18 hours

1/20 Blood culture: No growth after 48 hours      

1/19: C. difficile Negative

1/18 Documented in the H&P and in Internal Medicine Progress notes 1/19 & 1/20 
SIRS with hypotension, tachycardia and leukocytosis. no identified infection 
so far, we will continue with Levaquin and Zosyn for now, we will obtain sputum 
cultures, blood cultures and we will check stool for C. Diff infection. 

1/19 Documented in the internal medicine progress note: Sputum culture has not 
been obtained.

1/18 CXR: Cardiomegaly with patchy mid and lower lung opacities with small left 
effusion.

1/19 CXR: There are likely chronic pleural parenchymal changes.

Treatment:

Antibiotics: 1/18 Levaquin IVPB x 1; Piperacillin IVPB x1; 1/19 Piperacillin 
IVPB Q12HR d/c 1/20. 

IV Bolus: 1/18 0.9NS 3.5 L bolus 

Other: 1/18 Norepinephrine ivpb; 



In your professional opinion, please clarify if these findings signify one of 
the following conditions, whether the condition is POA, and cause, if known:



   Sepsis ruled out

   SIRS, without underlying infectious process poa

   Sepsis poa

   Other, please specify   _____________

   Unable to determine





SIRS Criteria (2 or more of the following may indicate SIRS):

-Temperature   < 96.8F (36C) or > 101.0F (38.3C)

-Heart Rate   > 90 bpm

-Respiratory Rate   > 20 breaths/min or PaCO2 < 32 mmHg

-White Blood Cell Count   > 12,000 or < 4,000 cells/mm3 or > 10% bands

-Lactate   >2.0 mmol/L (>4.0 is equivalent to septic shock)



(Last Revision: April 2018)



Sepsis with possible gram negative pneumonia present on admission

MTDD

## 2021-01-28 NOTE — CDI
Documentation Clarification Form



Date: 01/28/2021 08:25:47 AM

From: Lizet Gilbert RN CCDS

Phone: [phone]

MRN: [id number]

Admit Date: 01/18/2021 03:44:00 PM

Patient Name: Trevon Kaufman

Visit Number: [id number]

Discharge Date:  01/21/2021 02:57:00 PM





ATTENTION: The Clinical Documentation Specialists (CDI) and [facility] Coding Staff 
appreciate your assistance in clarifying documentation. Please respond to the 
clarification below the line at the bottom and electronically sign. The CDI & 
[facility] Coding staff will review the response and follow-up if needed. Please note: 
Queries are made part of the Legal Health Record. If you have any questions, 
please contact the author of this message via ITS.



Dr. Lidya Myers



The patient presented with the general weakness, nausea, vomiting and decreased 
oral intake for approximately three days. 



History/Risk Factors: 60-year-old male who was recent admission with a lung 
infection. Was prescribed two oral antibiotics by Pulmonology . Medical 
history: Rheumatoid lungs with chronic interstitial disease, RA and HTN

Clinical Indicators: 

Admitting Diagnosis: Acute hypoxic respiratory failure; ION secondary to ATN; 
Hypovolemic shock and possible gastroenteritis 

1/18 VSS: B/P 69/43; ; Temp 98.5 F Oral; RR 18; SpO2 95% 4L nasal cannula 

1/18 Labs: Wbc 20.7; Neutrophils 14.5; Cr 7.63; Lactic acid 2; UA Leukocyte 
esterase small Wbc 19.   

1/19: C. difficile Negative

Treatment: Antibiotics: 1/18 Levaquin IVPB x 1; Piperacillin IVPB x1; 1/19 
Piperacillin IVPB Q12HR d/c 1/20. 

IV Bolus: 1/18 0.9NS 3.5 L bolus 

Other: 1/18 Norepinephrine ivpb; 





In your professional opinion, can you please clarify gastroenteritis?



      Infectious gastroenteritis bacterial 

      Infectious gastroenteritis viral

      Other gastroenteritis (please specify) ___________

      Other, (please specify) ________

      Unable to determine





(Last Revision: April 2018) Other gastroenteritis unable to determine the cause

MTDD

## 2021-01-29 ENCOUNTER — HOSPITAL ENCOUNTER (INPATIENT)
Dept: HOSPITAL 47 - EC | Age: 61
LOS: 21 days | Discharge: HOME HEALTH | DRG: 853 | End: 2021-02-19
Attending: INTERNAL MEDICINE | Admitting: INTERNAL MEDICINE
Payer: COMMERCIAL

## 2021-01-29 VITALS — BODY MASS INDEX: 29.6 KG/M2

## 2021-01-29 DIAGNOSIS — E87.2: ICD-10-CM

## 2021-01-29 DIAGNOSIS — J91.8: ICD-10-CM

## 2021-01-29 DIAGNOSIS — I11.9: ICD-10-CM

## 2021-01-29 DIAGNOSIS — E86.9: ICD-10-CM

## 2021-01-29 DIAGNOSIS — I48.3: ICD-10-CM

## 2021-01-29 DIAGNOSIS — Z80.1: ICD-10-CM

## 2021-01-29 DIAGNOSIS — K21.9: ICD-10-CM

## 2021-01-29 DIAGNOSIS — J94.8: ICD-10-CM

## 2021-01-29 DIAGNOSIS — I48.91: ICD-10-CM

## 2021-01-29 DIAGNOSIS — B44.1: ICD-10-CM

## 2021-01-29 DIAGNOSIS — Z87.442: ICD-10-CM

## 2021-01-29 DIAGNOSIS — A41.89: Primary | ICD-10-CM

## 2021-01-29 DIAGNOSIS — E87.1: ICD-10-CM

## 2021-01-29 DIAGNOSIS — J86.9: ICD-10-CM

## 2021-01-29 DIAGNOSIS — E87.6: ICD-10-CM

## 2021-01-29 DIAGNOSIS — Z79.82: ICD-10-CM

## 2021-01-29 DIAGNOSIS — Z79.899: ICD-10-CM

## 2021-01-29 DIAGNOSIS — Z79.890: ICD-10-CM

## 2021-01-29 DIAGNOSIS — Z98.890: ICD-10-CM

## 2021-01-29 DIAGNOSIS — D63.8: ICD-10-CM

## 2021-01-29 DIAGNOSIS — Z87.01: ICD-10-CM

## 2021-01-29 DIAGNOSIS — N17.0: ICD-10-CM

## 2021-01-29 DIAGNOSIS — J96.21: ICD-10-CM

## 2021-01-29 DIAGNOSIS — Z80.8: ICD-10-CM

## 2021-01-29 DIAGNOSIS — M05.10: ICD-10-CM

## 2021-01-29 DIAGNOSIS — E78.5: ICD-10-CM

## 2021-01-29 DIAGNOSIS — L97.509: ICD-10-CM

## 2021-01-29 DIAGNOSIS — E03.9: ICD-10-CM

## 2021-01-29 DIAGNOSIS — T50.1X5A: ICD-10-CM

## 2021-01-29 DIAGNOSIS — Z87.891: ICD-10-CM

## 2021-01-29 DIAGNOSIS — Z90.89: ICD-10-CM

## 2021-01-29 DIAGNOSIS — Z86.718: ICD-10-CM

## 2021-01-29 DIAGNOSIS — R65.21: ICD-10-CM

## 2021-01-29 DIAGNOSIS — Z20.822: ICD-10-CM

## 2021-01-29 DIAGNOSIS — Z87.39: ICD-10-CM

## 2021-01-29 LAB
ALBUMIN SERPL-MCNC: 3.1 G/DL (ref 3.5–5)
ALP SERPL-CCNC: 190 U/L (ref 38–126)
ALT SERPL-CCNC: 18 U/L (ref 4–49)
ANION GAP SERPL CALC-SCNC: 14 MMOL/L
APTT BLD: 27 SEC (ref 22–30)
AST SERPL-CCNC: 29 U/L (ref 17–59)
BASOPHILS # BLD AUTO: 0.1 K/UL (ref 0–0.2)
BASOPHILS NFR BLD AUTO: 1 %
BUN SERPL-SCNC: 14 MG/DL (ref 9–20)
CALCIUM SPEC-MCNC: 9.3 MG/DL (ref 8.4–10.2)
CHLORIDE SERPL-SCNC: 102 MMOL/L (ref 98–107)
CO2 BLDA-SCNC: 24 MMOL/L (ref 19–24)
CO2 SERPL-SCNC: 19 MMOL/L (ref 22–30)
EOSINOPHIL # BLD AUTO: 0.8 K/UL (ref 0–0.7)
EOSINOPHIL NFR BLD AUTO: 4 %
ERYTHROCYTE [DISTWIDTH] IN BLOOD BY AUTOMATED COUNT: 4.4 M/UL (ref 4.3–5.9)
ERYTHROCYTE [DISTWIDTH] IN BLOOD: 17.2 % (ref 11.5–15.5)
GLUCOSE BLD-MCNC: 86 MG/DL (ref 75–99)
GLUCOSE SERPL-MCNC: 111 MG/DL (ref 74–99)
HCO3 BLDA-SCNC: 23 MMOL/L (ref 21–25)
HCO3 BLDV-SCNC: 21 MMOL/L (ref 24–28)
HCT VFR BLD AUTO: 38.5 % (ref 39–53)
HGB BLD-MCNC: 12.3 GM/DL (ref 13–17.5)
INR PPP: 1.5 (ref ?–1.2)
LYMPHOCYTES # SPEC AUTO: 2 K/UL (ref 1–4.8)
LYMPHOCYTES NFR SPEC AUTO: 10 %
MAGNESIUM SPEC-SCNC: 1.6 MG/DL (ref 1.6–2.3)
MCH RBC QN AUTO: 27.9 PG (ref 25–35)
MCHC RBC AUTO-ENTMCNC: 31.8 G/DL (ref 31–37)
MCV RBC AUTO: 87.7 FL (ref 80–100)
MONOCYTES # BLD AUTO: 1 K/UL (ref 0–1)
MONOCYTES NFR BLD AUTO: 5 %
NEUTROPHILS # BLD AUTO: 15 K/UL (ref 1.3–7.7)
NEUTROPHILS NFR BLD AUTO: 78 %
PCO2 BLDA: 36 MMHG (ref 35–45)
PCO2 BLDV: 36 MMHG (ref 37–51)
PH BLDA: 7.42 [PH] (ref 7.35–7.45)
PH BLDV: 7.38 [PH] (ref 7.31–7.41)
PH UR: 6 [PH] (ref 5–8)
PLATELET # BLD AUTO: 316 K/UL (ref 150–450)
PO2 BLDA: 80 MMHG (ref 83–108)
POTASSIUM SERPL-SCNC: 3.9 MMOL/L (ref 3.5–5.1)
PROT SERPL-MCNC: 7.3 G/DL (ref 6.3–8.2)
PT BLD: 15.3 SEC (ref 9–12)
SODIUM SERPL-SCNC: 135 MMOL/L (ref 137–145)
SP GR UR: >1.05 (ref 1–1.03)
UROBILINOGEN UR QL STRIP: <2 MG/DL (ref ?–2)
WBC # BLD AUTO: 19.1 K/UL (ref 3.8–10.6)

## 2021-01-29 PROCEDURE — 87635 SARS-COV-2 COVID-19 AMP PRB: CPT

## 2021-01-29 PROCEDURE — 83615 LACTATE (LD) (LDH) ENZYME: CPT

## 2021-01-29 PROCEDURE — 85730 THROMBOPLASTIN TIME PARTIAL: CPT

## 2021-01-29 PROCEDURE — 83880 ASSAY OF NATRIURETIC PEPTIDE: CPT

## 2021-01-29 PROCEDURE — 87040 BLOOD CULTURE FOR BACTERIA: CPT

## 2021-01-29 PROCEDURE — 32555 ASPIRATE PLEURA W/ IMAGING: CPT

## 2021-01-29 PROCEDURE — 85027 COMPLETE CBC AUTOMATED: CPT

## 2021-01-29 PROCEDURE — 88305 TISSUE EXAM BY PATHOLOGIST: CPT

## 2021-01-29 PROCEDURE — 86140 C-REACTIVE PROTEIN: CPT

## 2021-01-29 PROCEDURE — 87324 CLOSTRIDIUM AG IA: CPT

## 2021-01-29 PROCEDURE — 36600 WITHDRAWAL OF ARTERIAL BLOOD: CPT

## 2021-01-29 PROCEDURE — 36415 COLL VENOUS BLD VENIPUNCTURE: CPT

## 2021-01-29 PROCEDURE — 93613 INTRACARDIAC EPHYS 3D MAPG: CPT

## 2021-01-29 PROCEDURE — 82805 BLOOD GASES W/O2 SATURATION: CPT

## 2021-01-29 PROCEDURE — 89050 BODY FLUID CELL COUNT: CPT

## 2021-01-29 PROCEDURE — 84484 ASSAY OF TROPONIN QUANT: CPT

## 2021-01-29 PROCEDURE — 85652 RBC SED RATE AUTOMATED: CPT

## 2021-01-29 PROCEDURE — 83735 ASSAY OF MAGNESIUM: CPT

## 2021-01-29 PROCEDURE — 87116 MYCOBACTERIA CULTURE: CPT

## 2021-01-29 PROCEDURE — 80048 BASIC METABOLIC PNL TOTAL CA: CPT

## 2021-01-29 PROCEDURE — 80053 COMPREHEN METABOLIC PANEL: CPT

## 2021-01-29 PROCEDURE — 93662 INTRACARDIAC ECG (ICE): CPT

## 2021-01-29 PROCEDURE — 87102 FUNGUS ISOLATION CULTURE: CPT

## 2021-01-29 PROCEDURE — 71275 CT ANGIOGRAPHY CHEST: CPT

## 2021-01-29 PROCEDURE — 82945 GLUCOSE OTHER FLUID: CPT

## 2021-01-29 PROCEDURE — 82533 TOTAL CORTISOL: CPT

## 2021-01-29 PROCEDURE — 87070 CULTURE OTHR SPECIMN AEROBIC: CPT

## 2021-01-29 PROCEDURE — 99285 EMERGENCY DEPT VISIT HI MDM: CPT

## 2021-01-29 PROCEDURE — 84157 ASSAY OF PROTEIN OTHER: CPT

## 2021-01-29 PROCEDURE — 71046 X-RAY EXAM CHEST 2 VIEWS: CPT

## 2021-01-29 PROCEDURE — 3E043XZ INTRODUCTION OF VASOPRESSOR INTO CENTRAL VEIN, PERCUTANEOUS APPROACH: ICD-10-PCS

## 2021-01-29 PROCEDURE — 93005 ELECTROCARDIOGRAM TRACING: CPT

## 2021-01-29 PROCEDURE — 96361 HYDRATE IV INFUSION ADD-ON: CPT

## 2021-01-29 PROCEDURE — 93653 COMPRE EP EVAL TX SVT: CPT

## 2021-01-29 PROCEDURE — 81003 URINALYSIS AUTO W/O SCOPE: CPT

## 2021-01-29 PROCEDURE — 94760 N-INVAS EAR/PLS OXIMETRY 1: CPT

## 2021-01-29 PROCEDURE — 76000 FLUOROSCOPY <1 HR PHYS/QHP: CPT

## 2021-01-29 PROCEDURE — 85610 PROTHROMBIN TIME: CPT

## 2021-01-29 PROCEDURE — 94640 AIRWAY INHALATION TREATMENT: CPT

## 2021-01-29 PROCEDURE — 83605 ASSAY OF LACTIC ACID: CPT

## 2021-01-29 PROCEDURE — 84132 ASSAY OF SERUM POTASSIUM: CPT

## 2021-01-29 PROCEDURE — 82803 BLOOD GASES ANY COMBINATION: CPT

## 2021-01-29 PROCEDURE — 80202 ASSAY OF VANCOMYCIN: CPT

## 2021-01-29 PROCEDURE — 92960 CARDIOVERSION ELECTRIC EXT: CPT

## 2021-01-29 PROCEDURE — 87206 SMEAR FLUORESCENT/ACID STAI: CPT

## 2021-01-29 PROCEDURE — 96365 THER/PROPH/DIAG IV INF INIT: CPT

## 2021-01-29 PROCEDURE — 02HV33Z INSERTION OF INFUSION DEVICE INTO SUPERIOR VENA CAVA, PERCUTANEOUS APPROACH: ICD-10-PCS

## 2021-01-29 PROCEDURE — 74177 CT ABD & PELVIS W/CONTRAST: CPT

## 2021-01-29 PROCEDURE — 96367 TX/PROPH/DG ADDL SEQ IV INF: CPT

## 2021-01-29 PROCEDURE — 87205 SMEAR GRAM STAIN: CPT

## 2021-01-29 PROCEDURE — 85025 COMPLETE CBC W/AUTO DIFF WBC: CPT

## 2021-01-29 PROCEDURE — 71045 X-RAY EXAM CHEST 1 VIEW: CPT

## 2021-01-29 RX ADMIN — ACETAMINOPHEN PRN MG: 325 TABLET, FILM COATED ORAL at 18:45

## 2021-01-29 RX ADMIN — NOREPINEPHRINE BITARTRATE SCH MLS/HR: 1 INJECTION, SOLUTION, CONCENTRATE INTRAVENOUS at 18:04

## 2021-01-29 RX ADMIN — ASPIRIN 81 MG CHEWABLE TABLET SCH MG: 81 TABLET CHEWABLE at 22:18

## 2021-01-29 RX ADMIN — IPRATROPIUM BROMIDE AND ALBUTEROL SULFATE SCH ML: .5; 3 SOLUTION RESPIRATORY (INHALATION) at 20:38

## 2021-01-29 RX ADMIN — CEFAZOLIN SCH MLS/HR: 330 INJECTION, POWDER, FOR SOLUTION INTRAMUSCULAR; INTRAVENOUS at 15:01

## 2021-01-29 RX ADMIN — Medication SCH MCG: at 22:17

## 2021-01-29 RX ADMIN — PANTOPRAZOLE SODIUM SCH MG: 40 INJECTION, POWDER, FOR SOLUTION INTRAVENOUS at 18:06

## 2021-01-29 RX ADMIN — HEPARIN SODIUM SCH UNIT: 5000 INJECTION, SOLUTION INTRAVENOUS; SUBCUTANEOUS at 18:06

## 2021-01-29 RX ADMIN — OXYCODONE HYDROCHLORIDE AND ACETAMINOPHEN SCH MG: 500 TABLET ORAL at 22:17

## 2021-01-29 NOTE — CT
EXAMINATION TYPE: CT abdomen pelvis w con

 

DATE OF EXAM: 1/29/2021

 

COMPARISON: CT 1/10/2017

 

HISTORY: Elevated d-dimer, abdominal pain

 

CT DLP: 1227.9 mGycm

Automated exposure control for dose reduction was used.

 

TECHNIQUE:  Helical acquisition of images from the lung bases through the pelvis have been completed.


 

CONTRAST: 

Performed without Oral Contrast and with IV Contrast, patient injected with 80 mL of Isovue 370.

 

FINDINGS: Small umbilical hernia contains fat.

 

LUNG BASES: Bilateral trapped lungs, possible chronic empyema are again noted.

 

AORTA:  No significant abnormality is appreciated.

 

LIVER/GB: e.

 

PANCREAS: No significant abnormality is seen.

 

SPLEEN: Subcentimeter low dense focus is indeterminate..

 

ADRENALS: No significant abnormality is seen.

 

KIDNEYS: No significant interval change is seen, nonobstructive calculi present within the left kidne
y, no hydronephrosis or ureteral calculus, low dense foci associated with the kidneys as on prior exa
m, dominant cyst at the upper pole left kidney is exophytic measures 4.4 cm. Insufficient delay to as
sess for renal excretion.

 

 

REPRODUCTIVE ORGANS: No prostate calcifications.

 

BOWEL:  No significant abnormality is seen.

 

FREE AIR:  No Free Air visible.

 

ASCITES:  None visible.

 

PELVIC ADENOPATHY:  None visualized.

 

RETROPERITONEAL ADENOPATHY:  No Retroperitoneal Adenopathy visible.

 

URINARY BLADDER:  No significant abnormality is seen.

 

OSSEOUS STRUCTURES:  Degenerative disc changes are again noted within the lumbar spine, there is asso
ciated facet arthropathy.

 

IMPRESSION: 

NO ACUTE INTRA-ABDOMINAL ABNORMALITY, ADDITIONAL FINDINGS ABOVE.

## 2021-01-29 NOTE — CT
EXAMINATION TYPE: CT angio chest

 

DATE OF EXAM: 1/29/2021 11:16 AM

 

COMPARISON: CT chest December 29, 2020 and older studies

 

HISTORY: Elevated d-dimer, abdominal pain. History of rheumatoid lung disease.

 

CT DLP: 481 mGycm

Automated exposure control for dose reduction was used.

 

CONTRAST: 

CTA scan of the thorax is performed with IV Contrast, patient injected with 100 mL of Isovue 370, pul
monary embolism protocol.  MIP images are created and reviewed.  

 

FINDINGS:

 

LUNGS: Thick-walled pleural cavities posteriorly in the lower lungs is redemonstrated with left-sided
 air-fluid level comment degree of left-sided pleural fluid diminished from most recent CT. There is 
associated compressive atelectasis and/or chronic consolidation. Persistent azygos lobe/fissure. Jim
t multifocal areas of reticulonodular opacity in the upper lungs remain present, slight nodularity le
ft upper lobe again seen. Areas of focal consolidation improved from most recent study though some re
sidual areas of scarring and scar like consolidation remain present.

 

MEDIASTINUM: There is satisfactory enhancement of the pulmonary artery and its branches, there is no 
CT evidence for pulmonary embolism.  No ascending aortic aneurysm or dissection. Persistent abnormal 
enlarged bilateral hilar lymph nodes.. Tiny pericardial effusion is seen. Mild cardiomegaly. Coronary
 artery calcification redemonstrated.

 

 

OTHER:  Small hiatal hernia.

 

 

 

IMPRESSION: No CT evidence for acute pulmonary embolism. Chronic parenchymal changes from known rheum
atoid lung disease redemonstrated. Underlying acute pulmonary process is possible in the upper lungs,
 though findings are improved from most recent CT they have not resolved.

## 2021-01-29 NOTE — P.HPIM
History of Present Illness


H&P Date: 21


Chief Complaint: Right loculated empyema with sepsis





This is a 60-year-old  male patient requesting my service with past 

medical history of rheumatoid arthritis on Arava that was diagnosed when he was 

37 year old initially was started on Methotrexate that he could not tolerate 

then placed on Embrel but he developed side effects and finally was tried on 

Humira injection but he developed neurologic sided effects and was hospitaized 

at [facility] for quiet some time, rheumatoid lung disease, 

previous history of loculated empyema with chest tube placement in 2020, 

hypertension, hypothyroidism, history of DVT, remote history of tobacco use and 

dependence, was recently hospitalized at [facility] after he was 

admitted for an acute kidney injury with significant metabolic acidosis 

associated with the elevated creatinine and elevated BUN and hypotension at that

time he was seen and evaluated by pulmonary and critical care medicine, he had a

central line placed and at that time, he was placed on Levophed drip he was 

placed on IV anabiotic as well but the patient recovered very fast and he had an

echocardiogram that showed normal LV function and segmental hypokinesia, he was 

supposed to follow-up with Dr. Santo in the office today, patient was seen in my

office 24 hours ago when he was complaining of increased shortness breath, at 

that time his oxygenation could not be checked because of his Case's and cold

extremities, he was sent for a chest x-ray that showed right lower lobe 

infiltrate as well as blood tests that showed a white count of 20,000 patient 

was feeling better he was started on oral antibiotic in the form of Levaquin 500

mg orally once every day, and that he was supposed to follow-up with me as an 

outpatient every week he went to see his cardiologist today and he had an 

episode when he was dizzy lightheaded and an episode of vomiting as well and he 

was sent to the emergency department for evaluation had a computed tomography of

the chest as well as abdomen and pelvis that showed a thick walled pleural 

cavities posteriorly in the lower lungs with left-sided air-fluid level that has

diminished in size from the prior computed tomography scan when he was in the 

hospital a few weeks back there is also associated compressive atelectasis and 

chronic consultation with multifocal areas of reticular nodular opacity in the 

upper lungs with a slight nodularity of the left upper lobe again this area of f

ocal consultation has improved from the previous study that was done few weeks 

ago, patient was started on IV antibiotic with vancomycin and Zosyn as well as 

IV fluid, he was seen in consultation by pulmonary critical care in the 

emergency department as the patient blood pressure dropped and that he'll be 

transferred to the intensive care unit at this point in time I had long 

conversation with the patient and his wife at the bedside and the patient may 

need to have a chest tube placed for his possible right empyema, he did receive 

3 L normal saline in the ER, and his blood pressure is marginal he may need to 

go on  pressors if  not recovered.





Review of Systems


Constitutional: Reports anorexia, Reports chronic pain, Reports fatigue, Reports

weakness, Reports weight loss


Eyes: denies blurred vision, denies bulging eye, denies decreased vision, denies

diplopia


Ears, nose, mouth and throat: Denies dysphagia, Denies neck lump, Denies sore 

throat


Respiratory: Reports dyspnea, Reports respiratory infections, Denies congestion,

Denies cough with sputum, Denies home oxygen, Denies sleep apnea, Denies 

snoring, Denies wheezing


Gastrointestinal: Reports bloating, Reports change in bowel habits, Reports 

diarrhea, Reports early satiety, Reports indigestion, Reports loss of appetite, 

Reports nausea, Reports vomiting, Denies abdominal pain, Denies belching, Denies

heartburn, Denies hematemesis, Denies hematochezia, Denies melena


Genitourinary: Denies dysuria, Denies nocturia


Musculoskeletal: Denies myalgias


Musculoskeletal: absent: ankle pain, ankle stiffness, ankle swelling, elbow 

pain, elbow stiffness, elbow swelling, foot pain, foot stiffness, foot swelling,

hand pain, hand stiffness, hand swelling, hip pain, hip stiffness, hip swelling,

knee pain, knee stiffness, knee swelling, shoulder pain, shoulder stiffness, 

shoulder swelling, wrist pain, wrist stiffness, wrist swelling


Integumentary: Denies pruritus, Denies rash


Neurological: Denies numbness, Denies weakness


Psychiatric: Denies anxiety, Denies depression


Endocrine: Denies fatigue, Denies weight change





Past Medical History


Past Medical History: Deep Vein Thrombosis (DVT), GERD/Reflux, Hyperlipidemia, 

Hypertension, Neurologic Disorder, Renal Disease, Respiratory Disorder, 

Rheumatoid Arthritis (RA), Thyroid Disorder


Additional Past Medical History / Comment(s): Pt recently admitted to [facility] on 

21 with acute hypoxic respiratory failure 2ndary to hypovolemia from N/V/D,

acute kidney injury, hypovolemic shock.     Other hx:  Rheumatoid arthritis, 

rheumatoid lungs with chronic interstitial lung disease maintained on Arava and 

chronic steroids in the past, previous history of pneumonia/empyema with 

Streptococcus and the patient required chest tube drainage - 2020, past 

urinary retention, hypothyroidism, previous history of R foot infections 

requiring PICC line insertion, history of DVT L arm, nephrolithiasis, recent 

hospitalization where he required one hemodialysis session.


History of Any Multi-Drug Resistant Organisms: None Reported


Past Surgical History: Back Surgery, Orthopedic Surgery, Tonsillectomy


Additional Past Surgical History / Comment(s): Temporary hemodialysis cath, 

lithotripsy, colonoscopy, bronchoscopies, back surgery d/t herniated disc, 

bilateral carpal tunnel releases, bunions removed from feet, R foot plate for 

arch since removed, benign nodule off R hip, bilateral wrist ganglion cyst 

removals


Past Anesthesia/Blood Transfusion Reactions: Previous Problems w/ Anesthesia


Additional Past Anesthesia/Blood Transfusion Reaction / Comment(s): slow to 

awaken x1.


Smoking Status: Former smoker





- Past Family History


  ** Mother


Family Medical History: Cancer


Additional Family Medical History / Comment(s): lung with mets brain





  ** Father


Family Medical History: No Reported History


Additional Family Medical History / Comment(s): states, "he just ."





  ** Brother(s)


Family Medical History: No Reported History





  ** Sister(s)


Family Medical History: No Reported History


Additional Family Medical History / Comment(s): Patient has 2 step daughters.





Medications and Allergies


                                Home Medications











 Medication  Instructions  Recorded  Confirmed  Type


 


Levothyroxine Sodium [Synthroid] 150 mcg PO DAILY 17 History


 


Albuterol Inhaler [Ventolin Hfa 2 puff INHALATION RT-QID PRN 06/10/20 01/29/21 

History





Inhaler]    


 


Ascorbic Acid [Vitamin C] 2,000 mg PO HS 20 History


 


Cholecalciferol [Vitamin D3 (25 2,000 unit PO HS 20 History





Mcg = 1000 Iu)]    


 


Ipratropium-Albuterol Nebulize 3 ml INHALATION RT-QID PRN 20 

History





[Duoneb 0.5 mg-3 mg/3 ml Soln]    


 


Aspirin 81 mg PO HS  chew 21 Rx


 


Metoprolol Succinate (ER) [Toprol 25 mg PO DAILY #30 tab.er.24h 21 Rx





XL]    


 


Levofloxacin [Levaquin] 250 mg PO DAILY 21 History


 


Ondansetron [Zofran] 4 mg PO QID PRN 21 History








                                    Allergies











Allergy/AdvReac Type Severity Reaction Status Date / Time


 


No Known Allergies Allergy   Verified 21 10:37














Physical Exam


Vitals: 


                                   Vital Signs











  Temp Pulse Resp BP Pulse Ox


 


 21 16:00  98.9 F  111 H  21   95


 


 21 15:41   100  20  94/56  95


 


 21 15:18   100  19  94/56  95


 


 21 15:07   101 H  22  67/40  93 L


 


 21 14:30   106 H  17  91/56  98


 


 21 14:22   106 H  18  87/54  96


 


 21 14:00   105 H  20  84/53  96


 


 21 13:00   108 H  17  111/85  96


 


 21 12:30   114 H  18  80/51  96


 


 21 12:00   116 H  22  81/53  95


 


 21 11:15  98.8 F  118 H  22  100/60  93 L


 


 21 10:00   126 H  24  144/106  92 L


 


 21 09:30   129 H  28 H  131/104  93 L


 


 21 09:02  98.9 F  118 H  22  123/106  93 L








                                Intake and Output











 21





 06:59 14:59 22:59


 


Other:   


 


  Weight  95.254 kg 














Physical examination:








General: patient is 60 year old lying down in bed in moderate distress.





HEENT: head ia atraumatic normocephalic, Pupils were equal round reactive to 

light and accommodations , extra ocular muscle movement were intact, mucous 

membranes of the mouth are somewhat dry.





Neck: supple no JVP.'





Chest: decreased breath sounds at he bases with few ronchi no expiratory wheezes

 no chest wall tendernes or intercostal retractions.





Heart: first heart sound is depressed, second heart sound is normal tachycardic 

there is HOOD 2/6 located at the left sternal border.





Abdomen: soft, mild tenderness to the left lower quadrant positive bowel sounds,

 no guarding or rebound tenderness, no hepatosplenomegaly.





Extremities: there is no edema or calf tenderness DP+ 2 Bilaterally, there is 

what appears to be a charcot joint in the right foot form arch collapse.





Neurologic examination: patient is awake , alert and oriented X 3 CN II-XII are 

grossly intact, muscle power 4/5 in bilateral upper and lower extremities, deep 

tendon reflexes were normal.








Results


CBC & Chem 7: 


                                 21 09:21 09:26


Labs: 


                  Abnormal Lab Results - Last 24 Hours (Table)











  21 Range/Units





  09: 09: 09:26 


 


WBC  19.1 H    (3.8-10.6)  k/uL


 


Hgb  12.3 L    (13.0-17.5)  gm/dL


 


Hct  38.5 L    (39.0-53.0)  %


 


RDW  17.2 H    (11.5-15.5)  %


 


Neutrophils #  15.0 H    (1.3-7.7)  k/uL


 


Eosinophils #  0.8 H    (0-0.7)  k/uL


 


PT   15.3 H   (9.0-12.0)  sec


 


INR   1.5 H   (<1.2)  


 


ABG pO2     ()  mmHg


 


VBG pCO2     (37-51)  mmHg


 


VBG HCO3     (24-28)  mmol/L


 


Sodium    135 L  (137-145)  mmol/L


 


Carbon Dioxide    19 L  (22-30)  mmol/L


 


Creatinine    1.32 H  (0.66-1.25)  mg/dL


 


Glucose    111 H  (74-99)  mg/dL


 


Plasma Lactic Acid Naif     (0.7-2.0)  mmol/L


 


Alkaline Phosphatase    190 H  ()  U/L


 


Albumin    3.1 L  (3.5-5.0)  g/dL


 


Ur Specific Gravity     (1.001-1.035)  


 


Urine Protein     (Negative)  














  21 Range/Units





  09: 09: 10:31 


 


WBC     (3.8-10.6)  k/uL


 


Hgb     (13.0-17.5)  gm/dL


 


Hct     (39.0-53.0)  %


 


RDW     (11.5-15.5)  %


 


Neutrophils #     (1.3-7.7)  k/uL


 


Eosinophils #     (0-0.7)  k/uL


 


PT     (9.0-12.0)  sec


 


INR     (<1.2)  


 


ABG pO2    80 L  ()  mmHg


 


VBG pCO2   36 L   (37-51)  mmHg


 


VBG HCO3   21 L   (24-28)  mmol/L


 


Sodium     (137-145)  mmol/L


 


Carbon Dioxide     (22-30)  mmol/L


 


Creatinine     (0.66-1.25)  mg/dL


 


Glucose     (74-99)  mg/dL


 


Plasma Lactic Acid Naif  3.7 H*    (0.7-2.0)  mmol/L


 


Alkaline Phosphatase     ()  U/L


 


Albumin     (3.5-5.0)  g/dL


 


Ur Specific Gravity     (1.001-1.035)  


 


Urine Protein     (Negative)  














  21 Range/Units





  12:50 


 


WBC   (3.8-10.6)  k/uL


 


Hgb   (13.0-17.5)  gm/dL


 


Hct   (39.0-53.0)  %


 


RDW   (11.5-15.5)  %


 


Neutrophils #   (1.3-7.7)  k/uL


 


Eosinophils #   (0-0.7)  k/uL


 


PT   (9.0-12.0)  sec


 


INR   (<1.2)  


 


ABG pO2   ()  mmHg


 


VBG pCO2   (37-51)  mmHg


 


VBG HCO3   (24-28)  mmol/L


 


Sodium   (137-145)  mmol/L


 


Carbon Dioxide   (22-30)  mmol/L


 


Creatinine   (0.66-1.25)  mg/dL


 


Glucose   (74-99)  mg/dL


 


Plasma Lactic Acid Naif   (0.7-2.0)  mmol/L


 


Alkaline Phosphatase   ()  U/L


 


Albumin   (3.5-5.0)  g/dL


 


Ur Specific Gravity  >1.050 H  (1.001-1.035)  


 


Urine Protein  Trace H  (Negative)  














Thrombosis Risk Factor Assmnt





- DVT/VTE Prophylaxis


DVT/VTE Prophylaxis: Pharmacologic Prophylaxis ordered, Mechanical Prophylaxis 

ordered





- Choose All That Apply


Any of the Below Risk Factors Present?: Yes


Each Factor Represents 1 point: Age 41-60 years, Obesity (BMI >25), Serious lung

 disease incl. pneumonia (< 1month)


Other Risk Factors: Yes


Each Risk Factor Represents 3 Points: History of DVT/PE


Other congenital or acquired thrombophilia - If yes, enter type in comment: No


Thrombosis Risk Factor Assessment Total Risk Factor Score: 6


Thrombosis Risk Factor Assessment Level: High Risk





Assessment and Plan


Assessment: 





Assessment and plan:








1.  Acute hypoxemic respiratory failure secondary to loculated left-sided 

pleural effusion and possible empyema with what appears to be recurrent pn

eumonia.  Patient was admitted to the intensive care unit, he was started on IV 

fluid resuscitation, he may be started on Levophed, continue IV antibiotic in 

the form of a question of Zosyn, ID consultation, also interventional radiology 

consultation for possible thoracentesis of the left loculated pleural fluid for 

Gram stain and culture as well as LDH protein as well as pH for possible empyema

 with thoracic surgery consultation for possible VATS versus decortication and 

chest tube placement.





2.  Lactic acidosis.  Continue IV fluid resuscitation, continue with IV and 

biotic with a question of Zosyn, repeat lactic acid is 6 hours.





3.  Sepsis with possible septic shock.  Continue IV fluid resuscitation, 

continue IV antibiotic, start Levothroid drip to keep his mean greater than 65 

mmHg.





4.  Acute kidney injury due to poor oral intake of fluid as well as hypotension 

with acute tubular necrosis.  Continue IV fluid, continue to monitor the patient

 CMP continue to monitor urine output.





5.  Leukocytosis secondary to severe sepsis.  Continue IV fluid, continue IV 

antibiotic, repeat CBC tomorrow morning.





6.  Rheumatoid arthritis and rheumatoid lung.  Arava and Xeljanz had been on 

hold since October





7.  History of empyema with Streptococcus requiring chest tube.  His is a 

similar scenario may need a cardiothoracic surgery consultation.





8. Hypertension and hypertensive cardiovascular disease. currently hypotensive .

  Continue the IV fluid currently as well as IV pressors if needed





9. Hypothyroidism . we will continue with Synthroid 150 mcg orally daily.





10. DVT prophylaxis. we will start Lovenox 40 mg SC daily and bilateral knee 

high Fahad Hose





11. GI prophylaxis. we will continue with Protonix 40 mg IVP daily.





12. Admits to inpatient , estimated length of stay 2 midnights.





13. Full code.

## 2021-01-29 NOTE — XR
EXAMINATION TYPE: XR chest 1V portable

 

DATE OF EXAM: 1/29/2021

 

HISTORY: Shortness of breath.

 

COMPARISON: 1/19/2021

 

TECHNIQUE: Single view of the chest is submitted.

 

FINDINGS:

Demonstrated are scattered senescent parenchymal change.  

 

Perihilar and basilar infiltrates are noted without significant change.

 

The heart is stable.

 

Hilar and mediastinal structures are within normal limits.  

 

Degenerative changes are seen of the dorsal spine. 

 

 IMPRESSION: 

 

1.  Perihilar and basilar infiltrates are noted without significant change.

## 2021-01-29 NOTE — P.CNPUL
History of Present Illness


Consult date: 21


Requesting physician: Timo Parrish


Reason for consult: dyspnea, hypoxemia, abnormal CXR/CT


Chief complaint: Dyspnea, hypoxia


History of present illness: 


 60-year-old white male patient, with past medical history of rheumatoid 

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 

episodes of pneumonia and history of loculated empyema requiring chest tube 

placement back in 2020 with pleural fluid cultures positive for 

streptococcal pneumonia.  Other medical history includes hypertension, 

hypothyroidism and previous history of DVT.  Patient had a recent hospitaliza

tion from  through 2021 hypotension, dehydration related to 

nausea vomiting diarrhea, acute kidney injury.  Patient received antibiotics in 

the form of Zosyn and Levaquin for possibility of pneumonia, he is chest x-ray 

showed cranial perihilar pulmonary infiltrates with increased interstitial 

changes at the lung bases and was thought to be related to a combination of 

rheumatoid lung and chronic scarring.  His last CT of the chest was on 

2020 showing chronic changes to lower lungs, persistent irregular thick-

walled pleural spaces in the posterior lung bases containing fluid and air with 

adjacent anterior lung with chronic consolidation or atelectasis.  On 2021

patient came into the emergency department from Dr. SARNIA Santo's office where he 

was being seen for a regular follow-up appointments.  He was noted to have low 

pulse ox 70s and was sent in to the emergency department for evaluation.  He has

been having cough with yellow and sometimes blood-tinged sputum, appears 

amounts, denies any fever, COVID 19 was found to be negative, chest x-ray showed

perihilar and basilar infiltrates without significant change from previous chest

x-ray on 2021.  Lab data showed leukocytosis with white blood cell count 

of 19.1, hemoglobin of 12.1, INR 1.5, sodium is 135, potassium is 3.9, chloride 

is 102, CO2 is 19, creatinine is 1.32, which has actually improved since his 

discharge from the hospital on 2021 when he was at 1.84.  Lactic acid was 

elevated at 3.7, patient was given a total of 2 L in fluid boluses, troponin was

negative at less than 0.012.  Urinalysis showed no evidence of infection.  CT 

chest showed thick-walled pleural cavities posteriorly in the lower lungs with 

left-sided air-fluid level with left-sided pleural fluid diminished most recent 

CT from 2020.  There is associated compressive atelectasis and/or chronic 

consolidation.  Persistent as it is lobe fissure, multifocal areas of reticular 

nodular opacity in the upper lungs remain present with slight nodularity in the 

left upper lobe.  There is a focal consolidation improved from most recent study

with some residual areas of scarring.  We will emergency department patient 

became hypotensive with a blood pressure in the 70s, slightly tachycardic 

remains in sinus mechanism, he is receiving his third liter bolus, his lactic 

acid did respond and improved he was started on antibiotics in the form of Zosyn

and vancomycin 








Review of Systems


All systems: negative


Constitutional: Denies chills, Denies fever


Eyes: denies blurred vision, denies pain


Ears, nose, mouth and throat: Denies headache, Denies sore throat


Cardiovascular: Denies chest pain, Denies shortness of breath


Respiratory: Reports dyspnea, Reports respiratory infections, Denies cough


Gastrointestinal: Denies abdominal pain, Denies diarrhea, Denies nausea, Denies 

vomiting


Musculoskeletal: Denies myalgias


Integumentary: Denies pruritus, Denies rash


Neurological: Denies numbness, Denies weakness


Psychiatric: Denies anxiety, Denies depression


Endocrine: Denies fatigue, Denies weight change





Past Medical History


Past Medical History: Deep Vein Thrombosis (DVT), GERD/Reflux, Hyperlipidemia, 

Hypertension, Neurologic Disorder, Renal Disease, Respiratory Disorder, 

Rheumatoid Arthritis (RA), Thyroid Disorder


Additional Past Medical History / Comment(s): Pt recently admitted to [facility] on 

21 with acute hypoxic respiratory failure 2ndary to hypovolemia from N/V/D,

acute kidney injury, hypovolemic shock.     Other hx:  Rheumatoid arthritis, 

rheumatoid lungs with chronic interstitial lung disease maintained on Arava and 

chronic steroids in the past, previous history of pneumonia/empyema with 

Streptococcus and the patient required chest tube drainage - 2020, past 

urinary retention, hypothyroidism, previous history of R foot infections 

requiring PICC line insertion, history of DVT L arm, nephrolithiasis, recent h

ospitalization where he required one hemodialysis session.


History of Any Multi-Drug Resistant Organisms: None Reported


Past Surgical History: Back Surgery, Orthopedic Surgery, Tonsillectomy


Additional Past Surgical History / Comment(s): Temporary hemodialysis cath, 

lithotripsy, colonoscopy, bronchoscopies, back surgery d/t herniated disc, 

bilateral carpal tunnel releases, bunions removed from feet, R foot plate for 

arch since removed, benign nodule off R hip, bilateral wrist ganglion cyst 

removals


Past Anesthesia/Blood Transfusion Reactions: Previous Problems w/ Anesthesia


Additional Past Anesthesia/Blood Transfusion Reaction / Comment(s): slow to 

awaken x1.


Smoking Status: Former smoker





- Past Family History


  ** Mother


Family Medical History: Cancer


Additional Family Medical History / Comment(s): lung with mets brain





  ** Father


Family Medical History: No Reported History


Additional Family Medical History / Comment(s): states, "he just ."





  ** Brother(s)


Family Medical History: No Reported History





  ** Sister(s)


Family Medical History: No Reported History


Additional Family Medical History / Comment(s): Patient has 2 step daughters.





Medications and Allergies


                                Home Medications











 Medication  Instructions  Recorded  Confirmed  Type


 


Levothyroxine Sodium [Synthroid] 150 mcg PO DAILY 17 History


 


Albuterol Inhaler [Ventolin Hfa 2 puff INHALATION RT-QID PRN 06/10/20 01/29/21 

History





Inhaler]    


 


Ascorbic Acid [Vitamin C] 2,000 mg PO HS 20 History


 


Cholecalciferol [Vitamin D3 (25 2,000 unit PO HS 20 History





Mcg = 1000 Iu)]    


 


Ipratropium-Albuterol Nebulize 3 ml INHALATION RT-QID PRN 20 

History





[Duoneb 0.5 mg-3 mg/3 ml Soln]    


 


Aspirin 81 mg PO HS  chew 21 Rx


 


Metoprolol Succinate (ER) [Toprol 25 mg PO DAILY #30 tab.er.24h 21 Rx





XL]    


 


Levofloxacin [Levaquin] 250 mg PO DAILY 21 History


 


Ondansetron [Zofran] 4 mg PO QID PRN 21 History








                                    Allergies











Allergy/AdvReac Type Severity Reaction Status Date / Time


 


No Known Allergies Allergy   Verified 21 10:37














Physical Exam


Vitals: 


                                   Vital Signs











  Temp Pulse Resp BP Pulse Ox


 


 21 14:30   106 H  17  91/56  98


 


 21 14:22   106 H  18  87/54  96


 


 21 14:00   105 H  20  84/53  96


 


 21 13:00   108 H  17  111/85  96


 


 21 12:30   114 H  18  80/51  96


 


 21 12:00   116 H  22  81/53  95


 


 21 11:15  98.8 F  118 H  22  100/60  93 L


 


 21 10:00   126 H  24  144/106  92 L


 


 21 09:30   129 H  28 H  131/104  93 L


 


 21 09:02  98.9 F  118 H  22  123/106  93 L








                                Intake and Output











 21





 22:59 06:59 14:59


 


Other:   


 


  Weight   95.254 kg











 GENERAL EXAM: Alert, pleasant, 60-year-old white male, on room air with a pulse

 ox of 98% resting on the gurney in the emergency department in the reverse Tr

endelenburg position currently getting IV fluid boluses for hypotension, alert 

and responding appropriately comfortable in no apparent distress.


HEAD: Normocephalic/atraumatic.


EYES: Normal reaction of pupils, equal size.  Conjunctiva pink, sclera white.


NOSE: Clear with pink turbinates.


THROAT: No erythema or exudates.


NECK: No masses, no JVD, no thyroid enlargement, no adenopathy.


CHEST: No chest wall deformity.  Symmetrical expansion. 


LUNGS: Equal air entry with no crackles, wheeze, rhonchi or dullness.


CVS: Regular rate and rhythm, normal S1 and S2, no gallops, no murmurs, no rubs


ABDOMEN: Soft, nontender.  No hepatosplenomegaly, normal bowel sounds, no 

guarding or rigidity.


EXTREMITIES: No clubbing, no edema, no cyanosis, 2+ pulses and upper and lower 

extremities.


MUSCULOSKELETAL: Muscle strength and tone normal.


SPINE: No scoliosis or deformity


SKIN: No rashes


CENTRAL NERVOUS SYSTEM: Alert and oriented -3.  No focal deficits, tone is 

normal in all 4 extremities.


PSYCHIATRIC: Alert and oriented -3.  Appropriate affect.  Intact judgment and 

insight.











Results





- Laboratory Findings


CBC and BMP: 


                                 21 09:26





                                 21 09:26


ABG











ABG pH  7.42  (7.35-7.45)   21  10:31    


 


ABG pCO2  36 mmHg (35-45)   21  10:31    


 


ABG pO2  80 mmHg ()  L  21  10:31    


 


ABG O2 Saturation  96.2 % (94-97)   21  10:31    





PT/INR, D-dimer











PT  15.3 sec (9.0-12.0)  H  21  09:26    


 


INR  1.5  (<1.2)  H  21  09:26    








Abnormal lab findings: 


                                  Abnormal Labs











  21





  09:26 09:26 09:26


 


WBC  19.1 H  


 


Hgb  12.3 L  


 


Hct  38.5 L  


 


RDW  17.2 H  


 


Neutrophils #  15.0 H  


 


Eosinophils #  0.8 H  


 


PT   15.3 H 


 


INR   1.5 H 


 


ABG pO2   


 


VBG pCO2   


 


VBG HCO3   


 


Sodium    135 L


 


Carbon Dioxide    19 L


 


Creatinine    1.32 H


 


Glucose    111 H


 


Plasma Lactic Acid Naif   


 


Alkaline Phosphatase    190 H


 


Albumin    3.1 L


 


Ur Specific Gravity   


 


Urine Protein   














  21





  09:26 09:26 10:31


 


WBC   


 


Hgb   


 


Hct   


 


RDW   


 


Neutrophils #   


 


Eosinophils #   


 


PT   


 


INR   


 


ABG pO2    80 L


 


VBG pCO2   36 L 


 


VBG HCO3   21 L 


 


Sodium   


 


Carbon Dioxide   


 


Creatinine   


 


Glucose   


 


Plasma Lactic Acid Naif  3.7 H*  


 


Alkaline Phosphatase   


 


Albumin   


 


Ur Specific Gravity   


 


Urine Protein   














  21





  12:50


 


WBC 


 


Hgb 


 


Hct 


 


RDW 


 


Neutrophils # 


 


Eosinophils # 


 


PT 


 


INR 


 


ABG pO2 


 


VBG pCO2 


 


VBG HCO3 


 


Sodium 


 


Carbon Dioxide 


 


Creatinine 


 


Glucose 


 


Plasma Lactic Acid Naif 


 


Alkaline Phosphatase 


 


Albumin 


 


Ur Specific Gravity  >1.050 H


 


Urine Protein  Trace H














- Diagnostic Findings


Chest x-ray: report reviewed, image reviewed


CT scan - chest: report reviewed, image reviewed





Assessment and Plan


Plan: 


 Assessment:





#1.  Acute hypoxic respiratory failure related to sepsis, septic shock,  related

 to pneumonia, with chronic loculated effusions, rule out possibility of 

empyema. COVID 19 was ruled out per PCR





#2.  Recent admission to the hospital for hypotension, dehydration related to 

nausea vomiting diarrhea, acute kidney injury





#3.  Recurrent pulmonary infections, with history of streptococcal pneumonia and

 empyema requiring chest tube placement in 2020





#4.  Chronic loculated pneumothoraces, posterior, with air-fluid level seen on 

the CT imaging of the chest, the possibility of trapped lung, chronic scarring 

and possibility of empyema needs to be ruled out





#5.  Elevated d-dimer with no CT evidence for pulmonary embolism





#6.  History of rheumatoid arthritis and rheumatoid lung disease on Arava





#7.  Recent history of acute kidney injury, and admission lab work today shows 

improvement in his renal function





#8.  Hypertension





#9.  Hypothyroidism





#10.  Previous history of foot infections requiring antibiotics





#11.  Previous history of DVT





Plan:





The patient additional 1 L and IV fluid bolus, patient has been started on 

broad-spectrum antibiotics including Zosyn and vancomycin, she will likely 

require vasopressor support, GI and DVT prophylaxis, infectious disease service 

has been consulted for antibiotic management, we spoke to interventional 

radiology in regards to possibility of Obtaining a small sample of the pleural 

fluid from the left lung  pleural effusion for Gram stain and cultures including

 fungal culture.  However patient needs to be resuscitated and blood pressure 

stabilized.  If the pleural fluid is infected he will need cardiothoracic 

consultation with possibility of VATS.  In the meantime continue to closely 

follow the patient in the ICU





I performed a history & physical examination of the patient and discussed their 

management with my nurse practitioner, Lorena Bonilla.  I reviewed the nurse 

practitioner's note and agree with the documented findings and plan of care.  

Lung sounds are positive for diminished breath sounds.  The findings and the 

impression was discussed with the patient.  I attest to the documentation by the

 nurse practitioner. 





Time with Patient: Greater than 30

## 2021-01-29 NOTE — ED
General Adult HPI





- General


Chief complaint: Shortness of Breath


Stated complaint: SOB


Time Seen by Provider: 21 09:07


Source: patient


Mode of arrival: wheelchair


Limitations: physical limitation





- History of Present Illness


Initial comments: 


Dictation was produced using dragon dictation software. please excuse any 

grammatical, word or spelling errors. 





This patient was cared for during a federal and state declared state of 

emergency secondary to Covid 19





Chief Complaint: 60-year-old male sent here from cardiologist office for hypoxia





History of Present Illness: This 60-year-old male past medical history of 

rheumatoid arthritis, rheumatoid lung.  Patient was recently admitted to the 

hospital on .  Patient has a history of rheumatoid lung.  He is 

admitted to the intensive care unit for hypertension, hypoxia.  He was admitted 

and placed on vasopressors.  While being admitted to the hospital he had 

dialysis once.  According to discharge summary there is concern that patient had

hypoxic respiratory failure to hypovolemia from fluid water loss.  Patient 

states he went to follow-up with the cardiologist as a follow-up appointment.  

Told the cardiologist that he'll check and his oxygen levels is found to be low 

in the 70s.








The ROS documented in this emergency department record has been reviewed and 

confirmed by me.  Those systems with pertinent positive or negative responses 

have been documented in the HPI.  All other systems are other negative and/or 

noncontributory.








PHYSICAL EXAM:


General Impression: Alert and oriented x3, not in acute distress


HEENT: Normocephalic atraumatic, extra-ocular movements intact, pupils equal and

reactive to light bilaterally, mucous membranes moist.


Cardiovascular: Heart regular rate and rhythm


Chest: Able to complete full sentences, no retractions, no tachypnea, crackles 

diffusely throughout the lungs upon auscultation


Abdomen: abdomen soft, non-tender, non-distended, no organomegaly


Musculoskeletal: Pulses present and equal in all extremities, no peripheral 

edema


Motor:  no focal deficits noted


Neurological: CN II-XII grossly intact, no focal motor or sensory deficits noted


Skin: Intact with no visualized rashes


Psych: Normal affect and mood





ED course: 60-year-old male presents to the emergency department from 

cardiologist office for hypoxia.  He has a history of rheumatoid lung and was 

recently admitted to the intensive care unit for hypovolemia secondary to 

dehydration and fluid water loss.


Case was discussed with Dr. resendez who is willing to accept patients care for 

admission.  He did request obtaining a computed tomography scan of the chest and

the abdomen and pelvis.  Limited views that can see the lungs do show possible 

empyema.  CT of the abdomen and pelvis shows no acute processes.  Computed 

tomography scan of the chest angiography shows chronic parenchymal changes from 

known rheumatoid lung disease.  Does appear to be underlying acute pulmonary 

process that shows that in comparison to most recent CT that images have not 

resolved.


Laboratory evaluation obtained.  Leukocytosis of 19.9, no troubles 15.0.  Coag 

panels INR 1.5.  Arterial blood gas seems to be reasonable without any 

significant hypoxia.  PO2 of 80 on FiO2 of 28.  Lactic acidosis 3.7.  

Coronavirus is negative.  Patient placed on broad spectrum antibiotics.  Patient

be admitted.  Per hospitalist requests patient will have nephrology, infectious 

disease, pulmonology on consult.





EKG interpretation: Ventricular rate 130, sinus tachycardia, TX interval 142, QR

S 90, . No TX prolongation, no ST or T-wave changes noted.  











- Related Data


                                Home Medications











 Medication  Instructions  Recorded  Confirmed


 


Levothyroxine Sodium [Synthroid] 150 mcg PO DAILY 17


 


Albuterol Inhaler [Ventolin Hfa 2 puff INHALATION RT-QID PRN 06/10/20 01/29/21





Inhaler]   


 


Ascorbic Acid [Vitamin C] 2,000 mg PO HS 20


 


Cholecalciferol [Vitamin D3 (25 2,000 unit PO HS 20





Mcg = 1000 Iu)]   


 


Ipratropium-Albuterol Nebulize 3 ml INHALATION RT-QID PRN 20





[Duoneb 0.5 mg-3 mg/3 ml Soln]   


 


Levofloxacin [Levaquin] 250 mg PO DAILY 21


 


Ondansetron [Zofran] 4 mg PO QID PRN 21








                                  Previous Rx's











 Medication  Instructions  Recorded


 


Aspirin 81 mg PO HS  chew 21


 


Metoprolol Succinate (ER) [Toprol 25 mg PO DAILY #30 tab.er.24h 21





XL]  











                                    Allergies











Allergy/AdvReac Type Severity Reaction Status Date / Time


 


No Known Allergies Allergy   Verified 01/29/21 10:37














Review of Systems


ROS Statement: 


Those systems with pertinent positive or pertinent negative responses have been 

documented in the HPI.





ROS Other: All systems not noted in ROS Statement are negative.





Past Medical History


Past Medical History: Deep Vein Thrombosis (DVT), GERD/Reflux, Hyperlipidemia, 

Hypertension, Neurologic Disorder, Rheumatoid Arthritis (RA), Thyroid Disorder


Additional Past Medical History / Comment(s): Rheumatoid arthritis, rheumatoid 

lungs with chronic interstitial lung disease maintained on Arava and chronic 

steroids, previous history of pneumonia/empyema with Streptococcus and the 

patient required chest tube drainage - 2020, retention, hypothyroidism, 

previous history of foot infections requiring PICC line insertion, history of 

DVT, nephrolithiasis,


History of Any Multi-Drug Resistant Organisms: None Reported


Past Surgical History: Back Surgery, Tonsillectomy


Additional Past Surgical History / Comment(s): Right foot surgery, repair 

herniated disc, plate removed rt foot.   CTR donny,bronchoscopy X4, right foot 

surgery with plate placement due to arch collapse folowed by removal due to 

infection


Past Anesthesia/Blood Transfusion Reactions: Previous Problems w/ Anesthesia


Additional Past Anesthesia/Blood Transfusion Reaction / Comment(s): slow to 

awaken x1.


Past Psychological History: No Psychological Hx Reported


Smoking Status: Former smoker


Past Alcohol Use History: Rare


Past Drug Use History: None Reported





- Past Family History


  ** Mother


Family Medical History: Cancer (Mother  at the age 75 from lung ancer with 

brain mets.)


Additional Family Medical History / Comment(s): lung with mets brain





  ** Father


Family Medical History: No Reported History (Father  at the age of 78 at 

[facility] suddenly.)


Additional Family Medical History / Comment(s): states, "he just ."





  ** Brother(s)


Family Medical History: No Reported History (patient has one brother with no 

major health issues.)





  ** Sister(s)


Family Medical History: No Reported History (Patient has one sister with no 

major health issues.)


Additional Family Medical History / Comment(s): Patient has 2 step daughters.





General Exam


Limitations: physical limitation





Course


                                   Vital Signs











  21





  09:02 09:30 10:00


 


Temperature 98.9 F  


 


Pulse Rate 118 H 129 H 126 H


 


Respiratory 22 28 H 24





Rate   


 


Blood Pressure 123/106 131/104 144/106


 


O2 Sat by Pulse 93 L 93 L 92 L





Oximetry   














Medical Decision Making





- Lab Data


Result diagrams: 


                                 21 09:26





                                 21 09:26


                                   Lab Results











  21 Range/Units





  09:26 09:26 09:26 


 


WBC  19.1 H    (3.8-10.6)  k/uL


 


RBC  4.40    (4.30-5.90)  m/uL


 


Hgb  12.3 L    (13.0-17.5)  gm/dL


 


Hct  38.5 L    (39.0-53.0)  %


 


MCV  87.7    (80.0-100.0)  fL


 


MCH  27.9    (25.0-35.0)  pg


 


MCHC  31.8    (31.0-37.0)  g/dL


 


RDW  17.2 H    (11.5-15.5)  %


 


Plt Count  316    (150-450)  k/uL


 


MPV  9.0    


 


Neutrophils %  78    %


 


Lymphocytes %  10    %


 


Monocytes %  5    %


 


Eosinophils %  4    %


 


Basophils %  1    %


 


Neutrophils #  15.0 H    (1.3-7.7)  k/uL


 


Lymphocytes #  2.0    (1.0-4.8)  k/uL


 


Monocytes #  1.0    (0-1.0)  k/uL


 


Eosinophils #  0.8 H    (0-0.7)  k/uL


 


Basophils #  0.1    (0-0.2)  k/uL


 


Manual Slide Review  Performed    


 


Polychromasia  Present    


 


Hypochromasia  Marked    


 


Anisocytosis  Slight    


 


PT   15.3 H   (9.0-12.0)  sec


 


INR   1.5 H   (<1.2)  


 


APTT   27.0   (22.0-30.0)  sec


 


Sample Site     


 


ABG pH     (7.35-7.45)  


 


ABG pCO2     (35-45)  mmHg


 


ABG pO2     ()  mmHg


 


ABG HCO3     (21-25)  mmol/L


 


ABG Total CO2     (19-24)  mmol/L


 


ABG O2 Saturation     (94-97)  %


 


ABG Base Excess     mmol/L


 


Garth Test     


 


VBG pH     (7.31-7.41)  


 


VBG pCO2     (37-51)  mmHg


 


VBG HCO3     (24-28)  mmol/L


 


FiO2     %


 


Sodium    135 L  (137-145)  mmol/L


 


Potassium    3.9  (3.5-5.1)  mmol/L


 


Chloride    102  ()  mmol/L


 


Carbon Dioxide    19 L  (22-30)  mmol/L


 


Anion Gap    14  mmol/L


 


BUN    14  (9-20)  mg/dL


 


Creatinine    1.32 H  (0.66-1.25)  mg/dL


 


Est GFR (CKD-EPI)AfAm    68  (>60 ml/min/1.73 sqM)  


 


Est GFR (CKD-EPI)NonAf    59  (>60 ml/min/1.73 sqM)  


 


Glucose    111 H  (74-99)  mg/dL


 


Plasma Lactic Acid Naif     (0.7-2.0)  mmol/L


 


Calcium    9.3  (8.4-10.2)  mg/dL


 


Magnesium    1.6  (1.6-2.3)  mg/dL


 


Total Bilirubin    1.0  (0.2-1.3)  mg/dL


 


AST    29  (17-59)  U/L


 


ALT    18  (4-49)  U/L


 


Alkaline Phosphatase    190 H  ()  U/L


 


Troponin I     (0.000-0.034)  ng/mL


 


Total Protein    7.3  (6.3-8.2)  g/dL


 


Albumin    3.1 L  (3.5-5.0)  g/dL


 


Coronavirus (PCR)     (Not Detectd)  














  21 Range/Units





  09:26 09:26 09:26 


 


WBC     (3.8-10.6)  k/uL


 


RBC     (4.30-5.90)  m/uL


 


Hgb     (13.0-17.5)  gm/dL


 


Hct     (39.0-53.0)  %


 


MCV     (80.0-100.0)  fL


 


MCH     (25.0-35.0)  pg


 


MCHC     (31.0-37.0)  g/dL


 


RDW     (11.5-15.5)  %


 


Plt Count     (150-450)  k/uL


 


MPV     


 


Neutrophils %     %


 


Lymphocytes %     %


 


Monocytes %     %


 


Eosinophils %     %


 


Basophils %     %


 


Neutrophils #     (1.3-7.7)  k/uL


 


Lymphocytes #     (1.0-4.8)  k/uL


 


Monocytes #     (0-1.0)  k/uL


 


Eosinophils #     (0-0.7)  k/uL


 


Basophils #     (0-0.2)  k/uL


 


Manual Slide Review     


 


Polychromasia     


 


Hypochromasia     


 


Anisocytosis     


 


PT     (9.0-12.0)  sec


 


INR     (<1.2)  


 


APTT     (22.0-30.0)  sec


 


Sample Site     


 


ABG pH     (7.35-7.45)  


 


ABG pCO2     (35-45)  mmHg


 


ABG pO2     ()  mmHg


 


ABG HCO3     (21-25)  mmol/L


 


ABG Total CO2     (19-24)  mmol/L


 


ABG O2 Saturation     (94-97)  %


 


ABG Base Excess     mmol/L


 


Garth Test     


 


VBG pH    7.38  (7.31-7.41)  


 


VBG pCO2    36 L  (37-51)  mmHg


 


VBG HCO3    21 L  (24-28)  mmol/L


 


FiO2     %


 


Sodium     (137-145)  mmol/L


 


Potassium     (3.5-5.1)  mmol/L


 


Chloride     ()  mmol/L


 


Carbon Dioxide     (22-30)  mmol/L


 


Anion Gap     mmol/L


 


BUN     (9-20)  mg/dL


 


Creatinine     (0.66-1.25)  mg/dL


 


Est GFR (CKD-EPI)AfAm     (>60 ml/min/1.73 sqM)  


 


Est GFR (CKD-EPI)NonAf     (>60 ml/min/1.73 sqM)  


 


Glucose     (74-99)  mg/dL


 


Plasma Lactic Acid Naif  3.7 H*    (0.7-2.0)  mmol/L


 


Calcium     (8.4-10.2)  mg/dL


 


Magnesium     (1.6-2.3)  mg/dL


 


Total Bilirubin     (0.2-1.3)  mg/dL


 


AST     (17-59)  U/L


 


ALT     (4-49)  U/L


 


Alkaline Phosphatase     ()  U/L


 


Troponin I   <0.012   (0.000-0.034)  ng/mL


 


Total Protein     (6.3-8.2)  g/dL


 


Albumin     (3.5-5.0)  g/dL


 


Coronavirus (PCR)     (Not Detectd)  














  21 Range/Units





  10:31 10:52 


 


WBC    (3.8-10.6)  k/uL


 


RBC    (4.30-5.90)  m/uL


 


Hgb    (13.0-17.5)  gm/dL


 


Hct    (39.0-53.0)  %


 


MCV    (80.0-100.0)  fL


 


MCH    (25.0-35.0)  pg


 


MCHC    (31.0-37.0)  g/dL


 


RDW    (11.5-15.5)  %


 


Plt Count    (150-450)  k/uL


 


MPV    


 


Neutrophils %    %


 


Lymphocytes %    %


 


Monocytes %    %


 


Eosinophils %    %


 


Basophils %    %


 


Neutrophils #    (1.3-7.7)  k/uL


 


Lymphocytes #    (1.0-4.8)  k/uL


 


Monocytes #    (0-1.0)  k/uL


 


Eosinophils #    (0-0.7)  k/uL


 


Basophils #    (0-0.2)  k/uL


 


Manual Slide Review    


 


Polychromasia    


 


Hypochromasia    


 


Anisocytosis    


 


PT    (9.0-12.0)  sec


 


INR    (<1.2)  


 


APTT    (22.0-30.0)  sec


 


Sample Site  LRAD   


 


ABG pH  7.42   (7.35-7.45)  


 


ABG pCO2  36   (35-45)  mmHg


 


ABG pO2  80 L   ()  mmHg


 


ABG HCO3  23   (21-25)  mmol/L


 


ABG Total CO2  24   (19-24)  mmol/L


 


ABG O2 Saturation  96.2   (94-97)  %


 


ABG Base Excess  -1.3   mmol/L


 


Garth Test  Yes   


 


VBG pH    (7.31-7.41)  


 


VBG pCO2    (37-51)  mmHg


 


VBG HCO3    (24-28)  mmol/L


 


FiO2  28   %


 


Sodium    (137-145)  mmol/L


 


Potassium    (3.5-5.1)  mmol/L


 


Chloride    ()  mmol/L


 


Carbon Dioxide    (22-30)  mmol/L


 


Anion Gap    mmol/L


 


BUN    (9-20)  mg/dL


 


Creatinine    (0.66-1.25)  mg/dL


 


Est GFR (CKD-EPI)AfAm    (>60 ml/min/1.73 sqM)  


 


Est GFR (CKD-EPI)NonAf    (>60 ml/min/1.73 sqM)  


 


Glucose    (74-99)  mg/dL


 


Plasma Lactic Acid Naif    (0.7-2.0)  mmol/L


 


Calcium    (8.4-10.2)  mg/dL


 


Magnesium    (1.6-2.3)  mg/dL


 


Total Bilirubin    (0.2-1.3)  mg/dL


 


AST    (17-59)  U/L


 


ALT    (4-49)  U/L


 


Alkaline Phosphatase    ()  U/L


 


Troponin I    (0.000-0.034)  ng/mL


 


Total Protein    (6.3-8.2)  g/dL


 


Albumin    (3.5-5.0)  g/dL


 


Coronavirus (PCR)   Not Detected  (Not Detectd)  














Disposition


Clinical Impression: 


 Lung infection





Disposition: ADMITTED AS IP TO THIS HOSP


Condition: Fair


Referrals: 


Lidya Myers MD [Primary Care Provider] - 1-2 days


Decision Time: 12:02

## 2021-01-29 NOTE — CONS
CONSULTATION



DATE OF SERVICE:

01/29/2021



REASON FOR CONSULTATION:

Sepsis, pneumonia/empyema.



HISTORY OF PRESENT ILLNESS:

The patient is a 60-year-old  male with a history of rheumatoid arthritis and

rheumatoid lung disease and has been on immunomodulator medication. The patient was

hospitalized in June of 2020 and the patient did have left-sided empyema requiring

chest tube placement.  Cultures were positive for Strep pneumo and the patient received

a 4-week course of IV Rocephin.  The patient subsequently recovered and has been doing

okay. He was admitted to this facility a few weeks ago with acute lung injury, for

which the patient was treated by Nephrology and the ICU team.  The patient recently has

been evaluated by his primary care physician. The patient was complaining of increasing

shortness of breath that apparently has been getting worse for the last few days to

weeks.  The patient also has a cough which is moderate in intensity with occasional

greenish sputum, no hemoptysis.  Denies having any pleuritic chest pain.  The patient

has been monitored in the outpatient setting.  He did have a chest x-ray with evidence

of right lower lobe infiltrate. Elevated white count and the patient was started on

Levaquin.  The patient was seen at the cardiology office today, where he had an episode

of dizziness, lightheadedness and an episode of vomiting, for the patient was sent to

[facility] for evaluation.  On arrival in the ER, the patient was afebrile.

The patient did have a pulse ox of 93% on presentation to the hospital. The patient's

white count was 19.1 with a left shift.  Creatinine was 1.32, lactic acid 3.7.  Urine

was negative. Corona PCR was negative.  The patient did have a CT angiogram of the

chest with evidence of chronic parenchymal changes from known rheumatoid lung disease,

underlying acute pulmonary process possible in the upper lungs and it also mentioned

thick-walled pleural cavities posteriorly in the lower lungs with left-sided air-fluid

level that seemed to have slightly decreased from recent CT of the chest. CT scan of

abdomen and pelvis was negative.  The patient was hypotensive, requiring fluid boluses,

and has been admitted to the ICU.  The patient was started on vancomycin and Zosyn.

Infectious Disease was consulted for further management of antibiotic therapy.



REVIEW OF SYSTEMS:

Positive points have been mentioned in the HPI.  Rest of the systems are negative.



PAST MEDICAL HISTORY:

Hypertension, hyperlipidemia _____ hypothyroidism, left-sided empyema secondary to

Strep pneumo.



PAST SURGICAL HISTORY:

Back surgery, tonsillectomy, right foot surgery. He did have a chest tube and

subsequent removal.



SOCIAL HISTORY:

Remote history of smoking.  Rarely drinks.  No drug use.



FAMILY HISTORY:

Mother with history of lung cancer.



ALLERGIES:

NO KNOWN DRUG ALLERGIES.



MEDICATIONS:

The patient is currently on Tylenol, DuoNeb, vitamin C, aspirin, Zosyn, levothyroxine

and vancomycin.



PHYSICAL EXAMINATION:

Blood pressure is 93/55 with a pulse of 93, temperature 97.8.  He is 94% on 2 L nasal

cannula.

General description is a middle-aged male lying in bed in no distress.  No tachypnea or

accessory muscle of respiration use.

HEENT:  Examination shows no pallor or scleral icterus.  Oral mucous membrane is dry.

NECK: Trachea is central. No thyromegaly.

LUNGS: Unlabored breathing. Decreased breath sounds at bases.  No wheeze or crackle.

HEART: S1, S2.  Regular rate and rhythm.

ABDOMEN: Soft. No tenderness. No guarding or rigidity.

EXTREMITIES: No edema of the feet.

SKIN EXAMINATION: No rash or mass palpable.

Neurologically the patient is awake, alert, oriented x3. Mood and affect normal.



LABS:

Hemoglobin is 12.3, white count 19.1.  BUN of 14, creatinine 1.32.  Lactate acid 3.7.

BUN is 1.7.  Liver enzymes are normal.  Urine is negative.  Corona PCR was negative.



Chest x-ray and CT report mentioned above.



DIAGNOSTIC IMPRESSION AND PLAN:

Patient presented to hospital with dizziness, weakness, shortness of breath and

complaining of a cough with productive sputum in this patient who did have a history of

left-sided empyema requiring chest tube placement. Cultures at that time were positive

for Strep pneumo, for which the patient received 4 weeks of IV antibiotic therapy and

has been doing well for the last 6 months, with recent worsening of his symptoms in

this patient who did have elevated white count and elevated lactic acid and

hypertension, admitting criteria for SIRS.  _____ possible concerning for chronic

empyema/pneumonia, as no other obvious focus of infection.  Abdomen was soft on

clinical examination.  CT was negative.  No evidence of any cellulitis.



PLAN:

1. Await CT-guided aspirate of the fluid, which should be sent for culture.

2. We will keep the patient on vancomycin or discontinue Zosyn and add cefepime to

    cover for the Gram-negative and to decrease the risk of nephrotoxicity with

    vancomycin and Zosyn combination.

3. We will follow his clinical condition and culture to further adjust medication if

    needed. Thank you for this consultation. Will follow this patient along with you.





DUANE / KATLYNN: [id number] / Job#: [id number]

## 2021-01-29 NOTE — XR
EXAMINATION TYPE: XR chest 1V confirm line [facility]

 

DATE OF EXAM: 1/29/2021

 

COMPARISON: Chest x-ray same date earlier time

 

HISTORY: Status post central line placement

 

TECHNIQUE: Single frontal view of the chest is obtained.

 

FINDINGS:  There has been interval placement of a left jugular central venous catheter, distal tip of
 the catheter is overlying the superior vena cava. There is no evident pneumothorax. No other signifi
cant interval change.

 

IMPRESSION:  No evident complication status post central line placement.

## 2021-01-29 NOTE — ED
Medical Decision Making





- Lab Data


Result diagrams: 


                                 01/29/21 09:26





                                 01/29/21 09:26





                                   Lab Results











  01/29/21 01/29/21 01/29/21 Range/Units





  09:26 09:26 09:26 


 


WBC  19.1 H    (3.8-10.6)  k/uL


 


RBC  4.40    (4.30-5.90)  m/uL


 


Hgb  12.3 L    (13.0-17.5)  gm/dL


 


Hct  38.5 L    (39.0-53.0)  %


 


MCV  87.7    (80.0-100.0)  fL


 


MCH  27.9    (25.0-35.0)  pg


 


MCHC  31.8    (31.0-37.0)  g/dL


 


RDW  17.2 H    (11.5-15.5)  %


 


Plt Count  316    (150-450)  k/uL


 


MPV  9.0    


 


Neutrophils %  78    %


 


Lymphocytes %  10    %


 


Monocytes %  5    %


 


Eosinophils %  4    %


 


Basophils %  1    %


 


Neutrophils #  15.0 H    (1.3-7.7)  k/uL


 


Lymphocytes #  2.0    (1.0-4.8)  k/uL


 


Monocytes #  1.0    (0-1.0)  k/uL


 


Eosinophils #  0.8 H    (0-0.7)  k/uL


 


Basophils #  0.1    (0-0.2)  k/uL


 


Manual Slide Review  Performed    


 


Polychromasia  Present    


 


Hypochromasia  Marked    


 


Anisocytosis  Slight    


 


PT   15.3 H   (9.0-12.0)  sec


 


INR   1.5 H   (<1.2)  


 


APTT   27.0   (22.0-30.0)  sec


 


Sample Site     


 


ABG pH     (7.35-7.45)  


 


ABG pCO2     (35-45)  mmHg


 


ABG pO2     ()  mmHg


 


ABG HCO3     (21-25)  mmol/L


 


ABG Total CO2     (19-24)  mmol/L


 


ABG O2 Saturation     (94-97)  %


 


ABG Base Excess     mmol/L


 


Garth Test     


 


VBG pH     (7.31-7.41)  


 


VBG pCO2     (37-51)  mmHg


 


VBG HCO3     (24-28)  mmol/L


 


FiO2     %


 


Sodium    135 L  (137-145)  mmol/L


 


Potassium    3.9  (3.5-5.1)  mmol/L


 


Chloride    102  ()  mmol/L


 


Carbon Dioxide    19 L  (22-30)  mmol/L


 


Anion Gap    14  mmol/L


 


BUN    14  (9-20)  mg/dL


 


Creatinine    1.32 H  (0.66-1.25)  mg/dL


 


Est GFR (CKD-EPI)AfAm    68  (>60 ml/min/1.73 sqM)  


 


Est GFR (CKD-EPI)NonAf    59  (>60 ml/min/1.73 sqM)  


 


Glucose    111 H  (74-99)  mg/dL


 


Lactic Ac Sepsis Rflx     


 


Plasma Lactic Acid Naif     (0.7-2.0)  mmol/L


 


Calcium    9.3  (8.4-10.2)  mg/dL


 


Magnesium    1.6  (1.6-2.3)  mg/dL


 


Total Bilirubin    1.0  (0.2-1.3)  mg/dL


 


AST    29  (17-59)  U/L


 


ALT    18  (4-49)  U/L


 


Alkaline Phosphatase    190 H  ()  U/L


 


Troponin I     (0.000-0.034)  ng/mL


 


Total Protein    7.3  (6.3-8.2)  g/dL


 


Albumin    3.1 L  (3.5-5.0)  g/dL


 


Coronavirus (PCR)     (Not Detectd)  














  01/29/21 01/29/21 01/29/21 Range/Units





  09:26 09:26 09:26 


 


WBC     (3.8-10.6)  k/uL


 


RBC     (4.30-5.90)  m/uL


 


Hgb     (13.0-17.5)  gm/dL


 


Hct     (39.0-53.0)  %


 


MCV     (80.0-100.0)  fL


 


MCH     (25.0-35.0)  pg


 


MCHC     (31.0-37.0)  g/dL


 


RDW     (11.5-15.5)  %


 


Plt Count     (150-450)  k/uL


 


MPV     


 


Neutrophils %     %


 


Lymphocytes %     %


 


Monocytes %     %


 


Eosinophils %     %


 


Basophils %     %


 


Neutrophils #     (1.3-7.7)  k/uL


 


Lymphocytes #     (1.0-4.8)  k/uL


 


Monocytes #     (0-1.0)  k/uL


 


Eosinophils #     (0-0.7)  k/uL


 


Basophils #     (0-0.2)  k/uL


 


Manual Slide Review     


 


Polychromasia     


 


Hypochromasia     


 


Anisocytosis     


 


PT     (9.0-12.0)  sec


 


INR     (<1.2)  


 


APTT     (22.0-30.0)  sec


 


Sample Site     


 


ABG pH     (7.35-7.45)  


 


ABG pCO2     (35-45)  mmHg


 


ABG pO2     ()  mmHg


 


ABG HCO3     (21-25)  mmol/L


 


ABG Total CO2     (19-24)  mmol/L


 


ABG O2 Saturation     (94-97)  %


 


ABG Base Excess     mmol/L


 


Garth Test     


 


VBG pH    7.38  (7.31-7.41)  


 


VBG pCO2    36 L  (37-51)  mmHg


 


VBG HCO3    21 L  (24-28)  mmol/L


 


FiO2     %


 


Sodium     (137-145)  mmol/L


 


Potassium     (3.5-5.1)  mmol/L


 


Chloride     ()  mmol/L


 


Carbon Dioxide     (22-30)  mmol/L


 


Anion Gap     mmol/L


 


BUN     (9-20)  mg/dL


 


Creatinine     (0.66-1.25)  mg/dL


 


Est GFR (CKD-EPI)AfAm     (>60 ml/min/1.73 sqM)  


 


Est GFR (CKD-EPI)NonAf     (>60 ml/min/1.73 sqM)  


 


Glucose     (74-99)  mg/dL


 


Lactic Ac Sepsis Rflx     


 


Plasma Lactic Acid Naif  3.7 H*    (0.7-2.0)  mmol/L


 


Calcium     (8.4-10.2)  mg/dL


 


Magnesium     (1.6-2.3)  mg/dL


 


Total Bilirubin     (0.2-1.3)  mg/dL


 


AST     (17-59)  U/L


 


ALT     (4-49)  U/L


 


Alkaline Phosphatase     ()  U/L


 


Troponin I   <0.012   (0.000-0.034)  ng/mL


 


Total Protein     (6.3-8.2)  g/dL


 


Albumin     (3.5-5.0)  g/dL


 


Coronavirus (PCR)     (Not Detectd)  














  01/29/21 01/29/21 01/29/21 Range/Units





  10:31 10:33 10:52 


 


WBC     (3.8-10.6)  k/uL


 


RBC     (4.30-5.90)  m/uL


 


Hgb     (13.0-17.5)  gm/dL


 


Hct     (39.0-53.0)  %


 


MCV     (80.0-100.0)  fL


 


MCH     (25.0-35.0)  pg


 


MCHC     (31.0-37.0)  g/dL


 


RDW     (11.5-15.5)  %


 


Plt Count     (150-450)  k/uL


 


MPV     


 


Neutrophils %     %


 


Lymphocytes %     %


 


Monocytes %     %


 


Eosinophils %     %


 


Basophils %     %


 


Neutrophils #     (1.3-7.7)  k/uL


 


Lymphocytes #     (1.0-4.8)  k/uL


 


Monocytes #     (0-1.0)  k/uL


 


Eosinophils #     (0-0.7)  k/uL


 


Basophils #     (0-0.2)  k/uL


 


Manual Slide Review     


 


Polychromasia     


 


Hypochromasia     


 


Anisocytosis     


 


PT     (9.0-12.0)  sec


 


INR     (<1.2)  


 


APTT     (22.0-30.0)  sec


 


Sample Site  LRAD    


 


ABG pH  7.42    (7.35-7.45)  


 


ABG pCO2  36    (35-45)  mmHg


 


ABG pO2  80 L    ()  mmHg


 


ABG HCO3  23    (21-25)  mmol/L


 


ABG Total CO2  24    (19-24)  mmol/L


 


ABG O2 Saturation  96.2    (94-97)  %


 


ABG Base Excess  -1.3    mmol/L


 


Garth Test  Yes    


 


VBG pH     (7.31-7.41)  


 


VBG pCO2     (37-51)  mmHg


 


VBG HCO3     (24-28)  mmol/L


 


FiO2  28    %


 


Sodium     (137-145)  mmol/L


 


Potassium     (3.5-5.1)  mmol/L


 


Chloride     ()  mmol/L


 


Carbon Dioxide     (22-30)  mmol/L


 


Anion Gap     mmol/L


 


BUN     (9-20)  mg/dL


 


Creatinine     (0.66-1.25)  mg/dL


 


Est GFR (CKD-EPI)AfAm     (>60 ml/min/1.73 sqM)  


 


Est GFR (CKD-EPI)NonAf     (>60 ml/min/1.73 sqM)  


 


Glucose     (74-99)  mg/dL


 


Lactic Ac Sepsis Rflx   Y   


 


Plasma Lactic Acid Naif     (0.7-2.0)  mmol/L


 


Calcium     (8.4-10.2)  mg/dL


 


Magnesium     (1.6-2.3)  mg/dL


 


Total Bilirubin     (0.2-1.3)  mg/dL


 


AST     (17-59)  U/L


 


ALT     (4-49)  U/L


 


Alkaline Phosphatase     ()  U/L


 


Troponin I     (0.000-0.034)  ng/mL


 


Total Protein     (6.3-8.2)  g/dL


 


Albumin     (3.5-5.0)  g/dL


 


Coronavirus (PCR)    Not Detected  (Not Detectd)  














Disposition


Clinical Impression: 


 Lung infection





Disposition: ADMITTED AS IP TO THIS HOSP


Condition: Critical


Decision Time: 15:43





Procedures





- Universal Protocol (Time Out)


Procedure Performed:: central line placement


Performing Provider: Timo Parrish


Nurse: Jina Alvarez


Patient Identification (2 identifiers required): Chart, Verbal, Arm Band, Name


Patient/Legal Representative has Confirmed: Identity, Site, Procedure, Consent


Site: left IJ


Site Marked: Yes


Site Verified With Patient/Guardian: Yes





- Central Line Placement


  ** Left IJ


Consent Obtained: verbal consent, written consent


Patient Placed on Monitor/Pulse Ox: Yes


MD Prep: mask, gown, gloves


Central Line Prep: Chlorhexidine scrub


Local Anesthesia Used: Lidocaine 1%, with Epi


Ultrasound Used for Placement: Yes


Central Line Lumen Inserted: triple


Bloods Obtained for Lab: No


Central Line Position: good blood return, all ports aspirated, flushed, capped, 

sutured in place with 3-0 nylon


Dressing Applied: Tegaderm


Post Procedure X-Ray: tip of catheter in good position


Patient Tolerated Procedure: well


Complications: none

## 2021-01-30 LAB
ANION GAP SERPL CALC-SCNC: 9 MMOL/L
BASOPHILS # BLD AUTO: 0.1 K/UL (ref 0–0.2)
BASOPHILS NFR BLD AUTO: 1 %
BUN SERPL-SCNC: 9 MG/DL (ref 9–20)
CALCIUM SPEC-MCNC: 8 MG/DL (ref 8.4–10.2)
CHLORIDE SERPL-SCNC: 106 MMOL/L (ref 98–107)
CO2 SERPL-SCNC: 20 MMOL/L (ref 22–30)
EOSINOPHIL # BLD AUTO: 0.6 K/UL (ref 0–0.7)
EOSINOPHIL NFR BLD AUTO: 5 %
ERYTHROCYTE [DISTWIDTH] IN BLOOD BY AUTOMATED COUNT: 3.67 M/UL (ref 4.3–5.9)
ERYTHROCYTE [DISTWIDTH] IN BLOOD: 17 % (ref 11.5–15.5)
GLUCOSE SERPL-MCNC: 88 MG/DL (ref 74–99)
HCT VFR BLD AUTO: 32.5 % (ref 39–53)
HGB BLD-MCNC: 9.7 GM/DL (ref 13–17.5)
LYMPHOCYTES # SPEC AUTO: 1.3 K/UL (ref 1–4.8)
LYMPHOCYTES NFR SPEC AUTO: 11 %
MCH RBC QN AUTO: 26.3 PG (ref 25–35)
MCHC RBC AUTO-ENTMCNC: 29.7 G/DL (ref 31–37)
MCV RBC AUTO: 88.4 FL (ref 80–100)
MONOCYTES # BLD AUTO: 0.8 K/UL (ref 0–1)
MONOCYTES NFR BLD AUTO: 7 %
NEUTROPHILS # BLD AUTO: 8.7 K/UL (ref 1.3–7.7)
NEUTROPHILS NFR BLD AUTO: 74 %
PLATELET # BLD AUTO: 253 K/UL (ref 150–450)
POTASSIUM SERPL-SCNC: 3.6 MMOL/L (ref 3.5–5.1)
SODIUM SERPL-SCNC: 135 MMOL/L (ref 137–145)
WBC # BLD AUTO: 11.7 K/UL (ref 3.8–10.6)

## 2021-01-30 RX ADMIN — Medication SCH MCG: at 20:22

## 2021-01-30 RX ADMIN — IPRATROPIUM BROMIDE AND ALBUTEROL SULFATE SCH ML: .5; 3 SOLUTION RESPIRATORY (INHALATION) at 13:19

## 2021-01-30 RX ADMIN — OXYCODONE HYDROCHLORIDE AND ACETAMINOPHEN SCH MG: 500 TABLET ORAL at 20:22

## 2021-01-30 RX ADMIN — IPRATROPIUM BROMIDE AND ALBUTEROL SULFATE SCH: .5; 3 SOLUTION RESPIRATORY (INHALATION) at 20:48

## 2021-01-30 RX ADMIN — PANTOPRAZOLE SODIUM SCH MG: 40 INJECTION, POWDER, FOR SOLUTION INTRAVENOUS at 09:34

## 2021-01-30 RX ADMIN — CEFAZOLIN SCH MLS/HR: 330 INJECTION, POWDER, FOR SOLUTION INTRAMUSCULAR; INTRAVENOUS at 04:31

## 2021-01-30 RX ADMIN — CEFEPIME HYDROCHLORIDE SCH MLS/HR: 2 INJECTION, POWDER, FOR SOLUTION INTRAVENOUS at 20:22

## 2021-01-30 RX ADMIN — SODIUM CHLORIDE SCH MLS/HR: 9 INJECTION, SOLUTION INTRAVENOUS at 12:44

## 2021-01-30 RX ADMIN — ACETAMINOPHEN PRN MG: 325 TABLET, FILM COATED ORAL at 09:36

## 2021-01-30 RX ADMIN — POTASSIUM CHLORIDE SCH MLS/HR: 14.9 INJECTION, SOLUTION INTRAVENOUS at 17:08

## 2021-01-30 RX ADMIN — CEFEPIME HYDROCHLORIDE SCH MLS/HR: 2 INJECTION, POWDER, FOR SOLUTION INTRAVENOUS at 09:34

## 2021-01-30 RX ADMIN — HYDROCODONE BITARTRATE AND ACETAMINOPHEN PRN EACH: 5; 325 TABLET ORAL at 20:18

## 2021-01-30 RX ADMIN — ASPIRIN 81 MG CHEWABLE TABLET SCH MG: 81 TABLET CHEWABLE at 20:22

## 2021-01-30 RX ADMIN — TRIAMCINOLONE ACETONIDE SCH APPLIC: 1 CREAM TOPICAL at 20:23

## 2021-01-30 RX ADMIN — HEPARIN SODIUM SCH UNIT: 5000 INJECTION, SOLUTION INTRAVENOUS; SUBCUTANEOUS at 09:34

## 2021-01-30 RX ADMIN — HEPARIN SODIUM SCH UNIT: 5000 INJECTION, SOLUTION INTRAVENOUS; SUBCUTANEOUS at 00:38

## 2021-01-30 RX ADMIN — LEVOTHYROXINE SODIUM SCH MCG: 75 TABLET ORAL at 04:31

## 2021-01-30 RX ADMIN — POTASSIUM CHLORIDE SCH MLS/HR: 14.9 INJECTION, SOLUTION INTRAVENOUS at 09:34

## 2021-01-30 RX ADMIN — IPRATROPIUM BROMIDE AND ALBUTEROL SULFATE SCH ML: .5; 3 SOLUTION RESPIRATORY (INHALATION) at 07:38

## 2021-01-30 RX ADMIN — CEFAZOLIN SCH: 330 INJECTION, POWDER, FOR SOLUTION INTRAMUSCULAR; INTRAVENOUS at 09:29

## 2021-01-30 RX ADMIN — SODIUM CHLORIDE SCH MLS/HR: 9 INJECTION, SOLUTION INTRAVENOUS at 00:38

## 2021-01-30 RX ADMIN — HYDROCODONE BITARTRATE AND ACETAMINOPHEN PRN EACH: 5; 325 TABLET ORAL at 13:35

## 2021-01-30 RX ADMIN — HEPARIN SODIUM SCH UNIT: 5000 INJECTION, SOLUTION INTRAVENOUS; SUBCUTANEOUS at 17:07

## 2021-01-30 NOTE — P.NPCON
History of Present Illness





- Reason for Consult


acute renal failure





- Chief Complaint


Acute kidney injury resolving





- History of Present Illness


This is a 60-year-old male seen in consultation because of acute kidney injury. 

He was admitted because of shortness of breath and hypotension.





He had recently severe acute kidney injury because of nausea vomiting diarrhea 

and was admitted here and discharged on 2021.


creatinine was 7.63 as of 2021.  He was discharged on 2021.  His 

creatinine on the day of discharge on 2021 was 1.84.





He was admitted yesterday 2021, and creatinine had improved to 1.32 as of 

yesterday on admission.


He had a computed tomography scan with dye, his creatinine is 1 as of this 

morning, he is making adequate amount of urine.





He remains on small doses of levo fed because of blood pressure being low.  His 

also on IV fluids.





Currently he denies any nausea vomiting appetite is returning no diarrhea.  No 

fever chills.  Does have shortness of breath and mild cough.





His past history significant for chronic rheumatoid arthritis with rheumatoid 

lung on multiple immunosuppressive medications, recently had empyema had chest 

tube.  Also known with hypertension.























Past Medical History


Past Medical History: Deep Vein Thrombosis (DVT), GERD/Reflux, Hyperlipidemia, 

Hypertension, Neurologic Disorder, Renal Disease, Respiratory Disorder, 

Rheumatoid Arthritis (RA), Thyroid Disorder


Additional Past Medical History / Comment(s): Pt recently admitted to [facility] on 

21 with acute hypoxic respiratory failure 2ndary to hypovolemia from N/V/D,

acute kidney injury, hypovolemic shock.     Other hx:  Rheumatoid arthritis, 

rheumatoid lungs with chronic interstitial lung disease maintained on Arava and 

chronic steroids in the past, previous history of pneumonia/empyema with 

Streptococcus and the patient required chest tube drainage - 2020, past 

urinary retention, hypothyroidism, previous history of R foot infections 

requiring PICC line insertion, history of DVT L arm, nephrolithiasis, recent 

hospitalization where he required one hemodialysis session.


History of Any Multi-Drug Resistant Organisms: None Reported


Past Surgical History: Back Surgery, Orthopedic Surgery, Tonsillectomy


Additional Past Surgical History / Comment(s): Temporary hemodialysis cath, 

lithotripsy, colonoscopy, bronchoscopies, back surgery d/t herniated disc, 

bilateral carpal tunnel releases, bunions removed from feet, R foot plate for 

arch since removed, benign nodule off R hip, bilateral wrist ganglion cyst 

removals


Past Anesthesia/Blood Transfusion Reactions: Previous Problems w/ Anesthesia


Additional Past Anesthesia/Blood Transfusion Reaction / Comment(s): slow to 

awaken x1.


Smoking Status: Former smoker





- Past Family History


  ** Mother


Family Medical History: Cancer


Additional Family Medical History / Comment(s): lung with mets brain





  ** Father


Family Medical History: No Reported History


Additional Family Medical History / Comment(s): states, "he just ."





  ** Brother(s)


Family Medical History: No Reported History





  ** Sister(s)


Family Medical History: No Reported History


Additional Family Medical History / Comment(s): Patient has 2 step daughters.





Medications and Allergies


                                Home Medications











 Medication  Instructions  Recorded  Confirmed  Type


 


Levothyroxine Sodium [Synthroid] 150 mcg PO DAILY 17 History


 


Albuterol Inhaler [Ventolin Hfa 2 puff INHALATION RT-QID PRN 06/10/20 01/29/21 

History





Inhaler]    


 


Ascorbic Acid [Vitamin C] 2,000 mg PO HS 20 History


 


Cholecalciferol [Vitamin D3 (25 2,000 unit PO HS 20 History





Mcg = 1000 Iu)]    


 


Ipratropium-Albuterol Nebulize 3 ml INHALATION RT-QID PRN 20 

History





[Duoneb 0.5 mg-3 mg/3 ml Soln]    


 


Aspirin 81 mg PO HS  chew 21 Rx


 


Metoprolol Succinate (ER) [Toprol 25 mg PO DAILY #30 tab.er.24h 21 Rx





XL]    


 


Levofloxacin [Levaquin] 250 mg PO DAILY 21 History


 


Ondansetron [Zofran] 4 mg PO QID PRN 21 History








                                    Allergies











Allergy/AdvReac Type Severity Reaction Status Date / Time


 


No Known Allergies Allergy   Verified 21 10:37














Physical Exam


Vitals: 


                                   Vital Signs











  Temp Pulse Pulse Resp BP BP Pulse Ox


 


 21 07:40   101 H     


 


 21 07:00   114 H   15  98/71   96


 


 21 06:00   106 H   28 H  98/71   97


 


 21 05:00   103 H   22  88/72   96


 


 21 04:00  98.3 F  96  90  17  91/72   96


 


 21 03:00   96   25 H  102/75   97


 


 21 02:00   101 H   18  88/62   95


 


 21 01:00   92   69 H  95/65   97


 


 21 00:30    91  18   95/59  97


 


 21 00:00  97.7 F   91  18   98/66  97


 


 21 23:45    91  20   107/64  97


 


 21 23:30    90  18   94/62  97


 


 21 22:00   92    108/89   96


 


 21 21:00  97.8 F  88   19  82/60   96


 


 21 20:55   86     


 


 21 20:39   86     


 


 21 20:00   93  92  19  88/54   96


 


 21 19:00   93   19  93/55   96


 


 21 18:59    98  18   87/55  94 L


 


 21 17:00    100  16   100/59  98


 


 21 16:45       77/65 


 


 21 16:30  99.1 F   100  16   90/52  96


 


 21 16:00  98.9 F  111 H   21    95


 


 21 15:41   100   20  94/56   95


 


 21 15:18   100   19  94/56   95


 


 21 15:07   101 H   22  67/40   93 L


 


 21 14:30   106 H   17  91/56   98


 


 21 14:22   106 H   18  87/54   96


 


 21 14:00   105 H   20  84/53   96


 


 21 13:00   108 H   17  111/85   96


 


 21 12:30   114 H   18  80/51   96


 


 21 12:00   116 H   22  81/53   95


 


 21 11:15  98.8 F  118 H   22  100/60   93 L


 


 21 10:00   126 H   24  144/106   92 L


 


 21 09:30   129 H   28 H  131/104   93 L


 


 21 09:02  98.9 F  118 H   22  123/106   93 L








                                Intake and Output











 21





 22:59 06:59 14:59


 


Intake Total 780 1170 


 


Output Total 615 1045 


 


Balance 165 125 


 


Intake:   


 


   1170 


 


    Sodium Chloride 0.9% 1, 780 1170 





    000 ml @ 130 mls/hr IV .   





    [id number] [facility] Rx#:[id number]   


 


Output:   


 


  Urine 615 1045 


 


Other:   


 


  Voiding Method Indwelling Catheter Indwelling Catheter 








On examination is awake alert oriented comfortable.





He is in normal sinus rhythm.





Blood pressure is in the 90s to 80s systolic warm and well-perfused and 

comfortable





HEENT exam no JVP neck is supple no facial asymmetry





Lungs are significant for bilateral occasional wheezing diminished air sounds 

bilaterally





Heart sounds unremarkable for any murmur rub gallop





Abdomen soft nontender no organomegaly ascites masses noted





Section exam was no edema this chronic rash on his left leg below his knee and 

he says his had it for a long time.





Neurologically awake alert oriented





Results





- Lab Results


                             Most recent lab results











ABG pH  7.42  (7.35-7.45)   21  10:31    


 


ABG pCO2  36 mmHg (35-45)   21  10:31    


 


ABG pO2  80 mmHg ()  L  21  10:31    


 


ABG HCO3  23 mmol/L (21-25)   21  10:31    


 


ABG O2 Saturation  96.2 % (94-97)   21  10:31    


 


Calcium  8.0 mg/dL (8.4-10.2)  L  21  03:20    


 


Magnesium  1.6 mg/dL (1.6-2.3)   21  09:26    














                                 21 03:20





                                 21 03:20





Assessment and Plan


Assessment: 





Impression





1.  Recent acute kidney injury with a creatinine of 7 on 2021 during his 

last admission.  Currently his creatinine is down to 1.  There is some risk of 

dye induced ATN from the computed tomography scan that was performed yesterday 

but as of this morning his making good amount of urine.





2.  Hypotension, possibly related to sepsis and pneumonia but he looks cli

nically very stable although blood pressure is 80s and requiring small doses of 

levo fed





3.  Mild degree of non-gap acidosis, bicarb is 20 and gap is 9 possibly related 

to normal saline that is getting





4.  Chronic rheumatoid arthritis with rheumatoid lungs and empyema.





5.  Hypothyroidism TSH is 14 free T4 though is normal at 2.19





Recommendation





1.  Change IV fluids to lactated Ringer's because of the acidosis.





2.  Try to wean off of levo fed





3.  Review thyroid function





4.  Check serum cortisol as he might have been on steroids in the past

## 2021-01-30 NOTE — XR
EXAMINATION TYPE: XR chest 1V portable

 

DATE OF EXAM: 1/30/2021

 

COMPARISON: Prior chest x-ray 1/29/2021

 

HISTORY: Shortness of breath

 

TECHNIQUE: Single frontal view of the chest is obtained.

 

FINDINGS:  There is not a significant interval change. Distal tip of the internal jugular central argenis
ous catheter courses in a cephalad direction likely within the right innominate vein.

 

IMPRESSION:  Stable findings. Correlate for pneumonia, edema, there are chronic pleural and parenchym
al changes

## 2021-01-30 NOTE — P.PN
Subjective


Progress Note Date: 01/30/21





This is a 60-year-old  male patient requesting my service with past 

medical history of rheumatoid arthritis on Arava that was diagnosed when he was 

37 year old initially was started on Methotrexate that he could not tolerate 

then placed on Embrel but he developed side effects and finally was tried on 

Humira injection but he developed neurologic sided effects and was hospitaized 

at [facility] for quiet some time, rheumatoid lung disease, 

previous history of loculated empyema with chest tube placement in July 2020, 

hypertension, hypothyroidism, history of DVT, remote history of tobacco use and 

dependence, was recently hospitalized at [facility] after he was 

admitted for an acute kidney injury with significant metabolic acidosis 

associated with the elevated creatinine and elevated BUN and hypotension at that

time he was seen and evaluated by pulmonary and critical care medicine, he had a

central line placed and at that time, he was placed on Levophed drip he was p

laced on IV anabiotic as well but the patient recovered very fast and he had an 

echocardiogram that showed normal LV function and segmental hypokinesia, he was 

supposed to follow-up with Dr. Santo in the office today, patient was seen in my

office 24 hours ago when he was complaining of increased shortness breath, at 

that time his oxygenation could not be checked because of his Case's and cold

extremities, he was sent for a chest x-ray that showed right lower lobe 

infiltrate as well as blood tests that showed a white count of 20,000 patient 

was feeling better he was started on oral antibiotic in the form of Levaquin 500

mg orally once every day, and that he was supposed to follow-up with me as an 

outpatient every week he went to see his cardiologist today and he had an 

episode when he was dizzy lightheaded and an episode of vomiting as well and he 

was sent to the emergency department for evaluation had a computed tomography of

the chest as well as abdomen and pelvis that showed a thick walled pleural 

cavities posteriorly in the lower lungs with left-sided air-fluid level that has

diminished in size from the prior computed tomography scan when he was in the 

hospital a few weeks back there is also associated compressive atelectasis and 

chronic consultation with multifocal areas of reticular nodular opacity in the 

upper lungs with a slight nodularity of the left upper lobe again this area of 

focal consultation has improved from the previous study that was done few weeks 

ago, patient was started on IV antibiotic with vancomycin and Zosyn as well as 

IV fluid, he was seen in consultation by pulmonary critical care in the 

emergency department as the patient blood pressure dropped and that he'll be 

transferred to the intensive care unit at this point in time I had long conve

rsation with the patient and his wife at the bedside and the patient may need to

have a chest tube placed for his possible right empyema, he did receive 3 L 

normal saline in the ER, and his blood pressure is marginal he may need to go on

 pressors if  not recovered.





1/30:patient is sitting up in chair feeling about the same , complains of 

increased pain in the righ elbow, was seen earlier by pulmonary and the plan was

to go for US-Guided left thoracenthesis due to to loculated left pleural effu

nathaniel and possible empyema, may need VATS, he denies any chest pain or shortness 

of breath, no abdominal pain nausea, vomiting or diarrhea, still have diarrhea 

negative for C.Diff, he seems to have accept to stay in the hospital this time 

as long as he needed to.





Objective





- Vital Signs


Vital signs: 


                                   Vital Signs











Temp  98.3 F   01/30/21 04:00


 


Pulse  101 H  01/30/21 07:40


 


Resp  15   01/30/21 07:00


 


BP  98/71   01/30/21 07:00


 


Pulse Ox  96   01/30/21 07:00








                                 Intake & Output











 01/29/21 01/30/21 01/30/21





 18:59 06:59 18:59


 


Intake Total 390 1560 


 


Output Total 315 1345 


 


Balance 75 215 


 


Weight 95.254 kg  95.254 kg


 


Intake:   


 


   1560 


 


    Sodium Chloride 0.9% 1, 390 1560 





    000 ml @ 130 mls/hr IV .   





    Q7H42M [facility] Rx#:[id number]   


 


Output:   


 


  Urine 315 1345 


 


Other:   


 


  Voiding Method  Indwelling Catheter 














- Exam





 Review of Systems 


Constitutional: Reports anorexia, Reports chronic pain, Reports fatigue, Reports

weakness, Reports weight loss


Eyes: denies blurred vision, denies bulging eye, denies decreased vision, denies

diplopia


Ears, nose, mouth and throat: Denies dysphagia, Denies neck lump, Denies sore 

throat


Respiratory: Reports dyspnea, Reports respiratory infections, Denies congestion,

Denies cough with sputum, Denies home oxygen, Denies sleep apnea, Denies 

snoring, Denies wheezing


Gastrointestinal: Reports bloating, Reports change in bowel habits, Reports 

diarrhea, Reports early satiety, Reports indigestion, Reports loss of appetite, 

Reports nausea, Reports vomiting, Denies abdominal pain, Denies belching, Denies

heartburn, Denies hematemesis, Denies hematochezia, Denies melena


Genitourinary: Denies dysuria, Denies nocturia


Musculoskeletal: Denies myalgias


Musculoskeletal: absent: ankle pain, ankle stiffness, ankle swelling, elbow 

pain, elbow stiffness, elbow swelling, foot pain, foot stiffness, foot swelling,

hand pain, hand stiffness, hand swelling, hip pain, hip stiffness, hip swelling,

knee pain, knee stiffness, knee swelling, shoulder pain, shoulder stiffness, 

shoulder swelling, wrist pain, wrist stiffness, wrist swelling


Integumentary: Denies pruritus, Denies rash


Neurological: Denies numbness, Denies weakness


Psychiatric: Denies anxiety, Denies depression


Endocrine: Denies fatigue, Denies weight change








Physical examination:








General: patient is 60 year old lying down in bed in moderate distress.





HEENT: head ia atraumatic normocephalic, Pupils were equal round reactive to 

light and accommodations , extra ocular muscle movement were intact, mucous 

membranes of the mouth are somewhat dry.





Neck: supple no JVP.'





Chest: decreased breath sounds at he bases with few ronchi no expiratory wheezes

no chest wall tendernes or intercostal retractions.





Heart: first heart sound is depressed, second heart sound is normal tachycardic 

there is HOOD 2/6 located at the left sternal border.





Abdomen: soft, mild tenderness to the left lower quadrant positive bowel sounds,

no guarding or rebound tenderness, no hepatosplenomegaly.





Extremities: there is no edema or calf tenderness DP+ 2 Bilaterally, there is 

what appears to be a charcot joint in the right foot form arch collapse.





Neurologic examination: patient is awake , alert and oriented X 3 CN II-XII are 

grossly intact, muscle power 4/5 in bilateral upper and lower extremities, deep 

tendon reflexes were normal.








- Labs


CBC & Chem 7: 


                                 01/30/21 03:20





                                 01/30/21 03:20


Labs: 


                  Abnormal Lab Results - Last 24 Hours (Table)











  01/29/21 01/30/21 01/30/21 Range/Units





  12:50 03:20 03:20 


 


WBC    11.7 H  (3.8-10.6)  k/uL


 


RBC    3.67 L  (4.30-5.90)  m/uL


 


Hgb    9.7 L D  (13.0-17.5)  gm/dL


 


Hct    32.5 L  (39.0-53.0)  %


 


MCHC    29.7 L  (31.0-37.0)  g/dL


 


RDW    17.0 H  (11.5-15.5)  %


 


Neutrophils #    8.7 H  (1.3-7.7)  k/uL


 


Sodium   135 L   (137-145)  mmol/L


 


Carbon Dioxide   20 L   (22-30)  mmol/L


 


Calcium   8.0 L   (8.4-10.2)  mg/dL


 


Ur Specific Gravity  >1.050 H    (1.001-1.035)  


 


Urine Protein  Trace H    (Negative)  








                      Microbiology - Last 24 Hours (Table)











 01/29/21 09:26 Blood Culture - Preliminary





 Blood    No Growth after 24 hours














Assessment and Plan


Assessment: 





Assessment and plan:








1.  Acute hypoxemic respiratory failure secondary to loculated left-sided 

pleural effusion and possible empyema with what appears to be recurrent 

pneumonia.  Patient was admitted to the intensive care unit, he was started on 

IV fluid resuscitation, he is on small dose of Levophed, continue IV antibiotic 

in the form of vancomycin and cefepime, ID consultation, also interventional ra

diology consultation for possible ultrasound-guided thoracentesis of the left 

loculated pleural fluid for Gram stain and culture as well as LDH protein as 

well as pH for possible empyema with thoracic surgery consultation for possible 

VATS versus decortication and chest tube placement.





2.  Lactic acidosis.  Continue IV fluid resuscitation, continue with IV 

antibiotic vancomycin and cefepime, repeat lactic acid is back to normal.





3.  Sepsis with septic shock.  Continue IV fluid resuscitation, continue IV 

antibiotic, try to wean Levothroid drip to keep his mean greater than 65 mmHg.





4.  Acute kidney injury due to poor oral intake of fluid as well as hypotension 

with acute tubular necrosis.  Continue IV fluid, continue to monitor the patient

CMP continue to monitor urine output.





5.  Leukocytosis secondary to severe sepsis.  Continue IV fluid, continue IV 

antibiotic, repeat CBC tomorrow morning.





6.  Rheumatoid arthritis and rheumatoid lung.  Arava and Xeljanz had been on 

hold since October





7.  History of empyema with Streptococcus requiring chest tube.  His is a 

similar scenario may need a cardiothoracic surgery consultation.





8. Hypertension and hypertensive cardiovascular disease. currently hypotensive .

 Continue the IV fluid currently as well as IV pressors if needed





9. Hypothyroidism . we will continue with Synthroid 150 mcg orally daily.





10. DVT prophylaxis. we will start Lovenox 40 mg SC daily and bilateral knee 

high Fahad Hose





11. GI prophylaxis. we will continue with Protonix 40 mg IVP daily.





12.  Prognosis continues to be guarded.

## 2021-01-30 NOTE — PN
PROGRESS NOTE



DATE OF SERVICE:

01/30/2021



REASON FOR FOLLOWUP:

Pneumonia/empyema.



INTERVAL HISTORY:

The patient is currently afebrile.  Breathing slightly comfortably.  Denies having any

chest pain.  Occasional cough.  No abdominal pain.  No diarrhea.



PHYSICAL EXAMINATION:

Blood pressure 119/59, pulse of 109.  Temperature is 97.9.  He is 95% on 2 L nasal

cannula.

General description is a middle-aged male up in the bed in no distress.

Respiratory system: Unlabored breathing, decreased breath sounds at bases.  No wheeze.

Heart S1, S2.  Regular rate and rhythm.

Abdomen soft, no tenderness.



LABORATORY DATA:

Hemoglobin 9.2, white count 11.7, creatinine 1.0.  Blood culture so far negative.



DIAGNOSTIC IMPRESSION AND PLAN:

Patient with admission to the hospital with sepsis with concern for empyema, previous

history of streptococcal pneumonia, empyema on the left side with small loculated fluid

with plan for CT-guided drainage of it on Monday.  The patient has been unable to

provided sputum, covered with cefepime and Vanco, to continue while waiting for

condition to stabilize and culture to finalize.  Continue supportive care.





MMODL / IJN: [id number] / Job#: [id number]

## 2021-01-30 NOTE — P.PN
Subjective


Progress Note Date: 01/30/21


Principal diagnosis: 





Acute hypoxic respiratory failure secondary to sepsis, septic shock





 60-year-old white male patient, with past medical history of rheumatoid 

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 

episodes of pneumonia and history of loculated empyema requiring chest tube 

placement back in July 2020 with pleural fluid cultures positive for 

streptococcal pneumonia.  Other medical history includes hypertension, 

hypothyroidism and previous history of DVT.  Patient had a recent 

hospitalization from January 18 through 01/21/2021 hypotension, dehydration 

related to nausea vomiting diarrhea, acute kidney injury.  Patient received 

antibiotics in the form of Zosyn and Levaquin for possibility of pneumonia, he 

is chest x-ray showed cranial perihilar pulmonary infiltrates with increased 

interstitial changes at the lung bases and was thought to be related to a 

combination of rheumatoid lung and chronic scarring.  His last CT of the chest 

was on 12/29/2020 showing chronic changes to lower lungs, persistent irregular 

thick-walled pleural spaces in the posterior lung bases containing fluid and air

with adjacent anterior lung with chronic consolidation or atelectasis.  On 

01/29/2021 patient came into the emergency department from Dr. SARINA Santo's office

where he was being seen for a regular follow-up appointments.  He was noted to 

have low pulse ox 70s and was sent in to the emergency department for 

evaluation.  He has been having cough with yellow and sometimes blood-tinged 

sputum, appears amounts, denies any fever, COVID 19 was found to be negative, 

chest x-ray showed perihilar and basilar infiltrates without significant change 

from previous chest x-ray on 01/19/2021.  Lab data showed leukocytosis with 

white blood cell count of 19.1, hemoglobin of 12.1, INR 1.5, sodium is 135, 

potassium is 3.9, chloride is 102, CO2 is 19, creatinine is 1.32, which has 

actually improved since his discharge from the hospital on 01/21/2021 when he 

was at 1.84.  Lactic acid was elevated at 3.7, patient was given a total of 2 L 

in fluid boluses, troponin was negative at less than 0.012.  Urinalysis showed 

no evidence of infection.  CT chest showed thick-walled pleural cavities 

posteriorly in the lower lungs with left-sided air-fluid level with left-sided 

pleural fluid diminished most recent CT from 12/29/2020.  There is associated 

compressive atelectasis and/or chronic consolidation.  Persistent as it is lobe 

fissure, multifocal areas of reticular nodular opacity in the upper lungs remain

present with slight nodularity in the left upper lobe.  There is a focal 

consolidation improved from most recent study with some residual areas of 

scarring.  We will emergency department patient became hypotensive with a blood 

pressure in the 70s, slightly tachycardic remains in sinus mechanism, he is 

receiving his third liter bolus, his lactic acid did respond and improved he was

started on antibiotics in the form of Zosyn and vancomycin 





The patient is seen today January 30th 2021 in the intensive care unit.  He is 

currently sitting up in a chair at the bedside.  Awake and alert in no acute 

distress.  Maintaining O2 saturations in the 90s on 2 L/m per nasal cannula.  He

did require a small dose of norepinephrine currently on 0.05 mcg/kg/m.  He has 

lactated Ringer's at 100 MLS per hour.  He remains on vancomycin and cefepime.  

Chest x-ray continues to show the left lower lobe effusion.  White count 11.7.  

Hemoglobin 9.7.  Sodium 135.  Potassium 3.6.  Creatinine 1.00.  Blood cultures 

reveal no growth to date.





Objective





- Vital Signs


Vital signs: 


                                   Vital Signs











Temp  98.3 F   01/30/21 04:00


 


Pulse  101 H  01/30/21 07:40


 


Resp  15   01/30/21 07:00


 


BP  98/71   01/30/21 07:00


 


Pulse Ox  96   01/30/21 07:00








                                 Intake & Output











 01/29/21 01/30/21 01/30/21





 18:59 06:59 18:59


 


Intake Total 390 1560 


 


Output Total 315 1345 


 


Balance 75 215 


 


Weight 95.254 kg  95.254 kg


 


Intake:   


 


   1560 


 


    Sodium Chloride 0.9% 1, 390 1560 





    000 ml @ 130 mls/hr IV .   





    Q7H42M [facility] Rx#:[id number]   


 


Output:   


 


  Urine 315 1345 


 


Other:   


 


  Voiding Method  Indwelling Catheter 














- Exam





GENERAL EXAM: Alert, pleasant, 60-year-old male patient, on 2 L nasal cannula 

with a pulse ox of 96%, currently sitting up in chair at the bedside, 

comfortable in no apparent distress.


HEAD: Normocephalic/atraumatic.


EYES: Normal reaction of pupils, equal size.  Conjunctiva pink, sclera white.


NOSE: Clear with pink turbinates.


THROAT: No erythema or exudates.


NECK: No masses, no JVD, no thyroid enlargement, no adenopathy.


CHEST: No chest wall deformity.  Symmetrical expansion. 


LUNGS: Equal air entry with bibasilar crackles


CVS: Regular rate and rhythm, normal S1 and S2, no gallops, no murmurs, no rubs


ABDOMEN: Soft, nontender.  No hepatosplenomegaly, normal bowel sounds, no 

guarding or rigidity.


EXTREMITIES: No clubbing, no edema, no cyanosis, 2+ pulses and upper and lower 

extremities.


MUSCULOSKELETAL: Muscle strength and tone normal.


SPINE: No scoliosis or deformity


SKIN: No rashes


CENTRAL NERVOUS SYSTEM: Alert and oriented -3.  No focal deficits, tone is 

normal in all 4 extremities.


PSYCHIATRIC: Alert and oriented -3.  Appropriate affect.  Intact judgment and 

insight.








- Labs


CBC & Chem 7: 


                                 01/30/21 03:20





                                 01/30/21 03:20


Labs: 


                  Abnormal Lab Results - Last 24 Hours (Table)











  01/29/21 01/30/21 01/30/21 Range/Units





  12:50 03:20 03:20 


 


WBC    11.7 H  (3.8-10.6)  k/uL


 


RBC    3.67 L  (4.30-5.90)  m/uL


 


Hgb    9.7 L D  (13.0-17.5)  gm/dL


 


Hct    32.5 L  (39.0-53.0)  %


 


MCHC    29.7 L  (31.0-37.0)  g/dL


 


RDW    17.0 H  (11.5-15.5)  %


 


Neutrophils #    8.7 H  (1.3-7.7)  k/uL


 


Sodium   135 L   (137-145)  mmol/L


 


Carbon Dioxide   20 L   (22-30)  mmol/L


 


Calcium   8.0 L   (8.4-10.2)  mg/dL


 


Ur Specific Gravity  >1.050 H    (1.001-1.035)  


 


Urine Protein  Trace H    (Negative)  














Assessment and Plan


Assessment: 





1  Acute hypoxic respiratory failure related to sepsis, septic shock,  related 

to pneumonia, with chronic loculated effusions, rule out possibility of empyema.

COVID 19 was ruled out per PCR





2  Recent admission to the hospital for hypotension, dehydration related to 

nausea vomiting diarrhea, acute kidney injury





3  Recurrent pulmonary infections, with history of streptococcal pneumonia and 

empyema requiring chest tube placement in July 2020





4  Chronic loculated pneumothoraces, posterior, with air-fluid level seen on the

CT imaging of the chest, the possibility of trapped lung, chronic scarring and 

possibility of empyema needs to be ruled out





5  Elevated d-dimer with no CT evidence for pulmonary embolism





6  History of rheumatoid arthritis and rheumatoid lung disease on Arava





7  Recent history of acute kidney injury, and admission lab work today shows 

improvement in his renal function





8  Hypertension





9  Hypothyroidism





10  Previous history of foot infections requiring antibiotics





11  Previous history of DVT





Plan:





Titrate down the norepinephrine as tolerated


Continuous lactated Ringer's at 100 MLS per hour


Continue vancomycin and cefepime for now


Possible ultrasound-guided diagnostic thoracentesis of the left lung effusion by

interventional radiology on Monday


May require VATS procedure pleural fluid is infectious


We will continue to follow.

## 2021-01-31 LAB
ANION GAP SERPL CALC-SCNC: 8 MMOL/L
BASOPHILS # BLD AUTO: 0.1 K/UL (ref 0–0.2)
BASOPHILS NFR BLD AUTO: 1 %
BUN SERPL-SCNC: 4 MG/DL (ref 9–20)
CALCIUM SPEC-MCNC: 8.4 MG/DL (ref 8.4–10.2)
CHLORIDE SERPL-SCNC: 108 MMOL/L (ref 98–107)
CO2 SERPL-SCNC: 21 MMOL/L (ref 22–30)
EOSINOPHIL # BLD AUTO: 0.5 K/UL (ref 0–0.7)
EOSINOPHIL NFR BLD AUTO: 4 %
ERYTHROCYTE [DISTWIDTH] IN BLOOD BY AUTOMATED COUNT: 3.79 M/UL (ref 4.3–5.9)
ERYTHROCYTE [DISTWIDTH] IN BLOOD: 16.8 % (ref 11.5–15.5)
GLUCOSE SERPL-MCNC: 107 MG/DL (ref 74–99)
HCT VFR BLD AUTO: 33.4 % (ref 39–53)
HGB BLD-MCNC: 9.9 GM/DL (ref 13–17.5)
LYMPHOCYTES # SPEC AUTO: 1 K/UL (ref 1–4.8)
LYMPHOCYTES NFR SPEC AUTO: 8 %
MCH RBC QN AUTO: 26 PG (ref 25–35)
MCHC RBC AUTO-ENTMCNC: 29.5 G/DL (ref 31–37)
MCV RBC AUTO: 88.1 FL (ref 80–100)
MONOCYTES # BLD AUTO: 0.5 K/UL (ref 0–1)
MONOCYTES NFR BLD AUTO: 4 %
NEUTROPHILS # BLD AUTO: 10.3 K/UL (ref 1.3–7.7)
NEUTROPHILS NFR BLD AUTO: 82 %
PLATELET # BLD AUTO: 269 K/UL (ref 150–450)
POTASSIUM SERPL-SCNC: 3.5 MMOL/L (ref 3.5–5.1)
SODIUM SERPL-SCNC: 137 MMOL/L (ref 137–145)
WBC # BLD AUTO: 12.5 K/UL (ref 3.8–10.6)

## 2021-01-31 RX ADMIN — PANTOPRAZOLE SODIUM SCH MG: 40 INJECTION, POWDER, FOR SOLUTION INTRAVENOUS at 08:32

## 2021-01-31 RX ADMIN — HEPARIN SODIUM SCH UNIT: 5000 INJECTION, SOLUTION INTRAVENOUS; SUBCUTANEOUS at 00:32

## 2021-01-31 RX ADMIN — CEFEPIME HYDROCHLORIDE SCH MLS/HR: 2 INJECTION, POWDER, FOR SOLUTION INTRAVENOUS at 08:32

## 2021-01-31 RX ADMIN — HYDROCORTISONE SODIUM SUCCINATE SCH MG: 100 INJECTION, POWDER, FOR SOLUTION INTRAMUSCULAR; INTRAVENOUS at 16:00

## 2021-01-31 RX ADMIN — HYDROCODONE BITARTRATE AND ACETAMINOPHEN PRN EACH: 5; 325 TABLET ORAL at 14:56

## 2021-01-31 RX ADMIN — HEPARIN SODIUM SCH UNIT: 5000 INJECTION, SOLUTION INTRAVENOUS; SUBCUTANEOUS at 23:09

## 2021-01-31 RX ADMIN — POTASSIUM CHLORIDE SCH MLS/HR: 14.9 INJECTION, SOLUTION INTRAVENOUS at 04:03

## 2021-01-31 RX ADMIN — SODIUM CHLORIDE SCH MLS/HR: 9 INJECTION, SOLUTION INTRAVENOUS at 00:32

## 2021-01-31 RX ADMIN — IPRATROPIUM BROMIDE AND ALBUTEROL SULFATE SCH: .5; 3 SOLUTION RESPIRATORY (INHALATION) at 19:43

## 2021-01-31 RX ADMIN — SODIUM CHLORIDE SCH MLS/HR: 9 INJECTION, SOLUTION INTRAVENOUS at 23:09

## 2021-01-31 RX ADMIN — Medication SCH MCG: at 20:02

## 2021-01-31 RX ADMIN — POTASSIUM CHLORIDE SCH MLS/HR: 14.9 INJECTION, SOLUTION INTRAVENOUS at 08:32

## 2021-01-31 RX ADMIN — HEPARIN SODIUM SCH UNIT: 5000 INJECTION, SOLUTION INTRAVENOUS; SUBCUTANEOUS at 15:41

## 2021-01-31 RX ADMIN — POTASSIUM CHLORIDE SCH MEQ: 20 TABLET, EXTENDED RELEASE ORAL at 08:31

## 2021-01-31 RX ADMIN — ASPIRIN 81 MG CHEWABLE TABLET SCH MG: 81 TABLET CHEWABLE at 20:02

## 2021-01-31 RX ADMIN — POTASSIUM CHLORIDE SCH MEQ: 20 TABLET, EXTENDED RELEASE ORAL at 06:45

## 2021-01-31 RX ADMIN — OXYCODONE HYDROCHLORIDE AND ACETAMINOPHEN SCH MG: 500 TABLET ORAL at 20:02

## 2021-01-31 RX ADMIN — SODIUM CHLORIDE SCH MLS/HR: 9 INJECTION, SOLUTION INTRAVENOUS at 11:55

## 2021-01-31 RX ADMIN — LEVOTHYROXINE SODIUM SCH MCG: 75 TABLET ORAL at 06:45

## 2021-01-31 RX ADMIN — HYDROCODONE BITARTRATE AND ACETAMINOPHEN PRN EACH: 5; 325 TABLET ORAL at 23:14

## 2021-01-31 RX ADMIN — CEFEPIME HYDROCHLORIDE SCH MLS/HR: 2 INJECTION, POWDER, FOR SOLUTION INTRAVENOUS at 20:01

## 2021-01-31 RX ADMIN — HYDROCORTISONE SODIUM SUCCINATE SCH MG: 100 INJECTION, POWDER, FOR SOLUTION INTRAMUSCULAR; INTRAVENOUS at 08:58

## 2021-01-31 RX ADMIN — IPRATROPIUM BROMIDE AND ALBUTEROL SULFATE SCH: .5; 3 SOLUTION RESPIRATORY (INHALATION) at 11:05

## 2021-01-31 RX ADMIN — NOREPINEPHRINE BITARTRATE SCH MLS/HR: 1 INJECTION, SOLUTION, CONCENTRATE INTRAVENOUS at 17:47

## 2021-01-31 RX ADMIN — HYDROCORTISONE SODIUM SUCCINATE SCH MG: 100 INJECTION, POWDER, FOR SOLUTION INTRAMUSCULAR; INTRAVENOUS at 23:09

## 2021-01-31 RX ADMIN — TRIAMCINOLONE ACETONIDE SCH APPLIC: 1 CREAM TOPICAL at 08:32

## 2021-01-31 RX ADMIN — TRIAMCINOLONE ACETONIDE SCH APPLIC: 1 CREAM TOPICAL at 20:03

## 2021-01-31 RX ADMIN — HEPARIN SODIUM SCH UNIT: 5000 INJECTION, SOLUTION INTRAVENOUS; SUBCUTANEOUS at 08:32

## 2021-01-31 RX ADMIN — IPRATROPIUM BROMIDE AND ALBUTEROL SULFATE SCH: .5; 3 SOLUTION RESPIRATORY (INHALATION) at 07:26

## 2021-01-31 RX ADMIN — NOREPINEPHRINE BITARTRATE SCH MLS/HR: 1 INJECTION, SOLUTION, CONCENTRATE INTRAVENOUS at 04:03

## 2021-01-31 RX ADMIN — HYDROCODONE BITARTRATE AND ACETAMINOPHEN PRN EACH: 5; 325 TABLET ORAL at 08:31

## 2021-01-31 NOTE — P.PN
Subjective


Progress Note Date: 01/31/21


Principal diagnosis: 





This is a 60-year-old male seen in consultation because of acute kidney injury. 

He was admitted because of shortness of breath and hypotension.





He had recently severe acute kidney injury because of nausea vomiting diarrhea 

and was admitted here and discharged on 01/21/2021.


creatinine was 7.63 as of 01/18/2021.  He was discharged on 01/21/2021.  His 

creatinine on the day of discharge on 01/21/2021 was 1.84.





He was admitted  01/29/2021, and creatinine had improved to 1.32 


He had a computed tomography scan with dye, and his creatinine and urine output 

has been normal.





He continues to have cough.  He has empyema and is going for a chest tube 

drainage





He remains comfortable, has a Marino catheter which can come out and off on levo 

fed because of blood pressure tending to be low but he is well-perfused and 

seems that by his creatinine is renal perfusion is adequate








Currently he denies any nausea vomiting appetite is returning no diarrhea.  No 

fever chills.  Does have shortness of breath and mild cough.





His past history significant for chronic rheumatoid arthritis with rheumatoid 

lung on multiple immunosuppressive medications, recently had empyema had chest 

tube.  Also known with hypertension.











Objective





- Vital Signs


Vital signs: 


                                   Vital Signs











Temp  98.1 F   01/31/21 04:00


 


Pulse  115 H  01/31/21 07:00


 


Resp  12   01/31/21 07:00


 


BP  113/63   01/31/21 07:00


 


Pulse Ox  94 L  01/31/21 07:00








                                 Intake & Output











 01/30/21 01/31/21 01/31/21





 18:59 06:59 18:59


 


Intake Total 7368.376 8006.933 475


 


Output Total 510 1125 325


 


Balance 1139.042 357.933 150


 


Weight 95.254 kg  


 


Intake:   


 


  IV 1610 1475 475


 


    Cefepime 2 gm In Sodium 100 100 100





    Chloride 0.9% 100 ml @ 25   





    mls/hr IVPB Q12HR [facility] Rx   





    #:[id number]   


 


    Lactated Ringers 1,000 ml 750 1375 375





    @ 125 mls/hr IV .Q8H [facility]   





    Rx#:[id number]   


 


    Sodium Chloride 0.9% 1, 260  





    000 ml @ 130 mls/hr IV .   





    Q7H42M [facility] Rx#:[id number]   


 


    Vancomycin 1,750 mg In 500  





    Sodium Chloride 0.9% 500   





    ml 500 ml @ 167 mls/hr   





    IVPB Q12H [facility] Rx#:   





    [id number]   


 


  Intake, IV Titration 39.042 7.933 





  Amount   


 


    Norepinephrine 32 mg In 39.042 7.933 





    Sodium Chloride 0.9% 218   





    ml @ 0.05 MCG/KG/MIN 2.   





    233 mls/hr IV .Q24H [facility]   





    Rx#:[id number]   


 


Output:   


 


  Urine 510 1125 325


 


Other:   


 


  Voiding Method Indwelling Catheter Indwelling Catheter 








Examination is awake alert oriented comfortable





HEENT exam no JVP neck no masses noted





Lungs Are significant with with bilateral wheezing on expiration and some 

diminished breath sounds at the bases.





Heart sounds are unremarkable for any murmur rub gallop





Abdomen soft nontender





Extremity exam was no edema





Neurologically awake alert oriented





- Labs


CBC & Chem 7: 


                                 01/31/21 03:39





                                 01/31/21 03:39


Labs: 


                  Abnormal Lab Results - Last 24 Hours (Table)











  01/31/21 01/31/21 Range/Units





  03:39 03:39 


 


WBC   12.5 H  (3.8-10.6)  k/uL


 


RBC   3.79 L  (4.30-5.90)  m/uL


 


Hgb   9.9 L  (13.0-17.5)  gm/dL


 


Hct   33.4 L  (39.0-53.0)  %


 


MCHC   29.5 L  (31.0-37.0)  g/dL


 


RDW   16.8 H  (11.5-15.5)  %


 


Neutrophils #   10.3 H  (1.3-7.7)  k/uL


 


Chloride  108 H   ()  mmol/L


 


Carbon Dioxide  21 L   (22-30)  mmol/L


 


BUN  4 L   (9-20)  mg/dL


 


Glucose  107 H   (74-99)  mg/dL








                      Microbiology - Last 24 Hours (Table)











 01/30/21 14:30 Gram Stain - Preliminary





 Sputum Sputum Culture - Preliminary


 


 01/29/21 13:00 Blood Culture - Preliminary





 Blood    No Growth after 24 hours


 


 01/29/21 09:26 Blood Culture - Preliminary





 Blood    No Growth after 24 hours














Assessment and Plan


Assessment: 





Impression





1.  Recent acute kidney injury with a creatinine of 7 on 01/18/2021 during his 

last admission.  Currently his creatinine is down to 1.  There was some risk of 

dye induced ATN from the computed tomography scan that was performed on 

01/29/2021, but his urine output is good and his creatinine is down to 0.7.





2.  Hypotension, possibly related to sepsis and pneumonia but he looks 

clinically very stable, his blood pressure in the 90s to 110s.  He is well 

perfused, warm to touch and awake and alert





3.  Mild degree of non-gap acidosis, bicarb is 20 up to 21 and gap is 9 > 8 - 

possibly related to normal saline that was getting, IV fluid was changed to 

lactated Ringer's yesterday





4.  Chronic rheumatoid arthritis with rheumatoid lungs and empyema.





5.  Hypothyroidism TSH is 14 free T4 though is normal at 2.19, off off Synthroid





6.  Possible adrenal insufficiency serum cortisol is 3 





Recommendation





1.   continue Ringer's lactate.





2.  Suggest discontinue Marino catheter may use it [place] catheter





3.  Suggest discontinue levo fed





4.  Review thyroid function, he is off of Synthroid that he was taking 150 g as

an outpatient per patient 





4.   check ACTH simulation test

## 2021-01-31 NOTE — P.PN
Subjective


Progress Note Date: 01/31/21


Principal diagnosis: 





Acute hypoxic respiratory failure secondary to sepsis, septic shock





 60-year-old white male patient, with past medical history of rheumatoid 

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 

episodes of pneumonia and history of loculated empyema requiring chest tube 

placement back in July 2020 with pleural fluid cultures positive for 

streptococcal pneumonia.  Other medical history includes hypertension, 

hypothyroidism and previous history of DVT.  Patient had a recent 

hospitalization from January 18 through 01/21/2021 hypotension, dehydration 

related to nausea vomiting diarrhea, acute kidney injury.  Patient received 

antibiotics in the form of Zosyn and Levaquin for possibility of pneumonia, he 

is chest x-ray showed cranial perihilar pulmonary infiltrates with increased 

interstitial changes at the lung bases and was thought to be related to a 

combination of rheumatoid lung and chronic scarring.  His last CT of the chest 

was on 12/29/2020 showing chronic changes to lower lungs, persistent irregular 

thick-walled pleural spaces in the posterior lung bases containing fluid and air

with adjacent anterior lung with chronic consolidation or atelectasis.  On 

01/29/2021 patient came into the emergency department from Dr. SARINA Santo's office

where he was being seen for a regular follow-up appointments.  He was noted to 

have low pulse ox 70s and was sent in to the emergency department for 

evaluation.  He has been having cough with yellow and sometimes blood-tinged 

sputum, appears amounts, denies any fever, COVID 19 was found to be negative, 

chest x-ray showed perihilar and basilar infiltrates without significant change 

from previous chest x-ray on 01/19/2021.  Lab data showed leukocytosis with 

white blood cell count of 19.1, hemoglobin of 12.1, INR 1.5, sodium is 135, 

potassium is 3.9, chloride is 102, CO2 is 19, creatinine is 1.32, which has 

actually improved since his discharge from the hospital on 01/21/2021 when he 

was at 1.84.  Lactic acid was elevated at 3.7, patient was given a total of 2 L 

in fluid boluses, troponin was negative at less than 0.012.  Urinalysis showed 

no evidence of infection.  CT chest showed thick-walled pleural cavities 

posteriorly in the lower lungs with left-sided air-fluid level with left-sided 

pleural fluid diminished most recent CT from 12/29/2020.  There is associated 

compressive atelectasis and/or chronic consolidation.  Persistent as it is lobe 

fissure, multifocal areas of reticular nodular opacity in the upper lungs remain

present with slight nodularity in the left upper lobe.  There is a focal 

consolidation improved from most recent study with some residual areas of 

scarring.  We will emergency department patient became hypotensive with a blood 

pressure in the 70s, slightly tachycardic remains in sinus mechanism, he is 

receiving his third liter bolus, his lactic acid did respond and improved he was

started on antibiotics in the form of Zosyn and vancomycin 





The patient is seen today January 30th 2021 in the intensive care unit.  He is 

currently sitting up in a chair at the bedside.  Awake and alert in no acute 

distress.  Maintaining O2 saturations in the 90s on 2 L/m per nasal cannula.  He

did require a small dose of norepinephrine currently on 0.05 mcg/kg/m.  He has 

lactated Ringer's at 100 MLS per hour.  He remains on vancomycin and cefepime.  

Chest x-ray continues to show the left lower lobe effusion.  White count 11.7.  

Hemoglobin 9.7.  Sodium 135.  Potassium 3.6.  Creatinine 1.00.  Blood cultures 

reveal no growth to date.





Reevaluated today on 1/31/2021, remains in the ICU, still on norepinephrine at 

0.04 mcg/kg/m.  On LR at 1 25 mL per hour.  On vancomycin and cefepime 

empirically, blood pressure remains marginal, went ahead and recommended Solu-

Cortef 100 mg IV push every 8 hours and hopefully will be able to discontinue 

norepinephrine today.  His IV fluid is cut down to KVO.  Given 20 mg of Lasix IV

push, and I have recommended that we monitor the patient in the ICU for the next

24 hours.  Chest x-ray is basically the same, continues to show chronic finding,

difficult to exclude underlying pneumonia/empyema.  CT of the chest on admission

was also reviewed, difficult to rule out underlying empyema.  Clinically the 

patient is feeling better except for generally weak.  WBC count is 12.5 

hemoglobin is 9.9.  Normal renal profile is normal blood cultures are negative 

so far.  Sputum cultures are also negative so far.





Objective





- Vital Signs


Vital signs: 


                                   Vital Signs











Temp  98.2 F   01/31/21 08:00


 


Pulse  104 H  01/31/21 11:30


 


Resp  21   01/31/21 11:30


 


BP  82/52   01/31/21 11:30


 


Pulse Ox  97   01/31/21 11:30








                                 Intake & Output











 01/30/21 01/31/21 01/31/21





 18:59 06:59 18:59


 


Intake Total 9597.015 6607.933 725


 


Output Total 510 1125 1050


 


Balance 1139.042 357.933 -325


 


Weight 95.254 kg  100.7 kg


 


Intake:   


 


  IV 1610 1475 725


 


    Cefepime 2 gm In Sodium 100 100 100





    Chloride 0.9% 100 ml @ 25   





    mls/hr IVPB Q12HR [facility] Rx   





    #:[id number]   


 


    Lactated Ringers 1,000 ml 750 1375 625





    @ 125 mls/hr IV .Q8H [facility]   





    Rx#:[id number]   


 


    Sodium Chloride 0.9% 1, 260  





    000 ml @ 130 mls/hr IV .   





    Q7H42M [facility] Rx#:[id number]   


 


    Vancomycin 1,750 mg In 500  





    Sodium Chloride 0.9% 500   





    ml 500 ml @ 167 mls/hr   





    IVPB Q12H [facility] Rx#:   





    [id number]   


 


  Intake, IV Titration 39.042 7.933 





  Amount   


 


    Norepinephrine 32 mg In 39.042 7.933 





    Sodium Chloride 0.9% 218   





    ml @ 0.05 MCG/KG/MIN 2.   





    233 mls/hr IV .Q24H [facility]   





    Rx#:[id number]   


 


Output:   


 


  Urine 510 1125 1050


 


Other:   


 


  Voiding Method Indwelling Catheter Indwelling Catheter Indwelling Catheter














- Exam





GENERAL EXAM: Alert, pleasant, 60-year-old male patient, on 2 L nasal cannula 

with a pulse ox of 96%, currently sitting up in chair at the bedside, comf

ortable in no apparent distress.


HEAD: Normocephalic/atraumatic.


EENT: PERRLA, EOMI, nonicteric.  Moist mucous membranes, no neck masses, no JVD,

no stridor.


CHEST: No chest wall deformity.  Symmetrical expansion. 


LUNGS: Bibasilar crackles noted.


CVS: Regular rate and rhythm, normal S1 and S2, no gallops, no murmurs, no rubs


ABDOMEN: Soft, nontender.  No hepatosplenomegaly, normal bowel sounds, no 

guarding or rigidity.


EXTREMITIES: No clubbing, no edema, no cyanosis, 2+ pulses and upper and lower 

extremities.


MUSCULOSKELETAL: Muscle strength and tone normal.


SPINE: No scoliosis or deformity


SKIN: No rashes


CENTRAL NERVOUS SYSTEM: Alert and oriented -3.  No focal deficits, tone is 

normal in all 4 extremities.


PSYCHIATRIC: Alert and oriented -3.  Appropriate affect.  Intact judgment and i

nsight.








- Labs


CBC & Chem 7: 


                                 01/31/21 03:39





                                 01/31/21 03:39


Labs: 


                  Abnormal Lab Results - Last 24 Hours (Table)











  01/31/21 01/31/21 Range/Units





  03:39 03:39 


 


WBC   12.5 H  (3.8-10.6)  k/uL


 


RBC   3.79 L  (4.30-5.90)  m/uL


 


Hgb   9.9 L  (13.0-17.5)  gm/dL


 


Hct   33.4 L  (39.0-53.0)  %


 


MCHC   29.5 L  (31.0-37.0)  g/dL


 


RDW   16.8 H  (11.5-15.5)  %


 


Neutrophils #   10.3 H  (1.3-7.7)  k/uL


 


Chloride  108 H   ()  mmol/L


 


Carbon Dioxide  21 L   (22-30)  mmol/L


 


BUN  4 L   (9-20)  mg/dL


 


Glucose  107 H   (74-99)  mg/dL








                      Microbiology - Last 24 Hours (Table)











 01/30/21 14:30 Gram Stain - Preliminary





 Sputum Sputum Culture - Preliminary


 


 01/29/21 13:00 Blood Culture - Preliminary





 Blood    No Growth after 24 hours


 


 01/29/21 09:26 Blood Culture - Preliminary





 Blood    No Growth after 24 hours














Assessment and Plan


Assessment: 





1  Acute hypoxic respiratory failure related to sepsis, septic shock,  related 

to pneumonia, with chronic loculated effusions, rule out possibility of empyema.

COVID 19 was ruled out per PCR





2  Recent admission to the hospital for hypotension, dehydration related to 

nausea vomiting diarrhea, acute kidney injury





3  Recurrent pulmonary infections, with history of streptococcal pneumonia and 

empyema requiring chest tube placement in July 2020





4  Chronic loculated pneumothoraces, posterior, with air-fluid level seen on the

CT imaging of the chest, the possibility of trapped lung, chronic scarring and 

possibility of empyema needs to be ruled out





5  Elevated d-dimer with no CT evidence for pulmonary embolism





6  History of rheumatoid arthritis and rheumatoid lung disease on Arava





7  Recent history of acute kidney injury, and admission lab work today shows 

improvement in his renal function





8  Hypertension





9  Hypothyroidism





10  Previous history of foot infections requiring antibiotics





11  Previous history of DVT





Plan:


We will continue to monitor the patient in the ICU as long as he is requiring 

norepinephrine.


Will recommend Solu-Cortef 100 mg IV push every 8 hours, patient is chronically 

on steroids.  And he remains relatively hypotensive this morning.


Titrate down the norepinephrine as tolerated possibly discontinue after starting

Solu-Cortef.


Decrease IV fluid to KVO.


Continue vancomycin and cefepime for now


Possible ultrasound-guided diagnostic thoracentesis of the left lung effusion by

interventional radiology on Monday.  Discussed this option with Dr. Pham, and 

he is willing to perform CT-guided or ultrasound-guided thoracentesis of the 

pocket of fluid on the left side, just to rule out empyema.


We will continue to follow.


Time with Patient: Less than 30

## 2021-01-31 NOTE — XR
EXAMINATION TYPE: XR chest 1V portable

 

DATE OF EXAM: 1/31/2021

 

COMPARISON: Chest x-ray 1/30/2021

 

HISTORY: Shortness of breath

 

TECHNIQUE: Single frontal view of the chest is obtained.

 

FINDINGS:  No significant interval change.

 

IMPRESSION:  Chronic findings, difficult to exclude pneumonia, empyema

## 2021-01-31 NOTE — PN
PROGRESS NOTE



DATE OF SERVICE:

01/31/2021



REASON FOR FOLLOWUP:

Pneumonia, possible empyema.



INTERVAL HISTORY:

Patient is currently afebrile.  The patient is breathing slightly comfortably.  The

patient denies having chest pain. He did have a cough with sputum production.  No

nausea, no vomiting.  No abdominal pain. No diarrhea.



PHYSICAL EXAMINATION:

Blood pressure 92/68, pulse of 100.  Temperature 97.5.  He is 99% on 2 L nasal cannula.

General description is a middle-aged male up in bed in no distress.

Respiratory system: Unlabored breathing, decreased breath sounds in the bases.  No

wheeze.

Heart S1, S2.  Regular rate and rhythm.

ABDOMEN: Soft. No tenderness.



LABS:

Hemoglobin 9.1, white count 12.5, BUN of 14, creatinine 0.4. Vancomycin trough 23.

Sputum cultures pending.  Blood culture so far negative.



DIAGNOSTIC IMPRESSION AND PLAN:

Patient admitted to hospital with sepsis and concern for pneumonia/empyema.  Waiting

for a CT-guided drainage of that loculated fluid, should be sent for the cultures.  The

patient is covered with cefepime and Vanco.  Kidney function will be monitored closely.

Vancomycin dose needs to be adjusted to keep the trough around 15.  His questions and

concerns were answered.





MMODL / IJN: [id number] / Job#: [id number]

## 2021-01-31 NOTE — P.PN
Subjective


Progress Note Date: 01/31/21





This is a 60-year-old  male patient requesting my service with past 

medical history of rheumatoid arthritis on Arava that was diagnosed when he was 

37 year old initially was started on Methotrexate that he could not tolerate 

then placed on Embrel but he developed side effects and finally was tried on 

Humira injection but he developed neurologic sided effects and was hospitaized 

at [facility] for quiet some time, rheumatoid lung disease, 

previous history of loculated empyema with chest tube placement in July 2020, 

hypertension, hypothyroidism, history of DVT, remote history of tobacco use and 

dependence, was recently hospitalized at [facility] after he was 

admitted for an acute kidney injury with significant metabolic acidosis 

associated with the elevated creatinine and elevated BUN and hypotension at that

time he was seen and evaluated by pulmonary and critical care medicine, he had a

central line placed and at that time, he was placed on Levophed drip he was p

laced on IV anabiotic as well but the patient recovered very fast and he had an 

echocardiogram that showed normal LV function and segmental hypokinesia, he was 

supposed to follow-up with Dr. Sanot in the office today, patient was seen in my

office 24 hours ago when he was complaining of increased shortness breath, at 

that time his oxygenation could not be checked because of his Case's and cold

extremities, he was sent for a chest x-ray that showed right lower lobe 

infiltrate as well as blood tests that showed a white count of 20,000 patient 

was feeling better he was started on oral antibiotic in the form of Levaquin 500

mg orally once every day, and that he was supposed to follow-up with me as an 

outpatient every week he went to see his cardiologist today and he had an 

episode when he was dizzy lightheaded and an episode of vomiting as well and he 

was sent to the emergency department for evaluation had a computed tomography of

the chest as well as abdomen and pelvis that showed a thick walled pleural 

cavities posteriorly in the lower lungs with left-sided air-fluid level that has

diminished in size from the prior computed tomography scan when he was in the 

hospital a few weeks back there is also associated compressive atelectasis and 

chronic consultation with multifocal areas of reticular nodular opacity in the 

upper lungs with a slight nodularity of the left upper lobe again this area of 

focal consultation has improved from the previous study that was done few weeks 

ago, patient was started on IV antibiotic with vancomycin and Zosyn as well as 

IV fluid, he was seen in consultation by pulmonary critical care in the 

emergency department as the patient blood pressure dropped and that he'll be 

transferred to the intensive care unit at this point in time I had long conve

rsation with the patient and his wife at the bedside and the patient may need to

have a chest tube placed for his possible right empyema, he did receive 3 L 

normal saline in the ER, and his blood pressure is marginal he may need to go on

 pressors if  not recovered.





1/30:patient is sitting up in chair feeling about the same , complains of 

increased pain in the righ elbow, was seen earlier by pulmonary and the plan was

to go for US-Guided left thoracenthesis due to to loculated left pleural effu

nathaniel and possible empyema, may need VATS, he denies any chest pain or shortness 

of breath, no abdominal pain nausea, vomiting or diarrhea, still have diarrhea 

negative for C.Diff, he seems to have accept to stay in the hospital this time 

as long as he needed to.





1/31: Patient sitting up in the recliner complaining of increased pain in his 

joints due to his rheumatoid arthritis with increased synovitis in both wrists 

both elbows and both shoulders he was started on hydrocortisone 100 mg IV push 

every 8 hours, to try to help with his blood pressure as well as his phonation, 

he is maintained on Norco 5/325 mg one tablet orally every 6 hours as needed for

pain control, patient denies any chest pain is less short of breath, he 

continues to have increased coughing with yellow from production of green phlegm

production, he had no fever or chills at this time he continues to be on 

Levothroid drip, patient has appeared to have increased swelling in both lower 

extremities as well as both ankles, he is maintained on IV antibiotic in the 

form of vancomycin as well as cefepime, infectious disease is following, patient

is scheduled to go for ultrasound guidance thoracentesis of the left loculated 

pleural effusion tomorrow morning.





Objective





- Vital Signs


Vital signs: 


                                   Vital Signs











Temp  98.1 F   01/31/21 04:00


 


Pulse  115 H  01/31/21 07:00


 


Resp  12   01/31/21 07:00


 


BP  113/63   01/31/21 07:00


 


Pulse Ox  94 L  01/31/21 07:00








                                 Intake & Output











 01/30/21 01/31/21 01/31/21





 18:59 06:59 18:59


 


Intake Total 7645.394 8594.933 475


 


Output Total 510 1125 325


 


Balance 1139.042 357.933 150


 


Weight 95.254 kg  


 


Intake:   


 


  IV 1610 1475 475


 


    Cefepime 2 gm In Sodium 100 100 100





    Chloride 0.9% 100 ml @ 25   





    mls/hr IVPB Q12HR [facility] Rx   





    #:[id number]   


 


    Lactated Ringers 1,000 ml 750 1375 375





    @ 125 mls/hr IV .Q8H [facility]   





    Rx#:[id number]   


 


    Sodium Chloride 0.9% 1, 260  





    000 ml @ 130 mls/hr IV .   





    Q7H42M [facility] Rx#:[id number]   


 


    Vancomycin 1,750 mg In 500  





    Sodium Chloride 0.9% 500   





    ml 500 ml @ 167 mls/hr   





    IVPB Q12H [facility] Rx#:   





    [id number]   


 


  Intake, IV Titration 39.042 7.933 





  Amount   


 


    Norepinephrine 32 mg In 39.042 7.933 





    Sodium Chloride 0.9% 218   





    ml @ 0.05 MCG/KG/MIN 2.   





    233 mls/hr IV .Q24H [facility]   





    Rx#:[id number]   


 


Output:   


 


  Urine 510 1125 325


 


Other:   


 


  Voiding Method Indwelling Catheter Indwelling Catheter 














- Exam





 Review of Systems 


Constitutional: Reports anorexia, Reports chronic pain, Reports fatigue, Reports

weakness, Reports weight loss


Eyes: denies blurred vision, denies bulging eye, denies decreased vision, denies

diplopia


Ears, nose, mouth and throat: Denies dysphagia, Denies neck lump, Denies sore 

throat


Respiratory: Reports dyspnea, Reports respiratory infections, Denies congestion,

Denies cough with sputum, Denies home oxygen, Denies sleep apnea, Denies 

snoring, Denies wheezing


Gastrointestinal: Reports bloating, Reports change in bowel habits, Reports 

diarrhea, Reports early satiety, Reports indigestion, Reports loss of appetite, 

Reports nausea, Reports vomiting, Denies abdominal pain, Denies belching, Denies

heartburn, Denies hematemesis, Denies hematochezia, Denies melena


Genitourinary: Denies dysuria, Denies nocturia


Musculoskeletal: Denies myalgias


Musculoskeletal: absent: ankle pain, ankle stiffness, ankle swelling, elbow 

pain, elbow stiffness, elbow swelling, foot pain, foot stiffness, foot swelling,

hand pain, hand stiffness, hand swelling, hip pain, hip stiffness, hip swelling,

knee pain, knee stiffness, knee swelling, shoulder pain, shoulder stiffness, 

shoulder swelling, wrist pain, wrist stiffness, wrist swelling


Integumentary: Denies pruritus, Denies rash


Neurological: Denies numbness, Denies weakness


Psychiatric: Denies anxiety, Denies depression


Endocrine: Denies fatigue, Denies weight change








Physical examination:








General: patient is 60 year old lying down in bed in moderate distress.





HEENT: head ia atraumatic normocephalic, Pupils were equal round reactive to 

light and accommodations , extra ocular muscle movement were intact, mucous 

membranes of the mouth are somewhat dry.





Neck: supple no JVP.'





Chest: decreased breath sounds at he bases with few ronchi no expiratory wheezes

no chest wall tendernes or intercostal retractions.





Heart: first heart sound is depressed, second heart sound is normal tachycardic 

there is HOOD 2/6 located at the left sternal border.





Abdomen: soft, mild tenderness to the left lower quadrant positive bowel sounds,

no guarding or rebound tenderness, no hepatosplenomegaly.





Extremities: there is no edema or calf tenderness DP+ 2 Bilaterally, there is 

what appears to be a charcot joint in the right foot form arch collapse.





Neurologic examination: patient is awake , alert and oriented X 3 CN II-XII are 

grossly intact, muscle power 4/5 in bilateral upper and lower extremities, deep 

tendon reflexes were normal.








- Labs


CBC & Chem 7: 


                                 01/31/21 03:39





                                 01/31/21 03:39


Labs: 


                  Abnormal Lab Results - Last 24 Hours (Table)











  01/31/21 01/31/21 Range/Units





  03:39 03:39 


 


WBC   12.5 H  (3.8-10.6)  k/uL


 


RBC   3.79 L  (4.30-5.90)  m/uL


 


Hgb   9.9 L  (13.0-17.5)  gm/dL


 


Hct   33.4 L  (39.0-53.0)  %


 


MCHC   29.5 L  (31.0-37.0)  g/dL


 


RDW   16.8 H  (11.5-15.5)  %


 


Neutrophils #   10.3 H  (1.3-7.7)  k/uL


 


Chloride  108 H   ()  mmol/L


 


Carbon Dioxide  21 L   (22-30)  mmol/L


 


BUN  4 L   (9-20)  mg/dL


 


Glucose  107 H   (74-99)  mg/dL








                      Microbiology - Last 24 Hours (Table)











 01/30/21 14:30 Gram Stain - Preliminary





 Sputum Sputum Culture - Preliminary


 


 01/29/21 13:00 Blood Culture - Preliminary





 Blood    No Growth after 24 hours


 


 01/29/21 09:26 Blood Culture - Preliminary





 Blood    No Growth after 24 hours














Assessment and Plan


Assessment: 





Assessment and plan:








1.  Acute hypoxemic respiratory failure secondary to loculated left-sided 

pleural effusion and possible empyema with what appears to be recurrent 

pneumonia.  Patient was admitted to the intensive care unit, he was started on 

IV fluid resuscitation, he is on small dose of Levophed, continue IV antibiotic 

in the form of vancomycin and cefepime, ID consultation, also interventional 

radiology consultation for possible ultrasound-guided thoracentesis of the left 

loculated pleural fluid for Gram stain and culture as well as LDH protein as 

well as pH for possible empyema with thoracic surgery consultation for possible 

VATS versus decortication and chest tube placement.





2.  Lactic acidosis.  Continue IV fluid resuscitation, continue with IV 

antibiotic vancomycin and cefepime, repeat lactic acid is back to normal.





3.  Sepsis with septic shock.  Continue IV fluid resuscitation, continue IV 

antibiotic, try to wean Levothroid drip to keep his mean greater than 65 mmHg.





4.  Acute kidney injury due to poor oral intake of fluid as well as hypotension 

with acute tubular necrosis.  Continue IV fluid, continue to monitor the patient

CMP continue to monitor urine output.





5.  Leukocytosis secondary to severe sepsis.  Continue IV fluid, continue IV 

antibiotic, repeat CBC tomorrow morning.





6.  Rheumatoid arthritis and rheumatoid lung.  Arava and Xeljanz had been on 

hold since October, patient was started on hydrocortisone 100 mg IV push every 8

hours.





7.  History of empyema with Streptococcus requiring chest tube.  His is a 

similar scenario may need a cardiothoracic surgery consultation.





8. Hypertension and hypertensive cardiovascular disease. currently hypotensive .

 Continue the IV fluid currently as well as IV pressors if needed





9. Hypothyroidism . we will continue with Synthroid 150 mcg orally daily.





10. DVT prophylaxis. we will start Lovenox 40 mg SC daily and bilateral knee 

high Fahad Hose





11. GI prophylaxis. we will continue with Protonix 40 mg IVP daily.





12.  Prognosis continues to be guarded.

## 2021-02-01 LAB
ANION GAP SERPL CALC-SCNC: 6 MMOL/L
BASOPHILS # BLD AUTO: 0 K/UL (ref 0–0.2)
BASOPHILS NFR BLD AUTO: 0 %
BUN SERPL-SCNC: 7 MG/DL (ref 9–20)
CALCIUM SPEC-MCNC: 8.6 MG/DL (ref 8.4–10.2)
CELL CNT PNL FLD: 100
CHLORIDE SERPL-SCNC: 107 MMOL/L (ref 98–107)
CO2 SERPL-SCNC: 24 MMOL/L (ref 22–30)
DEPRECATED POLYS # FLD: 91 %
EOSINOPHIL # BLD AUTO: 0 K/UL (ref 0–0.7)
EOSINOPHIL NFR BLD AUTO: 0 %
ERYTHROCYTE [DISTWIDTH] IN BLOOD BY AUTOMATED COUNT: 3.32 M/UL (ref 4.3–5.9)
ERYTHROCYTE [DISTWIDTH] IN BLOOD: 16.9 % (ref 11.5–15.5)
GLUCOSE SERPL-MCNC: 127 MG/DL (ref 74–99)
HCT VFR BLD AUTO: 28.9 % (ref 39–53)
HGB BLD-MCNC: 8.8 GM/DL (ref 13–17.5)
LYMPHOCYTES # SPEC AUTO: 0.7 K/UL (ref 1–4.8)
LYMPHOCYTES NFR SPEC AUTO: 7 %
MCH RBC QN AUTO: 26.5 PG (ref 25–35)
MCHC RBC AUTO-ENTMCNC: 30.5 G/DL (ref 31–37)
MCV RBC AUTO: 87 FL (ref 80–100)
MONOCYTES # BLD AUTO: 0.3 K/UL (ref 0–1)
MONOCYTES NFR BLD AUTO: 2 %
MONONUC CELLS # FLD: 8 %
NEUTROPHILS # BLD AUTO: 9.1 K/UL (ref 1.3–7.7)
NEUTROPHILS NFR BLD AUTO: 90 %
NUC CELL # FLD: (no result) /UL
PLATELET # BLD AUTO: 271 K/UL (ref 150–450)
POTASSIUM SERPL-SCNC: 4.1 MMOL/L (ref 3.5–5.1)
SODIUM SERPL-SCNC: 137 MMOL/L (ref 137–145)
WBC # BLD AUTO: 10.2 K/UL (ref 3.8–10.6)

## 2021-02-01 PROCEDURE — 0W9B3ZX DRAINAGE OF LEFT PLEURAL CAVITY, PERCUTANEOUS APPROACH, DIAGNOSTIC: ICD-10-PCS

## 2021-02-01 RX ADMIN — PANTOPRAZOLE SODIUM SCH MG: 40 INJECTION, POWDER, FOR SOLUTION INTRAVENOUS at 08:35

## 2021-02-01 RX ADMIN — IPRATROPIUM BROMIDE AND ALBUTEROL SULFATE SCH: .5; 3 SOLUTION RESPIRATORY (INHALATION) at 15:14

## 2021-02-01 RX ADMIN — NOREPINEPHRINE BITARTRATE SCH: 1 INJECTION, SOLUTION, CONCENTRATE INTRAVENOUS at 20:07

## 2021-02-01 RX ADMIN — ASPIRIN 81 MG CHEWABLE TABLET SCH MG: 81 TABLET CHEWABLE at 20:04

## 2021-02-01 RX ADMIN — HYDROCODONE BITARTRATE AND ACETAMINOPHEN PRN EACH: 5; 325 TABLET ORAL at 20:04

## 2021-02-01 RX ADMIN — LEVOTHYROXINE SODIUM SCH MCG: 75 TABLET ORAL at 06:03

## 2021-02-01 RX ADMIN — CEFEPIME HYDROCHLORIDE SCH MLS/HR: 2 INJECTION, POWDER, FOR SOLUTION INTRAVENOUS at 08:35

## 2021-02-01 RX ADMIN — OXYCODONE HYDROCHLORIDE AND ACETAMINOPHEN SCH MG: 500 TABLET ORAL at 20:00

## 2021-02-01 RX ADMIN — TRIAMCINOLONE ACETONIDE SCH APPLIC: 1 CREAM TOPICAL at 20:04

## 2021-02-01 RX ADMIN — CEFEPIME HYDROCHLORIDE SCH MLS/HR: 2 INJECTION, POWDER, FOR SOLUTION INTRAVENOUS at 20:04

## 2021-02-01 RX ADMIN — Medication SCH MCG: at 20:04

## 2021-02-01 RX ADMIN — SODIUM CHLORIDE SCH MLS/HR: 9 INJECTION, SOLUTION INTRAVENOUS at 13:56

## 2021-02-01 RX ADMIN — TRIAMCINOLONE ACETONIDE SCH: 1 CREAM TOPICAL at 16:44

## 2021-02-01 RX ADMIN — HYDROCORTISONE SODIUM SUCCINATE SCH MG: 100 INJECTION, POWDER, FOR SOLUTION INTRAMUSCULAR; INTRAVENOUS at 08:33

## 2021-02-01 RX ADMIN — NOREPINEPHRINE BITARTRATE SCH: 1 INJECTION, SOLUTION, CONCENTRATE INTRAVENOUS at 17:51

## 2021-02-01 RX ADMIN — HYDROCORTISONE SODIUM SUCCINATE SCH MG: 100 INJECTION, POWDER, FOR SOLUTION INTRAMUSCULAR; INTRAVENOUS at 16:45

## 2021-02-01 RX ADMIN — HEPARIN SODIUM SCH UNIT: 5000 INJECTION, SOLUTION INTRAVENOUS; SUBCUTANEOUS at 08:33

## 2021-02-01 RX ADMIN — HEPARIN SODIUM SCH UNIT: 5000 INJECTION, SOLUTION INTRAVENOUS; SUBCUTANEOUS at 16:47

## 2021-02-01 RX ADMIN — IPRATROPIUM BROMIDE AND ALBUTEROL SULFATE SCH: .5; 3 SOLUTION RESPIRATORY (INHALATION) at 08:24

## 2021-02-01 RX ADMIN — IPRATROPIUM BROMIDE AND ALBUTEROL SULFATE SCH: .5; 3 SOLUTION RESPIRATORY (INHALATION) at 19:36

## 2021-02-01 NOTE — US
EXAMINATION TYPE: US thoracentesis

 

DATE OF EXAM: 2/1/2021

 

COMPARISON: CT 1/29/2021

 

HISTORY: Bilateral Pleural effusion.

 

FINDINGS: Maximal barrier technique was utilized.  The skin overlying a suitable pocket of fluid was 
localized and the overlying skin prepped and draped.  Lidocaine was used for local anesthesia. Ultras
ound was used with sterile technique.  A 5 Kinyarwanda catheter over guide needle was advanced into the pl
eural fluid collection using ultrasound guidance and a catheter advanced and needle removed.  Approxi
mately 45 cc of cloudy white fluid was removed.  Catheter was withdrawn and hemostasis achieved.  The
re is no immediate complication.  The patient discharged in stable condition without complication.

 

IMPRESSION: STATUS POST ULTRASOUND GUIDED THORACENTESIS for diagnosis, POST PROCEDURE CHEST X-RAY PEN
DING.  THIS PROCEDURE WAS PERFORMED BY THE UNDERSIGNED.

## 2021-02-01 NOTE — P.PN
Subjective


Progress Note Date: 02/01/21


Principal diagnosis: 


 Pneumonia, hydropneumothorax, septic shock





60-year-old white male patient, with past medical history of rheumatoid 

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 

episodes of pneumonia and history of loculated empyema requiring chest tube 

placement back in July 2020 with pleural fluid cultures positive for 

streptococcal pneumonia.  Other medical history includes hypertension, 

hypothyroidism and previous history of DVT.  Patient had a recent 

hospitalization from January 18 through 01/21/2021 hypotension, dehydration 

related to nausea vomiting diarrhea, acute kidney injury.  Patient received 

antibiotics in the form of Zosyn and Levaquin for possibility of pneumonia, he 

is chest x-ray showed cranial perihilar pulmonary infiltrates with increased 

interstitial changes at the lung bases and was thought to be related to a combi

nation of rheumatoid lung and chronic scarring.  His last CT of the chest was on

12/29/2020 showing chronic changes to lower lungs, persistent irregular thick-

walled pleural spaces in the posterior lung bases containing fluid and air with 

adjacent anterior lung with chronic consolidation or atelectasis.  On 01/29/2021

patient came into the emergency department from Dr. SARINA Santo's office where he w

as being seen for a regular follow-up appointments.  He was noted to have low 

pulse ox 70s and was sent in to the emergency department for evaluation.  He has

been having cough with yellow and sometimes blood-tinged sputum, appears 

amounts, denies any fever, COVID 19 was found to be negative, chest x-ray showed

perihilar and basilar infiltrates without significant change from previous chest

x-ray on 01/19/2021.  Lab data showed leukocytosis with white blood cell count 

of 19.1, hemoglobin of 12.1, INR 1.5, sodium is 135, potassium is 3.9, chloride 

is 102, CO2 is 19, creatinine is 1.32, which has actually improved since his 

discharge from the hospital on 01/21/2021 when he was at 1.84.  Lactic acid was 

elevated at 3.7, patient was given a total of 2 L in fluid boluses, troponin was

negative at less than 0.012.  Urinalysis showed no evidence of infection.  CT 

chest showed thick-walled pleural cavities posteriorly in the lower lungs with 

left-sided air-fluid level with left-sided pleural fluid diminished most recent 

CT from 12/29/2020.  There is associated compressive atelectasis and/or chronic 

consolidation.  Persistent as it is lobe fissure, multifocal areas of reticular 

nodular opacity in the upper lungs remain present with slight nodularity in the 

left upper lobe.  There is a focal consolidation improved from most recent study

with some residual areas of scarring.  We will emergency department patient 

became hypotensive with a blood pressure in the 70s, slightly tachycardic r

emains in sinus mechanism, he is receiving his third liter bolus, his lactic 

acid did respond and improved he was started on antibiotics in the form of Zosyn

and vancomycin 





The patient is seen today January 30th 2021 in the intensive care unit.  He is 

currently sitting up in a chair at the bedside.  Awake and alert in no acute 

distress.  Maintaining O2 saturations in the 90s on 2 L/m per nasal cannula.  He

did require a small dose of norepinephrine currently on 0.05 mcg/kg/m.  He has 

lactated Ringer's at 100 MLS per hour.  He remains on vancomycin and cefepime.  

Chest x-ray continues to show the left lower lobe effusion.  White count 11.7.  

Hemoglobin 9.7.  Sodium 135.  Potassium 3.6.  Creatinine 1.00.  Blood cultures 

reveal no growth to date.





Reevaluated today on 1/31/2021, remains in the ICU, still on norepinephrine at 

0.04 mcg/kg/m.  On LR at 1 25 mL per hour.  On vancomycin and cefepime 

empirically, blood pressure remains marginal, went ahead and recommended Solu-

Cortef 100 mg IV push every 8 hours and hopefully will be able to discontinue 

norepinephrine today.  His IV fluid is cut down to KVO.  Given 20 mg of Lasix IV

push, and I have recommended that we monitor the patient in the ICU for the next

24 hours.  Chest x-ray is basically the same, continues to show chronic finding,

difficult to exclude underlying pneumonia/empyema.  CT of the chest on admission

was also reviewed, difficult to rule out underlying empyema.  Clinically the 

patient is feeling better except for generally weak.  WBC count is 12.5 

hemoglobin is 9.9.  Normal renal profile is normal blood cultures are negative 

so far.  Sputum cultures are also negative so far.





On 02/01/2021 patient seen in follow-up in the intensive care unit, he is awake 

and alert, oriented 3, sitting up in the recliner, currently on 2 L of oxygen 

his pulse ox is %, his been afebrile, hemodynamically he is stable, he is 

not on any vasopressor support.  His lactate Ringer's running at 20 ML per hour,

antibiotics include cefepime and vancomycin, blood pressure has been stable, so 

far blood and sputum cultures are negative, he denies chest pain, his breathing 

is comfortable.  Today's labs have been reviewed, showing white blood cell count

trending down, 10.2 today, hemoglobin is 8.8, electrolytes and renal profile are

unremarkable.  Serum cortisol level came back at 3, patient continues on stress 

doses of hydrocortisone 100 mg every 8 hours.  Patient has not been stable, 

we'll consult interventional radiology for ultrasound-guided left thoracentesis











Objective





- Vital Signs


Vital signs: 


                                   Vital Signs











Temp  97.7 F   02/01/21 08:15


 


Pulse  95   02/01/21 09:15


 


Resp  17   02/01/21 09:15


 


BP  97/60   02/01/21 09:15


 


Pulse Ox  95   02/01/21 09:15








                                 Intake & Output











 01/31/21 02/01/21 02/01/21





 18:59 06:59 18:59


 


Intake Total 1491.324 902.778 390


 


Output Total 2115 550 0


 


Balance -623.676 352.778 390


 


Weight 100.7 kg  


 


Intake:   


 


  IV 1470 840 150


 


    Cefepime 2 gm In Sodium 100 100 100





    Chloride 0.9% 100 ml @ 25   





    mls/hr IVPB Q12HR [facility] Rx   





    #:[id number]   


 


    LR  200 50


 


    Lactated Ringers 1,000 ml 870 40 





    @ 125 mls/hr IV .Q8H [facility]   





    Rx#:[id number]   


 


    Vancomycin 1,750 mg In 500 500 





    Sodium Chloride 0.9% 500   





    ml 500 ml @ 167 mls/hr   





    IVPB Q12H [facility] Rx#:   





    [id number]   


 


  Intake, IV Titration 21.324 62.778 





  Amount   


 


    Norepinephrine 32 mg In 21.324  





    Sodium Chloride 0.9% 218   





    ml @ 0.05 MCG/KG/MIN 2.   





    233 mls/hr IV .Q24H [facility]   





    Rx#:[id number]   


 


    Norepinephrine 4 mg In  62.778 





    Sodium Chloride 0.9% 250   





    ml @ 0.05 MCG/KG/MIN 17.   





    86 mls/hr IV .Q14H [facility] Rx   





    #:[id number]   


 


  Oral   240


 


Output:   


 


  Urine 2115 550 0


 


Other:   


 


  Voiding Method Indwelling Catheter Indwelling Catheter 


 


  # Voids  1 


 


  # Bowel Movements  1 














- Exam


GENERAL EXAM: Alert, pleasant, 60-year-old male patient, on 2 L nasal cannula 

with a pulse ox of 96%, currently sitting up in chair at the bedside, 

comfortable in no apparent distress.


HEAD: Normocephalic/atraumatic.


EENT: PERRLA, EOMI, nonicteric.  Moist mucous membranes, no neck masses, no JVD,

no stridor.


CHEST: No chest wall deformity.  Symmetrical expansion. 


LUNGS: Bibasilar crackles noted.


CVS: Regular rate and rhythm, normal S1 and S2, no gallops, no murmurs, no rubs


ABDOMEN: Soft, nontender.  No hepatosplenomegaly, normal bowel sounds, no 

guarding or rigidity.


EXTREMITIES: No clubbing, no edema, no cyanosis, 2+ pulses and upper and lower 

extremities.


MUSCULOSKELETAL: Muscle strength and tone normal.


SPINE: No scoliosis or deformity


SKIN: No rashes


CENTRAL NERVOUS SYSTEM: Alert and oriented -3.  No focal deficits, tone is 

normal in all 4 extremities.


PSYCHIATRIC: Alert and oriented -3.  Appropriate affect.  Intact judgment and 

insight.











- Labs


CBC & Chem 7: 


                                 02/01/21 03:14





                                 02/01/21 03:14


Labs: 


                  Abnormal Lab Results - Last 24 Hours (Table)











  02/01/21 02/01/21 Range/Units





  03:14 03:14 


 


RBC   3.32 L  (4.30-5.90)  m/uL


 


Hgb   8.8 L  (13.0-17.5)  gm/dL


 


Hct   28.9 L  (39.0-53.0)  %


 


MCHC   30.5 L  (31.0-37.0)  g/dL


 


RDW   16.9 H  (11.5-15.5)  %


 


Neutrophils #   9.1 H  (1.3-7.7)  k/uL


 


Lymphocytes #   0.7 L  (1.0-4.8)  k/uL


 


BUN  7 L   (9-20)  mg/dL


 


Glucose  127 H   (74-99)  mg/dL








                      Microbiology - Last 24 Hours (Table)











 01/29/21 13:00 Blood Culture - Preliminary





 Blood    No Growth after 48 hours


 


 01/29/21 09:26 Blood Culture - Preliminary





 Blood    No Growth after 48 hours














Assessment and Plan


Plan: 


 Assessment:





#1.  Acute hypoxic respiratory failure related to sepsis, septic shock,  related

to pneumonia, with chronic loculated effusions, rule out possibility of empyema.

COVID 19 was ruled out per PCR





#2.  Bilateral hydropneumothorax, rule out possibility of empyema





#3.  Recent admission to the hospital for hypotension, dehydration related to 

nausea vomiting diarrhea, acute kidney injury





#4.  Recurrent pulmonary infections, with history of streptococcal pneumonia and

empyema requiring chest tube placement in July 2020





#5.  Chronic loculated pneumothoraces, posterior, with air-fluid level seen on 

the CT imaging of the chest, the possibility of trapped lung, chronic scarring 

and possibility of empyema needs to be ruled out





#6.  Elevated d-dimer with no CT evidence for pulmonary embolism





#7.  History of rheumatoid arthritis and rheumatoid lung disease on Arava





#8.  Recent history of acute kidney injury, and admission lab work today shows 

improvement in his renal function





#9.  Hypertension





#10.  Hypothyroidism





#11.  Previous history of foot infections requiring antibiotics





#12.  Previous history of DVT





Plan:





Continue current antibiotics, patient has been hemodynamically stable, not on a

ny vasopressor support, continue hydrocortisone 100 mg every 8 hours, patient is

been afebrile, today's labs have been reviewed, recent radiology for ultrasound-

guided thoracentesis and pleural fluid will need to be sent for cultures.  We'll

go and consult to thoracic surgery for possibility of VATS.  Continue to monitor

the patient in the intensive care unit.


I performed a history & physical examination of the patient and discussed their 

management with my nurse practitioner, Lorena Bonilla.  I reviewed the nurse 

practitioner's note and agree with the documented findings and plan of care.  Fadia

ng sounds are positive for diminished breath sounds.  The findings and the 

impression was discussed with the patient.  I attest to the documentation by the

nurse practitioner. 





Time with Patient: Less than 30

## 2021-02-01 NOTE — XR
EXAMINATION TYPE: XR chest 1V portable

 

DATE OF EXAM: 2/1/2021

 

COMPARISON: Prior chest x-ray 2/1/2021

 

HISTORY: Status post thoracentesis

 

TECHNIQUE: Single frontal view of the chest is obtained.

 

FINDINGS:  There is no significant interval change.

 

IMPRESSION:  No complication status post left thoracentesis.

## 2021-02-01 NOTE — P.GSCN
History of Present Illness


Consult date: 21


Reason for Consult: 





Bilateral recurrent pneumonia, bilateral hydropneumothorax, recommendations for 

VATS


Requesting physician: Shad Gómez


History of present illness: 





This is a 60-year-old gentleman who follows on an outpatient basis with Dr. Myers for primary care.  He has a previous medical history of multiple ad

missions for pneumonia with known empyema on the left and previous bilateral 

chest tube placement in 2020 with cultures positive for strep pneumonia, 

rheumatoid arthritis with rheumatoid lung, hypertension, hypothyroidism, DVT, 

previous tobacco dependence, family history of lung cancer, and recent 

hospitalization for hypotension, dehydration, and acute kidney injury where he 

did require dialysis.  He was following up  in Dr. SARINA Santo's office 

for a regularly scheduled appointment and was noted to have a low pulse ox in 

the 70s and was subsequently sent to the emergency room for evaluation.  He 

admits to cough with yellowish sputum, denies any fever.  In the emergency room 

his chest x-ray demonstrated perihilar and basilar infiltrates without 

significant change from previous chest x-ray.  Covid 19 was negative.  WBC 19.1,

hemoglobin 12.9, INR 1.5, creatinine 1.3 to, lactic acid 3.7, and negative 

troponin.  CT of the chest was completed demonstrating thick-walled pleural c

avities posteriorly in the lower lung fields with left-sided air-fluid level and

left-sided pleural effusion which was diminished from his most recent computed 

tomography scan in 2020.  In addition it showed associated compressive 

atelectasis, multifocal areas of reticular nodular opacity in the upper lungs 

with slight nodularity in the left upper lobe, as well as focal consolidation 

improved from his most recent study with some residual areas of scarring.  While

in the emergency room he was evaluated by pulmonary medicine as he was 

hypotensive and tachycardic, he received fluid boluses and was initiated on 

antibiotics as well as a small dose of pressors.  He was admitted to the int

ensive care unit with consultation placed to pulmonology, infectious disease, 

and nephrology.  He has slowly continued to improve and is currently in no acute

distress and oxygenating well on 2 L nasal cannula, off all pressors, remains 

afebrile, WBC normalized this morning at 10.2.  Blood cultures remain negative. 

Sputum culture sent  is preliminarily demonstrating Aspergillus 

species.  Interventional radiology was consulted for left sided thoracentesis 

for cytology and culture, and Dr. Venegas from cardio thoracic surgery was 

consulted for recommendations for VATS with decortication.





Review of Systems





Review of systems was completed and was negative except as noted





- Cardiovascular


Reports leg edema





- Respiratory


Reports as per HPI, Reports congestion, Reports cough with sputum, Reports 

dyspnea





Past Medical History


Past Medical History: Deep Vein Thrombosis (DVT), GERD/Reflux, Hyperlipidemia, 

Hypertension, Neurologic Disorder, Renal Disease, Respiratory Disorder, 

Rheumatoid Arthritis (RA), Thyroid Disorder


Additional Past Medical History / Comment(s): Pt recently admitted to [facility] on 

21 with acute hypoxic respiratory failure 2ndary to hypovolemia from N/V/D,

acute kidney injury, hypovolemic shock.     Other hx:  Rheumatoid arthritis, 

rheumatoid lungs with chronic interstitial lung disease maintained on Arava and 

chronic steroids in the past, previous history of pneumonia/empyema with 

Streptococcus and the patient required chest tube drainage - 2020, past 

urinary retention, hypothyroidism, previous history of R foot infections 

requiring PICC line insertion, history of DVT L arm, nephrolithiasis, recent 

hospitalization where he required one hemodialysis session.


History of Any Multi-Drug Resistant Organisms: None Reported


Past Surgical History: Back Surgery, Orthopedic Surgery, Tonsillectomy


Additional Past Surgical History / Comment(s): Temporary hemodialysis cath, 

lithotripsy, colonoscopy, bronchoscopies, back surgery d/t herniated disc, 

bilateral carpal tunnel releases, bunions removed from feet, R foot plate for 

arch since removed, benign nodule off R hip, bilateral wrist ganglion cyst 

removals; bilateral chest tube placement 2020


Past Anesthesia/Blood Transfusion Reactions: Previous Problems w/ Anesthesia


Additional Past Anesthesia/Blood Transfusion Reaction / Comm: slow to awaken x1.


Past Psychological History: No Psychological Hx Reported


Smoking Status: Former smoker


Past Alcohol Use History: None Reported


Past Drug Use History: None Reported





- Past Family History


  ** Mother


Family Medical History: Cancer


Additional Family Medical History / Comment(s): lung with mets brain





  ** Father


Family Medical History: No Reported History


Additional Family Medical History / Comment(s): states, "he just ."





  ** Brother(s)


Family Medical History: No Reported History





  ** Sister(s)


Family Medical History: No Reported History


Additional Family Medical History / Comment(s): Patient has 2 step daughters.





Medications and Allergies


                                Home Medications











 Medication  Instructions  Recorded  Confirmed  Type


 


Levothyroxine Sodium [Synthroid] 150 mcg PO DAILY 17 History


 


Albuterol Inhaler [Ventolin Hfa 2 puff INHALATION RT-QID PRN 06/10/20 01/29/21 

History





Inhaler]    


 


Ascorbic Acid [Vitamin C] 2,000 mg PO HS 20 History


 


Cholecalciferol [Vitamin D3 (25 2,000 unit PO HS 20 History





Mcg = 1000 Iu)]    


 


Ipratropium-Albuterol Nebulize 3 ml INHALATION RT-QID PRN 20 

History





[Duoneb 0.5 mg-3 mg/3 ml Soln]    


 


Aspirin 81 mg PO HS  chew 21 Rx


 


Metoprolol Succinate (ER) [Toprol 25 mg PO DAILY #30 tab.er.24h 21 Rx





XL]    


 


Levofloxacin [Levaquin] 250 mg PO DAILY 21 History


 


Ondansetron [Zofran] 4 mg PO QID PRN 21 History








                                    Allergies











Allergy/AdvReac Type Severity Reaction Status Date / Time


 


No Known Allergies Allergy   Verified 21 10:37














Surgical - Exam


                                   Vital Signs











Temp Pulse Resp BP Pulse Ox


 


 98.9 F   118 H  22   123/106   93 L


 


 21 09:02  21 09:02  21 09:02  21 09:02  21 09:02














- General


well developed, well nourished, no distress, no pain





- Eyes


normal ocular movement





- ENT


no hearing loss





- Neck


no masses, no bruits, trachea midline





- Respiratory





Lungs sounds diminished bilaterally, left greater than right, expiratory wheezes

 heard on the left side.  Respirations even, nonlabored.  Currently on 2 L nasal

 cannula with oxygen saturation 96%.





- Cardiovascular





S1, S2 present.  Regular rate and rhythm, sinus rhythm on telemetry.  Palpable 

peripheral pulses bilaterally.  Bilateral lower extremity edema present.  No 

calf pain or tenderness noted.





- Abdomen


Abdomen: soft, non tender, bowel sounds





- Genitourinary





Deferred





- Rectum





Deferred





- Integumentary


no rash, no growths





- Neurologic


normal coordination, normal sensation





- Musculoskeletal


normal posture





- Psychiatric


oriented to time, oriented to person, oriented to place, speech is normal, 

memory intact





Results





- Labs





                                 21 03:14





                                 21 03:14


                  Abnormal Lab Results - Last 24 Hours (Table)











  21 Range/Units





  03:14 03:14 


 


RBC   3.32 L  (4.30-5.90)  m/uL


 


Hgb   8.8 L  (13.0-17.5)  gm/dL


 


Hct   28.9 L  (39.0-53.0)  %


 


MCHC   30.5 L  (31.0-37.0)  g/dL


 


RDW   16.9 H  (11.5-15.5)  %


 


Neutrophils #   9.1 H  (1.3-7.7)  k/uL


 


Lymphocytes #   0.7 L  (1.0-4.8)  k/uL


 


BUN  7 L   (9-20)  mg/dL


 


Glucose  127 H   (74-99)  mg/dL








                      Microbiology - Last 24 Hours (Table)











 21 14:30 Gram Stain - Preliminary





 Sputum Sputum Culture - Preliminary





    Aspergillus species


 


 21 13:00 Blood Culture - Preliminary





 Blood    No Growth after 48 hours


 


 21 09:26 Blood Culture - Preliminary





 Blood    No Growth after 48 hours








                                 Diabetes panel











  21 Range/Units





  03:14 


 


Sodium  137  (137-145)  mmol/L


 


Potassium  4.1  (3.5-5.1)  mmol/L


 


Chloride  107  ()  mmol/L


 


Carbon Dioxide  24  (22-30)  mmol/L


 


BUN  7 L  (9-20)  mg/dL


 


Creatinine  0.76  (0.66-1.25)  mg/dL


 


Glucose  127 H  (74-99)  mg/dL


 


Calcium  8.6  (8.4-10.2)  mg/dL








                                  Calcium panel











  21 Range/Units





  03:14 


 


Calcium  8.6  (8.4-10.2)  mg/dL








                                 Pituitary panel











  21 Range/Units





  03:14 


 


Sodium  137  (137-145)  mmol/L


 


Potassium  4.1  (3.5-5.1)  mmol/L


 


Chloride  107  ()  mmol/L


 


Carbon Dioxide  24  (22-30)  mmol/L


 


BUN  7 L  (9-20)  mg/dL


 


Creatinine  0.76  (0.66-1.25)  mg/dL


 


Glucose  127 H  (74-99)  mg/dL


 


Calcium  8.6  (8.4-10.2)  mg/dL








                                  Adrenal panel











  21 Range/Units





  03:14 


 


Sodium  137  (137-145)  mmol/L


 


Potassium  4.1  (3.5-5.1)  mmol/L


 


Chloride  107  ()  mmol/L


 


Carbon Dioxide  24  (22-30)  mmol/L


 


BUN  7 L  (9-20)  mg/dL


 


Creatinine  0.76  (0.66-1.25)  mg/dL


 


Glucose  127 H  (74-99)  mg/dL


 


Calcium  8.6  (8.4-10.2)  mg/dL














- Imaging


Chest x-ray: report reviewed, image reviewed


CT scan - chest: report reviewed, image reviewed





Assessment and Plan


Assessment: 





1.  Bilateral pneumonia, bilateral hydropneumothorax, possible empyema, sputum 

culture preliminarily positive for Aspergillus species


2.  Acute hypoxic respiratory failure with sepsis on admission


3.  Multiple admissions for pneumonia with empyema on the left and previous 

bilateral chest tube placement in 2020 with cultures positive for strep pn

eumonia


4.  Rheumatoid arthritis with rheumatoid lung


5.  Hypertension, currently hypotensive, requiring pressors on admission


6.  Hypothyroidism


7.  History of DVT


8.  Previous tobacco dependence


9.  Family history of lung cancer


10.  Recent hospitalization for hypotension, dehydration, acute kidney injury 

requiring dialysis





Plan: 





Patient was seen and examined at the bedside.  Chart/diagnostics were reviewed. 

 The case will be discussed in detail with Dr. Venegas.  At this time the patient

 is in no acute distress, he is oxygenating well on 2 L nasal cannula, remains 

afebrile and hemodynamically stable.  Recommend continuing antibiotics per 

infectious disease.  Patient is to have left-sided thoracentesis today with 

fluid sent for culture and cytology, will follow results.  Incentive spirometry 

ordered and should be encouraged.  Increase activity as tolerated.  Management 

of other comorbidities per primary care service.  More recommendations to follow

 regarding surgical treatment of possible empyema.





Thank you Dr. Gómez for this consult


Time with Patient: Greater than 30

## 2021-02-01 NOTE — PN
PROGRESS NOTE



DATE OF SERVICE:

02/01/2021



REASON FOR FOLLOWUP:

Pneumonia, question of empyema.



INTERVAL HISTORY:

The patient is currently afebrile.  He seems to be breathing more comfortably.

Continued to have a cough.  No chest pain.  No nausea, no vomiting.  No abdominal pain

or diarrhea.



PHYSICAL EXAMINATION:

Blood pressure 101/70 with a pulse of 98, temperature is 97.7.  He is 94% on 2 L nasal

cannula.

General description is a middle-aged male lying in bed in no distress.

RESPIRATORY SYSTEM: Unlabored breathing with decreased intensity of breath sounds. No

wheeze.

HEART:  S1, S2.  Regular rate and rhythm.

ABDOMEN: Soft, no tenderness.



LABS:

Hemoglobin 8.8, white count 10.2, BUN of 7, creatinine 0.76.  Sputum now showing

Aspergillus species.



DIAGNOSTIC IMPRESSION AND PLAN:

Patient with left lower lobe chronic empyema waiting for the possible thoracotomy and

drainage and a biopsy and if grows the same pathogen he may benefit from antifungal

currently covered with cefepime and vancomycin to continue and monitor his clinical

course closely.  Continue with supportive care.





MMODL / IJN: [id number] / Job#: [id number]

## 2021-02-01 NOTE — P.PN
Subjective





Patient is seen in follow for acute kidney injury.  GFR back to baseline.  Off 

vasopressors.  Continues to have diarrhea.  Oral intake is slowly improving.  

Good urine output.





Vital signs are stable.


General: The patient appeared well nourished and normally developed. 


HEENT: Head exam is unremarkable. Neck is without jugular venous distension.


LUNGS: Breath sounds decreased.


HEART: Rate and Rhythm are regular. 


ABDOMEN: Soft, nontender.


EXTREMITITES: 1+ edema.





Objective





- Vital Signs


Vital signs: 


                                   Vital Signs











Temp  98 F   02/01/21 04:00


 


Pulse  98   02/01/21 06:45


 


Resp  20   02/01/21 07:00


 


BP  94/64   02/01/21 07:00


 


Pulse Ox  96   02/01/21 07:00








                                 Intake & Output











 01/31/21 02/01/21 02/01/21





 18:59 06:59 18:59


 


Intake Total 1491.324 902.778 20


 


Output Total 2115 550 0


 


Balance -623.676 352.778 20


 


Weight 100.7 kg  


 


Intake:   


 


  IV 1470 840 20


 


    Cefepime 2 gm In Sodium 100 100 





    Chloride 0.9% 100 ml @ 25   





    mls/hr IVPB Q12HR GABBY Rx   





    #:[id number]   


 


    LR  200 20


 


    Lactated Ringers 1,000 ml 870 40 





    @ 125 mls/hr IV .Q8H GABBY   





    Rx#:[id number]   


 


    Vancomycin 1,750 mg In 500 500 





    Sodium Chloride 0.9% 500   





    ml 500 ml @ 167 mls/hr   





    IVPB Q12H GABBY Rx#:   





    [id number]   


 


  Intake, IV Titration 21.324 62.778 





  Amount   


 


    Norepinephrine 32 mg In 21.324  





    Sodium Chloride 0.9% 218   





    ml @ 0.05 MCG/KG/MIN 2.   





    233 mls/hr IV .Q24H GABBY   





    Rx#:[id number]   


 


    Norepinephrine 4 mg In  62.778 





    Sodium Chloride 0.9% 250   





    ml @ 0.05 MCG/KG/MIN 17.   





    86 mls/hr IV .Q14H GABBY Rx   





    #:[id number]   


 


Output:   


 


  Urine 2115 550 0


 


Other:   


 


  Voiding Method Indwelling Catheter Indwelling Catheter 


 


  # Voids  1 


 


  # Bowel Movements  1 














- Labs


CBC & Chem 7: 


                                 02/01/21 03:14





                                 02/01/21 03:14


Labs: 


                  Abnormal Lab Results - Last 24 Hours (Table)











  02/01/21 02/01/21 Range/Units





  03:14 03:14 


 


RBC   3.32 L  (4.30-5.90)  m/uL


 


Hgb   8.8 L  (13.0-17.5)  gm/dL


 


Hct   28.9 L  (39.0-53.0)  %


 


MCHC   30.5 L  (31.0-37.0)  g/dL


 


RDW   16.9 H  (11.5-15.5)  %


 


Neutrophils #   9.1 H  (1.3-7.7)  k/uL


 


Lymphocytes #   0.7 L  (1.0-4.8)  k/uL


 


BUN  7 L   (9-20)  mg/dL


 


Glucose  127 H   (74-99)  mg/dL








                      Microbiology - Last 24 Hours (Table)











 01/29/21 13:00 Blood Culture - Preliminary





 Blood    No Growth after 48 hours


 


 01/29/21 09:26 Blood Culture - Preliminary





 Blood    No Growth after 48 hours


 


 01/30/21 14:30 Gram Stain - Preliminary





 Sputum Sputum Culture - Preliminary














Assessment and Plan


Plan: 





Assessment:


1.  Acute kidney injury mostly prerenal secondary to intravascular volume 

depletion and hypotension, improved with IV hydration.  GFR back to baseline.


2.  Pneumonia maintained on antibiotics. ?  Empyema.


3.  Septic shock secondary to pneumonia.  Off vasopressors.  On antibiotics.


4.  History of rheumatoid arthritis.


5.  Hypotension mostly secondary to sepsis.  Cortisol level was also low.  He is

currently on Solu-Cortef.  ACTH stimulation test will not be accurate at this 

time as he is on Solu-Cortef.


6.  Diarrhea.  C. diff negative.





Plan:


Encourage oral intake.


Off IV fluids.


Add midodrine.


Continue to monitor renal function and urine output.  Patient did receive IV 

contrast on January 29.

## 2021-02-01 NOTE — XR
EXAMINATION TYPE: XR chest 1V portable

 

DATE OF EXAM: 2/1/2021

 

HISTORY: Shortness of breath.

 

COMPARISON: None.

 

TECHNIQUE: Single view of the chest is submitted.

 

FINDINGS:

Demonstrated are scattered senescent parenchymal change.  

 

Patchy perihilar and basilar infiltrates persist without significant change.

 

The heart is stable.

 

Hilar and mediastinal structures are within normal limits.  

 

Degenerative changes are seen of the dorsal spine. 

 

 IMPRESSION: 

 

1.  Stable chest.

## 2021-02-01 NOTE — P.PN
Subjective


Progress Note Date: 02/01/21





This is a 60-year-old  male patient requesting my service with past 

medical history of rheumatoid arthritis on Arava that was diagnosed when he was 

37 year old initially was started on Methotrexate that he could not tolerate 

then placed on Embrel but he developed side effects and finally was tried on 

Humira injection but he developed neurologic sided effects and was hospitaized 

at [facility] for quiet some time, rheumatoid lung disease, 

previous history of loculated empyema with chest tube placement in July 2020, 

hypertension, hypothyroidism, history of DVT, remote history of tobacco use and 

dependence, was recently hospitalized at [facility] after he was 

admitted for an acute kidney injury with significant metabolic acidosis 

associated with the elevated creatinine and elevated BUN and hypotension at that

time he was seen and evaluated by pulmonary and critical care medicine, he had a

central line placed and at that time, he was placed on Levophed drip he was p

laced on IV anabiotic as well but the patient recovered very fast and he had an 

echocardiogram that showed normal LV function and segmental hypokinesia, he was 

supposed to follow-up with Dr. Santo in the office today, patient was seen in my

office 24 hours ago when he was complaining of increased shortness breath, at 

that time his oxygenation could not be checked because of his Case's and cold

extremities, he was sent for a chest x-ray that showed right lower lobe 

infiltrate as well as blood tests that showed a white count of 20,000 patient 

was feeling better he was started on oral antibiotic in the form of Levaquin 500

mg orally once every day, and that he was supposed to follow-up with me as an 

outpatient every week he went to see his cardiologist today and he had an 

episode when he was dizzy lightheaded and an episode of vomiting as well and he 

was sent to the emergency department for evaluation had a computed tomography of

the chest as well as abdomen and pelvis that showed a thick walled pleural 

cavities posteriorly in the lower lungs with left-sided air-fluid level that has

diminished in size from the prior computed tomography scan when he was in the 

hospital a few weeks back there is also associated compressive atelectasis and 

chronic consultation with multifocal areas of reticular nodular opacity in the 

upper lungs with a slight nodularity of the left upper lobe again this area of 

focal consultation has improved from the previous study that was done few weeks 

ago, patient was started on IV antibiotic with vancomycin and Zosyn as well as 

IV fluid, he was seen in consultation by pulmonary critical care in the 

emergency department as the patient blood pressure dropped and that he'll be 

transferred to the intensive care unit at this point in time I had long conve

rsation with the patient and his wife at the bedside and the patient may need to

have a chest tube placed for his possible right empyema, he did receive 3 L 

normal saline in the ER, and his blood pressure is marginal he may need to go on

 pressors if  not recovered.





1/30:patient is sitting up in chair feeling about the same , complains of 

increased pain in the righ elbow, was seen earlier by pulmonary and the plan was

to go for US-Guided left thoracenthesis due to to loculated left pleural effu

nathaniel and possible empyema, may need VATS, he denies any chest pain or shortness 

of breath, no abdominal pain nausea, vomiting or diarrhea, still have diarrhea 

negative for C.Diff, he seems to have accept to stay in the hospital this time 

as long as he needed to.





1/31: Patient sitting up in the recliner complaining of increased pain in his 

joints due to his rheumatoid arthritis with increased synovitis in both wrists 

both elbows and both shoulders he was started on hydrocortisone 100 mg IV push 

every 8 hours, to try to help with his blood pressure as well as his phonation, 

he is maintained on Norco 5/325 mg one tablet orally every 6 hours as needed for

pain control, patient denies any chest pain is less short of breath, he 

continues to have increased coughing with yellow from production of green phlegm

production, he had no fever or chills at this time he continues to be on 

Levothroid drip, patient has appeared to have increased swelling in both lower 

extremities as well as both ankles, he is maintained on IV antibiotic in the 

form of vancomycin as well as cefepime, infectious disease is following, patient

is scheduled to go for ultrasound guidance thoracentesis of the left loculated 

pleural effusion tomorrow morning.





2/1: Patient remains in the intensive care unit.  He has been afebrile, heart 

rate 100, blood pressure 95/62, pulse ox 97% on 2 L nasal cannula.  Hemoglobin 

8.8 and leukocytosis resolved.  Creatinine 0.76, electrolytes normal.  Sputum 

culture is positive for Aspergillus species.  Blood culture showing no growth. 

Patient underwent diagnostic thoracentesis with interventional radiology today 

with removal of 45 mL of cloudy white fluid.  Chest x-ray reveals no 

complications following left thoracentesis.  Fluid has been sent for culture and

cytology.  Consult in place for cardiothoracic surgery to evaluate for VATS with

decortication.





Objective





- Vital Signs


Vital signs: 


                                   Vital Signs











Temp  98 F   02/01/21 04:00


 


Pulse  98   02/01/21 06:45


 


Resp  20   02/01/21 07:00


 


BP  94/64   02/01/21 07:00


 


Pulse Ox  96   02/01/21 07:00








                                 Intake & Output











 01/31/21 02/01/21 02/01/21





 18:59 06:59 18:59


 


Intake Total 1491.324 902.778 20


 


Output Total 2115 550 0


 


Balance -623.676 352.778 20


 


Weight 100.7 kg  


 


Intake:   


 


  IV 1470 840 20


 


    Cefepime 2 gm In Sodium 100 100 





    Chloride 0.9% 100 ml @ 25   





    mls/hr IVPB Q12HR GABBY Rx   





    #:[id number]   


 


    LR  200 20


 


    Lactated Ringers 1,000 ml 870 40 





    @ 125 mls/hr IV .Q8H GABBY   





    Rx#:[id number]   


 


    Vancomycin 1,750 mg In 500 500 





    Sodium Chloride 0.9% 500   





    ml 500 ml @ 167 mls/hr   





    IVPB Q12H GABBY Rx#:   





    [id number]   


 


  Intake, IV Titration 21.324 62.778 





  Amount   


 


    Norepinephrine 32 mg In 21.324  





    Sodium Chloride 0.9% 218   





    ml @ 0.05 MCG/KG/MIN 2.   





    233 mls/hr IV .Q24H GABBY   





    Rx#:[id number]   


 


    Norepinephrine 4 mg In  62.778 





    Sodium Chloride 0.9% 250   





    ml @ 0.05 MCG/KG/MIN 17.   





    86 mls/hr IV .Q14H GABBY Rx   





    #:[id number]   


 


Output:   


 


  Urine 2115 550 0


 


Other:   


 


  Voiding Method Indwelling Catheter Indwelling Catheter 


 


  # Voids  1 


 


  # Bowel Movements  1 














- Exam





 Review of Systems 


Constitutional: Reports anorexia, Reports chronic pain, Reports fatigue, Reports

weakness, Reports weight loss


Eyes: denies blurred vision, denies bulging eye, denies decreased vision, denies

diplopia


Ears, nose, mouth and throat: Denies dysphagia, Denies neck lump, Denies sore 

throat


Respiratory: Reports dyspnea, Reports respiratory infections, Denies congestion,

Denies cough with sputum, Denies home oxygen, Denies sleep apnea, Denies 

snoring, Denies wheezing


Gastrointestinal: Reports bloating, Reports change in bowel habits, Reports diar

nicolette, Reports early satiety, Reports indigestion, Reports loss of appetite, 

Reports nausea, Reports vomiting, Denies abdominal pain, Denies belching, Denies

heartburn, Denies hematemesis, Denies hematochezia, Denies melena


Genitourinary: Denies dysuria, Denies nocturia


Musculoskeletal: Denies myalgias


Musculoskeletal: absent: ankle pain, ankle stiffness, ankle swelling, elbow 

pain, elbow stiffness, elbow swelling, foot pain, foot stiffness, foot swelling,

hand pain, hand stiffness, hand swelling, hip pain, hip stiffness, hip swelling,

knee pain, knee stiffness, knee swelling, shoulder pain, shoulder stiffness, 

shoulder swelling, wrist pain, wrist stiffness, wrist swelling


Integumentary: Denies pruritus, Denies rash


Neurological: Denies numbness, Denies weakness


Psychiatric: Denies anxiety, Denies depression


Endocrine: Denies fatigue, Denies weight change








Physical examination:


General: patient is 60 year old lying down in bed in moderate distress.





HEENT: head ia atraumatic normocephalic, Pupils were equal round reactive to 

light and accommodations , extra ocular muscle movement were intact, mucous 

membranes of the mouth are somewhat dry.





Neck: supple no JVP.





Chest: decreased breath sounds at he bases with few ronchi no expiratory wheezes

no chest wall tendernes or intercostal retractions.





Heart: first heart sound is depressed, second heart sound is normal tachycardic 

there is HOOD 2/6 located at the left sternal border.





Abdomen: soft, mild tenderness to the left lower quadrant positive bowel sounds,

no guarding or rebound tenderness, no hepatosplenomegaly.





Extremities: there is no edema or calf tenderness DP+ 2 Bilaterally, there is 

what appears to be a charcot joint in the right foot form arch collapse.





Neurologic examination: patient is awake , alert and oriented X 3 CN II-XII are 

grossly intact, muscle power 4/5 in bilateral upper and lower extremities, deep 

tendon reflexes were normal.








- Labs


CBC & Chem 7: 


                                 02/01/21 03:14





                                 02/01/21 03:14


Labs: 


                  Abnormal Lab Results - Last 24 Hours (Table)











  02/01/21 02/01/21 Range/Units





  03:14 03:14 


 


RBC   3.32 L  (4.30-5.90)  m/uL


 


Hgb   8.8 L  (13.0-17.5)  gm/dL


 


Hct   28.9 L  (39.0-53.0)  %


 


MCHC   30.5 L  (31.0-37.0)  g/dL


 


RDW   16.9 H  (11.5-15.5)  %


 


Neutrophils #   9.1 H  (1.3-7.7)  k/uL


 


Lymphocytes #   0.7 L  (1.0-4.8)  k/uL


 


BUN  7 L   (9-20)  mg/dL


 


Glucose  127 H   (74-99)  mg/dL








                      Microbiology - Last 24 Hours (Table)











 01/29/21 13:00 Blood Culture - Preliminary





 Blood    No Growth after 48 hours


 


 01/29/21 09:26 Blood Culture - Preliminary





 Blood    No Growth after 48 hours


 


 01/30/21 14:30 Gram Stain - Preliminary





 Sputum Sputum Culture - Preliminary














Assessment and Plan


Assessment: 





Assessment and plan:








1.  Acute hypoxemic respiratory failure secondary to loculated left-sided 

pleural effusion and possible empyema with what appears to be recurrent pneumo

anastacio.  Patient was admitted to the intensive care unit, he was started on IV 

fluid resuscitation, required small dose of Levophed, continue IV antibiotic in 

the form of vancomycin and cefepime, ID consultation appreciated, interventional

radiology performed ultrasound-guided thoracentesis of the left loculated 

pleural fluid for Gram stain and culture and cytology as well as LDH protein as 

well as pH for possible empyema with thoracic surgery consultation for possible 

VATS versus decortication and chest tube placement.





2.  Lactic acidosis.  Continue IV fluid resuscitation, continue with IV anti

biotic vancomycin and cefepime, repeat lactic acid is back to normal.





3.  Sepsis with septic shock.  Continue IV fluid resuscitation, continue IV 

antibiotic, try to wean Levothroid drip to keep his mean greater than 65 mmHg.





4.  Acute kidney injury due to poor oral intake of fluid as well as hypotension 

with acute tubular necrosis.  Continue IV fluid, continue to monitor the patient

CMP continue to monitor urine output.





5.  Leukocytosis secondary to severe sepsis.  Continue IV fluid, continue IV 

antibiotic, repeat CBC tomorrow morning.





6.  Rheumatoid arthritis and rheumatoid lung.  Arava and Xeljanz had been on 

hold since October, patient was started on hydrocortisone 100 mg IV push every 8

hours.





7.  History of empyema with Streptococcus requiring chest tube.  His is a 

similar scenario may need a cardiothoracic surgery consultation.





8.  Hypertension and hypertensive cardiovascular disease. currently hypotensive 

.  Continue the IV fluid currently as well as IV pressors if needed





9.  Hypothyroidism . we will continue with Synthroid 150 mcg orally daily.





10. DVT prophylaxis. we will start Lovenox 40 mg SC daily and bilateral knee 

high Fahad Hose





11. GI prophylaxis. we will continue with Protonix 40 mg IVP daily.





12.  Prognosis continues to be guarded.

## 2021-02-02 LAB
ALBUMIN SERPL-MCNC: 2.4 G/DL (ref 3.5–5)
ALP SERPL-CCNC: 151 U/L (ref 38–126)
ALT SERPL-CCNC: 10 U/L (ref 4–49)
ANION GAP SERPL CALC-SCNC: 5 MMOL/L
AST SERPL-CCNC: 16 U/L (ref 17–59)
BASOPHILS # BLD AUTO: 0 K/UL (ref 0–0.2)
BASOPHILS NFR BLD AUTO: 0 %
BUN SERPL-SCNC: 15 MG/DL (ref 9–20)
CALCIUM SPEC-MCNC: 8.7 MG/DL (ref 8.4–10.2)
CHLORIDE SERPL-SCNC: 109 MMOL/L (ref 98–107)
CO2 SERPL-SCNC: 23 MMOL/L (ref 22–30)
EOSINOPHIL # BLD AUTO: 0 K/UL (ref 0–0.7)
EOSINOPHIL NFR BLD AUTO: 0 %
ERYTHROCYTE [DISTWIDTH] IN BLOOD BY AUTOMATED COUNT: 3.45 M/UL (ref 4.3–5.9)
ERYTHROCYTE [DISTWIDTH] IN BLOOD: 17 % (ref 11.5–15.5)
GLUCOSE FLD-MCNC: 6 MG/DL
GLUCOSE SERPL-MCNC: 120 MG/DL (ref 74–99)
HCT VFR BLD AUTO: 29.5 % (ref 39–53)
HGB BLD-MCNC: 9.1 GM/DL (ref 13–17.5)
LYMPHOCYTES # SPEC AUTO: 0.9 K/UL (ref 1–4.8)
LYMPHOCYTES NFR SPEC AUTO: 8 %
MCH RBC QN AUTO: 26.3 PG (ref 25–35)
MCHC RBC AUTO-ENTMCNC: 30.7 G/DL (ref 31–37)
MCV RBC AUTO: 85.6 FL (ref 80–100)
MONOCYTES # BLD AUTO: 0.4 K/UL (ref 0–1)
MONOCYTES NFR BLD AUTO: 4 %
NEUTROPHILS # BLD AUTO: 10.2 K/UL (ref 1.3–7.7)
NEUTROPHILS NFR BLD AUTO: 87 %
PLATELET # BLD AUTO: 331 K/UL (ref 150–450)
POTASSIUM SERPL-SCNC: 4 MMOL/L (ref 3.5–5.1)
PROT SERPL-MCNC: 5.8 G/DL (ref 6.3–8.2)
SODIUM SERPL-SCNC: 137 MMOL/L (ref 137–145)
WBC # BLD AUTO: 11.6 K/UL (ref 3.8–10.6)

## 2021-02-02 RX ADMIN — HYDROCORTISONE SODIUM SUCCINATE SCH MG: 100 INJECTION, POWDER, FOR SOLUTION INTRAMUSCULAR; INTRAVENOUS at 16:28

## 2021-02-02 RX ADMIN — CEFEPIME HYDROCHLORIDE SCH MLS/HR: 2 INJECTION, POWDER, FOR SOLUTION INTRAVENOUS at 20:58

## 2021-02-02 RX ADMIN — HYDROCORTISONE SODIUM SUCCINATE SCH MG: 100 INJECTION, POWDER, FOR SOLUTION INTRAMUSCULAR; INTRAVENOUS at 00:07

## 2021-02-02 RX ADMIN — ASPIRIN 81 MG CHEWABLE TABLET SCH MG: 81 TABLET CHEWABLE at 20:58

## 2021-02-02 RX ADMIN — OXYCODONE HYDROCHLORIDE AND ACETAMINOPHEN SCH MG: 500 TABLET ORAL at 20:58

## 2021-02-02 RX ADMIN — HEPARIN SODIUM SCH UNIT: 5000 INJECTION, SOLUTION INTRAVENOUS; SUBCUTANEOUS at 00:07

## 2021-02-02 RX ADMIN — HYDROCORTISONE SODIUM SUCCINATE SCH MG: 100 INJECTION, POWDER, FOR SOLUTION INTRAMUSCULAR; INTRAVENOUS at 09:04

## 2021-02-02 RX ADMIN — IPRATROPIUM BROMIDE AND ALBUTEROL SULFATE SCH: .5; 3 SOLUTION RESPIRATORY (INHALATION) at 09:07

## 2021-02-02 RX ADMIN — IPRATROPIUM BROMIDE AND ALBUTEROL SULFATE SCH: .5; 3 SOLUTION RESPIRATORY (INHALATION) at 11:39

## 2021-02-02 RX ADMIN — TRIAMCINOLONE ACETONIDE SCH: 1 CREAM TOPICAL at 09:04

## 2021-02-02 RX ADMIN — IPRATROPIUM BROMIDE AND ALBUTEROL SULFATE SCH: .5; 3 SOLUTION RESPIRATORY (INHALATION) at 19:53

## 2021-02-02 RX ADMIN — SODIUM CHLORIDE SCH MLS/HR: 9 INJECTION, SOLUTION INTRAVENOUS at 00:07

## 2021-02-02 RX ADMIN — HYDROCODONE BITARTRATE AND ACETAMINOPHEN PRN EACH: 5; 325 TABLET ORAL at 21:07

## 2021-02-02 RX ADMIN — Medication SCH MCG: at 20:58

## 2021-02-02 RX ADMIN — CEFEPIME HYDROCHLORIDE SCH MLS/HR: 2 INJECTION, POWDER, FOR SOLUTION INTRAVENOUS at 09:04

## 2021-02-02 RX ADMIN — HEPARIN SODIUM SCH UNIT: 5000 INJECTION, SOLUTION INTRAVENOUS; SUBCUTANEOUS at 16:28

## 2021-02-02 RX ADMIN — LEVOTHYROXINE SODIUM SCH MCG: 75 TABLET ORAL at 06:26

## 2021-02-02 RX ADMIN — TRIAMCINOLONE ACETONIDE SCH APPLIC: 1 CREAM TOPICAL at 20:59

## 2021-02-02 RX ADMIN — HYDROCORTISONE SODIUM SUCCINATE SCH MG: 100 INJECTION, POWDER, FOR SOLUTION INTRAMUSCULAR; INTRAVENOUS at 23:33

## 2021-02-02 RX ADMIN — HEPARIN SODIUM SCH UNIT: 5000 INJECTION, SOLUTION INTRAVENOUS; SUBCUTANEOUS at 09:03

## 2021-02-02 RX ADMIN — PANTOPRAZOLE SODIUM SCH MG: 40 TABLET, DELAYED RELEASE ORAL at 06:43

## 2021-02-02 RX ADMIN — HEPARIN SODIUM SCH UNIT: 5000 INJECTION, SOLUTION INTRAVENOUS; SUBCUTANEOUS at 23:33

## 2021-02-02 NOTE — P.PN
Subjective


Progress Note Date: 02/02/21


Principal diagnosis: 





Bilateral pneumonia, bilateral hydropneumothorax, possible empyema, sputum 

culture preliminarily positive for Aspergillus species, acute hypoxic 

respiratory failure with sepsis on admission.  Radius history of multiple 

admissions for pneumonia with empyema on the left and previous bilateral chest 

tube placement in June 2020 with cultures positive for strep pneumonia, 

rheumatoid arthritis with rheumatoid lung, hypertension, hypothyroidism, DVT, 

previous tobacco dependence, family history of lung cancer, and recent 

hospitalization for hypotension, dehydration, acute kidney injury requiring 

dialysis





POD #1 left-sided thoracentesis by interventional radiology





The patient's currently sitting up in a recliner in the intensive care unit in 

no acute distress.  Denies chest pain, states shortness of breath is at 

baseline.  Currently on room air with oxygen saturation in the low to mid 90s.  

Patient did have thoracentesis yesterday with fluid sent for cytology and 

culture, await final results.  Remains on IV antibiotics per infectious disease.

 Remains afebrile, hemodynamically stable on no pressors.  No other new 

concerns.





Objective





- Vital Signs


Vital signs: 


                                   Vital Signs











Temp  98.0 F   02/02/21 04:00


 


Pulse  102 H  02/02/21 07:00


 


Resp  23   02/02/21 07:00


 


BP  115/78   02/02/21 07:00


 


Pulse Ox  94 L  02/02/21 07:00








                                 Intake & Output











 02/01/21 02/02/21 02/02/21





 18:59 06:59 18:59


 


Intake Total 750 830 0


 


Output Total 0 200 0


 


Balance 750 630 0


 


Intake:   


 


   100 0


 


    Cefepime 2 gm In Sodium 100 100 





    Chloride 0.9% 100 ml @ 25   





    mls/hr IVPB Q12HR GABBY Rx   





    #:[id number]   


 


     0 0


 


  Intake, IV Titration 250 250 





  Amount   


 


    Vancomycin 1,500 mg In 250 250 





    Sodium Chloride 0.9% 250   





    ml @ 125 mls/hr IVPB Q12H   





    GABBY Rx#:[id number]   


 


  Oral 240 480 


 


Output:   


 


  Urine 0 200 0


 


Other:   


 


  Voiding Method Indwelling Catheter Toilet 





  Urinal 


 


  # Voids 1 1 


 


  # Bowel Movements  1 














- Constitutional


General appearance: Present: cooperative, no acute distress





- Respiratory


Details: 





Lungs sounds diminished in the bases bilaterally, faint expiratory wheezes in 

the bases.  Respirations even, nonlabored.  Currently on room air with oxygen 

saturation 92-94%.  Able to achieve 1000 mL on his incentive spirometry.  Strong

cough.





- Cardiovascular


Details: 





S1, S2 present.  Regular rate and rhythm, sinus rhythm on telemetry.  Palpable 

peripheral pulses bilaterally.  Bilateral lower extremity edema present.  No 

calf pain or tenderness noted.





- Gastrointestinal


Gastrointestinal Comment(s): 





Abdomen soft, nontender, nondistended.  Active bowel sounds present 4 

quadrants.  Tolerating diet.  Positive bowel movement





- Genitourinary


Genitourinary Comment(s): 





Continues to void





- Integumentary


Integumentary Comment(s): 





Skin is warm and dry with evidence of good perfusion





- Neurologic


Neurologic: Present: CNII-XII intact





- Musculoskeletal


Musculoskeletal: Present: gait normal, strength equal bilaterally





- Psychiatric


Psychiatric: Present: A&O x's 3, appropriate affect, intact judgment & insight





- Allied health notes


Allied health notes reviewed: nursing





- Labs


CBC & Chem 7: 


                                 02/02/21 05:23





                                 02/02/21 05:23


Labs: 


                  Abnormal Lab Results - Last 24 Hours (Table)











  02/02/21 02/02/21 Range/Units





  05:23 05:23 


 


WBC  11.6 H   (3.8-10.6)  k/uL


 


RBC  3.45 L   (4.30-5.90)  m/uL


 


Hgb  9.1 L   (13.0-17.5)  gm/dL


 


Hct  29.5 L   (39.0-53.0)  %


 


MCHC  30.7 L   (31.0-37.0)  g/dL


 


RDW  17.0 H   (11.5-15.5)  %


 


Neutrophils #  10.2 H   (1.3-7.7)  k/uL


 


Lymphocytes #  0.9 L   (1.0-4.8)  k/uL


 


Chloride   109 H  ()  mmol/L


 


Glucose   120 H  (74-99)  mg/dL


 


AST   16 L  (17-59)  U/L


 


Alkaline Phosphatase   151 H  ()  U/L


 


Total Protein   5.8 L  (6.3-8.2)  g/dL


 


Albumin   2.4 L  (3.5-5.0)  g/dL








                      Microbiology - Last 24 Hours (Table)











 02/01/21 11:30 Acid Fast Bacilli Smear - Final





 Pleural Fluid Acid Fast Bacilli Culture - Preliminary


 


 02/01/21 11:30 Gram Stain - Preliminary





 Pleural Fluid Body Fluid Culture - Preliminary


 


 02/01/21 11:30 Fungal Culture - Preliminary





 Pleural Fluid 


 


 01/29/21 13:00 Blood Culture - Preliminary





 Blood    No Growth after 72 hours


 


 01/29/21 09:26 Blood Culture - Preliminary





 Blood    No Growth after 72 hours


 


 01/30/21 14:30 Gram Stain - Preliminary





 Sputum Sputum Culture - Preliminary





    Aspergillus species














- Imaging and Cardiology


Chest x-ray: image reviewed





Assessment and Plan


Assessment: 





1.  Bilateral pneumonia, bilateral hydropneumothorax, possible empyema, sputum 

culture preliminarily positive for Aspergillus species, status post left-sided 

thoracentesis


2.  Acute hypoxic respiratory failure with sepsis on admission


3.  Multiple admissions for pneumonia with empyema on the left and previous 

bilateral chest tube placement in June 2020 with cultures positive for strep 

pneumonia


4.  Rheumatoid arthritis with rheumatoid lung


5.  Hypertension, currently hypotensive, requiring pressors on admission


6.  Hypothyroidism


7.  History of DVT


8.  Previous tobacco dependence


9.  Family history of lung cancer


10.  Recent hospitalization for hypotension, dehydration, acute kidney injury 

requiring dialysis





Plan: 





1.  Will follow culture and cytology sent from thoracentesis.


2.  Daily chest x-rays


3.  Encourage incentive spirometry


4.  No plan for open thoracotomy with decortication at this time as it is not 

clear that the benefit would outweigh the risk.  We will continue to follow the 

patient's response to current treatment, culture and cytology, and make future 

decision about appropriateness of surgery


5.  Continue antibiotics per infectious disease recommendations


6.  Increase activity as tolerated


7.  Management with a comorbidities per primary care service.


8.  More recommendations to follow





Time with Patient: Greater than 30

## 2021-02-02 NOTE — PN
PROGRESS NOTE



DATE OF SERVICE:

02/02/2021



REASON FOR FOLLOWUP:

Pneumonia _____ empyema.



INTERVAL HISTORY:

The patient is currently afebrile, has been breathing comfortably. The patient denies

having any chest pain or shortness of breath or cough.  Patient is status post

thoracocentesis. No nausea, no vomiting, no abdominal pain or diarrhea.



PHYSICAL EXAMINATION:

Blood pressure 115/69 with pulse of 99, temperature 97.6.  He is 91% on 2 L nasal

cannula.

General description is a middle-aged male up in the chair in no distress.

RESPIRATORY SYSTEM: Unlabored breathing with decreased breath sounds at the base. No

wheeze.

HEART: S1, S2.  Regular rate and rhythm.

ABDOMEN: Soft. No tenderness.



LABS:

Hemoglobin 9.1, white count 11.6, BUN 15, creatinine 0.80.  Sputum has been Aspergillus

fumigatus. Pleural fluid culture so far pending.



DIAGNOSTIC IMPRESSION AND PLAN:

Patient admitted to hospital with pneumonia, possible empyema _____ in this patient.

Sputum has been negative for any resistant pathogen.  Vancomycin will be discontinued

to decrease risk of any nephrotoxicity.  Pleural fluid will be followed and antibiotic

adjusted further if needed.





MMODL / IJN: [id number] / Job#: [id number]

## 2021-02-02 NOTE — P.PN
Subjective





Patient is seen in follow for acute kidney injury.  GFR back to baseline.  Off 

vasopressors.  Tolerating oral intake.  Diarrhea improved.  Lower extremities 

more edematous.





Vital signs are stable.


General: The patient appeared well nourished and normally developed. 


HEENT: Head exam is unremarkable. Neck is without jugular venous distension.


LUNGS: Breath sounds decreased.


HEART: Rate and Rhythm are regular. 


ABDOMEN: Soft, nontender.


EXTREMITITES: 2+ edema.





Objective





- Vital Signs


Vital signs: 


                                   Vital Signs











Temp  98.0 F   02/02/21 04:00


 


Pulse  102 H  02/02/21 07:00


 


Resp  23   02/02/21 07:00


 


BP  115/78   02/02/21 07:00


 


Pulse Ox  94 L  02/02/21 07:00








                                 Intake & Output











 02/01/21 02/02/21 02/02/21





 18:59 06:59 18:59


 


Intake Total 750 830 0


 


Output Total 0 200 0


 


Balance 750 630 0


 


Intake:   


 


   100 0


 


    Cefepime 2 gm In Sodium 100 100 





    Chloride 0.9% 100 ml @ 25   





    mls/hr IVPB Q12HR GABBY Rx   





    #:[id number]   


 


     0 0


 


  Intake, IV Titration 250 250 





  Amount   


 


    Vancomycin 1,500 mg In 250 250 





    Sodium Chloride 0.9% 250   





    ml @ 125 mls/hr IVPB Q12H   





    GABBY Rx#:[id number]   


 


  Oral 240 480 


 


Output:   


 


  Urine 0 200 0


 


Other:   


 


  Voiding Method Indwelling Catheter Toilet 





  Urinal 


 


  # Voids 1 1 


 


  # Bowel Movements  1 














- Labs


CBC & Chem 7: 


                                 02/02/21 05:23





                                 02/02/21 05:23


Labs: 


                  Abnormal Lab Results - Last 24 Hours (Table)











  02/02/21 02/02/21 Range/Units





  05:23 05:23 


 


WBC  11.6 H   (3.8-10.6)  k/uL


 


RBC  3.45 L   (4.30-5.90)  m/uL


 


Hgb  9.1 L   (13.0-17.5)  gm/dL


 


Hct  29.5 L   (39.0-53.0)  %


 


MCHC  30.7 L   (31.0-37.0)  g/dL


 


RDW  17.0 H   (11.5-15.5)  %


 


Neutrophils #  10.2 H   (1.3-7.7)  k/uL


 


Lymphocytes #  0.9 L   (1.0-4.8)  k/uL


 


Chloride   109 H  ()  mmol/L


 


Glucose   120 H  (74-99)  mg/dL


 


AST   16 L  (17-59)  U/L


 


Alkaline Phosphatase   151 H  ()  U/L


 


Total Protein   5.8 L  (6.3-8.2)  g/dL


 


Albumin   2.4 L  (3.5-5.0)  g/dL








                      Microbiology - Last 24 Hours (Table)











 02/01/21 11:30 Acid Fast Bacilli Smear - Final





 Pleural Fluid Acid Fast Bacilli Culture - Preliminary


 


 02/01/21 11:30 Gram Stain - Preliminary





 Pleural Fluid Body Fluid Culture - Preliminary


 


 02/01/21 11:30 Fungal Culture - Preliminary





 Pleural Fluid 


 


 01/29/21 13:00 Blood Culture - Preliminary





 Blood    No Growth after 72 hours


 


 01/29/21 09:26 Blood Culture - Preliminary





 Blood    No Growth after 72 hours


 


 01/30/21 14:30 Gram Stain - Preliminary





 Sputum Sputum Culture - Preliminary





    Aspergillus species














Assessment and Plan


Plan: 





Assessment:


1.  Acute kidney injury mostly prerenal secondary to intravascular volume 

depletion and hypotension, improved with IV hydration.  GFR back to baseline.


2.  Pneumonia maintained on antibiotics. ? Empyema - status post thoracentesis 

on February 1 with 45 mL of fluid removed.


3.  Septic shock secondary to pneumonia.  Off vasopressors.  On antibiotics.


4.  History of rheumatoid arthritis.


5.  Hypotension mostly secondary to sepsis.  Cortisol level was also low.  He is

currently on Solu-Cortef.  ACTH stimulation test will not be accurate at this 

time as he is on Solu-Cortef.


6.  Diarrhea.  C. diff negative.  Mostly resolved.


7.  Lower extremity edema.





Plan:


Encouraged oral intake.


Continue to monitor renal function and urine output.  Patient did receive IV 

contrast on January 29.


Lasix 40 mg IV once today.

## 2021-02-02 NOTE — P.PN
Subjective


Progress Note Date: 02/02/21





This is a 60-year-old  male patient requesting my service with past 

medical history of rheumatoid arthritis on Arava that was diagnosed when he was 

37 year old initially was started on Methotrexate that he could not tolerate 

then placed on Embrel but he developed side effects and finally was tried on 

Humira injection but he developed neurologic sided effects and was hospitaized 

at [facility] for quiet some time, rheumatoid lung disease, 

previous history of loculated empyema with chest tube placement in July 2020, 

hypertension, hypothyroidism, history of DVT, remote history of tobacco use and 

dependence, was recently hospitalized at [facility] after he was 

admitted for an acute kidney injury with significant metabolic acidosis 

associated with the elevated creatinine and elevated BUN and hypotension at that

time he was seen and evaluated by pulmonary and critical care medicine, he had a

central line placed and at that time, he was placed on Levophed drip he was p

laced on IV anabiotic as well but the patient recovered very fast and he had an 

echocardiogram that showed normal LV function and segmental hypokinesia, he was 

supposed to follow-up with Dr. Santo in the office today, patient was seen in my

office 24 hours ago when he was complaining of increased shortness breath, at 

that time his oxygenation could not be checked because of his Case's and cold

extremities, he was sent for a chest x-ray that showed right lower lobe 

infiltrate as well as blood tests that showed a white count of 20,000 patient 

was feeling better he was started on oral antibiotic in the form of Levaquin 500

mg orally once every day, and that he was supposed to follow-up with me as an 

outpatient every week he went to see his cardiologist today and he had an 

episode when he was dizzy lightheaded and an episode of vomiting as well and he 

was sent to the emergency department for evaluation had a computed tomography of

the chest as well as abdomen and pelvis that showed a thick walled pleural 

cavities posteriorly in the lower lungs with left-sided air-fluid level that has

diminished in size from the prior computed tomography scan when he was in the 

hospital a few weeks back there is also associated compressive atelectasis and 

chronic consultation with multifocal areas of reticular nodular opacity in the 

upper lungs with a slight nodularity of the left upper lobe again this area of 

focal consultation has improved from the previous study that was done few weeks 

ago, patient was started on IV antibiotic with vancomycin and Zosyn as well as 

IV fluid, he was seen in consultation by pulmonary critical care in the 

emergency department as the patient blood pressure dropped and that he'll be 

transferred to the intensive care unit at this point in time I had long conve

rsation with the patient and his wife at the bedside and the patient may need to

have a chest tube placed for his possible right empyema, he did receive 3 L 

normal saline in the ER, and his blood pressure is marginal he may need to go on

 pressors if  not recovered.





1/30:patient is sitting up in chair feeling about the same , complains of 

increased pain in the righ elbow, was seen earlier by pulmonary and the plan was

to go for US-Guided left thoracenthesis due to to loculated left pleural effu

nathaniel and possible empyema, may need VATS, he denies any chest pain or shortness 

of breath, no abdominal pain nausea, vomiting or diarrhea, still have diarrhea 

negative for C.Diff, he seems to have accept to stay in the hospital this time 

as long as he needed to.





1/31: Patient sitting up in the recliner complaining of increased pain in his 

joints due to his rheumatoid arthritis with increased synovitis in both wrists 

both elbows and both shoulders he was started on hydrocortisone 100 mg IV push 

every 8 hours, to try to help with his blood pressure as well as his phonation, 

he is maintained on Norco 5/325 mg one tablet orally every 6 hours as needed for

pain control, patient denies any chest pain is less short of breath, he 

continues to have increased coughing with yellow from production of green phlegm

production, he had no fever or chills at this time he continues to be on 

Levothroid drip, patient has appeared to have increased swelling in both lower 

extremities as well as both ankles, he is maintained on IV antibiotic in the 

form of vancomycin as well as cefepime, infectious disease is following, patient

is scheduled to go for ultrasound guidance thoracentesis of the left loculated 

pleural effusion tomorrow morning.





2/1: Patient remains in the intensive care unit.  He has been afebrile, heart 

rate 100, blood pressure 95/62, pulse ox 97% on 2 L nasal cannula.  Hemoglobin 

8.8 and leukocytosis resolved.  Creatinine 0.76, electrolytes normal.  Sputum 

culture is positive for Aspergillus species.  Blood culture showing no growth. 

Patient underwent diagnostic thoracentesis with interventional radiology today 

with removal of 45 mL of cloudy white fluid.  Chest x-ray reveals no 

complications following left thoracentesis.  Fluid has been sent for culture and

cytology.  Consult in place for cardiothoracic surgery to evaluate for VATS with

decortication.





2/2: Patient remains in the intensive care unit.  He did receive 1 dose of IV 

Lasix this morning ordered by nephrology.  He is followed by cardiothoracic 

surgery was no plan for surgical intervention.  ID has recommended cefepime and 

vancomycin for now.  Expect antifungal agent ended as well.  Patient denies any 

significant shortness of breath.  He is comfortable sitting in a chair.  Patient

is having minimal cough and minimal sputum.  Patient is achieving 1000 ml on 

incentive spirometry.  Culture and cytology reports remain pending from 

thoracentesis. Repeat chest x-ray reveals patchy peripheral lung with lung zone 

and lower lung zone infiltrates persist unchanged.  Patient has been afebrile, 

heart rate in the 90s and low 100s, pulse ox 91 on room air.





Objective





- Vital Signs


Vital signs: 


                                   Vital Signs











Temp  98.0 F   02/02/21 04:00


 


Pulse  90   02/02/21 05:00


 


Resp  15   02/02/21 05:00


 


BP  115/82   02/02/21 05:00


 


Pulse Ox  93 L  02/02/21 05:00








                                 Intake & Output











 02/01/21 02/01/21 02/02/21





 06:59 18:59 06:59


 


Intake Total 902.778 750 830


 


Output Total 550 0 200


 


Balance 352.778 750 630


 


Intake:   


 


   260 100


 


    Cefepime 2 gm In Sodium 100 100 100





    Chloride 0.9% 100 ml @ 25   





    mls/hr IVPB Q12HR [facility] Rx   





    #:[id number]   


 


     160 0


 


    Lactated Ringers 1,000 ml 40  





    @ 125 mls/hr IV .Q8H [facility]ABBY   





    Rx#:[id number]   


 


    Vancomycin 1,750 mg In 500  





    Sodium Chloride 0.9% 500   





    ml 500 ml @ 167 mls/hr   





    IVPB Q12H [facility] Rx#:   





    [id number]   


 


  Intake, IV Titration 62.778 250 250





  Amount   


 


    Norepinephrine 4 mg In 62.778  





    Sodium Chloride 0.9% 250   





    ml @ 0.05 MCG/KG/MIN 17.   





    86 mls/hr IV .Q14H [facility] Rx   





    #:[id number]   


 


    Vancomycin 1,500 mg In  250 250





    Sodium Chloride 0.9% 250   





    ml @ 125 mls/hr IVPB Q12H   





    [facility] Rx#:[id number]   


 


  Oral  240 480


 


Output:   


 


  Urine 550 0 200


 


Other:   


 


  Voiding Method Indwelling Catheter Indwelling Catheter Indwelling Catheter


 


  # Voids 1 1 1


 


  # Bowel Movements 1  1














- Exam





 Review of Systems 


Constitutional: Reports anorexia, Reports chronic pain, Reports fatigue, Reports

weakness, Reports weight loss


Eyes: denies blurred vision, denies bulging eye, denies decreased vision, denies

diplopia


Ears, nose, mouth and throat: Denies dysphagia, Denies neck lump, Denies sore 

throat


Respiratory: Reports dyspnea, Reports respiratory infections, Denies congestion,

Denies cough with sputum, Denies home oxygen, Denies sleep apnea, Denies 

snoring, Denies wheezing


Gastrointestinal: Reports bloating, Reports change in bowel habits, Reports 

diarrhea, Reports early satiety, Reports indigestion, Reports loss of appetite, 

Reports nausea, Reports vomiting, Denies abdominal pain, Denies belching, Denies

heartburn, Denies hematemesis, Denies hematochezia, Denies melena


Genitourinary: Denies dysuria, Denies nocturia


Musculoskeletal: Denies myalgias


Musculoskeletal: absent: ankle pain, ankle stiffness, ankle swelling, elbow 

pain, elbow stiffness, elbow swelling, foot pain, foot stiffness, foot swelling,

hand pain, hand stiffness, hand swelling, hip pain, hip stiffness, hip swelling,

knee pain, knee stiffness, knee swelling, shoulder pain, shoulder stiffness, 

shoulder swelling, wrist pain, wrist stiffness, wrist swelling


Integumentary: Denies pruritus, Denies rash


Neurological: Denies numbness, Denies weakness


Psychiatric: Denies anxiety, Denies depression


Endocrine: Denies fatigue, Denies weight change








Physical examination:


General: patient is 60 year old lying down in bed in moderate distress.





HEENT: head ia atraumatic normocephalic, Pupils were equal round reactive to 

light and accommodations , extra ocular muscle movement were intact, mucous 

membranes of the mouth are somewhat dry.





Neck: supple no JVP.





Chest: decreased breath sounds at he bases with few ronchi no expiratory wheezes

no chest wall tendernes or intercostal retractions.





Heart: first heart sound is depressed, second heart sound is normal tachycardic 

there is HOOD 2/6 located at the left sternal border.





Abdomen: soft, mild tenderness to the left lower quadrant positive bowel sounds,

no guarding or rebound tenderness, no hepatosplenomegaly.





Extremities: there is no edema or calf tenderness DP+ 2 Bilaterally, there is 

what appears to be a charcot joint in the right foot form arch collapse.





Neurologic examination: patient is awake , alert and oriented X 3 CN II-XII are 

grossly intact, muscle power 4/5 in bilateral upper and lower extremities, deep 

tendon reflexes were normal.








- Labs


CBC & Chem 7: 


                                 02/02/21 05:23





                                 02/02/21 05:23


Labs: 


                  Abnormal Lab Results - Last 24 Hours (Table)











  02/02/21 Range/Units





  05:23 


 


WBC  11.6 H  (3.8-10.6)  k/uL


 


RBC  3.45 L  (4.30-5.90)  m/uL


 


Hgb  9.1 L  (13.0-17.5)  gm/dL


 


Hct  29.5 L  (39.0-53.0)  %


 


MCHC  30.7 L  (31.0-37.0)  g/dL


 


RDW  17.0 H  (11.5-15.5)  %


 


Neutrophils #  10.2 H  (1.3-7.7)  k/uL


 


Lymphocytes #  0.9 L  (1.0-4.8)  k/uL








                      Microbiology - Last 24 Hours (Table)











 02/01/21 11:30 Acid Fast Bacilli Smear - Final





 Pleural Fluid Acid Fast Bacilli Culture - Preliminary


 


 02/01/21 11:30 Gram Stain - Preliminary





 Pleural Fluid Body Fluid Culture - Preliminary


 


 02/01/21 11:30 Fungal Culture - Preliminary





 Pleural Fluid 


 


 01/29/21 13:00 Blood Culture - Preliminary





 Blood    No Growth after 72 hours


 


 01/29/21 09:26 Blood Culture - Preliminary





 Blood    No Growth after 72 hours


 


 01/30/21 14:30 Gram Stain - Preliminary





 Sputum Sputum Culture - Preliminary





    Aspergillus species














Assessment and Plan


Assessment: 





Assessment and plan:








1.  Acute hypoxemic respiratory failure secondary to loculated left-sided 

pleural effusion and possible empyema with what appears to be recurrent 

pneumonia.  Patient was admitted to the intensive care unit, he was started on 

IV fluid resuscitation, required small dose of Levophed, continue IV antibiotic 

in the form of vancomycin and cefepime, ID consultation appreciated, 

interventional radiology performed ultrasound-guided thoracentesis of the left 

loculated pleural fluid for Gram stain and culture and cytology as well as LDH 

protein as well as pH for possible empyema.  Culture positive for Aspergillus.  

Cardiothoracic surgery following with no plans for surgical intervention at this

time. 





2.  Lactic acidosis.  Continue IV fluid resuscitation, continue with IV 

antibiotic vancomycin and cefepime, repeat lactic acid is back to normal.





3.  Sepsis with septic shock.  Continue IV fluid resuscitation, continue IV 

antibiotic, try to wean Levothroid drip to keep his mean greater than 65 mmHg.





4.  Acute kidney injury due to poor oral intake of fluid as well as hypotension 

with acute tubular necrosis.  Continue IV fluid, continue to monitor the patient

CMP continue to monitor urine output.





5.  Leukocytosis secondary to severe sepsis.  Continue IV fluid, continue IV 

antibiotic, repeat CBC tomorrow morning.





6.  Rheumatoid arthritis and rheumatoid lung.  Arava and Xeljanz had been on 

hold since October, patient was started on hydrocortisone 100 mg IV push every 8

hours.





7.  History of empyema with Streptococcus requiring chest tube.  His is a s

imilar scenario may need a cardiothoracic surgery consultation.





8.  Hypertension and hypertensive cardiovascular disease. currently hypotensive 

.  Continue the IV fluid currently as well as IV pressors if needed





9.  Hypothyroidism . we will continue with Synthroid 150 mcg orally daily.





10. DVT prophylaxis. we will start Lovenox 40 mg SC daily and bilateral knee 

high Fahad Hose





11. GI prophylaxis. we will continue with Protonix 40 mg IVP daily.





12.  Prognosis continues to be guarded.

## 2021-02-02 NOTE — P.PN
Subjective


Progress Note Date: 02/02/21


Principal diagnosis: 


 Pneumonia, hydropneumothorax, septic shock





60-year-old white male patient, with past medical history of rheumatoid 

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 

episodes of pneumonia and history of loculated empyema requiring chest tube 

placement back in July 2020 with pleural fluid cultures positive for 

streptococcal pneumonia.  Other medical history includes hypertension, 

hypothyroidism and previous history of DVT.  Patient had a recent 

hospitalization from January 18 through 01/21/2021 hypotension, dehydration 

related to nausea vomiting diarrhea, acute kidney injury.  Patient received 

antibiotics in the form of Zosyn and Levaquin for possibility of pneumonia, he 

is chest x-ray showed cranial perihilar pulmonary infiltrates with increased 

interstitial changes at the lung bases and was thought to be related to a combi

nation of rheumatoid lung and chronic scarring.  His last CT of the chest was on

12/29/2020 showing chronic changes to lower lungs, persistent irregular thick-

walled pleural spaces in the posterior lung bases containing fluid and air with 

adjacent anterior lung with chronic consolidation or atelectasis.  On 01/29/2021

patient came into the emergency department from Dr. SARINA Santo's office where he w

as being seen for a regular follow-up appointments.  He was noted to have low 

pulse ox 70s and was sent in to the emergency department for evaluation.  He has

been having cough with yellow and sometimes blood-tinged sputum, appears 

amounts, denies any fever, COVID 19 was found to be negative, chest x-ray showed

perihilar and basilar infiltrates without significant change from previous chest

x-ray on 01/19/2021.  Lab data showed leukocytosis with white blood cell count 

of 19.1, hemoglobin of 12.1, INR 1.5, sodium is 135, potassium is 3.9, chloride 

is 102, CO2 is 19, creatinine is 1.32, which has actually improved since his 

discharge from the hospital on 01/21/2021 when he was at 1.84.  Lactic acid was 

elevated at 3.7, patient was given a total of 2 L in fluid boluses, troponin was

negative at less than 0.012.  Urinalysis showed no evidence of infection.  CT 

chest showed thick-walled pleural cavities posteriorly in the lower lungs with 

left-sided air-fluid level with left-sided pleural fluid diminished most recent 

CT from 12/29/2020.  There is associated compressive atelectasis and/or chronic 

consolidation.  Persistent as it is lobe fissure, multifocal areas of reticular 

nodular opacity in the upper lungs remain present with slight nodularity in the 

left upper lobe.  There is a focal consolidation improved from most recent study

with some residual areas of scarring.  We will emergency department patient 

became hypotensive with a blood pressure in the 70s, slightly tachycardic r

emains in sinus mechanism, he is receiving his third liter bolus, his lactic 

acid did respond and improved he was started on antibiotics in the form of Zosyn

and vancomycin 





The patient is seen today January 30th 2021 in the intensive care unit.  He is 

currently sitting up in a chair at the bedside.  Awake and alert in no acute 

distress.  Maintaining O2 saturations in the 90s on 2 L/m per nasal cannula.  He

did require a small dose of norepinephrine currently on 0.05 mcg/kg/m.  He has 

lactated Ringer's at 100 MLS per hour.  He remains on vancomycin and cefepime.  

Chest x-ray continues to show the left lower lobe effusion.  White count 11.7.  

Hemoglobin 9.7.  Sodium 135.  Potassium 3.6.  Creatinine 1.00.  Blood cultures 

reveal no growth to date.





Reevaluated today on 1/31/2021, remains in the ICU, still on norepinephrine at 

0.04 mcg/kg/m.  On LR at 1 25 mL per hour.  On vancomycin and cefepime 

empirically, blood pressure remains marginal, went ahead and recommended Solu-

Cortef 100 mg IV push every 8 hours and hopefully will be able to discontinue 

norepinephrine today.  His IV fluid is cut down to KVO.  Given 20 mg of Lasix IV

push, and I have recommended that we monitor the patient in the ICU for the next

24 hours.  Chest x-ray is basically the same, continues to show chronic finding,

difficult to exclude underlying pneumonia/empyema.  CT of the chest on admission

was also reviewed, difficult to rule out underlying empyema.  Clinically the 

patient is feeling better except for generally weak.  WBC count is 12.5 

hemoglobin is 9.9.  Normal renal profile is normal blood cultures are negative 

so far.  Sputum cultures are also negative so far.





On 02/01/2021 patient seen in follow-up in the intensive care unit, he is awake 

and alert, oriented 3, sitting up in the recliner, currently on 2 L of oxygen 

his pulse ox is %, his been afebrile, hemodynamically he is stable, he is 

not on any vasopressor support.  His lactate Ringer's running at 20 ML per hour,

antibiotics include cefepime and vancomycin, blood pressure has been stable, so 

far blood and sputum cultures are negative, he denies chest pain, his breathing 

is comfortable.  Today's labs have been reviewed, showing white blood cell count

trending down, 10.2 today, hemoglobin is 8.8, electrolytes and renal profile are

unremarkable.  Serum cortisol level came back at 3, patient continues on stress 

doses of hydrocortisone 100 mg every 8 hours.  Patient has not been stable, 

we'll consult interventional radiology for ultrasound-guided left thoracentesis





On 02/02/2021 patient seen in follow-up in the intensive care unit, he is calm 

and comfortable, awake and alert, sitting up in the recliner, currently on room 

air with a pulse ox of 93-94%, his been afebrile, hemodynamically he is been 

stable, he is on IV LR at 20 ML per hour, no other drips.  He is on cefepime and

vancomycin for antibiotic coverage, yesterday he underwent left thoracentesis 

with removal of 43 mL of pleural fluid which was sent for cultures.  Tolerated 

procedure well.  His pleural fluid analysis revealed total fluid protein of 1.3 

g, and fluid LDH of greater than 4500 consistent with exudative fluid.  His 

microbiology data has been reviewed showing Aspergillus fumigate is in the 

sputum, his pleural fluid cultures are pending at this time, the cultures have 

shown no growth, hemodynamically has been stable, he is breathing easier, he 

still has cough mostly in the morning and the amount of secretions his bringing 

up is decreasing in amount.  Lung sounds revealed a few scattered wheezes, and a

few rhonchi at bilateral bases.  Is tolerating oral intake.  No abdominal pain, 

no nausea or vomiting, today's labs have been reviewed, white blood cell count 

is 11.2, hemoglobin is 9.1, sodium is 137, potassium is 4.0, chloride is 109, 

BUN is 15, creatinine 0.80.  No acute events overnight.  He is working on 

incentive spirometer, he is achieving 1000 today











Objective





- Vital Signs


Vital signs: 


                                   Vital Signs











Temp  98.0 F   02/02/21 04:00


 


Pulse  102 H  02/02/21 07:00


 


Resp  23   02/02/21 07:00


 


BP  115/78   02/02/21 07:00


 


Pulse Ox  94 L  02/02/21 07:00








                                 Intake & Output











 02/01/21 02/02/21 02/02/21





 18:59 06:59 18:59


 


Intake Total 750 830 0


 


Output Total 0 200 0


 


Balance 750 630 0


 


Intake:   


 


   100 0


 


    Cefepime 2 gm In Sodium 100 100 





    Chloride 0.9% 100 ml @ 25   





    mls/hr IVPB Q12HR [facility] Rx   





    #:[id number]   


 


     0 0


 


  Intake, IV Titration 250 250 





  Amount   


 


    Vancomycin 1,500 mg In 250 250 





    Sodium Chloride 0.9% 250   





    ml @ 125 mls/hr IVPB Q12H   





    [facility] Rx#:[id number]   


 


  Oral 240 480 


 


Output:   


 


  Urine 0 200 0


 


Other:   


 


  Voiding Method Indwelling Catheter Toilet 





  Urinal 


 


  # Voids 1 1 


 


  # Bowel Movements  1 














- Exam


GENERAL EXAM: Alert, pleasant, 60-year-old male patient, on room air with a 

pulse ox of 94%, currently sitting up in chair at the bedside, comfortable in no

apparent distress.


HEAD: Normocephalic/atraumatic.


EENT: PERRLA, EOMI, nonicteric.  Moist mucous membranes, no neck masses, no JVD,

no stridor.


CHEST: No chest wall deformity.  Symmetrical expansion. 


LUNGS: Bibasilar rhonchi, and a few scattered wheezes


CVS: Regular rate and rhythm, normal S1 and S2, no gallops, no murmurs, no rubs


ABDOMEN: Soft, nontender.  No hepatosplenomegaly, normal bowel sounds, no 

guarding or rigidity.


EXTREMITIES: No clubbing, 1+ lower extremity edema, no cyanosis, 2+ pulses and 

upper and lower extremities.


MUSCULOSKELETAL: Muscle strength and tone normal.


SPINE: No scoliosis or deformity


SKIN: No rashes


CENTRAL NERVOUS SYSTEM: Alert and oriented -3.  No focal deficits, tone is 

normal in all 4 extremities.


PSYCHIATRIC: Alert and oriented -3.  Appropriate affect.  Intact judgment and 

insight.











- Labs


CBC & Chem 7: 


                                 02/02/21 05:23





                                 02/02/21 05:23


Labs: 


                  Abnormal Lab Results - Last 24 Hours (Table)











  02/02/21 02/02/21 Range/Units





  05:23 05:23 


 


WBC  11.6 H   (3.8-10.6)  k/uL


 


RBC  3.45 L   (4.30-5.90)  m/uL


 


Hgb  9.1 L   (13.0-17.5)  gm/dL


 


Hct  29.5 L   (39.0-53.0)  %


 


MCHC  30.7 L   (31.0-37.0)  g/dL


 


RDW  17.0 H   (11.5-15.5)  %


 


Neutrophils #  10.2 H   (1.3-7.7)  k/uL


 


Lymphocytes #  0.9 L   (1.0-4.8)  k/uL


 


Chloride   109 H  ()  mmol/L


 


Glucose   120 H  (74-99)  mg/dL


 


AST   16 L  (17-59)  U/L


 


Alkaline Phosphatase   151 H  ()  U/L


 


Total Protein   5.8 L  (6.3-8.2)  g/dL


 


Albumin   2.4 L  (3.5-5.0)  g/dL








                      Microbiology - Last 24 Hours (Table)











 01/30/21 14:30 Gram Stain - Final





 Sputum Sputum Culture - Final





    Aspergillus fumigatus


 


 02/01/21 11:30 Acid Fast Bacilli Smear - Final





 Pleural Fluid Acid Fast Bacilli Culture - Preliminary


 


 02/01/21 11:30 Gram Stain - Preliminary





 Pleural Fluid Body Fluid Culture - Preliminary


 


 02/01/21 11:30 Fungal Culture - Preliminary





 Pleural Fluid 


 


 01/29/21 13:00 Blood Culture - Preliminary





 Blood    No Growth after 72 hours


 


 01/29/21 09:26 Blood Culture - Preliminary





 Blood    No Growth after 72 hours














Assessment and Plan


Plan: 


 Assessment:





#1.  Acute hypoxic respiratory failure related to sepsis, septic shock,  related

to pneumonia, with chronic loculated effusions, rule out possibility of empyema.

COVID 19 was ruled out per PCR





#2.  Bilateral hydropneumothorax, rule out possibility of empyema, status post 

ultrasound-guided left-sided thoracentesis on 02/01/2021 would removal of 45 mL 

of cloudy white fluid, which was exudative in nature, pleural fluid cultures are

pending at this time





#3.  Recent admission to the hospital for hypotension, dehydration related to 

nausea vomiting diarrhea, acute kidney injury





#4.  Recurrent pulmonary infections, with history of streptococcal pneumonia and

empyema requiring chest tube placement in July 2020





#5.  Chronic loculated pneumothoraces, posterior, with air-fluid level seen on 

the CT imaging of the chest, the possibility of trapped lung, chronic scarring 

and possibility of empyema needs to be ruled out





#6.  Elevated d-dimer with no CT evidence for pulmonary embolism





#7.  History of rheumatoid arthritis and rheumatoid lung disease on Arava





#8.  Recent history of acute kidney injury, and admission lab work today shows 

improvement in his renal function





#9.  Hypertension





#10.  Hypothyroidism





#11.  Previous history of foot infections requiring antibiotics





#12.  Previous history of DVT





Plan:





Encouraging deep breathing and coughing, incentive spirometry use, encourage 

activity as tolerated, vital signs have been stable, hemodynamically patient has

been stable, afebrile, remains on cefepime and vancomycin, tolerated a left-

sided thoracentesis well performed by interventional radiology, still awaiting 

results of the pleural fluid cultures.  Consult cardiothoracic surgery for 

possibility of VATS.  No acute events overnight, patient is stable for transfer 

out of the intensive care unit to general medical floor today.  We'll continue 

to closely follow.


I performed a history & physical examination of the patient and discussed their 

management with my nurse practitioner, Lorena Bonilla.  I reviewed the nurse 

practitioner's note and agree with the documented findings and plan of care.  Fadia

ng sounds are positive for diminished breath sounds.  The findings and the 

impression was discussed with the patient.  I attest to the documentation by the

nurse practitioner. 





Time with Patient: Less than 30

## 2021-02-02 NOTE — XR
EXAMINATION TYPE: XR chest 1V portable

 

DATE OF EXAM: 2/2/2021

 

HISTORY: Shortness of breath.

 

COMPARISON: 2/1/2021

 

TECHNIQUE: Single view of the chest is submitted.

 

FINDINGS:

Demonstrated are scattered senescent parenchymal change.  

 

Patchy peripheral lung with lung zone and lower lung zone infiltrates persist unchanged.

 

The heart is stable.

 

Hilar and mediastinal structures are within normal limits.  

 

Degenerative changes are seen of the dorsal spine. 

 

 IMPRESSION: 

 

1.  Patchy peripheral lung with lung zone and lower lung zone infiltrates persist unchanged.

## 2021-02-03 LAB
ALBUMIN SERPL-MCNC: 2.6 G/DL (ref 3.5–5)
ALBUMIN/GLOB SERPL: 0.7 {RATIO}
ALP SERPL-CCNC: 140 U/L (ref 38–126)
ALT SERPL-CCNC: 10 U/L (ref 4–49)
ANION GAP SERPL CALC-SCNC: 7 MMOL/L
AST SERPL-CCNC: 16 U/L (ref 17–59)
BASOPHILS # BLD AUTO: 0 K/UL (ref 0–0.2)
BASOPHILS NFR BLD AUTO: 0 %
BUN SERPL-SCNC: 19 MG/DL (ref 9–20)
CALCIUM SPEC-MCNC: 8.8 MG/DL (ref 8.4–10.2)
CHLORIDE SERPL-SCNC: 106 MMOL/L (ref 98–107)
CO2 SERPL-SCNC: 26 MMOL/L (ref 22–30)
EOSINOPHIL # BLD AUTO: 0 K/UL (ref 0–0.7)
EOSINOPHIL NFR BLD AUTO: 0 %
ERYTHROCYTE [DISTWIDTH] IN BLOOD BY AUTOMATED COUNT: 3.75 M/UL (ref 4.3–5.9)
ERYTHROCYTE [DISTWIDTH] IN BLOOD: 17.8 % (ref 11.5–15.5)
GLOBULIN SER CALC-MCNC: 3.6 G/DL
GLUCOSE SERPL-MCNC: 107 MG/DL (ref 74–99)
HCT VFR BLD AUTO: 32.9 % (ref 39–53)
HGB BLD-MCNC: 9.5 GM/DL (ref 13–17.5)
LYMPHOCYTES # SPEC AUTO: 1 K/UL (ref 1–4.8)
LYMPHOCYTES NFR SPEC AUTO: 11 %
MAGNESIUM SPEC-SCNC: 1.8 MG/DL (ref 1.6–2.3)
MCH RBC QN AUTO: 25.4 PG (ref 25–35)
MCHC RBC AUTO-ENTMCNC: 29 G/DL (ref 31–37)
MCV RBC AUTO: 87.7 FL (ref 80–100)
MONOCYTES # BLD AUTO: 0.5 K/UL (ref 0–1)
MONOCYTES NFR BLD AUTO: 5 %
NEUTROPHILS # BLD AUTO: 7.5 K/UL (ref 1.3–7.7)
NEUTROPHILS NFR BLD AUTO: 83 %
PLATELET # BLD AUTO: 356 K/UL (ref 150–450)
POTASSIUM SERPL-SCNC: 3.4 MMOL/L (ref 3.5–5.1)
PROT SERPL-MCNC: 6.2 G/DL (ref 6.3–8.2)
SODIUM SERPL-SCNC: 139 MMOL/L (ref 137–145)
WBC # BLD AUTO: 9 K/UL (ref 3.8–10.6)

## 2021-02-03 RX ADMIN — VORICONAZOLE SCH MLS/HR: 10 INJECTION, POWDER, LYOPHILIZED, FOR SOLUTION INTRAVENOUS at 22:27

## 2021-02-03 RX ADMIN — IPRATROPIUM BROMIDE AND ALBUTEROL SULFATE SCH ML: .5; 3 SOLUTION RESPIRATORY (INHALATION) at 08:29

## 2021-02-03 RX ADMIN — HYDROCORTISONE SODIUM SUCCINATE SCH MG: 100 INJECTION, POWDER, FOR SOLUTION INTRAMUSCULAR; INTRAVENOUS at 07:52

## 2021-02-03 RX ADMIN — HEPARIN SODIUM SCH UNIT: 5000 INJECTION, SOLUTION INTRAVENOUS; SUBCUTANEOUS at 07:52

## 2021-02-03 RX ADMIN — TRIAMCINOLONE ACETONIDE SCH: 1 CREAM TOPICAL at 20:34

## 2021-02-03 RX ADMIN — HYDROCODONE BITARTRATE AND ACETAMINOPHEN PRN EACH: 5; 325 TABLET ORAL at 20:39

## 2021-02-03 RX ADMIN — POTASSIUM CHLORIDE SCH MEQ: 20 TABLET, EXTENDED RELEASE ORAL at 12:45

## 2021-02-03 RX ADMIN — CEFEPIME HYDROCHLORIDE SCH MLS/HR: 2 INJECTION, POWDER, FOR SOLUTION INTRAVENOUS at 07:52

## 2021-02-03 RX ADMIN — Medication SCH MCG: at 20:33

## 2021-02-03 RX ADMIN — HYDROCORTISONE SODIUM SUCCINATE SCH MG: 100 INJECTION, POWDER, FOR SOLUTION INTRAMUSCULAR; INTRAVENOUS at 16:09

## 2021-02-03 RX ADMIN — CEFEPIME HYDROCHLORIDE SCH MLS/HR: 2 INJECTION, POWDER, FOR SOLUTION INTRAVENOUS at 20:33

## 2021-02-03 RX ADMIN — OXYCODONE HYDROCHLORIDE AND ACETAMINOPHEN SCH MG: 500 TABLET ORAL at 20:33

## 2021-02-03 RX ADMIN — ASPIRIN 81 MG CHEWABLE TABLET SCH MG: 81 TABLET CHEWABLE at 20:33

## 2021-02-03 RX ADMIN — LEVOTHYROXINE SODIUM SCH MCG: 75 TABLET ORAL at 05:07

## 2021-02-03 RX ADMIN — POTASSIUM CHLORIDE SCH MEQ: 20 TABLET, EXTENDED RELEASE ORAL at 14:11

## 2021-02-03 RX ADMIN — IPRATROPIUM BROMIDE AND ALBUTEROL SULFATE SCH ML: .5; 3 SOLUTION RESPIRATORY (INHALATION) at 12:39

## 2021-02-03 RX ADMIN — PANTOPRAZOLE SODIUM SCH MG: 40 TABLET, DELAYED RELEASE ORAL at 07:52

## 2021-02-03 RX ADMIN — IPRATROPIUM BROMIDE AND ALBUTEROL SULFATE SCH: .5; 3 SOLUTION RESPIRATORY (INHALATION) at 20:55

## 2021-02-03 RX ADMIN — HEPARIN SODIUM SCH UNIT: 5000 INJECTION, SOLUTION INTRAVENOUS; SUBCUTANEOUS at 16:07

## 2021-02-03 RX ADMIN — TRIAMCINOLONE ACETONIDE SCH: 1 CREAM TOPICAL at 07:53

## 2021-02-03 NOTE — PN
PROGRESS NOTE



DATE OF SERVICE:

02/03/2021



REASON FOR FOLLOWUP:

Left-sided empyema.



INTERVAL HISTORY:

The patient is currently afebrile, has been breathing comfortably. The patient denies

having any chest pain or shortness of breath. Cough has decreased in intensity.  No

nausea, no vomiting, no abdominal pain or diarrhea.



PHYSICAL EXAMINATION:

Blood pressure 96/64, pulse of 103, temperature 97.9.  He is 93% on room air.

General description is a middle-aged male up in the chair in no distress.

RESPIRATORY SYSTEM: Unlabored breathing with decreased intensity of breath sounds. No

wheeze.

HEART: S1, S2.  Regular rate and rhythm.

ABDOMEN: Soft. No tenderness.



LABS:

Hemoglobin is 9.5, white count 9.0, BUN of 19, creatinine 0.89.  Pleural fluid is

showing _____.



DIAGNOSTIC IMPRESSION AND PLAN:

Patient with left-sided empyema with cultures now showing aspergillus species. The

patient will be started on _____ 6 mg/kg x2 doses followed by 4 mg/kg.  He will need

oral voriconazole on discharge for a couple of _____, depending on his clinical

response. Cefepime discontinued, as no bacterial pathogen has been grown. Continue with

supportive care.





MMODL / IJN: [id number] / Job#: [id number]

## 2021-02-03 NOTE — P.PN
Subjective


Progress Note Date: 02/03/21


Principal diagnosis: 


 Pneumonia, hydropneumothorax, septic shock





60-year-old white male patient, with past medical history of rheumatoid 

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 

episodes of pneumonia and history of loculated empyema requiring chest tube 

placement back in July 2020 with pleural fluid cultures positive for 

streptococcal pneumonia.  Other medical history includes hypertension, 

hypothyroidism and previous history of DVT.  Patient had a recent 

hospitalization from January 18 through 01/21/2021 hypotension, dehydration 

related to nausea vomiting diarrhea, acute kidney injury.  Patient received 

antibiotics in the form of Zosyn and Levaquin for possibility of pneumonia, he 

is chest x-ray showed cranial perihilar pulmonary infiltrates with increased 

interstitial changes at the lung bases and was thought to be related to a combi

nation of rheumatoid lung and chronic scarring.  His last CT of the chest was on

12/29/2020 showing chronic changes to lower lungs, persistent irregular thick-

walled pleural spaces in the posterior lung bases containing fluid and air with 

adjacent anterior lung with chronic consolidation or atelectasis.  On 01/29/2021

patient came into the emergency department from Dr. SARINA Santo's office where he w

as being seen for a regular follow-up appointments.  He was noted to have low 

pulse ox 70s and was sent in to the emergency department for evaluation.  He has

been having cough with yellow and sometimes blood-tinged sputum, appears 

amounts, denies any fever, COVID 19 was found to be negative, chest x-ray showed

perihilar and basilar infiltrates without significant change from previous chest

x-ray on 01/19/2021.  Lab data showed leukocytosis with white blood cell count 

of 19.1, hemoglobin of 12.1, INR 1.5, sodium is 135, potassium is 3.9, chloride 

is 102, CO2 is 19, creatinine is 1.32, which has actually improved since his 

discharge from the hospital on 01/21/2021 when he was at 1.84.  Lactic acid was 

elevated at 3.7, patient was given a total of 2 L in fluid boluses, troponin was

negative at less than 0.012.  Urinalysis showed no evidence of infection.  CT 

chest showed thick-walled pleural cavities posteriorly in the lower lungs with 

left-sided air-fluid level with left-sided pleural fluid diminished most recent 

CT from 12/29/2020.  There is associated compressive atelectasis and/or chronic 

consolidation.  Persistent as it is lobe fissure, multifocal areas of reticular 

nodular opacity in the upper lungs remain present with slight nodularity in the 

left upper lobe.  There is a focal consolidation improved from most recent study

with some residual areas of scarring.  We will emergency department patient 

became hypotensive with a blood pressure in the 70s, slightly tachycardic r

emains in sinus mechanism, he is receiving his third liter bolus, his lactic 

acid did respond and improved he was started on antibiotics in the form of Zosyn

and vancomycin 





The patient is seen today January 30th 2021 in the intensive care unit.  He is 

currently sitting up in a chair at the bedside.  Awake and alert in no acute 

distress.  Maintaining O2 saturations in the 90s on 2 L/m per nasal cannula.  He

did require a small dose of norepinephrine currently on 0.05 mcg/kg/m.  He has 

lactated Ringer's at 100 MLS per hour.  He remains on vancomycin and cefepime.  

Chest x-ray continues to show the left lower lobe effusion.  White count 11.7.  

Hemoglobin 9.7.  Sodium 135.  Potassium 3.6.  Creatinine 1.00.  Blood cultures 

reveal no growth to date.





Reevaluated today on 1/31/2021, remains in the ICU, still on norepinephrine at 

0.04 mcg/kg/m.  On LR at 1 25 mL per hour.  On vancomycin and cefepime 

empirically, blood pressure remains marginal, went ahead and recommended Solu-

Cortef 100 mg IV push every 8 hours and hopefully will be able to discontinue 

norepinephrine today.  His IV fluid is cut down to KVO.  Given 20 mg of Lasix IV

push, and I have recommended that we monitor the patient in the ICU for the next

24 hours.  Chest x-ray is basically the same, continues to show chronic finding,

difficult to exclude underlying pneumonia/empyema.  CT of the chest on admission

was also reviewed, difficult to rule out underlying empyema.  Clinically the 

patient is feeling better except for generally weak.  WBC count is 12.5 

hemoglobin is 9.9.  Normal renal profile is normal blood cultures are negative 

so far.  Sputum cultures are also negative so far.





On 02/01/2021 patient seen in follow-up in the intensive care unit, he is awake 

and alert, oriented 3, sitting up in the recliner, currently on 2 L of oxygen 

his pulse ox is %, his been afebrile, hemodynamically he is stable, he is 

not on any vasopressor support.  His lactate Ringer's running at 20 ML per hour,

antibiotics include cefepime and vancomycin, blood pressure has been stable, so 

far blood and sputum cultures are negative, he denies chest pain, his breathing 

is comfortable.  Today's labs have been reviewed, showing white blood cell count

trending down, 10.2 today, hemoglobin is 8.8, electrolytes and renal profile are

unremarkable.  Serum cortisol level came back at 3, patient continues on stress 

doses of hydrocortisone 100 mg every 8 hours.  Patient has not been stable, 

we'll consult interventional radiology for ultrasound-guided left thoracentesis





On 02/02/2021 patient seen in follow-up in the intensive care unit, he is calm 

and comfortable, awake and alert, sitting up in the recliner, currently on room 

air with a pulse ox of 93-94%, his been afebrile, hemodynamically he is been 

stable, he is on IV LR at 20 ML per hour, no other drips.  He is on cefepime and

vancomycin for antibiotic coverage, yesterday he underwent left thoracentesis 

with removal of 43 mL of pleural fluid which was sent for cultures.  Tolerated 

procedure well.  His pleural fluid analysis revealed total fluid protein of 1.3 

g, and fluid LDH of greater than 4500 consistent with exudative fluid.  His 

microbiology data has been reviewed showing Aspergillus fumigate is in the 

sputum, his pleural fluid cultures are pending at this time, the cultures have 

shown no growth, hemodynamically has been stable, he is breathing easier, he 

still has cough mostly in the morning and the amount of secretions his bringing 

up is decreasing in amount.  Lung sounds revealed a few scattered wheezes, and a

few rhonchi at bilateral bases.  Is tolerating oral intake.  No abdominal pain, 

no nausea or vomiting, today's labs have been reviewed, white blood cell count 

is 11.2, hemoglobin is 9.1, sodium is 137, potassium is 4.0, chloride is 109, 

BUN is 15, creatinine 0.80.  No acute events overnight.  He is working on 

incentive spirometer, he is achieving 1000 today





On 02/03/2021 patient seen in follow-up on medical surgical floor.  He is awake 

and alert, in no acute distress, breathing comfortably, denies any chest pain, 

room air pulse ox is 94%, no fever or chills, today's chest x-ray shows left 

basilar loculated pneumothorax, stable in appearance, bilateral lung 

infiltrates.  His pleural fluid cultures so far showing Aspergillus species, as 

does his sputum culture.  Blood culture show no growth.  The cervix is 

following, current antibiotic coverage includes cefepime, vancomycin has been 

discontinued, she received 1 dose of IV Lasix per nephrology.  He has produced 

2500 in urine output, and he is in -1.6 L over the last 24 hours, however he 

still has significant lower extremity edema











Objective





- Vital Signs


Vital signs: 


                                   Vital Signs











Temp  97.4 F L  02/03/21 07:24


 


Pulse  103 H  02/03/21 12:51


 


Resp  18   02/03/21 12:51


 


BP  118/71   02/03/21 07:24


 


Pulse Ox  91 L  02/03/21 07:24








                                 Intake & Output











 02/02/21 02/03/21 02/03/21





 18:59 06:59 18:59


 


Intake Total 860 0 


 


Output Total 2500 0 720


 


Balance -1640 0 -720


 


Weight 100.7 kg  


 


Intake:   


 


   0 


 


    Cefepime 2 gm In Sodium 100  





    Chloride 0.9% 100 ml @ 25   





    mls/hr IVPB Q12HR [facility]   





    #:[id number]   


 


    LR 40 0 


 


  Oral 720  


 


Output:   


 


  Urine 2500 0 720


 


Other:   


 


  Voiding Method Toilet Toilet Toilet





 Urinal Urinal 














- Exam


GENERAL EXAM: Alert, pleasant, 60-year-old male patient, on room air with a 

pulse ox of 94%, currently sitting up in chair at the bedside, comfortable in no

apparent distress.


HEAD: Normocephalic/atraumatic.


EENT: PERRLA, EOMI, nonicteric.  Moist mucous membranes, no neck masses, no JVD,

no stridor.


CHEST: No chest wall deformity.  Symmetrical expansion. 


LUNGS: Bibasilar rhonchi, and a few scattered wheezes


CVS: Regular rate and rhythm, normal S1 and S2, no gallops, no murmurs, no rubs


ABDOMEN: Soft, nontender.  No hepatosplenomegaly, normal bowel sounds, no 

guarding or rigidity.


EXTREMITIES: No clubbing, 1+ lower extremity edema, no cyanosis, 2+ pulses and 

upper and lower extremities.


MUSCULOSKELETAL: Muscle strength and tone normal.


SPINE: No scoliosis or deformity


SKIN: No rashes


CENTRAL NERVOUS SYSTEM: Alert and oriented -3.  No focal deficits, tone is 

normal in all 4 extremities.


PSYCHIATRIC: Alert and oriented -3.  Appropriate affect.  Intact judgment and 

insight.











- Labs


CBC & Chem 7: 


                                 02/03/21 08:19





                                 02/03/21 08:19


Labs: 


                  Abnormal Lab Results - Last 24 Hours (Table)











  02/03/21 02/03/21 Range/Units





  08:19 08:19 


 


RBC  3.75 L   (4.30-5.90)  m/uL


 


Hgb  9.5 L   (13.0-17.5)  gm/dL


 


Hct  32.9 L   (39.0-53.0)  %


 


MCHC  29.0 L   (31.0-37.0)  g/dL


 


RDW  17.8 H   (11.5-15.5)  %


 


Potassium   3.4 L  (3.5-5.1)  mmol/L


 


Glucose   107 H  (74-99)  mg/dL


 


AST   16 L  (17-59)  U/L


 


Alkaline Phosphatase   140 H  ()  U/L


 


Total Protein   6.2 L  (6.3-8.2)  g/dL


 


Albumin   2.6 L  (3.5-5.0)  g/dL








                      Microbiology - Last 24 Hours (Table)











 01/29/21 09:26 Blood Culture - Preliminary





 Blood    No Growth after 120 hours


 


 02/01/21 11:30 Gram Stain - Preliminary





 Pleural Fluid Body Fluid Culture - Preliminary





    Aspergillus species


 


 01/29/21 13:00 Blood Culture - Preliminary





 Blood    No Growth after 96 hours














Assessment and Plan


Plan: 


 Assessment:





#1.  Acute hypoxic respiratory failure related to sepsis, septic shock,  related

to pneumonia, with chronic loculated effusions, rule out possibility of empyema.

COVID 19 was ruled out per PCR





#2.  Bilateral hydropneumothorax, rule out possibility of empyema, status post 

ultrasound-guided left-sided thoracentesis on 02/01/2021 would removal of 45 mL 

of cloudy white fluid, which was exudative in nature, pleural fluid culture only

showing Aspergillus, antibiotic coverage is with cefepime and vancomycin, it 

service is following, CT surgery has no plans for surgical intervention at this 

time





#3.  Recent admission to the hospital for hypotension, dehydration related to 

nausea vomiting diarrhea, acute kidney injury





#4.  Recurrent pulmonary infections, with history of streptococcal pneumonia and

empyema requiring chest tube placement in July 2020





#5.  Chronic loculated pneumothoraces, posterior, with air-fluid level seen on 

the CT imaging of the chest, the possibility of trapped lung, chronic scarring 

and possibility of empyema needs to be ruled out





#6.  Elevated d-dimer with no CT evidence for pulmonary embolism





#7.  History of rheumatoid arthritis and rheumatoid lung disease on Arava





#8.  Recent history of acute kidney injury, and admission lab work today shows 

improvement in his renal function





#9.  Hypertension





#10.  Hypothyroidism





#11.  Previous history of foot infections requiring antibiotics





#12.  Previous history of DVT





Plan:





Continue antibiotics per ID service recommendations, no fever or chills, so far 

pleural fluid cultures only showing Aspergillus, patient continues on cefepime, 

hemodynamically stable, no fever chills, he is breathing comfortably, no acute 

events overnight, CT surgery is following, so far no surgical intervention is 

planned.  Increase activity as tolerated, Ace wrap bilateral lower extremities, 

patient was given a dose of IV Lasix he is in negative fluid balance, we'll cut 

back the hydrocortisone to 50 mg every 8 hours.


I performed a history & physical examination of the patient and discussed their 

management with my nurse practitioner, Lorena Bonilla.  I reviewed the nurse 

practitioner's note and agree with the documented findings and plan of care.  

Lung sounds are positive for diminished breath sounds.  The findings and the 

impression was discussed with the patient.  I attest to the documentation by the

nurse practitioner. 





Time with Patient: Less than 30

## 2021-02-03 NOTE — P.PN
Subjective


Progress Note Date: 02/03/21





This is a 60-year-old  male patient requesting my service with past 

medical history of rheumatoid arthritis on Arava that was diagnosed when he was 

37 year old initially was started on Methotrexate that he could not tolerate 

then placed on Embrel but he developed side effects and finally was tried on 

Humira injection but he developed neurologic sided effects and was hospitaized 

at [facility] for quiet some time, rheumatoid lung disease, 

previous history of loculated empyema with chest tube placement in July 2020, 

hypertension, hypothyroidism, history of DVT, remote history of tobacco use and 

dependence, was recently hospitalized at [facility] after he was 

admitted for an acute kidney injury with significant metabolic acidosis 

associated with the elevated creatinine and elevated BUN and hypotension at that

time he was seen and evaluated by pulmonary and critical care medicine, he had a

central line placed and at that time, he was placed on Levophed drip he was p

laced on IV anabiotic as well but the patient recovered very fast and he had an 

echocardiogram that showed normal LV function and segmental hypokinesia, he was 

supposed to follow-up with Dr. Santo in the office today, patient was seen in my

office 24 hours ago when he was complaining of increased shortness breath, at 

that time his oxygenation could not be checked because of his Case's and cold

extremities, he was sent for a chest x-ray that showed right lower lobe 

infiltrate as well as blood tests that showed a white count of 20,000 patient 

was feeling better he was started on oral antibiotic in the form of Levaquin 500

mg orally once every day, and that he was supposed to follow-up with me as an 

outpatient every week he went to see his cardiologist today and he had an 

episode when he was dizzy lightheaded and an episode of vomiting as well and he 

was sent to the emergency department for evaluation had a computed tomography of

the chest as well as abdomen and pelvis that showed a thick walled pleural 

cavities posteriorly in the lower lungs with left-sided air-fluid level that has

diminished in size from the prior computed tomography scan when he was in the 

hospital a few weeks back there is also associated compressive atelectasis and 

chronic consultation with multifocal areas of reticular nodular opacity in the 

upper lungs with a slight nodularity of the left upper lobe again this area of 

focal consultation has improved from the previous study that was done few weeks 

ago, patient was started on IV antibiotic with vancomycin and Zosyn as well as 

IV fluid, he was seen in consultation by pulmonary critical care in the 

emergency department as the patient blood pressure dropped and that he'll be 

transferred to the intensive care unit at this point in time I had long conve

rsation with the patient and his wife at the bedside and the patient may need to

have a chest tube placed for his possible right empyema, he did receive 3 L 

normal saline in the ER, and his blood pressure is marginal he may need to go on

 pressors if  not recovered.





1/30:patient is sitting up in chair feeling about the same , complains of 

increased pain in the righ elbow, was seen earlier by pulmonary and the plan was

to go for US-Guided left thoracenthesis due to to loculated left pleural effu

nathaniel and possible empyema, may need VATS, he denies any chest pain or shortness 

of breath, no abdominal pain nausea, vomiting or diarrhea, still have diarrhea 

negative for C.Diff, he seems to have accept to stay in the hospital this time 

as long as he needed to.





1/31: Patient sitting up in the recliner complaining of increased pain in his 

joints due to his rheumatoid arthritis with increased synovitis in both wrists 

both elbows and both shoulders he was started on hydrocortisone 100 mg IV push 

every 8 hours, to try to help with his blood pressure as well as his phonation, 

he is maintained on Norco 5/325 mg one tablet orally every 6 hours as needed for

pain control, patient denies any chest pain is less short of breath, he 

continues to have increased coughing with yellow from production of green phlegm

production, he had no fever or chills at this time he continues to be on 

Levothroid drip, patient has appeared to have increased swelling in both lower 

extremities as well as both ankles, he is maintained on IV antibiotic in the 

form of vancomycin as well as cefepime, infectious disease is following, patient

is scheduled to go for ultrasound guidance thoracentesis of the left loculated 

pleural effusion tomorrow morning.





2/1: Patient remains in the intensive care unit.  He has been afebrile, heart 

rate 100, blood pressure 95/62, pulse ox 97% on 2 L nasal cannula.  Hemoglobin 

8.8 and leukocytosis resolved.  Creatinine 0.76, electrolytes normal.  Sputum 

culture is positive for Aspergillus species.  Blood culture showing no growth. 

Patient underwent diagnostic thoracentesis with interventional radiology today 

with removal of 45 mL of cloudy white fluid.  Chest x-ray reveals no 

complications following left thoracentesis.  Fluid has been sent for culture and

cytology.  Consult in place for cardiothoracic surgery to evaluate for VATS with

decortication.





2/2: Patient remains in the intensive care unit.  He did receive 1 dose of IV 

Lasix this morning ordered by nephrology.  He is followed by cardiothoracic 

surgery was no plan for surgical intervention.  ID has recommended cefepime and 

vancomycin for now.  Expect antifungal agent ended as well.  Patient denies any 

significant shortness of breath.  He is comfortable sitting in a chair.  Patient

is having minimal cough and minimal sputum.  Patient is achieving 1000 ml on 

incentive spirometry.  Culture and cytology reports remain pending from 

thoracentesis. Repeat chest x-ray reveals patchy peripheral lung with lung zone 

and lower lung zone infiltrates persist unchanged.  Patient has been afebrile, 

heart rate in the 90s and low 100s, pulse ox 91 on room air.





2/3: Repeat chest x-ray reveals left-sided basilar loculated pneumothorax, 

stable.  Bilateral lung infiltrates.  Correlate for pneumonia and atelectasis.  

WBC 9, hemoglobin 9.5.  Potassium 3.4 and will be replaced.  Patient is 

continued on cefepime and vancomycin per Dr. Sheets's recommendations.  Patient 

in reaching 1000 on incentive spirometry.  Patient is scheduled to see Dr. Kaye tomorrow.  Cefepime and vancomycin had been discontinued by Dr. Sheets and

started on Voriconazole. 





Objective





- Vital Signs


Vital signs: 


                                   Vital Signs











Temp  97.4 F L  02/03/21 07:24


 


Pulse  88   02/03/21 08:38


 


Resp  18   02/03/21 07:24


 


BP  118/71   02/03/21 07:24


 


Pulse Ox  91 L  02/03/21 07:24








                                 Intake & Output











 02/02/21 02/03/21 02/03/21





 18:59 06:59 18:59


 


Intake Total 860 0 


 


Output Total 2500 0 


 


Balance -1640 0 


 


Weight 100.7 kg  


 


Intake:   


 


   0 


 


    Cefepime 2 gm In Sodium 100  





    Chloride 0.9% 100 ml @ 25   





    mls/hr IVPB Q12HR [facility]   





    #:[id number]   


 


    LR 40 0 


 


  Oral 720  


 


Output:   


 


  Urine 2500 0 


 


Other:   


 


  Voiding Method Toilet Toilet Toilet





 Urinal Urinal 














- Exam





 Review of Systems 


Constitutional: Reports anorexia, Reports chronic pain, Reports fatigue, Reports

weakness, Reports weight loss


Eyes: denies blurred vision, denies bulging eye, denies decreased vision, denies

diplopia


Ears, nose, mouth and throat: Denies dysphagia, Denies neck lump, Denies sore 

throat


Respiratory: Reports dyspnea, Reports respiratory infections, Denies congestion,

Denies cough with sputum, Denies home oxygen, Denies sleep apnea, Denies 

snoring, Denies wheezing


Gastrointestinal: Reports bloating, Reports change in bowel habits, Reports 

diarrhea, Reports early satiety, Reports indigestion, Reports loss of appetite, 

Reports nausea, Reports vomiting, Denies abdominal pain, Denies belching, Denies

heartburn, Denies hematemesis, Denies hematochezia, Denies melena


Genitourinary: Denies dysuria, Denies nocturia


Musculoskeletal: Denies myalgias


Musculoskeletal: absent: ankle pain, ankle stiffness, ankle swelling, elbow 

pain, elbow stiffness, elbow swelling, foot pain, foot stiffness, foot swelling,

hand pain, hand stiffness, hand swelling, hip pain, hip stiffness, hip swelling,

knee pain, knee stiffness, knee swelling, shoulder pain, shoulder stiffness, 

shoulder swelling, wrist pain, wrist stiffness, wrist swelling


Integumentary: Denies pruritus, Denies rash


Neurological: Denies numbness, Denies weakness


Psychiatric: Denies anxiety, Denies depression


Endocrine: Denies fatigue, Denies weight change








Physical examination:


General: patient is 60 year old lying down in bed in moderate distress.





HEENT: head ia atraumatic normocephalic, Pupils were equal round reactive to 

light and accommodations , extra ocular muscle movement were intact, mucous 

membranes of the mouth are somewhat dry.





Neck: supple no JVP.





Chest: decreased breath sounds at he bases with few ronchi no expiratory wheezes

no chest wall tendernes or intercostal retractions.





Heart: first heart sound is depressed, second heart sound is normal tachycardic 

there is HOOD 2/6 located at the left sternal border.





Abdomen: soft, mild tenderness to the left lower quadrant positive bowel sounds,

no guarding or rebound tenderness, no hepatosplenomegaly.





Extremities: there is no edema or calf tenderness DP+ 2 Bilaterally, there is 

what appears to be a charcot joint in the right foot form arch collapse.





Neurologic examination: patient is awake , alert and oriented X 3 CN II-XII are 

grossly intact, muscle power 4/5 in bilateral upper and lower extremities, deep 

tendon reflexes were normal.








- Labs


CBC & Chem 7: 


                                 02/04/21 06:04





                                 02/04/21 06:04


Labs: 


                  Abnormal Lab Results - Last 24 Hours (Table)











  02/03/21 02/03/21 Range/Units





  08:19 08:19 


 


RBC  3.75 L   (4.30-5.90)  m/uL


 


Hgb  9.5 L   (13.0-17.5)  gm/dL


 


Hct  32.9 L   (39.0-53.0)  %


 


MCHC  29.0 L   (31.0-37.0)  g/dL


 


RDW  17.8 H   (11.5-15.5)  %


 


Potassium   3.4 L  (3.5-5.1)  mmol/L


 


Glucose   107 H  (74-99)  mg/dL


 


AST   16 L  (17-59)  U/L


 


Alkaline Phosphatase   140 H  ()  U/L


 


Total Protein   6.2 L  (6.3-8.2)  g/dL


 


Albumin   2.6 L  (3.5-5.0)  g/dL








                      Microbiology - Last 24 Hours (Table)











 02/01/21 11:30 Gram Stain - Preliminary





 Pleural Fluid Body Fluid Culture - Preliminary





    Aspergillus species


 


 01/29/21 13:00 Blood Culture - Preliminary





 Blood    No Growth after 96 hours


 


 01/29/21 09:26 Blood Culture - Preliminary





 Blood    No Growth after 96 hours


 


 01/30/21 14:30 Gram Stain - Final





 Sputum Sputum Culture - Final





    Aspergillus fumigatus














Assessment and Plan


Assessment: 





Assessment and plan:








1.  Acute hypoxemic respiratory failure secondary to loculated left-sided 

pleural effusion and possible empyema with what appears to be recurrent 

pneumonia.  Patient was admitted to the intensive care unit, he was started on 

IV fluid resuscitation, required small dose of Levophed which has been weaned 

off, Cefepime and vancomycin discontinued by Dr. Sheets and started on 

Voriconazole, interventional radiology performed ultrasound-guided thoracentesis

of the left loculated pleural fluid for Gram stain and culture and cytology as 

well as LDH protein as well as pH for possible empyema.  Culture positive for 

Aspergillus.  Cardiothoracic surgery following with no plans for surgical 

intervention at this time and we will reevaluate tomorrow. 





2.  Lactic acidosis.  Continue IV fluid resuscitation, continue with IV 

antibiotic vancomycin and cefepime, repeat lactic acid is back to normal.





3.  Sepsis with septic shock.  Continue IV fluid resuscitation, continue IV 

antibiotic, try to wean Levothroid drip to keep his mean greater than 65 mmHg.





4.  Acute kidney injury due to poor oral intake of fluid as well as hypotension 

with acute tubular necrosis.  Continue IV fluid, continue to monitor the patient

CMP continue to monitor urine output.





5.  Leukocytosis secondary to severe sepsis.  Continue IV fluid, continue IV 

antibiotic, repeat CBC tomorrow morning.





6.  Rheumatoid arthritis and rheumatoid lung.  Arava and Xeljanz had been on 

hold since October, patient was started on hydrocortisone 100 mg IV push every 8

hours.





7.  History of empyema with Streptococcus requiring chest tube.  His is a 

similar scenario may need a cardiothoracic surgery consultation.





8.  Hypertension and hypertensive cardiovascular disease. currently hypotensive 

.  Continue the IV fluid currently as well as IV pressors if needed





9.  Hypothyroidism . we will continue with Synthroid 150 mcg orally daily.





10. DVT prophylaxis. we will start Lovenox 40 mg SC daily and bilateral knee 

high Fahad Hose





11. GI prophylaxis. we will continue with Protonix 40 mg IVP daily.





12.  Prognosis continues to be guarded.

## 2021-02-03 NOTE — P.PN
Subjective





Patient is seen in follow for acute kidney injury.  GFR back to baseline. 

Tolerating oral intake.  Diarrhea improved.  Edema is a little better today.  

Good urine output.





Vital signs are stable.


General: The patient appeared well nourished and normally developed. 


HEENT: Head exam is unremarkable. Neck is without jugular venous distension.


LUNGS: Breath sounds decreased.


HEART: Rate and Rhythm are regular. 


ABDOMEN: Soft, nontender.


EXTREMITITES: 2+ edema.





Objective





- Vital Signs


Vital signs: 


                                   Vital Signs











Temp  97.4 F L  02/03/21 07:24


 


Pulse  88   02/03/21 08:38


 


Resp  18   02/03/21 07:24


 


BP  118/71   02/03/21 07:24


 


Pulse Ox  91 L  02/03/21 07:24








                                 Intake & Output











 02/02/21 02/03/21 02/03/21





 18:59 06:59 18:59


 


Intake Total 860 0 


 


Output Total 2500 0 


 


Balance -1640 0 


 


Weight 100.7 kg  


 


Intake:   


 


   0 


 


    Cefepime 2 gm In Sodium 100  





    Chloride 0.9% 100 ml @ 25   





    mls/hr IVPB Q12HR [facility]   





    #:[id number]   


 


    LR 40 0 


 


  Oral 720  


 


Output:   


 


  Urine 2500 0 


 


Other:   


 


  Voiding Method Toilet Toilet Toilet





 Urinal Urinal 














- Labs


CBC & Chem 7: 


                                 02/03/21 08:19





                                 02/02/21 05:23


Labs: 


                  Abnormal Lab Results - Last 24 Hours (Table)











  02/03/21 Range/Units





  08:19 


 


RBC  3.75 L  (4.30-5.90)  m/uL


 


Hgb  9.5 L  (13.0-17.5)  gm/dL


 


Hct  32.9 L  (39.0-53.0)  %


 


MCHC  29.0 L  (31.0-37.0)  g/dL


 


RDW  17.8 H  (11.5-15.5)  %








                      Microbiology - Last 24 Hours (Table)











 01/29/21 13:00 Blood Culture - Preliminary





 Blood    No Growth after 96 hours


 


 01/29/21 09:26 Blood Culture - Preliminary





 Blood    No Growth after 96 hours


 


 02/01/21 11:30 Gram Stain - Preliminary





 Pleural Fluid Body Fluid Culture - Preliminary


 


 01/30/21 14:30 Gram Stain - Final





 Sputum Sputum Culture - Final





    Aspergillus fumigatus














Assessment and Plan


Plan: 





Assessment:


1.  Acute kidney injury mostly prerenal secondary to intravascular volume 

depletion and hypotension, improved with IV hydration.  GFR back to baseline.


2.  Pneumonia maintained on antibiotics. ? Empyema - status post thoracentesis 

on February 1 with 45 mL of fluid removed.


3.  Septic shock secondary to pneumonia.  Off vasopressors.  On antibiotics.


4.  History of rheumatoid arthritis.


5.  Hypotension mostly secondary to sepsis.  Cortisol level was also low.  He is

currently on Solu-Cortef.  ACTH stimulation test will not be accurate at this 

time as he is on Solu-Cortef.


6.  Diarrhea.  C. diff negative.  Resolved.


7.  Lower extremity edema.





Plan:


Encouraged oral intake.


Repeat Lasix 40 mg IV once today.


Morning labs pending.

## 2021-02-03 NOTE — XR
EXAMINATION TYPE: XR chest 2V

 

DATE OF EXAM: 2/3/2021

 

COMPARISON: 2/2/2021

 

INDICATION: Pneumothorax

 

TECHNIQUE:  Frontal and lateral views of the chest are obtained.

 

FINDINGS:  

The heart size is normal.  

The pulmonary vasculature is normal.

Diffuse increased infiltrates are present through the bilateral mid and lower lung fields. A loculate
d left basilar pneumothorax may be present. Air-fluid levels present at the left diaphragm. Minimal f
luid is present in the right diaphragm..

 

IMPRESSION:  

1. Left basilar loculated pneumothorax, stable.

2. Bilateral lung infiltrates. Correlate for pneumonia and atelectasis

## 2021-02-04 LAB
ALBUMIN SERPL-MCNC: 3 G/DL (ref 3.8–4.9)
ALBUMIN/GLOB SERPL: 1.07 G/DL (ref 1.6–3.17)
ALP SERPL-CCNC: 138 U/L (ref 41–126)
ALT SERPL-CCNC: 13 U/L (ref 10–49)
ANION GAP SERPL CALC-SCNC: 7.2 MMOL/L (ref 4–12)
ANISOCYTOSIS BLD QL SMEAR: (no result)
APTT BLD: 21.3 SEC (ref 22–30)
AST SERPL-CCNC: 18 U/L (ref 14–35)
BASOPHILS # BLD MANUAL: 0 X 10*3/UL (ref 0–0.1)
BUN SERPL-SCNC: 24 MG/DL (ref 9–27)
BUN/CREAT SERPL: 30 RATIO (ref 12–20)
CALCIUM SPEC-MCNC: 8.5 MG/DL (ref 8.7–10.3)
CHLORIDE SERPL-SCNC: 111 MMOL/L (ref 96–109)
CO2 SERPL-SCNC: 27.8 MMOL/L (ref 21.6–31.8)
EOSINOPHIL # BLD MANUAL: 0 X 10*3/UL (ref 0.04–0.35)
ERYTHROCYTE [DISTWIDTH] IN BLOOD BY AUTOMATED COUNT: 3.37 X 10*6/UL (ref 4.4–5.6)
ERYTHROCYTE [DISTWIDTH] IN BLOOD: 17.9 % (ref 11.5–14.5)
GLOBULIN SER CALC-MCNC: 2.8 G/DL (ref 1.6–3.3)
GLUCOSE BLD-MCNC: 123 MG/DL (ref 75–99)
GLUCOSE SERPL-MCNC: 107 MG/DL (ref 70–110)
HCT VFR BLD AUTO: 29.7 % (ref 39.6–50)
HGB BLD-MCNC: 8.8 G/DL (ref 13–17)
INR PPP: 1.1 (ref ?–1.2)
LYMPHOCYTES # BLD MANUAL: 1.01 X 10*3/UL (ref 0.9–5)
MCH RBC QN AUTO: 26.1 PG (ref 27–32)
MCHC RBC AUTO-ENTMCNC: 29.6 G/DL (ref 32–37)
MCV RBC AUTO: 88.1 FL (ref 80–97)
MONOCYTES # BLD MANUAL: 0.5 X 10*3/UL (ref 0.2–1)
NEUTROPHILS NFR BLD MANUAL: 84 %
NEUTS SEG # BLD MANUAL: 8.48 X 10*3/UL (ref 2–8.9)
PLATELET # BLD AUTO: 372 X 10*3/UL (ref 140–440)
POTASSIUM SERPL-SCNC: 3 MMOL/L (ref 3.5–5.5)
PROT SERPL-MCNC: 5.8 G/DL (ref 6.2–8.2)
PT BLD: 11.2 SEC (ref 9–12)
SODIUM SERPL-SCNC: 146 MMOL/L (ref 135–145)
STOMATOCYTES BLD QL SMEAR: (no result)
WBC # BLD AUTO: 10.09 X 10*3/UL (ref 4.5–10)

## 2021-02-04 RX ADMIN — VORICONAZOLE SCH MLS/HR: 10 INJECTION, POWDER, LYOPHILIZED, FOR SOLUTION INTRAVENOUS at 20:20

## 2021-02-04 RX ADMIN — TRIAMCINOLONE ACETONIDE SCH: 1 CREAM TOPICAL at 10:08

## 2021-02-04 RX ADMIN — POTASSIUM CHLORIDE SCH MEQ: 20 TABLET, EXTENDED RELEASE ORAL at 18:10

## 2021-02-04 RX ADMIN — HEPARIN SODIUM PRN UNIT: 5000 INJECTION, SOLUTION INTRAVENOUS; SUBCUTANEOUS at 23:47

## 2021-02-04 RX ADMIN — TRIAMCINOLONE ACETONIDE SCH: 1 CREAM TOPICAL at 20:38

## 2021-02-04 RX ADMIN — Medication SCH MCG: at 20:19

## 2021-02-04 RX ADMIN — HEPARIN SODIUM SCH: 5000 INJECTION, SOLUTION INTRAVENOUS; SUBCUTANEOUS at 00:10

## 2021-02-04 RX ADMIN — FUROSEMIDE SCH MG: 10 INJECTION, SOLUTION INTRAMUSCULAR; INTRAVENOUS at 09:43

## 2021-02-04 RX ADMIN — POTASSIUM CHLORIDE SCH MLS/HR: 14.9 INJECTION, SOLUTION INTRAVENOUS at 16:05

## 2021-02-04 RX ADMIN — HYDROCORTISONE SODIUM SUCCINATE SCH MG: 100 INJECTION, POWDER, FOR SOLUTION INTRAMUSCULAR; INTRAVENOUS at 16:05

## 2021-02-04 RX ADMIN — HYDROCODONE BITARTRATE AND ACETAMINOPHEN PRN EACH: 5; 325 TABLET ORAL at 22:19

## 2021-02-04 RX ADMIN — AMIODARONE HYDROCHLORIDE SCH MLS/HR: 50 INJECTION, SOLUTION INTRAVENOUS at 22:20

## 2021-02-04 RX ADMIN — POTASSIUM CHLORIDE SCH MLS/HR: 14.9 INJECTION, SOLUTION INTRAVENOUS at 23:46

## 2021-02-04 RX ADMIN — POTASSIUM CHLORIDE SCH MEQ: 20 TABLET, EXTENDED RELEASE ORAL at 10:40

## 2021-02-04 RX ADMIN — POTASSIUM CHLORIDE SCH MEQ: 20 TABLET, EXTENDED RELEASE ORAL at 20:19

## 2021-02-04 RX ADMIN — HEPARIN SODIUM SCH MLS/HR: 10000 INJECTION, SOLUTION INTRAVENOUS at 09:51

## 2021-02-04 RX ADMIN — IPRATROPIUM BROMIDE AND ALBUTEROL SULFATE SCH: .5; 3 SOLUTION RESPIRATORY (INHALATION) at 19:57

## 2021-02-04 RX ADMIN — HYDROCORTISONE SODIUM SUCCINATE SCH MG: 100 INJECTION, POWDER, FOR SOLUTION INTRAMUSCULAR; INTRAVENOUS at 00:14

## 2021-02-04 RX ADMIN — HEPARIN SODIUM SCH: 5000 INJECTION, SOLUTION INTRAVENOUS; SUBCUTANEOUS at 10:08

## 2021-02-04 RX ADMIN — IPRATROPIUM BROMIDE AND ALBUTEROL SULFATE SCH: .5; 3 SOLUTION RESPIRATORY (INHALATION) at 13:51

## 2021-02-04 RX ADMIN — POTASSIUM CHLORIDE SCH MEQ: 20 TABLET, EXTENDED RELEASE ORAL at 15:54

## 2021-02-04 RX ADMIN — LEVOTHYROXINE SODIUM SCH MCG: 75 TABLET ORAL at 05:54

## 2021-02-04 RX ADMIN — HYDROCORTISONE SODIUM SUCCINATE SCH MG: 100 INJECTION, POWDER, FOR SOLUTION INTRAMUSCULAR; INTRAVENOUS at 23:47

## 2021-02-04 RX ADMIN — POTASSIUM CHLORIDE SCH MLS/HR: 14.9 INJECTION, SOLUTION INTRAVENOUS at 20:19

## 2021-02-04 RX ADMIN — HYDROCORTISONE SODIUM SUCCINATE SCH MG: 100 INJECTION, POWDER, FOR SOLUTION INTRAMUSCULAR; INTRAVENOUS at 09:44

## 2021-02-04 RX ADMIN — ASPIRIN 81 MG CHEWABLE TABLET SCH MG: 81 TABLET CHEWABLE at 20:18

## 2021-02-04 RX ADMIN — HEPARIN SODIUM PRN UNIT: 5000 INJECTION, SOLUTION INTRAVENOUS; SUBCUTANEOUS at 16:05

## 2021-02-04 RX ADMIN — PANTOPRAZOLE SODIUM SCH MG: 40 TABLET, DELAYED RELEASE ORAL at 09:43

## 2021-02-04 RX ADMIN — POTASSIUM CHLORIDE SCH MEQ: 20 TABLET, EXTENDED RELEASE ORAL at 11:37

## 2021-02-04 RX ADMIN — VORICONAZOLE SCH MLS/HR: 10 INJECTION, POWDER, LYOPHILIZED, FOR SOLUTION INTRAVENOUS at 10:36

## 2021-02-04 RX ADMIN — IPRATROPIUM BROMIDE AND ALBUTEROL SULFATE SCH: .5; 3 SOLUTION RESPIRATORY (INHALATION) at 07:02

## 2021-02-04 RX ADMIN — OXYCODONE HYDROCHLORIDE AND ACETAMINOPHEN SCH MG: 500 TABLET ORAL at 20:18

## 2021-02-04 NOTE — P.PN
Subjective


Progress Note Date: 02/04/21





This is a 60-year-old  male patient requesting my service with past 

medical history of rheumatoid arthritis on Arava that was diagnosed when he was 

37 year old initially was started on Methotrexate that he could not tolerate 

then placed on Embrel but he developed side effects and finally was tried on 

Humira injection but he developed neurologic sided effects and was hospitaized 

at [facility] for quiet some time, rheumatoid lung disease, 

previous history of loculated empyema with chest tube placement in July 2020, 

hypertension, hypothyroidism, history of DVT, remote history of tobacco use and 

dependence, was recently hospitalized at [facility] after he was 

admitted for an acute kidney injury with significant metabolic acidosis 

associated with the elevated creatinine and elevated BUN and hypotension at that

time he was seen and evaluated by pulmonary and critical care medicine, he had a

central line placed and at that time, he was placed on Levophed drip he was p

laced on IV anabiotic as well but the patient recovered very fast and he had an 

echocardiogram that showed normal LV function and segmental hypokinesia, he was 

supposed to follow-up with Dr. Santo in the office today, patient was seen in my

office 24 hours ago when he was complaining of increased shortness breath, at 

that time his oxygenation could not be checked because of his Case's and cold

extremities, he was sent for a chest x-ray that showed right lower lobe 

infiltrate as well as blood tests that showed a white count of 20,000 patient 

was feeling better he was started on oral antibiotic in the form of Levaquin 500

mg orally once every day, and that he was supposed to follow-up with me as an 

outpatient every week he went to see his cardiologist today and he had an 

episode when he was dizzy lightheaded and an episode of vomiting as well and he 

was sent to the emergency department for evaluation had a computed tomography of

the chest as well as abdomen and pelvis that showed a thick walled pleural 

cavities posteriorly in the lower lungs with left-sided air-fluid level that has

diminished in size from the prior computed tomography scan when he was in the 

hospital a few weeks back there is also associated compressive atelectasis and 

chronic consultation with multifocal areas of reticular nodular opacity in the 

upper lungs with a slight nodularity of the left upper lobe again this area of 

focal consultation has improved from the previous study that was done few weeks 

ago, patient was started on IV antibiotic with vancomycin and Zosyn as well as 

IV fluid, he was seen in consultation by pulmonary critical care in the 

emergency department as the patient blood pressure dropped and that he'll be 

transferred to the intensive care unit at this point in time I had long conve

rsation with the patient and his wife at the bedside and the patient may need to

have a chest tube placed for his possible right empyema, he did receive 3 L 

normal saline in the ER, and his blood pressure is marginal he may need to go on

 pressors if  not recovered.





1/30:patient is sitting up in chair feeling about the same , complains of 

increased pain in the righ elbow, was seen earlier by pulmonary and the plan was

to go for US-Guided left thoracenthesis due to to loculated left pleural effu

nathaniel and possible empyema, may need VATS, he denies any chest pain or shortness 

of breath, no abdominal pain nausea, vomiting or diarrhea, still have diarrhea 

negative for C.Diff, he seems to have accept to stay in the hospital this time 

as long as he needed to.





1/31: Patient sitting up in the recliner complaining of increased pain in his 

joints due to his rheumatoid arthritis with increased synovitis in both wrists 

both elbows and both shoulders he was started on hydrocortisone 100 mg IV push 

every 8 hours, to try to help with his blood pressure as well as his phonation, 

he is maintained on Norco 5/325 mg one tablet orally every 6 hours as needed for

pain control, patient denies any chest pain is less short of breath, he 

continues to have increased coughing with yellow from production of green phlegm

production, he had no fever or chills at this time he continues to be on 

Levothroid drip, patient has appeared to have increased swelling in both lower 

extremities as well as both ankles, he is maintained on IV antibiotic in the 

form of vancomycin as well as cefepime, infectious disease is following, patient

is scheduled to go for ultrasound guidance thoracentesis of the left loculated 

pleural effusion tomorrow morning.





2/1: Patient remains in the intensive care unit.  He has been afebrile, heart 

rate 100, blood pressure 95/62, pulse ox 97% on 2 L nasal cannula.  Hemoglobin 

8.8 and leukocytosis resolved.  Creatinine 0.76, electrolytes normal.  Sputum 

culture is positive for Aspergillus species.  Blood culture showing no growth. 

Patient underwent diagnostic thoracentesis with interventional radiology today 

with removal of 45 mL of cloudy white fluid.  Chest x-ray reveals no 

complications following left thoracentesis.  Fluid has been sent for culture and

cytology.  Consult in place for cardiothoracic surgery to evaluate for VATS with

decortication.





2/2: Patient remains in the intensive care unit.  He did receive 1 dose of IV 

Lasix this morning ordered by nephrology.  He is followed by cardiothoracic 

surgery was no plan for surgical intervention.  ID has recommended cefepime and 

vancomycin for now.  Expect antifungal agent ended as well.  Patient denies any 

significant shortness of breath.  He is comfortable sitting in a chair.  Patient

is having minimal cough and minimal sputum.  Patient is achieving 1000 ml on 

incentive spirometry.  Culture and cytology reports remain pending from 

thoracentesis. Repeat chest x-ray reveals patchy peripheral lung with lung zone 

and lower lung zone infiltrates persist unchanged.  Patient has been afebrile, 

heart rate in the 90s and low 100s, pulse ox 91 on room air.





2/3: Repeat chest x-ray reveals left-sided basilar loculated pneumothorax, 

stable.  Bilateral lung infiltrates.  Correlate for pneumonia and atelectasis.  

WBC 9, hemoglobin 9.5.  Potassium 3.4 and will be replaced.  Patient is 

continued on cefepime and vancomycin per Dr. Sheets's recommendations.  Patient 

in reaching 1000 on incentive spirometry.  Patient is scheduled to see Dr. Kaye tomorrow.  Cefepime and vancomycin had been discontinued by Dr. Sheets and

started on Voriconazole. 





2/4: A-Team was called this morning regarding elevated heart rate, EKG was 

atrial flutter with RVR and 150 bpm and patient was transferred to ICU as an 

overflow for the cardiac stepdown unit, started on Cardizem drip and consult 

with cardiology.  Heart rate was improved with Cardizem and patient also 

received amiodarone bolus 300 mg.  Patient denies any worsening shortness of 

breath, cough or congestion.  No chest pain or palpitations.  Patient is resting

comfortably.  09, hemoglobin 8.8, potassium 3.4 replaced.  Creatinine 0.89.





Objective





- Vital Signs


Vital signs: 


                                   Vital Signs











Temp  97.8 F   02/04/21 12:00


 


Pulse  144 H  02/04/21 12:30


 


Resp  21   02/04/21 13:00


 


BP  84/59   02/04/21 13:00


 


Pulse Ox  93 L  02/04/21 12:00








                                 Intake & Output











 02/03/21 02/04/21 02/04/21





 18:59 06:59 18:59


 


Intake Total 100 300 40


 


Output Total 1320  1400


 


Balance -1220 300 -1360


 


Intake:   


 


    40


 


    0.9   40


 


    Cefepime 2 gm In Sodium 100  





    Chloride 0.9% 100 ml @ 25   





    mls/hr IVPB Q12HR [facility] Rx   





    #:[id number]   


 


  Oral  300 


 


Output:   


 


  Urine 1320  1400


 


Other:   


 


  Voiding Method Toilet Toilet Toilet





   Urinal


 


  # Voids 9  














- Exam





 Review of Systems 


Constitutional: Reports anorexia, Reports chronic pain, Reports fatigue, Reports

weakness, Reports weight loss


Eyes: denies blurred vision, denies bulging eye, denies decreased vision, denies

diplopia


Ears, nose, mouth and throat: Denies dysphagia, Denies neck lump, Denies sore 

throat


Respiratory: Reports dyspnea, Reports respiratory infections, Denies congestion,

Denies cough with sputum, Denies home oxygen, Denies sleep apnea, Denies snoring

, Denies wheezing


Gastrointestinal: Reports bloating, Reports change in bowel habits, Reports 

diarrhea, Reports early satiety, Reports indigestion, Reports loss of appetite, 

Reports nausea, Reports vomiting, Denies abdominal pain, Denies belching, Denies

heartburn, Denies hematemesis, Denies hematochezia, Denies melena


Genitourinary: Denies dysuria, Denies nocturia


Musculoskeletal: Denies myalgias


Musculoskeletal: absent: ankle pain, ankle stiffness, ankle swelling, elbow 

pain, elbow stiffness, elbow swelling, foot pain, foot stiffness, foot swelling,

hand pain, hand stiffness, hand swelling, hip pain, hip stiffness, hip swelling,

knee pain, knee stiffness, knee swelling, shoulder pain, shoulder stiffness, 

shoulder swelling, wrist pain, wrist stiffness, wrist swelling


Integumentary: Denies pruritus, Denies rash


Neurological: Denies numbness, Denies weakness


Psychiatric: Denies anxiety, Denies depression


Endocrine: Denies fatigue, Denies weight change








Physical examination:


General: patient is 60 year old lying down in bed in moderate distress.





HEENT: head ia atraumatic normocephalic, Pupils were equal round reactive to 

light and accommodations , extra ocular muscle movement were intact, mucous 

membranes of the mouth are somewhat dry.





Neck: supple no JVP.





Chest: decreased breath sounds at he bases with few ronchi no expiratory wheezes

no chest wall tendernes or intercostal retractions.





Heart: first heart sound is depressed, second heart sound is normal tachycardic 

there is HOOD 2/6 located at the left sternal border.





Abdomen: soft, mild tenderness to the left lower quadrant positive bowel sounds,

no guarding or rebound tenderness, no hepatosplenomegaly.





Extremities: there is no edema or calf tenderness DP+ 2 Bilaterally, there is 

what appears to be a charcot joint in the right foot form arch collapse.





Neurologic examination: patient is awake , alert and oriented X 3 CN II-XII are 

grossly intact, muscle power 4/5 in bilateral upper and lower extremities, deep 

tendon reflexes were normal.








- Labs


CBC & Chem 7: 


                                 02/04/21 06:04





                                 02/04/21 06:04


Labs: 


                  Abnormal Lab Results - Last 24 Hours (Table)











  02/04/21 02/04/21 02/04/21 Range/Units





  06:04 06:04 08:16 


 


WBC  10.09 H    (4.50-10.00)  X 10*3/uL


 


RBC  3.37 L    (4.40-5.60)  X 10*6/uL


 


Hgb  8.8 L    (13.0-17.0)  g/dL


 


Hct  29.7 L    (39.6-50.0)  %


 


MCH  26.1 L    (27.0-32.0)  pg


 


MCHC  29.6 L    (32.0-37.0)  g/dL


 


RDW  17.9 H    (11.5-14.5)  %


 


Absolute Nucleated RBC  0.03 H    (0.00-0.00)  X 10*3/uL


 


Metamyelocytes %  1 H    (0-0)  %


 


Eosinophils # (Manual)  0 L    (0.04-0.35)  X 10*3/uL


 


NRBC/100 WBC Diff  0.3 H    (0.0-0.0)  /100 WBCS


 


ESR  >140 H    (0-20)  mm/Hr


 


APTT     (22.0-30.0)  sec


 


Sodium   146 H   (135-145)  mmol/L


 


Potassium   3.0 L   (3.5-5.5)  mmol/L


 


Chloride   111 H   ()  mmol/L


 


BUN/Creatinine Ratio   30.00 H   (12.00-20.00)  Ratio


 


POC Glucose (mg/dL)    123 H  (75-99)  mg/dL


 


Calcium   8.5 L   (8.7-10.3)  mg/dL


 


Total Bilirubin   0.2 L   (0.3-1.2)  mg/dL


 


Alkaline Phosphatase   138 H   ()  U/L


 


C-Reactive Protein   3.0 H   (0.0-0.8)  mg/dL


 


Total Protein   5.8 L   (6.2-8.2)  g/dL


 


Albumin   3.00 L   (3.80-4.90)  g/dL


 


Albumin/Globulin Ratio   1.07 L   (1.60-3.17)  g/dL














  02/04/21 Range/Units





  09:08 


 


WBC   (4.50-10.00)  X 10*3/uL


 


RBC   (4.40-5.60)  X 10*6/uL


 


Hgb   (13.0-17.0)  g/dL


 


Hct   (39.6-50.0)  %


 


MCH   (27.0-32.0)  pg


 


MCHC   (32.0-37.0)  g/dL


 


RDW   (11.5-14.5)  %


 


Absolute Nucleated RBC   (0.00-0.00)  X 10*3/uL


 


Metamyelocytes %   (0-0)  %


 


Eosinophils # (Manual)   (0.04-0.35)  X 10*3/uL


 


NRBC/100 WBC Diff   (0.0-0.0)  /100 WBCS


 


ESR   (0-20)  mm/Hr


 


APTT  21.3 L  (22.0-30.0)  sec


 


Sodium   (135-145)  mmol/L


 


Potassium   (3.5-5.5)  mmol/L


 


Chloride   ()  mmol/L


 


BUN/Creatinine Ratio   (12.00-20.00)  Ratio


 


POC Glucose (mg/dL)   (75-99)  mg/dL


 


Calcium   (8.7-10.3)  mg/dL


 


Total Bilirubin   (0.3-1.2)  mg/dL


 


Alkaline Phosphatase   ()  U/L


 


C-Reactive Protein   (0.0-0.8)  mg/dL


 


Total Protein   (6.2-8.2)  g/dL


 


Albumin   (3.80-4.90)  g/dL


 


Albumin/Globulin Ratio   (1.60-3.17)  g/dL








                      Microbiology - Last 24 Hours (Table)











 01/29/21 09:26 Blood Culture - Final





 Blood    No Growth after 144 hours


 


 02/01/21 11:30 Gram Stain - Final





 Pleural Fluid Body Fluid Culture - Final





    Aspergillus fumigatus


 


 01/29/21 13:00 Blood Culture - Preliminary





 Blood    No Growth after 120 hours














Assessment and Plan


Assessment: 





Assessment and plan:








1.  Acute hypoxemic respiratory failure secondary to loculated left-sided ple

ural effusion and fungal empyema with what appears to be recurrent pneumonia.  

Continue Voriconazole 200 mg every 12 hours IV piggyback per Dr. Sheets, 

interventional radiology performed ultrasound-guided thoracentesis of the left 

loculated pleural fluid for Gram stain and culture and cytology as well as LDH 

protein as well as pH for possible empyema.  Culture positive for Aspergillus.  

Cardiothoracic surgery following with no plans for surgical intervention at this

time and we will reevaluate tomorrow. 





2.  Lactic acidosis.  Resolved and cefepime, repeat lactic acid is back to 

normal.





3.  Sepsis with septic shock.  Patient has been weaned off Levothroid drip.





4.  Acute kidney injury due to poor oral intake of fluid as well as hypotension 

with acute tubular necrosis.  Continue IV fluid, continue to monitor the patient

CMP continue to monitor urine output.  Nephrology is following.





5.  Leukocytosis secondary to severe sepsis.  Continue IV fluid, continue IV 

antibiotic, repeat CBC tomorrow morning.





6.  Atrial flutter with RVR.  Patient was started on Cardizem drip, amiodarone 

300 mg IV bolus, cardiology consult appreciated.





7.  Rheumatoid arthritis and rheumatoid lung.  Arava and Xeljanz had been on 

hold since October, patient was started on hydrocortisone 100 mg IV push every 8

hours.





8.  History of empyema with Streptococcus requiring chest tube.  His is a 

similar scenario may need a cardiothoracic surgery consultation.





9.  Hypertension and hypertensive cardiovascular disease. currently hypotensive 

.  Continue the IV fluid currently as well as IV pressors if needed





10.  Hypothyroidism . we will continue with Synthroid 150 mcg orally daily.





11. DVT prophylaxis. we will start Lovenox 40 mg SC daily and bilateral knee 

high Fahad Hose





12. GI prophylaxis. we will continue with Protonix 40 mg IVP daily.





13.  Prognosis continues to be guarded.

## 2021-02-04 NOTE — PN
PROGRESS NOTE



DATE OF SERVICE:

02/04/2021



REASON FOR FOLLOWUP:

Aspergillosis.



INTERVAL HISTORY:

The patient is currently afebrile.  The patient has been transferred down to the ICU

because of atrial fibrillation with RVR.  The patient denies having any chest pain or

shortness of breath.  Minimal cough.  No abdominal pain or diarrhea.



PHYSICAL EXAMINATION:

Blood pressure is 86/66, pulse is 147, temperature is 97.6. He is 93% on room air.

General description is a middle-aged male up in the chair in no distress.

RESPIRATORY SYSTEM: Unlabored breathing, decreased breath sounds at the bases. No

wheeze.

HEART: S1, S2.  Regular rate and rhythm.

ABDOMEN:  Soft, no tenderness.



LABS:

Hemoglobin 8.8, white count 10.0. Creatinine is 0.08.



DIAGNOSTIC IMPRESSION AND PLAN:

Patient with Aspergillus pneumonia and empyema, status post CT-guided drainage. Await

further recommendation as far as repeat thoracotomy and _____that area versus medical

therapy only. Questions were answered.





MMODL / IJN: [id number] / Job#: [id number]

## 2021-02-04 NOTE — P.PN
Subjective


Progress Note Date: 02/04/21


Principal diagnosis: 





Bilateral pneumonia, bilateral hydropneumothorax, possible empyema, sputum 

culture preliminarily positive for Aspergillus species, acute hypoxic 

respiratory failure with sepsis on admission.  Past medical history significant 

for multiple admissions for pneumonia with empyema on the left and previous 

bilateral chest tube placement in June 2020 with cultures positive for strep 

pneumonia, rheumatoid arthritis with rheumatoid lung, hypertension, 

hypothyroidism, DVT, previous tobacco dependence, family history of lung cancer,

and recent hospitalization for hypotension, dehydration, acute kidney injury 

requiring dialysis.





POD #3 left-sided thoracentesis by interventional radiology.





The patient was seen in follow-up today at his bedside in the intensive care 

unit.  Currently he is sitting up to the bedside chair, is awake, alert and 

oriented 3.  He was transferred to the intensive care unit this morning from 

the medical surgical unit due to some atrial fibrillation and atrial flutter 

with RVR.  His heart rate is currently 147 BPM on his bedside monitor.  Cardizem

drip is infusing at 5 mg per hour.  Oxygen saturations are 93% on 2 L nasal 

cannula.  Pleural fluid culture and sputum culture results positive for 

Aspergillus species.  Lab results this morning show a WBC count 10.0, hemoglobin

8.8, hematocrit 29.7, BUN 24, creatinine 0.8 and C-reactive protein 3.0.  

Currently he denies any complaints of pain or shortness of breath with just sitt

ing there but reports when he is ambulating he does have some episodes of 

shortness of breath.  A chest x-ray was completed this morning and the report 

shows loculated posterior basilar pneumothoraces, and diffuse increased lung 

markings.








Objective





- Vital Signs


Vital signs: 


                                   Vital Signs











Temp  97.6 F   02/04/21 08:00


 


Pulse  174 H  02/04/21 08:00


 


Resp  16   02/04/21 08:00


 


BP  105/62   02/04/21 08:00


 


Pulse Ox  93 L  02/04/21 08:00








                                 Intake & Output











 02/03/21 02/04/21 02/04/21





 18:59 06:59 18:59


 


Intake Total 100 300 40


 


Output Total 1320  1400


 


Balance -1220 300 -1360


 


Intake:   


 


    40


 


    0.9   40


 


    Cefepime 2 gm In Sodium 100  





    Chloride 0.9% 100 ml @ 25   





    mls/hr IVPB Q12HR [facility]   





    #:[id number]   


 


  Oral  300 


 


Output:   


 


  Urine 1320  1400


 


Other:   


 


  Voiding Method Toilet Toilet Toilet





   Urinal


 


  # Voids 9  














- Constitutional


General appearance: Present: average body habitus, cooperative, no acute 

distress





- EENT


Eyes: Present: normal appearance.  Absent: scleral icterus


ENT: Present: hearing grossly normal





- Neck


Details: 





Neck is supple, no JVD.





- Respiratory


Details: 





Lung sounds essentially clear throughout, diminished to his bilateral bases with

few scattered crackles.  Respirations are symmetrical and nonlabored.  Oxygen 

saturation is 93% on 2 L nasal cannula.





- Cardiovascular


Details: 





Irregular rhythm with a tachycardic rate.  S1 and S2 present, negative for S3, 

gallop or murmur.  Bedside telemetry showing atrial fibrillation/atrial flutter 

with rapid ventricular response heart rate 147 BPM.  Cardizem drip at 5 mg per 

hour.  Knee-high PAT hose in place to his bilateral lower extremities.  +2 to +3

edema to his bilateral lower extremities.





- Gastrointestinal


Gastrointestinal Comment(s): 





Abdomen is soft, nontender and nondistended.  Active bowel sounds present in all

4 abdominal quadrants.  No guarding or rigidity.  No organomegaly appreciated.





- Genitourinary


Genitourinary Comment(s): 





Continues to void.





- Integumentary


Integumentary Comment(s): 





Skin is warm and dry.  No clubbing or cyanosis is present.





- Neurologic


Neurologic: Present: CNII-XII intact.  Absent: focal deficits





- Musculoskeletal


Musculoskeletal: Present: generalized weakness, strength equal bilaterally





- Psychiatric


Psychiatric: Present: A&O x's 3, appropriate affect, intact judgment & insight





- Allied health notes


Allied health notes reviewed: nursing





- Labs


CBC & Chem 7: 


                                 02/04/21 06:04





                                 02/04/21 14:46


Labs: 


                  Abnormal Lab Results - Last 24 Hours (Table)











  02/04/21 02/04/21 02/04/21 Range/Units





  06:04 06:04 08:16 


 


WBC  10.09 H    (4.50-10.00)  X 10*3/uL


 


RBC  3.37 L    (4.40-5.60)  X 10*6/uL


 


Hgb  8.8 L    (13.0-17.0)  g/dL


 


Hct  29.7 L    (39.6-50.0)  %


 


MCH  26.1 L    (27.0-32.0)  pg


 


MCHC  29.6 L    (32.0-37.0)  g/dL


 


RDW  17.9 H    (11.5-14.5)  %


 


Absolute Nucleated RBC  0.03 H    (0.00-0.00)  X 10*3/uL


 


Metamyelocytes %  1 H    (0-0)  %


 


Eosinophils # (Manual)  0 L    (0.04-0.35)  X 10*3/uL


 


NRBC/100 WBC Diff  0.3 H    (0.0-0.0)  /100 WBCS


 


ESR  >140 H    (0-20)  mm/Hr


 


APTT     (22.0-30.0)  sec


 


Sodium   146 H   (135-145)  mmol/L


 


Potassium   3.0 L   (3.5-5.5)  mmol/L


 


Chloride   111 H   ()  mmol/L


 


BUN/Creatinine Ratio   30.00 H   (12.00-20.00)  Ratio


 


POC Glucose (mg/dL)    123 H  (75-99)  mg/dL


 


Calcium   8.5 L   (8.7-10.3)  mg/dL


 


Total Bilirubin   0.2 L   (0.3-1.2)  mg/dL


 


Alkaline Phosphatase   138 H   ()  U/L


 


C-Reactive Protein   3.0 H   (0.0-0.8)  mg/dL


 


Total Protein   5.8 L   (6.2-8.2)  g/dL


 


Albumin   3.00 L   (3.80-4.90)  g/dL


 


Albumin/Globulin Ratio   1.07 L   (1.60-3.17)  g/dL














  02/04/21 Range/Units





  09:08 


 


WBC   (4.50-10.00)  X 10*3/uL


 


RBC   (4.40-5.60)  X 10*6/uL


 


Hgb   (13.0-17.0)  g/dL


 


Hct   (39.6-50.0)  %


 


MCH   (27.0-32.0)  pg


 


MCHC   (32.0-37.0)  g/dL


 


RDW   (11.5-14.5)  %


 


Absolute Nucleated RBC   (0.00-0.00)  X 10*3/uL


 


Metamyelocytes %   (0-0)  %


 


Eosinophils # (Manual)   (0.04-0.35)  X 10*3/uL


 


NRBC/100 WBC Diff   (0.0-0.0)  /100 WBCS


 


ESR   (0-20)  mm/Hr


 


APTT  21.3 L  (22.0-30.0)  sec


 


Sodium   (135-145)  mmol/L


 


Potassium   (3.5-5.5)  mmol/L


 


Chloride   ()  mmol/L


 


BUN/Creatinine Ratio   (12.00-20.00)  Ratio


 


POC Glucose (mg/dL)   (75-99)  mg/dL


 


Calcium   (8.7-10.3)  mg/dL


 


Total Bilirubin   (0.3-1.2)  mg/dL


 


Alkaline Phosphatase   ()  U/L


 


C-Reactive Protein   (0.0-0.8)  mg/dL


 


Total Protein   (6.2-8.2)  g/dL


 


Albumin   (3.80-4.90)  g/dL


 


Albumin/Globulin Ratio   (1.60-3.17)  g/dL








                      Microbiology - Last 24 Hours (Table)











 02/01/21 11:30 Gram Stain - Final





 Pleural Fluid Body Fluid Culture - Final





    Aspergillus fumigatus


 


 01/29/21 13:00 Blood Culture - Preliminary





 Blood    No Growth after 120 hours


 


 01/29/21 09:26 Blood Culture - Preliminary





 Blood    No Growth after 120 hours














- Imaging and Cardiology


Chest x-ray: report reviewed, image reviewed





Assessment and Plan


Assessment: 





1.  Bilateral pneumonia, bilateral hydropneumothorax, possible empyema, sputum 

culture preliminarily positive for Aspergillus species, status post left-sided 

thoracentesis


2.  Acute hypoxic respiratory failure with sepsis on admission


3.  Multiple admissions for pneumonia with empyema on the left and previous 

bilateral chest tube placement in June 2020 with cultures positive for strep 

pneumonia


4.  Rheumatoid arthritis with rheumatoid lung


5.  Hypertension, currently hypotensive, requiring pressors on admission


6.  Hypothyroidism


7.  History of DVT


8.  Previous tobacco dependence


9.  Family history of lung cancer


10.  Recent hospitalization for hypotension, dehydration, acute kidney injury 

requiring dialysis


11.  New-onset atrial fibrillation/atrial flutter with rapid ventricular 

response, on Cardizem drip


Plan: 





1.  Will follow culture and cytology sent from thoracentesis, pleural fluid Gram

stain shows Aspergillus Fumigatus.


2.  Continue to follow daily chest x-rays.


3.  Encourage incentive spirometry 10 times every hour while awake.  

Bronchodilators per pulmonary for/critical care medicine.


4.  Currently on Vfend managed by infectious disease.


5.  Increase activity as tolerated.


6.  Medical management and other comorbidities per primary care service.


7.  More recommendations to follow based on patient's clinical course.


Time with Patient: Greater than 30

## 2021-02-04 NOTE — P.PN
Subjective





Patient is seen in follow for acute kidney injury.  GFR back to baseline. 

Tolerating oral intake. Edema improving. Went into a-flutter this AM - on 

cardizem drip. No chest pain or shortness or breath.








Vital signs are stable.


General: The patient appeared well nourished and normally developed. 


HEENT: Head exam is unremarkable. Neck is without jugular venous distension.


LUNGS: Breath sounds decreased.


HEART: Irregular rate and rhythm.


ABDOMEN: Soft, nontender.


EXTREMITITES: 2+ edema.





Objective





- Vital Signs


Vital signs: 


                                   Vital Signs











Temp  98.0 F   02/04/21 07:25


 


Pulse  140 H  02/04/21 07:55


 


Resp  20   02/04/21 07:55


 


BP  96/57   02/04/21 07:55


 


Pulse Ox  93 L  02/04/21 07:55








                                 Intake & Output











 02/03/21 02/04/21 02/04/21





 18:59 06:59 18:59


 


Intake Total 100 300 


 


Output Total 1320  


 


Balance -1220 300 


 


Intake:   


 


    


 


    Cefepime 2 gm In Sodium 100  





    Chloride 0.9% 100 ml @ 25   





    mls/hr IVPB Q12HR [facility]   





    #:[id number]   


 


  Oral  300 


 


Output:   


 


  Urine 1320  


 


Other:   


 


  Voiding Method Toilet Toilet 


 


  # Voids 9  














- Labs


CBC & Chem 7: 


                                 02/04/21 06:04





                                 02/03/21 08:19


Labs: 


                  Abnormal Lab Results - Last 24 Hours (Table)











  02/03/21 02/04/21 02/04/21 Range/Units





  08:19 06:04 08:16 


 


WBC   10.09 H   (4.50-10.00)  X 10*3/uL


 


RBC   3.37 L   (4.40-5.60)  X 10*6/uL


 


Hgb   8.8 L   (13.0-17.0)  g/dL


 


Hct   29.7 L   (39.6-50.0)  %


 


MCH   26.1 L   (27.0-32.0)  pg


 


MCHC   29.6 L   (32.0-37.0)  g/dL


 


RDW   17.9 H   (11.5-14.5)  %


 


Absolute Nucleated RBC   0.03 H   (0.00-0.00)  X 10*3/uL


 


NRBC/100 WBC Diff   0.3 H   (0.0-0.0)  /100 WBCS


 


Potassium  3.4 L    (3.5-5.1)  mmol/L


 


Glucose  107 H    (74-99)  mg/dL


 


POC Glucose (mg/dL)    123 H  (75-99)  mg/dL


 


AST  16 L    (17-59)  U/L


 


Alkaline Phosphatase  140 H    ()  U/L


 


Total Protein  6.2 L    (6.3-8.2)  g/dL


 


Albumin  2.6 L    (3.5-5.0)  g/dL








                      Microbiology - Last 24 Hours (Table)











 01/29/21 13:00 Blood Culture - Preliminary





 Blood    No Growth after 120 hours


 


 01/29/21 09:26 Blood Culture - Preliminary





 Blood    No Growth after 120 hours


 


 02/01/21 11:30 Gram Stain - Preliminary





 Pleural Fluid Body Fluid Culture - Preliminary





    Aspergillus species














Assessment and Plan


Plan: 





Assessment:


1.  Acute kidney injury mostly prerenal secondary to intravascular volume 

depletion and hypotension, improved with IV hydration.  GFR back to baseline.


2.  Pneumonia maintained on antibiotics. ? Empyema - status post thoracentesis 

on February 1 with 45 mL of fluid removed.


3.  Septic shock secondary to pneumonia.  Off vasopressors.  On antibiotics.


4.  History of rheumatoid arthritis.


5.  Hypotension mostly secondary to sepsis.  Cortisol level was also low.  He is

currently on Solu-Cortef.  ACTH stimulation test will not be accurate at this 

time as he is on Solu-Cortef.


6.  Diarrhea.  C. diff negative. Improved. 


7.  Lower extremity edema. Improving with diuresis.


8.  A-flutter maintained on cardizem drip. 





Plan:


Encouraged oral intake.


Continue diuresis - lasix 40 mg IV daily.


AM labs pending - replace lytes prn.

## 2021-02-04 NOTE — P.PN
Subjective


Progress Note Date: 02/04/21





60-year-old white male patient, with past medical history of rheumatoid 

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 

episodes of pneumonia and history of loculated empyema requiring chest tube 

placement back in July 2020 with pleural fluid cultures positive for 

streptococcal pneumonia.  Other medical history includes hypertension, 

hypothyroidism and previous history of DVT.  Patient had a recent 

hospitalization from January 18 through 01/21/2021 hypotension, dehydration re

lated to nausea vomiting diarrhea, acute kidney injury.  Patient received 

antibiotics in the form of Zosyn and Levaquin for possibility of pneumonia, he 

is chest x-ray showed cranial perihilar pulmonary infiltrates with increased 

interstitial changes at the lung bases and was thought to be related to a 

combination of rheumatoid lung and chronic scarring.  His last CT of the chest 

was on 12/29/2020 showing chronic changes to lower lungs, persistent irregular 

thick-walled pleural spaces in the posterior lung bases containing fluid and air

with adjacent anterior lung with chronic consolidation or atelectasis.  On 

01/29/2021 patient came into the emergency department from Dr. SARINA Santo's office

where he was being seen for a regular follow-up appointments.  He was noted to 

have low pulse ox 70s and was sent in to the emergency department for 

evaluation.  He has been having cough with yellow and sometimes blood-tinged 

sputum, appears amounts, denies any fever, COVID 19 was found to be negative, 

chest x-ray showed perihilar and basilar infiltrates without significant change 

from previous chest x-ray on 01/19/2021.  Lab data showed leukocytosis with 

white blood cell count of 19.1, hemoglobin of 12.1, INR 1.5, sodium is 135, 

potassium is 3.9, chloride is 102, CO2 is 19, creatinine is 1.32, which has 

actually improved since his discharge from the hospital on 01/21/2021 when he 

was at 1.84.  Lactic acid was elevated at 3.7, patient was given a total of 2 L 

in fluid boluses, troponin was negative at less than 0.012.  Urinalysis showed 

no evidence of infection.  CT chest showed thick-walled pleural cavities 

posteriorly in the lower lungs with left-sided air-fluid level with left-sided 

pleural fluid diminished most recent CT from 12/29/2020.  There is associated 

compressive atelectasis and/or chronic consolidation.  Persistent as it is lobe 

fissure, multifocal areas of reticular nodular opacity in the upper lungs remain

present with slight nodularity in the left upper lobe.  There is a focal 

consolidation improved from most recent study with some residual areas of 

scarring.  We will emergency department patient became hypotensive with a blood 

pressure in the 70s, slightly tachycardic remains in sinus mechanism, he is 

receiving his third liter bolus, his lactic acid did respond and improved he was

started on antibiotics in the form of Zosyn and vancomycin 





The patient is seen today January 30th 2021 in the intensive care unit.  He is 

currently sitting up in a chair at the bedside.  Awake and alert in no acute 

distress.  Maintaining O2 saturations in the 90s on 2 L/m per nasal cannula.  He

did require a small dose of norepinephrine currently on 0.05 mcg/kg/m.  He has 

lactated Ringer's at 100 MLS per hour.  He remains on vancomycin and cefepime.  

Chest x-ray continues to show the left lower lobe effusion.  White count 11.7.  

Hemoglobin 9.7.  Sodium 135.  Potassium 3.6.  Creatinine 1.00.  Blood cultures 

reveal no growth to date.





Reevaluated today on 1/31/2021, remains in the ICU, still on norepinephrine at 

0.04 mcg/kg/m.  On LR at 1 25 mL per hour.  On vancomycin and cefepime 

empirically, blood pressure remains marginal, went ahead and recommended 

Solu-Cortef 100 mg IV push every 8 hours and hopefully will be able to 

discontinue norepinephrine today.  His IV fluid is cut down to KVO.  Given 20 mg

of Lasix IV push, and I have recommended that we monitor the patient in the ICU 

for the next 24 hours.  Chest x-ray is basically the same, continues to show ch

ronic finding, difficult to exclude underlying pneumonia/empyema.  CT of the 

chest on admission was also reviewed, difficult to rule out underlying empyema. 

Clinically the patient is feeling better except for generally weak.  WBC count 

is 12.5 hemoglobin is 9.9.  Normal renal profile is normal blood cultures are 

negative so far.  Sputum cultures are also negative so far.





On 02/01/2021 patient seen in follow-up in the intensive care unit, he is awake 

and alert, oriented 3, sitting up in the recliner, currently on 2 L of oxygen 

his pulse ox is %, his been afebrile, hemodynamically he is stable, he is 

not on any vasopressor support.  His lactate Ringer's running at 20 ML per hour,

antibiotics include cefepime and vancomycin, blood pressure has been stable, so 

far blood and sputum cultures are negative, he denies chest pain, his breathing 

is comfortable.  Today's labs have been reviewed, showing white blood cell count

trending down, 10.2 today, hemoglobin is 8.8, electrolytes and renal profile are

unremarkable.  Serum cortisol level came back at 3, patient continues on stress 

doses of hydrocortisone 100 mg every 8 hours.  Patient has not been stable, 

we'll consult interventional radiology for ultrasound-guided left thoracentesis





On 02/02/2021 patient seen in follow-up in the intensive care unit, he is calm 

and comfortable, awake and alert, sitting up in the recliner, currently on room 

air with a pulse ox of 93-94%, his been afebrile, hemodynamically he is been 

stable, he is on IV LR at 20 ML per hour, no other drips.  He is on cefepime and

vancomycin for antibiotic coverage, yesterday he underwent left thoracentesis 

with removal of 43 mL of pleural fluid which was sent for cultures.  Tolerated 

procedure well.  His pleural fluid analysis revealed total fluid protein of 1.3 

g, and fluid LDH of greater than 4500 consistent with exudative fluid.  His 

microbiology data has been reviewed showing Aspergillus fumigate is in the 

sputum, his pleural fluid cultures are pending at this time, the cultures have 

shown no growth, hemodynamically has been stable, he is breathing easier, he 

still has cough mostly in the morning and the amount of secretions his bringing 

up is decreasing in amount.  Lung sounds revealed a few scattered wheezes, and a

few rhonchi at bilateral bases.  Is tolerating oral intake.  No abdominal pain, 

no nausea or vomiting, today's labs have been reviewed, white blood cell count 

is 11.2, hemoglobin is 9.1, sodium is 137, potassium is 4.0, chloride is 109, 

BUN is 15, creatinine 0.80.  No acute events overnight.  He is working on 

incentive spirometer, he is achieving 1000 today





On 02/03/2021 patient seen in follow-up on medical surgical floor.  He is awake 

and alert, in no acute distress, breathing comfortably, denies any chest pain, 

room air pulse ox is 94%, no fever or chills, today's chest x-ray shows left 

basilar loculated pneumothorax, stable in appearance, bilateral lung 

infiltrates.  His pleural fluid cultures so far showing Aspergillus species, as 

does his sputum culture.  Blood culture show no growth.  The cervix is follow

ing, current antibiotic coverage includes cefepime, vancomycin has been 

discontinued, she received 1 dose of IV Lasix per nephrology.  He has produced 

2500 in urine output, and he is in -1.6 L over the last 24 hours, however he 

still has significant lower extremity edema.





The patient is seen today 02/04/2021 in follow-up in the intensive care unit.  

He was transferred here earlier this morning after developing atrial 

fibrillation/flutter with a rapid ventricular response.  He was initiated on C

ardizem at 5 mg per hour.  He has 0.9 normal saline at 20 ML's per hour.  He is 

currently awake and alert in no acute distress.  No worsening shortness of 

breath, cough or congestion.  No chest pain or palpitations.  He sitting up in a

chair at the bedside.  Maintaining O2 saturations in the 90s on 2 L/m per nasal 

cannula. Chest x-ray does reveal loculated posterior basilar pneumothoraces, 

stable from previous exam.  There is diffuse increased lung markings.  Correlate

for pneumonia and atelectasis.  Pleural fluid cultures is positive for 

Aspergillus species.  White count 10.9.  Hemoglobin 8.8.  Sodium 139.  Potassium

3.4.  Creatinine 0.89.  He remains on bronchodilators, IV diuretics, IV Solu-

Cortef.  Continued on voriconazole.





Objective





- Vital Signs


Vital signs: 


                                   Vital Signs











Temp  97.6 F   02/04/21 08:00


 


Pulse  174 H  02/04/21 08:00


 


Resp  16   02/04/21 08:00


 


BP  105/62   02/04/21 08:00


 


Pulse Ox  93 L  02/04/21 08:00








                                 Intake & Output











 02/03/21 02/04/21 02/04/21





 18:59 06:59 18:59


 


Intake Total 100 300 


 


Output Total 1320  


 


Balance -1220 300 


 


Intake:   


 


    


 


    Cefepime 2 gm In Sodium 100  





    Chloride 0.9% 100 ml @ 25   





    mls/hr IVPB Q12HR [facility] Rx   





    #:[id number]   


 


  Oral  300 


 


Output:   


 


  Urine 1320  


 


Other:   


 


  Voiding Method Toilet Toilet 


 


  # Voids 9  














- Exam





GENERAL EXAM: Alert, very pleasant 60-year-old gentleman, on 2 L nasal cannula, 

comfortable in no apparent distress.


HEAD: Normocephalic.


EYES: Normal reaction of pupils, equal size.


NOSE: Clear with pink turbinates.


THROAT: No erythema or exudates.


NECK: No masses, no JVD.


CHEST: No chest wall deformity.


LUNGS: Equal air entry with few scattered rhonchi, crackles in the posterior 

bases.


CVS: S1 and S2 normal with no audible murmur, irregular rhythm.  Tachycardic.


ABDOMEN: No hepatosplenomegaly, normal bowel sounds, no guarding or rigidity.


SPINE: No scoliosis or deformity


SKIN: No rashes


CENTRAL NERVOUS SYSTEM: No focal deficits, tone is normal in all 4 extremities.


EXTREMITIES: There is 2+ peripheral edema.  No clubbing, no cyanosis.  

Peripheral pulses are intact.





- Labs


CBC & Chem 7: 


                                 02/04/21 06:04





                                 02/03/21 08:19


Labs: 


                  Abnormal Lab Results - Last 24 Hours (Table)











  02/03/21 02/04/21 02/04/21 Range/Units





  08:19 06:04 08:16 


 


WBC   10.09 H   (4.50-10.00)  X 10*3/uL


 


RBC   3.37 L   (4.40-5.60)  X 10*6/uL


 


Hgb   8.8 L   (13.0-17.0)  g/dL


 


Hct   29.7 L   (39.6-50.0)  %


 


MCH   26.1 L   (27.0-32.0)  pg


 


MCHC   29.6 L   (32.0-37.0)  g/dL


 


RDW   17.9 H   (11.5-14.5)  %


 


Absolute Nucleated RBC   0.03 H   (0.00-0.00)  X 10*3/uL


 


NRBC/100 WBC Diff   0.3 H   (0.0-0.0)  /100 WBCS


 


Potassium  3.4 L    (3.5-5.1)  mmol/L


 


Glucose  107 H    (74-99)  mg/dL


 


POC Glucose (mg/dL)    123 H  (75-99)  mg/dL


 


AST  16 L    (17-59)  U/L


 


Alkaline Phosphatase  140 H    ()  U/L


 


Total Protein  6.2 L    (6.3-8.2)  g/dL


 


Albumin  2.6 L    (3.5-5.0)  g/dL








                      Microbiology - Last 24 Hours (Table)











 01/29/21 13:00 Blood Culture - Preliminary





 Blood    No Growth after 120 hours


 


 01/29/21 09:26 Blood Culture - Preliminary





 Blood    No Growth after 120 hours


 


 02/01/21 11:30 Gram Stain - Preliminary





 Pleural Fluid Body Fluid Culture - Preliminary





    Aspergillus species














Assessment and Plan


Assessment: 





1 Acute hypoxic respiratory failure secondary to sepsis, septic shock, related 

to pneumonia, with chronic loculated effusions, possible empyema.  Cultures 

positive for Aspergillus species.





2 Bilateral hydropneumothorax, possible empyema, status post ultrasound-guided 

left-sided thoracentesis on 02/01/2021 with 45 ML's of cloudy white fluid 

removed, showing Aspergillus.  Remains on voriconazole.





3 Recent admission for hypotension, dehydration secondary to nausea, vomiting, 

acute kidney injury with diarrhea.





4 Recent pulmonary infections with history of streptococcal pneumonia, empyema 

requiring chest tube placement in July 2020.





5 Chronic loculated pneumothoraces, posterior, with air-fluid seen on CAT scan 

imaging on the chest.  Possibility of trapped lung, chronic scarring and 

possibility of empyema.





6 History of rheumatoid arthritis and rheumatoid lung disease.  On Arava





7 Recent history of acute kidney injury





8 Hypertension





9 Hypothyroidism





10 Previous history of DVT.  





11 New-onset atrial fibrillation/flutter with rapid ventricular response curr

ently on a Cardizem drip





Plan:





The patient was seen and evaluated by Dr. Gómez.  Chest x-ray and labs reviewed.

 Microbiology reviewed.  We will continue with the current treatment plan.  

Continue diuretics.  Continued on a Cardizem drip.  We will continue to follow 

and make further recommendations based on his clinical status.





Critical care time 36 minutes





I, the cosigning physician, performed a history & physical examination of the 

patient. Lungs sounds  bilateral scattered rhonchi and crackles in the bilateral

bases.  Maintaining good O2 saturations in the 90s on 2 L/m per nasal cannula.  

I discussed the assessment and plan of care with my nurse practitioner, Rupa sun. I attest to the above note as dictated by her.

## 2021-02-04 NOTE — P.CRDCN
History of Present Illness


History of present illness: 





Patient was transferred to the ICU from the medical floor for tachycardia


His twelve-lead ECG shows an atrial tachycardia/likely atrial flutter with RVR 

at 150 beats a minute


He was admitted to the medical floor for acute hypoxic respiratory failure 

secondary to sepsis.  He has chronic loculated effusions likely empyema and 

cultures are positive for Aspergillus


He has bilateral hydropneumothorax status post ultrasound-guided thoracentesis 

on 


He is currently on IV Cardizem


He is resting comfortably in bed denies any chest discomfort and does not appear

short of breath.





Past history of rheumatoid arthritis, acute kidney injury recently, 

hypertension, hypothyroidism and a history of DVT





On examination breath sounds are reduced bilaterally


Heart sounds are tachycardic no murmurs


Blood pressure 105/62 mmHg pulse rate 140-150 beats a minute





Labs are reviewed


White count 10,000 hemoglobin 8.8, platelet count 3 and 72,000


Sodium 146, potassium 3.0, BUN 24 and creatinine 0.8


AST 18, ALT 13





Impression


Atrial flutter with RVR


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





Suggest


Currently he is on IV Cardizem that this has not affected his heart rate or 

controlled to 99


300 mg of IV amiodarone bolus 1


Continue anticoagulation with heparin for now


His atrial flutter began this morning








Past Medical History


Past Medical History: Deep Vein Thrombosis (DVT), GERD/Reflux, Hyperlipidemia, 

Hypertension, Neurologic Disorder, Renal Disease, Respiratory Disorder, 

Rheumatoid Arthritis (RA), Thyroid Disorder


Additional Past Medical History / Comment(s): Pt recently admitted to [facility] on 

21 with acute hypoxic respiratory failure 2ndary to hypovolemia from N/V/D,

acute kidney injury, hypovolemic shock.     Other hx:  Rheumatoid arthritis, 

rheumatoid lungs with chronic interstitial lung disease maintained on Arava and 

chronic steroids in the past, previous history of pneumonia/empyema with 

Streptococcus and the patient required chest tube drainage - 2020, past 

urinary retention, hypothyroidism, previous history of R foot infections 

requiring PICC line insertion, history of DVT L arm, nephrolithiasis, recent 

hospitalization where he required one hemodialysis session.


History of Any Multi-Drug Resistant Organisms: None Reported


Past Surgical History: Back Surgery, Orthopedic Surgery, Tonsillectomy


Additional Past Surgical History / Comment(s): Temporary hemodialysis cath, 

lithotripsy, colonoscopy, bronchoscopies, back surgery d/t herniated disc, 

bilateral carpal tunnel releases, bunions removed from feet, R foot plate for 

arch since removed, benign nodule off R hip, bilateral wrist ganglion cyst 

removals; bilateral chest tube placement 2020


Past Anesthesia/Blood Transfusion Reactions: Previous Problems w/ Anesthesia


Additional Past Anesthesia/Blood Transfusion Reaction / Comment(s): slow to 

awaken x1.


Past Psychological History: No Psychological Hx Reported


Smoking Status: Former smoker


Past Alcohol Use History: None Reported


Past Drug Use History: None Reported





- Past Family History


  ** Mother


Family Medical History: Cancer


Additional Family Medical History / Comment(s): lung with mets brain





  ** Father


Family Medical History: No Reported History


Additional Family Medical History / Comment(s): states, "he just ."





  ** Brother(s)


Family Medical History: No Reported History





  ** Sister(s)


Family Medical History: No Reported History


Additional Family Medical History / Comment(s): Patient has 2 step daughters.





Medications and Allergies


                                Home Medications











 Medication  Instructions  Recorded  Confirmed  Type


 


Levothyroxine Sodium [Synthroid] 150 mcg PO DAILY 17 History


 


Albuterol Inhaler [Ventolin Hfa 2 puff INHALATION RT-QID PRN 06/10/20 01/29/21 

History





Inhaler]    


 


Ascorbic Acid [Vitamin C] 2,000 mg PO HS 20 History


 


Cholecalciferol [Vitamin D3 (25 2,000 unit PO HS 20 History





Mcg = 1000 Iu)]    


 


Ipratropium-Albuterol Nebulize 3 ml INHALATION RT-QID PRN 20 

History





[Duoneb 0.5 mg-3 mg/3 ml Soln]    


 


Aspirin 81 mg PO HS  chew 21 Rx


 


Metoprolol Succinate (ER) [Toprol 25 mg PO DAILY #30 tab.er.24h 21 Rx





XL]    


 


Levofloxacin [Levaquin] 250 mg PO DAILY 21 History


 


Ondansetron [Zofran] 4 mg PO QID PRN 21 History








                                    Allergies











Allergy/AdvReac Type Severity Reaction Status Date / Time


 


No Known Allergies Allergy   Verified 21 10:37














Physical Exam


Vitals: 


                                   Vital Signs











  Temp Pulse Pulse Pulse Resp BP Pulse Ox


 


 21 08:00  97.6 F   174 H   16  105/62  93 L


 


 21 07:55    140 H   20  96/57  93 L


 


 21 07:49    150 H   20  86/51  90 L


 


 21 07:46    147 H   20  92/59  97


 


 21 07:44    145 H   20  89/55  96


 


 21 07:25  98.0 F    145 H  16  88/33  91 L


 


 21 07:03     144 H   


 


 21 00:03  97.9 F    144 H   100/67  92 L


 


 21 19:38  97.9 F    103 H   96/64  93 L


 


 21 19:25     103 H  18  


 


 21 14:17  98.2 F    107 H  18  105/58  91 L


 


 21 12:51     103 H  18  


 


 21 12:50   82     


 


 21 12:39   81     








                                Intake and Output











 21





 22:59 06:59 14:59


 


Intake Total 400  40


 


Output Total 600  1400


 


Balance -200  -1360


 


Intake:   


 


    40


 


    0.9   40


 


    Cefepime 2 gm In Sodium 100  





    Chloride 0.9% 100 ml @ 25   





    mls/hr IVPB Q12HR [facility] Rx   





    #:[id number]   


 


  Oral 300  


 


Output:   


 


  Urine 600  1400


 


Other:   


 


  Voiding Method Toilet  Toilet





   Urinal


 


  # Voids 9  














Results





                                 21 06:04





                                 21 06:04


                                 Cardiac Enzymes











  21 Range/Units





  06:04 


 


AST  18  (14-35)  U/L








                                   Coagulation











  21 Range/Units





  09:08 


 


PT  11.2  (9.0-12.0)  sec


 


APTT  21.3 L  (22.0-30.0)  sec








                                       CBC











  21 Range/Units





  06:04 


 


WBC  10.09 H  (4.50-10.00)  X 10*3/uL


 


RBC  3.37 L  (4.40-5.60)  X 10*6/uL


 


Hgb  8.8 L  (13.0-17.0)  g/dL


 


Hct  29.7 L  (39.6-50.0)  %


 


Plt Count  372  (140-440)  X 10*3/uL








                          Comprehensive Metabolic Panel











  21 Range/Units





  06:04 


 


Sodium  146 H  (135-145)  mmol/L


 


Potassium  3.0 L  (3.5-5.5)  mmol/L


 


Chloride  111 H  ()  mmol/L


 


Carbon Dioxide  27.8  (21.6-31.8)  mmol/L


 


BUN  24.0  (9.0-27.0)  mg/dL


 


Creatinine  0.8  (0.6-1.5)  mg/dL


 


Glucose  107  ()  mg/dL


 


Calcium  8.5 L  (8.7-10.3)  mg/dL


 


AST  18  (14-35)  U/L


 


ALT  13  (10-49)  U/L


 


Alkaline Phosphatase  138 H  ()  U/L


 


Total Protein  5.8 L  (6.2-8.2)  g/dL


 


Albumin  3.00 L  (3.80-4.90)  g/dL








                               Current Medications











Generic Name Dose Route Start Last Admin





  Trade Name Freq  PRN Reason Stop Dose Admin


 


Acetaminophen  650 mg  21 18:15  21 09:36





  Acetaminophen Tab 325 Mg Tab  PO   650 mg





  Q6HR PRN   Administration





  Fever and/ or Pain  


 


Hydrocodone Bitart/Acetaminophen  1 each  21 13:28  21 20:39





  Hydrocodone/Apap 5-325mg 1 Each Tab  PO   1 each





  Q6HR PRN   Administration





  Pain  


 


Albuterol/Ipratropium  3 ml  21 15:37 





  Ipratropium-Albuterol 3 Ml Neb  INHALATION  





  RT-QID PRN  





  Shortness Of Breath  


 


Albuterol/Ipratropium  3 ml  21 20:00  21 07:02





  Ipratropium-Albuterol 3 Ml Neb  INHALATION   Not Given





  RT-TID GABBY  


 


Ascorbic Acid  2,000 mg  21 21:00  21 20:33





  Ascorbic Acid 500 Mg Tab  PO   2,000 mg





  HS GABBY   Administration


 


Aspirin  81 mg  21 21:00  21 20:33





  Aspirin 81 Mg  PO   81 mg





  HS GABBY   Administration


 


Cholecalciferol  50 mcg  21 21:00  21 20:33





  Cholecalciferol 25 Mcg (1000 Iu) Tablet  PO   50 mcg





  HS GABBY   Administration


 


Furosemide  40 mg  21 09:00  21 09:43





  Furosemide 10 Mg/Ml 4 Ml Vial  IV   40 mg





  DAILY GABBY   Administration


 


Heparin Sodium (Porcine)  0 unit  21 08:52 





  Heparin Sodium,Porcine 5,000 Unit/Ml 1 Ml Vial  IV  





  PER PROTOCOL PRN  





  Low PTT  





  Protocol  


 


Hydrocortisone Sodium Succinate  50 mg  21 16:00  21 09:44





  Hydrocortisone Succinate 100 Mg/2 Ml Vial  IV   50 mg





  Q8HR GABBY   Administration


 


Voriconazole 600 mg/ Sodium  250 mls @ 125 mls/hr  21 22:00  21 10:

36





  Chloride  IVPB  21 11:59  125 mls/hr





  Q12H GABBY   Administration


 


Voriconazole 400 mg/ Sodium  250 mls @ 125 mls/hr  21 21:00 





  Chloride  IVPB  





  Q12HR GABBY  


 


Diltiazem HCl 125 mg/ Sodium  125 mls @ 5 mls/hr  21 09:30  21 08:50





  Chloride  IV   10 mg/hr





  .Q24H GABBY   10 mls/hr





    Administration





  Protocol  





  5 MG/HR  


 


Heparin Sodium/Sodium Chloride  250 mls @ 10 mls/hr  21 09:00  21 

09:51





  25,000 unit/ Sodium Chloride  IV   9.93 units/kg/hr





  .Q24H GABBY   10 mls/hr





    Administration





  Protocol  





  9.93 UNITS/KG/HR  


 


Amiodarone HCl 300 mg/  256 mls @ 128 mls/hr  21 10:30  21 11:25





  Dextrose/Water  IV  21 12:29  128 mls/hr





  .Q2H ONE   Administration


 


Levothyroxine Sodium  150 mcg  21 06:30  21 05:54





  Levothyroxine 75 Mcg Tab  PO   150 mcg





  DAILY@0630 GABBY   Administration


 


Miscellaneous Information  1 each  21 09:28 





  Potassium Replacement Protocol 1 Each Misc  MISCELLANE  





  DAILY PRN  





  Per Protocol  





  Protocol  


 


Miscellaneous Information  1 each  21 11:20 





  Potassium Replacement Protocol 1 Each Misc  MISCELLANE  





  DAILY PRN  





  Per Protocol  





  Protocol  


 


Naloxone HCl  0.2 mg  21 12:02 





  Naloxone 0.4 Mg/Ml 1 Ml Vial  IV  





  Q2M PRN  





  Opioid Reversal  


 


Pantoprazole Sodium  40 mg  21 07:30  21 09:43





  Pantoprazole 40 Mg Tablet  PO   40 mg





  AC-BRKFST GABBY   Administration


 


Potassium Chloride  20 meq  21 11:00  21 11:37





  Potassium Chloride Er 20 Meq Tab.Er  PO  21 12:01  20 meq





  Q1HR GABBY   Administration





  Protocol  


 


Triamcinolone Acetonide  1 applic  21 21:00  21 10:08





  Triamcinolone 0.1% Cream 80 Gm Tube  TOPICAL   Not Given





  BID GABBY  








                                Intake and Output











 21





 22:59 06:59 14:59


 


Intake Total 400  40


 


Output Total 600  1400


 


Balance -200  -1360


 


Intake:   


 


    40


 


    0.9   40


 


    Cefepime 2 gm In Sodium 100  





    Chloride 0.9% 100 ml @ 25   





    mls/hr IVPB Q12HR [facility] Rx   





    #:[id number]   


 


  Oral 300  


 


Output:   


 


  Urine 600  1400


 


Other:   


 


  Voiding Method Toilet  Toilet





   Urinal


 


  # Voids 9  








                                        





                                 21 06:04 





                                 21 06:04

## 2021-02-05 LAB
ANION GAP SERPL CALC-SCNC: 6 MMOL/L
BASOPHILS # BLD AUTO: 0.1 K/UL (ref 0–0.2)
BASOPHILS NFR BLD AUTO: 1 %
BUN SERPL-SCNC: 29 MG/DL (ref 9–20)
CALCIUM SPEC-MCNC: 9 MG/DL (ref 8.4–10.2)
CHLORIDE SERPL-SCNC: 110 MMOL/L (ref 98–107)
CO2 SERPL-SCNC: 27 MMOL/L (ref 22–30)
EOSINOPHIL # BLD AUTO: 0 K/UL (ref 0–0.7)
EOSINOPHIL NFR BLD AUTO: 0 %
ERYTHROCYTE [DISTWIDTH] IN BLOOD BY AUTOMATED COUNT: 3.84 M/UL (ref 4.3–5.9)
ERYTHROCYTE [DISTWIDTH] IN BLOOD: 17.7 % (ref 11.5–15.5)
GLUCOSE BLD-MCNC: 125 MG/DL (ref 75–99)
GLUCOSE BLD-MCNC: 157 MG/DL (ref 75–99)
GLUCOSE SERPL-MCNC: 135 MG/DL (ref 74–99)
HCT VFR BLD AUTO: 33.5 % (ref 39–53)
HGB BLD-MCNC: 9.8 GM/DL (ref 13–17.5)
LYMPHOCYTES # SPEC AUTO: 1.5 K/UL (ref 1–4.8)
LYMPHOCYTES NFR SPEC AUTO: 9 %
MCH RBC QN AUTO: 25.5 PG (ref 25–35)
MCHC RBC AUTO-ENTMCNC: 29.2 G/DL (ref 31–37)
MCV RBC AUTO: 87.2 FL (ref 80–100)
MONOCYTES # BLD AUTO: 0.9 K/UL (ref 0–1)
MONOCYTES NFR BLD AUTO: 5 %
NEUTROPHILS # BLD AUTO: 13.8 K/UL (ref 1.3–7.7)
NEUTROPHILS NFR BLD AUTO: 83 %
PLATELET # BLD AUTO: 374 K/UL (ref 150–450)
POTASSIUM SERPL-SCNC: 3.9 MMOL/L (ref 3.5–5.1)
SODIUM SERPL-SCNC: 143 MMOL/L (ref 137–145)
WBC # BLD AUTO: 16.6 K/UL (ref 3.8–10.6)

## 2021-02-05 RX ADMIN — HYDROCORTISONE SODIUM SUCCINATE SCH MG: 100 INJECTION, POWDER, FOR SOLUTION INTRAMUSCULAR; INTRAVENOUS at 22:56

## 2021-02-05 RX ADMIN — VORICONAZOLE SCH MLS/HR: 10 INJECTION, POWDER, LYOPHILIZED, FOR SOLUTION INTRAVENOUS at 20:31

## 2021-02-05 RX ADMIN — TRIAMCINOLONE ACETONIDE SCH: 1 CREAM TOPICAL at 20:02

## 2021-02-05 RX ADMIN — IPRATROPIUM BROMIDE AND ALBUTEROL SULFATE SCH: .5; 3 SOLUTION RESPIRATORY (INHALATION) at 19:33

## 2021-02-05 RX ADMIN — IPRATROPIUM BROMIDE AND ALBUTEROL SULFATE SCH: .5; 3 SOLUTION RESPIRATORY (INHALATION) at 07:52

## 2021-02-05 RX ADMIN — HYDROCORTISONE SODIUM SUCCINATE SCH MG: 100 INJECTION, POWDER, FOR SOLUTION INTRAMUSCULAR; INTRAVENOUS at 16:46

## 2021-02-05 RX ADMIN — FUROSEMIDE SCH MG: 10 INJECTION, SOLUTION INTRAMUSCULAR; INTRAVENOUS at 09:34

## 2021-02-05 RX ADMIN — HYDROCORTISONE SODIUM SUCCINATE SCH MG: 100 INJECTION, POWDER, FOR SOLUTION INTRAMUSCULAR; INTRAVENOUS at 09:34

## 2021-02-05 RX ADMIN — IPRATROPIUM BROMIDE AND ALBUTEROL SULFATE SCH: .5; 3 SOLUTION RESPIRATORY (INHALATION) at 10:55

## 2021-02-05 RX ADMIN — POTASSIUM CHLORIDE SCH MLS/HR: 14.9 INJECTION, SOLUTION INTRAVENOUS at 12:26

## 2021-02-05 RX ADMIN — Medication SCH MCG: at 20:01

## 2021-02-05 RX ADMIN — HEPARIN SODIUM SCH MLS/HR: 10000 INJECTION, SOLUTION INTRAVENOUS at 20:16

## 2021-02-05 RX ADMIN — ASPIRIN 81 MG CHEWABLE TABLET SCH MG: 81 TABLET CHEWABLE at 20:01

## 2021-02-05 RX ADMIN — TRIAMCINOLONE ACETONIDE SCH: 1 CREAM TOPICAL at 07:57

## 2021-02-05 RX ADMIN — AMIODARONE HYDROCHLORIDE SCH MLS/HR: 50 INJECTION, SOLUTION INTRAVENOUS at 06:58

## 2021-02-05 RX ADMIN — PANTOPRAZOLE SODIUM SCH MG: 40 TABLET, DELAYED RELEASE ORAL at 06:57

## 2021-02-05 RX ADMIN — HEPARIN SODIUM SCH MLS/HR: 10000 INJECTION, SOLUTION INTRAVENOUS at 05:07

## 2021-02-05 RX ADMIN — OXYCODONE HYDROCHLORIDE AND ACETAMINOPHEN SCH MG: 500 TABLET ORAL at 20:01

## 2021-02-05 RX ADMIN — HEPARIN SODIUM SCH MLS/HR: 10000 INJECTION, SOLUTION INTRAVENOUS at 14:00

## 2021-02-05 RX ADMIN — AMIODARONE HYDROCHLORIDE SCH MLS/HR: 50 INJECTION, SOLUTION INTRAVENOUS at 20:31

## 2021-02-05 RX ADMIN — VORICONAZOLE SCH MLS/HR: 10 INJECTION, POWDER, LYOPHILIZED, FOR SOLUTION INTRAVENOUS at 09:50

## 2021-02-05 RX ADMIN — LEVOTHYROXINE SODIUM SCH MCG: 75 TABLET ORAL at 06:57

## 2021-02-05 NOTE — P.PN
Subjective


Progress Note Date: 02/05/21


Principal diagnosis: 





Bilateral pneumonia, bilateral hydropneumothorax, possible empyema, sputum 

culture preliminarily positive for Aspergillus species, acute hypoxic 

respiratory failure with sepsis on admission.  Past medical history significant 

for multiple admissions for pneumonia with empyema on the left and previous 

bilateral chest tube placement in June 2020 with cultures positive for strep 

pneumonia, rheumatoid arthritis with rheumatoid lung, hypertension, 

hypothyroidism, DVT, previous tobacco dependence, family history of lung cancer,

and recent hospitalization for hypotension, dehydration, acute kidney injury 

requiring dialysis.





POD #4 left-sided thoracentesis by interventional radiology.





The patient was seen on follow-up today 02/05/2021 at his bedside in the 

intensive care unit.  Currently sitting up to the bedside chair, is awake, alert

and oriented 3.  Denies any complaints of pain or shortness of breath at this 

time.  Bedside telemetry continues to show atrial fibrillation forward atrial 

flutter with rapid ventricular response with his heart rate in the 140s.  

Amiodarone drip remains infusing at 0.5 mg/m for the patient's atrial 

fibrillation.  Oxygen saturation are 93% on room air.  Dr. Kaye met with the 

patient yesterday at his bedside, discussed treatment options with the patient 

including Pleurx catheter placement.  The patient was in agreement for the 

Pleurx catheter placement and will be scheduled for Monday, 02/08/2021 for a 

left sided Pleurx catheter.





Objective





- Vital Signs


Vital signs: 


                                   Vital Signs











Temp  97.7 F   02/05/21 04:00


 


Pulse  141 H  02/05/21 04:00


 


Resp  15   02/05/21 04:00


 


BP  92/68   02/05/21 04:00


 


Pulse Ox  93 L  02/05/21 04:00








                                 Intake & Output











 02/04/21 02/05/21 02/05/21





 18:59 06:59 18:59


 


Intake Total 207.493 6092.005 


 


Output Total 2050 400 


 


Balance -1907.250 755.005 


 


Intake:   


 


  IV 40 800 


 


    0.9 40 450 


 


    Potassium Chloride 20 meq  100 





    In Water For Injection 1   





    100ml.bag @ 50 mls/hr   





    IVPB Q2H [facility] Rx#:   





    [id number]   


 


    Voriconazole 400 mg In  250 





    Sodium Chloride 0.9% 250   





    ml @ 125 mls/hr IVPB   





    Q12HR [facility] Rx#:[id number]   


 


  Intake, IV Titration 102.750 355.005 





  Amount   


 


    Amiodarone 450 mg In  143.892 





    Dextrose 5% in Water 250   





    ml @ 0.5 MG/MIN 16.667   





    mls/hr IV .Q15H [facility] Rx#:   





    [id number]   


 


    Diltiazem 125 mg In 42.917  





    Sodium Chloride 0.9% 100   





    ml @ 5 MG/HR 5 mls/hr IV   





    .Q24H [facility] Rx#:[id number]   


 


    Heparin Sod,Pork in 0.45% 59.833 211.113 





    NaCl 25,000 unit In 0.45   





    % NaCl 1 250ml.bag @ 9.93   





    UNITS/KG/HR 10 mls/hr IV   





    .Q24H [facility] Rx#:[id number]   


 


Output:   


 


  Urine 2050 400 


 


Other:   


 


  Voiding Method Toilet  





 Urinal  














- Constitutional


General appearance: Present: cooperative, no acute distress, obese





- EENT


Eyes: Present: normal appearance.  Absent: scleral icterus


ENT: Present: hearing grossly normal





- Neck


Details: 





Neck is supple, no JVD.


Neck: Present: normal ROM.  Absent: lymphadenopathy





- Respiratory


Details: 





Lungs sounds essentially clear, diminished bilateral bases.  Respirations are 

spent on nonlabored.  Oxygen saturation 93% on room air.





- Cardiovascular


Details: 





Irregular rhythm and tachycardic rate.  S1 and S2 present, negative for S3, 

gallop or murmur.  Bedside telemetry showing atrial fibrillation/atrial flutter 

heart rate in the 140s.  +2 edema to his bilateral lower extremities.  Knee-high

PAT hose in place to his bilateral lower extremities.





- Gastrointestinal


Gastrointestinal Comment(s): 





Abdomen is soft, nontender and nondistended.  Active bowel sounds present in all

4 abdominal quadrants.  No guarding or rigidity.  No organomegaly appreciated.  

Tolerating oral intake.





- Genitourinary


Genitourinary Comment(s): 





Continues to void.





- Integumentary


Integumentary Comment(s): 





Skin is warm and dry.  No clubbing or cyanosis is present.





- Neurologic


Neurologic: Present: CNII-XII intact.  Absent: focal deficits





- Musculoskeletal


Musculoskeletal: Present: gait normal, generalized weakness, strength equal 

bilaterally





- Psychiatric


Psychiatric: Present: A&O x's 3, appropriate affect, intact judgment & insight





- Allied health notes


Allied health notes reviewed: nursing





- Labs


CBC & Chem 7: 


                                 02/05/21 05:27





                                 02/05/21 05:27


Labs: 


                  Abnormal Lab Results - Last 24 Hours (Table)











  02/04/21 02/04/21 02/04/21 Range/Units





  06:04 06:04 09:08 


 


WBC     (3.8-10.6)  k/uL


 


RBC     (4.30-5.90)  m/uL


 


Hgb     (13.0-17.5)  gm/dL


 


Hct     (39.0-53.0)  %


 


MCHC     (31.0-37.0)  g/dL


 


RDW     (11.5-15.5)  %


 


Metamyelocytes %  1 H    (0-0)  %


 


Neutrophils #     (1.3-7.7)  k/uL


 


Eosinophils # (Manual)  0 L    (0.04-0.35)  X 10*3/uL


 


ESR  >140 H    (0-20)  mm/Hr


 


APTT    21.3 L  (22.0-30.0)  sec


 


Sodium   146 H   (135-145)  mmol/L


 


Potassium   3.0 L   (3.5-5.5)  mmol/L


 


Chloride   111 H   ()  mmol/L


 


BUN     (9-20)  mg/dL


 


BUN/Creatinine Ratio   30.00 H   (12.00-20.00)  Ratio


 


Glucose     (74-99)  mg/dL


 


Calcium   8.5 L   (8.7-10.3)  mg/dL


 


Total Bilirubin   0.2 L   (0.3-1.2)  mg/dL


 


Alkaline Phosphatase   138 H   ()  U/L


 


C-Reactive Protein   3.0 H   (0.0-0.8)  mg/dL


 


Total Protein   5.8 L   (6.2-8.2)  g/dL


 


Albumin   3.00 L   (3.80-4.90)  g/dL


 


Albumin/Globulin Ratio   1.07 L   (1.60-3.17)  g/dL














  02/04/21 02/05/21 02/05/21 Range/Units





  14:46 05:27 05:27 


 


WBC   16.6 H   (3.8-10.6)  k/uL


 


RBC   3.84 L   (4.30-5.90)  m/uL


 


Hgb   9.8 L   (13.0-17.5)  gm/dL


 


Hct   33.5 L   (39.0-53.0)  %


 


MCHC   29.2 L   (31.0-37.0)  g/dL


 


RDW   17.7 H   (11.5-15.5)  %


 


Metamyelocytes %     (0-0)  %


 


Neutrophils #   13.8 H   (1.3-7.7)  k/uL


 


Eosinophils # (Manual)     (0.04-0.35)  X 10*3/uL


 


ESR     (0-20)  mm/Hr


 


APTT    36.6 H  (22.0-30.0)  sec


 


Sodium     (135-145)  mmol/L


 


Potassium  2.6 L*    (3.5-5.5)  mmol/L


 


Chloride     ()  mmol/L


 


BUN     (9-20)  mg/dL


 


BUN/Creatinine Ratio     (12.00-20.00)  Ratio


 


Glucose     (74-99)  mg/dL


 


Calcium     (8.7-10.3)  mg/dL


 


Total Bilirubin     (0.3-1.2)  mg/dL


 


Alkaline Phosphatase     ()  U/L


 


C-Reactive Protein     (0.0-0.8)  mg/dL


 


Total Protein     (6.2-8.2)  g/dL


 


Albumin     (3.80-4.90)  g/dL


 


Albumin/Globulin Ratio     (1.60-3.17)  g/dL














  02/05/21 Range/Units





  05:27 


 


WBC   (3.8-10.6)  k/uL


 


RBC   (4.30-5.90)  m/uL


 


Hgb   (13.0-17.5)  gm/dL


 


Hct   (39.0-53.0)  %


 


MCHC   (31.0-37.0)  g/dL


 


RDW   (11.5-15.5)  %


 


Metamyelocytes %   (0-0)  %


 


Neutrophils #   (1.3-7.7)  k/uL


 


Eosinophils # (Manual)   (0.04-0.35)  X 10*3/uL


 


ESR   (0-20)  mm/Hr


 


APTT   (22.0-30.0)  sec


 


Sodium   (135-145)  mmol/L


 


Potassium   (3.5-5.5)  mmol/L


 


Chloride  110 H  ()  mmol/L


 


BUN  29 H  (9-20)  mg/dL


 


BUN/Creatinine Ratio   (12.00-20.00)  Ratio


 


Glucose  135 H  (74-99)  mg/dL


 


Calcium   (8.7-10.3)  mg/dL


 


Total Bilirubin   (0.3-1.2)  mg/dL


 


Alkaline Phosphatase   ()  U/L


 


C-Reactive Protein   (0.0-0.8)  mg/dL


 


Total Protein   (6.2-8.2)  g/dL


 


Albumin   (3.80-4.90)  g/dL


 


Albumin/Globulin Ratio   (1.60-3.17)  g/dL








                      Microbiology - Last 24 Hours (Table)











 01/29/21 13:00 Blood Culture - Final





 Blood    No Growth after 144 hours


 


 01/29/21 09:26 Blood Culture - Final





 Blood    No Growth after 144 hours


 


 02/01/21 11:30 Gram Stain - Final





 Pleural Fluid Body Fluid Culture - Final





    Aspergillus fumigatus














Assessment and Plan


Assessment: 





1.  Bilateral pneumonia, bilateral hydropneumothorax, possible empyema, sputum 

culture preliminarily positive for Aspergillus species, status post left-sided 

thoracentesis


2.  Acute hypoxic respiratory failure with sepsis on admission


3.  Multiple admissions for pneumonia with empyema on the left and previous 

bilateral chest tube placement in June 2020 with cultures positive for strep 

pneumonia


4.  Rheumatoid arthritis with rheumatoid lung


5.  Hypertension, currently hypotensive, requiring pressors on admission


6.  Hypothyroidism


7.  History of DVT


8.  Previous tobacco dependence


9.  Family history of lung cancer


10.  Recent hospitalization for hypotension, dehydration, acute kidney injury 

requiring dialysis


11.  New-onset atrial fibrillation/atrial flutter with rapid ventricular 

response, on amiodarone drip


Plan: 





1.  Pleural fluid Gram stain shows Aspergillus Fumigatus.


2.  Continue to follow chest x-rays.


3.  Encourage incentive spirometry 10 times every hour while awake.  

Bronchodilators per pulmonary for/critical care medicine.


4.  Currently on Vfend managed by infectious disease.


5.  Increase activity as tolerated.


6.  Medical management and other comorbidities per primary care service.


7.  The patient is scheduled for a left Pleurx catheter placement on Monday, 

02/08/2021 to be performed by Dr. Arnold Kaye. 


8.  More recommendations to follow based on patient's clinical course.


Time with Patient: Greater than 30

## 2021-02-05 NOTE — P.PN
Subjective





Patient is seen in follow for acute kidney injury.  GFR back to baseline. 

Tolerating oral intake. Edema improving.  Maintained on amiodarone drip for A. 

fib.  No chest pain or shortness of breath.  





Vital signs are stable.


General: The patient appeared well nourished and normally developed. 


HEENT: Head exam is unremarkable. Neck is without jugular venous distension.


LUNGS: Breath sounds decreased.


HEART: Irregular rate and rhythm.


ABDOMEN: Soft, nontender.


EXTREMITITES: 1+ edema.





Objective





- Vital Signs


Vital signs: 


                                   Vital Signs











Temp  97.7 F   02/05/21 04:00


 


Pulse  141 H  02/05/21 04:00


 


Resp  15   02/05/21 04:00


 


BP  92/68   02/05/21 04:00


 


Pulse Ox  93 L  02/05/21 04:00








                                 Intake & Output











 02/04/21 02/05/21 02/05/21





 18:59 06:59 18:59


 


Intake Total 642.423 5443.005 


 


Output Total 2050 400 


 


Balance -1907.250 755.005 


 


Intake:   


 


  IV 40 800 


 


    0.9 40 450 


 


    Potassium Chloride 20 meq  100 





    In Water For Injection 1   





    100ml.bag @ 50 mls/hr   





    IVPB Q2H [facility] Rx#:   





    [id number]   


 


    Voriconazole 400 mg In  250 





    Sodium Chloride 0.9% 250   





    ml @ 125 mls/hr IVPB   





    Q12HR [facility] Rx#:[id number]   


 


  Intake, IV Titration 102.750 355.005 





  Amount   


 


    Amiodarone 450 mg In  143.892 





    Dextrose 5% in Water 250   





    ml @ 0.5 MG/MIN 16.667   





    mls/hr IV .Q15H [facility] Rx#:   





    [id number]   


 


    Diltiazem 125 mg In 42.917  





    Sodium Chloride 0.9% 100   





    ml @ 5 MG/HR 5 mls/hr IV   





    .Q24H [facility] Rx#:[id number]   


 


    Heparin Sod,Pork in 0.45% 59.833 211.113 





    NaCl 25,000 unit In 0.45   





    % NaCl 1 250ml.bag @ 9.93   





    UNITS/KG/HR 10 mls/hr IV   





    .Q24H [facility] Rx#:[id number]   


 


Output:   


 


  Urine 2050 400 


 


Other:   


 


  Voiding Method Toilet  





 Urinal  














- Labs


CBC & Chem 7: 


                                 02/05/21 05:27





                                 02/05/21 05:27


Labs: 


                  Abnormal Lab Results - Last 24 Hours (Table)











  02/04/21 02/04/21 02/05/21 Range/Units





  06:04 14:46 05:27 


 


WBC    16.6 H  (3.8-10.6)  k/uL


 


RBC    3.84 L  (4.30-5.90)  m/uL


 


Hgb    9.8 L  (13.0-17.5)  gm/dL


 


Hct    33.5 L  (39.0-53.0)  %


 


MCHC    29.2 L  (31.0-37.0)  g/dL


 


RDW    17.7 H  (11.5-15.5)  %


 


Neutrophils #    13.8 H  (1.3-7.7)  k/uL


 


ESR  >140 H    (0-20)  mm/Hr


 


APTT     (22.0-30.0)  sec


 


Potassium   2.6 L*   (3.5-5.1)  mmol/L


 


Chloride     ()  mmol/L


 


BUN     (9-20)  mg/dL


 


Glucose     (74-99)  mg/dL














  02/05/21 02/05/21 Range/Units





  05:27 05:27 


 


WBC    (3.8-10.6)  k/uL


 


RBC    (4.30-5.90)  m/uL


 


Hgb    (13.0-17.5)  gm/dL


 


Hct    (39.0-53.0)  %


 


MCHC    (31.0-37.0)  g/dL


 


RDW    (11.5-15.5)  %


 


Neutrophils #    (1.3-7.7)  k/uL


 


ESR    (0-20)  mm/Hr


 


APTT  36.6 H   (22.0-30.0)  sec


 


Potassium    (3.5-5.1)  mmol/L


 


Chloride   110 H  ()  mmol/L


 


BUN   29 H  (9-20)  mg/dL


 


Glucose   135 H  (74-99)  mg/dL








                      Microbiology - Last 24 Hours (Table)











 01/29/21 13:00 Blood Culture - Final





 Blood    No Growth after 144 hours


 


 01/29/21 09:26 Blood Culture - Final





 Blood    No Growth after 144 hours


 


 02/01/21 11:30 Gram Stain - Final





 Pleural Fluid Body Fluid Culture - Final





    Aspergillus fumigatus














Assessment and Plan


Plan: 





Assessment:


1.  Acute kidney injury mostly prerenal secondary to intravascular volume 

depletion and hypotension, improved with IV hydration.  GFR back to baseline.


2.  Pneumonia maintained on antibiotics. ? Empyema - status post thoracentesis 

on February 1 with 45 mL of fluid removed.


3.  Septic shock secondary to pneumonia.  Off vasopressors.  On antibiotics.


4.  History of rheumatoid arthritis.


5.  Hypotension mostly secondary to sepsis.  Cortisol level was also low.  He is

currently on Solu-Cortef.  ACTH stimulation test will not be accurate at this 

time as he is on Solu-Cortef.


6.  Diarrhea.  C. diff negative. Improved. 


7.  Lower extremity edema. Improving with diuresis.


8.  A-fib maintained on amiodarone drip. 





Plan:


Encouraged oral intake.


Continue diuresis - lasix 40 mg IV daily.

## 2021-02-05 NOTE — P.PN
Subjective





Pt is seen and examined sitting up in the chair in no acute distress. He 

continues to be atrial flutter with 2:1 conduction rates around 150. Blood 

pressure 84/48. Laboratory data reviewed, WBC 16.6, hgb 9.8, plt 374, sodium 

143, potassium 3.9, creatinine 0.88. Currently maintained on IV amiodarone, IV 

heparin, aspirin 81 mg daily and lasix 40 mg IV daily.  Denies chest pain, 

dizziness or palpitations.  





GENERAL: Well-appearing, well-nourished and in no acute distress.


NECK: Supple without JVD or thyromegaly.


LUNGS: Scattered rhonchi, no wheezes or rales. Respiration equal and unlabored. 

No wheezes, rales or rhonchi.


HEART: Regular rate and rhythm without murmurs, rubs or gallops. S1 and S2 

heard.


EXTREMITIES: Normal range of motion, no edema.  No clubbing or cyanosis. 

Peripheral pulses intact.





ASSESSMENT


Atrial flutter with rapid ventricular rate


Fungal infection with empyema


Hypertension


Dyslipidemia





PLAN


Continue IV heparin infusion. 


Continue amiodarone infusion through the night. If he remains in atrial flutter 

with RVR we will consider cardioversion in the morning. 





Nurse Practitioner note has been reviewed, I agree with a documented findings 

and plan of care.  Patient was seen and examined.








Objective





- Vital Signs


Vital signs: 


                                   Vital Signs











Temp  97.6 F   02/05/21 12:00


 


Pulse  147 H  02/05/21 12:00


 


Resp  17   02/05/21 12:00


 


BP  84/48   02/05/21 12:00


 


Pulse Ox  90 L  02/05/21 12:00








                                 Intake & Output











 02/04/21 02/05/21 02/05/21





 18:59 06:59 18:59


 


Intake Total 211.357 3771.005 113.59


 


Output Total 2050 400 1350


 


Balance -1907.250 755.005 -1236.41


 


Intake:   


 


  IV 40 800 


 


    0.9 40 450 


 


    Potassium Chloride 20 meq  100 





    In Water For Injection 1   





    100ml.bag @ 50 mls/hr   





    IVPB Q2H [facility] Rx#:   





    [id number]   


 


    Voriconazole 400 mg In  250 





    Sodium Chloride 0.9% 250   





    ml @ 125 mls/hr IVPB   





    Q12HR [facility] Rx#:[id number]   


 


  Intake, IV Titration 102.750 355.005 113.59





  Amount   


 


    Amiodarone 450 mg In  143.892 





    Dextrose 5% in Water 250   





    ml @ 0.5 MG/MIN 16.667   





    mls/hr IV .Q15H [facility] Rx#:   





    [id number]   


 


    Diltiazem 125 mg In 42.917  





    Sodium Chloride 0.9% 100   





    ml @ 5 MG/HR 5 mls/hr IV   





    .Q24H [facility] Rx#:[id number]   


 


    Heparin Sod,Pork in 0.45% 59.833 211.113 113.59





    NaCl 25,000 unit In 0.45   





    % NaCl 1 250ml.bag @ 9.93   





    UNITS/KG/HR 10 mls/hr IV   





    .Q24H [facility] Rx#:[id number]   


 


Output:   


 


  Urine 2050 400 1350


 


Other:   


 


  Voiding Method Toilet  Toilet





 Urinal  Urinal














- Labs


CBC & Chem 7: 


                                 02/05/21 05:27





                                 02/05/21 05:27


Labs: 


                  Abnormal Lab Results - Last 24 Hours (Table)











  02/04/21 02/05/21 02/05/21 Range/Units





  14:46 05:27 05:27 


 


WBC   16.6 H   (3.8-10.6)  k/uL


 


RBC   3.84 L   (4.30-5.90)  m/uL


 


Hgb   9.8 L   (13.0-17.5)  gm/dL


 


Hct   33.5 L   (39.0-53.0)  %


 


MCHC   29.2 L   (31.0-37.0)  g/dL


 


RDW   17.7 H   (11.5-15.5)  %


 


Neutrophils #   13.8 H   (1.3-7.7)  k/uL


 


APTT    36.6 H  (22.0-30.0)  sec


 


Potassium  2.6 L*    (3.5-5.1)  mmol/L


 


Chloride     ()  mmol/L


 


BUN     (9-20)  mg/dL


 


Glucose     (74-99)  mg/dL


 


POC Glucose (mg/dL)     (75-99)  mg/dL














  02/05/21 02/05/21 02/05/21 Range/Units





  05:27 11:53 11:53 


 


WBC     (3.8-10.6)  k/uL


 


RBC     (4.30-5.90)  m/uL


 


Hgb     (13.0-17.5)  gm/dL


 


Hct     (39.0-53.0)  %


 


MCHC     (31.0-37.0)  g/dL


 


RDW     (11.5-15.5)  %


 


Neutrophils #     (1.3-7.7)  k/uL


 


APTT   75.9 H   (22.0-30.0)  sec


 


Potassium     (3.5-5.1)  mmol/L


 


Chloride  110 H    ()  mmol/L


 


BUN  29 H    (9-20)  mg/dL


 


Glucose  135 H    (74-99)  mg/dL


 


POC Glucose (mg/dL)    157 H  (75-99)  mg/dL








                      Microbiology - Last 24 Hours (Table)











 01/29/21 13:00 Blood Culture - Final





 Blood    No Growth after 144 hours


 


 01/29/21 09:26 Blood Culture - Final





 Blood    No Growth after 144 hours


 


 02/01/21 11:30 Gram Stain - Final





 Pleural Fluid Body Fluid Culture - Final





    Aspergillus fumigatus

## 2021-02-05 NOTE — P.PN
Subjective


Progress Note Date: 02/05/21


Principal diagnosis: 


 Pneumonia, hydropneumothorax, septic shock





60-year-old white male patient, with past medical history of rheumatoid 

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 

episodes of pneumonia and history of loculated empyema requiring chest tube 

placement back in July 2020 with pleural fluid cultures positive for 

streptococcal pneumonia.  Other medical history includes hypertension, 

hypothyroidism and previous history of DVT.  Patient had a recent 

hospitalization from January 18 through 01/21/2021 hypotension, dehydration 

related to nausea vomiting diarrhea, acute kidney injury.  Patient received 

antibiotics in the form of Zosyn and Levaquin for possibility of pneumonia, he 

is chest x-ray showed cranial perihilar pulmonary infiltrates with increased 

interstitial changes at the lung bases and was thought to be related to a combi

nation of rheumatoid lung and chronic scarring.  His last CT of the chest was on

12/29/2020 showing chronic changes to lower lungs, persistent irregular thick-

walled pleural spaces in the posterior lung bases containing fluid and air with 

adjacent anterior lung with chronic consolidation or atelectasis.  On 01/29/2021

patient came into the emergency department from Dr. SARINA Santo's office where he w

as being seen for a regular follow-up appointments.  He was noted to have low 

pulse ox 70s and was sent in to the emergency department for evaluation.  He has

been having cough with yellow and sometimes blood-tinged sputum, appears 

amounts, denies any fever, COVID 19 was found to be negative, chest x-ray showed

perihilar and basilar infiltrates without significant change from previous chest

x-ray on 01/19/2021.  Lab data showed leukocytosis with white blood cell count 

of 19.1, hemoglobin of 12.1, INR 1.5, sodium is 135, potassium is 3.9, chloride 

is 102, CO2 is 19, creatinine is 1.32, which has actually improved since his 

discharge from the hospital on 01/21/2021 when he was at 1.84.  Lactic acid was 

elevated at 3.7, patient was given a total of 2 L in fluid boluses, troponin was

negative at less than 0.012.  Urinalysis showed no evidence of infection.  CT 

chest showed thick-walled pleural cavities posteriorly in the lower lungs with 

left-sided air-fluid level with left-sided pleural fluid diminished most recent 

CT from 12/29/2020.  There is associated compressive atelectasis and/or chronic 

consolidation.  Persistent as it is lobe fissure, multifocal areas of reticular 

nodular opacity in the upper lungs remain present with slight nodularity in the 

left upper lobe.  There is a focal consolidation improved from most recent study

with some residual areas of scarring.  We will emergency department patient 

became hypotensive with a blood pressure in the 70s, slightly tachycardic r

emains in sinus mechanism, he is receiving his third liter bolus, his lactic 

acid did respond and improved he was started on antibiotics in the form of Zosyn

and vancomycin 





The patient is seen today January 30th 2021 in the intensive care unit.  He is 

currently sitting up in a chair at the bedside.  Awake and alert in no acute 

distress.  Maintaining O2 saturations in the 90s on 2 L/m per nasal cannula.  He

did require a small dose of norepinephrine currently on 0.05 mcg/kg/m.  He has 

lactated Ringer's at 100 MLS per hour.  He remains on vancomycin and cefepime.  

Chest x-ray continues to show the left lower lobe effusion.  White count 11.7.  

Hemoglobin 9.7.  Sodium 135.  Potassium 3.6.  Creatinine 1.00.  Blood cultures 

reveal no growth to date.





Reevaluated today on 1/31/2021, remains in the ICU, still on norepinephrine at 

0.04 mcg/kg/m.  On LR at 1 25 mL per hour.  On vancomycin and cefepime 

empirically, blood pressure remains marginal, went ahead and recommended Solu-

Cortef 100 mg IV push every 8 hours and hopefully will be able to discontinue 

norepinephrine today.  His IV fluid is cut down to KVO.  Given 20 mg of Lasix IV

push, and I have recommended that we monitor the patient in the ICU for the next

24 hours.  Chest x-ray is basically the same, continues to show chronic finding,

difficult to exclude underlying pneumonia/empyema.  CT of the chest on admission

was also reviewed, difficult to rule out underlying empyema.  Clinically the 

patient is feeling better except for generally weak.  WBC count is 12.5 

hemoglobin is 9.9.  Normal renal profile is normal blood cultures are negative 

so far.  Sputum cultures are also negative so far.





On 02/01/2021 patient seen in follow-up in the intensive care unit, he is awake 

and alert, oriented 3, sitting up in the recliner, currently on 2 L of oxygen 

his pulse ox is %, his been afebrile, hemodynamically he is stable, he is 

not on any vasopressor support.  His lactate Ringer's running at 20 ML per hour,

antibiotics include cefepime and vancomycin, blood pressure has been stable, so 

far blood and sputum cultures are negative, he denies chest pain, his breathing 

is comfortable.  Today's labs have been reviewed, showing white blood cell count

trending down, 10.2 today, hemoglobin is 8.8, electrolytes and renal profile are

unremarkable.  Serum cortisol level came back at 3, patient continues on stress 

doses of hydrocortisone 100 mg every 8 hours.  Patient has not been stable, 

we'll consult interventional radiology for ultrasound-guided left thoracentesis





On 02/02/2021 patient seen in follow-up in the intensive care unit, he is calm 

and comfortable, awake and alert, sitting up in the recliner, currently on room 

air with a pulse ox of 93-94%, his been afebrile, hemodynamically he is been 

stable, he is on IV LR at 20 ML per hour, no other drips.  He is on cefepime and

vancomycin for antibiotic coverage, yesterday he underwent left thoracentesis 

with removal of 43 mL of pleural fluid which was sent for cultures.  Tolerated 

procedure well.  His pleural fluid analysis revealed total fluid protein of 1.3 

g, and fluid LDH of greater than 4500 consistent with exudative fluid.  His 

microbiology data has been reviewed showing Aspergillus fumigate is in the 

sputum, his pleural fluid cultures are pending at this time, the cultures have 

shown no growth, hemodynamically has been stable, he is breathing easier, he 

still has cough mostly in the morning and the amount of secretions his bringing 

up is decreasing in amount.  Lung sounds revealed a few scattered wheezes, and a

few rhonchi at bilateral bases.  Is tolerating oral intake.  No abdominal pain, 

no nausea or vomiting, today's labs have been reviewed, white blood cell count 

is 11.2, hemoglobin is 9.1, sodium is 137, potassium is 4.0, chloride is 109, 

BUN is 15, creatinine 0.80.  No acute events overnight.  He is working on 

incentive spirometer, he is achieving 1000 today





On 02/03/2021 patient seen in follow-up on medical surgical floor.  He is awake 

and alert, in no acute distress, breathing comfortably, denies any chest pain, 

room air pulse ox is 94%, no fever or chills, today's chest x-ray shows left 

basilar loculated pneumothorax, stable in appearance, bilateral lung 

infiltrates.  His pleural fluid cultures so far showing Aspergillus species, as 

does his sputum culture.  Blood culture show no growth.  The cervix is 

following, current antibiotic coverage includes cefepime, vancomycin has been 

discontinued, she received 1 dose of IV Lasix per nephrology.  He has produced 

2500 in urine output, and he is in -1.6 L over the last 24 hours, however he 

still has significant lower extremity edema





On 02/05/2021 patient seen in follow-up in the intensive care unit, he is awake 

and alert, in no acute distress, on room air, his pulse ox is 93%, currently on 

amiodarone at 0.5 mg/m, heparin infusion per weight based protocol, 0.9 normal 

saline at a rate of 10 ML per hour, his heart rate is still poorly controlled, 

he remains in atrial flutter with a rate of 140 BPM.  He's had no fever or 

chills, he status post ultrasound-guided thoracentesis of the left pleural space

with removal of 45 mL of pleural fluid in the cultures were positive for 

Aspergillus.  Infectious disease service is following, and voriconazole was 

started.  Clinically patient denies any shortness of breath, no cough, no 

compressive chest pain, we discussed the case with the cardiothoracic surgery 

yesterday, and plan is to proceed with the placement of Pleurx catheter into the

left pleural space today.











Objective





- Vital Signs


Vital signs: 


                                   Vital Signs











Temp  97.7 F   02/05/21 04:00


 


Pulse  141 H  02/05/21 04:00


 


Resp  15   02/05/21 04:00


 


BP  92/68   02/05/21 04:00


 


Pulse Ox  93 L  02/05/21 04:00








                                 Intake & Output











 02/04/21 02/05/21 02/05/21





 18:59 06:59 18:59


 


Intake Total 728.657 3463.005 


 


Output Total 2050 400 450


 


Balance -1907.250 755.005 -450


 


Intake:   


 


  IV 40 800 


 


    0.9 40 450 


 


    Potassium Chloride 20 meq  100 





    In Water For Injection 1   





    100ml.bag @ 50 mls/hr   





    IVPB Q2H [facility] Rx#:   





    [id number]   


 


    Voriconazole 400 mg In  250 





    Sodium Chloride 0.9% 250   





    ml @ 125 mls/hr IVPB   





    Q12HR [facility] Rx#:[id number]   


 


  Intake, IV Titration 102.750 355.005 





  Amount   


 


    Amiodarone 450 mg In  143.892 





    Dextrose 5% in Water 250   





    ml @ 0.5 MG/MIN 16.667   





    mls/hr IV .Q15H [facility] Rx#:   





    [id number]   


 


    Diltiazem 125 mg In 42.917  





    Sodium Chloride 0.9% 100   





    ml @ 5 MG/HR 5 mls/hr IV   





    .Q24H [facility] Rx#:[id number]   


 


    Heparin Sod,Pork in 0.45% 59.833 211.113 





    NaCl 25,000 unit In 0.45   





    % NaCl 1 250ml.bag @ 9.93   





    UNITS/KG/HR 10 mls/hr IV   





    .Q24H [facility] Rx#:[id number]   


 


Output:   


 


  Urine 2050 400 450


 


Other:   


 


  Voiding Method Toilet  





 Urinal  














- Exam


GENERAL EXAM: Alert, pleasant, 60-year-old male patient, on room air with a 

pulse ox of 94%, currently sitting up in chair at the bedside, comfortable in no

apparent distress.


HEAD: Normocephalic/atraumatic.


EENT: PERRLA, EOMI, nonicteric.  Moist mucous membranes, no neck masses, no JVD,

no stridor.


CHEST: No chest wall deformity.  Symmetrical expansion. 


LUNGS: Bibasilar rhonchi, and a few scattered wheezes


CVS: Regular rate and rhythm, normal S1 and S2, no gallops, no murmurs, no rubs


ABDOMEN: Soft, nontender.  No hepatosplenomegaly, normal bowel sounds, no 

guarding or rigidity.


EXTREMITIES: No clubbing, 1+ lower extremity edema, no cyanosis, 2+ pulses and 

upper and lower extremities.


MUSCULOSKELETAL: Muscle strength and tone normal.


SPINE: No scoliosis or deformity


SKIN: No rashes


CENTRAL NERVOUS SYSTEM: Alert and oriented -3.  No focal deficits, tone is 

normal in all 4 extremities.


PSYCHIATRIC: Alert and oriented -3.  Appropriate affect.  Intact judgment and 

insight.











- Labs


CBC & Chem 7: 


                                 02/05/21 05:27





                                 02/05/21 05:27


Labs: 


                  Abnormal Lab Results - Last 24 Hours (Table)











  02/04/21 02/04/21 02/05/21 Range/Units





  06:04 14:46 05:27 


 


WBC    16.6 H  (3.8-10.6)  k/uL


 


RBC    3.84 L  (4.30-5.90)  m/uL


 


Hgb    9.8 L  (13.0-17.5)  gm/dL


 


Hct    33.5 L  (39.0-53.0)  %


 


MCHC    29.2 L  (31.0-37.0)  g/dL


 


RDW    17.7 H  (11.5-15.5)  %


 


Neutrophils #    13.8 H  (1.3-7.7)  k/uL


 


ESR  >140 H    (0-20)  mm/Hr


 


APTT     (22.0-30.0)  sec


 


Potassium   2.6 L*   (3.5-5.1)  mmol/L


 


Chloride     ()  mmol/L


 


BUN     (9-20)  mg/dL


 


Glucose     (74-99)  mg/dL














  02/05/21 02/05/21 Range/Units





  05:27 05:27 


 


WBC    (3.8-10.6)  k/uL


 


RBC    (4.30-5.90)  m/uL


 


Hgb    (13.0-17.5)  gm/dL


 


Hct    (39.0-53.0)  %


 


MCHC    (31.0-37.0)  g/dL


 


RDW    (11.5-15.5)  %


 


Neutrophils #    (1.3-7.7)  k/uL


 


ESR    (0-20)  mm/Hr


 


APTT  36.6 H   (22.0-30.0)  sec


 


Potassium    (3.5-5.1)  mmol/L


 


Chloride   110 H  ()  mmol/L


 


BUN   29 H  (9-20)  mg/dL


 


Glucose   135 H  (74-99)  mg/dL








                      Microbiology - Last 24 Hours (Table)











 01/29/21 13:00 Blood Culture - Final





 Blood    No Growth after 144 hours


 


 01/29/21 09:26 Blood Culture - Final





 Blood    No Growth after 144 hours


 


 02/01/21 11:30 Gram Stain - Final





 Pleural Fluid Body Fluid Culture - Final





    Aspergillus fumigatus














Assessment and Plan


Plan: 


 Assessment:





#1.  Acute hypoxic respiratory failure related to sepsis, septic shock,  related

to pneumonia, with chronic loculated effusions, rule out possibility of empyema.

COVID 19 was ruled out per PCR





#2.  Bilateral hydropneumothorax, rule out possibility of empyema, status post 

ultrasound-guided left-sided thoracentesis on 02/01/2021 would removal of 45 mL 

of cloudy white fluid, which was exudative in nature, pleural fluid culture only

showing Aspergillus, antibiotic coverage is with cefepime and vancomycin, it 

service is following, CT surgery has no plans for surgical intervention at this 

time





#3.  Recent admission to the hospital for hypotension, dehydration related to 

nausea vomiting diarrhea, acute kidney injury





#4.  Recurrent pulmonary infections, with history of streptococcal pneumonia and

empyema requiring chest tube placement in July 2020





#5.  Chronic loculated pneumothoraces, posterior, with air-fluid level seen on 

the CT imaging of the chest, the possibility of trapped lung, chronic scarring 

and possibility of empyema needs to be ruled out





#6.  Elevated d-dimer with no CT evidence for pulmonary embolism





#7.  History of rheumatoid arthritis and rheumatoid lung disease on Arava





#8.  Recent history of acute kidney injury, and admission lab work today shows 

improvement in his renal function





#9.  Hypertension





#10.  Hypothyroidism





#11.  Previous history of foot infections requiring antibiotics





#12.  Previous history of DVT





Plan:





Continue antibiotics per ID service recommendations, patient is currently just 

voriconazole, no fever or chills, remains in A. flutter with RVR, 

anticoagulation and rate control medications per cardiology, we discussed the 

case with CT surgery, and the plan is to proceed with placement of left-sided 

Pleurx catheter today.  Provide incentive spirometer, encourage deep breathing 

and coughing, continue same dose hydrocortisone, breathing treatments. we'll 

continue to closely follow.





I performed a history & physical examination of the patient and discussed their 

management with my nurse practitioner, Lorena Bonilla.  I reviewed the nurse 

practitioner's note and agree with the documented findings and plan of care.  

Lung sounds are positive for diminished breath sounds.  The findings and the 

impression was discussed with the patient.  I attest to the documentation by the

nurse practitioner. 





Time with Patient: Less than 30

## 2021-02-05 NOTE — P.PN
Subjective


Progress Note Date: 02/05/21





This is a 60-year-old  male patient requesting my service with past 

medical history of rheumatoid arthritis on Arava that was diagnosed when he was 

37 year old initially was started on Methotrexate that he could not tolerate 

then placed on Embrel but he developed side effects and finally was tried on 

Humira injection but he developed neurologic sided effects and was hospitaized 

at [facility] for quiet some time, rheumatoid lung disease, 

previous history of loculated empyema with chest tube placement in July 2020, 

hypertension, hypothyroidism, history of DVT, remote history of tobacco use and 

dependence, was recently hospitalized at [facility] after he was 

admitted for an acute kidney injury with significant metabolic acidosis 

associated with the elevated creatinine and elevated BUN and hypotension at that

time he was seen and evaluated by pulmonary and critical care medicine, he had a

central line placed and at that time, he was placed on Levophed drip he was p

laced on IV anabiotic as well but the patient recovered very fast and he had an 

echocardiogram that showed normal LV function and segmental hypokinesia, he was 

supposed to follow-up with Dr. Santo in the office today, patient was seen in my

office 24 hours ago when he was complaining of increased shortness breath, at 

that time his oxygenation could not be checked because of his Case's and cold

extremities, he was sent for a chest x-ray that showed right lower lobe 

infiltrate as well as blood tests that showed a white count of 20,000 patient 

was feeling better he was started on oral antibiotic in the form of Levaquin 500

mg orally once every day, and that he was supposed to follow-up with me as an 

outpatient every week he went to see his cardiologist today and he had an 

episode when he was dizzy lightheaded and an episode of vomiting as well and he 

was sent to the emergency department for evaluation had a computed tomography of

the chest as well as abdomen and pelvis that showed a thick walled pleural 

cavities posteriorly in the lower lungs with left-sided air-fluid level that has

diminished in size from the prior computed tomography scan when he was in the 

hospital a few weeks back there is also associated compressive atelectasis and 

chronic consultation with multifocal areas of reticular nodular opacity in the 

upper lungs with a slight nodularity of the left upper lobe again this area of 

focal consultation has improved from the previous study that was done few weeks 

ago, patient was started on IV antibiotic with vancomycin and Zosyn as well as 

IV fluid, he was seen in consultation by pulmonary critical care in the 

emergency department as the patient blood pressure dropped and that he'll be 

transferred to the intensive care unit at this point in time I had long conve

rsation with the patient and his wife at the bedside and the patient may need to

have a chest tube placed for his possible right empyema, he did receive 3 L 

normal saline in the ER, and his blood pressure is marginal he may need to go on

 pressors if  not recovered.





1/30:patient is sitting up in chair feeling about the same , complains of 

increased pain in the righ elbow, was seen earlier by pulmonary and the plan was

to go for US-Guided left thoracenthesis due to to loculated left pleural effu

nathaniel and possible empyema, may need VATS, he denies any chest pain or shortness 

of breath, no abdominal pain nausea, vomiting or diarrhea, still have diarrhea 

negative for C.Diff, he seems to have accept to stay in the hospital this time 

as long as he needed to.





1/31: Patient sitting up in the recliner complaining of increased pain in his 

joints due to his rheumatoid arthritis with increased synovitis in both wrists 

both elbows and both shoulders he was started on hydrocortisone 100 mg IV push 

every 8 hours, to try to help with his blood pressure as well as his phonation, 

he is maintained on Norco 5/325 mg one tablet orally every 6 hours as needed for

pain control, patient denies any chest pain is less short of breath, he 

continues to have increased coughing with yellow from production of green phlegm

production, he had no fever or chills at this time he continues to be on 

Levothroid drip, patient has appeared to have increased swelling in both lower 

extremities as well as both ankles, he is maintained on IV antibiotic in the 

form of vancomycin as well as cefepime, infectious disease is following, patient

is scheduled to go for ultrasound guidance thoracentesis of the left loculated 

pleural effusion tomorrow morning.





2/1: Patient remains in the intensive care unit.  He has been afebrile, heart 

rate 100, blood pressure 95/62, pulse ox 97% on 2 L nasal cannula.  Hemoglobin 

8.8 and leukocytosis resolved.  Creatinine 0.76, electrolytes normal.  Sputum 

culture is positive for Aspergillus species.  Blood culture showing no growth. 

Patient underwent diagnostic thoracentesis with interventional radiology today 

with removal of 45 mL of cloudy white fluid.  Chest x-ray reveals no 

complications following left thoracentesis.  Fluid has been sent for culture and

cytology.  Consult in place for cardiothoracic surgery to evaluate for VATS with

decortication.





2/2: Patient remains in the intensive care unit.  He did receive 1 dose of IV 

Lasix this morning ordered by nephrology.  He is followed by cardiothoracic 

surgery was no plan for surgical intervention.  ID has recommended cefepime and 

vancomycin for now.  Expect antifungal agent ended as well.  Patient denies any 

significant shortness of breath.  He is comfortable sitting in a chair.  Patient

is having minimal cough and minimal sputum.  Patient is achieving 1000 ml on 

incentive spirometry.  Culture and cytology reports remain pending from 

thoracentesis. Repeat chest x-ray reveals patchy peripheral lung with lung zone 

and lower lung zone infiltrates persist unchanged.  Patient has been afebrile, 

heart rate in the 90s and low 100s, pulse ox 91 on room air.





2/3: Repeat chest x-ray reveals left-sided basilar loculated pneumothorax, 

stable.  Bilateral lung infiltrates.  Correlate for pneumonia and atelectasis.  

WBC 9, hemoglobin 9.5.  Potassium 3.4 and will be replaced.  Patient is 

continued on cefepime and vancomycin per Dr. Sheets's recommendations.  Patient 

in reaching 1000 on incentive spirometry.  Patient is scheduled to see Dr. Kaye tomorrow.  Cefepime and vancomycin had been discontinued by Dr. Sheets and

started on Voriconazole. 





2/4: A-Team was called this morning regarding elevated heart rate, EKG was 

atrial flutter with RVR and 150 bpm and patient was transferred to ICU as an 

overflow for the cardiac stepdown unit, started on Cardizem drip and consult 

with cardiology.  Heart rate was improved with Cardizem and patient also 

received amiodarone bolus 300 mg.  Patient denies any worsening shortness of 

breath, cough or congestion.  No chest pain or palpitations.  Patient is resting

comfortably.  09, hemoglobin 8.8, potassium 3.4 replaced.  Creatinine 0.89.





2/5: Patient remains in the intensive care unit.  He continues to be in atrial 

flutter with 21 conduction rate.  He is on heparin drip and amiodarone.  He is 

currently in and out of atrial flutter with RVR.  Cardiology is following 

closely and may consider cardioversion tomorrow. WBC 16.6, hgb 9.8, plt 374, 

sodium 143, potassium 3.9, creatinine 0.88.  He has been afebrile, heart rate 

has been at times marginal, pulse ox 90% on room air.  Patient denies having any

fever or chills, no shortness of breath, no cough.  No chest pain.  Patient is 

followed by cardiothoracic surgery and plan is for left-sided PleurX catheter 

placement which is scheduled for Monday.





Objective





- Vital Signs


Vital signs: 


                                   Vital Signs











Temp  97.6 F   02/05/21 12:00


 


Pulse  147 H  02/05/21 12:00


 


Resp  17   02/05/21 12:00


 


BP  84/48   02/05/21 12:00


 


Pulse Ox  90 L  02/05/21 12:00








                                 Intake & Output











 02/04/21 02/05/21 02/05/21





 18:59 06:59 18:59


 


Intake Total 786.612 9157.005 113.59


 


Output Total 2050 400 1350


 


Balance -1907.250 755.005 -1236.41


 


Intake:   


 


  IV 40 800 


 


    0.9 40 450 


 


    Potassium Chloride 20 meq  100 





    In Water For Injection 1   





    100ml.bag @ 50 mls/hr   





    IVPB Q2H GABBY Rx#:   





    [id number]   


 


    Voriconazole 400 mg In  250 





    Sodium Chloride 0.9% 250   





    ml @ 125 mls/hr IVPB   





    Q12HR GABBY Rx#:[id number]   


 


  Intake, IV Titration 102.750 355.005 113.59





  Amount   


 


    Amiodarone 450 mg In  143.892 





    Dextrose 5% in Water 250   





    ml @ 0.5 MG/MIN 16.667   





    mls/hr IV .Q15H GABBY Rx#:   





    [id number]   


 


    Diltiazem 125 mg In 42.917  





    Sodium Chloride 0.9% 100   





    ml @ 5 MG/HR 5 mls/hr IV   





    .Q24H GABBY Rx#:[id number]   


 


    Heparin Sod,Pork in 0.45% 59.833 211.113 113.59





    NaCl 25,000 unit In 0.45   





    % NaCl 1 250ml.bag @ 9.93   





    UNITS/KG/HR 10 mls/hr IV   





    .Q24H GABBY Rx#:[id number]   


 


Output:   


 


  Urine 2050 400 1350


 


Other:   


 


  Voiding Method Toilet  Toilet





 Urinal  Urinal














- Exam





 Review of Systems 


Constitutional: Reports anorexia, Reports chronic pain, Reports fatigue, Reports

weakness, Reports weight loss


Eyes: denies blurred vision, denies bulging eye, denies decreased vision, denies

diplopia


Ears, nose, mouth and throat: Denies dysphagia, Denies neck lump, Denies sore 

throat


Respiratory: Reports dyspnea, Reports respiratory infections, Denies congestion,

Denies cough with sputum, Denies home oxygen, Denies sleep apnea, Denies 

snoring, Denies wheezing


Cardiac: No chest pain.  No palpitations.  No lower extremity edema.  No 

syncopal episodes.


Gastrointestinal: Reports bloating, Reports change in bowel habits, Reports 

diarrhea, Reports early satiety, Reports indigestion, Reports loss of appetite, 

Reports nausea, Reports vomiting, Denies abdominal pain, Denies belching, Denies

heartburn, Denies hematemesis, Denies hematochezia, Denies melena


Genitourinary: Denies dysuria, Denies nocturia


Musculoskeletal: Denies myalgias


Musculoskeletal: absent: ankle pain, ankle stiffness, ankle swelling, elbow 

pain, elbow stiffness, elbow swelling, foot pain, foot stiffness, foot swelling,

hand pain, hand stiffness, hand swelling, hip pain, hip stiffness, hip swelling,

knee pain, knee stiffness, knee swelling, shoulder pain, shoulder stiffness, 

shoulder swelling, wrist pain, wrist stiffness, wrist swelling


Integumentary: Denies pruritus, Denies rash


Neurological: Denies numbness, Denies weakness


Psychiatric: Denies anxiety, Denies depression


Endocrine: Denies fatigue, Denies weight change








Physical examination:


General: patient is 60 year old lying down in bed in no acute distress.





HEENT: head ia atraumatic normocephalic, Pupils were equal round reactive to 

light and accommodations , extra ocular muscle movement were intact, mucous 

membranes of the mouth are somewhat dry.





Neck: supple no JVP.





Chest: decreased breath sounds at he bases with few ronchi no expiratory wheezes

no chest wall tendernes or intercostal retractions.





Heart: first heart sound is depressed, second heart sound is normal tachycardic 

there is HOOD 2/6 located at the left sternal border.





Abdomen: soft, mild tenderness to the left lower quadrant positive bowel sounds,

no guarding or rebound tenderness, no hepatosplenomegaly.





Extremities: there is no edema or calf tenderness DP+ 2 Bilaterally, there is 

what appears to be a charcot joint in the right foot form arch collapse.





Neurologic examination: patient is awake , alert and oriented X 3 CN II-XII are 

grossly intact, muscle power 4/5 in bilateral upper and lower extremities, deep 

tendon reflexes were normal.








- Labs


CBC & Chem 7: 


                                 02/05/21 05:27





                                 02/05/21 05:27


Labs: 


                  Abnormal Lab Results - Last 24 Hours (Table)











  02/04/21 02/05/21 02/05/21 Range/Units





  14:46 05:27 05:27 


 


WBC   16.6 H   (3.8-10.6)  k/uL


 


RBC   3.84 L   (4.30-5.90)  m/uL


 


Hgb   9.8 L   (13.0-17.5)  gm/dL


 


Hct   33.5 L   (39.0-53.0)  %


 


MCHC   29.2 L   (31.0-37.0)  g/dL


 


RDW   17.7 H   (11.5-15.5)  %


 


Neutrophils #   13.8 H   (1.3-7.7)  k/uL


 


APTT    36.6 H  (22.0-30.0)  sec


 


Potassium  2.6 L*    (3.5-5.1)  mmol/L


 


Chloride     ()  mmol/L


 


BUN     (9-20)  mg/dL


 


Glucose     (74-99)  mg/dL


 


POC Glucose (mg/dL)     (75-99)  mg/dL














  02/05/21 02/05/21 02/05/21 Range/Units





  05:27 11:53 11:53 


 


WBC     (3.8-10.6)  k/uL


 


RBC     (4.30-5.90)  m/uL


 


Hgb     (13.0-17.5)  gm/dL


 


Hct     (39.0-53.0)  %


 


MCHC     (31.0-37.0)  g/dL


 


RDW     (11.5-15.5)  %


 


Neutrophils #     (1.3-7.7)  k/uL


 


APTT   75.9 H   (22.0-30.0)  sec


 


Potassium     (3.5-5.1)  mmol/L


 


Chloride  110 H    ()  mmol/L


 


BUN  29 H    (9-20)  mg/dL


 


Glucose  135 H    (74-99)  mg/dL


 


POC Glucose (mg/dL)    157 H  (75-99)  mg/dL








                      Microbiology - Last 24 Hours (Table)











 01/29/21 13:00 Blood Culture - Final





 Blood    No Growth after 144 hours


 


 01/29/21 09:26 Blood Culture - Final





 Blood    No Growth after 144 hours


 


 02/01/21 11:30 Gram Stain - Final





 Pleural Fluid Body Fluid Culture - Final





    Aspergillus fumigatus














Assessment and Plan


Assessment: 





Assessment and plan:








1.  Acute hypoxemic respiratory failure secondary to loculated left-sided 

pleural effusion and fungal empyema with what appears to be recurrent pneumonia.

 Continue Voriconazole 200 mg every 12 hours IV piggyback per Dr. Sheets, 

interventional radiology performed ultrasound-guided thoracentesis of the left 

loculated pleural fluid for Gram stain and culture and cytology as well as LDH 

protein as well as pH for possible empyema.  Culture positive for Aspergillus.  

Cardiothoracic surgery following with plans for Pleurx catheter on Monday





2.  Lactic acidosis.  Resolved, repeat lactic acid is back to normal.





3.  Sepsis with septic shock.  Patient has been weaned off Levothroid drip.





4.  Acute kidney injury due to poor oral intake of fluid as well as hypotension 

with acute tubular necrosis.  Patient is on IV fluid, continue to monitor the 

patient CMP continue to monitor urine output.  Nephrology is following.





5.  Leukocytosis secondary to severe sepsis secondary to empyema.  Continue IV 

fluid, continue IV antibiotic, repeat CBC tomorrow morning.





6.  Atrial flutter with RVR.  Continue amiodarone drip and possible 

cardioversion tomorrow.Cardiology consult appreciated.





7.  Rheumatoid arthritis and rheumatoid lung.  Arava and Xeljanz had been on 

hold since October, patient was started on hydrocortisone 100 mg IV push every 8

hours.





8.  History of empyema with Streptococcus requiring chest tube.  His is a 

similar scenario may need a cardiothoracic surgery consultation.





9.  Hypertension and hypertensive cardiovascular disease. currently hypotensive 

.  Continue the IV fluid currently as well as IV pressors if needed





10.  Hypothyroidism . we will continue with Synthroid 150 mcg orally daily.





11. DVT prophylaxis. we will start Lovenox 40 mg SC daily and bilateral knee 

high Fahad Hose





12. GI prophylaxis. we will continue with Protonix 40 mg IVP daily.





13.  Prognosis continues to be guarded.

## 2021-02-05 NOTE — PN
PROGRESS NOTE



DATE OF SERVICE:

02/05/2021



REASON FOR FOLLOWUP:

Left-sided empyema _____ aspergillus.



INTERVAL HISTORY:

The patient is currently afebrile. The patient is breathing slightly comfortably.  The

patient denies having any chest pain.  He did have some cough, not bringing up any

sputum.  No nausea, no vomiting, no abdominal pain or diarrhea.



PHYSICAL EXAMINATION:

Blood pressure is 123/87 with a pulse of 151, temperature 98. He is 90% on room air.

General description is a middle-aged male lying in bed in no distress.

RESPIRATORY SYSTEM: Unlabored breathing with decreased intensity of breath sounds. No

wheeze.

HEART: S1, S2.  Regular rate and rhythm.

ABDOMEN: Soft. No tenderness.



LABS:

Hemoglobin 9.8, white count 16.6, BUN of 29, creatinine 0.88.



DIAGNOSTIC IMPRESSION AND PLAN:

1. Patient with left-sided empyema.  Culture positive for Aspergillus fumigatus.  The

    patient is on voriconazole. Continue, and transition to oral on discharge.

    Possible _____ on Monday as per discussion with CT Surgery. Continue with

    supportive care.

2. Elevated white count, more likely steroid-related.  This will be monitored closely.





MMODL / IJN: [id number] / Job#: [id number]

## 2021-02-06 LAB
ANION GAP SERPL CALC-SCNC: 7 MMOL/L
BUN SERPL-SCNC: 31 MG/DL (ref 9–20)
CALCIUM SPEC-MCNC: 8.5 MG/DL (ref 8.4–10.2)
CELLS COUNTED: 200
CHLORIDE SERPL-SCNC: 107 MMOL/L (ref 98–107)
CO2 SERPL-SCNC: 28 MMOL/L (ref 22–30)
ERYTHROCYTE [DISTWIDTH] IN BLOOD BY AUTOMATED COUNT: 3.46 M/UL (ref 4.3–5.9)
ERYTHROCYTE [DISTWIDTH] IN BLOOD: 18.4 % (ref 11.5–15.5)
GLUCOSE BLD-MCNC: 116 MG/DL (ref 75–99)
GLUCOSE BLD-MCNC: 131 MG/DL (ref 75–99)
GLUCOSE BLD-MCNC: 131 MG/DL (ref 75–99)
GLUCOSE BLD-MCNC: 135 MG/DL (ref 75–99)
GLUCOSE SERPL-MCNC: 149 MG/DL (ref 74–99)
HCT VFR BLD AUTO: 30.2 % (ref 39–53)
HGB BLD-MCNC: 9.2 GM/DL (ref 13–17.5)
LYMPHOCYTES # BLD MANUAL: 1.14 K/UL (ref 1–4.8)
MCH RBC QN AUTO: 26.7 PG (ref 25–35)
MCHC RBC AUTO-ENTMCNC: 30.6 G/DL (ref 31–37)
MCV RBC AUTO: 87.3 FL (ref 80–100)
METAMYELOCYTES # BLD: 0.95 K/UL
MONOCYTES # BLD MANUAL: 0.95 K/UL (ref 0–1)
MYELOCYTES # BLD MANUAL: 0.19 K/UL
NEUTROPHILS NFR BLD MANUAL: 76 %
NEUTS SEG # BLD MANUAL: 15.9 K/UL (ref 1.3–7.7)
PLATELET # BLD AUTO: 354 K/UL (ref 150–450)
POTASSIUM SERPL-SCNC: 3.6 MMOL/L (ref 3.5–5.1)
SODIUM SERPL-SCNC: 142 MMOL/L (ref 137–145)
WBC # BLD AUTO: 19 K/UL (ref 3.8–10.6)

## 2021-02-06 RX ADMIN — VORICONAZOLE SCH MLS/HR: 10 INJECTION, POWDER, LYOPHILIZED, FOR SOLUTION INTRAVENOUS at 19:36

## 2021-02-06 RX ADMIN — OXYCODONE HYDROCHLORIDE AND ACETAMINOPHEN SCH MG: 500 TABLET ORAL at 19:35

## 2021-02-06 RX ADMIN — METOPROLOL TARTRATE SCH MG: 25 TABLET, FILM COATED ORAL at 19:35

## 2021-02-06 RX ADMIN — HYDROCODONE BITARTRATE AND ACETAMINOPHEN PRN EACH: 5; 325 TABLET ORAL at 23:07

## 2021-02-06 RX ADMIN — AMIODARONE HYDROCHLORIDE SCH: 50 INJECTION, SOLUTION INTRAVENOUS at 14:35

## 2021-02-06 RX ADMIN — TRIAMCINOLONE ACETONIDE SCH APPLIC: 1 CREAM TOPICAL at 08:29

## 2021-02-06 RX ADMIN — METOPROLOL TARTRATE SCH MG: 25 TABLET, FILM COATED ORAL at 16:09

## 2021-02-06 RX ADMIN — HYDROCORTISONE SODIUM SUCCINATE SCH MG: 100 INJECTION, POWDER, FOR SOLUTION INTRAMUSCULAR; INTRAVENOUS at 23:00

## 2021-02-06 RX ADMIN — HEPARIN SODIUM SCH MLS/HR: 10000 INJECTION, SOLUTION INTRAVENOUS at 23:00

## 2021-02-06 RX ADMIN — HEPARIN SODIUM SCH MLS/HR: 10000 INJECTION, SOLUTION INTRAVENOUS at 12:16

## 2021-02-06 RX ADMIN — VORICONAZOLE SCH MLS/HR: 10 INJECTION, POWDER, LYOPHILIZED, FOR SOLUTION INTRAVENOUS at 08:54

## 2021-02-06 RX ADMIN — TRIAMCINOLONE ACETONIDE SCH: 1 CREAM TOPICAL at 19:36

## 2021-02-06 RX ADMIN — LEVOTHYROXINE SODIUM SCH MCG: 75 TABLET ORAL at 05:41

## 2021-02-06 RX ADMIN — ASPIRIN 81 MG CHEWABLE TABLET SCH MG: 81 TABLET CHEWABLE at 19:35

## 2021-02-06 RX ADMIN — AMIODARONE HYDROCHLORIDE SCH MG: 200 TABLET ORAL at 19:35

## 2021-02-06 RX ADMIN — FUROSEMIDE SCH MG: 10 INJECTION, SOLUTION INTRAMUSCULAR; INTRAVENOUS at 08:28

## 2021-02-06 RX ADMIN — IPRATROPIUM BROMIDE AND ALBUTEROL SULFATE SCH: .5; 3 SOLUTION RESPIRATORY (INHALATION) at 19:22

## 2021-02-06 RX ADMIN — Medication SCH MCG: at 19:35

## 2021-02-06 RX ADMIN — POTASSIUM CHLORIDE SCH: 14.9 INJECTION, SOLUTION INTRAVENOUS at 19:21

## 2021-02-06 RX ADMIN — HYDROCORTISONE SODIUM SUCCINATE SCH MG: 100 INJECTION, POWDER, FOR SOLUTION INTRAMUSCULAR; INTRAVENOUS at 08:28

## 2021-02-06 RX ADMIN — IPRATROPIUM BROMIDE AND ALBUTEROL SULFATE SCH: .5; 3 SOLUTION RESPIRATORY (INHALATION) at 08:15

## 2021-02-06 RX ADMIN — IPRATROPIUM BROMIDE AND ALBUTEROL SULFATE SCH: .5; 3 SOLUTION RESPIRATORY (INHALATION) at 15:42

## 2021-02-06 RX ADMIN — HYDROCORTISONE SODIUM SUCCINATE SCH MG: 100 INJECTION, POWDER, FOR SOLUTION INTRAMUSCULAR; INTRAVENOUS at 16:09

## 2021-02-06 RX ADMIN — AMIODARONE HYDROCHLORIDE SCH MG: 200 TABLET ORAL at 08:53

## 2021-02-06 RX ADMIN — PANTOPRAZOLE SODIUM SCH MG: 40 TABLET, DELAYED RELEASE ORAL at 05:41

## 2021-02-06 NOTE — P.PN
Subjective


Progress Note Date: 02/06/21





60-year-old white male patient, with past medical history of rheumatoid 

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 

episodes of pneumonia and history of loculated empyema requiring chest tube 

placement back in July 2020 with pleural fluid cultures positive for 

streptococcal pneumonia.  Other medical history includes hypertension, 

hypothyroidism and previous history of DVT.  Patient had a recent 

hospitalization from January 18 through 01/21/2021 hypotension, dehydration re

lated to nausea vomiting diarrhea, acute kidney injury.  Patient received 

antibiotics in the form of Zosyn and Levaquin for possibility of pneumonia, he 

is chest x-ray showed cranial perihilar pulmonary infiltrates with increased 

interstitial changes at the lung bases and was thought to be related to a 

combination of rheumatoid lung and chronic scarring.  His last CT of the chest 

was on 12/29/2020 showing chronic changes to lower lungs, persistent irregular 

thick-walled pleural spaces in the posterior lung bases containing fluid and air

with adjacent anterior lung with chronic consolidation or atelectasis.  On 

01/29/2021 patient came into the emergency department from Dr. SARINA Santo's office

where he was being seen for a regular follow-up appointments.  He was noted to 

have low pulse ox 70s and was sent in to the emergency department for 

evaluation.  He has been having cough with yellow and sometimes blood-tinged 

sputum, appears amounts, denies any fever, COVID 19 was found to be negative, 

chest x-ray showed perihilar and basilar infiltrates without significant change 

from previous chest x-ray on 01/19/2021.  Lab data showed leukocytosis with 

white blood cell count of 19.1, hemoglobin of 12.1, INR 1.5, sodium is 135, 

potassium is 3.9, chloride is 102, CO2 is 19, creatinine is 1.32, which has 

actually improved since his discharge from the hospital on 01/21/2021 when he 

was at 1.84.  Lactic acid was elevated at 3.7, patient was given a total of 2 L 

in fluid boluses, troponin was negative at less than 0.012.  Urinalysis showed 

no evidence of infection.  CT chest showed thick-walled pleural cavities 

posteriorly in the lower lungs with left-sided air-fluid level with left-sided 

pleural fluid diminished most recent CT from 12/29/2020.  There is associated 

compressive atelectasis and/or chronic consolidation.  Persistent as it is lobe 

fissure, multifocal areas of reticular nodular opacity in the upper lungs remain

present with slight nodularity in the left upper lobe.  There is a focal 

consolidation improved from most recent study with some residual areas of 

scarring.  We will emergency department patient became hypotensive with a blood 

pressure in the 70s, slightly tachycardic remains in sinus mechanism, he is 

receiving his third liter bolus, his lactic acid did respond and improved he was

started on antibiotics in the form of Zosyn and vancomycin 





The patient is seen today January 30th 2021 in the intensive care unit.  He is 

currently sitting up in a chair at the bedside.  Awake and alert in no acute 

distress.  Maintaining O2 saturations in the 90s on 2 L/m per nasal cannula.  He

did require a small dose of norepinephrine currently on 0.05 mcg/kg/m.  He has 

lactated Ringer's at 100 MLS per hour.  He remains on vancomycin and cefepime.  

Chest x-ray continues to show the left lower lobe effusion.  White count 11.7.  

Hemoglobin 9.7.  Sodium 135.  Potassium 3.6.  Creatinine 1.00.  Blood cultures 

reveal no growth to date.





Reevaluated today on 1/31/2021, remains in the ICU, still on norepinephrine at 

0.04 mcg/kg/m.  On LR at 1 25 mL per hour.  On vancomycin and cefepime 

empirically, blood pressure remains marginal, went ahead and recommended 

Solu-Cortef 100 mg IV push every 8 hours and hopefully will be able to 

discontinue norepinephrine today.  His IV fluid is cut down to KVO.  Given 20 mg

of Lasix IV push, and I have recommended that we monitor the patient in the ICU 

for the next 24 hours.  Chest x-ray is basically the same, continues to show ch

ronic finding, difficult to exclude underlying pneumonia/empyema.  CT of the 

chest on admission was also reviewed, difficult to rule out underlying empyema. 

Clinically the patient is feeling better except for generally weak.  WBC count 

is 12.5 hemoglobin is 9.9.  Normal renal profile is normal blood cultures are 

negative so far.  Sputum cultures are also negative so far.





On 02/01/2021 patient seen in follow-up in the intensive care unit, he is awake 

and alert, oriented 3, sitting up in the recliner, currently on 2 L of oxygen 

his pulse ox is %, his been afebrile, hemodynamically he is stable, he is 

not on any vasopressor support.  His lactate Ringer's running at 20 ML per hour,

antibiotics include cefepime and vancomycin, blood pressure has been stable, so 

far blood and sputum cultures are negative, he denies chest pain, his breathing 

is comfortable.  Today's labs have been reviewed, showing white blood cell count

trending down, 10.2 today, hemoglobin is 8.8, electrolytes and renal profile are

unremarkable.  Serum cortisol level came back at 3, patient continues on stress 

doses of hydrocortisone 100 mg every 8 hours.  Patient has not been stable, 

we'll consult interventional radiology for ultrasound-guided left thoracentesis





On 02/02/2021 patient seen in follow-up in the intensive care unit, he is calm 

and comfortable, awake and alert, sitting up in the recliner, currently on room 

air with a pulse ox of 93-94%, his been afebrile, hemodynamically he is been 

stable, he is on IV LR at 20 ML per hour, no other drips.  He is on cefepime and

vancomycin for antibiotic coverage, yesterday he underwent left thoracentesis 

with removal of 43 mL of pleural fluid which was sent for cultures.  Tolerated 

procedure well.  His pleural fluid analysis revealed total fluid protein of 1.3 

g, and fluid LDH of greater than 4500 consistent with exudative fluid.  His 

microbiology data has been reviewed showing Aspergillus fumigate is in the 

sputum, his pleural fluid cultures are pending at this time, the cultures have 

shown no growth, hemodynamically has been stable, he is breathing easier, he 

still has cough mostly in the morning and the amount of secretions his bringing 

up is decreasing in amount.  Lung sounds revealed a few scattered wheezes, and a

few rhonchi at bilateral bases.  Is tolerating oral intake.  No abdominal pain, 

no nausea or vomiting, today's labs have been reviewed, white blood cell count 

is 11.2, hemoglobin is 9.1, sodium is 137, potassium is 4.0, chloride is 109, 

BUN is 15, creatinine 0.80.  No acute events overnight.  He is working on 

incentive spirometer, he is achieving 1000 today





On 02/03/2021 patient seen in follow-up on medical surgical floor.  He is awake 

and alert, in no acute distress, breathing comfortably, denies any chest pain, 

room air pulse ox is 94%, no fever or chills, today's chest x-ray shows left 

basilar loculated pneumothorax, stable in appearance, bilateral lung 

infiltrates.  His pleural fluid cultures so far showing Aspergillus species, as 

does his sputum culture.  Blood culture show no growth.  The cervix is follow

ing, current antibiotic coverage includes cefepime, vancomycin has been 

discontinued, she received 1 dose of IV Lasix per nephrology.  He has produced 

2500 in urine output, and he is in -1.6 L over the last 24 hours, however he 

still has significant lower extremity edema.





The patient is seen today 02/04/2021 in follow-up in the intensive care unit.  

He was transferred here earlier this morning after developing atrial 

fibrillation/flutter with a rapid ventricular response.  He was initiated on C

ardizem at 5 mg per hour.  He has 0.9 normal saline at 20 ML's per hour.  He is 

currently awake and alert in no acute distress.  No worsening shortness of 

breath, cough or congestion.  No chest pain or palpitations.  He sitting up in a

chair at the bedside.  Maintaining O2 saturations in the 90s on 2 L/m per nasal 

cannula. Chest x-ray does reveal loculated posterior basilar pneumothoraces, 

stable from previous exam.  There is diffuse increased lung markings.  Correlate

for pneumonia and atelectasis.  Pleural fluid cultures is positive for 

Aspergillus species.  White count 10.9.  Hemoglobin 8.8.  Sodium 139.  Potassium

3.4.  Creatinine 0.89.  He remains on bronchodilators, IV diuretics, IV Solu-

Cortef.  Continued on voriconazole.








The patient is seen today 02/06/2021 in follow-up on the selective care unit.  

He is currently sitting up in a recliner at the bedside.  Awake and alert in no 

acute distress.  He is currently on room air.  LR at 20 miles per hour.  Heparin

drip per weight base protocol.  White count 19.0.  Hemoglobin 9.1.  Sodium 142. 

Potassium 3.6.  Creatinine 0.81.  He remains on voriconazole.





Objective





- Vital Signs


Vital signs: 


                                   Vital Signs











Temp  98.7 F   02/06/21 12:00


 


Pulse  81   02/06/21 12:00


 


Resp  18   02/06/21 12:00


 


BP  93/67   02/06/21 12:00


 


Pulse Ox  94 L  02/06/21 12:00








                                 Intake & Output











 02/05/21 02/06/21 02/06/21





 18:59 06:59 18:59


 


Intake Total 113.59 467.532 600.041


 


Output Total 8387 692 7324


 


Balance -1236.41 267.532 -399.959


 


Weight  100.1 kg 


 


Intake:   


 


  Intake, IV Titration 113.59 227.532 124.041





  Amount   


 


    Heparin Sod,Pork in 0.45%  227.532 124.041





    NaCl 25,000 unit In 0.45   





    % NaCl 1 250ml.bag @ 16   





    UNITS/KG/HR 16.112 mls/hr   





    IV .[id number] GABBY Rx#:   





    [id number]   


 


    Heparin Sod,Pork in 0.45% 113.59  





    NaCl 25,000 unit In 0.45   





    % NaCl 1 250ml.bag @ 9.93   





    UNITS/KG/HR 10 mls/hr IV   





    .Q24H GABBY Rx#:[id number]   


 


  Oral  240 476


 


Output:   


 


  Urine 9570 367 5624


 


Other:   


 


  Voiding Method Toilet Toilet Toilet





 Urinal Urinal Urinal


 


  # Voids  1 


 


  # Bowel Movements  1 














- Exam





GENERAL EXAM: Alert, very pleasant 60-year-old gentleman, on 2 L nasal cannula, 

comfortable in no apparent distress.


HEAD: Normocephalic.


EYES: Normal reaction of pupils, equal size.


NOSE: Clear with pink turbinates.


THROAT: No erythema or exudates.


NECK: No masses, no JVD.


CHEST: No chest wall deformity.


LUNGS: Equal air entry with few scattered rhonchi, crackles in the posterior 

bases.


CVS: S1 and S2 normal with no audible murmur, irregular rhythm.  Tachycardic.


ABDOMEN: No hepatosplenomegaly, normal bowel sounds, no guarding or rigidity.


SPINE: No scoliosis or deformity


SKIN: No rashes


CENTRAL NERVOUS SYSTEM: No focal deficits, tone is normal in all 4 extremities.


EXTREMITIES: There is 2+ peripheral edema.  No clubbing, no cyanosis.  

Peripheral pulses are intact.





- Labs


CBC & Chem 7: 


                                 02/06/21 02:15





                                 02/06/21 02:15


Labs: 


                  Abnormal Lab Results - Last 24 Hours (Table)











  02/05/21 02/06/21 02/06/21 Range/Units





  20:40 02:15 02:15 


 


WBC   19.0 H   (3.8-10.6)  k/uL


 


RBC   3.46 L   (4.30-5.90)  m/uL


 


Hgb   9.2 L   (13.0-17.5)  gm/dL


 


Hct   30.2 L   (39.0-53.0)  %


 


MCHC   30.6 L   (31.0-37.0)  g/dL


 


RDW   18.4 H   (11.5-15.5)  %


 


Neutrophils # (Manual)   15.90 H   (1.3-7.7)  k/uL


 


Metamyelocytes # (Man)   0.95 H   (0)  k/uL


 


Myelocytes # (Manual)   0.19 H   (0)  k/uL


 


APTT    41.5 H  (22.0-30.0)  sec


 


BUN     (9-20)  mg/dL


 


Glucose     (74-99)  mg/dL


 


POC Glucose (mg/dL)  125 H    (75-99)  mg/dL














  02/06/21 02/06/21 02/06/21 Range/Units





  02:15 05:44 08:53 


 


WBC     (3.8-10.6)  k/uL


 


RBC     (4.30-5.90)  m/uL


 


Hgb     (13.0-17.5)  gm/dL


 


Hct     (39.0-53.0)  %


 


MCHC     (31.0-37.0)  g/dL


 


RDW     (11.5-15.5)  %


 


Neutrophils # (Manual)     (1.3-7.7)  k/uL


 


Metamyelocytes # (Man)     (0)  k/uL


 


Myelocytes # (Manual)     (0)  k/uL


 


APTT    124.7 H*  (22.0-30.0)  sec


 


BUN  31 H    (9-20)  mg/dL


 


Glucose  149 H    (74-99)  mg/dL


 


POC Glucose (mg/dL)   116 H   (75-99)  mg/dL














  02/06/21 Range/Units





  11:50 


 


WBC   (3.8-10.6)  k/uL


 


RBC   (4.30-5.90)  m/uL


 


Hgb   (13.0-17.5)  gm/dL


 


Hct   (39.0-53.0)  %


 


MCHC   (31.0-37.0)  g/dL


 


RDW   (11.5-15.5)  %


 


Neutrophils # (Manual)   (1.3-7.7)  k/uL


 


Metamyelocytes # (Man)   (0)  k/uL


 


Myelocytes # (Manual)   (0)  k/uL


 


APTT   (22.0-30.0)  sec


 


BUN   (9-20)  mg/dL


 


Glucose   (74-99)  mg/dL


 


POC Glucose (mg/dL)  135 H  (75-99)  mg/dL














Assessment and Plan


Assessment: 





1 Acute hypoxic respiratory failure secondary to sepsis, septic shock, related 

to pneumonia, with chronic loculated effusions, possible empyema.  Cultures 

positive for Aspergillus species.





2 Bilateral hydropneumothorax, possible empyema, status post ultrasound-guided 

left-sided thoracentesis on 02/01/2021 with 45 ML's of cloudy white fluid 

removed, showing Aspergillus.  Remains on voriconazole.





3 Recent admission for hypotension, dehydration secondary to nausea, vomiting, 

acute kidney injury with diarrhea.





4 Recent pulmonary infections with history of streptococcal pneumonia, empyema 

requiring chest tube placement in July 2020.





5 Chronic loculated pneumothoraces, posterior, with air-fluid seen on CAT scan 

imaging on the chest.  Possibility of trapped lung, chronic scarring and 

possibility of empyema.





6 History of rheumatoid arthritis and rheumatoid lung disease.  On Arava





7 Recent history of acute kidney injury





8 Hypertension





9 Hypothyroidism





10 Previous history of DVT.  





11 New-onset atrial fibrillation/flutter with rapid ventricular response 

currently on a Cardizem drip





Plan:





The patient was seen and evaluated by Dr. Gómez. 


Continue with the current treatment plan


Pleurx catheter to be placed on 02/08/2021


The plan is for installation of amphotericin


Cardiology considering cardioversion


Continue heparin drip for now


We'll continue to follow





I, the cosigning physician, performed a history & physical examination of the 

patient. Lungs sounds  bilateral scattered rhonchi and crackles in the bilateral

bases.  Maintaining good O2 saturations in the 90s on 2 L/m per nasal cannula.  

I discussed the assessment and plan of care with my nurse practitioner, Rupa Flood. I attest to the above note as dictated by her.

## 2021-02-06 NOTE — PN
PROGRESS NOTE



DATE OF SERVICE:

02/06/2021



REASON FOR FOLLOW UP:

Left-sided empyema.



INTERVAL HISTORY:

Patient is currently afebrile.  Patient is breathing slightly comfortably.  The patient

denies having any chest pain or shortness of breath.  Minimal cough.  No nausea, no

vomiting.  No abdominal pain or diarrhea.



PHYSICAL EXAMINATION:

Blood pressure 112/72 with a pulse of 81, temperature 98.7.  He is 94% on 2 L nasal

cannula.  General description is a middle-aged male up in the chair in no distress.

Respiratory system: Unlabored breathing, decreased breath sounds at bases, no wheeze.

Heart S1, S2.  Regular rate and rhythm.  Abdomen soft, no tenderness.



LABS:

Hemoglobin is 9.8, white count 19,000.  BUN of 31, creatinine 0.81.



DIAGNOSTIC IMPRESSION AND PLAN:

1. Patient with chronic left  lung empyema with CT-guided drainage and culture

    positive for Aspergillus.  The sputum was also positive for the same pathogen.

    Minimal thoracotomy on Monday.  The patient is covered with voriconazole.

2. Elevated white count, more likely steroid effect and will be monitored closely.





MMODL / IJN: [id number] / Job#: [id number]

## 2021-02-06 NOTE — P.PN
Subjective


Progress Note Date: 02/06/21





The patient was interviewed and examined sitting up in the recliner chair.  He 

states he did have some shortness of breath ambulating from the bed.  He also 

feels occasional palpitations.  No chest pain or chest pressure.  No dizziness 

or lightheadedness.  Positive for fatigue.





The patient is concerned about not proceeding with cardioversion as he was told 

his heart rate must be well controlled in order to proceed with chest tube 

placement.  Patient was reassured that he may proceed with our recommendations. 

If we perform the cardioversion prior to his chest tube placement, he will 

likely convert back post procedure.





GENERAL: Well-appearing, well-nourished and in no acute distress.


NECK: Supple without JVD or thyromegaly.


LUNGS: Breath sounds rhonchorous to auscultation bilaterally. Respiration equal 

and unlabored.  No wheezes or crackles.


HEART: Regular rate and rhythm without murmurs, rubs or gallops. S1 and S2 

heard.  Tachycardic.


EXTREMITIES: Normal range of motion, no edema.  No clubbing or cyanosis. 

Peripheral pulses intact and strong.





VITALS:


Blood pressure 112/72, heart rate 118, respiratory rate 16, SpO2 95% on 2 L 

nasal cannula





TELEMETRY:


Atrial flutter with RVR.  Heart rates in the 140s.





LABS:


WBC 19.0, hemoglobin 9.2, hematocrit 30.2, sodium 142, potassium 3.6, BUN 31, 

creatinine 0.81





IMPRESSION:


#1 atrial flutter with RVR, on IV heparin


#2 fungal pneumonia with empyema


#3 hypertension


#4 hyperlipidemia


#5 leukocytosis


#6 anemia





PLAN:


Patient will be proceeding with chest tube placement by CV surgery on Monday.


Consider cardioversion after chest tube placement


Continue to maximize beta blockers as tolerated


Further recommendations based upon clinical course





The patient has been seen and evaluated. Plan of care has been reviewed and 

agreed upon by Dr Pimentel.


 








Objective





- Vital Signs


Vital signs: 


                                   Vital Signs











Temp  98.7 F   02/06/21 12:00


 


Pulse  81   02/06/21 12:00


 


Resp  18   02/06/21 12:00


 


BP  93/67   02/06/21 12:00


 


Pulse Ox  94 L  02/06/21 12:00








                                 Intake & Output











 02/05/21 02/06/21 02/06/21





 18:59 06:59 18:59


 


Intake Total 113.59 467.532 600.041


 


Output Total 2310 280 3167


 


Balance -1236.41 267.532 -399.959


 


Weight  100.1 kg 


 


Intake:   


 


  Intake, IV Titration 113.59 227.532 124.041





  Amount   


 


    Heparin Sod,Pork in 0.45%  227.532 124.041





    NaCl 25,000 unit In 0.45   





    % NaCl 1 250ml.bag @ 16   





    UNITS/KG/HR 16.112 mls/hr   





    IV .[id number] GABBY Rx#:   





    [id number]   


 


    Heparin Sod,Pork in 0.45% 113.59  





    NaCl 25,000 unit In 0.45   





    % NaCl 1 250ml.bag @ 9.93   





    UNITS/KG/HR 10 mls/hr IV   





    .Q24H GABBY Rx#:[id number]   


 


  Oral  240 476


 


Output:   


 


  Urine 5636 [id number] 5848


 


Other:   


 


  Voiding Method Toilet Toilet Toilet





 Urinal Urinal Urinal


 


  # Voids  1 


 


  # Bowel Movements  1 














- Labs


CBC & Chem 7: 


                                 02/06/21 02:15





                                 02/06/21 02:15


Labs: 


                  Abnormal Lab Results - Last 24 Hours (Table)











  02/05/21 02/06/21 02/06/21 Range/Units





  20:40 02:15 02:15 


 


WBC   19.0 H   (3.8-10.6)  k/uL


 


RBC   3.46 L   (4.30-5.90)  m/uL


 


Hgb   9.2 L   (13.0-17.5)  gm/dL


 


Hct   30.2 L   (39.0-53.0)  %


 


MCHC   30.6 L   (31.0-37.0)  g/dL


 


RDW   18.4 H   (11.5-15.5)  %


 


Neutrophils # (Manual)   15.90 H   (1.3-7.7)  k/uL


 


Metamyelocytes # (Man)   0.95 H   (0)  k/uL


 


Myelocytes # (Manual)   0.19 H   (0)  k/uL


 


APTT    41.5 H  (22.0-30.0)  sec


 


BUN     (9-20)  mg/dL


 


Glucose     (74-99)  mg/dL


 


POC Glucose (mg/dL)  125 H    (75-99)  mg/dL














  02/06/21 02/06/21 02/06/21 Range/Units





  02:15 05:44 08:53 


 


WBC     (3.8-10.6)  k/uL


 


RBC     (4.30-5.90)  m/uL


 


Hgb     (13.0-17.5)  gm/dL


 


Hct     (39.0-53.0)  %


 


MCHC     (31.0-37.0)  g/dL


 


RDW     (11.5-15.5)  %


 


Neutrophils # (Manual)     (1.3-7.7)  k/uL


 


Metamyelocytes # (Man)     (0)  k/uL


 


Myelocytes # (Manual)     (0)  k/uL


 


APTT    124.7 H*  (22.0-30.0)  sec


 


BUN  31 H    (9-20)  mg/dL


 


Glucose  149 H    (74-99)  mg/dL


 


POC Glucose (mg/dL)   116 H   (75-99)  mg/dL














  02/06/21 Range/Units





  11:50 


 


WBC   (3.8-10.6)  k/uL


 


RBC   (4.30-5.90)  m/uL


 


Hgb   (13.0-17.5)  gm/dL


 


Hct   (39.0-53.0)  %


 


MCHC   (31.0-37.0)  g/dL


 


RDW   (11.5-15.5)  %


 


Neutrophils # (Manual)   (1.3-7.7)  k/uL


 


Metamyelocytes # (Man)   (0)  k/uL


 


Myelocytes # (Manual)   (0)  k/uL


 


APTT   (22.0-30.0)  sec


 


BUN   (9-20)  mg/dL


 


Glucose   (74-99)  mg/dL


 


POC Glucose (mg/dL)  135 H  (75-99)  mg/dL

## 2021-02-06 NOTE — PN
PROGRESS NOTE



I am covering for Dr. Myers.



HISTORY OF PRESENT ILLNESS:

This 60-year-old gentleman who was admitted with acute hypoxic respiratory failure

secondary to loculated left-sided pleural effusion and fungal empyema, also had

recurrent pneumonia.  The patient is on antifungal medications and chest tube drainage

and PleurX catheter placement was contemplated by cardiovascular surgery on Monday.

The patient has bilateral pneumonia and bilateral hydropneumothorax also.  The patient

has also had acute hypoxic respiratory failure and sepsis as well.  The patient had

multiple episodes of pneumonia and empyema as well.



PAST MEDICAL HISTORY:

Reviewed.



REVIEW OF SYSTEMS:

Cardiovascular: No angina.

Respiration as mentioned earlier.

GI: As mentioned earlier.

: No dysuria.



CURRENT MEDICATIONS:

Reviewed and include: Tylenol, Norco, Cordarone, vitamin C, aspirin, heparin,

Synthroid.



PHYSICAL EXAMINATION:

Patient is alert, oriented times three.

Pulse is 102.  Blood pressure 101/60, respirations 16, temperature 97.9, pulse ox 98%

on 3 L.

HEENT is conjunctivae normal.

NECK: No JVD.

CARDIOVASCULAR: S1, S2.

RESPIRATORY SYSTEM: Breath sounds diminished at the bases.  Scattered rhonchi.

ABDOMEN:  Soft, nontender.

LEGS are no edema, no swelling.

NERVOUS SYSTEM: No focal deficits.



LABS:

WBC 19 and BUN is 31.  Cultures Aspergillus fumigatus.



ASSESSMENT:

1. Acute bilateral pneumonia with left-sided pleural effusion, fungal empyema with

    recurrent pneumonia.

2. Lactic acidosis.

3. Sepsis, septic shock.

4. Acute kidney injury.

5. Leukocytosis.

6. Atrial flutter with rapid ventricular rate.

7. History of rheumatoid arthritis and rheumatoid lung.

8. History of empyema with Streptococcus.

9. Hypertension.

10.Hypertensive cardiovascular disease.

11.Hypothyroidism.

12.Deep vein thrombosis prophylaxis.

13.Gastrointestinal prophylaxis.

14.Increased WBC.

15.Anemia, normocytic anemia of chronic disease.



RECOMMENDATIONS AND DISCUSSION:

Recommend to continue current medications, management and symptomatic treatment.

Otherwise at this time, the patient is on IV heparin.  Cardiology is following the

patient closely.  The patient is on comfort measures. Multiple consultants are

following the patient closely.  The white count is still elevated.  The most recent

chest x-ray which was reviewed personally by me done a couple days ago showed

significant bilateral pneumonic process and effusions.  Chest CTA done 01/29/2021

showed no CT evidence of pulmonary embolism and chronic parenchymal changes also noted

from rheumatoid lung.  We will continue to monitor. Further recommendations to follow.





MMODL / IJN: [id number] / Job#: [id number]

## 2021-02-06 NOTE — P.PN
Subjective


Progress Note Date: 02/06/21


Principal diagnosis: 





Bilateral pneumonia, bilateral hydropneumothorax, possible empyema, sputum 

culture preliminarily positive for Aspergillus species, acute hypoxic 

respiratory failure with sepsis on admission.  Past medical history significant 

for multiple admissions for pneumonia with empyema on the left and previous 

bilateral chest tube placement in June 2020 with cultures positive for strep 

pneumonia, rheumatoid arthritis with rheumatoid lung, hypertension, 

hypothyroidism, DVT, previous tobacco dependence, family history of lung cancer,

and recent hospitalization for hypotension, dehydration, acute kidney injury 

requiring dialysis.





POD #5 left-sided thoracentesis by interventional radiology.





The patient was seen on follow-up today 02/06/2021 at his bedside on the cardiac

stepdown unit.  Currently sitting up to the bedside chair, is awake, alert and 

oriented 3.  Denies any complaints of pain or shortness of breath at this time.

 Remote telemetry showing atrial flutter with heart rate of 106 bpm.  Amiodarone

drip remains infusing at 0.5 mg/m and heparin drip is infusing per protocol. 

Oxygen saturation are 95% on 2 L nasal cannula.  He is achieving 1000 mL on his 

incentive spirometry.  The patient is scheduled for a left Pleurx catheter 

placement on Monday, 02/08/2021.  Laboratory results show a WBC count trending 

up at 19.0, hemoglobin 9.2, hematocrit 30.2, platelets 354, band count 8%, PTT 

41.5, BUN 31, and creatinine 0.81.





Objective





- Vital Signs


Vital signs: 


                                   Vital Signs











Temp  97.5 F L  02/06/21 08:25


 


Pulse  118 H  02/06/21 08:25


 


Resp  16   02/06/21 08:25


 


BP  112/72   02/06/21 08:25


 


Pulse Ox  95   02/06/21 08:25








                                 Intake & Output











 02/05/21 02/06/21 02/06/21





 18:59 06:59 18:59


 


Intake Total 113.59 467.532 


 


Output Total 1350 200 200


 


Balance -1236.41 267.532 -200


 


Weight  100.1 kg 


 


Intake:   


 


  Intake, IV Titration 113.59 227.532 





  Amount   


 


    Heparin Sod,Pork in 0.45%  227.532 





    NaCl 25,000 unit In 0.45   





    % NaCl 1 250ml.bag @ 16   





    UNITS/KG/HR 16.112 mls/hr   





    IV .[id number] [facility] Rx#:   





    [id number]   


 


    Heparin Sod,Pork in 0.45% 113.59  





    NaCl 25,000 unit In 0.45   





    % NaCl 1 250ml.bag @ 9.93   





    UNITS/KG/HR 10 mls/hr IV   





    .Q24H [facility] Rx#:[id number]   


 


  Oral  240 


 


Output:   


 


  Urine 1350 200 200


 


Other:   


 


  Voiding Method Toilet Toilet 





 Urinal Urinal 


 


  # Voids  1 


 


  # Bowel Movements  1 














- Constitutional


General appearance: Present: cooperative, no acute distress, obese





- EENT


Eyes: Present: normal appearance.  Absent: scleral icterus


ENT: Present: hearing grossly normal





- Neck


Details: 





Neck is supple, no JVD.





- Respiratory


Details: 





Lung sounds with expiratory wheezes throughout, diminished to his bilateral 

bases.  Respirations are symmetrical and nonlabored.  Oxygen saturation is 95% 

on 2 L nasal cannula.  Achieving 1000 mL on his incentive spirometry.





- Cardiovascular


Details: 





Irregular rhythm and tachycardic rate.  S1 and S2 present, negative for S3, 

gallop or murmur.  Remote telemetry showing atrial flutter heart rate 106 BPM.  

Knee-high PAT hose and sequential compression devices in place to his bilateral 

lower extremities.





- Gastrointestinal


Gastrointestinal Comment(s): 





Abdomen is soft, nontender nondistended.  Active bowel sounds present in all 4 

abdominal quadrants.  No guarding or rigidity.  No organomegaly appreciated.  

Tolerating oral intake.





- Genitourinary


Genitourinary Comment(s): 





Continues to void.





- Integumentary


Integumentary Comment(s): 





Skin is warm and dry.  No clubbing or cyanosis is present.





- Neurologic


Neurologic: Present: CNII-XII intact.  Absent: focal deficits





- Musculoskeletal


Musculoskeletal: Present: gait normal, generalized weakness, strength equal 

bilaterally





- Psychiatric


Psychiatric: Present: A&O x's 3, appropriate affect, intact judgment & insight





- Allied health notes


Allied health notes reviewed: nursing





- Labs


CBC & Chem 7: 


                                 02/06/21 02:15





                                 02/06/21 02:15


Labs: 


                  Abnormal Lab Results - Last 24 Hours (Table)











  02/05/21 02/05/21 02/05/21 Range/Units





  11:53 11:53 20:40 


 


WBC     (3.8-10.6)  k/uL


 


RBC     (4.30-5.90)  m/uL


 


Hgb     (13.0-17.5)  gm/dL


 


Hct     (39.0-53.0)  %


 


MCHC     (31.0-37.0)  g/dL


 


RDW     (11.5-15.5)  %


 


Neutrophils # (Manual)     (1.3-7.7)  k/uL


 


Metamyelocytes # (Man)     (0)  k/uL


 


Myelocytes # (Manual)     (0)  k/uL


 


APTT  75.9 H    (22.0-30.0)  sec


 


BUN     (9-20)  mg/dL


 


Glucose     (74-99)  mg/dL


 


POC Glucose (mg/dL)   157 H  125 H  (75-99)  mg/dL














  02/06/21 02/06/21 02/06/21 Range/Units





  02:15 02:15 02:15 


 


WBC  19.0 H    (3.8-10.6)  k/uL


 


RBC  3.46 L    (4.30-5.90)  m/uL


 


Hgb  9.2 L    (13.0-17.5)  gm/dL


 


Hct  30.2 L    (39.0-53.0)  %


 


MCHC  30.6 L    (31.0-37.0)  g/dL


 


RDW  18.4 H    (11.5-15.5)  %


 


Neutrophils # (Manual)  15.90 H    (1.3-7.7)  k/uL


 


Metamyelocytes # (Man)  0.95 H    (0)  k/uL


 


Myelocytes # (Manual)  0.19 H    (0)  k/uL


 


APTT   41.5 H   (22.0-30.0)  sec


 


BUN    31 H  (9-20)  mg/dL


 


Glucose    149 H  (74-99)  mg/dL


 


POC Glucose (mg/dL)     (75-99)  mg/dL














  02/06/21 Range/Units





  05:44 


 


WBC   (3.8-10.6)  k/uL


 


RBC   (4.30-5.90)  m/uL


 


Hgb   (13.0-17.5)  gm/dL


 


Hct   (39.0-53.0)  %


 


MCHC   (31.0-37.0)  g/dL


 


RDW   (11.5-15.5)  %


 


Neutrophils # (Manual)   (1.3-7.7)  k/uL


 


Metamyelocytes # (Man)   (0)  k/uL


 


Myelocytes # (Manual)   (0)  k/uL


 


APTT   (22.0-30.0)  sec


 


BUN   (9-20)  mg/dL


 


Glucose   (74-99)  mg/dL


 


POC Glucose (mg/dL)  116 H  (75-99)  mg/dL














Assessment and Plan


Assessment: 





1.  Bilateral pneumonia, bilateral hydropneumothorax, possible empyema, sputum 

culture preliminarily positive for Aspergillus species, status post left-sided 

thoracentesis


2.  Acute hypoxic respiratory failure with sepsis on admission


3.  Multiple admissions for pneumonia with empyema on the left and previous 

bilateral chest tube placement in June 2020 with cultures positive for strep 

pneumonia


4.  Rheumatoid arthritis with rheumatoid lung


5.  Hypertension, currently hypotensive, requiring pressors on admission


6.  Hypothyroidism


7.  History of DVT


8.  Previous tobacco dependence


9.  Family history of lung cancer


10.  Recent hospitalization for hypotension, dehydration, acute kidney injury 

requiring dialysis


11.  New-onset atrial fibrillation/atrial flutter with rapid ventricular 

response, on amiodarone drip


Plan: 





1.  Pleural fluid culture and sputum culture shows Aspergillus Fumigatus.


2.  Continue to follow chest x-rays.


3.  Encourage incentive spirometry 10 times every hour while awake.  

Bronchodilators per pulmonary for/critical care medicine.


4.  Currently on Vfend managed by infectious disease.  Amphotericin B pleural 

irrigation and available for on-call to the operating room for Monday, 

02/08/2021.


5.  Increase activity as tolerated.


6.  Medical management and other comorbidities per primary care service.


7.  The patient is scheduled for a left Pleurx catheter placement on Monday, 

02/08/2021 to be performed by Dr. Arnold Kaye. 


8.  More recommendations to follow based on patient's clinical course.


Time with Patient: Greater than 30

## 2021-02-07 LAB
ANION GAP SERPL CALC-SCNC: 8 MMOL/L
BUN SERPL-SCNC: 31 MG/DL (ref 9–20)
CALCIUM SPEC-MCNC: 8.6 MG/DL (ref 8.4–10.2)
CELLS COUNTED: 100
CHLORIDE SERPL-SCNC: 106 MMOL/L (ref 98–107)
CO2 SERPL-SCNC: 27 MMOL/L (ref 22–30)
ERYTHROCYTE [DISTWIDTH] IN BLOOD BY AUTOMATED COUNT: 3.73 M/UL (ref 4.3–5.9)
ERYTHROCYTE [DISTWIDTH] IN BLOOD: 18.5 % (ref 11.5–15.5)
GLUCOSE BLD-MCNC: 113 MG/DL (ref 75–99)
GLUCOSE BLD-MCNC: 123 MG/DL (ref 75–99)
GLUCOSE BLD-MCNC: 152 MG/DL (ref 75–99)
GLUCOSE BLD-MCNC: 97 MG/DL (ref 75–99)
GLUCOSE SERPL-MCNC: 92 MG/DL (ref 74–99)
HCT VFR BLD AUTO: 32.9 % (ref 39–53)
HGB BLD-MCNC: 10.1 GM/DL (ref 13–17.5)
LYMPHOCYTES # BLD MANUAL: 3.52 K/UL (ref 1–4.8)
MCH RBC QN AUTO: 27.2 PG (ref 25–35)
MCHC RBC AUTO-ENTMCNC: 30.8 G/DL (ref 31–37)
MCV RBC AUTO: 88.3 FL (ref 80–100)
METAMYELOCYTES # BLD: 0.37 K/UL
MONOCYTES # BLD MANUAL: 0.37 K/UL (ref 0–1)
NEUTROPHILS NFR BLD MANUAL: 73 %
NEUTS SEG # BLD MANUAL: 14.2 K/UL (ref 1.3–7.7)
PLATELET # BLD AUTO: 339 K/UL (ref 150–450)
POTASSIUM SERPL-SCNC: 3.3 MMOL/L (ref 3.5–5.1)
SODIUM SERPL-SCNC: 141 MMOL/L (ref 137–145)
WBC # BLD AUTO: 18.5 K/UL (ref 3.8–10.6)

## 2021-02-07 RX ADMIN — METOPROLOL TARTRATE SCH MG: 25 TABLET, FILM COATED ORAL at 08:10

## 2021-02-07 RX ADMIN — HEPARIN SODIUM SCH MLS/HR: 10000 INJECTION, SOLUTION INTRAVENOUS at 21:30

## 2021-02-07 RX ADMIN — PANTOPRAZOLE SODIUM SCH MG: 40 TABLET, DELAYED RELEASE ORAL at 05:17

## 2021-02-07 RX ADMIN — VORICONAZOLE SCH MLS/HR: 10 INJECTION, POWDER, LYOPHILIZED, FOR SOLUTION INTRAVENOUS at 09:19

## 2021-02-07 RX ADMIN — TRIAMCINOLONE ACETONIDE SCH APPLIC: 1 CREAM TOPICAL at 21:24

## 2021-02-07 RX ADMIN — TRIAMCINOLONE ACETONIDE SCH APPLIC: 1 CREAM TOPICAL at 08:10

## 2021-02-07 RX ADMIN — METOPROLOL TARTRATE SCH MG: 25 TABLET, FILM COATED ORAL at 21:24

## 2021-02-07 RX ADMIN — FUROSEMIDE SCH MG: 10 INJECTION, SOLUTION INTRAMUSCULAR; INTRAVENOUS at 08:10

## 2021-02-07 RX ADMIN — VORICONAZOLE SCH MLS/HR: 10 INJECTION, POWDER, LYOPHILIZED, FOR SOLUTION INTRAVENOUS at 22:05

## 2021-02-07 RX ADMIN — POTASSIUM CHLORIDE SCH MEQ: 20 TABLET, EXTENDED RELEASE ORAL at 11:27

## 2021-02-07 RX ADMIN — AMIODARONE HYDROCHLORIDE SCH MG: 200 TABLET ORAL at 21:24

## 2021-02-07 RX ADMIN — IPRATROPIUM BROMIDE AND ALBUTEROL SULFATE SCH: .5; 3 SOLUTION RESPIRATORY (INHALATION) at 11:49

## 2021-02-07 RX ADMIN — HYDROCORTISONE SODIUM SUCCINATE SCH MG: 100 INJECTION, POWDER, FOR SOLUTION INTRAMUSCULAR; INTRAVENOUS at 08:10

## 2021-02-07 RX ADMIN — Medication SCH MCG: at 21:23

## 2021-02-07 RX ADMIN — IPRATROPIUM BROMIDE AND ALBUTEROL SULFATE SCH: .5; 3 SOLUTION RESPIRATORY (INHALATION) at 20:56

## 2021-02-07 RX ADMIN — LEVOTHYROXINE SODIUM SCH MCG: 75 TABLET ORAL at 05:17

## 2021-02-07 RX ADMIN — METOPROLOL TARTRATE SCH MG: 25 TABLET, FILM COATED ORAL at 15:47

## 2021-02-07 RX ADMIN — POTASSIUM CHLORIDE SCH MEQ: 20 TABLET, EXTENDED RELEASE ORAL at 09:19

## 2021-02-07 RX ADMIN — HEPARIN SODIUM SCH MLS/HR: 10000 INJECTION, SOLUTION INTRAVENOUS at 11:28

## 2021-02-07 RX ADMIN — HYDROCORTISONE SODIUM SUCCINATE SCH MG: 100 INJECTION, POWDER, FOR SOLUTION INTRAMUSCULAR; INTRAVENOUS at 23:29

## 2021-02-07 RX ADMIN — ASPIRIN 81 MG CHEWABLE TABLET SCH MG: 81 TABLET CHEWABLE at 21:23

## 2021-02-07 RX ADMIN — POTASSIUM CHLORIDE SCH MLS/HR: 14.9 INJECTION, SOLUTION INTRAVENOUS at 21:25

## 2021-02-07 RX ADMIN — OXYCODONE HYDROCHLORIDE AND ACETAMINOPHEN SCH MG: 500 TABLET ORAL at 21:24

## 2021-02-07 RX ADMIN — AMIODARONE HYDROCHLORIDE SCH MG: 200 TABLET ORAL at 08:09

## 2021-02-07 RX ADMIN — IPRATROPIUM BROMIDE AND ALBUTEROL SULFATE SCH: .5; 3 SOLUTION RESPIRATORY (INHALATION) at 08:48

## 2021-02-07 RX ADMIN — HYDROCORTISONE SODIUM SUCCINATE SCH MG: 100 INJECTION, POWDER, FOR SOLUTION INTRAMUSCULAR; INTRAVENOUS at 15:47

## 2021-02-07 NOTE — P.PN
Subjective


Progress Note Date: 02/07/21





60-year-old white male patient, with past medical history of rheumatoid 

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 

episodes of pneumonia and history of loculated empyema requiring chest tube 

placement back in July 2020 with pleural fluid cultures positive for 

streptococcal pneumonia.  Other medical history includes hypertension, 

hypothyroidism and previous history of DVT.  Patient had a recent 

hospitalization from January 18 through 01/21/2021 hypotension, dehydration re

lated to nausea vomiting diarrhea, acute kidney injury.  Patient received 

antibiotics in the form of Zosyn and Levaquin for possibility of pneumonia, he 

is chest x-ray showed cranial perihilar pulmonary infiltrates with increased 

interstitial changes at the lung bases and was thought to be related to a 

combination of rheumatoid lung and chronic scarring.  His last CT of the chest 

was on 12/29/2020 showing chronic changes to lower lungs, persistent irregular 

thick-walled pleural spaces in the posterior lung bases containing fluid and air

with adjacent anterior lung with chronic consolidation or atelectasis.  On 

01/29/2021 patient came into the emergency department from Dr. SARINA Santo's office

where he was being seen for a regular follow-up appointments.  He was noted to 

have low pulse ox 70s and was sent in to the emergency department for 

evaluation.  He has been having cough with yellow and sometimes blood-tinged 

sputum, appears amounts, denies any fever, COVID 19 was found to be negative, 

chest x-ray showed perihilar and basilar infiltrates without significant change 

from previous chest x-ray on 01/19/2021.  Lab data showed leukocytosis with 

white blood cell count of 19.1, hemoglobin of 12.1, INR 1.5, sodium is 135, 

potassium is 3.9, chloride is 102, CO2 is 19, creatinine is 1.32, which has 

actually improved since his discharge from the hospital on 01/21/2021 when he 

was at 1.84.  Lactic acid was elevated at 3.7, patient was given a total of 2 L 

in fluid boluses, troponin was negative at less than 0.012.  Urinalysis showed 

no evidence of infection.  CT chest showed thick-walled pleural cavities 

posteriorly in the lower lungs with left-sided air-fluid level with left-sided 

pleural fluid diminished most recent CT from 12/29/2020.  There is associated 

compressive atelectasis and/or chronic consolidation.  Persistent as it is lobe 

fissure, multifocal areas of reticular nodular opacity in the upper lungs remain

present with slight nodularity in the left upper lobe.  There is a focal 

consolidation improved from most recent study with some residual areas of 

scarring.  We will emergency department patient became hypotensive with a blood 

pressure in the 70s, slightly tachycardic remains in sinus mechanism, he is 

receiving his third liter bolus, his lactic acid did respond and improved he was

started on antibiotics in the form of Zosyn and vancomycin 





The patient is seen today January 30th 2021 in the intensive care unit.  He is 

currently sitting up in a chair at the bedside.  Awake and alert in no acute 

distress.  Maintaining O2 saturations in the 90s on 2 L/m per nasal cannula.  He

did require a small dose of norepinephrine currently on 0.05 mcg/kg/m.  He has 

lactated Ringer's at 100 MLS per hour.  He remains on vancomycin and cefepime.  

Chest x-ray continues to show the left lower lobe effusion.  White count 11.7.  

Hemoglobin 9.7.  Sodium 135.  Potassium 3.6.  Creatinine 1.00.  Blood cultures 

reveal no growth to date.





Reevaluated today on 1/31/2021, remains in the ICU, still on norepinephrine at 

0.04 mcg/kg/m.  On LR at 1 25 mL per hour.  On vancomycin and cefepime 

empirically, blood pressure remains marginal, went ahead and recommended 

Solu-Cortef 100 mg IV push every 8 hours and hopefully will be able to 

discontinue norepinephrine today.  His IV fluid is cut down to KVO.  Given 20 mg

of Lasix IV push, and I have recommended that we monitor the patient in the ICU 

for the next 24 hours.  Chest x-ray is basically the same, continues to show ch

ronic finding, difficult to exclude underlying pneumonia/empyema.  CT of the 

chest on admission was also reviewed, difficult to rule out underlying empyema. 

Clinically the patient is feeling better except for generally weak.  WBC count 

is 12.5 hemoglobin is 9.9.  Normal renal profile is normal blood cultures are 

negative so far.  Sputum cultures are also negative so far.





On 02/01/2021 patient seen in follow-up in the intensive care unit, he is awake 

and alert, oriented 3, sitting up in the recliner, currently on 2 L of oxygen 

his pulse ox is %, his been afebrile, hemodynamically he is stable, he is 

not on any vasopressor support.  His lactate Ringer's running at 20 ML per hour,

antibiotics include cefepime and vancomycin, blood pressure has been stable, so 

far blood and sputum cultures are negative, he denies chest pain, his breathing 

is comfortable.  Today's labs have been reviewed, showing white blood cell count

trending down, 10.2 today, hemoglobin is 8.8, electrolytes and renal profile are

unremarkable.  Serum cortisol level came back at 3, patient continues on stress 

doses of hydrocortisone 100 mg every 8 hours.  Patient has not been stable, 

we'll consult interventional radiology for ultrasound-guided left thoracentesis





On 02/02/2021 patient seen in follow-up in the intensive care unit, he is calm 

and comfortable, awake and alert, sitting up in the recliner, currently on room 

air with a pulse ox of 93-94%, his been afebrile, hemodynamically he is been 

stable, he is on IV LR at 20 ML per hour, no other drips.  He is on cefepime and

vancomycin for antibiotic coverage, yesterday he underwent left thoracentesis 

with removal of 43 mL of pleural fluid which was sent for cultures.  Tolerated 

procedure well.  His pleural fluid analysis revealed total fluid protein of 1.3 

g, and fluid LDH of greater than 4500 consistent with exudative fluid.  His 

microbiology data has been reviewed showing Aspergillus fumigate is in the 

sputum, his pleural fluid cultures are pending at this time, the cultures have 

shown no growth, hemodynamically has been stable, he is breathing easier, he 

still has cough mostly in the morning and the amount of secretions his bringing 

up is decreasing in amount.  Lung sounds revealed a few scattered wheezes, and a

few rhonchi at bilateral bases.  Is tolerating oral intake.  No abdominal pain, 

no nausea or vomiting, today's labs have been reviewed, white blood cell count 

is 11.2, hemoglobin is 9.1, sodium is 137, potassium is 4.0, chloride is 109, 

BUN is 15, creatinine 0.80.  No acute events overnight.  He is working on 

incentive spirometer, he is achieving 1000 today





On 02/03/2021 patient seen in follow-up on medical surgical floor.  He is awake 

and alert, in no acute distress, breathing comfortably, denies any chest pain, 

room air pulse ox is 94%, no fever or chills, today's chest x-ray shows left 

basilar loculated pneumothorax, stable in appearance, bilateral lung 

infiltrates.  His pleural fluid cultures so far showing Aspergillus species, as 

does his sputum culture.  Blood culture show no growth.  The cervix is follow

ing, current antibiotic coverage includes cefepime, vancomycin has been 

discontinued, she received 1 dose of IV Lasix per nephrology.  He has produced 

2500 in urine output, and he is in -1.6 L over the last 24 hours, however he 

still has significant lower extremity edema.





The patient is seen today 02/04/2021 in follow-up in the intensive care unit.  

He was transferred here earlier this morning after developing atrial 

fibrillation/flutter with a rapid ventricular response.  He was initiated on C

ardizem at 5 mg per hour.  He has 0.9 normal saline at 20 ML's per hour.  He is 

currently awake and alert in no acute distress.  No worsening shortness of 

breath, cough or congestion.  No chest pain or palpitations.  He sitting up in a

chair at the bedside.  Maintaining O2 saturations in the 90s on 2 L/m per nasal 

cannula. Chest x-ray does reveal loculated posterior basilar pneumothoraces, 

stable from previous exam.  There is diffuse increased lung markings.  Correlate

for pneumonia and atelectasis.  Pleural fluid cultures is positive for 

Aspergillus species.  White count 10.9.  Hemoglobin 8.8.  Sodium 139.  Potassium

3.4.  Creatinine 0.89.  He remains on bronchodilators, IV diuretics, IV Solu-

Cortef.  Continued on voriconazole.








The patient is seen today 02/06/2021 in follow-up on the selective care unit.  

He is currently sitting up in a recliner at the bedside.  Awake and alert in no 

acute distress.  He is currently on room air.  LR at 20 miles per hour.  Heparin

drip per weight base protocol.  White count 19.0.  Hemoglobin 9.1.  Sodium 142. 

Potassium 3.6.  Creatinine 0.81.  He remains on voriconazole.





The patient is seen today 02/07/2021 in follow-up on the selective care unit.  

He is currently sitting up in a chair at the bedside.  No worsening shortness of

breath, cough or congestion.  Chest x-ray reveals bilateral airspace infiltrates

right greater than left without significant improvement.  Sputum and pleural 

fluid cultures were both positive for Aspergillus fumigatus.  He remains on 

voriconazole.  White count 18.5.  Hemoglobin 10.1.  Sodium 141.  Potassium 3.3. 

Creatinine 0.8.  Atrial fibrillation.  He remains on a heparin drip.  Plan is 

for left-sided Pleurx catheter placement in a.m.





Objective





- Vital Signs


Vital signs: 


                                   Vital Signs











Temp  97.6 F   02/07/21 11:37


 


Pulse  105 H  02/07/21 11:37


 


Resp  20   02/07/21 11:37


 


BP  91/64   02/07/21 11:37


 


Pulse Ox  95   02/07/21 11:37








                                 Intake & Output











 02/06/21 02/07/21 02/07/21





 18:59 06:59 18:59


 


Intake Total 1308.041 744.975 490.000


 


Output Total 4447 649 1730


 


Balance -191.959 444.975 -1385.000


 


Weight  100.7 kg 


 


Intake:   


 


  Intake, IV Titration 124.041 204.975 250.000





  Amount   


 


    Heparin Sod,Pork in 0.45% 124.041 204.975 250.000





    NaCl 25,000 unit In 0.45   





    % NaCl 1 250ml.bag @ 16   





    UNITS/KG/HR 16.112 mls/hr   





    IV .[id number] [facility] Rx#:   





    [id number]   


 


  Oral 1184 540 240


 


Output:   


 


  Urine 4740 031 1995


 


Other:   


 


  Voiding Method Toilet  Toilet





 Urinal  Urinal


 


  # Voids  1 1


 


  # Bowel Movements  1 














- Exam





GENERAL EXAM: Alert, very pleasant 60-year-old gentleman, on 2 L nasal cannula, 

comfortable in no apparent distress.


HEAD: Normocephalic.


EYES: Normal reaction of pupils, equal size.


NOSE: Clear with pink turbinates.


THROAT: No erythema or exudates.


NECK: No masses, no JVD.


CHEST: No chest wall deformity.


LUNGS: Equal air entry with few scattered rhonchi, crackles in the posterior 

bases.


CVS: S1 and S2 normal with no audible murmur, irregular rhythm.  Tachycardic.


ABDOMEN: No hepatosplenomegaly, normal bowel sounds, no guarding or rigidity.


SPINE: No scoliosis or deformity


SKIN: No rashes


CENTRAL NERVOUS SYSTEM: No focal deficits, tone is normal in all 4 extremities.


EXTREMITIES: There is 2+ peripheral edema.  No clubbing, no cyanosis.  

Peripheral pulses are intact.





- Labs


CBC & Chem 7: 


                                 02/07/21 07:03





                                 02/07/21 07:03


Labs: 


                  Abnormal Lab Results - Last 24 Hours (Table)











  02/06/21 02/06/21 02/06/21 Range/Units





  08:53 17:06 18:49 


 


WBC     (3.8-10.6)  k/uL


 


RBC     (4.30-5.90)  m/uL


 


Hgb     (13.0-17.5)  gm/dL


 


Hct     (39.0-53.0)  %


 


MCHC     (31.0-37.0)  g/dL


 


RDW     (11.5-15.5)  %


 


Neutrophils # (Manual)     (1.3-7.7)  k/uL


 


Metamyelocytes # (Man)     (0)  k/uL


 


APTT  124.7 H*   30.9 H  (22.0-30.0)  sec


 


Potassium     (3.5-5.1)  mmol/L


 


BUN     (9-20)  mg/dL


 


POC Glucose (mg/dL)   131 H   (75-99)  mg/dL














  02/06/21 02/07/21 02/07/21 Range/Units





  20:15 01:16 07:03 


 


WBC    18.5 H  (3.8-10.6)  k/uL


 


RBC    3.73 L  (4.30-5.90)  m/uL


 


Hgb    10.1 L  (13.0-17.5)  gm/dL


 


Hct    32.9 L  (39.0-53.0)  %


 


MCHC    30.8 L  (31.0-37.0)  g/dL


 


RDW    18.5 H  (11.5-15.5)  %


 


Neutrophils # (Manual)    14.20 H  (1.3-7.7)  k/uL


 


Metamyelocytes # (Man)    0.37 H  (0)  k/uL


 


APTT   43.5 H   (22.0-30.0)  sec


 


Potassium     (3.5-5.1)  mmol/L


 


BUN     (9-20)  mg/dL


 


POC Glucose (mg/dL)  131 H    (75-99)  mg/dL














  02/07/21 02/07/21 02/07/21 Range/Units





  07:03 07:03 11:26 


 


WBC     (3.8-10.6)  k/uL


 


RBC     (4.30-5.90)  m/uL


 


Hgb     (13.0-17.5)  gm/dL


 


Hct     (39.0-53.0)  %


 


MCHC     (31.0-37.0)  g/dL


 


RDW     (11.5-15.5)  %


 


Neutrophils # (Manual)     (1.3-7.7)  k/uL


 


Metamyelocytes # (Man)     (0)  k/uL


 


APTT  49.5 H    (22.0-30.0)  sec


 


Potassium   3.3 L   (3.5-5.1)  mmol/L


 


BUN   31 H   (9-20)  mg/dL


 


POC Glucose (mg/dL)    113 H  (75-99)  mg/dL














Assessment and Plan


Assessment: 





1 Acute hypoxic respiratory failure secondary to sepsis, septic shock, related 

to pneumonia, with chronic loculated effusions, possible empyema.  Cultures 

positive for Aspergillus fumigatus.





2 Bilateral hydropneumothorax, possible empyema, status post ultrasound-guided 

left-sided thoracentesis on 02/01/2021 with 45 ML's of cloudy white fluid 

removed, showing Aspergillus.  Remains on voriconazole.





3 Recent admission for hypotension, dehydration secondary to nausea, vomiting, 

acute kidney injury with diarrhea.





4 Recent pulmonary infections with history of streptococcal pneumonia, empyema 

requiring chest tube placement in July 2020.





5 Chronic loculated pneumothoraces, posterior, with air-fluid seen on CAT scan 

imaging on the chest.  Possibility of trapped lung, chronic scarring and 

possibility of empyema.





6 History of rheumatoid arthritis and rheumatoid lung disease.  On Arava





7 Recent history of acute kidney injury





8 Hypertension





9 Hypothyroidism





10 Previous history of DVT.  





11 New-onset atrial fibrillation/flutter anticoagulated on heparin drip





Plan:





The patient was seen and evaluated by Dr. Gómez. 


Chest x-ray and labs reviewed


Pleurx catheter to be placed on 02/08/2021


The plan is for installation of amphotericin


Cardiology considering cardioversion


Continue heparin drip for now


We'll continue to follow





I, the cosigning physician, performed a history & physical examination of the 

patient. Lungs sounds  bilateral scattered rhonchi and crackles in the bilateral

bases.  Maintaining good O2 saturations in the 90s on 2 L/m per nasal cannula.  

I discussed the assessment and plan of care with my nurse practitioner, Rupa Flood. I attest to the above note as dictated by her.

## 2021-02-07 NOTE — P.PN
Subjective


Progress Note Date: 02/07/21


Principal diagnosis: 





Bilateral pneumonia, bilateral hydropneumothorax, possible empyema, sputum 

culture preliminarily positive for Aspergillus species, acute hypoxic 

respiratory failure with sepsis on admission.  Past medical history significant 

for multiple admissions for pneumonia with empyema on the left and previous 

bilateral chest tube placement in June 2020 with cultures positive for strep 

pneumonia, rheumatoid arthritis with rheumatoid lung, hypertension, 

hypothyroidism, DVT, previous tobacco dependence, family history of lung cancer,

and recent hospitalization for hypotension, dehydration, acute kidney injury 

requiring dialysis.





POD #6 left-sided thoracentesis by interventional radiology.





The patient was seen on follow-up today 02/07/2021 at his bedside on the cardiac

stepdown unit.  Currently sitting up to the bedside chair, is awake, alert and 

oriented 3.  Denies any complaints of pain or shortness of breath at this time.

 He is complaining of some generalized weakness.  Remote telemetry showing 

atrial flutter heart rate 98 BPM.  Continues on amiodarone 200 mg by mouth twice

a day, metoprolol 25 mg by mouth 3 times a day and heparin drip per protocol.  

Preoperative education reinforcement with the patient, questions were answered 

to the best my ability.  Oxygen saturation is 94% on 2 L nasal cannula and he is

achieving 4067-3553 mL on his incentive spirometry.





Objective





- Vital Signs


Vital signs: 


                                   Vital Signs











Temp  97.6 F   02/07/21 08:05


 


Pulse  106 H  02/07/21 08:05


 


Resp  20   02/07/21 08:05


 


BP  101/66   02/07/21 08:05


 


Pulse Ox  94 L  02/07/21 08:05








                                 Intake & Output











 02/06/21 02/07/21 02/07/21





 18:59 06:59 18:59


 


Intake Total 1308.041 744.975 429.266


 


Output Total 1500 300 700


 


Balance -191.959 444.975 -270.734


 


Weight  100.7 kg 


 


Intake:   


 


  Intake, IV Titration 124.041 204.975 189.266





  Amount   


 


    Heparin Sod,Pork in 0.45% 124.041 204.975 189.266





    NaCl 25,000 unit In 0.45   





    % NaCl 1 250ml.bag @ 16   





    UNITS/KG/HR 16.112 mls/hr   





    IV .[id number] [facility] Rx#:   





    [id number]   


 


  Oral 1184 540 240


 


Output:   


 


  Urine 1500 300 700


 


Other:   


 


  Voiding Method Toilet  





 Urinal  


 


  # Voids  1 1


 


  # Bowel Movements  1 














- Constitutional


General appearance: Present: cooperative, no acute distress, obese





- EENT


Eyes: Present: normal appearance.  Absent: scleral icterus


ENT: Present: hearing grossly normal





- Neck


Details: 





Neck is supple, no JVD.





- Respiratory


Details: 





Lung sounds essentially clear to his bilateral upper lobes, few scattered 

crackles to his bilateral bases.  Respirations are symmetrical and nonlabored.  

Oxygen saturation are 94% on 2 L nasal cannula.  Achieving 1462-4170 mL on his 

incentive spirometry.





- Cardiovascular


Details: 





Irregular rhythm and controlled rate.  S1 and S2 present, negative for S3, 

gallop or murmur.  +1 to +2 edema to his bilateral lower extremities.  Knee-high

PAT hose and sequential compression devices in place to his bilateral lower 

extremities.





- Gastrointestinal


Gastrointestinal Comment(s): 





Abdomen is soft, nontender and nondistended.  Active bowel sounds present in all

4 abdominal quadrants.  No guarding or rigidity.  Tolerating oral intake.





- Genitourinary


Genitourinary Comment(s): 





Continues to void.





- Integumentary


Integumentary Comment(s): 





Skin is warm and dry.  No clubbing or cyanosis is present.





- Neurologic


Neurologic: Present: CNII-XII intact.  Absent: focal deficits





- Musculoskeletal


Musculoskeletal: Present: gait normal, generalized weakness, strength equal 

bilaterally





- Psychiatric


Psychiatric: Present: A&O x's 3, appropriate affect, intact judgment & insight





- Allied health notes


Allied health notes reviewed: nursing





- Labs


CBC & Chem 7: 


                                 02/07/21 07:03





                                 02/07/21 07:03


Labs: 


                  Abnormal Lab Results - Last 24 Hours (Table)











  02/06/21 02/06/21 02/06/21 Range/Units





  08:53 11:50 17:06 


 


WBC     (3.8-10.6)  k/uL


 


RBC     (4.30-5.90)  m/uL


 


Hgb     (13.0-17.5)  gm/dL


 


Hct     (39.0-53.0)  %


 


MCHC     (31.0-37.0)  g/dL


 


RDW     (11.5-15.5)  %


 


Neutrophils # (Manual)     (1.3-7.7)  k/uL


 


Metamyelocytes # (Man)     (0)  k/uL


 


APTT  124.7 H*    (22.0-30.0)  sec


 


Potassium     (3.5-5.1)  mmol/L


 


BUN     (9-20)  mg/dL


 


POC Glucose (mg/dL)   135 H  131 H  (75-99)  mg/dL














  02/06/21 02/06/21 02/07/21 Range/Units





  18:49 20:15 01:16 


 


WBC     (3.8-10.6)  k/uL


 


RBC     (4.30-5.90)  m/uL


 


Hgb     (13.0-17.5)  gm/dL


 


Hct     (39.0-53.0)  %


 


MCHC     (31.0-37.0)  g/dL


 


RDW     (11.5-15.5)  %


 


Neutrophils # (Manual)     (1.3-7.7)  k/uL


 


Metamyelocytes # (Man)     (0)  k/uL


 


APTT  30.9 H   43.5 H  (22.0-30.0)  sec


 


Potassium     (3.5-5.1)  mmol/L


 


BUN     (9-20)  mg/dL


 


POC Glucose (mg/dL)   131 H   (75-99)  mg/dL














  02/07/21 02/07/21 02/07/21 Range/Units





  07:03 07:03 07:03 


 


WBC  18.5 H    (3.8-10.6)  k/uL


 


RBC  3.73 L    (4.30-5.90)  m/uL


 


Hgb  10.1 L    (13.0-17.5)  gm/dL


 


Hct  32.9 L    (39.0-53.0)  %


 


MCHC  30.8 L    (31.0-37.0)  g/dL


 


RDW  18.5 H    (11.5-15.5)  %


 


Neutrophils # (Manual)  14.20 H    (1.3-7.7)  k/uL


 


Metamyelocytes # (Man)  0.37 H    (0)  k/uL


 


APTT   49.5 H   (22.0-30.0)  sec


 


Potassium    3.3 L  (3.5-5.1)  mmol/L


 


BUN    31 H  (9-20)  mg/dL


 


POC Glucose (mg/dL)     (75-99)  mg/dL














- Imaging and Cardiology


Chest x-ray: report reviewed, image reviewed





Assessment and Plan


Assessment: 





1.  Bilateral pneumonia, bilateral hydropneumothorax, possible empyema, sputum 

culture preliminarily positive for Aspergillus species, status post left-sided 

thoracentesis


2.  Acute hypoxic respiratory failure with sepsis on admission


3.  Multiple admissions for pneumonia with empyema on the left and previous 

bilateral chest tube placement in June 2020 with cultures positive for strep 

pneumonia


4.  Rheumatoid arthritis with rheumatoid lung


5.  Hypertension, currently hypotensive, requiring pressors on admission


6.  Hypothyroidism


7.  History of DVT


8.  Previous tobacco dependence


9.  Family history of lung cancer


10.  Recent hospitalization for hypotension, dehydration, acute kidney injury 

requiring dialysis


11.  New-onset atrial fibrillation/atrial flutter with rapid ventricular 

response, currently on amiodarone by mouth and metoprolol tartrate


Plan: 





1.  Pleural fluid culture and sputum culture shows Aspergillus Fumigatus.


2.  Continue to follow chest x-rays.


3.  Encourage incentive spirometry 10 times every hour while awake.  

Bronchodilators per pulmonary for/critical care medicine.


4.  Currently on Vfend managed by infectious disease.  Amphotericin B pleural 

irrigation and available for on-call to the operating room for Monday, 

02/08/2021.


5.  Increase activity as tolerated.


6.  Medical management and other comorbidities per primary care service.


7.  The patient is scheduled for a left Pleurx catheter placement tomorrow 

Monday, 02/08/2021 to be performed by Dr. Arnold Kaye. 


8.  Preoperative teaching reinforcement the patient.


9.  More recommendations to follow based on patient's clinical course.


Time with Patient: Greater than 30

## 2021-02-07 NOTE — XR
EXAMINATION TYPE: XR chest 2V

 

DATE OF EXAM: 2/7/2021

 

COMPARISON: 2/4/2021

 

HISTORY: Shortness of breath

 

TECHNIQUE:  Frontal and lateral views of the chest are obtained.

 

FINDINGS:

 

Scattered senescent parenchymal changes noted. Hyperinflation compatible with COPD. 

 

Bilateral airspace infiltrates right much greater than left persist without significant interval nieto
ge.

 

Heart size is stable.

 

Mediastinal structures are stable and grossly unremarkable.

 

No evidence for hilar prominence.

 

Degenerative changes dorsal spine. 

 

IMPRESSION:

1. Bilateral airspace infiltrates right much greater than left persist without significant interval c
hange.

## 2021-02-07 NOTE — PN
PROGRESS NOTE



DATE OF SERVICE:

02/07/2021



REASON FOR FOLLOWUP:

Aspergillus pneumonia and empyema.



INTERVAL HISTORY:

Patient is currently afebrile.  The patient is breathing comfortably.  Denies having

any chest pain, shortness of breath.  Occasional cough.  No abdominal pain. O diarrhea.



PHYSICAL EXAMINATION:

Blood pressure 104/65 with a pulse of 100, temperature 97.8.  He is 95% on 2 L nasal

cannula.

General description is a middle-aged male lying in bed in no distress.

Respiratory system: Unlabored breathing, decreased breath sounds at bases.  No wheeze.

Heart S1, S2.  Regular rate and rhythm.

ABDOMEN:  Soft, no tenderness.



LABS:

Hemoglobin is 10.1, white count 15.5.  BUN of 31, creatinine 0.80.



DIAGNOSTIC IMPRESSION AND PLAN:

Patient with aspergillus pneumonia and empyema. _____ tomorrow.  The patient is covered

with voriconazole to continue and monitor clinical course closely.





MMODL / IJN: [id number] / Job#: [id number]

## 2021-02-07 NOTE — PN
PROGRESS NOTE



DATE OF SERVICE:

02/07/2021



I am covering for Dr. Myers.



This 60-year-old gentleman who was admitted with acute hypoxic respiratory 
failure

secondary to loculated left-sided pleural effusion also had possible fungal 
empyema.

The patient had multiple organisms grown from the culture.  The patient is on

antifungal treatment.  A PleurX catheter drainage is being planned tomorrow.  
The most

recent chest x-ray which was reviewed personally by me showed bilateral 
parenchymal

lesions and significant pleural effusions also with some air-fluid levels on the
left

side.



Past medical history reviewed.



REVIEW OF SYSTEMS:

CARDIOVASCULAR SYSTEM: No angina.

RESPIRATORY: As mentioned earlier.

GI: As mentioned earlier.

: No dysuria.

NERVOUS SYSTEM: No numbness or weakness.



CURRENT MEDICATIONS:

Reviewed and include: Tylenol. Norco. DuoNeb. Cordarone, , vitamin C, aspirin,

vitamin D3, Lasix, heparin, lactated Ringer's, Synthroid, Melatonin, doses 
reviewed.



PHYSICAL EXAMINATION:

The patient is alert and oriented times three.

Pulse 105, blood pressure 90/64, respiration 20, temperature 97.6, pulse ox 94% 
on 2 L.

HEENT is conjunctivae normal.

NECK: No JVD.

CARDIOVASCULAR: S1, S2 muffled.

RESPIRATORY SYSTEM: Breath sounds diminished at the bases.  A few scattered 
rhonchi and

crackles.

ABDOMEN:  Soft, nontender.

NERVOUS SYSTEM:  No focal deficits.



LABS:

WBC 18.2, hemoglobin 10.1.  Sodium 141, potassium 3.3.



ASSESSMENT:

1. Acute bilateral pneumonia with left-sided pleural effusion, fungal empyema 
with

    recurrent pneumonia.

2. Lactic acidosis.

3. Foot ulcer drainage.

4. Sepsis and septic shock.

5. Acute kidney injury.

6. Leukocytosis.

7. Atrial flutter with rapid ventricular rate.

8. History of rheumatoid arthritis and rheumatoid lung.

9. History of empyema with Streptococcus.

10.Hypertension.

11.Hypertensive cardiovascular disease.

12.Hypothyroidism.

13.Deep vein thrombosis prophylaxis.

14.Gastrointestinal prophylaxis.

15.Increased WBC.

16.Anemia, normocytic anemia of chronic disease.



RECOMMENDATIONS:

In this 60-year-old gentleman who presented with multiple complex medical 
issues, at

this time continue the current medications. Chest x-ray is stable at this time. 
I

recommend to continue with the monitoring and PleurX drainage catheter as 
tomorrow.

Supplement potassium.  Continue with antifungals.  Repeat labs in the morning.

Prognosis guarded because of multiple complex medical issues and further

recommendations to follow.





MMODL / IJN: [id number] / Job#: [id number]

SURAJ

## 2021-02-07 NOTE — P.PN
Subjective


Progress Note Date: 02/07/21


The patient was interviewed and examined sitting up in the recliner chair.  He 

states he did have some shortness of breath ambulating from the bed, but it 

quickly resolved. He also feels occasional palpitations.  No chest pain or chest

pressure.  No dizziness or lightheadedness.  Positive for fatigue.





GENERAL: Well-appearing, well-nourished and in no acute distress.


NECK: Supple without JVD or thyromegaly.


LUNGS: Breath sounds rhonchorous to auscultation bilaterally. Respiration equal 

and unlabored.  No wheezes or crackles.


HEART: Regular rate and rhythm without murmurs, rubs or gallops. S1 and S2 

heard.  Tachycardic.


EXTREMITIES: Normal range of motion, no edema.  No clubbing or cyanosis. 

Peripheral pulses intact and strong.





VITALS:


Blood pressure 101/66, heart rate 106, respiratory rate 20, SpO2 95% on 2 L 

nasal cannula, temperature 97.6F oral





TELEMETRY:


Atrial flutter with RVR.  Heart rates in the 130s.





LABS:


WBC 19.0, hemoglobin 9.2, hematocrit 30.2, sodium 142, potassium 3.6, BUN 31, 

creatinine 0.81





IMPRESSION:


#1 atrial flutter with RVR, on IV heparin


#2 fungal pneumonia with empyema


#3 hypertension


#4 hyperlipidemia


#5 leukocytosis


#6 anemia





PLAN:


Patient will be proceeding with chest tube placement by CV surgery on Monday.


Proceed with cardioversion after chest tube placement


Continue to maximize beta blockers as tolerated


Further recommendations based upon clinical course





The patient has been seen and evaluated. Plan of care has been reviewed and 

agreed upon by Dr Pimentel.








Objective





- Vital Signs


Vital signs: 


                                   Vital Signs











Temp  97.6 F   02/07/21 11:37


 


Pulse  105 H  02/07/21 11:37


 


Resp  20   02/07/21 11:37


 


BP  91/64   02/07/21 11:37


 


Pulse Ox  95   02/07/21 11:37








                                 Intake & Output











 02/06/21 02/07/21 02/07/21





 18:59 06:59 18:59


 


Intake Total 1308.041 744.975 730.000


 


Output Total 2513 601 0443


 


Balance -191.959 444.975 -1145.000


 


Weight  100.7 kg 


 


Intake:   


 


  Intake, IV Titration 124.041 204.975 250.000





  Amount   


 


    Heparin Sod,Pork in 0.45% 124.041 204.975 250.000





    NaCl 25,000 unit In 0.45   





    % NaCl 1 250ml.bag @ 16   





    UNITS/KG/HR 16.112 mls/hr   





    IV .[id number] [facility] Rx#:   





    [id number]   


 


  Oral 1184 540 480


 


Output:   


 


  Urine 0899 547 4567


 


Other:   


 


  Voiding Method Toilet  Toilet





 Urinal  Urinal


 


  # Voids  1 1


 


  # Bowel Movements  1 














- Labs


CBC & Chem 7: 


                                 02/07/21 07:03





                                 02/07/21 07:03


Labs: 


                  Abnormal Lab Results - Last 24 Hours (Table)











  02/06/21 02/06/21 02/06/21 Range/Units





  08:53 17:06 18:49 


 


WBC     (3.8-10.6)  k/uL


 


RBC     (4.30-5.90)  m/uL


 


Hgb     (13.0-17.5)  gm/dL


 


Hct     (39.0-53.0)  %


 


MCHC     (31.0-37.0)  g/dL


 


RDW     (11.5-15.5)  %


 


Neutrophils # (Manual)     (1.3-7.7)  k/uL


 


Metamyelocytes # (Man)     (0)  k/uL


 


APTT  124.7 H*   30.9 H  (22.0-30.0)  sec


 


Potassium     (3.5-5.1)  mmol/L


 


BUN     (9-20)  mg/dL


 


POC Glucose (mg/dL)   131 H   (75-99)  mg/dL














  02/06/21 02/07/21 02/07/21 Range/Units





  20:15 01:16 07:03 


 


WBC    18.5 H  (3.8-10.6)  k/uL


 


RBC    3.73 L  (4.30-5.90)  m/uL


 


Hgb    10.1 L  (13.0-17.5)  gm/dL


 


Hct    32.9 L  (39.0-53.0)  %


 


MCHC    30.8 L  (31.0-37.0)  g/dL


 


RDW    18.5 H  (11.5-15.5)  %


 


Neutrophils # (Manual)    14.20 H  (1.3-7.7)  k/uL


 


Metamyelocytes # (Man)    0.37 H  (0)  k/uL


 


APTT   43.5 H   (22.0-30.0)  sec


 


Potassium     (3.5-5.1)  mmol/L


 


BUN     (9-20)  mg/dL


 


POC Glucose (mg/dL)  131 H    (75-99)  mg/dL














  02/07/21 02/07/21 02/07/21 Range/Units





  07:03 07:03 11:26 


 


WBC     (3.8-10.6)  k/uL


 


RBC     (4.30-5.90)  m/uL


 


Hgb     (13.0-17.5)  gm/dL


 


Hct     (39.0-53.0)  %


 


MCHC     (31.0-37.0)  g/dL


 


RDW     (11.5-15.5)  %


 


Neutrophils # (Manual)     (1.3-7.7)  k/uL


 


Metamyelocytes # (Man)     (0)  k/uL


 


APTT  49.5 H    (22.0-30.0)  sec


 


Potassium   3.3 L   (3.5-5.1)  mmol/L


 


BUN   31 H   (9-20)  mg/dL


 


POC Glucose (mg/dL)    113 H  (75-99)  mg/dL

## 2021-02-08 LAB
ANION GAP SERPL CALC-SCNC: 8 MMOL/L
BUN SERPL-SCNC: 28 MG/DL (ref 9–20)
CALCIUM SPEC-MCNC: 8.6 MG/DL (ref 8.4–10.2)
CELLS COUNTED: 200
CHLORIDE SERPL-SCNC: 105 MMOL/L (ref 98–107)
CO2 SERPL-SCNC: 30 MMOL/L (ref 22–30)
ERYTHROCYTE [DISTWIDTH] IN BLOOD BY AUTOMATED COUNT: 3.58 M/UL (ref 4.3–5.9)
ERYTHROCYTE [DISTWIDTH] IN BLOOD: 18.6 % (ref 11.5–15.5)
GLUCOSE BLD-MCNC: 107 MG/DL (ref 75–99)
GLUCOSE BLD-MCNC: 126 MG/DL (ref 75–99)
GLUCOSE BLD-MCNC: 85 MG/DL (ref 75–99)
GLUCOSE BLD-MCNC: 93 MG/DL (ref 75–99)
GLUCOSE SERPL-MCNC: 103 MG/DL (ref 74–99)
HCT VFR BLD AUTO: 31.7 % (ref 39–53)
HGB BLD-MCNC: 9.6 GM/DL (ref 13–17.5)
LYMPHOCYTES # BLD MANUAL: 1.68 K/UL (ref 1–4.8)
MCH RBC QN AUTO: 26.9 PG (ref 25–35)
MCHC RBC AUTO-ENTMCNC: 30.4 G/DL (ref 31–37)
MCV RBC AUTO: 88.5 FL (ref 80–100)
METAMYELOCYTES # BLD: 0.42 K/UL
MONOCYTES # BLD MANUAL: 1.68 K/UL (ref 0–1)
MYELOCYTES # BLD MANUAL: 0.84 K/UL
NEUTROPHILS NFR BLD MANUAL: 78 %
NEUTS SEG # BLD MANUAL: 16.8 K/UL (ref 1.3–7.7)
PLATELET # BLD AUTO: 325 K/UL (ref 150–450)
POTASSIUM SERPL-SCNC: 3 MMOL/L (ref 3.5–5.1)
SODIUM SERPL-SCNC: 143 MMOL/L (ref 137–145)
WBC # BLD AUTO: 21 K/UL (ref 3.8–10.6)

## 2021-02-08 PROCEDURE — 0W9B30Z DRAINAGE OF LEFT PLEURAL CAVITY WITH DRAINAGE DEVICE, PERCUTANEOUS APPROACH: ICD-10-PCS

## 2021-02-08 PROCEDURE — 3E0L3GC INTRODUCTION OF OTHER THERAPEUTIC SUBSTANCE INTO PLEURAL CAVITY, PERCUTANEOUS APPROACH: ICD-10-PCS

## 2021-02-08 PROCEDURE — 3E1F38Z IRRIGATION OF RESPIRATORY TRACT USING IRRIGATING SUBSTANCE, PERCUTANEOUS APPROACH: ICD-10-PCS

## 2021-02-08 RX ADMIN — HYDROCODONE BITARTRATE AND ACETAMINOPHEN PRN EACH: 5; 325 TABLET ORAL at 00:43

## 2021-02-08 RX ADMIN — TRIAMCINOLONE ACETONIDE SCH APPLIC: 1 CREAM TOPICAL at 12:55

## 2021-02-08 RX ADMIN — AMIODARONE HYDROCHLORIDE SCH MG: 200 TABLET ORAL at 20:55

## 2021-02-08 RX ADMIN — PANTOPRAZOLE SODIUM SCH MG: 40 TABLET, DELAYED RELEASE ORAL at 06:38

## 2021-02-08 RX ADMIN — POTASSIUM CHLORIDE SCH MEQ: 20 TABLET, EXTENDED RELEASE ORAL at 09:09

## 2021-02-08 RX ADMIN — POTASSIUM CHLORIDE SCH: 14.9 INJECTION, SOLUTION INTRAVENOUS at 12:57

## 2021-02-08 RX ADMIN — LEVOTHYROXINE SODIUM SCH MCG: 75 TABLET ORAL at 06:38

## 2021-02-08 RX ADMIN — HYDROCODONE BITARTRATE AND ACETAMINOPHEN PRN EACH: 5; 325 TABLET ORAL at 22:45

## 2021-02-08 RX ADMIN — IPRATROPIUM BROMIDE AND ALBUTEROL SULFATE SCH: .5; 3 SOLUTION RESPIRATORY (INHALATION) at 11:26

## 2021-02-08 RX ADMIN — HYDROCORTISONE SODIUM SUCCINATE SCH MG: 100 INJECTION, POWDER, FOR SOLUTION INTRAMUSCULAR; INTRAVENOUS at 23:57

## 2021-02-08 RX ADMIN — METOPROLOL TARTRATE SCH MG: 25 TABLET, FILM COATED ORAL at 17:17

## 2021-02-08 RX ADMIN — METOPROLOL TARTRATE SCH MG: 25 TABLET, FILM COATED ORAL at 09:05

## 2021-02-08 RX ADMIN — IPRATROPIUM BROMIDE AND ALBUTEROL SULFATE SCH: .5; 3 SOLUTION RESPIRATORY (INHALATION) at 20:26

## 2021-02-08 RX ADMIN — VORICONAZOLE SCH MLS/HR: 10 INJECTION, POWDER, LYOPHILIZED, FOR SOLUTION INTRAVENOUS at 20:55

## 2021-02-08 RX ADMIN — ASPIRIN 81 MG CHEWABLE TABLET SCH MG: 81 TABLET CHEWABLE at 20:55

## 2021-02-08 RX ADMIN — HYDROCORTISONE SODIUM SUCCINATE SCH MG: 100 INJECTION, POWDER, FOR SOLUTION INTRAMUSCULAR; INTRAVENOUS at 09:04

## 2021-02-08 RX ADMIN — FUROSEMIDE SCH MG: 10 INJECTION, SOLUTION INTRAMUSCULAR; INTRAVENOUS at 09:05

## 2021-02-08 RX ADMIN — VORICONAZOLE SCH MLS/HR: 10 INJECTION, POWDER, LYOPHILIZED, FOR SOLUTION INTRAVENOUS at 09:10

## 2021-02-08 RX ADMIN — Medication SCH MCG: at 20:54

## 2021-02-08 RX ADMIN — OXYCODONE HYDROCHLORIDE AND ACETAMINOPHEN SCH MG: 500 TABLET ORAL at 20:55

## 2021-02-08 RX ADMIN — HYDROCORTISONE SODIUM SUCCINATE SCH MG: 100 INJECTION, POWDER, FOR SOLUTION INTRAMUSCULAR; INTRAVENOUS at 17:16

## 2021-02-08 RX ADMIN — AMIODARONE HYDROCHLORIDE SCH MG: 200 TABLET ORAL at 09:05

## 2021-02-08 RX ADMIN — IPRATROPIUM BROMIDE AND ALBUTEROL SULFATE SCH: .5; 3 SOLUTION RESPIRATORY (INHALATION) at 07:39

## 2021-02-08 RX ADMIN — TRIAMCINOLONE ACETONIDE SCH: 1 CREAM TOPICAL at 20:57

## 2021-02-08 RX ADMIN — POTASSIUM CHLORIDE SCH MEQ: 20 TABLET, EXTENDED RELEASE ORAL at 12:54

## 2021-02-08 RX ADMIN — METOPROLOL TARTRATE SCH MG: 25 TABLET, FILM COATED ORAL at 22:40

## 2021-02-08 NOTE — P.PN
Subjective


Progress Note Date: 02/08/21





HISTORY OF PRESENT ILLNESS:  Patient examined this morning.  Patient is sitting 

up in the chair.  He denies chest pain or pressure.  He denies shortness of 

breath. He continues to have a productive cough. WBC 21.0. blood pressure 

100/67.  Heart rate in the 70s.  He is afebrile.





PHYSICAL EXAM: 


VITAL SIGNS: Reviewed.


GENERAL: Well-developed in no acute distress. 


NECK: Supple. No JVD or thyromegaly


LUNGS: Respirations even and unlabored. Lungs essentially clear to auscultation 

bilaterally.


HEART: Irregular rate and rhythm.  S1 and S2 heard.


EXTREMITIES: Normal range of motion.  No clubbing or cyanosis.  Peripheral 

pulses intact.  No lower extremity edema





ASSESSMENT: 


Atrial flutter with RVR, rate currently controlled


Acute hypoxic respiratory failure


Fungal pneumonia with empyema


Hypertension


Hyperlipidemia





PLAN: 


Patient to undergo Pleurx catheter placement today


Resume IV heparin after procedure


Patient to undergo cardioversion tomorrow with Dr. Pimentel


Further recommendations pending patient course





Nurse practitioner note has been reviewed by physician. Signing provider agrees 

with the documented findings, assessment, and plan of care. 








Objective





- Vital Signs


Vital signs: 


                                   Vital Signs











Temp  97.8 F   02/08/21 13:38


 


Pulse  68   02/08/21 13:38


 


Resp  18   02/08/21 13:38


 


BP  100/67   02/08/21 04:00


 


Pulse Ox  97   02/08/21 13:38








                                 Intake & Output











 02/07/21 02/08/21 02/08/21





 18:59 06:59 18:59


 


Intake Total 970.000 472.175 25


 


Output Total 1875 650 


 


Balance -905.000 -177.825 25


 


Weight  100.7 kg 


 


Intake:   


 


  IV  20 25


 


    Invasive Line 2  10 


 


    Invasive Line 5  10 


 


  Intake, IV Titration 250.000 212.175 





  Amount   


 


    Heparin Sod,Pork in 0.45% 250.000 212.175 





    NaCl 25,000 unit In 0.45   





    % NaCl 1 250ml.bag @ 16   





    UNITS/KG/HR 16.112 mls/hr   





    IV .[id number] [facility] Rx#:   





    [id number]   


 


  Oral 720 240 


 


Output:   


 


  Urine 1875 650 


 


Other:   


 


  Voiding Method Toilet Toilet 





 Urinal Urinal 


 


  # Voids 1  1














- Labs


CBC & Chem 7: 


                                 02/08/21 06:58





                                 02/08/21 06:58


Labs: 


                  Abnormal Lab Results - Last 24 Hours (Table)











  02/07/21 02/07/21 02/08/21 Range/Units





  16:15 20:26 06:12 


 


WBC     (3.8-10.6)  k/uL


 


RBC     (4.30-5.90)  m/uL


 


Hgb     (13.0-17.5)  gm/dL


 


Hct     (39.0-53.0)  %


 


MCHC     (31.0-37.0)  g/dL


 


RDW     (11.5-15.5)  %


 


Neutrophils # (Manual)     (1.3-7.7)  k/uL


 


Monocytes # (Manual)     (0-1.0)  k/uL


 


Metamyelocytes # (Man)     (0)  k/uL


 


Myelocytes # (Manual)     (0)  k/uL


 


APTT     (22.0-30.0)  sec


 


Potassium     (3.5-5.1)  mmol/L


 


BUN     (9-20)  mg/dL


 


Glucose     (74-99)  mg/dL


 


POC Glucose (mg/dL)  123 H  152 H  107 H  (75-99)  mg/dL














  02/08/21 02/08/21 02/08/21 Range/Units





  06:58 06:58 06:58 


 


WBC   21.0 H   (3.8-10.6)  k/uL


 


RBC   3.58 L   (4.30-5.90)  m/uL


 


Hgb   9.6 L   (13.0-17.5)  gm/dL


 


Hct   31.7 L   (39.0-53.0)  %


 


MCHC   30.4 L   (31.0-37.0)  g/dL


 


RDW   18.6 H   (11.5-15.5)  %


 


Neutrophils # (Manual)   16.80 H   (1.3-7.7)  k/uL


 


Monocytes # (Manual)   1.68 H   (0-1.0)  k/uL


 


Metamyelocytes # (Man)   0.42 H   (0)  k/uL


 


Myelocytes # (Manual)   0.84 H   (0)  k/uL


 


APTT  54.0 H    (22.0-30.0)  sec


 


Potassium    3.0 L  (3.5-5.1)  mmol/L


 


BUN    28 H  (9-20)  mg/dL


 


Glucose    103 H  (74-99)  mg/dL


 


POC Glucose (mg/dL)     (75-99)  mg/dL








                      Microbiology - Last 24 Hours (Table)











 02/01/21 11:30 Fungal Culture - Preliminary





 Pleural Fluid    Aspergillus species

## 2021-02-08 NOTE — P.PN
Subjective


Progress Note Date: 02/08/21








60-year-old white male patient,





On today's evaluation of 02/08/2021, the patient is doing well.  He is awaiting 

his surgery.  He is currently nothing by mouth.  After lengthy discussion with 

the surgeon, total decided to proceed with a Pleurx catheter insertion on the 

left with and with Ampho B showed of the pleural space.  The patient is also on 

voriconazole.  He is coughing out thick sputum.  Note that the pleural fluid 

aspirated from the left lung showed Aspergillus fumigatus.  Sputum also showed 

the same.  As such the patient has fungal empyema involving the LAD left lung 

for sure and possibly on the right.  There is extensive thickening of the vessel

and the parietal pleura in addition to large cavities in lung bases bilaterally 

consistent with empyema.  No fever.  No chills.  He Is a 22 with a Hemoglobin of

9.6.  On a separate note, the patient has a new onset atrial fibrillation/flutt

er and the patient is currently on IV heparin which was placed on hold for 

Pleurx catheter insertion. CT chest showed thick-walled pleural cavities 

posteriorly in the lower lungs with left-sided air-fluid level with left-sided 

pleural fluid diminished most recent CT from 12/29/2020.  There is associated 

compressive atelectasis and/or chronic consolidation.  Persistent as it is lobe 

fissure, multifocal areas of reticular nodular opacity in the upper lungs remain

present with slight nodularity in the left upper lobe.  There is a focal 

consolidation improved from most recent study with some residual areas of 

scarring.  past medical history of rheumatoid arthritis, previous history of 

rheumatoid lung disease on Arava, previous episodes of pneumonia and history of 

loculated empyema requiring chest tube placement back in July 2020 with pleural 

fluid cultures positive for streptococcal pneumonia.  Other medical history 

includes hypertension, hypothyroidism and previous history of DVT.  Patient had 

a recent hospitalization from January 18 through 01/21/2021 hypotension, 

dehydration related to nausea vomiting diarrhea, acute kidney injury.  I was 

involved in his care back then.  At that time the patient also had cavities in 

the lung bases posteriorly with some air-fluid level.  The patient is currently 

off immunosuppression treatment and the patient was taken a combination of Arava

and Xaljenz and he is a latest intake of this medication was approximately 5 

months ago.  He is using incentive spirometer.





Objective





- Vital Signs


Vital signs: 


                                   Vital Signs











Temp  98.2 F   02/08/21 04:00


 


Pulse  71   02/08/21 04:00


 


Resp  18   02/08/21 04:00


 


BP  100/67   02/08/21 04:00


 


Pulse Ox  93 L  02/08/21 04:00








                                 Intake & Output











 02/07/21 02/08/21 02/08/21





 18:59 06:59 18:59


 


Intake Total 970.000 472.175 


 


Output Total 1875 650 


 


Balance -905.000 -177.825 


 


Weight  100.7 kg 


 


Intake:   


 


  IV  20 


 


    Invasive Line 2  10 


 


    Invasive Line 5  10 


 


  Intake, IV Titration 250.000 212.175 





  Amount   


 


    Heparin Sod,Pork in 0.45% 250.000 212.175 





    NaCl 25,000 unit In 0.45   





    % NaCl 1 250ml.bag @ 16   





    UNITS/KG/HR 16.112 mls/hr   





    IV .[id number] [facility] Rx#:   





    [id number]   


 


  Oral 720 240 


 


Output:   


 


  Urine 1875 650 


 


Other:   


 


  Voiding Method Toilet Toilet 





 Urinal Urinal 


 


  # Voids 1  














- Exam











GENERAL EXAM: Alert, very pleasant 60-year-old gentleman, on 2 L nasal cannula, 

comfortable in no apparent distress.


HEAD: Normocephalic.


EYES: Normal reaction of pupils, equal size.


NOSE: Clear with pink turbinates.


THROAT: No erythema or exudates.


NECK: No masses, no JVD.


CHEST: No chest wall deformity.


LUNGS: Equal air entry with few scattered rhonchi, crackles in the posterior 

bases.


CVS: S1 and S2 normal with no audible murmur, irregular rhythm.  Tachycardic.


ABDOMEN: No hepatosplenomegaly, normal bowel sounds, no guarding or rigidity.


SPINE: No scoliosis or deformity


SKIN: No rashes


CENTRAL NERVOUS SYSTEM: No focal deficits, tone is normal in all 4 extremities.


EXTREMITIES: There is 2+ peripheral edema.  No clubbing, no cyanosis.  

Peripheral pulses are intact.





- Labs





- Labs


CBC & Chem 7: 


                                 02/08/21 06:58





                                 02/08/21 06:58


Labs: 


                  Abnormal Lab Results - Last 24 Hours (Table)











  02/07/21 02/07/21 02/07/21 Range/Units





  11:26 16:15 20:26 


 


WBC     (3.8-10.6)  k/uL


 


RBC     (4.30-5.90)  m/uL


 


Hgb     (13.0-17.5)  gm/dL


 


Hct     (39.0-53.0)  %


 


MCHC     (31.0-37.0)  g/dL


 


RDW     (11.5-15.5)  %


 


Neutrophils # (Manual)     (1.3-7.7)  k/uL


 


Monocytes # (Manual)     (0-1.0)  k/uL


 


Metamyelocytes # (Man)     (0)  k/uL


 


Myelocytes # (Manual)     (0)  k/uL


 


APTT     (22.0-30.0)  sec


 


Potassium     (3.5-5.1)  mmol/L


 


BUN     (9-20)  mg/dL


 


Glucose     (74-99)  mg/dL


 


POC Glucose (mg/dL)  113 H  123 H  152 H  (75-99)  mg/dL














  02/08/21 02/08/21 02/08/21 Range/Units





  06:12 06:58 06:58 


 


WBC    21.0 H  (3.8-10.6)  k/uL


 


RBC    3.58 L  (4.30-5.90)  m/uL


 


Hgb    9.6 L  (13.0-17.5)  gm/dL


 


Hct    31.7 L  (39.0-53.0)  %


 


MCHC    30.4 L  (31.0-37.0)  g/dL


 


RDW    18.6 H  (11.5-15.5)  %


 


Neutrophils # (Manual)    16.80 H  (1.3-7.7)  k/uL


 


Monocytes # (Manual)    1.68 H  (0-1.0)  k/uL


 


Metamyelocytes # (Man)    0.42 H  (0)  k/uL


 


Myelocytes # (Manual)    0.84 H  (0)  k/uL


 


APTT   54.0 H   (22.0-30.0)  sec


 


Potassium     (3.5-5.1)  mmol/L


 


BUN     (9-20)  mg/dL


 


Glucose     (74-99)  mg/dL


 


POC Glucose (mg/dL)  107 H    (75-99)  mg/dL














  02/08/21 Range/Units





  06:58 


 


WBC   (3.8-10.6)  k/uL


 


RBC   (4.30-5.90)  m/uL


 


Hgb   (13.0-17.5)  gm/dL


 


Hct   (39.0-53.0)  %


 


MCHC   (31.0-37.0)  g/dL


 


RDW   (11.5-15.5)  %


 


Neutrophils # (Manual)   (1.3-7.7)  k/uL


 


Monocytes # (Manual)   (0-1.0)  k/uL


 


Metamyelocytes # (Man)   (0)  k/uL


 


Myelocytes # (Manual)   (0)  k/uL


 


APTT   (22.0-30.0)  sec


 


Potassium  3.0 L  (3.5-5.1)  mmol/L


 


BUN  28 H  (9-20)  mg/dL


 


Glucose  103 H  (74-99)  mg/dL


 


POC Glucose (mg/dL)   (75-99)  mg/dL














Assessment and Plan


Plan: 








 





1 Acute hypoxic respiratory failure secondary to sepsis, septic shock, related 

to pneumonia, with chronic loculated effusions, possible empyema.  Cultures 

positive for Aspergillus fumigatus.  This could be a fungal empyema/infection, 

possible bilateral although this was only confirmed on the left side.  The 

sputum is positive for Aspergillus fumigatus.  The pleural fluid is also 

positive.  The patient is currently on voriconazole.  Ideally, he would require 

from surgical intervention including thoracoscopy/decortication.  Nevertheless, 

the surgeon thought that this would be an extensive surgery for him and he 

decided to proceed with Pleurx catheter insertion.





2 Bilateral hydropneumothorax, possible empyema, status post ultrasound-guided 

left-sided thoracentesis on 02/01/2021 with 45 ML's of cloudy white fluid 

removed, showing Aspergillus.  Remains on voriconazole.





3 Recent admission for hypotension, dehydration secondary to nausea, vomiting, 

acute kidney injury with diarrhea.





4 Recent pulmonary infections with history of streptococcal pneumonia, empyema 

requiring chest tube placement in July 2020.





5 Chronic loculated pneumothoraces, posterior, with air-fluid seen on CAT scan 

imaging on the chest.  Possibility of trapped lung, chronic scarring and poss

ibility of empyema.





6 History of rheumatoid arthritis and rheumatoid lung disease.  On Arava





7 Recent history of acute kidney injury





8 Hypertension





9 Hypothyroidism





10 Previous history of DVT.  





11 New-onset atrial fibrillation/flutter anticoagulated on heparin drip





Plan:











Pleurx catheter to be placed on 02/08/2021


The plan is for installation of amphotericin B in the pleural space and continue

the voriconazole.


The patient remains in atrial fibrillation.  Possible cardioversion at a later 

stage.


Continue heparin drip for now


We'll continue to follow

## 2021-02-08 NOTE — FL
EXAMINATION TYPE: FL fluoroscopy <1hr

 

DATE OF EXAM: 2/8/2021

 

FLUOROSCOPY

 

Fluoroscopy time of 23 seconds was used during PleurX catheter insertion.  2 image/s document/s the danae escobar.

## 2021-02-08 NOTE — P.PN
Subjective


Progress Note Date: 02/08/21


Principal diagnosis: 





Bilateral pneumonia, bilateral hydropneumothorax, possible empyema, sputum 

culture preliminarily positive for Aspergillus species, acute hypoxic 

respiratory failure with sepsis on admission.  Past medical history significant 

for multiple admissions for pneumonia with empyema on the left and previous 

bilateral chest tube placement in June 2020 with cultures positive for strep 

pneumonia, rheumatoid arthritis with rheumatoid lung, hypertension, 

hypothyroidism, DVT, previous tobacco dependence, family history of lung cancer,

and recent hospitalization for hypotension, dehydration, acute kidney injury 

requiring dialysis.





POD #7 left-sided thoracentesis by interventional radiology.





The patient was seen on follow-up today 02/08/2021 at his bedside on the cardiac

stepdown unit.  He is awake, alert and oriented 3 and is sitting up to the 

bedside chair.  Denies any complaints of pain or shortness of breath this 

morning although is complaining of a productive cough with green tinged sputum. 

Sputum culture was positive for Aspergillus as well as the pleural fluid from 

the left-sided thoracentesis and he remains on Vfend for coverage.  Oxygen 

saturations are 93% on 1 L nasal cannula and he is achieving 1000 mL on his 

incentive spirometry.  Remote telemetry today is showing atrial fibrillation 

heart rate 77 BPM, he remains on a heparin drip per protocol.  Questions 

answered to to the best of my ability in regards to his Pleurx catheter 

procedure today.  He remains nothing by mouth since midnight.  Laboratory 

results today show a WBC count trending up 21.0, hemoglobin 9.6, hematocrit 

31.7, platelets 325, PTT 54.0, sodium 143, potassium 3.0, BUN 28 and creatinine 

0.80.  





Objective





- Vital Signs


Vital signs: 


                                   Vital Signs











Temp  98.2 F   02/08/21 04:00


 


Pulse  71   02/08/21 04:00


 


Resp  18   02/08/21 04:00


 


BP  100/67   02/08/21 04:00


 


Pulse Ox  93 L  02/08/21 04:00








                                 Intake & Output











 02/07/21 02/08/21 02/08/21





 18:59 06:59 18:59


 


Intake Total 970.000 472.175 


 


Output Total 1875 650 


 


Balance -905.000 -177.825 


 


Weight  100.7 kg 


 


Intake:   


 


  IV  20 


 


    Invasive Line 2  10 


 


    Invasive Line 5  10 


 


  Intake, IV Titration 250.000 212.175 





  Amount   


 


    Heparin Sod,Pork in 0.45% 250.000 212.175 





    NaCl 25,000 unit In 0.45   





    % NaCl 1 250ml.bag @ 16   





    UNITS/KG/HR 16.112 mls/hr   





    IV .[id number] [facility] Rx#:   





    [id number]   


 


  Oral 720 240 


 


Output:   


 


  Urine 1875 650 


 


Other:   


 


  Voiding Method Toilet Toilet 





 Urinal Urinal 


 


  # Voids 1  














- Constitutional


General appearance: Present: cooperative, no acute distress, obese





- EENT


Eyes: Present: normal appearance.  Absent: scleral icterus


ENT: Present: hearing grossly normal





- Neck


Details: 





Neck is supple, no JVD.





- Respiratory


Details: 





Lung sounds with expiratory wheezes throughout, diminished to his bilateral 

bases.  Respirations are symmetrical and nonlabored.  Oxygen saturation is 93% 

on 1 L nasal cannula.  Achieving 1000 mL on his incentive spirometry.





- Cardiovascular


Details: 





Irregular rhythm and regular rate.  S1 and S2 present, negative for S3, gallop 

or murmur.  Remote telemetry showing atrial fibrillation heart rate 77 BPM.  

Knee-high sequential compression devices in place to his bilateral lower extremi

ty Ace.





- Gastrointestinal


Gastrointestinal Comment(s): 





Abdomen is soft, nontender and nondistended.  Active bowel sounds present in all

4 abdominal quadrants.  No guarding or rigidity.  Nothing by mouth at this time.





- Genitourinary


Genitourinary Comment(s): 





Continues to void.





- Integumentary


Integumentary Comment(s): 





Skin is warm and dry.  No clubbing or cyanosis is present.


Integumentary: Absent: rash





- Neurologic


Neurologic: Present: CNII-XII intact.  Absent: focal deficits





- Musculoskeletal


Musculoskeletal: Present: gait normal, generalized weakness, strength equal 

bilaterally





- Psychiatric


Psychiatric: Present: A&O x's 3, appropriate affect, intact judgment & insight





- Allied health notes


Allied health notes reviewed: nursing





- Labs


CBC & Chem 7: 


                                 02/08/21 06:58





                                 02/08/21 06:58


Labs: 


                  Abnormal Lab Results - Last 24 Hours (Table)











  02/07/21 02/07/21 02/07/21 Range/Units





  11:26 16:15 20:26 


 


WBC     (3.8-10.6)  k/uL


 


RBC     (4.30-5.90)  m/uL


 


Hgb     (13.0-17.5)  gm/dL


 


Hct     (39.0-53.0)  %


 


MCHC     (31.0-37.0)  g/dL


 


RDW     (11.5-15.5)  %


 


Neutrophils # (Manual)     (1.3-7.7)  k/uL


 


Monocytes # (Manual)     (0-1.0)  k/uL


 


Metamyelocytes # (Man)     (0)  k/uL


 


Myelocytes # (Manual)     (0)  k/uL


 


APTT     (22.0-30.0)  sec


 


Potassium     (3.5-5.1)  mmol/L


 


BUN     (9-20)  mg/dL


 


Glucose     (74-99)  mg/dL


 


POC Glucose (mg/dL)  113 H  123 H  152 H  (75-99)  mg/dL














  02/08/21 02/08/21 02/08/21 Range/Units





  06:12 06:58 06:58 


 


WBC    21.0 H  (3.8-10.6)  k/uL


 


RBC    3.58 L  (4.30-5.90)  m/uL


 


Hgb    9.6 L  (13.0-17.5)  gm/dL


 


Hct    31.7 L  (39.0-53.0)  %


 


MCHC    30.4 L  (31.0-37.0)  g/dL


 


RDW    18.6 H  (11.5-15.5)  %


 


Neutrophils # (Manual)    16.80 H  (1.3-7.7)  k/uL


 


Monocytes # (Manual)    1.68 H  (0-1.0)  k/uL


 


Metamyelocytes # (Man)    0.42 H  (0)  k/uL


 


Myelocytes # (Manual)    0.84 H  (0)  k/uL


 


APTT   54.0 H   (22.0-30.0)  sec


 


Potassium     (3.5-5.1)  mmol/L


 


BUN     (9-20)  mg/dL


 


Glucose     (74-99)  mg/dL


 


POC Glucose (mg/dL)  107 H    (75-99)  mg/dL














  02/08/21 Range/Units





  06:58 


 


WBC   (3.8-10.6)  k/uL


 


RBC   (4.30-5.90)  m/uL


 


Hgb   (13.0-17.5)  gm/dL


 


Hct   (39.0-53.0)  %


 


MCHC   (31.0-37.0)  g/dL


 


RDW   (11.5-15.5)  %


 


Neutrophils # (Manual)   (1.3-7.7)  k/uL


 


Monocytes # (Manual)   (0-1.0)  k/uL


 


Metamyelocytes # (Man)   (0)  k/uL


 


Myelocytes # (Manual)   (0)  k/uL


 


APTT   (22.0-30.0)  sec


 


Potassium  3.0 L  (3.5-5.1)  mmol/L


 


BUN  28 H  (9-20)  mg/dL


 


Glucose  103 H  (74-99)  mg/dL


 


POC Glucose (mg/dL)   (75-99)  mg/dL














Assessment and Plan


Assessment: 





1.  Bilateral pneumonia, bilateral hydropneumothorax, possible empyema, sputum 

culture preliminarily positive for Aspergillus species, status post left-sided 

thoracentesis


2.  Acute hypoxic respiratory failure with sepsis on admission


3.  Multiple admissions for pneumonia with empyema on the left and previous 

bilateral chest tube placement in June 2020 with cultures positive for strep 

pneumonia


4.  Rheumatoid arthritis with rheumatoid lung


5.  Hypertension, currently hypotensive, requiring pressors on admission


6.  Hypothyroidism


7.  History of DVT


8.  Previous tobacco dependence


9.  Family history of lung cancer


10.  Recent hospitalization for hypotension, dehydration, acute kidney injury re

quiring dialysis


11.  New-onset atrial fibrillation/atrial flutter with rapid ventricular 

response, currently on amiodarone by mouth and metoprolol tartrate


Plan: 





1.  Pleural fluid culture and sputum culture shows Aspergillus Fumigatus, 

currently on Vfend IV piggyback.


2.  Continue to follow chest x-rays.


3.  Encourage incentive spirometry 10 times every hour while awake.  

Bronchodilators per pulmonary for/critical care medicine.


4.  Currently on Vfend managed by infectious disease.  Amphotericin B pleural 

irrigation is available for on-call to the operating room for Pleurx catheter 

placement today 02/08/2021.


5.  Increase activity as tolerated.


6.  Medical management and other comorbidities per primary care service.


7.  The patient is scheduled for a left Pleurx catheter placement today 

02/08/2021 to be performed by Dr. Arnold Kaye. 


8.  Preoperative teaching reinforcement the patient.


9.  More recommendations to follow based on patient's clinical course.


Time with Patient: Greater than 30

## 2021-02-08 NOTE — PN
PROGRESS NOTE



Patient is seen for followup for hyponatremia.  Patient is receiving IV Lasix.  He has

had good urine output.  Potassium is staying about 3 to 3.3 mEq/L.  Renal function has

improved since admission, with creatinine down to 0.8 from 1.32 on initial admission.



On examination today, patient is comfortable, awake. He is not in any acute distress.

Blood pressure 100/67, heart rate 68 per minute. He is afebrile.

Examination shows bilateral breath sounds are heard.  Abdomen is soft.

Examination of lower extremities shows edema 1+ bilaterally.

CNS exam grossly intact.



Labs show sodium 143, potassium 3.0, chloride 105. CO2 is 30, BUN 28, creatinine 0.8.



ASSESSMENT:

1. Hypokalemia secondary to diuresis, currently being replaced.

2. Acute kidney injury, now resolved.

3. Pneumonia, maintained on antibiotics, with possibility of empyema; scheduled for

    chest tube placement.

4. Septic shock associated with pneumonia, off of pressors.

5. History of rheumatoid arthritis.



PLAN:

Replace potassium. Check magnesium. Can add to today's labs. Repeat labs in a.m.





MMODL / IJN: [id number] / Job#: [id number]

## 2021-02-08 NOTE — XR
EXAMINATION TYPE: XR chest 1V portable

 

DATE OF EXAM: 2/8/2021

 

COMPARISON: 7/2/2021

 

HISTORY: post op Pluerx 

 

 

TECHNIQUE:  Frontal and lateral views of the chest are obtained.

 

FINDINGS:

 

Interval placement of left-sided chest tube. There is subcutaneous air seen along the chest wall. West
ateral coarse infiltrates persist. No evidence for sizable pneumothorax at this time. Heart size is s
table.

 

Mediastinal structures are stable and grossly unremarkable.

 

No evidence for hilar prominence.

 

Degenerative changes dorsal spine. 

 

IMPRESSION:

1. Interval placement of left-sided pleural catheter without sizable pneumothorax. There is evidence 
of subcutaneous air along the left chest wall. Bilateral infiltrates are stable.

## 2021-02-08 NOTE — P.OP
Date of Procedure: 02/08/21


Preoperative Diagnosis: 


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


Postoperative Diagnosis: 


Same


Procedure(s) Performed: 


Left Pleurx catheter placement with irrigation of pleural space and instillation

of amphotericin B solution under fluoroscopic guidance


Implants: 


Pleurx catheter


Anesthesia: MAC


Surgeon: Arnold Kaye


Estimated Blood Loss (ml): 2


IV fluids (ml): 200


Urine output (ml): 0


Pathology: none sent


Condition: stable


Disposition: PACU


Indications for Procedure: 


60-year-old male with long-standing history of pleural effusions and rheumatoid 

disease and pleural disease presents with pneumonia and grew Aspergillus from 

his lungs he had a bilateral trapped lungs with a pneumothorax on the right and 

a hydropneumothorax on the left hydropneumothorax was tapped and the fluid 

showed purulence and also grew Aspergillus


Operative Findings: 


Murali pus was noted in the left pleural space and was sent for fungal and 

routine cultures


Description of Procedure: 


The patient was brought to the operating room, placed supine on the operating 

table, given IV sedation.  Left chest and left upper quadrant of the abdomen 

were sterilely prepped and draped.  Pleural space was entered in the midaxillary

line in the sixth interspace with a skinny needle under lidocaine anesthesia and

air and purulent fluid were noted.  18-gauge needle was placed at this site.  

Guidewire was threaded into the left pleural space under fluoroscopic guidance. 

Needle was removed and guidewire left in place.  Site was enlarged to about a 

centimeter and a half.  Counterincision was made near the sternal border and 

anesthetized with lidocaine.  Pleurx catheter was tunneled from this incision to

the initial incision and the skin cuff was left just under the skin at the exit 

site.  Introducer and dilator were placed over the guidewire under fluoroscopic 

guidance and through the introducer sheath the Pleurx catheter was introduced 

into the pleural space.  Some purulence came out and this was sent for culture. 

Introducer sheath was removed and the catheter was noted to lay well in the left

pleural space.  This incision was then closed with a 4-0 Vicryl subcutaneous and

subcuticular stitch.  First catheter was secured at the exit site with a 0 silk 

suture.  It was connected to suction with a stopcock.  Purulence was sucked out 

and then saline infused in and then the saline sucked out and more saline placed

into we had irrigated with a liter of fluid.  We then instilled 200 mL of 

amphotericin solution with 50 mg of aunts the Kerrison be into the left pleural 

space through the catheter and disconnected the catheter and Tip.  It was placed

in a standard Pleurx dressing.  The skin's entry site was dressed with skin glue

and a Band-Aid.  The patient was transferred to recovery in stable condition.

## 2021-02-09 LAB
ANION GAP SERPL CALC-SCNC: 8 MMOL/L
BUN SERPL-SCNC: 27 MG/DL (ref 9–20)
CALCIUM SPEC-MCNC: 8.5 MG/DL (ref 8.4–10.2)
CHLORIDE SERPL-SCNC: 102 MMOL/L (ref 98–107)
CO2 SERPL-SCNC: 31 MMOL/L (ref 22–30)
GLUCOSE BLD-MCNC: 100 MG/DL (ref 75–99)
GLUCOSE BLD-MCNC: 102 MG/DL (ref 75–99)
GLUCOSE BLD-MCNC: 121 MG/DL (ref 75–99)
GLUCOSE BLD-MCNC: 144 MG/DL (ref 75–99)
GLUCOSE SERPL-MCNC: 90 MG/DL (ref 74–99)
POTASSIUM SERPL-SCNC: 3.5 MMOL/L (ref 3.5–5.1)
SODIUM SERPL-SCNC: 141 MMOL/L (ref 137–145)

## 2021-02-09 PROCEDURE — 02K83ZZ MAP CONDUCTION MECHANISM, PERCUTANEOUS APPROACH: ICD-10-PCS

## 2021-02-09 PROCEDURE — 5A2204Z RESTORATION OF CARDIAC RHYTHM, SINGLE: ICD-10-PCS

## 2021-02-09 PROCEDURE — 02583ZZ DESTRUCTION OF CONDUCTION MECHANISM, PERCUTANEOUS APPROACH: ICD-10-PCS

## 2021-02-09 PROCEDURE — B246ZZ4 ULTRASONOGRAPHY OF RIGHT AND LEFT HEART, TRANSESOPHAGEAL: ICD-10-PCS

## 2021-02-09 RX ADMIN — CEFAZOLIN SCH MLS/HR: 330 INJECTION, POWDER, FOR SOLUTION INTRAMUSCULAR; INTRAVENOUS at 17:15

## 2021-02-09 RX ADMIN — AMIODARONE HYDROCHLORIDE SCH MG: 200 TABLET ORAL at 20:51

## 2021-02-09 RX ADMIN — HYDROCORTISONE SODIUM SUCCINATE SCH MG: 100 INJECTION, POWDER, FOR SOLUTION INTRAMUSCULAR; INTRAVENOUS at 17:15

## 2021-02-09 RX ADMIN — FUROSEMIDE SCH MG: 10 INJECTION, SOLUTION INTRAMUSCULAR; INTRAVENOUS at 09:09

## 2021-02-09 RX ADMIN — METOPROLOL TARTRATE SCH MG: 25 TABLET, FILM COATED ORAL at 09:09

## 2021-02-09 RX ADMIN — FUROSEMIDE SCH MG: 10 INJECTION, SOLUTION INTRAMUSCULAR; INTRAVENOUS at 17:53

## 2021-02-09 RX ADMIN — PANTOPRAZOLE SODIUM SCH MG: 40 TABLET, DELAYED RELEASE ORAL at 06:47

## 2021-02-09 RX ADMIN — VORICONAZOLE SCH MLS/HR: 10 INJECTION, POWDER, LYOPHILIZED, FOR SOLUTION INTRAVENOUS at 21:22

## 2021-02-09 RX ADMIN — IPRATROPIUM BROMIDE AND ALBUTEROL SULFATE SCH: .5; 3 SOLUTION RESPIRATORY (INHALATION) at 08:47

## 2021-02-09 RX ADMIN — OXYCODONE HYDROCHLORIDE AND ACETAMINOPHEN SCH MG: 500 TABLET ORAL at 20:51

## 2021-02-09 RX ADMIN — METOPROLOL TARTRATE SCH MG: 25 TABLET, FILM COATED ORAL at 20:51

## 2021-02-09 RX ADMIN — HYDROCORTISONE SODIUM SUCCINATE SCH MG: 100 INJECTION, POWDER, FOR SOLUTION INTRAMUSCULAR; INTRAVENOUS at 23:03

## 2021-02-09 RX ADMIN — TRIAMCINOLONE ACETONIDE SCH APPLIC: 1 CREAM TOPICAL at 20:52

## 2021-02-09 RX ADMIN — IPRATROPIUM BROMIDE AND ALBUTEROL SULFATE SCH: .5; 3 SOLUTION RESPIRATORY (INHALATION) at 12:08

## 2021-02-09 RX ADMIN — Medication SCH MCG: at 20:51

## 2021-02-09 RX ADMIN — APIXABAN SCH MG: 5 TABLET, FILM COATED ORAL at 17:17

## 2021-02-09 RX ADMIN — AMIODARONE HYDROCHLORIDE SCH MG: 200 TABLET ORAL at 09:09

## 2021-02-09 RX ADMIN — POTASSIUM CHLORIDE SCH: 14.9 INJECTION, SOLUTION INTRAVENOUS at 17:15

## 2021-02-09 RX ADMIN — LEVOTHYROXINE SODIUM SCH MCG: 75 TABLET ORAL at 05:44

## 2021-02-09 RX ADMIN — ASPIRIN 81 MG CHEWABLE TABLET SCH MG: 81 TABLET CHEWABLE at 20:51

## 2021-02-09 RX ADMIN — HYDROCODONE BITARTRATE AND ACETAMINOPHEN PRN EACH: 5; 325 TABLET ORAL at 20:51

## 2021-02-09 RX ADMIN — TRIAMCINOLONE ACETONIDE SCH: 1 CREAM TOPICAL at 09:11

## 2021-02-09 RX ADMIN — HYDROCORTISONE SODIUM SUCCINATE SCH MG: 100 INJECTION, POWDER, FOR SOLUTION INTRAMUSCULAR; INTRAVENOUS at 09:09

## 2021-02-09 RX ADMIN — METOPROLOL TARTRATE SCH MG: 25 TABLET, FILM COATED ORAL at 17:15

## 2021-02-09 RX ADMIN — IPRATROPIUM BROMIDE AND ALBUTEROL SULFATE SCH: .5; 3 SOLUTION RESPIRATORY (INHALATION) at 21:23

## 2021-02-09 RX ADMIN — VORICONAZOLE SCH MLS/HR: 10 INJECTION, POWDER, LYOPHILIZED, FOR SOLUTION INTRAVENOUS at 09:11

## 2021-02-09 NOTE — PN
PROGRESS NOTE



DATE OF SERVICE:

02/09/2021



REASON FOR FOLLOWUP:

Aspergillus pneumonia and empyema.



INTERVAL HISTORY:

The patient is currently afebrile.  The patient is breathing comfortably.  The patient

denies having any chest pain or any shortness of breath.  Occasional cough.  No

abdominal pain or diarrhea.



PHYSICAL EXAMINATION:

Blood pressure 103/69 with a pulse of 94, temperature 98.9. He is 96% on 2 L nasal

cannula.

General description is a middle-aged male lying in bed in no distress.

RESPIRATORY SYSTEM: Unlabored breathing with decreased breath sounds at the base. No

wheeze.

HEART: S1, S2.  Regular rate and rhythm.

ABDOMEN: Soft. No tenderness.



LABS:

BUN of 27, creatinine 0.79.



DIAGNOSTIC IMPRESSION AND PLAN:

Patient with aspergillus pneumonia and empyema, status post left PleurX catheter

placement with irrigation of pleural space _____ patient is covered with voriconazole;

to continue and will monitor his clinical course closely.  Continue with supportive

care.





MMODL / IJN: [id number] / Job#: [id number]

## 2021-02-09 NOTE — P.PN
Subjective


Progress Note Date: 02/09/21





This is a 60-year-old  male patient requesting my service with past 

medical history of rheumatoid arthritis on Arava that was diagnosed when he was 

37 year old initially was started on Methotrexate that he could not tolerate 

then placed on Embrel but he developed side effects and finally was tried on 

Humira injection but he developed neurologic sided effects and was hospitaized 

at [facility] for quiet some time, rheumatoid lung disease, 

previous history of loculated empyema with chest tube placement in July 2020, 

hypertension, hypothyroidism, history of DVT, remote history of tobacco use and 

dependence, was recently hospitalized at [facility] after he was 

admitted for an acute kidney injury with significant metabolic acidosis 

associated with the elevated creatinine and elevated BUN and hypotension at that

time he was seen and evaluated by pulmonary and critical care medicine, he had a

central line placed and at that time, he was placed on Levophed drip he was p

laced on IV anabiotic as well but the patient recovered very fast and he had an 

echocardiogram that showed normal LV function and segmental hypokinesia, he was 

supposed to follow-up with Dr. Santo in the office today, patient was seen in my

office 24 hours ago when he was complaining of increased shortness breath, at 

that time his oxygenation could not be checked because of his Case's and cold

extremities, he was sent for a chest x-ray that showed right lower lobe 

infiltrate as well as blood tests that showed a white count of 20,000 patient 

was feeling better he was started on oral antibiotic in the form of Levaquin 500

mg orally once every day, and that he was supposed to follow-up with me as an 

outpatient every week he went to see his cardiologist today and he had an 

episode when he was dizzy lightheaded and an episode of vomiting as well and he 

was sent to the emergency department for evaluation had a computed tomography of

the chest as well as abdomen and pelvis that showed a thick walled pleural 

cavities posteriorly in the lower lungs with left-sided air-fluid level that has

diminished in size from the prior computed tomography scan when he was in the 

hospital a few weeks back there is also associated compressive atelectasis and 

chronic consultation with multifocal areas of reticular nodular opacity in the 

upper lungs with a slight nodularity of the left upper lobe again this area of 

focal consultation has improved from the previous study that was done few weeks 

ago, patient was started on IV antibiotic with vancomycin and Zosyn as well as 

IV fluid, he was seen in consultation by pulmonary critical care in the 

emergency department as the patient blood pressure dropped and that he'll be 

transferred to the intensive care unit at this point in time I had long conve

rsation with the patient and his wife at the bedside and the patient may need to

have a chest tube placed for his possible right empyema, he did receive 3 L 

normal saline in the ER, and his blood pressure is marginal he may need to go on

 pressors if  not recovered.





1/30:patient is sitting up in chair feeling about the same , complains of 

increased pain in the righ elbow, was seen earlier by pulmonary and the plan was

to go for US-Guided left thoracenthesis due to to loculated left pleural effu

nathaniel and possible empyema, may need VATS, he denies any chest pain or shortness 

of breath, no abdominal pain nausea, vomiting or diarrhea, still have diarrhea 

negative for C.Diff, he seems to have accept to stay in the hospital this time 

as long as he needed to.





1/31: Patient sitting up in the recliner complaining of increased pain in his 

joints due to his rheumatoid arthritis with increased synovitis in both wrists 

both elbows and both shoulders he was started on hydrocortisone 100 mg IV push 

every 8 hours, to try to help with his blood pressure as well as his phonation, 

he is maintained on Norco 5/325 mg one tablet orally every 6 hours as needed for

pain control, patient denies any chest pain is less short of breath, he 

continues to have increased coughing with yellow from production of green phlegm

production, he had no fever or chills at this time he continues to be on 

Levothroid drip, patient has appeared to have increased swelling in both lower 

extremities as well as both ankles, he is maintained on IV antibiotic in the 

form of vancomycin as well as cefepime, infectious disease is following, patient

is scheduled to go for ultrasound guidance thoracentesis of the left loculated 

pleural effusion tomorrow morning.





2/1: Patient remains in the intensive care unit.  He has been afebrile, heart 

rate 100, blood pressure 95/62, pulse ox 97% on 2 L nasal cannula.  Hemoglobin 

8.8 and leukocytosis resolved.  Creatinine 0.76, electrolytes normal.  Sputum 

culture is positive for Aspergillus species.  Blood culture showing no growth. 

Patient underwent diagnostic thoracentesis with interventional radiology today 

with removal of 45 mL of cloudy white fluid.  Chest x-ray reveals no 

complications following left thoracentesis.  Fluid has been sent for culture and

cytology.  Consult in place for cardiothoracic surgery to evaluate for VATS with

decortication.





2/2: Patient remains in the intensive care unit.  He did receive 1 dose of IV 

Lasix this morning ordered by nephrology.  He is followed by cardiothoracic 

surgery was no plan for surgical intervention.  ID has recommended cefepime and 

vancomycin for now.  Expect antifungal agent ended as well.  Patient denies any 

significant shortness of breath.  He is comfortable sitting in a chair.  Patient

is having minimal cough and minimal sputum.  Patient is achieving 1000 ml on 

incentive spirometry.  Culture and cytology reports remain pending from 

thoracentesis. Repeat chest x-ray reveals patchy peripheral lung with lung zone 

and lower lung zone infiltrates persist unchanged.  Patient has been afebrile, 

heart rate in the 90s and low 100s, pulse ox 91 on room air.





2/3: Repeat chest x-ray reveals left-sided basilar loculated pneumothorax, 

stable.  Bilateral lung infiltrates.  Correlate for pneumonia and atelectasis.  

WBC 9, hemoglobin 9.5.  Potassium 3.4 and will be replaced.  Patient is 

continued on cefepime and vancomycin per Dr. Sheets's recommendations.  Patient 

in reaching 1000 on incentive spirometry.  Patient is scheduled to see Dr. Kaye tomorrow.  Cefepime and vancomycin had been discontinued by Dr. Sheets and

started on Voriconazole. 





2/4: A-Team was called this morning regarding elevated heart rate, EKG was 

atrial flutter with RVR and 150 bpm and patient was transferred to ICU as an 

overflow for the cardiac stepdown unit, started on Cardizem drip and consult 

with cardiology.  Heart rate was improved with Cardizem and patient also 

received amiodarone bolus 300 mg.  Patient denies any worsening shortness of 

breath, cough or congestion.  No chest pain or palpitations.  Patient is resting

comfortably.  09, hemoglobin 8.8, potassium 3.4 replaced.  Creatinine 0.89.





2/5: Patient remains in the intensive care unit.  He continues to be in atrial 

flutter with 21 conduction rate.  He is on heparin drip and amiodarone.  He is 

currently in and out of atrial flutter with RVR.  Cardiology is following 

closely and may consider cardioversion tomorrow. WBC 16.6, hgb 9.8, plt 374, 

sodium 143, potassium 3.9, creatinine 0.88.  He has been afebrile, heart rate 

has been at times marginal, pulse ox 90% on room air.  Patient denies having any

fever or chills, no shortness of breath, no cough.  No chest pain.  Patient is 

followed by cardiothoracic surgery and plan is for left-sided PleurX catheter 

placement which is scheduled for Monday.





2/8: Patient is scheduled for PleurX catheter placement this afternoon.  

Cardiology may schedule him for cardioversion.  Dr. Collado is planning on oral 

antimicrobials at the time of discharge.  Patient is bringing up quite a bit of 

sputum.  He has been afebrile, heart rate 68, blood pressure 100/67, pulse ox 

93% on 1 L nasal cannula.  WBC 21, hemoglobin 9.6, platelet count 325.  

Potassium 3, BUN 28 creatinine 0.8.





2/9: Patient is status post PleurX catheter.  Patient has been afebrile, heart 

rate 106, blood pressure 85/53.  Pulse ox 97% on 2 L.  Patient is known to be 

back in atrial fibrillation at rate of 122.  He did go for cardioversion today 

for atrial flutter.  Patient states he has a little pain in the side for which 

she is taking Norco.  Not bad today.  Patient has subcutaneous edema to 

bilateral chest.  He denies having any headache.  BUN 27 creatinine 0.79.  Blood

sugar .








Objective





- Vital Signs


Vital signs: 


                                   Vital Signs











Temp  97.9 F   02/09/21 11:14


 


Pulse  122 H  02/09/21 12:14


 


Resp  20   02/09/21 12:14


 


BP  85/53   02/09/21 12:14


 


Pulse Ox  97   02/09/21 11:14








                                 Intake & Output











 02/08/21 02/09/21 02/09/21





 18:59 06:59 18:59


 


Intake Total 325  100


 


Output Total 1203 640 


 


Balance -878 -640 100


 


Weight  100.4 kg 


 


Intake:   


 


    100


 


Output:   


 


  Urine 1200 640 


 


  Estimated Blood Loss 3  


 


Other:   


 


  Voiding Method Toilet Toilet 





 Urinal Urinal 


 


  # Voids 1 1 














- Exam





 Review of Systems 


Constitutional: Reports anorexia, Reports chronic pain, Reports fatigue, denies 

weakness, Reports weight loss


Eyes: denies blurred vision, denies bulging eye, denies decreased vision, denies

diplopia


Ears, nose, mouth and throat: Denies dysphagia, Denies neck lump, Denies sore 

throat


Respiratory: Reports dyspnea, Reports respiratory infections, Denies congestion,

Denies cough with sputum, Denies home oxygen, Denies sleep apnea, Denies 

snoring, Denies wheezing


Cardiac: No chest pain.  No palpitations.  No lower extremity edema.  No 

syncopal episodes.


Gastrointestinal: Reports bloating, Reports change in bowel habits, Reports 

diarrhea, Reports early satiety, Reports indigestion, Reports loss of appetite, 

Reports nausea, Reports vomiting, Denies abdominal pain, Denies belching, Denies

heartburn, Denies hematemesis, Denies hematochezia, Denies melena


Genitourinary: Denies dysuria, Denies nocturia


Musculoskeletal: Denies myalgias


Musculoskeletal: absent: ankle pain, ankle stiffness, ankle swelling, elbow 

pain, elbow stiffness, elbow swelling, foot pain, foot stiffness, foot swelling,

hand pain, hand stiffness, hand swelling, hip pain, hip stiffness, hip swelling,

knee pain, knee stiffness, knee swelling, shoulder pain, shoulder stiffness, 

shoulder swelling, wrist pain, wrist stiffness, wrist swelling


Integumentary: Denies pruritus, Denies rash


Neurological: Denies numbness, Denies weakness


Psychiatric: Denies anxiety, Denies depression


Endocrine: Denies fatigue, Denies weight change








Physical examination:


General: patient is 60 year old male patient resting in chair and appears to be 

in no acute distress.





HEENT: head ia atraumatic normocephalic, Pupils were equal round reactive to 

light and accommodations , extra ocular muscle movement were intact, mucous 

membranes of the mouth are somewhat dry.





Neck: supple no JVP.





Chest: decreased breath sounds at he bases with few ronchi no expiratory wheezes

no chest wall tendernes or intercostal retractions.





Heart: first heart sound is depressed, second heart sound is normal tachycardic 

and irregular there is HOOD 2/6 located at the left sternal border.





Abdomen: soft, mild tenderness to the left lower quadrant positive bowel sounds,

no guarding or rebound tenderness, no hepatosplenomegaly.





Extremities: there is no edema or calf tenderness DP+ 2 Bilaterally, there is 

what appears to be a charcot joint in the right foot form arch collapse.





Neurologic examination: patient is awake , alert and oriented X 3 CN II-XII are 

grossly intact, muscle power 4/5 in bilateral upper and lower extremities, deep 

tendon reflexes were normal.








- Labs


CBC & Chem 7: 


                                 02/08/21 06:58





                                 02/09/21 08:46


Labs: 


                  Abnormal Lab Results - Last 24 Hours (Table)











  02/08/21 02/09/21 02/09/21 Range/Units





  20:10 06:11 08:46 


 


Carbon Dioxide    31 H  (22-30)  mmol/L


 


BUN    27 H  (9-20)  mg/dL


 


POC Glucose (mg/dL)  126 H  102 H   (75-99)  mg/dL














  02/09/21 Range/Units





  11:32 


 


Carbon Dioxide   (22-30)  mmol/L


 


BUN   (9-20)  mg/dL


 


POC Glucose (mg/dL)  100 H  (75-99)  mg/dL








                      Microbiology - Last 24 Hours (Table)











 02/01/21 11:30 Fungal Culture - Preliminary





 Pleural Fluid    Aspergillus fumigatus


 


 02/01/21 11:30 Acid Fast Bacilli Smear - Final





 Pleural Fluid Acid Fast Bacilli Culture - Preliminary














Assessment and Plan


Assessment: 





1.  Acute hypoxemic respiratory failure secondary to loculated left-sided 

pleural effusion and fungal empyema with what appears to be recurrent pneumonia.

 Continue Voriconazole 200 mg every 12 hours IV piggyback per Dr. Sheets, 

interventional radiology performed ultrasound-guided thoracentesis of the left 

loculated pleural fluid for Gram stain and culture and cytology as well as LDH 

protein as well as pH for possible empyema.  Culture positive for Aspergillus.  

Cardiothoracic surgery status post Pleurx catheter insertion.





2.  Lactic acidosis.  Resolved, repeat lactic acid is back to normal.





3.  Sepsis with septic shock.  Patient has been weaned off Levothroid drip.





4.  Acute kidney injury due to poor oral intake of fluid as well as hypotension 

with acute tubular necrosis.  Patient is on IV fluid, continue to monitor the 

patient CMP continue to monitor urine output.  Nephrology is following.





5.  Leukocytosis secondary to severe sepsis secondary to empyema.  Continue IV 

fluid, continue IV antibiotic, repeat CBC tomorrow morning.





6.  Atrial flutter with RVR.  Status post cardioversion, converted 

back.Cardiology consult appreciated.





7.  Rheumatoid arthritis and rheumatoid lung.  Arava and Xeljanz had been on 

hold since October, patient was started on hydrocortisone 50 mg IV push every 8 

hours.





8.  History of empyema with Streptococcus requiring chest tube.  





9.  Hypertension and hypertensive cardiovascular disease. currently hypotensive.





10.  Hypothyroidism . we will continue with Synthroid 150 mcg orally daily.





11. DVT prophylaxis. we will start Lovenox 40 mg SC daily and bilateral knee 

high Fahad Hose





12. GI prophylaxis. we will continue with Protonix 40 mg IVP daily.





13.  Prognosis continues to be guarded.





Discharge plan: home





Impression and plan of care have been directed as dictated by the signing 

physician.  Phyllis Wylie nurse practitioner acting as scribe for signing 

physician.

## 2021-02-09 NOTE — P.PN
Subjective


Progress Note Date: 02/09/21


Principal diagnosis: 





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 

species, acute hypoxic respiratory failure with sepsis on admission.  Radius his

tory of multiple admissions for pneumonia with empyema on the left and previous 

bilateral chest tube placement in June 2020 with cultures positive for strep 

pneumonia, rheumatoid arthritis with rheumatoid lung, hypertension, 

hypothyroidism, DVT, previous tobacco dependence, family history of lung cancer,

and recent hospitalization for hypotension, dehydration, acute kidney injury 

requiring dialysis





POD #8 left-sided thoracentesis by interventional radiology





POD #1 left Pleurx catheter placement with irrigation of pleural space and 

instillation of amphotericin B solution under thoracoscopic guidance





Extensive subcutaneous emphysema





Persistent atrial flutter with RVR, status post electrical cardioversion with 

return to normal sinus rhythm





The patient is currently laying in bed on the cardiac stepdown unit in no acute 

distress.  Denies pain, states shortness of breath has continued to improve.  He

was taken down for cardioversions morning and remains on bed rest.  Chest x-ray 

this morning demonstrates extensive subcutaneous emphysema, patient appears 

swollen, voice sounds different, patient denies any distress, appears 

comfortable.  Remains on IV Lasix, IV Solu-Cortef, and IV voriconazole.  Remains

afebrile, currently in sinus rhythm and hemodynamically stable, oxygen saturatio

n in the high 90s on 2 L nasal cannula.





Objective





- Vital Signs


Vital signs: 


                                   Vital Signs











Temp  97.6 F   02/09/21 08:29


 


Pulse  83   02/09/21 09:14


 


Resp  20   02/09/21 09:14


 


BP  95/62   02/09/21 09:14


 


Pulse Ox  90 L  02/09/21 08:29








                                 Intake & Output











 02/08/21 02/09/21 02/09/21





 18:59 06:59 18:59


 


Intake Total 325  100


 


Output Total 1203 640 


 


Balance -878 -640 100


 


Weight  100.4 kg 


 


Intake:   


 


    100


 


Output:   


 


  Urine 1200 640 


 


  Estimated Blood Loss 3  


 


Other:   


 


  Voiding Method Toilet Toilet 





 Urinal Urinal 


 


  # Voids 1 1 














- Constitutional


General appearance: Present: cooperative, no acute distress





- Respiratory


Details: 





Lungs sounds diminished in the bases bilaterally.  Respirations even, nonl

abored.  Currently on 2 L nasal cannula with oxygen saturation 97%.  Able to 

achieve 1000 mL on his incentive spirometry.  Strong cough.  Left sided Pleurx 

catheter present.





- Cardiovascular


Details: 





S1, S2 present.  Regular rate and rhythm, sinus rhythm on telemetry.  Palpable 

peripheral pulses bilaterally.  Bilateral lower extremity edema present.  No fabiano

f pain or tenderness noted.








- Gastrointestinal


Gastrointestinal Comment(s): 





Abdomen soft, nontender, nondistended.  Active bowel sounds present 4 

quadrants.  Tolerating diet.  Positive bowel movement








- Genitourinary


Genitourinary Comment(s): 





Continues to void





- Integumentary


Integumentary Comment(s): 





Skin is warm and dry with evidence of good perfusion





- Neurologic


Neurologic: Present: CNII-XII intact





- Musculoskeletal


Musculoskeletal: Present: strength equal bilaterally





- Psychiatric


Psychiatric: Present: A&O x's 3, appropriate affect, intact judgment & insight





- Allied health notes


Allied health notes reviewed: nursing





- Labs


CBC & Chem 7: 


                                 02/08/21 06:58





                                 02/09/21 08:46


Labs: 


                  Abnormal Lab Results - Last 24 Hours (Table)











  02/08/21 02/09/21 02/09/21 Range/Units





  20:10 06:11 08:46 


 


Carbon Dioxide    31 H  (22-30)  mmol/L


 


BUN    27 H  (9-20)  mg/dL


 


POC Glucose (mg/dL)  126 H  102 H   (75-99)  mg/dL








                      Microbiology - Last 24 Hours (Table)











 02/01/21 11:30 Fungal Culture - Preliminary





 Pleural Fluid    Aspergillus fumigatus


 


 02/01/21 11:30 Acid Fast Bacilli Smear - Final





 Pleural Fluid Acid Fast Bacilli Culture - Preliminary














- Imaging and Cardiology


Chest x-ray: report reviewed, image reviewed





Assessment and Plan


Assessment: 





1.  Bilateral pneumonia,  left trapped lung with fungal empyema growing 

Aspergillus species, status post left-sided thoracentesis, status post Pleurx 

catheter placement with instillation of amphotericin B


2.  Acute hypoxic respiratory failure with sepsis on admission


3.  Multiple admissions for pneumonia with empyema on the left and previous 

bilateral chest tube placement in June 2020 with cultures positive for strep 

pneumonia


4.  Rheumatoid arthritis with rheumatoid lung


5.  Hypertension, currently hypotensive, requiring pressors on admission


6.  Hypothyroidism


7.  History of DVT


8.  Previous tobacco dependence


9.  Family history of lung cancer


10.  Recent hospitalization for hypotension, dehydration, acute kidney injury 

requiring dialysis


11.  Extensive subcutaneous emphysema


12.  Persistent atrial flutter with RVR, status post electrical cardioversion 

with return to normal sinus rhythm


Plan: 





1.  Pleurx catheter connected to atrium with -20 cm continuous wall suction.  

Will monitor for resolution of air leak and decreased subcu emphysema


2.  Daily chest x-rays


3.  Wean O2 as tolerated.  Encourage incentive spirometry.  Bronchodilators, 

Solu-Cortef per pulmonology


4.  Amiodarone, Eliquis per cardiology


5.  Continue voriconazole per infectious disease recommendations


6.  Increase activity as tolerated.  Suction tubing extended so patient may 

ambulate in the room, may remove suction for short times for patient to ambulate

in the hallway


7.  Management of other comorbidities per primary care service.


8.  Teaching done with patient and wife regarding Pleurx catheter care.  Will 

continue to reinforce while patient is hospitalized


9.  More recommendations to follow





Time with Patient: Greater than 30

## 2021-02-09 NOTE — CDI
Documentation Clarification Form



Date: 02/09/2021 03:14:41 PM

From: Sangita Steinberg RN, CCDS

Phone: [phone]

MRN: [id number]

Admit Date: 01/29/2021 12:02:00 PM

Patient Name: Trevon Kaufman

Visit Number: [id number]

Discharge Date:  





ATTENTION: The Clinical Documentation Specialists (CDI) and [facility] Coding Staff 
appreciate your assistance in clarifying documentation. Please respond to the 
clarification below the line at the bottom and electronically sign. The CDI & 
[facility] Coding staff will review the response and follow-up if needed. Please note: 
Queries are made part of the Legal Health Record. If you have any questions, 
please contact the author of this message via ITS.



Dr. Otoniel Pimentel





Atrial Flutter is documented on 2/9 as persistent atrial flutter with RVR. To 
accurately code the diagnosis further clarification is needed. 



History/Risk factors: Rheumatoid arthritis, rheumatoid lung, former smoker, 
Sepsis with Septic shock



Clinical Indicators: 60-year-old male sent from Dr. SARINA Santo's office to ED on 
1/29 with complaints of shortness of breath and low pulse ox 70s. He was ruled 
in for pneumonia, sepsis with septic shock. 

1/29 Vital signs on admission: 131/104 129 28  

1/29 EKG/telemetry: Atrial flutter with variable AV block vent. rate 145 bpm



2/4 Pulmonary notes patient went into atrial fibrillation/flutter with a rapid 
ventricular response started on Cardizem at 5 mg per hour.



2/4 Cardiology consult: Atrial flutter with RVR 

2/5 Cardiology progress notes: He continues to be in atrial flutter with 2:1 
conduction rates around 150.



Treatment:  

2/4 Cardizem @5 mg per hr 

2/4 Heparin drip (per orders) 

Eliquis 5 mg po bid

2/4 Amiodarone  mg iv once than drip per orders 2/4-2/5 change to 
Cardarone  200 mg po bid 2/6 2/9 Electrical cardioversion





In your professional opinion, in order to capture the severity of condition; can
you please clarify the type of Atrial Flutter if known?  



Typical/Type I

Atypical/Type II

Other, please specify ________

Unable to determine



(Last Revision: April 2018)

___________________________________________________________________________



MTDD

## 2021-02-09 NOTE — P.PN
Subjective


Progress Note Date: 02/09/21











On today's evaluation of 02/08/2021, the patient is doing well.  He is awaiting 

his surgery.  He is currently nothing by mouth.  After lengthy discussion with 

the surgeon, total decided to proceed with a Pleurx catheter insertion on the 

left with and with Ampho B showed of the pleural space.  The patient is also on 

voriconazole.  He is coughing out thick sputum.  Note that the pleural fluid 

aspirated from the left lung showed Aspergillus fumigatus.  Sputum also showed 

the same.  As such the patient has fungal empyema involving the LAD left lung 

for sure and possibly on the right.  There is extensive thickening of the vessel

and the parietal pleura in addition to large cavities in lung bases bilaterally 

consistent with empyema.  No fever.  No chills.  He Is a 22 with a Hemoglobin of

9.6.  On a separate note, the patient has a new onset atrial 

fibrillation/flutter and the patient is currently on IV heparin which was placed

on hold for Pleurx catheter insertion. CT chest showed thick-walled pleural 

cavities posteriorly in the lower lungs with left-sided air-fluid level with 

left-sided pleural fluid diminished most recent CT from 12/29/2020.  There is 

associated compressive atelectasis and/or chronic consolidation.  Persistent as 

it is lobe fissure, multifocal areas of reticular nodular opacity in the upper 

lungs remain present with slight nodularity in the left upper lobe.  There is a 

focal consolidation improved from most recent study with some residual areas of 

scarring.  past medical history of rheumatoid arthritis, previous history of 

rheumatoid lung disease on Arava, previous episodes of pneumonia and history of 

loculated empyema requiring chest tube placement back in July 2020 with pleural 

fluid cultures positive for streptococcal pneumonia.  Other medical history 

includes hypertension, hypothyroidism and previous history of DVT.  Patient had 

a recent hospitalization from January 18 through 01/21/2021 hypotension, 

dehydration related to nausea vomiting diarrhea, acute kidney injury.  I was 

involved in his care back then.  At that time the patient also had cavities in 

the lung bases posteriorly with some air-fluid level.  The patient is currently 

off immunosuppression treatment and the patient was taken a combination of Arava

and Xaljenz and he is a latest intake of this medication was approximately 5 

months ago.  He is using incentive spirometer.





On 02/09/2021 the patient is currently postop.  The patient was taken to the 

operating room.  The patient has a left-sided Pleurx catheter inserted and 

today's postop day #1.  Intraoperatively, more purulent material was drained 

from the left lung, amphotericin B was infused into the pleural space and the 

tube was clamped and today's was unclamped.  The follow-up chest x-ray from 

today showed development of subcutaneous emphysema bilaterally along with a 

hydropneumothorax on the left.  Based on the surgical report, a total of 200 mL 

of amphotericin B solution was infused into the left pleural space.  The patient

is calm and comfortable today.  Is on oxygen at 2 L by nasal cannula.  Earlier 

this morning he underwent a cardioversion that was essentially successful.  An 

MRA evaluation, the patient was back in A. fib flutter.  He is on 

anticoagulation with Eliquis and he is also on amiodarone and cardiology is on 

the case.  He is currently off immunosuppression treatment.





Objective





- Vital Signs


Vital signs: 


                                   Vital Signs











Temp  97.6 F   02/09/21 08:29


 


Pulse  81   02/09/21 10:14


 


Resp  20   02/09/21 10:14


 


BP  94/57   02/09/21 10:14


 


Pulse Ox  90 L  02/09/21 08:29








                                 Intake & Output











 02/08/21 02/09/21 02/09/21





 18:59 06:59 18:59


 


Intake Total 325  100


 


Output Total 1203 640 


 


Balance -878 -640 100


 


Weight  100.4 kg 


 


Intake:   


 


    100


 


Output:   


 


  Urine 1200 640 


 


  Estimated Blood Loss 3  


 


Other:   


 


  Voiding Method Toilet Toilet 





 Urinal Urinal 


 


  # Voids 1 1 














- Exam








GENERAL EXAM: Alert, very pleasant 60-year-old gentleman, on 2 L nasal cannula, 

comfortable in no apparent distress.  The patient has obvious emphysema 

involving the chest and the neck and the shoulders area laterally.  The patient 

has a Pleurx catheter on the left.


HEAD: Normocephalic.


EYES: Normal reaction of pupils, equal size.


NOSE: Clear with pink turbinates.


THROAT: No erythema or exudates.


NECK: No masses, no JVD.


CHEST: No chest wall deformity.


LUNGS: Equal air entry with few scattered rhonchi, crackles in the posterior 

bases.  The patient is a Pleurx catheter on the left


CVS: S1 and S2 normal with no audible murmur, irregular rhythm.  Tachycardic.


ABDOMEN: No hepatosplenomegaly, normal bowel sounds, no guarding or rigidity.


SPINE: No scoliosis or deformity


SKIN: No rashes


CENTRAL NERVOUS SYSTEM: No focal deficits, tone is normal in all 4 extremities.


EXTREMITIES: There is 2+ peripheral edema.  No clubbing, no cyanosis.  

Peripheral pulses are intact.





- Labs


CBC & Chem 7: 


                                 02/08/21 06:58





                                 02/09/21 08:46


Labs: 


                  Abnormal Lab Results - Last 24 Hours (Table)











  02/08/21 02/09/21 02/09/21 Range/Units





  20:10 06:11 08:46 


 


Carbon Dioxide    31 H  (22-30)  mmol/L


 


BUN    27 H  (9-20)  mg/dL


 


POC Glucose (mg/dL)  126 H  102 H   (75-99)  mg/dL








                      Microbiology - Last 24 Hours (Table)











 02/01/21 11:30 Fungal Culture - Preliminary





 Pleural Fluid    Aspergillus fumigatus


 


 02/01/21 11:30 Acid Fast Bacilli Smear - Final





 Pleural Fluid Acid Fast Bacilli Culture - Preliminary














Assessment and Plan


Plan: 











1 Acute hypoxic respiratory failure secondary to sepsis, septic shock, related 

to pneumonia, with chronic loculated effusions, possible empyema.  Cultures 

positive for Aspergillus fumigatus.  This could be a fungal empyema/infection, 

possible bilateral although this was only confirmed on the left side.  The 

sputum is positive for Aspergillus fumigatus.  The pleural fluid is also 

positive.  The patient is currently on voriconazole.  Ideally, he would require 

from surgical intervention including thoracoscopy/decortication.  Nevertheless, 

the surgeon thought that this would be an extensive surgery for him and he de

cided to proceed with Pleurx catheter insertion.





The patient is post insertion of a Pleurx catheter.  Amphotericin B was also 

infused into the pleural space.  Surgery was performed yesterday.  The left lung

was trapped with fungal empyema and a Pleurx catheter was inserted.  Currently 

the tube was unclamped and there is air and fluid coming out of the first place.

 The patient has developed some subcutaneous emphysema.  There is positive air 

leak.





2 Bilateral hydropneumothorax, possible empyema, status post ultrasound-guided 

left-sided thoracentesis on 02/01/2021 with 45 ML's of cloudy white fluid 

removed, showing Aspergillus.  Remains on voriconazole.





3 Recent admission for hypotension, dehydration secondary to nausea, vomiting, 

acute kidney injury with diarrhea.





4 Recent pulmonary infections with history of streptococcal pneumonia, empyema 

requiring chest tube placement in July 2020.





5 Chronic loculated pneumothoraces, posterior, with air-fluid seen on CAT scan 

imaging on the chest.  Possibility of trapped lung, chronic scarring and 

possibility of empyema.





6 History of rheumatoid arthritis and rheumatoid lung disease.  Currently not 

receiving any immunosuppressive agents





7 Recent history of acute kidney injury





8 Hypertension





9 Hypothyroidism





10 Previous history of DVT.  





11 New-onset atrial fibrillation/flutter anticoagulated and the patient had 

failed cardioversion.  The patient is currently on Eliquis and amiodarone





12 bilateral subcutaneous emphysema along with a hydropneumothorax on the left 

with positive air leak through the Pleurx catheter.  He was currently attached 

to continuous suction





Plan:











Pleurx catheter  placed on 02/08/2021


The plan is for installation of amphotericin B in the pleural space and continue

the voriconazole.


Continue voriconazole 400 mg every 12 hours.


Daily chest x-rays


The patient remains in atrial fibrillation.  Seems to have failed cardioversion 

and the patient is currently in any amiodarone and Eliquis.  Amiodarone is being

given at a dose of 200 mg twice a day.  He is also on metoprolol 25 mg by mouth 

3 times a day.


The Pleurx catheter attached to suction and monitor the air leak and outputs.  

The patient is currently on continuous suction.


We'll continue to follow

## 2021-02-09 NOTE — P.EPPROC
- EP Procedure Note


Electrophysiology Procedure Note: 





Diagnosis


Persistent atrial flutter with RVR





Procedure


Electrical cardioversion


Pre-cardioversion intracardiac echo to rule out left atrial appendage thrombus


Post cardioversion intracardiac echo





Intracardiac echo prior to electrical cardioversion revealed


Normal right atrial appendage thrombus


No left atrial appendage thrombus 


Normal LV function


Normal cavo tricuspid is tenderness without any pouches


No right atrial or left atrial masses


Normal contraction of the left atrial appendage





Electrical cardioversion


200 J biphasic shock in the AP configuration successfully cardioverted to sinus 

rhythm


Post cardioversion heart rates in the 50s occasional PVCs





Post cardioversion intracardiac echo revealed absence of any left atrial 

appendage thrombus and normal contraction of the left atrial appendage





Baseline measurements in sinus rhythm


Sinus cycle length 1154, occasional PVCs and PACs


MT interval 142 ms,  ms, QT interval 500 ms 





plan


Continue ELIQUIS 5 mg twice daily


Amiodarone 200 mg twice daily for one month followed by 200 mg once daily for 

one month then completely stop





Once his fungal infection is treated atrial flutter ablation was recommended

## 2021-02-09 NOTE — XR
EXAMINATION TYPE: XR chest 2V

 

DATE OF EXAM: 2/9/2021

 

COMPARISON: Chest x-ray from yesterday and older studies.

 

HISTORY: Pleural catheter progress study. Known rheumatoid lung disease.

 

TECHNIQUE:  Frontal and lateral views of the chest are obtained.

 

FINDINGS:  Posterior left basilar pleural drainage catheter redemonstrated. Persistent thick walled p
osterior lower lung pleural collections bilaterally. Extensive overlying subcutaneous emphysema is mo
re prominent from prior study.  Persistent small bilateral pleural fluid collections with horizontal 
appearance on the left noted suggesting some residual pleural air. Persistent cardiomegaly. Persisten
t multifocal increased opacities right lung greater than left. Fluid within fissure is noted on later
al view. The osseous structures are intact.

 

IMPRESSION:  Worsening bilateral subcutaneous emphysema. Small bilateral pleural effusions more promi
nent from prior. Stable posterior lower lung thick walled pleural collections with left-sided pleural
 drainage catheter. Background chronic parenchymal changes and cardiomegaly with multifocal right gre
ater than left acute infiltrates redemonstrated.

## 2021-02-09 NOTE — P.PN
Progress Note - Text





Diagnosis


Typical atrial flutter, persistent





Successful electrical cardioversion


Early recurrence of atrial flutter





Plan


EP study and ablation for typical atrial flutter on Thursday morning at 7 AM


In the after midnight on Wednesday


Do not stop ELIQUIS

## 2021-02-10 LAB
GLUCOSE BLD-MCNC: 112 MG/DL (ref 75–99)
GLUCOSE BLD-MCNC: 163 MG/DL (ref 75–99)
GLUCOSE BLD-MCNC: 167 MG/DL (ref 75–99)
GLUCOSE BLD-MCNC: 202 MG/DL (ref 75–99)

## 2021-02-10 RX ADMIN — PANTOPRAZOLE SODIUM SCH MG: 40 TABLET, DELAYED RELEASE ORAL at 06:08

## 2021-02-10 RX ADMIN — AMMONIUM LACTATE SCH: 12 LOTION TOPICAL at 21:46

## 2021-02-10 RX ADMIN — POTASSIUM CHLORIDE SCH: 14.9 INJECTION, SOLUTION INTRAVENOUS at 11:50

## 2021-02-10 RX ADMIN — IPRATROPIUM BROMIDE AND ALBUTEROL SULFATE SCH: .5; 3 SOLUTION RESPIRATORY (INHALATION) at 20:15

## 2021-02-10 RX ADMIN — LEVOTHYROXINE SODIUM SCH MCG: 75 TABLET ORAL at 06:08

## 2021-02-10 RX ADMIN — AMIODARONE HYDROCHLORIDE SCH MG: 200 TABLET ORAL at 09:58

## 2021-02-10 RX ADMIN — IPRATROPIUM BROMIDE AND ALBUTEROL SULFATE SCH: .5; 3 SOLUTION RESPIRATORY (INHALATION) at 11:48

## 2021-02-10 RX ADMIN — METOPROLOL TARTRATE SCH MG: 25 TABLET, FILM COATED ORAL at 20:19

## 2021-02-10 RX ADMIN — AMIODARONE HYDROCHLORIDE SCH MG: 200 TABLET ORAL at 20:18

## 2021-02-10 RX ADMIN — HYDROCODONE BITARTRATE AND ACETAMINOPHEN PRN EACH: 5; 325 TABLET ORAL at 20:19

## 2021-02-10 RX ADMIN — FUROSEMIDE SCH MG: 10 INJECTION, SOLUTION INTRAMUSCULAR; INTRAVENOUS at 09:59

## 2021-02-10 RX ADMIN — Medication SCH MCG: at 20:19

## 2021-02-10 RX ADMIN — VORICONAZOLE SCH MLS/HR: 10 INJECTION, POWDER, LYOPHILIZED, FOR SOLUTION INTRAVENOUS at 21:46

## 2021-02-10 RX ADMIN — IPRATROPIUM BROMIDE AND ALBUTEROL SULFATE SCH: .5; 3 SOLUTION RESPIRATORY (INHALATION) at 08:39

## 2021-02-10 RX ADMIN — VORICONAZOLE SCH MLS/HR: 10 INJECTION, POWDER, LYOPHILIZED, FOR SOLUTION INTRAVENOUS at 10:00

## 2021-02-10 RX ADMIN — APIXABAN SCH MG: 5 TABLET, FILM COATED ORAL at 09:59

## 2021-02-10 RX ADMIN — HYDROCORTISONE SODIUM SUCCINATE SCH MG: 100 INJECTION, POWDER, FOR SOLUTION INTRAMUSCULAR; INTRAVENOUS at 09:58

## 2021-02-10 RX ADMIN — TRIAMCINOLONE ACETONIDE SCH APPLIC: 1 CREAM TOPICAL at 10:00

## 2021-02-10 RX ADMIN — CEFAZOLIN SCH: 330 INJECTION, POWDER, FOR SOLUTION INTRAMUSCULAR; INTRAVENOUS at 16:14

## 2021-02-10 RX ADMIN — ASPIRIN 81 MG CHEWABLE TABLET SCH MG: 81 TABLET CHEWABLE at 20:18

## 2021-02-10 RX ADMIN — HYDROCORTISONE SODIUM SUCCINATE SCH MG: 100 INJECTION, POWDER, FOR SOLUTION INTRAMUSCULAR; INTRAVENOUS at 15:27

## 2021-02-10 RX ADMIN — METOPROLOL TARTRATE SCH MG: 25 TABLET, FILM COATED ORAL at 15:27

## 2021-02-10 RX ADMIN — APIXABAN SCH MG: 5 TABLET, FILM COATED ORAL at 20:19

## 2021-02-10 RX ADMIN — METOPROLOL TARTRATE SCH MG: 25 TABLET, FILM COATED ORAL at 09:59

## 2021-02-10 RX ADMIN — OXYCODONE HYDROCHLORIDE AND ACETAMINOPHEN SCH MG: 500 TABLET ORAL at 20:18

## 2021-02-10 RX ADMIN — TRIAMCINOLONE ACETONIDE SCH: 1 CREAM TOPICAL at 21:46

## 2021-02-10 NOTE — XR
EXAMINATION TYPE: XR chest 2V

 

DATE OF EXAM: 2/10/2021

 

COMPARISON: 2/9/2021

 

HISTORY: 60-year-old male effusion and trapped lung

 

TECHNIQUE:  PA and lateral views

 

FINDINGS:  

Heart remains borderline enlarged. Patchy bilateral airspace opacities, right greater than left. Smal
l left basilar hydropneumothorax is suggested, significantly change from prior with a Pleurx catheter
. Extensive subcutaneous emphysema throughout limits the evaluation. Normal variant azygos fissure.

 

 

IMPRESSION:  

 

1. Severe bilateral subcutaneous emphysema persists.

2. Left-sided pleurX catheter with redemonstrated small left basilar hydropneumothorax.

3. Bilateral patchy infiltrates, right greater than left not significantly changed.

## 2021-02-10 NOTE — P.PN
Subjective


Progress Note Date: 02/10/21


Principal diagnosis: 


 Pneumonia, hydropneumothorax, septic shock





60-year-old white male patient, with past medical history of rheumatoid 

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 

episodes of pneumonia and history of loculated empyema requiring chest tube 

placement back in July 2020 with pleural fluid cultures positive for 

streptococcal pneumonia.  Other medical history includes hypertension, 

hypothyroidism and previous history of DVT.  Patient had a recent 

hospitalization from January 18 through 01/21/2021 hypotension, dehydration 

related to nausea vomiting diarrhea, acute kidney injury.  Patient received 

antibiotics in the form of Zosyn and Levaquin for possibility of pneumonia, he 

is chest x-ray showed cranial perihilar pulmonary infiltrates with increased 

interstitial changes at the lung bases and was thought to be related to a combi

nation of rheumatoid lung and chronic scarring.  His last CT of the chest was on

12/29/2020 showing chronic changes to lower lungs, persistent irregular thick-

walled pleural spaces in the posterior lung bases containing fluid and air with 

adjacent anterior lung with chronic consolidation or atelectasis.  On 01/29/2021

patient came into the emergency department from Dr. SARINA Santo's office where he w

as being seen for a regular follow-up appointments.  He was noted to have low 

pulse ox 70s and was sent in to the emergency department for evaluation.  He has

been having cough with yellow and sometimes blood-tinged sputum, appears 

amounts, denies any fever, COVID 19 was found to be negative, chest x-ray showed

perihilar and basilar infiltrates without significant change from previous chest

x-ray on 01/19/2021.  Lab data showed leukocytosis with white blood cell count 

of 19.1, hemoglobin of 12.1, INR 1.5, sodium is 135, potassium is 3.9, chloride 

is 102, CO2 is 19, creatinine is 1.32, which has actually improved since his 

discharge from the hospital on 01/21/2021 when he was at 1.84.  Lactic acid was 

elevated at 3.7, patient was given a total of 2 L in fluid boluses, troponin was

negative at less than 0.012.  Urinalysis showed no evidence of infection.  CT 

chest showed thick-walled pleural cavities posteriorly in the lower lungs with 

left-sided air-fluid level with left-sided pleural fluid diminished most recent 

CT from 12/29/2020.  There is associated compressive atelectasis and/or chronic 

consolidation.  Persistent as it is lobe fissure, multifocal areas of reticular 

nodular opacity in the upper lungs remain present with slight nodularity in the 

left upper lobe.  There is a focal consolidation improved from most recent study

with some residual areas of scarring.  We will emergency department patient 

became hypotensive with a blood pressure in the 70s, slightly tachycardic r

emains in sinus mechanism, he is receiving his third liter bolus, his lactic 

acid did respond and improved he was started on antibiotics in the form of Zosyn

and vancomycin 





The patient is seen today January 30th 2021 in the intensive care unit.  He is 

currently sitting up in a chair at the bedside.  Awake and alert in no acute 

distress.  Maintaining O2 saturations in the 90s on 2 L/m per nasal cannula.  He

did require a small dose of norepinephrine currently on 0.05 mcg/kg/m.  He has 

lactated Ringer's at 100 MLS per hour.  He remains on vancomycin and cefepime.  

Chest x-ray continues to show the left lower lobe effusion.  White count 11.7.  

Hemoglobin 9.7.  Sodium 135.  Potassium 3.6.  Creatinine 1.00.  Blood cultures 

reveal no growth to date.





Reevaluated today on 1/31/2021, remains in the ICU, still on norepinephrine at 

0.04 mcg/kg/m.  On LR at 1 25 mL per hour.  On vancomycin and cefepime 

empirically, blood pressure remains marginal, went ahead and recommended Solu-

Cortef 100 mg IV push every 8 hours and hopefully will be able to discontinue 

norepinephrine today.  His IV fluid is cut down to KVO.  Given 20 mg of Lasix IV

push, and I have recommended that we monitor the patient in the ICU for the next

24 hours.  Chest x-ray is basically the same, continues to show chronic finding,

difficult to exclude underlying pneumonia/empyema.  CT of the chest on admission

was also reviewed, difficult to rule out underlying empyema.  Clinically the 

patient is feeling better except for generally weak.  WBC count is 12.5 

hemoglobin is 9.9.  Normal renal profile is normal blood cultures are negative 

so far.  Sputum cultures are also negative so far.





On 02/01/2021 patient seen in follow-up in the intensive care unit, he is awake 

and alert, oriented 3, sitting up in the recliner, currently on 2 L of oxygen 

his pulse ox is %, his been afebrile, hemodynamically he is stable, he is 

not on any vasopressor support.  His lactate Ringer's running at 20 ML per hour,

antibiotics include cefepime and vancomycin, blood pressure has been stable, so 

far blood and sputum cultures are negative, he denies chest pain, his breathing 

is comfortable.  Today's labs have been reviewed, showing white blood cell count

trending down, 10.2 today, hemoglobin is 8.8, electrolytes and renal profile are

unremarkable.  Serum cortisol level came back at 3, patient continues on stress 

doses of hydrocortisone 100 mg every 8 hours.  Patient has not been stable, 

we'll consult interventional radiology for ultrasound-guided left thoracentesis





On 02/02/2021 patient seen in follow-up in the intensive care unit, he is calm 

and comfortable, awake and alert, sitting up in the recliner, currently on room 

air with a pulse ox of 93-94%, his been afebrile, hemodynamically he is been 

stable, he is on IV LR at 20 ML per hour, no other drips.  He is on cefepime and

vancomycin for antibiotic coverage, yesterday he underwent left thoracentesis 

with removal of 43 mL of pleural fluid which was sent for cultures.  Tolerated 

procedure well.  His pleural fluid analysis revealed total fluid protein of 1.3 

g, and fluid LDH of greater than 4500 consistent with exudative fluid.  His 

microbiology data has been reviewed showing Aspergillus fumigate is in the 

sputum, his pleural fluid cultures are pending at this time, the cultures have 

shown no growth, hemodynamically has been stable, he is breathing easier, he 

still has cough mostly in the morning and the amount of secretions his bringing 

up is decreasing in amount.  Lung sounds revealed a few scattered wheezes, and a

few rhonchi at bilateral bases.  Is tolerating oral intake.  No abdominal pain, 

no nausea or vomiting, today's labs have been reviewed, white blood cell count 

is 11.2, hemoglobin is 9.1, sodium is 137, potassium is 4.0, chloride is 109, 

BUN is 15, creatinine 0.80.  No acute events overnight.  He is working on 

incentive spirometer, he is achieving 1000 today





On 02/03/2021 patient seen in follow-up on medical surgical floor.  He is awake 

and alert, in no acute distress, breathing comfortably, denies any chest pain, 

room air pulse ox is 94%, no fever or chills, today's chest x-ray shows left 

basilar loculated pneumothorax, stable in appearance, bilateral lung 

infiltrates.  His pleural fluid cultures so far showing Aspergillus species, as 

does his sputum culture.  Blood culture show no growth.  The cervix is 

following, current antibiotic coverage includes cefepime, vancomycin has been 

discontinued, she received 1 dose of IV Lasix per nephrology.  He has produced 

2500 in urine output, and he is in -1.6 L over the last 24 hours, however he 

still has significant lower extremity edema





On 02/05/2021 patient seen in follow-up in the intensive care unit, he is awake 

and alert, in no acute distress, on room air, his pulse ox is 93%, currently on 

amiodarone at 0.5 mg/m, heparin infusion per weight based protocol, 0.9 normal 

saline at a rate of 10 ML per hour, his heart rate is still poorly controlled, 

he remains in atrial flutter with a rate of 140 BPM.  He's had no fever or 

chills, he status post ultrasound-guided thoracentesis of the left pleural space

with removal of 45 mL of pleural fluid in the cultures were positive for 

Aspergillus.  Infectious disease service is following, and voriconazole was 

started.  Clinically patient denies any shortness of breath, no cough, no 

compressive chest pain, we discussed the case with the cardiothoracic surgery 

yesterday, and plan is to proceed with the placement of Pleurx catheter into the

left pleural space today.





On 02/10/2021 patient seen in follow-up.  Sitting up in the recliner, breathing 

comfortably, his on 2 L of oxygen pulse ox of 90%, he's been afebrile, cm

bcutaneous emphysema slightly worsened especially involving right upper chest 

area, left chest Pleurx catheter remains in place, connected to Pleur-evac to 

suction and there is some intermittent air leak still present, his chest x-ray 

shows severe bilateral septic hence emphysema, small left basilar 

hydropneumothorax, and bilateral patchy infiltrates right greater than left not 

significantly changed, patient remains on voriconazole, and his left chest 

pleural fluid fungal culture was positive for Aspergillus fumigatus.  Total 

fluid cytology was not sent however pathology lab still has the pleural fluid 

which we will request cytology. 











Objective





- Vital Signs


Vital signs: 


                                   Vital Signs











Temp  98.1 F   02/10/21 12:19


 


Pulse  97   02/10/21 12:19


 


Resp  16   02/10/21 12:19


 


BP  106/65   02/10/21 12:19


 


Pulse Ox  98   02/10/21 12:19








                                 Intake & Output











 02/09/21 02/10/21 02/10/21





 18:59 06:59 18:59


 


Intake Total 1470  472


 


Output Total 486 1765 800


 


Balance 984 -1765 -328


 


Weight  99.7 kg 


 


Intake:   


 


    


 


      


 


    Voriconazole 400 mg In 250  





    Sodium Chloride 0.9% 250   





    ml @ 125 mls/hr IVPB   





    Q12HR [facility] Rx#:[id number]   


 


  Oral 960  472


 


Output:   


 


  Chest Tube Drainage 186 60 50


 


    Left Mid-Axillary Chest 186 60 50


 


  Urine 300 1705 750


 


Other:   


 


  Voiding Method  Toilet Toilet





  Urinal Urinal














- Exam


GENERAL EXAM: Alert, pleasant, 60-year-old male patient, on room air with a 

pulse ox of 94%, currently sitting up in chair at the bedside, comfortable in no

apparent distress.


HEAD: Normocephalic/atraumatic.


EENT: PERRLA, EOMI, nonicteric.  Moist mucous membranes, no neck masses, no JVD,

no stridor.


CHEST: No chest wall deformity.  Symmetrical expansion.  Left chest Pleurx 

catheter connected to Pleur-evac, to wall suction, with intermittent air leak 

present, with a total of 246 mL serosanguineous output in the Pleur-evac


LUNGS: Bibasilar rhonchi, and a few scattered wheezes


CVS: Regular rate and rhythm, normal S1 and S2, no gallops, no murmurs, no rubs


ABDOMEN: Soft, nontender.  No hepatosplenomegaly, normal bowel sounds, no 

guarding or rigidity.


EXTREMITIES: No clubbing, 1+ lower extremity edema, no cyanosis, 2+ pulses and 

upper and lower extremities.


MUSCULOSKELETAL: Muscle strength and tone normal.


SPINE: No scoliosis or deformity


SKIN: No rashes


CENTRAL NERVOUS SYSTEM: Alert and oriented -3.  No focal deficits, tone is 

normal in all 4 extremities.


PSYCHIATRIC: Alert and oriented -3.  Appropriate affect.  Intact judgment and 

insight.











- Labs


CBC & Chem 7: 


                                 02/08/21 06:58





                                 02/09/21 08:46


Labs: 


                  Abnormal Lab Results - Last 24 Hours (Table)











  02/09/21 02/09/21 02/10/21 Range/Units





  16:38 20:15 06:07 


 


POC Glucose (mg/dL)  121 H  144 H  112 H  (75-99)  mg/dL














  02/10/21 Range/Units





  11:36 


 


POC Glucose (mg/dL)  167 H  (75-99)  mg/dL














Assessment and Plan


Plan: 


 Assessment:





#1.  Acute hypoxic respiratory failure related to sepsis, septic shock,  related

to pneumonia, with chronic loculated effusions, rule out possibility of empyema.

COVID 19 was ruled out per PCR.  Pleural fluid culture positive for Aspergillus 

fumigators, patient is on voriconazole, ideally patient would benefit from 

surgical intervention including thoracoscopy/decortication however this could be

an extensive surgery for him in patient had the left chest Pleurx catheter 

inserted for drainage





Patient is post insertion of a left-sided Pleurx catheter and instillation of 

amphotericin B into the pleural space, the left lung was trapped with fungal 

empyema, patient has intermittent air leak, and there is a subcutaneous 

emphysema involving neck, upper chest





#2.  Bilateral hydropneumothorax, rule out possibility of empyema, status post 

ultrasound-guided left-sided thoracentesis on 02/01/2021 would removal of 45 mL 

of cloudy white fluid, which was exudative in nature, pleural fluid culture only

showing Aspergillus, antibiotic coverage is with cefepime and vancomycin, it 

service is following, CT surgery has no plans for surgical intervention at this 

time.  on 02/10/2021 patient is on voriconazole for evidence of Aspergillus 

fumigators in the pleural fluid





#3.  Recent admission to the hospital for hypotension, dehydration related to 

nausea vomiting diarrhea, acute kidney injury





#4.  Recurrent pulmonary infections, with history of streptococcal pneumonia and

empyema requiring chest tube placement in July 2020





#5.  Chronic loculated pneumothoraces, posterior, with air-fluid level seen on 

the CT imaging of the chest, the possibility of trapped lung, chronic scarring 

and possibility of empyema needs to be ruled out





#6.  Elevated d-dimer with no CT evidence for pulmonary embolism





#7.  History of rheumatoid arthritis and rheumatoid lung disease on Arava





#8.  Recent history of acute kidney injury, and admission lab work today shows 

improvement in his renal function





#9.  Hypertension





#10.  Hypothyroidism





#11.  Previous history of foot infections requiring antibiotics





#12.  Previous history of DVT





Plan:





Continue current antibiotics, patient still has intermittent air leak on the 

left Pleurx catheter chest tube, fungal cultures poor positive for Aspergillus. 

No fever or chills, no worsening dyspnea, subcutaneous emphysema is slightly 

increased over the right chest, we'll continue the left chest tube to suction.  

We called pathology and requested the fluid collected from 02/01/2021 from left 

pleural space to be sent for cytology, we will probably repeat cytology from the

pleural fluid in the next couple of days to rule out possibility of malignancy. 

We'll continue to follow closely with CT surgery.  Patient continues on 

amiodarone and Eliquis, unfortunately patient did not stay in sinus rhythm after

his cardioversion, cardiology is following, and is planning on of the 

cardioversion





I performed a history & physical examination of the patient and discussed their 

management with my nurse practitioner, Lorena Bonilla.  I reviewed the nurse 

practitioner's note and agree with the documented findings and plan of care.  

Lung sounds are positive for diminished breath sounds.  The findings and the 

impression was discussed with the patient.  I attest to the documentation by the

nurse practitioner. 





Time with Patient: Less than 30

## 2021-02-10 NOTE — PN
PROGRESS NOTE



DATE OF SERVICE:

02/10/2021



REASON FOR FOLLOWUP:

Aspergillus pneumonia and empyema.



INTERVAL HISTORY:

The patient is currently afebrile. He is breathing more comfortably. Denies having any

chest pain.  Occasional cough.  No abdominal pain or diarrhea.



PHYSICAL EXAMINATION:

Blood pressure 120/79 with pulse of 93, temperature 97.9.  He is 93% on 2 L nasal

cannula.

General description is a middle-aged male up in the chair in no distress.

RESPIRATORY SYSTEM: Unlabored breathing with decreased breath sounds at the base. No

wheeze.

HEART: S1, S2.  Regular rate and rhythm.

ABDOMEN: Soft. No tenderness.



LABS:

No new labs have been obtained today.



DIAGNOSTIC IMPRESSION AND PLAN:

Patient with aspergillus voriconazole; to continue, and will monitor his clinical

course closely.





MMODL / IJN: [id number] / Job#: [id number]

## 2021-02-10 NOTE — P.PN
Subjective


Progress Note Date: 02/10/21


Principal diagnosis: 





Bilateral pneumonia, bilateral hydropneumothorax, possible empyema, sputum 

culture preliminarily positive for Aspergillus species, acute hypoxic 

respiratory failure with sepsis on admission.  Past medical history significant 

for multiple admissions for pneumonia with empyema on the left and previous 

bilateral chest tube placement in June 2020 with cultures positive for strep 

pneumonia, rheumatoid arthritis with rheumatoid lung, hypertension, 

hypothyroidism, DVT, previous tobacco dependence, family history of lung cancer,

and recent hospitalization for hypotension, dehydration, acute kidney injury 

requiring dialysis.





POD #2 left Pleurx catheter placement with irrigational pleural space and 

installation of amphotericin B solution under fluoroscopic guidance.





POD #9 left-sided thoracentesis by interventional radiology.





The patient was seen on follow-up today 02/10/2021 at his bedside on the cardiac

stepdown unit.  He is awake, alert and oriented 3 and is sitting up to the 

bedside chair.  The patient denies any complaints of pain or shortness of breath

at this time.  He has some subcutaneous emphysema present to his neck, bilateral

upper extremities anterior and posterior chest.  The patient reports that the 

subcutaneous emphysema is somewhat improved today and his opinion.  He denies 

any distress or complaints of difficulty with swallowing and no evidence of 

stridor.  Oxygen saturation is 95% on 2 L nasal cannula and he is achieving 1250

mL on his incentive spirometry.  Currently on Vfend for anti-fungal coverage 

managed by infectious disease.  He is also on Solu-Cortef 50 mg IV every 8 hours

managed by pulmonary critical care medicine.  A chest x-ray was completed this 

morning which continues to show severe bilateral subcutaneous emphysema, left-

sided Pleurx catheter with small left basilar hydropneumothorax and bilateral 

patchy infiltrates right greater than left.  Left chest Pleurx catheter remains 

to bedside [facility] system on continuous low wall suction -20 cm H2O.  Continuous 

air leak is present.  Draining scant thin serosanguineous cloudy drainage with 

280 mL output in the last 24 hours.  He has been afebrile the last 24 hours.  He

underwent a cardioversion yesterday performed by Dr. Pimentel, remote telemetry 

showing atrial fibrillation/atrial flutter heart rate 85 BPM.





Objective





- Vital Signs


Vital signs: 


                                   Vital Signs











Temp  96 F L  02/10/21 08:28


 


Pulse  110 H  02/10/21 08:28


 


Resp  16   02/10/21 08:28


 


BP  117/80   02/10/21 04:00


 


Pulse Ox  98   02/10/21 08:28








                                 Intake & Output











 02/09/21 02/10/21 02/10/21





 18:59 06:59 18:59


 


Intake Total 1470  


 


Output Total 486 1765 


 


Balance 984 -1765 


 


Weight  99.7 kg 


 


Intake:   


 


    


 


      


 


    Voriconazole 400 mg In 250  





    Sodium Chloride 0.9% 250   





    ml @ 125 mls/hr IVPB   





    Q12HR [facility] Rx#:[id number]   


 


  Oral 960  


 


Output:   


 


  Chest Tube Drainage 186 60 


 


    Left Mid-Axillary Chest 186 60 


 


  Urine 300 1705 


 


Other:   


 


  Voiding Method  Toilet 





  Urinal 














- Constitutional


General appearance: Present: cooperative, no acute distress, obese





- EENT


Eyes: Present: normal appearance.  Absent: scleral icterus


ENT: Present: hearing grossly normal





- Neck


Details: 





Neck with subcutaneous emphysema present.


Neck: Present: normal ROM.  Absent: stridor





- Respiratory


Details: 





Lung sounds with few scattered expiratory wheezes, diminished to his bilateral 

bases left greater than right.  Respirations are symmetrical and nonlabored.  

Oxygen saturation are 95% on 2 L nasal cannula.  Achieving 1250 mL on his 

incentive spirometry.  Left Pleurx catheter in place to low continuous wall 

suction -20 cm H2O.  Continuous air leak is present.  Draining thin 

serosanguineous cloudy drainage with 280 mL output in the last 24 hours.





- Cardiovascular


Details: 





Irregular rhythm and controlled rate.  S1 and S2 present, negative for S3, 

gallop or murmur.  +1 edema to his bilateral lower extremities.  Knee-high PAT 

hose and sequential compression devices in place was bilateral lower 

extremities.





- Gastrointestinal


Gastrointestinal Comment(s): 





Abdomen is soft, nontender and nondistended.  Active bowel sounds present in all

4 abdominal quadrants.  No guarding or rigidity.  Tolerating oral intake.





- Genitourinary


Genitourinary Comment(s): 





Continues to void.





- Integumentary


Integumentary Comment(s): 





Skin is warm and dry.  No clubbing or cyanosis is present.  Dressing clean, dry 

and in place to left chest Pleurx catheter.  Subcutaneous emphysema present to 

his neck, bilateral upper extremities.  Anterior and posterior chest.





- Neurologic


Neurologic: Present: CNII-XII intact.  Absent: focal deficits





- Musculoskeletal


Musculoskeletal: Present: gait normal, strength equal bilaterally





- Psychiatric


Psychiatric: Present: A&O x's 3, appropriate affect, intact judgment & insight





- Allied health notes


Allied health notes reviewed: nursing





- Labs


CBC & Chem 7: 


                                 02/08/21 06:58





                                 02/09/21 08:46


Labs: 


                  Abnormal Lab Results - Last 24 Hours (Table)











  02/09/21 02/09/21 02/09/21 Range/Units





  11:32 16:38 20:15 


 


POC Glucose (mg/dL)  100 H  121 H  144 H  (75-99)  mg/dL














  02/10/21 Range/Units





  06:07 


 


POC Glucose (mg/dL)  112 H  (75-99)  mg/dL








                      Microbiology - Last 24 Hours (Table)











 02/01/21 11:30 Fungal Culture - Preliminary





 Pleural Fluid    Aspergillus fumigatus














- Imaging and Cardiology


Chest x-ray: report reviewed, image reviewed





Assessment and Plan


Assessment: 





1.  Bilateral pneumonia, bilateral hydropneumothorax, possible empyema, sputum 

culture preliminarily positive for Aspergillus species, status post left-sided 

thoracentesis


2.  Acute hypoxic respiratory failure with sepsis on admission


3.  Multiple admissions for pneumonia with empyema on the left and previous 

bilateral chest tube placement in June 2020 with cultures positive for strep 

pneumonia


4.  Rheumatoid arthritis with rheumatoid lung


5.  Hypertension, currently hypotensive, requiring pressors on admission


6.  Hypothyroidism


7.  History of DVT


8.  Previous tobacco dependence


9.  Family history of lung cancer


10.  Recent hospitalization for hypotension, dehydration, acute kidney injury 

requiring dialysis


11.  New-onset atrial fibrillation/atrial flutter with rapid ventricular 

response, currently on amiodarone by mouth and metoprolol tartrate


12.  Subcutaneous emphysema to his neck, bilateral upper extremities, anterior 

posterior chest with positive air leak through the Pleurx catheter, currently on

continuous low wall suction


Plan: 





1.  Keep Pleurx catheter connected to low continuous wall suction -20 cm H2O.  

Continue to monitor for airleak resolution and decrease in subcutaneous 

emphysema.


2.  Continue to follow daily chest x-rays.


3.  Encourage incentive spirometry 10 times every hour while awake.  Bro

nchodilators per pulmonary for/critical care medicine.


4.  Currently on Vfend managed by infectious disease.  Amphotericin B pleural 

was drained yesterday 02/09/2021.


5.  Increase activity as tolerated.


6.  Medical management and other comorbidities per primary care service.


7.  Reinforced Pleurx catheter teaching with the patient and his wife present. 


8.  Cytology results are pending.


9.  More recommendations to follow based on patient's clinical course.


Time with Patient: Greater than 30

## 2021-02-10 NOTE — P.PN
Subjective


Progress Note Date: 02/10/21





This is a 60-year-old  male patient requesting my service with past 

medical history of rheumatoid arthritis on Arava that was diagnosed when he was 

37 year old initially was started on Methotrexate that he could not tolerate 

then placed on Embrel but he developed side effects and finally was tried on 

Humira injection but he developed neurologic sided effects and was hospitaized 

at [facility] for quiet some time, rheumatoid lung disease, 

previous history of loculated empyema with chest tube placement in July 2020, 

hypertension, hypothyroidism, history of DVT, remote history of tobacco use and 

dependence, was recently hospitalized at [facility] after he was 

admitted for an acute kidney injury with significant metabolic acidosis 

associated with the elevated creatinine and elevated BUN and hypotension at that

time he was seen and evaluated by pulmonary and critical care medicine, he had a

central line placed and at that time, he was placed on Levophed drip he was p

laced on IV anabiotic as well but the patient recovered very fast and he had an 

echocardiogram that showed normal LV function and segmental hypokinesia, he was 

supposed to follow-up with Dr. Santo in the office today, patient was seen in my

office 24 hours ago when he was complaining of increased shortness breath, at 

that time his oxygenation could not be checked because of his Case's and cold

extremities, he was sent for a chest x-ray that showed right lower lobe 

infiltrate as well as blood tests that showed a white count of 20,000 patient 

was feeling better he was started on oral antibiotic in the form of Levaquin 500

mg orally once every day, and that he was supposed to follow-up with me as an 

outpatient every week he went to see his cardiologist today and he had an 

episode when he was dizzy lightheaded and an episode of vomiting as well and he 

was sent to the emergency department for evaluation had a computed tomography of

the chest as well as abdomen and pelvis that showed a thick walled pleural 

cavities posteriorly in the lower lungs with left-sided air-fluid level that has

diminished in size from the prior computed tomography scan when he was in the 

hospital a few weeks back there is also associated compressive atelectasis and 

chronic consultation with multifocal areas of reticular nodular opacity in the 

upper lungs with a slight nodularity of the left upper lobe again this area of 

focal consultation has improved from the previous study that was done few weeks 

ago, patient was started on IV antibiotic with vancomycin and Zosyn as well as 

IV fluid, he was seen in consultation by pulmonary critical care in the 

emergency department as the patient blood pressure dropped and that he'll be 

transferred to the intensive care unit at this point in time I had long conve

rsation with the patient and his wife at the bedside and the patient may need to

have a chest tube placed for his possible right empyema, he did receive 3 L 

normal saline in the ER, and his blood pressure is marginal he may need to go on

 pressors if  not recovered.





1/30:patient is sitting up in chair feeling about the same , complains of 

increased pain in the righ elbow, was seen earlier by pulmonary and the plan was

to go for US-Guided left thoracenthesis due to to loculated left pleural effu

nathaniel and possible empyema, may need VATS, he denies any chest pain or shortness 

of breath, no abdominal pain nausea, vomiting or diarrhea, still have diarrhea 

negative for C.Diff, he seems to have accept to stay in the hospital this time 

as long as he needed to.





1/31: Patient sitting up in the recliner complaining of increased pain in his 

joints due to his rheumatoid arthritis with increased synovitis in both wrists 

both elbows and both shoulders he was started on hydrocortisone 100 mg IV push 

every 8 hours, to try to help with his blood pressure as well as his phonation, 

he is maintained on Norco 5/325 mg one tablet orally every 6 hours as needed for

pain control, patient denies any chest pain is less short of breath, he 

continues to have increased coughing with yellow from production of green phlegm

production, he had no fever or chills at this time he continues to be on 

Levothroid drip, patient has appeared to have increased swelling in both lower 

extremities as well as both ankles, he is maintained on IV antibiotic in the 

form of vancomycin as well as cefepime, infectious disease is following, patient

is scheduled to go for ultrasound guidance thoracentesis of the left loculated 

pleural effusion tomorrow morning.





2/1: Patient remains in the intensive care unit.  He has been afebrile, heart 

rate 100, blood pressure 95/62, pulse ox 97% on 2 L nasal cannula.  Hemoglobin 

8.8 and leukocytosis resolved.  Creatinine 0.76, electrolytes normal.  Sputum 

culture is positive for Aspergillus species.  Blood culture showing no growth. 

Patient underwent diagnostic thoracentesis with interventional radiology today 

with removal of 45 mL of cloudy white fluid.  Chest x-ray reveals no 

complications following left thoracentesis.  Fluid has been sent for culture and

cytology.  Consult in place for cardiothoracic surgery to evaluate for VATS with

decortication.





2/2: Patient remains in the intensive care unit.  He did receive 1 dose of IV 

Lasix this morning ordered by nephrology.  He is followed by cardiothoracic 

surgery was no plan for surgical intervention.  ID has recommended cefepime and 

vancomycin for now.  Expect antifungal agent ended as well.  Patient denies any 

significant shortness of breath.  He is comfortable sitting in a chair.  Patient

is having minimal cough and minimal sputum.  Patient is achieving 1000 ml on 

incentive spirometry.  Culture and cytology reports remain pending from 

thoracentesis. Repeat chest x-ray reveals patchy peripheral lung with lung zone 

and lower lung zone infiltrates persist unchanged.  Patient has been afebrile, 

heart rate in the 90s and low 100s, pulse ox 91 on room air.





2/3: Repeat chest x-ray reveals left-sided basilar loculated pneumothorax, 

stable.  Bilateral lung infiltrates.  Correlate for pneumonia and atelectasis.  

WBC 9, hemoglobin 9.5.  Potassium 3.4 and will be replaced.  Patient is 

continued on cefepime and vancomycin per Dr. Sheets's recommendations.  Patient 

in reaching 1000 on incentive spirometry.  Patient is scheduled to see Dr. Kaye tomorrow.  Cefepime and vancomycin had been discontinued by Dr. Sheets and

started on Voriconazole. 





2/4: A-Team was called this morning regarding elevated heart rate, EKG was 

atrial flutter with RVR and 150 bpm and patient was transferred to ICU as an 

overflow for the cardiac stepdown unit, started on Cardizem drip and consult 

with cardiology.  Heart rate was improved with Cardizem and patient also 

received amiodarone bolus 300 mg.  Patient denies any worsening shortness of 

breath, cough or congestion.  No chest pain or palpitations.  Patient is resting

comfortably.  09, hemoglobin 8.8, potassium 3.4 replaced.  Creatinine 0.89.





2/5: Patient remains in the intensive care unit.  He continues to be in atrial 

flutter with 21 conduction rate.  He is on heparin drip and amiodarone.  He is 

currently in and out of atrial flutter with RVR.  Cardiology is following 

closely and may consider cardioversion tomorrow. WBC 16.6, hgb 9.8, plt 374, 

sodium 143, potassium 3.9, creatinine 0.88.  He has been afebrile, heart rate 

has been at times marginal, pulse ox 90% on room air.  Patient denies having any

fever or chills, no shortness of breath, no cough.  No chest pain.  Patient is 

followed by cardiothoracic surgery and plan is for left-sided PleurX catheter 

placement which is scheduled for Monday.





2/8: Patient is scheduled for PleurX catheter placement this afternoon.  

Cardiology may schedule him for cardioversion.  Dr. Collado is planning on oral 

antimicrobials at the time of discharge.  Patient is bringing up quite a bit of 

sputum.  He has been afebrile, heart rate 68, blood pressure 100/67, pulse ox 

93% on 1 L nasal cannula.  WBC 21, hemoglobin 9.6, platelet count 325.  

Potassium 3, BUN 28 creatinine 0.8.





2/9: Patient is status post PleurX catheter.  Patient has been afebrile, heart 

rate 106, blood pressure 85/53.  Pulse ox 97% on 2 L.  Patient is known to be 

back in atrial fibrillation at rate of 122.  He did go for cardioversion today 

for atrial flutter.  Patient states he has a little pain in the side for which 

she is taking Norco.  Not bad today.  Patient has subcutaneous edema to 

bilateral chest.  He denies having any headache.  BUN 27 creatinine 0.79.  Blood

sugar .





2/10: Patient denies chest pain or shortness of breath.  He is reaching 1250 on 

IS.  PleurX draining small amount of sanguinous fluid.  Patient is in atrial 

flutter.  Dr. Pimentel is planning for EP study and ablation on Thursday and plan

to continue eliquis.  Patient is also on Lopressor 25 mg 3 times daily.  Patient

has been afebrile, heart rate 97, blood pressure 106/65, pulse ox 98% on room 

air.  Blood sugars running between 112 and 67.  Patient is covered with 

Voriconazole.  Lac-Hydrin added for bilateral legs.








Objective





- Vital Signs


Vital signs: 


                                   Vital Signs











Temp  98.1 F   02/10/21 12:19


 


Pulse  97   02/10/21 12:19


 


Resp  16   02/10/21 12:19


 


BP  106/65   02/10/21 12:19


 


Pulse Ox  98   02/10/21 12:19








                                 Intake & Output











 02/09/21 02/10/21 02/10/21





 18:59 06:59 18:59


 


Intake Total 1470  472


 


Output Total 486 1765 800


 


Balance 984 -1765 -071


 


Weight  99.7 kg 


 


Intake:   


 


    


 


      


 


    Voriconazole 400 mg In 250  





    Sodium Chloride 0.9% 250   





    ml @ 125 mls/hr IVPB   





    Q12HR [facility] Rx#:[id number]   


 


  Oral 960  472


 


Output:   


 


  Chest Tube Drainage 186 60 50


 


    Left Mid-Axillary Chest 186 60 50


 


  Urine 300 1705 750


 


Other:   


 


  Voiding Method  Toilet Toilet





  Urinal Urinal














- Exam





 Review of Systems 


Constitutional: Reports anorexia, Reports chronic pain, Reports fatigue, denies 

weakness, Reports weight loss


Eyes: denies blurred vision, denies bulging eye, denies decreased vision, denies

diplopia


Ears, nose, mouth and throat: Denies dysphagia, Denies neck lump, Denies sore 

throat


Respiratory: Reports dyspnea, Reports respiratory infections, Denies congestion,

Denies cough with sputum, Denies home oxygen, Denies sleep apnea, Denies 

snoring, Denies wheezing


Cardiac: No chest pain.  No palpitations.  No lower extremity edema.  No 

syncopal episodes.


Gastrointestinal: Reports bloating, Reports change in bowel habits, Reports 

diarrhea, Reports early satiety, Reports indigestion, Reports loss of appetite, 

Reports nausea, Reports vomiting, Denies abdominal pain, Denies belching, Denies

heartburn, Denies hematemesis, Denies hematochezia, Denies melena


Genitourinary: Denies dysuria, Denies nocturia


Musculoskeletal: Denies myalgias


Musculoskeletal: absent: ankle pain, ankle stiffness, ankle swelling, elbow 

pain, elbow stiffness, elbow swelling, foot pain, foot stiffness, foot swelling,

hand pain, hand stiffness, hand swelling, hip pain, hip stiffness, hip swelling,

knee pain, knee stiffness, knee swelling, shoulder pain, shoulder stiffness, 

shoulder swelling, wrist pain, wrist stiffness, wrist swelling


Integumentary: Denies pruritus, Denies rash


Neurological: Denies numbness, Denies weakness


Psychiatric: Denies anxiety, Denies depression


Endocrine: Denies fatigue, Denies weight change








Physical examination:


General: patient is 60 year old male patient resting in chair and appears to be 

in no acute distress.





HEENT: head ia atraumatic normocephalic, Pupils were equal round reactive to 

light and accommodations , extra ocular muscle movement were intact, mucous 

membranes of the mouth are somewhat dry.





Neck: supple no JVP.





Chest: decreased breath sounds at he bases no expiratory wheezes no chest wall 

tendernes or intercostal retractions.  PleurX cath in placePositive subcutaneous

emphysema.





Heart: first heart sound is depressed, second heart sound is normal tachycardic 

and irregular there is HOOD 2/6 located at the left sternal border.





Abdomen: soft, mild tenderness to the left lower quadrant positive bowel sounds,

no guarding or rebound tenderness, no hepatosplenomegaly.





Extremities: there is no edema or calf tenderness DP+ 2 Bilaterally, there is 

what appears to be a charcot joint in the right foot form arch collapse.





Neurologic examination: patient is awake , alert and oriented X 3 CN II-XII are 

grossly intact, muscle power 4/5 in bilateral upper and lower extremities, deep 

tendon reflexes were normal.








- Labs


CBC & Chem 7: 


                                 02/08/21 06:58





                                 02/09/21 08:46


Labs: 


                  Abnormal Lab Results - Last 24 Hours (Table)











  02/09/21 02/09/21 02/10/21 Range/Units





  16:38 20:15 06:07 


 


POC Glucose (mg/dL)  121 H  144 H  112 H  (75-99)  mg/dL














  02/10/21 Range/Units





  11:36 


 


POC Glucose (mg/dL)  167 H  (75-99)  mg/dL














Assessment and Plan


Assessment: 





1.  Acute hypoxemic respiratory failure secondary to loculated left-sided 

pleural effusion and fungal empyema with what appears to be recurrent pneumonia.

 Continue Voriconazole 200 mg every 12 hours IV piggyback per Dr. Sheets, 

interventional radiology performed ultrasound-guided thoracentesis of the left 

loculated pleural fluid for Gram stain and culture and cytology as well as LDH 

protein as well as pH for possible empyema.  Culture positive for Aspergillus.  

Cardiothoracic surgery status post Pleurx catheter insertion.





2.  Lactic acidosis.  Resolved, repeat lactic acid is back to normal.





3.  Sepsis with septic shock.  Patient has been weaned off Levothroid drip.





4.  Acute kidney injury due to poor oral intake of fluid as well as hypotension 

with acute tubular necrosis.  Patient is on IV fluid, continue to monitor the 

patient CMP continue to monitor urine output.  Nephrology is following.





5.  Leukocytosis secondary to severe sepsis secondary to empyema.  Continue IV 

fluid, continue IV antibiotic, repeat CBC tomorrow morning.





6.  Persistent atrial flutter with RVR.  Status post cardioversion, converted 

back.  Patient is scheduled for EP study and ablation on Thursday.  Patient to 

continue eliquis.  Cardiology consult appreciated.





7.  Rheumatoid arthritis and rheumatoid lung.  Arava and Xeljanz had been on 

hold since October, patient was started on hydrocortisone 50 mg IV push every 8 

hours.





8.  History of empyema with Streptococcus requiring chest tube.  





9.  Hypertension and hypertensive cardiovascular disease. currently hypotensive.





10.  Hypothyroidism . we will continue with Synthroid 150 mcg orally daily.





11. DVT prophylaxis. we will start Lovenox 40 mg SC daily and bilateral knee 

high Fahad Hose





12. GI prophylaxis. we will continue with Protonix 40 mg IVP daily.





13.  Prognosis continues to be guarded.





Discharge plan: home





Impression and plan of care have been directed as dictated by the signing 

physician.  Phyllis Wylie nurse practitioner acting as scribe for signing 

physician.

## 2021-02-10 NOTE — P.PN
Subjective


Progress Note Date: 02/10/21





HISTORY OF PRESENT ILLNESS:  Patient examined this morning.  Patient is sitting 

up in the chair.  He denies chest pain or pressure.  He denies shortness of 

breath.  He is status post left Pleurx catheter insertion.  Chest x-ray 

completed this morning reveals severe bilateral subcutaneous emphysema, left-

sided Pleurx catheter with redemonstrated small left basilar hydropneumothorax, 

and bilateral patchy infiltrates right greater than left.  Patient underwent 

cardioversion yesterday with Dr. Pimentel.  Unfortunately, the patient went back 

into atrial flutter.





PHYSICAL EXAM: 


VITAL SIGNS: Reviewed.


GENERAL: Well-developed in no acute distress. 


NECK: Supple. No JVD or thyromegaly


LUNGS: Respirations even and unlabored. Lungs with scattered rhonchi. Left Ple

urx chest tube noted.


HEART: Irregular rate and rhythm.  S1 and S2 heard.


EXTREMITIES: Normal range of motion.  No clubbing or cyanosis.  Peripheral 

pulses intact.  No lower extremity edema





ASSESSMENT: 


Typical atrial flutter with RVR, s/p cardioversion


Acute hypoxic respiratory failure


Fungal pneumonia with empyema


Hypertension


Hyperlipidemia





PLAN: 


Continue Eliquis


Patient to undergo EP study and ablation tomorrow with Dr. Pimentel


Further recommendations pending patient course





Nurse practitioner note has been reviewed by physician. Signing provider agrees 

with the documented findings, assessment, and plan of care. 








Objective





- Vital Signs


Vital signs: 


                                   Vital Signs











Temp  97.6 F   02/10/21 15:25


 


Pulse  81   02/10/21 15:25


 


Resp  18   02/10/21 15:25


 


BP  93/67   02/10/21 15:25


 


Pulse Ox  91 L  02/10/21 15:25








                                 Intake & Output











 02/09/21 02/10/21 02/10/21





 18:59 06:59 18:59


 


Intake Total 1470  472


 


Output Total 486 1765 800


 


Balance 984 -1765 -766


 


Weight  99.7 kg 


 


Intake:   


 


    


 


      


 


    Voriconazole 400 mg In 250  





    Sodium Chloride 0.9% 250   





    ml @ 125 mls/hr IVPB   





    Q12HR [facility] Rx#:[id number]   


 


  Oral 960  472


 


Output:   


 


  Chest Tube Drainage 186 60 50


 


    Left Mid-Axillary Chest 186 60 50


 


  Urine 300 1705 750


 


Other:   


 


  Voiding Method  Toilet Toilet





  Urinal Urinal














- Labs


CBC & Chem 7: 


                                 02/08/21 06:58





                                 02/09/21 08:46


Labs: 


                  Abnormal Lab Results - Last 24 Hours (Table)











  02/09/21 02/09/21 02/10/21 Range/Units





  16:38 20:15 06:07 


 


POC Glucose (mg/dL)  121 H  144 H  112 H  (75-99)  mg/dL














  02/10/21 Range/Units





  11:36 


 


POC Glucose (mg/dL)  167 H  (75-99)  mg/dL

## 2021-02-11 LAB
ALBUMIN SERPL-MCNC: 2.9 G/DL (ref 3.5–5)
ALP SERPL-CCNC: 320 U/L (ref 38–126)
ALT SERPL-CCNC: 120 U/L (ref 4–49)
ANION GAP SERPL CALC-SCNC: 6 MMOL/L
AST SERPL-CCNC: 127 U/L (ref 17–59)
BUN SERPL-SCNC: 28 MG/DL (ref 9–20)
CALCIUM SPEC-MCNC: 8.3 MG/DL (ref 8.4–10.2)
CHLORIDE SERPL-SCNC: 100 MMOL/L (ref 98–107)
CO2 SERPL-SCNC: 37 MMOL/L (ref 22–30)
ERYTHROCYTE [DISTWIDTH] IN BLOOD BY AUTOMATED COUNT: 3.56 M/UL (ref 4.3–5.9)
ERYTHROCYTE [DISTWIDTH] IN BLOOD: 18.9 % (ref 11.5–15.5)
GLUCOSE BLD-MCNC: 137 MG/DL (ref 75–99)
GLUCOSE BLD-MCNC: 142 MG/DL (ref 75–99)
GLUCOSE BLD-MCNC: 98 MG/DL (ref 75–99)
GLUCOSE BLD-MCNC: 99 MG/DL (ref 75–99)
GLUCOSE SERPL-MCNC: 100 MG/DL (ref 74–99)
HCT VFR BLD AUTO: 33.1 % (ref 39–53)
HGB BLD-MCNC: 9.7 GM/DL (ref 13–17.5)
MCH RBC QN AUTO: 27.2 PG (ref 25–35)
MCHC RBC AUTO-ENTMCNC: 29.4 G/DL (ref 31–37)
MCV RBC AUTO: 92.8 FL (ref 80–100)
PLATELET # BLD AUTO: 299 K/UL (ref 150–450)
POTASSIUM SERPL-SCNC: 2.8 MMOL/L (ref 3.5–5.1)
PROT SERPL-MCNC: 6.3 G/DL (ref 6.3–8.2)
SODIUM SERPL-SCNC: 143 MMOL/L (ref 137–145)
WBC # BLD AUTO: 16 K/UL (ref 3.8–10.6)

## 2021-02-11 RX ADMIN — CEFAZOLIN SCH MLS: 330 INJECTION, POWDER, FOR SOLUTION INTRAMUSCULAR; INTRAVENOUS at 07:15

## 2021-02-11 RX ADMIN — HYDROCORTISONE SODIUM SUCCINATE SCH MG: 100 INJECTION, POWDER, FOR SOLUTION INTRAMUSCULAR; INTRAVENOUS at 00:04

## 2021-02-11 RX ADMIN — OXYCODONE HYDROCHLORIDE AND ACETAMINOPHEN SCH MG: 500 TABLET ORAL at 20:38

## 2021-02-11 RX ADMIN — SODIUM CHLORIDE, PRESERVATIVE FREE SCH ML: 5 INJECTION INTRAVENOUS at 20:39

## 2021-02-11 RX ADMIN — LEVOTHYROXINE SODIUM SCH MCG: 75 TABLET ORAL at 05:17

## 2021-02-11 RX ADMIN — ASPIRIN 81 MG CHEWABLE TABLET SCH MG: 81 TABLET CHEWABLE at 20:39

## 2021-02-11 RX ADMIN — VORICONAZOLE SCH: 10 INJECTION, POWDER, LYOPHILIZED, FOR SOLUTION INTRAVENOUS at 20:40

## 2021-02-11 RX ADMIN — IPRATROPIUM BROMIDE AND ALBUTEROL SULFATE SCH: .5; 3 SOLUTION RESPIRATORY (INHALATION) at 12:02

## 2021-02-11 RX ADMIN — FUROSEMIDE SCH MG: 10 INJECTION, SOLUTION INTRAMUSCULAR; INTRAVENOUS at 16:15

## 2021-02-11 RX ADMIN — TRIAMCINOLONE ACETONIDE SCH APPLIC: 1 CREAM TOPICAL at 20:39

## 2021-02-11 RX ADMIN — APIXABAN SCH: 5 TABLET, FILM COATED ORAL at 05:17

## 2021-02-11 RX ADMIN — AMMONIUM LACTATE SCH: 12 LOTION TOPICAL at 07:09

## 2021-02-11 RX ADMIN — POTASSIUM CHLORIDE SCH MLS/HR: 14.9 INJECTION, SOLUTION INTRAVENOUS at 13:11

## 2021-02-11 RX ADMIN — AMIODARONE HYDROCHLORIDE SCH MG: 200 TABLET ORAL at 05:17

## 2021-02-11 RX ADMIN — HYDROCODONE BITARTRATE AND ACETAMINOPHEN PRN EACH: 5; 325 TABLET ORAL at 20:38

## 2021-02-11 RX ADMIN — SODIUM CHLORIDE, PRESERVATIVE FREE SCH ML: 5 INJECTION INTRAVENOUS at 13:06

## 2021-02-11 RX ADMIN — POTASSIUM CHLORIDE SCH MEQ: 20 TABLET, EXTENDED RELEASE ORAL at 17:47

## 2021-02-11 RX ADMIN — IPRATROPIUM BROMIDE AND ALBUTEROL SULFATE SCH: .5; 3 SOLUTION RESPIRATORY (INHALATION) at 19:38

## 2021-02-11 RX ADMIN — TRIAMCINOLONE ACETONIDE SCH: 1 CREAM TOPICAL at 07:09

## 2021-02-11 RX ADMIN — METOPROLOL TARTRATE SCH MG: 25 TABLET, FILM COATED ORAL at 05:17

## 2021-02-11 RX ADMIN — CEFAZOLIN SCH MLS: 330 INJECTION, POWDER, FOR SOLUTION INTRAMUSCULAR; INTRAVENOUS at 07:20

## 2021-02-11 RX ADMIN — POTASSIUM CHLORIDE SCH MEQ: 20 TABLET, EXTENDED RELEASE ORAL at 20:38

## 2021-02-11 RX ADMIN — PANTOPRAZOLE SODIUM SCH: 40 TABLET, DELAYED RELEASE ORAL at 05:14

## 2021-02-11 RX ADMIN — METOPROLOL TARTRATE SCH MG: 25 TABLET, FILM COATED ORAL at 20:38

## 2021-02-11 RX ADMIN — IPRATROPIUM BROMIDE AND ALBUTEROL SULFATE SCH: .5; 3 SOLUTION RESPIRATORY (INHALATION) at 06:52

## 2021-02-11 RX ADMIN — VORICONAZOLE SCH MLS/HR: 10 INJECTION, POWDER, LYOPHILIZED, FOR SOLUTION INTRAVENOUS at 13:11

## 2021-02-11 RX ADMIN — AMMONIUM LACTATE SCH: 12 LOTION TOPICAL at 20:39

## 2021-02-11 RX ADMIN — HYDROCORTISONE SODIUM SUCCINATE SCH MG: 100 INJECTION, POWDER, FOR SOLUTION INTRAMUSCULAR; INTRAVENOUS at 13:06

## 2021-02-11 RX ADMIN — Medication SCH MCG: at 20:38

## 2021-02-11 RX ADMIN — APIXABAN SCH MG: 5 TABLET, FILM COATED ORAL at 20:38

## 2021-02-11 NOTE — CE
CARDIAC ELECTROPHYSIOLOGY REPORT



Trevon Kaufman is a 60-year-old male patient who was admitted for pleural infection

which turned out to be fungal infection.  However, he went into atrial flutter with

RVR.  He underwent successful electrical cardioversion after loading with oral

amiodarone, but he recurred within a few hours.  Therefore, today he was brought in for

an atrial flutter ablation, despite the fact that he has a Pleur-evac in his lung and

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  His atrial flutter rates have

been very difficult to control despite beta blockers and amiodarone.



He is already on anticoagulation.



The patient was brought to the EP lab in a fasting state. Written informed consent was

obtained prior to the procedure. The procedure was performed under general anesthesia.

Venous sheaths were placed in the right and left femoral veins.  Intracardiac

echocardiography was performed.  Catheters were placed in the coronary sinus, high

right atrium, His bundle area.  Mapping ablation catheter was placed.



Intracardiac echo revealed absence of any left atrial appendage thrombus.



Three-dimensional anatomic mapping of the cavotricuspid isthmus was performed.  An RF

 tip catheter was used to perform RF ablation.  The patient was in atrial

flutter at the start of the study.  Atrial cycle length was 211 milliseconds.  A

cavotricuspid RF line of block was made. This resulted in termination of the

tachycardia. Prior to this, entrainment mapping had been performed.



Following termination, the rest of the procedure was performed in sinus rhythm.  A

complete anatomic line of block was made in the cavotricuspid isthmus from the

tricuspid anulus to the IVC.  This line was then interrogated with high-output pacing.

It was also interrogated with differential pacing, and complete bidirectional block was

noted.



In sinus rhythm the AH interval was 71 milliseconds, HV interval 37 milliseconds, QRS

98 milliseconds, TX interval 153 milliseconds.



AV node Wenckebach block 390 milliseconds.  The sinus node recovery time at a paced

cycle length of 600 milliseconds was 1250 milliseconds.



RESULT:

Successful radiofrequency ablation for symptomatic atrial flutter that was difficult

for rate control and had failed amiodarone and electrical cardioversion recently.  The

RF line demonstrated complete bidirectional block.



PLAN:

Amiodarone 100 mg p.o. daily for one month only and then stop.  The patient has

rheumatoid lung and I would avoid long-term amiodarone.  Anticoagulation with Eliquis

should continue.  Beta blocker should continue.





MMODL / IJN: [id number] / Job#: [id number]

## 2021-02-11 NOTE — P.PN
Subjective


Progress Note Date: 02/11/21


Principal diagnosis: 


 Pneumonia, hydropneumothorax, septic shock





60-year-old white male patient, with past medical history of rheumatoid 

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 

episodes of pneumonia and history of loculated empyema requiring chest tube 

placement back in July 2020 with pleural fluid cultures positive for 

streptococcal pneumonia.  Other medical history includes hypertension, 

hypothyroidism and previous history of DVT.  Patient had a recent 

hospitalization from January 18 through 01/21/2021 hypotension, dehydration 

related to nausea vomiting diarrhea, acute kidney injury.  Patient received 

antibiotics in the form of Zosyn and Levaquin for possibility of pneumonia, he 

is chest x-ray showed cranial perihilar pulmonary infiltrates with increased 

interstitial changes at the lung bases and was thought to be related to a combi

nation of rheumatoid lung and chronic scarring.  His last CT of the chest was on

12/29/2020 showing chronic changes to lower lungs, persistent irregular thick-

walled pleural spaces in the posterior lung bases containing fluid and air with 

adjacent anterior lung with chronic consolidation or atelectasis.  On 01/29/2021

patient came into the emergency department from Dr. SARINA Santo's office where he w

as being seen for a regular follow-up appointments.  He was noted to have low 

pulse ox 70s and was sent in to the emergency department for evaluation.  He has

been having cough with yellow and sometimes blood-tinged sputum, appears 

amounts, denies any fever, COVID 19 was found to be negative, chest x-ray showed

perihilar and basilar infiltrates without significant change from previous chest

x-ray on 01/19/2021.  Lab data showed leukocytosis with white blood cell count 

of 19.1, hemoglobin of 12.1, INR 1.5, sodium is 135, potassium is 3.9, chloride 

is 102, CO2 is 19, creatinine is 1.32, which has actually improved since his 

discharge from the hospital on 01/21/2021 when he was at 1.84.  Lactic acid was 

elevated at 3.7, patient was given a total of 2 L in fluid boluses, troponin was

negative at less than 0.012.  Urinalysis showed no evidence of infection.  CT 

chest showed thick-walled pleural cavities posteriorly in the lower lungs with 

left-sided air-fluid level with left-sided pleural fluid diminished most recent 

CT from 12/29/2020.  There is associated compressive atelectasis and/or chronic 

consolidation.  Persistent as it is lobe fissure, multifocal areas of reticular 

nodular opacity in the upper lungs remain present with slight nodularity in the 

left upper lobe.  There is a focal consolidation improved from most recent study

with some residual areas of scarring.  We will emergency department patient 

became hypotensive with a blood pressure in the 70s, slightly tachycardic r

emains in sinus mechanism, he is receiving his third liter bolus, his lactic 

acid did respond and improved he was started on antibiotics in the form of Zosyn

and vancomycin 





The patient is seen today January 30th 2021 in the intensive care unit.  He is 

currently sitting up in a chair at the bedside.  Awake and alert in no acute 

distress.  Maintaining O2 saturations in the 90s on 2 L/m per nasal cannula.  He

did require a small dose of norepinephrine currently on 0.05 mcg/kg/m.  He has 

lactated Ringer's at 100 MLS per hour.  He remains on vancomycin and cefepime.  

Chest x-ray continues to show the left lower lobe effusion.  White count 11.7.  

Hemoglobin 9.7.  Sodium 135.  Potassium 3.6.  Creatinine 1.00.  Blood cultures 

reveal no growth to date.





Reevaluated today on 1/31/2021, remains in the ICU, still on norepinephrine at 

0.04 mcg/kg/m.  On LR at 1 25 mL per hour.  On vancomycin and cefepime 

empirically, blood pressure remains marginal, went ahead and recommended Solu-

Cortef 100 mg IV push every 8 hours and hopefully will be able to discontinue 

norepinephrine today.  His IV fluid is cut down to KVO.  Given 20 mg of Lasix IV

push, and I have recommended that we monitor the patient in the ICU for the next

24 hours.  Chest x-ray is basically the same, continues to show chronic finding,

difficult to exclude underlying pneumonia/empyema.  CT of the chest on admission

was also reviewed, difficult to rule out underlying empyema.  Clinically the 

patient is feeling better except for generally weak.  WBC count is 12.5 

hemoglobin is 9.9.  Normal renal profile is normal blood cultures are negative 

so far.  Sputum cultures are also negative so far.





On 02/01/2021 patient seen in follow-up in the intensive care unit, he is awake 

and alert, oriented 3, sitting up in the recliner, currently on 2 L of oxygen 

his pulse ox is %, his been afebrile, hemodynamically he is stable, he is 

not on any vasopressor support.  His lactate Ringer's running at 20 ML per hour,

antibiotics include cefepime and vancomycin, blood pressure has been stable, so 

far blood and sputum cultures are negative, he denies chest pain, his breathing 

is comfortable.  Today's labs have been reviewed, showing white blood cell count

trending down, 10.2 today, hemoglobin is 8.8, electrolytes and renal profile are

unremarkable.  Serum cortisol level came back at 3, patient continues on stress 

doses of hydrocortisone 100 mg every 8 hours.  Patient has not been stable, 

we'll consult interventional radiology for ultrasound-guided left thoracentesis





On 02/02/2021 patient seen in follow-up in the intensive care unit, he is calm 

and comfortable, awake and alert, sitting up in the recliner, currently on room 

air with a pulse ox of 93-94%, his been afebrile, hemodynamically he is been 

stable, he is on IV LR at 20 ML per hour, no other drips.  He is on cefepime and

vancomycin for antibiotic coverage, yesterday he underwent left thoracentesis 

with removal of 43 mL of pleural fluid which was sent for cultures.  Tolerated 

procedure well.  His pleural fluid analysis revealed total fluid protein of 1.3 

g, and fluid LDH of greater than 4500 consistent with exudative fluid.  His 

microbiology data has been reviewed showing Aspergillus fumigate is in the 

sputum, his pleural fluid cultures are pending at this time, the cultures have 

shown no growth, hemodynamically has been stable, he is breathing easier, he 

still has cough mostly in the morning and the amount of secretions his bringing 

up is decreasing in amount.  Lung sounds revealed a few scattered wheezes, and a

few rhonchi at bilateral bases.  Is tolerating oral intake.  No abdominal pain, 

no nausea or vomiting, today's labs have been reviewed, white blood cell count 

is 11.2, hemoglobin is 9.1, sodium is 137, potassium is 4.0, chloride is 109, 

BUN is 15, creatinine 0.80.  No acute events overnight.  He is working on 

incentive spirometer, he is achieving 1000 today





On 02/03/2021 patient seen in follow-up on medical surgical floor.  He is awake 

and alert, in no acute distress, breathing comfortably, denies any chest pain, 

room air pulse ox is 94%, no fever or chills, today's chest x-ray shows left 

basilar loculated pneumothorax, stable in appearance, bilateral lung 

infiltrates.  His pleural fluid cultures so far showing Aspergillus species, as 

does his sputum culture.  Blood culture show no growth.  The cervix is 

following, current antibiotic coverage includes cefepime, vancomycin has been 

discontinued, she received 1 dose of IV Lasix per nephrology.  He has produced 

2500 in urine output, and he is in -1.6 L over the last 24 hours, however he 

still has significant lower extremity edema





On 02/05/2021 patient seen in follow-up in the intensive care unit, he is awake 

and alert, in no acute distress, on room air, his pulse ox is 93%, currently on 

amiodarone at 0.5 mg/m, heparin infusion per weight based protocol, 0.9 normal 

saline at a rate of 10 ML per hour, his heart rate is still poorly controlled, 

he remains in atrial flutter with a rate of 140 BPM.  He's had no fever or 

chills, he status post ultrasound-guided thoracentesis of the left pleural space

with removal of 45 mL of pleural fluid in the cultures were positive for 

Aspergillus.  Infectious disease service is following, and voriconazole was 

started.  Clinically patient denies any shortness of breath, no cough, no 

compressive chest pain, we discussed the case with the cardiothoracic surgery 

yesterday, and plan is to proceed with the placement of Pleurx catheter into the

left pleural space today.





On 02/10/2021 patient seen in follow-up.  Sitting up in the recliner, breathing 

comfortably, his on 2 L of oxygen pulse ox of 90%, he's been afebrile, cm

bcutaneous emphysema slightly worsened especially involving right upper chest 

area, left chest Pleurx catheter remains in place, connected to Pleur-evac to 

suction and there is some intermittent air leak still present, his chest x-ray 

shows severe bilateral septic hence emphysema, small left basilar 

hydropneumothorax, and bilateral patchy infiltrates right greater than left not 

significantly changed, patient remains on voriconazole, and his left chest 

pleural fluid fungal culture was positive for Aspergillus fumigatus.  Total 

fluid cytology was not sent however pathology lab still has the pleural fluid 

which we will request cytology. 





On 02/11/2021 patient seen in follow-up.  He is resting in bed, subcu emphysema 

in his bilateral upper chest seems to have slightly progressed since yesterday, 

no significant extension to his neck area, his voice is still left chest Pleurx 

catheter in place connected to Pleur-evac and there is intermittent air leak at 

times it is more continuous.  There has only been 85 mL of serosanguineous 

output in the last 24 hours, pleural fluid cytology was sent from the pleural 

fluid collected on 01/10/2021, results are pending.  Continues on voriconazole 

for evidence of Aspergillus fungal infection in the pleural space.  Today's labs

reviewed, showing blood vessel, 16, hemoglobin is 9.7, sodium is 143, potassium 

is 2.8, chloride is 100 CO2 is 27 BUN is 28 and creatinine 0.78, LFTs seems to 

have acutely increased, with AST up to 127, ALT is 120, and alkaline phosphatase

of 320.  No fever or chills, patient of breath but no acute distress, today's 

chest x-ray shows extensive subcutaneous emphysema, small pneumothoraces at the 

lung bases, in worsening-appearing infiltrate through the right lung base.











Objective





- Vital Signs


Vital signs: 


                                   Vital Signs











Temp  97.4 F L  02/11/21 09:18


 


Pulse  80   02/11/21 10:20


 


Resp  16   02/11/21 09:49


 


BP  121/77   02/11/21 10:20


 


Pulse Ox  86 L  02/11/21 10:20








                                 Intake & Output











 02/10/21 02/11/21 02/11/21





 18:59 06:59 18:59


 


Intake Total 3701 468 0052


 


Output Total 1300 435 


 


Balance -120 -85 1260


 


Weight  100.4 kg 


 


Intake:   


 


  IV  350 1020


 


    0.9  100 


 


    Voriconazole 400 mg In  250 





    Sodium Chloride 0.9% 250   





    ml @ 125 mls/hr IVPB   





    Q12HR [facility] Rx#:[id number]   


 


  Oral 1180  240


 


Output:   


 


  Chest Tube Drainage 50 35 


 


    Left Mid-Axillary Chest 50 35 


 


  Urine 1250 400 


 


Other:   


 


  Voiding Method Toilet  





 Urinal  














- Exam


GENERAL EXAM: Alert, pleasant, 60-year-old male patient, on room air with a 

pulse ox of 94%, currently sitting up in chair at the bedside, comfortable in no

apparent distress.


HEAD: Normocephalic/atraumatic.


EENT: PERRLA, EOMI, nonicteric.  Moist mucous membranes, no neck masses, no JVD,

no stridor.


CHEST: No chest wall deformity.  Symmetrical expansion.  Left chest Pleurx 

catheter connected to Pleur-evac, to wall suction, with intermittent air leak 

present, serosanguineous output in the Pleur-evac.  There is extensive cm

bcutaneous emphysema in his anterior bilateral upper chest, extending into his 

neck and shoulders


LUNGS: Bibasilar rhonchi, and a few scattered wheezes


CVS: Regular rate and rhythm, normal S1 and S2, no gallops, no murmurs, no rubs


ABDOMEN: Soft, nontender.  No hepatosplenomegaly, normal bowel sounds, no 

guarding or rigidity.


EXTREMITIES: No clubbing, 1+ lower extremity edema, no cyanosis, 2+ pulses and 

upper and lower extremities.


MUSCULOSKELETAL: Muscle strength and tone normal.


SPINE: No scoliosis or deformity


SKIN: No rashes


CENTRAL NERVOUS SYSTEM: Alert and oriented -3.  No focal deficits, tone is 

normal in all 4 extremities.


PSYCHIATRIC: Alert and oriented -3.  Appropriate affect.  Intact judgment and 

insight.











- Labs


CBC & Chem 7: 


                                 02/11/21 12:40





                                 02/11/21 12:40


Labs: 


                  Abnormal Lab Results - Last 24 Hours (Table)











  02/10/21 02/10/21 02/11/21 Range/Units





  16:48 20:28 12:40 


 


WBC    16.0 H  (3.8-10.6)  k/uL


 


RBC    3.56 L  (4.30-5.90)  m/uL


 


Hgb    9.7 L  (13.0-17.5)  gm/dL


 


Hct    33.1 L  (39.0-53.0)  %


 


MCHC    29.4 L  (31.0-37.0)  g/dL


 


RDW    18.9 H  (11.5-15.5)  %


 


Potassium     (3.5-5.1)  mmol/L


 


Carbon Dioxide     (22-30)  mmol/L


 


BUN     (9-20)  mg/dL


 


Glucose     (74-99)  mg/dL


 


POC Glucose (mg/dL)  163 H  202 H   (75-99)  mg/dL


 


Calcium     (8.4-10.2)  mg/dL


 


AST     (17-59)  U/L


 


ALT     (4-49)  U/L


 


Alkaline Phosphatase     ()  U/L


 


Albumin     (3.5-5.0)  g/dL














  02/11/21 Range/Units





  12:40 


 


WBC   (3.8-10.6)  k/uL


 


RBC   (4.30-5.90)  m/uL


 


Hgb   (13.0-17.5)  gm/dL


 


Hct   (39.0-53.0)  %


 


MCHC   (31.0-37.0)  g/dL


 


RDW   (11.5-15.5)  %


 


Potassium  2.8 L  (3.5-5.1)  mmol/L


 


Carbon Dioxide  37 H  (22-30)  mmol/L


 


BUN  28 H  (9-20)  mg/dL


 


Glucose  100 H  (74-99)  mg/dL


 


POC Glucose (mg/dL)   (75-99)  mg/dL


 


Calcium  8.3 L  (8.4-10.2)  mg/dL


 


AST  127 H  (17-59)  U/L


 


ALT  120 H  (4-49)  U/L


 


Alkaline Phosphatase  320 H  ()  U/L


 


Albumin  2.9 L  (3.5-5.0)  g/dL














Assessment and Plan


Plan: 


 Assessment:





#1.  Acute hypoxic respiratory failure related to sepsis, septic shock,  related

to pneumonia, with chronic loculated effusions, rule out possibility of empyema.

COVID 19 was ruled out per PCR.  Pleural fluid culture positive for Aspergillus 

fumigators, patient is on voriconazole, ideally patient would benefit from 

surgical intervention including thoracoscopy/decortication however this could be

an extensive surgery for him in patient had the left chest Pleurx catheter 

inserted for drainage





Patient is post insertion of a left-sided Pleurx catheter and instillation of 

amphotericin B into the pleural space, the left lung was trapped with fungal 

empyema, patient has intermittent air leak, and there is a subcutaneous 

emphysema involving neck, upper chest





#2.  Bilateral hydropneumothorax, rule out possibility of empyema, status post 

ultrasound-guided left-sided thoracentesis on 02/01/2021 would removal of 45 mL 

of cloudy white fluid, which was exudative in nature, pleural fluid culture only

showing Aspergillus, antibiotic coverage is with cefepime and vancomycin, it 

service is following, CT surgery has no plans for surgical intervention at this 

time.  on 02/10/2021 patient is on voriconazole for evidence of Aspergillus 

fumigators in the pleural fluid





#3.  Recent admission to the hospital for hypotension, dehydration related to 

nausea vomiting diarrhea, acute kidney injury





#4.  Recurrent pulmonary infections, with history of streptococcal pneumonia and

empyema requiring chest tube placement in July 2020





#5.  Chronic loculated pneumothoraces, posterior, with air-fluid level seen on 

the CT imaging of the chest, the possibility of trapped lung, chronic scarring a

nd possibility of empyema needs to be ruled out





#6.  Elevated d-dimer with no CT evidence for pulmonary embolism





#7.  History of rheumatoid arthritis and rheumatoid lung disease on Arava





#8.  Recent history of acute kidney injury, and admission lab work today shows 

improvement in his renal function





#9.  Hypertension





#10.  Hypothyroidism





#11.  Previous history of foot infections requiring antibiotics





#12.  Previous history of DVT





#13.  Transaminitis possibly related to antifungal antibiotics, will obtain 

follow-up labs





Plan:





Continue current treatment, today's labs have been noted, liver enzymes are 

elevated, we'll obtain follow-up CMP tomorrow.  Continue left Pleurx chest tube 

to continuous wall suction, the chest tube still has a air leak, continue 

current antibiotics. 





I performed a history & physical examination of the patient and discussed their 

management with my nurse practitioner, Lorena Bonilla.  I reviewed the nurse 

practitioner's note and agree with the documented findings and plan of care.  

Lung sounds are positive for diminished breath sounds.  The findings and the 

impression was discussed with the patient.  I attest to the documentation by the

nurse practitioner. 





Time with Patient: Less than 30

## 2021-02-11 NOTE — P.PN
Progress Note - Text





Successful atrial flutter ablation with bidirectional block with differential 

pacing





Plan


100 mg amiodarone by mouth once daily for one month and then stop


Reduce metoprolol to 25 mg twice daily


Continue ELIQUIS 5 mg twice daily





Further dictation to follow

## 2021-02-11 NOTE — P.PN
Subjective


Progress Note Date: 02/11/21


Principal diagnosis: 





Bilateral pneumonia, bilateral hydropneumothorax, possible empyema, sputum 

culture preliminarily positive for Aspergillus species, acute hypoxic 

respiratory failure with sepsis on admission.  Past medical history significant 

for multiple admissions for pneumonia with empyema on the left and previous 

bilateral chest tube placement in June 2020 with cultures positive for strep 

pneumonia, rheumatoid arthritis with rheumatoid lung, hypertension, 

hypothyroidism, DVT, previous tobacco dependence, family history of lung cancer,

and recent hospitalization for hypotension, dehydration, acute kidney injury 

requiring dialysis.





POD #3 left Pleurx catheter placement with irrigational pleural space and 

installation of amphotericin B solution under fluoroscopic guidance.





POD #9 left-sided thoracentesis by interventional radiology.





The patient was seen and examined today 02/11/2021 in the electrophysiology lab.

 He is awake, alert and oriented 3.  Denies any complaints of pain or shortness

of breath at this time.  Left chest Pleurx catheter remains in place and 

connected to low continuous wall suction -20 cm H2O.  No air leak is present at 

this time.  Draining thin cloudy serosanguineous drainage.  The patient is 

scheduled for an ablation this morning for his atrial flutter by Dr. Pimentel.  

Oxygen saturations are 97% on 2 L nasal cannula.  Afebrile the last 24 hours.





Objective





- Vital Signs


Vital signs: 


                                   Vital Signs











Temp  97.7 F   02/11/21 04:00


 


Pulse  60   02/11/21 04:00


 


Resp  16   02/11/21 04:00


 


BP  116/70   02/11/21 04:00


 


Pulse Ox  97   02/11/21 04:00








                                 Intake & Output











 02/10/21 02/11/21 02/11/21





 18:59 06:59 18:59


 


Intake Total 1180 350 0


 


Output Total 1300 435 


 


Balance -120 -85 0


 


Weight  100.4 kg 


 


Intake:   


 


  IV  350 0


 


    0.9  100 


 


    Voriconazole 400 mg In  250 





    Sodium Chloride 0.9% 250   





    ml @ 125 mls/hr IVPB   





    Q12HR [facility] Rx#:[id number]   


 


  Oral 1180  


 


Output:   


 


  Chest Tube Drainage 50 35 


 


    Left Mid-Axillary Chest 50 35 


 


  Urine 1250 400 


 


Other:   


 


  Voiding Method Toilet  





 Urinal  














- Constitutional


General appearance: Present: cooperative, no acute distress, obese





- EENT


Eyes: Present: normal appearance.  Absent: scleral icterus


ENT: Present: hearing grossly normal





- Neck


Details: 





Neck with extensive subcutaneous emphysema.


Neck: Present: normal ROM.  Absent: stridor





- Respiratory


Details: 





Lung sounds essentially clear to his bilateral upper lobes, diminished to his 

bilateral bases.  No wheezes, rhonchi present.  Respirations are symmetrical and

nonlabored.  Oxygen saturation is 97% on 2 L nasal cannula.  Left chest Pleurx 

catheter in place to low continuous wall suction -20 cm H2O.  No air leak is 

present.  Draining thin cloudy serosanguineous drainage.





- Cardiovascular


Details: 





Irregular rhythm and controlled rate.  S1 and S2 present, negative for S3, 

gallop or murmur.  +1 edema to his bilateral lower extremities.  Knee-high PAT 

hose in place to his bilateral lower extremities.





- Gastrointestinal


Gastrointestinal Comment(s): 





Abdomen soft, nontender and nondistended.  Active bowel sounds present in all 4 

abdominal quadrants.  No guarding or rigidity.  No organomegaly appreciated.





- Genitourinary


Genitourinary Comment(s): 





Continues to void.





- Integumentary


Integumentary Comment(s): 





Skin is warm and dry.  No clubbing or cyanosis is present.  Subcutaneous 

emphysema present to his neck, anterior and posterior chest, and bilateral upper

extremities.





- Neurologic


Neurologic: Present: CNII-XII intact.  Absent: focal deficits





- Musculoskeletal


Musculoskeletal: Present: gait normal, generalized weakness, strength equal 

bilaterally





- Psychiatric


Psychiatric: Present: A&O x's 3, appropriate affect, intact judgment & insight





- Allied health notes


Allied health notes reviewed: nursing





- Labs


CBC & Chem 7: 


                                 02/08/21 06:58





                                 02/09/21 08:46


Labs: 


                  Abnormal Lab Results - Last 24 Hours (Table)











  02/10/21 02/10/21 02/10/21 Range/Units





  11:36 16:48 20:28 


 


POC Glucose (mg/dL)  167 H  163 H  202 H  (75-99)  mg/dL














- Imaging and Cardiology


Chest x-ray: report reviewed, image reviewed





Assessment and Plan


Assessment: 





1.  Bilateral pneumonia, bilateral hydropneumothorax, possible empyema, sputum 

culture preliminarily positive for Aspergillus species, status post left-sided 

thoracentesis


2.  Acute hypoxic respiratory failure with sepsis on admission


3.  Multiple admissions for pneumonia with empyema on the left and previous 

bilateral chest tube placement in June 2020 with cultures positive for strep 

pneumonia


4.  Rheumatoid arthritis with rheumatoid lung


5.  Hypertension, currently hypotensive, requiring pressors on admission


6.  Hypothyroidism


7.  History of DVT


8.  Previous tobacco dependence


9.  Family history of lung cancer


10.  Recent hospitalization for hypotension, dehydration, acute kidney injury 

requiring dialysis


11.  New-onset atrial fibrillation/atrial flutter with rapid ventricular 

response, currently on amiodarone by mouth and metoprolol tartrate


12.  Subcutaneous emphysema to his neck, bilateral upper extremities, anterior 

posterior chest with positive air leak through the Pleurx catheter, currently on

continuous low wall suction


Plan: 





1.  We will cap the Pleurx catheter today.  Continue to monitor for subcutaneous

emphysema resolution.


2.  Continue to follow daily chest x-rays.


3.  Encourage incentive spirometry 10 times every hour while awake.  

Bronchodilators per pulmonary for/critical care medicine.


4.  Remains on Vfend managed by infectious disease.  Amphotericin B pleural was 

drained 02/09/2021.


5.  Increase activity as tolerated.


6.  Medical management and other comorbidities per primary care service.


7.  Reinforced Pleurx catheter teaching with the patient and his wife present. 


8.  Cytology results are pending.


9.  More recommendations to follow based on patient's clinical course.


Time with Patient: Greater than 30

## 2021-02-11 NOTE — PN
PROGRESS NOTE



DATE OF SERVICE:

02/11/2021



REASON FOR FOLLOWUP:

Aspergillosis.



INTERVAL HISTORY:

The patient is currently afebrile. The patient is breathing comfortably, status post

cardiac ablation today.  No chest pain or shortness of breath.  Occasional cough.  No

nausea, no vomiting, no abdominal pain or diarrhea.



PHYSICAL EXAMINATION:

Blood pressure is 116/70 with a pulse of 85, temperature 97.9.  He is 97% on room air.

General description is a middle-aged male lying in bed in no distress.

RESPIRATORY SYSTEM: Unlabored breathing with decreased breath sounds at the base. No

wheeze.

HEART: S1, S2.  Regular rate and rhythm.

ABDOMEN: Soft. No tenderness.



LABS:

White count down to 16.  BUN 28, creatinine 0.78.



DIAGNOSTIC IMPRESSION AND PLAN:

Patient with aspergillus pneumonia and empyema, status post PleurX catheter placement.

Patient is covered with voriconazole; to continue. Family at the bedside. Multiple

questions were answered.





MMODL / IJN: [id number] / Job#: [id number]

## 2021-02-11 NOTE — XR
EXAMINATION TYPE: XR chest 1V portable

 

DATE OF EXAM: 2/11/2021

 

COMPARISON: 2/10/2021

 

INDICATION: Pleurx catheter

 

TECHNIQUE: Single frontal view of the chest is obtained.

 

FINDINGS:  

The heart size is prominent.  

The pulmonary vasculature is prominent.  

There is a patchy infiltrates in the right lung. A right loculated pneumothorax may be at the base. L
eft-sided catheter is present. Some left basilar atelectasis may be present. This appears diminished.
 Extensive subcutaneous emphysema limits the evaluation.  

 

 

IMPRESSION:  

1. Small on pneumothoraces may be present at the lung bases. Extensive subcutaneous emphysema is pres
ent.

2.

2. Worsening appearing infiltrate through the right lung and left base.

## 2021-02-12 LAB
ALBUMIN SERPL-MCNC: 3 G/DL (ref 3.5–5)
ALP SERPL-CCNC: 425 U/L (ref 38–126)
ALT SERPL-CCNC: 193 U/L (ref 4–49)
ANION GAP SERPL CALC-SCNC: 5 MMOL/L
AST SERPL-CCNC: 209 U/L (ref 17–59)
BUN SERPL-SCNC: 27 MG/DL (ref 9–20)
CALCIUM SPEC-MCNC: 8.9 MG/DL (ref 8.4–10.2)
CELLS COUNTED: 200
CHLORIDE SERPL-SCNC: 99 MMOL/L (ref 98–107)
CO2 SERPL-SCNC: 37 MMOL/L (ref 22–30)
EOSINOPHIL # BLD MANUAL: 0.16 K/UL (ref 0–0.7)
ERYTHROCYTE [DISTWIDTH] IN BLOOD BY AUTOMATED COUNT: 3.76 M/UL (ref 4.3–5.9)
ERYTHROCYTE [DISTWIDTH] IN BLOOD: 19 % (ref 11.5–15.5)
GLUCOSE BLD-MCNC: 108 MG/DL (ref 75–99)
GLUCOSE BLD-MCNC: 117 MG/DL (ref 75–99)
GLUCOSE BLD-MCNC: 121 MG/DL (ref 75–99)
GLUCOSE BLD-MCNC: 82 MG/DL (ref 75–99)
GLUCOSE SERPL-MCNC: 87 MG/DL (ref 74–99)
HCT VFR BLD AUTO: 33.9 % (ref 39–53)
HGB BLD-MCNC: 10 GM/DL (ref 13–17.5)
LYMPHOCYTES # BLD MANUAL: 2.53 K/UL (ref 1–4.8)
MCH RBC QN AUTO: 26.5 PG (ref 25–35)
MCHC RBC AUTO-ENTMCNC: 29.5 G/DL (ref 31–37)
MCV RBC AUTO: 90.1 FL (ref 80–100)
METAMYELOCYTES # BLD: 0.47 K/UL
MONOCYTES # BLD MANUAL: 0.95 K/UL (ref 0–1)
MYELOCYTES # BLD MANUAL: 0.79 K/UL
NEUTROPHILS NFR BLD MANUAL: 70 %
NEUTS SEG # BLD MANUAL: 11.06 K/UL (ref 1.3–7.7)
PLATELET # BLD AUTO: 293 K/UL (ref 150–450)
POTASSIUM SERPL-SCNC: 3 MMOL/L (ref 3.5–5.1)
PROT SERPL-MCNC: 6.5 G/DL (ref 6.3–8.2)
SODIUM SERPL-SCNC: 141 MMOL/L (ref 137–145)
WBC # BLD AUTO: 15.8 K/UL (ref 3.8–10.6)

## 2021-02-12 RX ADMIN — OXYCODONE HYDROCHLORIDE AND ACETAMINOPHEN SCH MG: 500 TABLET ORAL at 21:30

## 2021-02-12 RX ADMIN — APIXABAN SCH MG: 5 TABLET, FILM COATED ORAL at 10:32

## 2021-02-12 RX ADMIN — VORICONAZOLE SCH MLS/HR: 10 INJECTION, POWDER, LYOPHILIZED, FOR SOLUTION INTRAVENOUS at 10:32

## 2021-02-12 RX ADMIN — METOPROLOL TARTRATE SCH MG: 25 TABLET, FILM COATED ORAL at 10:32

## 2021-02-12 RX ADMIN — SODIUM CHLORIDE, PRESERVATIVE FREE SCH ML: 5 INJECTION INTRAVENOUS at 21:30

## 2021-02-12 RX ADMIN — TRIAMCINOLONE ACETONIDE SCH: 1 CREAM TOPICAL at 21:31

## 2021-02-12 RX ADMIN — PANTOPRAZOLE SODIUM SCH MG: 40 TABLET, DELAYED RELEASE ORAL at 06:19

## 2021-02-12 RX ADMIN — AMIODARONE HYDROCHLORIDE SCH MG: 100 TABLET ORAL at 10:33

## 2021-02-12 RX ADMIN — METOPROLOL TARTRATE SCH MG: 25 TABLET, FILM COATED ORAL at 21:30

## 2021-02-12 RX ADMIN — ASPIRIN 81 MG CHEWABLE TABLET SCH MG: 81 TABLET CHEWABLE at 21:29

## 2021-02-12 RX ADMIN — SODIUM CHLORIDE, PRESERVATIVE FREE SCH ML: 5 INJECTION INTRAVENOUS at 10:33

## 2021-02-12 RX ADMIN — IPRATROPIUM BROMIDE AND ALBUTEROL SULFATE SCH: .5; 3 SOLUTION RESPIRATORY (INHALATION) at 10:50

## 2021-02-12 RX ADMIN — Medication SCH MCG: at 21:30

## 2021-02-12 RX ADMIN — TRIAMCINOLONE ACETONIDE SCH APPLIC: 1 CREAM TOPICAL at 10:33

## 2021-02-12 RX ADMIN — FUROSEMIDE SCH MG: 10 INJECTION, SOLUTION INTRAMUSCULAR; INTRAVENOUS at 10:31

## 2021-02-12 RX ADMIN — POTASSIUM CHLORIDE SCH MEQ: 20 TABLET, EXTENDED RELEASE ORAL at 23:20

## 2021-02-12 RX ADMIN — AMMONIUM LACTATE SCH APPLIC: 12 LOTION TOPICAL at 12:57

## 2021-02-12 RX ADMIN — IPRATROPIUM BROMIDE AND ALBUTEROL SULFATE SCH: .5; 3 SOLUTION RESPIRATORY (INHALATION) at 19:30

## 2021-02-12 RX ADMIN — LEVOTHYROXINE SODIUM SCH MCG: 75 TABLET ORAL at 06:19

## 2021-02-12 RX ADMIN — POTASSIUM CHLORIDE SCH MEQ: 20 TABLET, EXTENDED RELEASE ORAL at 18:01

## 2021-02-12 RX ADMIN — VORICONAZOLE SCH MLS/HR: 10 INJECTION, POWDER, LYOPHILIZED, FOR SOLUTION INTRAVENOUS at 21:29

## 2021-02-12 RX ADMIN — AMMONIUM LACTATE SCH APPLIC: 12 LOTION TOPICAL at 21:30

## 2021-02-12 RX ADMIN — POTASSIUM CHLORIDE SCH MLS/HR: 14.9 INJECTION, SOLUTION INTRAVENOUS at 10:33

## 2021-02-12 RX ADMIN — APIXABAN SCH MG: 5 TABLET, FILM COATED ORAL at 21:29

## 2021-02-12 RX ADMIN — IPRATROPIUM BROMIDE AND ALBUTEROL SULFATE SCH: .5; 3 SOLUTION RESPIRATORY (INHALATION) at 07:43

## 2021-02-12 RX ADMIN — POTASSIUM CHLORIDE SCH MEQ: 20 TABLET, EXTENDED RELEASE ORAL at 17:07

## 2021-02-12 RX ADMIN — CEFAZOLIN SCH MLS/HR: 330 INJECTION, POWDER, FOR SOLUTION INTRAMUSCULAR; INTRAVENOUS at 17:07

## 2021-02-12 NOTE — P.PN
Subjective


Progress Note Date: 02/12/21


Principal diagnosis: 





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 

species, acute hypoxic respiratory failure with sepsis on admission.  Radius his

tory of multiple admissions for pneumonia with empyema on the left and previous 

bilateral chest tube placement in June 2020 with cultures positive for strep 

pneumonia, rheumatoid arthritis with rheumatoid lung, hypertension, 

hypothyroidism, DVT, previous tobacco dependence, family history of lung cancer,

and recent hospitalization for hypotension, dehydration, acute kidney injury 

requiring dialysis





POD #11 left-sided thoracentesis by interventional radiology





POD #4 left Pleurx catheter placement with irrigation of pleural space and 

instillation of amphotericin B solution under thoracoscopic guidance





Extensive subcutaneous emphysema





Persistent atrial flutter with RVR, status post electrical cardioversion, status

post radiofrequency ablation





The patient is currently sitting up in a recliner on the cardiac stepdown unit 

in no acute distress.  Denies pain, states shortness of breath has continued to 

improve, appears comfortable.  Subcutaneous emphysema appears slightly better 

than previous.  Remains on IV Lasix and IV voriconazole.  Remains afebrile, 

currently in sinus rhythm and hemodynamically stable, oxygen saturation in the 

90s on room air.  Left sided Pleurx catheter connected to continuous wall 

suction, intermittent air leak present.  No new concerns, however patient is an

xious to go home.





Objective





- Vital Signs


Vital signs: 


                                   Vital Signs











Temp  97.9 F   02/12/21 03:09


 


Pulse  83   02/12/21 03:09


 


Resp  17   02/12/21 03:09


 


BP  127/82   02/12/21 03:09


 


Pulse Ox  93 L  02/12/21 03:09








                                 Intake & Output











 02/11/21 02/12/21 02/12/21





 18:59 06:59 18:59


 


Intake Total 2020 410 240


 


Output Total 1695 535 325


 


Balance 325 -125 -85


 


Weight  101.8 kg 


 


Intake:   


 


  IV 1060 250 


 


    LR 40  


 


    Voriconazole 400 mg In  250 





    Sodium Chloride 0.9% 250   





    ml @ 125 mls/hr IVPB   





    Q12HR GABBY Rx#:[id number]   


 


  Intake, IV Titration  160 





  Amount   


 


    Sodium Chloride 0.9% 1,  160 





    000 ml @ 20 mls/hr IV .   





    Q24H GABBY Rx#:[id number]   


 


  Oral 960  240


 


Output:   


 


  Chest Tube Drainage 120 35 


 


    Left Mid-Axillary Chest 120 35 


 


  Urine 1575 500 325














- Constitutional


General appearance: Present: cooperative, no acute distress





- Respiratory


Details: 





Lungs sounds diminished in the bases bilaterally.  Respirations even, 

nonlabored.  Currently on room air with oxygen saturation in the 90s.  Able to 

achieve 1000 mL on his incentive spirometry.  Strong cough.  Left sided Pleurx 

catheter present to continuous wall suction, 30 mL cloudy drainage overnight, 

400 mL in the last 24 hours, intermittent air leak present.








- Cardiovascular


Details: 





S1, S2 present.  Regular rate and rhythm, sinus rhythm on telemetry with heart 

rate in the 90s.  Palpable peripheral pulses bilaterally.  Bilateral lower 

extremity edema present.  No calf pain or tenderness noted.





- Gastrointestinal


Gastrointestinal Comment(s): 





Abdomen soft, nontender, nondistended.  Active bowel sounds present 4 

quadrants.  Tolerating diet.  Positive bowel movement per patient








- Genitourinary


Genitourinary Comment(s): 





Continues to void








- Integumentary


Integumentary Comment(s): 





Skin is warm and dry with evidence of good perfusion








- Neurologic


Neurologic: Present: CNII-XII intact





- Musculoskeletal


Musculoskeletal: Present: gait normal, strength equal bilaterally





- Psychiatric


Psychiatric: Present: A&O x's 3, appropriate affect, intact judgment & insight





- Allied health notes


Allied health notes reviewed: nursing





- Labs


CBC & Chem 7: 


                                 02/12/21 07:45





                                 02/12/21 07:45


Labs: 


                  Abnormal Lab Results - Last 24 Hours (Table)











  02/11/21 02/11/21 02/11/21 Range/Units





  12:40 12:40 16:47 


 


WBC  16.0 H    (3.8-10.6)  k/uL


 


RBC  3.56 L    (4.30-5.90)  m/uL


 


Hgb  9.7 L    (13.0-17.5)  gm/dL


 


Hct  33.1 L    (39.0-53.0)  %


 


MCHC  29.4 L    (31.0-37.0)  g/dL


 


RDW  18.9 H    (11.5-15.5)  %


 


Potassium   2.8 L   (3.5-5.1)  mmol/L


 


Carbon Dioxide   37 H   (22-30)  mmol/L


 


BUN   28 H   (9-20)  mg/dL


 


Glucose   100 H   (74-99)  mg/dL


 


POC Glucose (mg/dL)    142 H  (75-99)  mg/dL


 


Calcium   8.3 L   (8.4-10.2)  mg/dL


 


AST   127 H   (17-59)  U/L


 


ALT   120 H   (4-49)  U/L


 


Alkaline Phosphatase   320 H   ()  U/L


 


Albumin   2.9 L   (3.5-5.0)  g/dL














  02/11/21 02/11/21 02/12/21 Range/Units





  20:01 21:36 07:45 


 


WBC    15.8 H  (3.8-10.6)  k/uL


 


RBC    3.76 L  (4.30-5.90)  m/uL


 


Hgb    10.0 L  (13.0-17.5)  gm/dL


 


Hct    33.9 L  (39.0-53.0)  %


 


MCHC    29.5 L  (31.0-37.0)  g/dL


 


RDW    19.0 H  (11.5-15.5)  %


 


Potassium   3.0 L   (3.5-5.1)  mmol/L


 


Carbon Dioxide     (22-30)  mmol/L


 


BUN     (9-20)  mg/dL


 


Glucose     (74-99)  mg/dL


 


POC Glucose (mg/dL)  137 H    (75-99)  mg/dL


 


Calcium     (8.4-10.2)  mg/dL


 


AST     (17-59)  U/L


 


ALT     (4-49)  U/L


 


Alkaline Phosphatase     ()  U/L


 


Albumin     (3.5-5.0)  g/dL














  02/12/21 Range/Units





  07:45 


 


WBC   (3.8-10.6)  k/uL


 


RBC   (4.30-5.90)  m/uL


 


Hgb   (13.0-17.5)  gm/dL


 


Hct   (39.0-53.0)  %


 


MCHC   (31.0-37.0)  g/dL


 


RDW   (11.5-15.5)  %


 


Potassium  3.0 L  (3.5-5.1)  mmol/L


 


Carbon Dioxide  37 H  (22-30)  mmol/L


 


BUN  27 H  (9-20)  mg/dL


 


Glucose   (74-99)  mg/dL


 


POC Glucose (mg/dL)   (75-99)  mg/dL


 


Calcium   (8.4-10.2)  mg/dL


 


AST  209 H  (17-59)  U/L


 


ALT  193 H  (4-49)  U/L


 


Alkaline Phosphatase  425 H  ()  U/L


 


Albumin  3.0 L  (3.5-5.0)  g/dL














- Imaging and Cardiology


Chest x-ray: report reviewed, image reviewed





Assessment and Plan


Assessment: 





1.  Bilateral pneumonia,  left trapped lung with fungal empyema growing 

Aspergillus species, status post left-sided thoracentesis, status post Pleurx 

catheter placement with instillation of amphotericin B


2.  Acute hypoxic respiratory failure with sepsis on admission


3.  Multiple admissions for pneumonia with empyema on the left and previous bi

lateral chest tube placement in June 2020 with cultures positive for strep 

pneumonia


4.  Rheumatoid arthritis with rheumatoid lung


5.  Hypertension, currently hypotensive, requiring pressors on admission


6.  Hypothyroidism


7.  History of DVT


8.  Previous tobacco dependence


9.  Family history of lung cancer


10.  Recent hospitalization for hypotension, dehydration, acute kidney injury 

requiring dialysis


11.  Extensive subcutaneous emphysema


12.  Persistent atrial flutter with RVR, status post electrical cardioversion 

and radiofrequency ablation, currently sinus


Plan: 





1.  Continue Pleurx catheter with -20 cm continuous wall suction.  Will monitor 

for resolution of air leak and decreased subcu emphysema


2.  Daily chest x-rays


3.  Encourage incentive spirometry.  Bronchodilators per pulmonology


4.  Amiodarone, Eliquis per cardiology


5.  Continue voriconazole per infectious disease recommendations


6.  Increase activity as tolerated.  Suction tubing extended so patient may 

ambulate in the room, may remove suction for short times for patient to ambulate

in the hallway


7.  Management of other comorbidities per primary care service.


8.  Teaching done with patient and wife regarding Pleurx catheter care.  Will 

continue to reinforce while patient is hospitalized


9.  More recommendations to follow





Time with Patient: Greater than 30

## 2021-02-12 NOTE — PN
PROGRESS NOTE



DATE OF SERVICE:

02/12/2021



REASON FOR FOLLOWUP:

Aspergillosis and aspergillus empyema.



INTERVAL HISTORY:

The patient is currently afebrile, has been breathing comfortably.  Denies having any

chest pain. Occasional cough. No nausea, no vomiting, no abdominal pain or diarrhea.



PHYSICAL EXAMINATION:

Blood pressure 115/70 with pulse of 91, temperature 98.3. He is 91% on room air.

General description is a middle-aged male lying in bed in no distress.

RESPIRATORY SYSTEM: Unlabored breathing with decreased breath sounds at the base. No

wheeze.

HEART: S1, S2.  Regular rate and rhythm.

ABDOMEN: Soft. No tenderness.



LABS:

Hemoglobin is 10, white count 15.8, BUN of 27, creatinine 0.82.



DIAGNOSTIC IMPRESSION AND PLAN:

Patient with aspergillus pneumonia and empyema, status post PleurX catheter placement.

Patient at this point is covered with voriconazole; to continue while monitoring his

clinical course closely.  Continue with supportive care.





MMODL / IJN: [id number] / Job#: [id number]

## 2021-02-12 NOTE — P.PN
Subjective


Progress Note Date: 02/11/21





This is a 60-year-old  male patient requesting my service with past 

medical history of rheumatoid arthritis on Arava that was diagnosed when he was 

37 year old initially was started on Methotrexate that he could not tolerate 

then placed on Embrel but he developed side effects and finally was tried on 

Humira injection but he developed neurologic sided effects and was hospitaized 

at [facility] for quiet some time, rheumatoid lung disease, 

previous history of loculated empyema with chest tube placement in July 2020, 

hypertension, hypothyroidism, history of DVT, remote history of tobacco use and 

dependence, was recently hospitalized at [facility] after he was 

admitted for an acute kidney injury with significant metabolic acidosis 

associated with the elevated creatinine and elevated BUN and hypotension at that

time he was seen and evaluated by pulmonary and critical care medicine, he had a

central line placed and at that time, he was placed on Levophed drip he was p

laced on IV anabiotic as well but the patient recovered very fast and he had an 

echocardiogram that showed normal LV function and segmental hypokinesia, he was 

supposed to follow-up with Dr. Santo in the office today, patient was seen in my

office 24 hours ago when he was complaining of increased shortness breath, at 

that time his oxygenation could not be checked because of his Case's and cold

extremities, he was sent for a chest x-ray that showed right lower lobe 

infiltrate as well as blood tests that showed a white count of 20,000 patient 

was feeling better he was started on oral antibiotic in the form of Levaquin 500

mg orally once every day, and that he was supposed to follow-up with me as an 

outpatient every week he went to see his cardiologist today and he had an 

episode when he was dizzy lightheaded and an episode of vomiting as well and he 

was sent to the emergency department for evaluation had a computed tomography of

the chest as well as abdomen and pelvis that showed a thick walled pleural 

cavities posteriorly in the lower lungs with left-sided air-fluid level that has

diminished in size from the prior computed tomography scan when he was in the 

hospital a few weeks back there is also associated compressive atelectasis and 

chronic consultation with multifocal areas of reticular nodular opacity in the 

upper lungs with a slight nodularity of the left upper lobe again this area of 

focal consultation has improved from the previous study that was done few weeks 

ago, patient was started on IV antibiotic with vancomycin and Zosyn as well as 

IV fluid, he was seen in consultation by pulmonary critical care in the 

emergency department as the patient blood pressure dropped and that he'll be 

transferred to the intensive care unit at this point in time I had long conve

rsation with the patient and his wife at the bedside and the patient may need to

have a chest tube placed for his possible right empyema, he did receive 3 L 

normal saline in the ER, and his blood pressure is marginal he may need to go on

 pressors if  not recovered.





1/30:patient is sitting up in chair feeling about the same , complains of 

increased pain in the righ elbow, was seen earlier by pulmonary and the plan was

to go for US-Guided left thoracenthesis due to to loculated left pleural effu

nathaniel and possible empyema, may need VATS, he denies any chest pain or shortness 

of breath, no abdominal pain nausea, vomiting or diarrhea, still have diarrhea 

negative for C.Diff, he seems to have accept to stay in the hospital this time 

as long as he needed to.





1/31: Patient sitting up in the recliner complaining of increased pain in his 

joints due to his rheumatoid arthritis with increased synovitis in both wrists 

both elbows and both shoulders he was started on hydrocortisone 100 mg IV push 

every 8 hours, to try to help with his blood pressure as well as his phonation, 

he is maintained on Norco 5/325 mg one tablet orally every 6 hours as needed for

pain control, patient denies any chest pain is less short of breath, he 

continues to have increased coughing with yellow from production of green phlegm

production, he had no fever or chills at this time he continues to be on 

Levothroid drip, patient has appeared to have increased swelling in both lower 

extremities as well as both ankles, he is maintained on IV antibiotic in the 

form of vancomycin as well as cefepime, infectious disease is following, patient

is scheduled to go for ultrasound guidance thoracentesis of the left loculated 

pleural effusion tomorrow morning.





2/1: Patient remains in the intensive care unit.  He has been afebrile, heart 

rate 100, blood pressure 95/62, pulse ox 97% on 2 L nasal cannula.  Hemoglobin 

8.8 and leukocytosis resolved.  Creatinine 0.76, electrolytes normal.  Sputum 

culture is positive for Aspergillus species.  Blood culture showing no growth. 

Patient underwent diagnostic thoracentesis with interventional radiology today 

with removal of 45 mL of cloudy white fluid.  Chest x-ray reveals no 

complications following left thoracentesis.  Fluid has been sent for culture and

cytology.  Consult in place for cardiothoracic surgery to evaluate for VATS with

decortication.





2/2: Patient remains in the intensive care unit.  He did receive 1 dose of IV 

Lasix this morning ordered by nephrology.  He is followed by cardiothoracic 

surgery was no plan for surgical intervention.  ID has recommended cefepime and 

vancomycin for now.  Expect antifungal agent ended as well.  Patient denies any 

significant shortness of breath.  He is comfortable sitting in a chair.  Patient

is having minimal cough and minimal sputum.  Patient is achieving 1000 ml on 

incentive spirometry.  Culture and cytology reports remain pending from 

thoracentesis. Repeat chest x-ray reveals patchy peripheral lung with lung zone 

and lower lung zone infiltrates persist unchanged.  Patient has been afebrile, 

heart rate in the 90s and low 100s, pulse ox 91 on room air.





2/3: Repeat chest x-ray reveals left-sided basilar loculated pneumothorax, 

stable.  Bilateral lung infiltrates.  Correlate for pneumonia and atelectasis.  

WBC 9, hemoglobin 9.5.  Potassium 3.4 and will be replaced.  Patient is 

continued on cefepime and vancomycin per Dr. Sheets's recommendations.  Patient 

in reaching 1000 on incentive spirometry.  Patient is scheduled to see Dr. Kaye tomorrow.  Cefepime and vancomycin had been discontinued by Dr. Sheets and

started on Voriconazole. 





2/4: A-Team was called this morning regarding elevated heart rate, EKG was 

atrial flutter with RVR and 150 bpm and patient was transferred to ICU as an 

overflow for the cardiac stepdown unit, started on Cardizem drip and consult 

with cardiology.  Heart rate was improved with Cardizem and patient also 

received amiodarone bolus 300 mg.  Patient denies any worsening shortness of 

breath, cough or congestion.  No chest pain or palpitations.  Patient is resting

comfortably.  09, hemoglobin 8.8, potassium 3.4 replaced.  Creatinine 0.89.





2/5: Patient remains in the intensive care unit.  He continues to be in atrial 

flutter with 21 conduction rate.  He is on heparin drip and amiodarone.  He is 

currently in and out of atrial flutter with RVR.  Cardiology is following 

closely and may consider cardioversion tomorrow. WBC 16.6, hgb 9.8, plt 374, 

sodium 143, potassium 3.9, creatinine 0.88.  He has been afebrile, heart rate 

has been at times marginal, pulse ox 90% on room air.  Patient denies having any

fever or chills, no shortness of breath, no cough.  No chest pain.  Patient is 

followed by cardiothoracic surgery and plan is for left-sided PleurX catheter 

placement which is scheduled for Monday.





2/8: Patient is scheduled for PleurX catheter placement this afternoon.  

Cardiology may schedule him for cardioversion.  Dr. Collado is planning on oral 

antimicrobials at the time of discharge.  Patient is bringing up quite a bit of 

sputum.  He has been afebrile, heart rate 68, blood pressure 100/67, pulse ox 

93% on 1 L nasal cannula.  WBC 21, hemoglobin 9.6, platelet count 325.  

Potassium 3, BUN 28 creatinine 0.8.





2/9: Patient is status post PleurX catheter.  Patient has been afebrile, heart 

rate 106, blood pressure 85/53.  Pulse ox 97% on 2 L.  Patient is known to be 

back in atrial fibrillation at rate of 122.  He did go for cardioversion today 

for atrial flutter.  Patient states he has a little pain in the side for which 

she is taking Norco.  Not bad today.  Patient has subcutaneous edema to 

bilateral chest.  He denies having any headache.  BUN 27 creatinine 0.79.  Blood

sugar .





2/10: Patient denies chest pain or shortness of breath.  He is reaching 1250 on 

IS.  PleurX draining small amount of sanguinous fluid.  Patient is in atrial 

flutter.  Dr. Pimentel is planning for EP study and ablation on Thursday and plan

to continue eliquis.  Patient is also on Lopressor 25 mg 3 times daily.  Patient

has been afebrile, heart rate 97, blood pressure 106/65, pulse ox 98% on room 

air.  Blood sugars running between 112 and 67.  Patient is covered with 

Voriconazole.  Lac-Hydrin added for bilateral legs.





2/11: Patient is status post successful atrial flutter ablation with Dr. Pimentel.  Cardiology has recommended amlodipine 100 mg twice daily for 1 month. 

Cardiac monitor is a sinus rhythm with PACs.  Metoprolol was decreased to 25 mg 

twice daily.  Patient is on eliquis 5 mg twice daily.  Patient has been 

afebrile, heart rate 64, blood pressure 121/77.  Potassium 2.3 and replaced.  

Repeat level will be obtained this evening.  Pleurx catheter remains in place 

connected to Pleur-evac with intermittent air leak.  He has minimal output.  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  He is planning on oral antimicrobial at the time of discharge.








Objective





- Vital Signs


Vital signs: 


                                   Vital Signs











Temp  97.4 F L  02/11/21 09:18


 


Pulse  80   02/11/21 10:20


 


Resp  16   02/11/21 09:49


 


BP  121/77   02/11/21 10:20


 


Pulse Ox  86 L  02/11/21 10:20








                                 Intake & Output











 02/10/21 02/11/21 02/11/21





 18:59 06:59 18:59


 


Intake Total 8369 [id number]


 


Output Total 1300 435 


 


Balance -120 -85 1020


 


Weight  100.4 kg 


 


Intake:   


 


  IV  350 1020


 


    0.9  100 


 


    Voriconazole 400 mg In  250 





    Sodium Chloride 0.9% 250   





    ml @ 125 mls/hr IVPB   





    Q12HR [facility] Rx#:[id number]   


 


  Oral 1180  


 


Output:   


 


  Chest Tube Drainage 50 35 


 


    Left Mid-Axillary Chest 50 35 


 


  Urine 1250 400 


 


Other:   


 


  Voiding Method Toilet  





 Urinal  














- Exam





 Review of Systems 


Constitutional: Reports anorexia, Reports chronic pain, Reports fatigue, denies 

weakness, Reports weight loss


Eyes: denies blurred vision, denies bulging eye, denies decreased vision, denies

diplopia


Ears, nose, mouth and throat: Denies dysphagia, Denies neck lump, Denies sore 

throat


Respiratory: Reports dyspnea, Reports respiratory infections, Denies congestion,

Denies cough with sputum, Denies home oxygen, Denies sleep apnea, Denies 

snoring, Denies wheezing


Cardiac: No chest pain.  No palpitations.  No lower extremity edema.  No 

syncopal episodes.


Gastrointestinal: Reports bloating, Reports change in bowel habits, Reports 

diarrhea, Reports early satiety, Reports indigestion, Reports loss of appetite, 

Reports nausea, Reports vomiting, Denies abdominal pain, Denies belching, Denies

heartburn, Denies hematemesis, Denies hematochezia, Denies melena


Genitourinary: Denies dysuria, Denies nocturia


Musculoskeletal: Denies myalgias 


Musculoskeletal: absent: ankle pain, ankle stiffness, ankle swelling, elbow 

pain, elbow stiffness, elbow swelling, foot pain, foot stiffness, foot swelling,

hand pain, hand stiffness, hand swelling, hip pain, hip stiffness, hip swelling,

knee pain, knee stiffness, knee swelling, shoulder pain, shoulder stiffness, 

shoulder swelling, wrist pain, wrist stiffness, wrist swelling


Integumentary: Denies pruritus, Denies rash


Neurological: Denies numbness, Denies weakness


Psychiatric: Denies anxiety, Denies depression


Endocrine: Denies fatigue, Denies weight change








Physical examination:


General: This is a 60-year-old  male.  Patient is resting in bed and 

appears to be comfortable.





HEENT: head ia atraumatic normocephalic, Pupils were equal round reactive to 

light and accommodations , extra ocular muscle movement were intact, mucous 

membranes of the mouth are somewhat dry.





Neck: supple no JVP.





Chest: decreased breath sounds at he bases no expiratory wheezes no chest wall 

tendernes or intercostal retractions.  PleurX cath in placePositive subcutaneous

emphysema.





Heart: first heart sound is depressed, second heart sound is normal tachycardic 

and irregular there is HOOD 2/6 located at the left sternal border.





Abdomen: soft, mild tenderness to the left lower quadrant positive bowel sounds,

no guarding or rebound tenderness, no hepatosplenomegaly.





Extremities: there is no edema or calf tenderness DP+ 2 Bilaterally, there is 

what appears to be a charcot joint in the right foot form arch collapse.





Neurologic examination: patient is awake , alert and oriented X 3 CN II-XII are 

grossly intact, muscle power 4/5 in bilateral upper and lower extremities, deep 

tendon reflexes were normal.








- Labs


CBC & Chem 7: 


                                 02/12/21 07:45





                                 02/12/21 07:45


Labs: 


                  Abnormal Lab Results - Last 24 Hours (Table)











  02/10/21 02/10/21 Range/Units





  16:48 20:28 


 


POC Glucose (mg/dL)  163 H  202 H  (75-99)  mg/dL














Assessment and Plan


Assessment: 





1.  Acute hypoxemic respiratory failure secondary to loculated left-sided 

pleural effusion and fungal empyema with what appears to be recurrent pneumonia.

 Continue Voriconazole 200 mg every 12 hours IV piggyback per Dr. Sheets, 

interventional radiology performed ultrasound-guided thoracentesis of the left 

loculated pleural fluid for Gram stain and culture and cytology as well as LDH 

protein as well as pH for possible empyema.  Culture positive for Aspergillus.  

Cardiothoracic surgery status post Pleurx catheter insertion.





2.  Lactic acidosis.  Resolved, repeat lactic acid is back to normal.





3.  Sepsis with septic shock.  Patient has been weaned off Levothroid drip.





4.  Acute kidney injury due to poor oral intake of fluid as well as hypotension 

with acute tubular necrosis.  Patient is on IV fluid, continue to monitor the 

patient CMP continue to monitor urine output.  Nephrology is following.





5.  Leukocytosis secondary to severe sepsis secondary to empyema.  Continue IV 

fluid, continue IV antibiotic, repeat CBC tomorrow morning.





6.  Persistent atrial flutter with RVR.  Status post cardioversion, converted 

back.  Status post ablation completed by Dr. Pimentel.  Patient to continue 

eliquis.  She was continued on amiodarone 100 mg daily for one month Cardiology 

consult appreciated.





7.  Rheumatoid arthritis and rheumatoid lung.  Arava and Xeljanz had been on 

hold since October, patient was started on hydrocortisone 50 mg IV push every 8 

hours.





8.  History of empyema with Streptococcus requiring chest tube.  





9.  Hypertension and hypertensive cardiovascular disease. currently hypotensive.





10.  Hypothyroidism . we will continue with Synthroid 150 mcg orally daily.





11. DVT prophylaxis. we will start Lovenox 40 mg SC daily and bilateral knee 

high Fahad Hose





12. GI prophylaxis. we will continue with Protonix 40 mg IVP daily.





13.  Prognosis continues to be guarded.





Discharge plan: home





Impression and plan of care have been directed as dictated by the signing 

physician.  Phyllis Wylie nurse practitioner acting as scribe for signing 

physician.

## 2021-02-12 NOTE — XR
EXAMINATION TYPE: XR chest 1V portable

 

DATE OF EXAM: 2/12/2021

 

HISTORY: Shortness of breath.

 

COMPARISON: None.

 

TECHNIQUE: Single view of the chest is submitted.

 

FINDINGS:

Demonstrated are scattered senescent parenchymal change.  

 

Left basilar pleural catheter unchanged in position. Extensive subcutaneous emphysema seen throughout
 the chest wall and neck. This does limit evaluation for pneumothorax. Sizable pneumothorax is not ev
ident with certainty. There is diffuse infiltrate throughout the right lung left lower lobe persists 
unchanged.

 

The heart is stable.

 

Hilar and mediastinal structures are within normal limits.  

 

Degenerative changes are seen of the dorsal spine. 

 

 IMPRESSION: 

 

1.  Stable chest.

## 2021-02-12 NOTE — P.PN
Subjective


Progress Note Date: 02/12/21





HISTORY OF PRESENT ILLNESS:  Patient examined this morning.  Patient is sitting 

up in the chair.  He denies chest pain or pressure.  He denies shortness of 

breath.  Patient is status post cardioversion on 2/9/2021 with Dr. Pimentel.  

Unfortunately, the patient went back into atrial flutter.  He underwent 

successful ablation yesterday with Dr. Pimentel. He remains in sinus rhythm this 

morning.





PHYSICAL EXAM: 


VITAL SIGNS: Reviewed.


GENERAL: Well-developed in no acute distress. 


NECK: Supple. No JVD or thyromegaly


LUNGS: Respirations even and unlabored. Lungs with scattered rhonchi. Left 

Pleurx chest tube noted.


HEART: Regular rate and rhythm.  S1 and S2 heard.


EXTREMITIES: Normal range of motion.  No clubbing or cyanosis.  Peripheral 

pulses intact.  No lower extremity edema





ASSESSMENT: 


Typical atrial flutter with RVR, s/p cardioversion and ablation


Acute hypoxic respiratory failure


Fungal pneumonia with empyema


Hypertension


Hyperlipidemia





PLAN: 


Continue Eliquis


Continue amiodarone 100 mg by mouth daily for one month then discontinue


Continue beta blocker


Continue telemetry monitoring


Pulmonary and cardiothoracic surgery following


Further recommendations pending patient course





Nurse practitioner note has been reviewed by physician. Signing provider agrees 

with the documented findings, assessment, and plan of care. 








Objective





- Vital Signs


Vital signs: 


                                   Vital Signs











Temp  98.3 F   02/12/21 08:00


 


Pulse  91   02/12/21 08:00


 


Resp  17   02/12/21 03:09


 


BP  115/71   02/12/21 08:00


 


Pulse Ox  91 L  02/12/21 08:00








                                 Intake & Output











 02/11/21 02/12/21 02/12/21





 18:59 06:59 18:59


 


Intake Total 2020 410 240


 


Output Total [phone]


 


Balance 325 -125 -1010


 


Weight  101.8 kg 


 


Intake:   


 


  IV 1060 250 


 


    LR 40  


 


    Voriconazole 400 mg In  250 





    Sodium Chloride 0.9% 250   





    ml @ 125 mls/hr IVPB   





    Q12HR [facility] Rx#:[id number]   


 


  Intake, IV Titration  160 





  Amount   


 


    Sodium Chloride 0.9% 1,  160 





    000 ml @ 20 mls/hr IV .   





    Q24H [facility] Rx#:[id number]   


 


  Oral 960  240


 


Output:   


 


  Chest Tube Drainage 120 35 


 


    Left Mid-Axillary Chest 120 35 


 


  Urine [id number]














- Labs


CBC & Chem 7: 


                                 02/12/21 07:45





                                 02/12/21 07:45


Labs: 


                  Abnormal Lab Results - Last 24 Hours (Table)











  02/11/21 02/11/21 02/11/21 Range/Units





  12:40 16:47 20:01 


 


WBC     (3.8-10.6)  k/uL


 


RBC     (4.30-5.90)  m/uL


 


Hgb     (13.0-17.5)  gm/dL


 


Hct     (39.0-53.0)  %


 


MCHC     (31.0-37.0)  g/dL


 


RDW     (11.5-15.5)  %


 


Neutrophils # (Manual)     (1.3-7.7)  k/uL


 


Metamyelocytes # (Man)     (0)  k/uL


 


Myelocytes # (Manual)     (0)  k/uL


 


Potassium  2.8 L    (3.5-5.1)  mmol/L


 


Carbon Dioxide  37 H    (22-30)  mmol/L


 


BUN  28 H    (9-20)  mg/dL


 


Glucose  100 H    (74-99)  mg/dL


 


POC Glucose (mg/dL)   142 H  137 H  (75-99)  mg/dL


 


Calcium  8.3 L    (8.4-10.2)  mg/dL


 


AST  127 H    (17-59)  U/L


 


ALT  120 H    (4-49)  U/L


 


Alkaline Phosphatase  320 H    ()  U/L


 


Albumin  2.9 L    (3.5-5.0)  g/dL














  02/11/21 02/12/21 02/12/21 Range/Units





  21:36 07:45 07:45 


 


WBC   15.8 H   (3.8-10.6)  k/uL


 


RBC   3.76 L   (4.30-5.90)  m/uL


 


Hgb   10.0 L   (13.0-17.5)  gm/dL


 


Hct   33.9 L   (39.0-53.0)  %


 


MCHC   29.5 L   (31.0-37.0)  g/dL


 


RDW   19.0 H   (11.5-15.5)  %


 


Neutrophils # (Manual)   11.06 H   (1.3-7.7)  k/uL


 


Metamyelocytes # (Man)   0.47 H   (0)  k/uL


 


Myelocytes # (Manual)   0.79 H   (0)  k/uL


 


Potassium  3.0 L   3.0 L  (3.5-5.1)  mmol/L


 


Carbon Dioxide    37 H  (22-30)  mmol/L


 


BUN    27 H  (9-20)  mg/dL


 


Glucose     (74-99)  mg/dL


 


POC Glucose (mg/dL)     (75-99)  mg/dL


 


Calcium     (8.4-10.2)  mg/dL


 


AST    209 H  (17-59)  U/L


 


ALT    193 H  (4-49)  U/L


 


Alkaline Phosphatase    425 H  ()  U/L


 


Albumin    3.0 L  (3.5-5.0)  g/dL














  02/12/21 Range/Units





  12:14 


 


WBC   (3.8-10.6)  k/uL


 


RBC   (4.30-5.90)  m/uL


 


Hgb   (13.0-17.5)  gm/dL


 


Hct   (39.0-53.0)  %


 


MCHC   (31.0-37.0)  g/dL


 


RDW   (11.5-15.5)  %


 


Neutrophils # (Manual)   (1.3-7.7)  k/uL


 


Metamyelocytes # (Man)   (0)  k/uL


 


Myelocytes # (Manual)   (0)  k/uL


 


Potassium   (3.5-5.1)  mmol/L


 


Carbon Dioxide   (22-30)  mmol/L


 


BUN   (9-20)  mg/dL


 


Glucose   (74-99)  mg/dL


 


POC Glucose (mg/dL)  108 H  (75-99)  mg/dL


 


Calcium   (8.4-10.2)  mg/dL


 


AST   (17-59)  U/L


 


ALT   (4-49)  U/L


 


Alkaline Phosphatase   ()  U/L


 


Albumin   (3.5-5.0)  g/dL

## 2021-02-12 NOTE — P.PN
Subjective


Progress Note Date: 02/12/21





This is a 60-year-old  male patient requesting my service with past 

medical history of rheumatoid arthritis on Arava that was diagnosed when he was 

37 year old initially was started on Methotrexate that he could not tolerate 

then placed on Embrel but he developed side effects and finally was tried on 

Humira injection but he developed neurologic sided effects and was hospitaized 

at [facility] for quiet some time, rheumatoid lung disease, 

previous history of loculated empyema with chest tube placement in July 2020, 

hypertension, hypothyroidism, history of DVT, remote history of tobacco use and 

dependence, was recently hospitalized at [facility] after he was 

admitted for an acute kidney injury with significant metabolic acidosis 

associated with the elevated creatinine and elevated BUN and hypotension at that

time he was seen and evaluated by pulmonary and critical care medicine, he had a

central line placed and at that time, he was placed on Levophed drip he was p

laced on IV anabiotic as well but the patient recovered very fast and he had an 

echocardiogram that showed normal LV function and segmental hypokinesia, he was 

supposed to follow-up with Dr. Santo in the office today, patient was seen in my

office 24 hours ago when he was complaining of increased shortness breath, at 

that time his oxygenation could not be checked because of his Case's and cold

extremities, he was sent for a chest x-ray that showed right lower lobe 

infiltrate as well as blood tests that showed a white count of 20,000 patient 

was feeling better he was started on oral antibiotic in the form of Levaquin 500

mg orally once every day, and that he was supposed to follow-up with me as an 

outpatient every week he went to see his cardiologist today and he had an 

episode when he was dizzy lightheaded and an episode of vomiting as well and he 

was sent to the emergency department for evaluation had a computed tomography of

the chest as well as abdomen and pelvis that showed a thick walled pleural 

cavities posteriorly in the lower lungs with left-sided air-fluid level that has

diminished in size from the prior computed tomography scan when he was in the 

hospital a few weeks back there is also associated compressive atelectasis and 

chronic consultation with multifocal areas of reticular nodular opacity in the 

upper lungs with a slight nodularity of the left upper lobe again this area of 

focal consultation has improved from the previous study that was done few weeks 

ago, patient was started on IV antibiotic with vancomycin and Zosyn as well as 

IV fluid, he was seen in consultation by pulmonary critical care in the 

emergency department as the patient blood pressure dropped and that he'll be 

transferred to the intensive care unit at this point in time I had long conve

rsation with the patient and his wife at the bedside and the patient may need to

have a chest tube placed for his possible right empyema, he did receive 3 L 

normal saline in the ER, and his blood pressure is marginal he may need to go on

 pressors if  not recovered.





1/30:patient is sitting up in chair feeling about the same , complains of 

increased pain in the righ elbow, was seen earlier by pulmonary and the plan was

to go for US-Guided left thoracenthesis due to to loculated left pleural effu

nathaniel and possible empyema, may need VATS, he denies any chest pain or shortness 

of breath, no abdominal pain nausea, vomiting or diarrhea, still have diarrhea 

negative for C.Diff, he seems to have accept to stay in the hospital this time 

as long as he needed to.





1/31: Patient sitting up in the recliner complaining of increased pain in his 

joints due to his rheumatoid arthritis with increased synovitis in both wrists 

both elbows and both shoulders he was started on hydrocortisone 100 mg IV push 

every 8 hours, to try to help with his blood pressure as well as his phonation, 

he is maintained on Norco 5/325 mg one tablet orally every 6 hours as needed for

pain control, patient denies any chest pain is less short of breath, he 

continues to have increased coughing with yellow from production of green phlegm

production, he had no fever or chills at this time he continues to be on 

Levothroid drip, patient has appeared to have increased swelling in both lower 

extremities as well as both ankles, he is maintained on IV antibiotic in the 

form of vancomycin as well as cefepime, infectious disease is following, patient

is scheduled to go for ultrasound guidance thoracentesis of the left loculated 

pleural effusion tomorrow morning.





2/1: Patient remains in the intensive care unit.  He has been afebrile, heart 

rate 100, blood pressure 95/62, pulse ox 97% on 2 L nasal cannula.  Hemoglobin 

8.8 and leukocytosis resolved.  Creatinine 0.76, electrolytes normal.  Sputum 

culture is positive for Aspergillus species.  Blood culture showing no growth. 

Patient underwent diagnostic thoracentesis with interventional radiology today 

with removal of 45 mL of cloudy white fluid.  Chest x-ray reveals no 

complications following left thoracentesis.  Fluid has been sent for culture and

cytology.  Consult in place for cardiothoracic surgery to evaluate for VATS with

decortication.





2/2: Patient remains in the intensive care unit.  He did receive 1 dose of IV 

Lasix this morning ordered by nephrology.  He is followed by cardiothoracic 

surgery was no plan for surgical intervention.  ID has recommended cefepime and 

vancomycin for now.  Expect antifungal agent ended as well.  Patient denies any 

significant shortness of breath.  He is comfortable sitting in a chair.  Patient

is having minimal cough and minimal sputum.  Patient is achieving 1000 ml on 

incentive spirometry.  Culture and cytology reports remain pending from 

thoracentesis. Repeat chest x-ray reveals patchy peripheral lung with lung zone 

and lower lung zone infiltrates persist unchanged.  Patient has been afebrile, 

heart rate in the 90s and low 100s, pulse ox 91 on room air.





2/3: Repeat chest x-ray reveals left-sided basilar loculated pneumothorax, 

stable.  Bilateral lung infiltrates.  Correlate for pneumonia and atelectasis.  

WBC 9, hemoglobin 9.5.  Potassium 3.4 and will be replaced.  Patient is 

continued on cefepime and vancomycin per Dr. Sheets's recommendations.  Patient 

in reaching 1000 on incentive spirometry.  Patient is scheduled to see Dr. Kaye tomorrow.  Cefepime and vancomycin had been discontinued by Dr. Sheets and

started on Voriconazole. 





2/4: A-Team was called this morning regarding elevated heart rate, EKG was 

atrial flutter with RVR and 150 bpm and patient was transferred to ICU as an 

overflow for the cardiac stepdown unit, started on Cardizem drip and consult 

with cardiology.  Heart rate was improved with Cardizem and patient also 

received amiodarone bolus 300 mg.  Patient denies any worsening shortness of 

breath, cough or congestion.  No chest pain or palpitations.  Patient is resting

comfortably.  09, hemoglobin 8.8, potassium 3.4 replaced.  Creatinine 0.89.





2/5: Patient remains in the intensive care unit.  He continues to be in atrial 

flutter with 21 conduction rate.  He is on heparin drip and amiodarone.  He is 

currently in and out of atrial flutter with RVR.  Cardiology is following 

closely and may consider cardioversion tomorrow. WBC 16.6, hgb 9.8, plt 374, 

sodium 143, potassium 3.9, creatinine 0.88.  He has been afebrile, heart rate 

has been at times marginal, pulse ox 90% on room air.  Patient denies having any

fever or chills, no shortness of breath, no cough.  No chest pain.  Patient is 

followed by cardiothoracic surgery and plan is for left-sided PleurX catheter 

placement which is scheduled for Monday.





2/8: Patient is scheduled for PleurX catheter placement this afternoon.  

Cardiology may schedule him for cardioversion.  Dr. Collado is planning on oral 

antimicrobials at the time of discharge.  Patient is bringing up quite a bit of 

sputum.  He has been afebrile, heart rate 68, blood pressure 100/67, pulse ox 

93% on 1 L nasal cannula.  WBC 21, hemoglobin 9.6, platelet count 325.  

Potassium 3, BUN 28 creatinine 0.8.





2/9: Patient is status post PleurX catheter.  Patient has been afebrile, heart 

rate 106, blood pressure 85/53.  Pulse ox 97% on 2 L.  Patient is known to be 

back in atrial fibrillation at rate of 122.  He did go for cardioversion today 

for atrial flutter.  Patient states he has a little pain in the side for which 

she is taking Norco.  Not bad today.  Patient has subcutaneous edema to 

bilateral chest.  He denies having any headache.  BUN 27 creatinine 0.79.  Blood

sugar .





2/10: Patient denies chest pain or shortness of breath.  He is reaching 1250 on 

IS.  PleurX draining small amount of sanguinous fluid.  Patient is in atrial 

flutter.  Dr. Pimentel is planning for EP study and ablation on Thursday and plan

to continue eliquis.  Patient is also on Lopressor 25 mg 3 times daily.  Patient

has been afebrile, heart rate 97, blood pressure 106/65, pulse ox 98% on room 

air.  Blood sugars running between 112 and 67.  Patient is covered with 

Voriconazole.  Lac-Hydrin added for bilateral legs.





2/11: Patient is status post successful atrial flutter ablation with Dr. Pimentel.  Cardiology has recommended amlodipine 100 mg twice daily for 1 month. 

Cardiac monitor is a sinus rhythm with PACs.  Metoprolol was decreased to 25 mg 

twice daily.  Patient is on eliquis 5 mg twice daily.  Patient has been 

afebrile, heart rate 64, blood pressure 121/77.  Potassium 2.3 and replaced.  

Repeat level will be obtained this evening.  Pleurx catheter remains in place 

connected to Pleur-evac with intermittent air leak.  He has minimal output.  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  He is planning on oral antimicrobial at the time of discharge.





2/12: Patient is resting in recliner. Patient's wife is at bedside. Patient 

states he walked in hallway and felt well but did have shortness of breath with 

activity. He continues to have subcutaneous emphysema. He continues to have air 

leak.  WBC is 15.8, hemoglobin 10, platelet count 293.  Potassium is 3 and will 

be replaced.  BUN 27 creatinine 0.82.  Patient has been afebrile, heart rate 79,

blood pressure 114/66, pulse ox 90% on room air.  Chest x-ray is stable 

findings.








Objective





- Vital Signs


Vital signs: 


                                   Vital Signs











Temp  98.3 F   02/12/21 08:00


 


Pulse  79   02/12/21 12:00


 


Resp  17   02/12/21 03:09


 


BP  114/66   02/12/21 12:00


 


Pulse Ox  90 L  02/12/21 12:00








                                 Intake & Output











 02/11/21 02/12/21 02/12/21





 18:59 06:59 18:59


 


Intake Total 2020 410 780


 


Output Total 4300 226 3934


 


Balance 325 -125 -1170


 


Weight  101.8 kg 


 


Intake:   


 


  IV 1060 250 


 


    LR 40  


 


    Voriconazole 400 mg In  250 





    Sodium Chloride 0.9% 250   





    ml @ 125 mls/hr IVPB   





    Q12HR GABBY Rx#:[id number]   


 


  Intake, IV Titration  160 





  Amount   


 


    Sodium Chloride 0.9% 1,  160 





    000 ml @ 20 mls/hr IV .   





    Q24H GABBY Rx#:[id number]   


 


  Oral 960  780


 


Output:   


 


  Chest Tube Drainage 120 35 


 


    Left Mid-Axillary Chest 120 35 


 


  Urine 4668 [id number]














- Exam





 Review of Systems 


Constitutional: Reports anorexia, Reports chronic pain, Reports fatigue, denies 

weakness, Reports weight loss


Eyes: denies blurred vision, denies bulging eye, denies decreased vision, denies

diplopia


Ears, nose, mouth and throat: Denies dysphagia, Denies neck lump, Denies sore 

throat


Respiratory: Reports dyspnea, with exertion Reports respiratory infections, 

Denies congestion, Denies cough with sputum, Denies home oxygen, Denies sleep 

apnea, Denies snoring, Denies wheezing


Cardiac: No chest pain.  No palpitations.  No lower extremity edema.  No 

syncopal episodes.


Gastrointestinal: Reports bloating, Reports change in bowel habits, Reports 

diarrhea, Reports early satiety, Reports indigestion, Reports loss of appetite, 

Reports nausea, Reports vomiting, Denies abdominal pain, Denies belching, Denies

heartburn, Denies hematemesis, Denies hematochezia, Denies melena


Genitourinary: Denies dysuria, Denies nocturia


Musculoskeletal: Denies myalgias 


Musculoskeletal: absent: ankle pain, ankle stiffness, ankle swelling, elbow 

pain, elbow stiffness, elbow swelling, foot pain, foot stiffness, foot swelling,

hand pain, hand stiffness, hand swelling, hip pain, hip stiffness, hip swelling,

knee pain, knee stiffness, knee swelling, shoulder pain, shoulder stiffness, 

shoulder swelling, wrist pain, wrist stiffness, wrist swelling


Integumentary: Denies pruritus, Denies rash


Neurological: Denies numbness, Denies weakness


Psychiatric: Denies anxiety, Denies depression


Endocrine: Denies fatigue, Denies weight change








Physical examination:


General: This is a 60-year-old  male.  Patient is resting in a recliner

and appears to be comfortable.





HEENT: head ia atraumatic normocephalic, Pupils were equal round reactive to 

light and accommodations , extra ocular muscle movement were intact, mucous 

membranes of the mouth are somewhat dry.





Neck: supple no JVP.





Chest: decreased breath sounds at he bases no expiratory wheezes no chest wall 

tendernes or intercostal retractions.  PleurX cath in place. Positive 

subcutaneous emphysema.





Heart: first heart sound is depressed, second heart sound is normal tachycardic 

and irregular there is HOOD 2/6 located at the left sternal border.





Abdomen: soft, mild tenderness to the left lower quadrant positive bowel sounds,

no guarding or rebound tenderness, no hepatosplenomegaly.





Extremities: there is 1 edema or calf tenderness DP+ 2 Bilaterally, there is 

what appears to be a charcot joint in the right foot form arch collapse.





Neurologic examination: patient is awake , alert and oriented X 3 CN II-XII are 

grossly intact, muscle power 4/5 in bilateral upper and lower extremities, deep 

tendon reflexes were normal.








- Labs


CBC & Chem 7: 


                                 02/12/21 07:45





                                 02/12/21 07:45


Labs: 


                  Abnormal Lab Results - Last 24 Hours (Table)











  02/11/21 02/11/21 02/11/21 Range/Units





  16:47 20:01 21:36 


 


WBC     (3.8-10.6)  k/uL


 


RBC     (4.30-5.90)  m/uL


 


Hgb     (13.0-17.5)  gm/dL


 


Hct     (39.0-53.0)  %


 


MCHC     (31.0-37.0)  g/dL


 


RDW     (11.5-15.5)  %


 


Neutrophils # (Manual)     (1.3-7.7)  k/uL


 


Metamyelocytes # (Man)     (0)  k/uL


 


Myelocytes # (Manual)     (0)  k/uL


 


Potassium    3.0 L  (3.5-5.1)  mmol/L


 


Carbon Dioxide     (22-30)  mmol/L


 


BUN     (9-20)  mg/dL


 


POC Glucose (mg/dL)  142 H  137 H   (75-99)  mg/dL


 


AST     (17-59)  U/L


 


ALT     (4-49)  U/L


 


Alkaline Phosphatase     ()  U/L


 


Albumin     (3.5-5.0)  g/dL














  02/12/21 02/12/21 02/12/21 Range/Units





  07:45 07:45 12:14 


 


WBC  15.8 H    (3.8-10.6)  k/uL


 


RBC  3.76 L    (4.30-5.90)  m/uL


 


Hgb  10.0 L    (13.0-17.5)  gm/dL


 


Hct  33.9 L    (39.0-53.0)  %


 


MCHC  29.5 L    (31.0-37.0)  g/dL


 


RDW  19.0 H    (11.5-15.5)  %


 


Neutrophils # (Manual)  11.06 H    (1.3-7.7)  k/uL


 


Metamyelocytes # (Man)  0.47 H    (0)  k/uL


 


Myelocytes # (Manual)  0.79 H    (0)  k/uL


 


Potassium   3.0 L   (3.5-5.1)  mmol/L


 


Carbon Dioxide   37 H   (22-30)  mmol/L


 


BUN   27 H   (9-20)  mg/dL


 


POC Glucose (mg/dL)    108 H  (75-99)  mg/dL


 


AST   209 H   (17-59)  U/L


 


ALT   193 H   (4-49)  U/L


 


Alkaline Phosphatase   425 H   ()  U/L


 


Albumin   3.0 L   (3.5-5.0)  g/dL














Assessment and Plan


Assessment: 





1.  Acute hypoxemic respiratory failure secondary to loculated left-sided 

pleural effusion and fungal empyema with what appears to be recurrent pneumonia.

 Continue Voriconazole 200 mg every 12 hours IV piggyback per Dr. Sheets with 

plan for oral at the time of discharge, interventional radiology performed 

ultrasound-guided thoracentesis of the left loculated pleural fluid for Gram 

stain and culture and cytology as well as LDH protein as well as pH for possible

empyema.  Culture positive for Aspergillus.  Cardiothoracic surgery status post 

Pleurx catheter insertion.





2.  Lactic acidosis.  Resolved, repeat lactic acid is back to normal.





3.  Sepsis with septic shock and was stable.  Patient has been weaned off 

Levothroid drip.





4.  Acute kidney injury due to poor oral intake of fluid as well as hypotension 

with acute tubular necrosis.  Patient is on IV fluid, continue to monitor the 

patient CMP continue to monitor urine output.  Nephrology is following.





5.  Leukocytosis secondary to severe sepsis secondary to empyema.  Patient is 

off antibiotic.





6.  Persistent atrial flutter with RVR.  Status post cardioversion, converted 

back.  Status post ablation completed by Dr. Pimentel.  Patient to continue 

eliquis.  Continued on amiodarone 100 mg daily for one month. Cardiology consult

appreciated.





7.  Rheumatoid arthritis and rheumatoid lung.  Arava and Xeljanz had been on 

hold since October.





8.  History of empyema with Streptococcus requiring chest tube.  





9.  Hypertension and hypertensive cardiovascular disease. Lasix 40 mg IVP daily.





10.  Hypothyroidism . we will continue with Synthroid 150 mcg orally daily.





11. DVT prophylaxis. Eliquis and SCDs.





12. GI prophylaxis. we will continue with Protonix 40 mg oral daily.








Discharge plan: home possibly early next week





Impression and plan of care have been directed as dictated by the signing 

physician.  Phyllis Convery nurse practitioner acting as scribe for signing 

physician.

## 2021-02-12 NOTE — P.PN
Subjective


Progress Note Date: 02/12/21


Principal diagnosis: 


 Pneumonia, hydropneumothorax, septic shock





60-year-old white male patient, with past medical history of rheumatoid 

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 

episodes of pneumonia and history of loculated empyema requiring chest tube 

placement back in July 2020 with pleural fluid cultures positive for 

streptococcal pneumonia.  Other medical history includes hypertension, 

hypothyroidism and previous history of DVT.  Patient had a recent 

hospitalization from January 18 through 01/21/2021 hypotension, dehydration 

related to nausea vomiting diarrhea, acute kidney injury.  Patient received 

antibiotics in the form of Zosyn and Levaquin for possibility of pneumonia, he 

is chest x-ray showed cranial perihilar pulmonary infiltrates with increased 

interstitial changes at the lung bases and was thought to be related to a combi

nation of rheumatoid lung and chronic scarring.  His last CT of the chest was on

12/29/2020 showing chronic changes to lower lungs, persistent irregular thick-

walled pleural spaces in the posterior lung bases containing fluid and air with 

adjacent anterior lung with chronic consolidation or atelectasis.  On 01/29/2021

patient came into the emergency department from Dr. SARINA Santo's office where he w

as being seen for a regular follow-up appointments.  He was noted to have low 

pulse ox 70s and was sent in to the emergency department for evaluation.  He has

been having cough with yellow and sometimes blood-tinged sputum, appears 

amounts, denies any fever, COVID 19 was found to be negative, chest x-ray showed

perihilar and basilar infiltrates without significant change from previous chest

x-ray on 01/19/2021.  Lab data showed leukocytosis with white blood cell count 

of 19.1, hemoglobin of 12.1, INR 1.5, sodium is 135, potassium is 3.9, chloride 

is 102, CO2 is 19, creatinine is 1.32, which has actually improved since his 

discharge from the hospital on 01/21/2021 when he was at 1.84.  Lactic acid was 

elevated at 3.7, patient was given a total of 2 L in fluid boluses, troponin was

negative at less than 0.012.  Urinalysis showed no evidence of infection.  CT 

chest showed thick-walled pleural cavities posteriorly in the lower lungs with 

left-sided air-fluid level with left-sided pleural fluid diminished most recent 

CT from 12/29/2020.  There is associated compressive atelectasis and/or chronic 

consolidation.  Persistent as it is lobe fissure, multifocal areas of reticular 

nodular opacity in the upper lungs remain present with slight nodularity in the 

left upper lobe.  There is a focal consolidation improved from most recent study

with some residual areas of scarring.  We will emergency department patient 

became hypotensive with a blood pressure in the 70s, slightly tachycardic r

emains in sinus mechanism, he is receiving his third liter bolus, his lactic 

acid did respond and improved he was started on antibiotics in the form of Zosyn

and vancomycin 





The patient is seen today January 30th 2021 in the intensive care unit.  He is 

currently sitting up in a chair at the bedside.  Awake and alert in no acute 

distress.  Maintaining O2 saturations in the 90s on 2 L/m per nasal cannula.  He

did require a small dose of norepinephrine currently on 0.05 mcg/kg/m.  He has 

lactated Ringer's at 100 MLS per hour.  He remains on vancomycin and cefepime.  

Chest x-ray continues to show the left lower lobe effusion.  White count 11.7.  

Hemoglobin 9.7.  Sodium 135.  Potassium 3.6.  Creatinine 1.00.  Blood cultures 

reveal no growth to date.





Reevaluated today on 1/31/2021, remains in the ICU, still on norepinephrine at 

0.04 mcg/kg/m.  On LR at 1 25 mL per hour.  On vancomycin and cefepime 

empirically, blood pressure remains marginal, went ahead and recommended Solu-

Cortef 100 mg IV push every 8 hours and hopefully will be able to discontinue 

norepinephrine today.  His IV fluid is cut down to KVO.  Given 20 mg of Lasix IV

push, and I have recommended that we monitor the patient in the ICU for the next

24 hours.  Chest x-ray is basically the same, continues to show chronic finding,

difficult to exclude underlying pneumonia/empyema.  CT of the chest on admission

was also reviewed, difficult to rule out underlying empyema.  Clinically the 

patient is feeling better except for generally weak.  WBC count is 12.5 

hemoglobin is 9.9.  Normal renal profile is normal blood cultures are negative 

so far.  Sputum cultures are also negative so far.





On 02/01/2021 patient seen in follow-up in the intensive care unit, he is awake 

and alert, oriented 3, sitting up in the recliner, currently on 2 L of oxygen 

his pulse ox is %, his been afebrile, hemodynamically he is stable, he is 

not on any vasopressor support.  His lactate Ringer's running at 20 ML per hour,

antibiotics include cefepime and vancomycin, blood pressure has been stable, so 

far blood and sputum cultures are negative, he denies chest pain, his breathing 

is comfortable.  Today's labs have been reviewed, showing white blood cell count

trending down, 10.2 today, hemoglobin is 8.8, electrolytes and renal profile are

unremarkable.  Serum cortisol level came back at 3, patient continues on stress 

doses of hydrocortisone 100 mg every 8 hours.  Patient has not been stable, 

we'll consult interventional radiology for ultrasound-guided left thoracentesis





On 02/02/2021 patient seen in follow-up in the intensive care unit, he is calm 

and comfortable, awake and alert, sitting up in the recliner, currently on room 

air with a pulse ox of 93-94%, his been afebrile, hemodynamically he is been 

stable, he is on IV LR at 20 ML per hour, no other drips.  He is on cefepime and

vancomycin for antibiotic coverage, yesterday he underwent left thoracentesis 

with removal of 43 mL of pleural fluid which was sent for cultures.  Tolerated 

procedure well.  His pleural fluid analysis revealed total fluid protein of 1.3 

g, and fluid LDH of greater than 4500 consistent with exudative fluid.  His 

microbiology data has been reviewed showing Aspergillus fumigate is in the 

sputum, his pleural fluid cultures are pending at this time, the cultures have 

shown no growth, hemodynamically has been stable, he is breathing easier, he 

still has cough mostly in the morning and the amount of secretions his bringing 

up is decreasing in amount.  Lung sounds revealed a few scattered wheezes, and a

few rhonchi at bilateral bases.  Is tolerating oral intake.  No abdominal pain, 

no nausea or vomiting, today's labs have been reviewed, white blood cell count 

is 11.2, hemoglobin is 9.1, sodium is 137, potassium is 4.0, chloride is 109, 

BUN is 15, creatinine 0.80.  No acute events overnight.  He is working on 

incentive spirometer, he is achieving 1000 today





On 02/03/2021 patient seen in follow-up on medical surgical floor.  He is awake 

and alert, in no acute distress, breathing comfortably, denies any chest pain, 

room air pulse ox is 94%, no fever or chills, today's chest x-ray shows left 

basilar loculated pneumothorax, stable in appearance, bilateral lung 

infiltrates.  His pleural fluid cultures so far showing Aspergillus species, as 

does his sputum culture.  Blood culture show no growth.  The cervix is 

following, current antibiotic coverage includes cefepime, vancomycin has been 

discontinued, she received 1 dose of IV Lasix per nephrology.  He has produced 

2500 in urine output, and he is in -1.6 L over the last 24 hours, however he 

still has significant lower extremity edema





On 02/05/2021 patient seen in follow-up in the intensive care unit, he is awake 

and alert, in no acute distress, on room air, his pulse ox is 93%, currently on 

amiodarone at 0.5 mg/m, heparin infusion per weight based protocol, 0.9 normal 

saline at a rate of 10 ML per hour, his heart rate is still poorly controlled, 

he remains in atrial flutter with a rate of 140 BPM.  He's had no fever or 

chills, he status post ultrasound-guided thoracentesis of the left pleural space

with removal of 45 mL of pleural fluid in the cultures were positive for 

Aspergillus.  Infectious disease service is following, and voriconazole was 

started.  Clinically patient denies any shortness of breath, no cough, no 

compressive chest pain, we discussed the case with the cardiothoracic surgery 

yesterday, and plan is to proceed with the placement of Pleurx catheter into the

left pleural space today.





On 02/10/2021 patient seen in follow-up.  Sitting up in the recliner, breathing 

comfortably, his on 2 L of oxygen pulse ox of 90%, he's been afebrile, cm

bcutaneous emphysema slightly worsened especially involving right upper chest 

area, left chest Pleurx catheter remains in place, connected to Pleur-evac to 

suction and there is some intermittent air leak still present, his chest x-ray 

shows severe bilateral septic hence emphysema, small left basilar 

hydropneumothorax, and bilateral patchy infiltrates right greater than left not 

significantly changed, patient remains on voriconazole, and his left chest 

pleural fluid fungal culture was positive for Aspergillus fumigatus.  Total 

fluid cytology was not sent however pathology lab still has the pleural fluid 

which we will request cytology. 





On 02/11/2021 patient seen in follow-up.  He is resting in bed, subcu emphysema 

in his bilateral upper chest seems to have slightly progressed since yesterday, 

no significant extension to his neck area, his voice is still left chest Pleurx 

catheter in place connected to Pleur-evac and there is intermittent air leak at 

times it is more continuous.  There has only been 85 mL of serosanguineous 

output in the last 24 hours, pleural fluid cytology was sent from the pleural 

fluid collected on 01/10/2021, results are pending.  Continues on voriconazole 

for evidence of Aspergillus fungal infection in the pleural space.  Today's labs

reviewed, showing blood vessel, 16, hemoglobin is 9.7, sodium is 143, potassium 

is 2.8, chloride is 100 CO2 is 27 BUN is 28 and creatinine 0.78, LFTs seems to 

have acutely increased, with AST up to 127, ALT is 120, and alkaline phosphatase

of 320.  No fever or chills, patient of breath but no acute distress, today's 

chest x-ray shows extensive subcutaneous emphysema, small pneumothoraces at the 

lung bases, in worsening-appearing infiltrate through the right lung base.





On 02/12/2021 patient seen in follow-up.  His left-sided Pleurx catheter remains

connected to Pleur-evac continuous wall suction, and the air leak is slightly 

better however it still consistent and intermittent in nature today, his 

subcutaneous emphysema remains stable, has not significantly increased.  Room 

air pulse ox is 90%, patient is working on the incentive spirometer, he is 

pulling [id number] on the today, he's been afebrile, he remains on voriconazole 

for antibiotic coverage, chest x-ray today shows a left basilar pleural catheter

unchanged in position, and extensive subcutaneous emphysema throughout the chest

wall and neck, there is no sizable pneumothorax, and there is diffuse infiltrate

throughout the right lung, left lower lobe.  Today's labs have been reviewed, 

when blood cell count is 15.8, hemoglobin is 10, sodium is 141, potassium is 

3.0, chloride is 99, CO2 is 37, B1 is 27, creatinine 0.82, AST is 209, ALT is 

193, alkaline phosphatase is 425, and his liver enzymes are trending up.  We 

will ask infectious disease to see if this could be related to the antibiotics











Objective





- Vital Signs


Vital signs: 


                                   Vital Signs











Temp  98.3 F   02/12/21 08:00


 


Pulse  79   02/12/21 12:00


 


Resp  17   02/12/21 03:09


 


BP  114/66   02/12/21 12:00


 


Pulse Ox  90 L  02/12/21 12:00








                                 Intake & Output











 02/11/21 02/12/21 02/12/21





 18:59 06:59 18:59


 


Intake Total 2020 410 780


 


Output Total 0499 485 4071


 


Balance 325 -125 -1170


 


Weight  101.8 kg 


 


Intake:   


 


  IV 1060 250 


 


    LR 40  


 


    Voriconazole 400 mg In  250 





    Sodium Chloride 0.9% 250   





    ml @ 125 mls/hr IVPB   





    Q12HR GABBY Rx#:[id number]   


 


  Intake, IV Titration  160 





  Amount   


 


    Sodium Chloride 0.9% 1,  160 





    000 ml @ 20 mls/hr IV .   





    Q24H GABBY Rx#:[id number]   


 


  Oral 960  780


 


Output:   


 


  Chest Tube Drainage 120 35 


 


    Left Mid-Axillary Chest 120 35 


 


  Urine 1571 233 [id number]














- Exam


GENERAL EXAM: Alert, pleasant, 60-year-old male patient, on room air with a 

pulse ox of 94%, currently sitting up in chair at the bedside, comfortable in no

apparent distress.


HEAD: Normocephalic/atraumatic.


EENT: PERRLA, EOMI, nonicteric.  Moist mucous membranes, no neck masses, no JVD,

no stridor.


CHEST: No chest wall deformity.  Symmetrical expansion.  Left chest Pleurx 

catheter connected to Pleur-evac, to wall suction, with intermittent air leak 

present, serosanguineous output in the Pleur-evac.  There is extensive subcuta

neous emphysema in his anterior bilateral upper chest, extending into his neck 

and shoulders


LUNGS: Bibasilar rhonchi, and a few scattered wheezes


CVS: Regular rate and rhythm, normal S1 and S2, no gallops, no murmurs, no rubs


ABDOMEN: Soft, nontender.  No hepatosplenomegaly, normal bowel sounds, no 

guarding or rigidity.


EXTREMITIES: No clubbing, 1+ lower extremity edema, no cyanosis, 2+ pulses and 

upper and lower extremities.


MUSCULOSKELETAL: Muscle strength and tone normal.


SPINE: No scoliosis or deformity


SKIN: No rashes


CENTRAL NERVOUS SYSTEM: Alert and oriented -3.  No focal deficits, tone is 

normal in all 4 extremities.


PSYCHIATRIC: Alert and oriented -3.  Appropriate affect.  Intact judgment and 

insight.











- Labs


CBC & Chem 7: 


                                 02/12/21 07:45





                                 02/12/21 07:45


Labs: 


                  Abnormal Lab Results - Last 24 Hours (Table)











  02/11/21 02/11/21 02/11/21 Range/Units





  16:47 20:01 21:36 


 


WBC     (3.8-10.6)  k/uL


 


RBC     (4.30-5.90)  m/uL


 


Hgb     (13.0-17.5)  gm/dL


 


Hct     (39.0-53.0)  %


 


MCHC     (31.0-37.0)  g/dL


 


RDW     (11.5-15.5)  %


 


Neutrophils # (Manual)     (1.3-7.7)  k/uL


 


Metamyelocytes # (Man)     (0)  k/uL


 


Myelocytes # (Manual)     (0)  k/uL


 


Potassium    3.0 L  (3.5-5.1)  mmol/L


 


Carbon Dioxide     (22-30)  mmol/L


 


BUN     (9-20)  mg/dL


 


POC Glucose (mg/dL)  142 H  137 H   (75-99)  mg/dL


 


AST     (17-59)  U/L


 


ALT     (4-49)  U/L


 


Alkaline Phosphatase     ()  U/L


 


Albumin     (3.5-5.0)  g/dL














  02/12/21 02/12/21 02/12/21 Range/Units





  07:45 07:45 12:14 


 


WBC  15.8 H    (3.8-10.6)  k/uL


 


RBC  3.76 L    (4.30-5.90)  m/uL


 


Hgb  10.0 L    (13.0-17.5)  gm/dL


 


Hct  33.9 L    (39.0-53.0)  %


 


MCHC  29.5 L    (31.0-37.0)  g/dL


 


RDW  19.0 H    (11.5-15.5)  %


 


Neutrophils # (Manual)  11.06 H    (1.3-7.7)  k/uL


 


Metamyelocytes # (Man)  0.47 H    (0)  k/uL


 


Myelocytes # (Manual)  0.79 H    (0)  k/uL


 


Potassium   3.0 L   (3.5-5.1)  mmol/L


 


Carbon Dioxide   37 H   (22-30)  mmol/L


 


BUN   27 H   (9-20)  mg/dL


 


POC Glucose (mg/dL)    108 H  (75-99)  mg/dL


 


AST   209 H   (17-59)  U/L


 


ALT   193 H   (4-49)  U/L


 


Alkaline Phosphatase   425 H   ()  U/L


 


Albumin   3.0 L   (3.5-5.0)  g/dL














Assessment and Plan


Plan: 


 Assessment:





#1.  Acute hypoxic respiratory failure related to sepsis, septic shock,  related

to pneumonia, with chronic loculated effusions, rule out possibility of empyema.

COVID 19 was ruled out per PCR.  Pleural fluid culture positive for Aspergillus 

fumigators, patient is on voriconazole, ideally patient would benefit from 

surgical intervention including thoracoscopy/decortication however this could be

an extensive surgery for him in patient had the left chest Pleurx catheter 

inserted for drainage





Patient is post insertion of a left-sided Pleurx catheter and instillation of 

amphotericin B into the pleural space, the left lung was trapped with fungal 

empyema, patient has intermittent air leak, and there is a subcutaneous 

emphysema involving neck, upper chest





#2.  Bilateral hydropneumothorax, rule out possibility of empyema, status post 

ultrasound-guided left-sided thoracentesis on 02/01/2021 would removal of 45 mL 

of cloudy white fluid, which was exudative in nature, pleural fluid culture only

showing Aspergillus, antibiotic coverage is with cefepime and vancomycin, it 

service is following, CT surgery has no plans for surgical intervention at this 

time.  on 02/10/2021 patient is on voriconazole for evidence of Aspergillus 

fumigators in the pleural fluid





#3.  Recent admission to the hospital for hypotension, dehydration related to 

nausea vomiting diarrhea, acute kidney injury





#4.  Recurrent pulmonary infections, with history of streptococcal pneumonia and

empyema requiring chest tube placement in July 2020





#5.  Chronic loculated pneumothoraces, posterior, with air-fluid level seen on 

the CT imaging of the chest, the possibility of trapped lung, chronic scarring 

and possibility of empyema needs to be ruled out





#6.  Elevated d-dimer with no CT evidence for pulmonary embolism





#7.  History of rheumatoid arthritis and rheumatoid lung disease on Arava





#8.  Recent history of acute kidney injury, and admission lab work today shows 

improvement in his renal function





#9.  Hypertension





#10.  Hypothyroidism





#11.  Previous history of foot infections requiring antibiotics





#12.  Previous history of DVT





#13.  Transaminitis possibly related to antifungal antibiotics, will obtain 

follow-up labs





Plan:





Continue wall suction to the left Pleurx chest tube, the air leak although 

slightly diminished still persistent.  Continue encouraging deep breathing and 

coughing, but her enzymes are increasing, and this is probably related to the 

voriconazole, defer to the infectious disease to see if there is a another 

alternative for antibiotic coverage for Aspergillus pneumonia.  Follow-up CMP in

the morning.





I performed a history & physical examination of the patient and discussed their 

management with my nurse practitioner, Lorena Bonilla.  I reviewed the nurse 

practitioner's note and agree with the documented findings and plan of care.  

Lung sounds are positive for diminished breath sounds.  The findings and the 

impression was discussed with the patient.  I attest to the documentation by the

nurse practitioner. 





Time with Patient: Less than 30

## 2021-02-13 LAB
ALBUMIN SERPL-MCNC: 2.6 G/DL (ref 3.5–5)
ALP SERPL-CCNC: 413 U/L (ref 38–126)
ALT SERPL-CCNC: 180 U/L (ref 4–49)
ANION GAP SERPL CALC-SCNC: 3 MMOL/L
AST SERPL-CCNC: 171 U/L (ref 17–59)
BUN SERPL-SCNC: 25 MG/DL (ref 9–20)
CALCIUM SPEC-MCNC: 8.5 MG/DL (ref 8.4–10.2)
CELLS COUNTED: 200
CHLORIDE SERPL-SCNC: 102 MMOL/L (ref 98–107)
CO2 SERPL-SCNC: 36 MMOL/L (ref 22–30)
EOSINOPHIL # BLD MANUAL: 0.7 K/UL (ref 0–0.7)
ERYTHROCYTE [DISTWIDTH] IN BLOOD BY AUTOMATED COUNT: 3.8 M/UL (ref 4.3–5.9)
ERYTHROCYTE [DISTWIDTH] IN BLOOD: 19.3 % (ref 11.5–15.5)
GLUCOSE BLD-MCNC: 104 MG/DL (ref 75–99)
GLUCOSE BLD-MCNC: 80 MG/DL (ref 75–99)
GLUCOSE BLD-MCNC: 84 MG/DL (ref 75–99)
GLUCOSE SERPL-MCNC: 78 MG/DL (ref 74–99)
HCT VFR BLD AUTO: 34.2 % (ref 39–53)
HGB BLD-MCNC: 10 GM/DL (ref 13–17.5)
LYMPHOCYTES # BLD MANUAL: 1.96 K/UL (ref 1–4.8)
MAGNESIUM SPEC-SCNC: 1.8 MG/DL (ref 1.6–2.3)
MCH RBC QN AUTO: 26.2 PG (ref 25–35)
MCHC RBC AUTO-ENTMCNC: 29.1 G/DL (ref 31–37)
MCV RBC AUTO: 90 FL (ref 80–100)
METAMYELOCYTES # BLD: 0.14 K/UL
MONOCYTES # BLD MANUAL: 0.98 K/UL (ref 0–1)
MYELOCYTES # BLD MANUAL: 0.14 K/UL
NEUTROPHILS NFR BLD MANUAL: 71 %
NEUTS SEG # BLD MANUAL: 10.2 K/UL (ref 1.3–7.7)
PLATELET # BLD AUTO: 291 K/UL (ref 150–450)
POTASSIUM SERPL-SCNC: 3.6 MMOL/L (ref 3.5–5.1)
PROT SERPL-MCNC: 5.7 G/DL (ref 6.3–8.2)
SODIUM SERPL-SCNC: 141 MMOL/L (ref 137–145)
WBC # BLD AUTO: 14 K/UL (ref 3.8–10.6)

## 2021-02-13 RX ADMIN — ASPIRIN 81 MG CHEWABLE TABLET SCH MG: 81 TABLET CHEWABLE at 21:45

## 2021-02-13 RX ADMIN — POTASSIUM CHLORIDE SCH: 14.9 INJECTION, SOLUTION INTRAVENOUS at 11:02

## 2021-02-13 RX ADMIN — TRIAMCINOLONE ACETONIDE SCH APPLIC: 1 CREAM TOPICAL at 21:51

## 2021-02-13 RX ADMIN — METOPROLOL TARTRATE SCH MG: 25 TABLET, FILM COATED ORAL at 08:02

## 2021-02-13 RX ADMIN — FUROSEMIDE SCH MG: 10 INJECTION, SOLUTION INTRAMUSCULAR; INTRAVENOUS at 08:02

## 2021-02-13 RX ADMIN — AMMONIUM LACTATE SCH APPLIC: 12 LOTION TOPICAL at 21:46

## 2021-02-13 RX ADMIN — METOPROLOL TARTRATE SCH MG: 25 TABLET, FILM COATED ORAL at 21:45

## 2021-02-13 RX ADMIN — AMIODARONE HYDROCHLORIDE SCH MG: 100 TABLET ORAL at 08:02

## 2021-02-13 RX ADMIN — TRIAMCINOLONE ACETONIDE SCH APPLIC: 1 CREAM TOPICAL at 08:03

## 2021-02-13 RX ADMIN — SODIUM CHLORIDE, PRESERVATIVE FREE SCH ML: 5 INJECTION INTRAVENOUS at 21:45

## 2021-02-13 RX ADMIN — POTASSIUM CHLORIDE SCH MEQ: 20 TABLET, EXTENDED RELEASE ORAL at 01:08

## 2021-02-13 RX ADMIN — OXYCODONE HYDROCHLORIDE AND ACETAMINOPHEN SCH MG: 500 TABLET ORAL at 21:45

## 2021-02-13 RX ADMIN — PANTOPRAZOLE SODIUM SCH MG: 40 TABLET, DELAYED RELEASE ORAL at 06:48

## 2021-02-13 RX ADMIN — SODIUM CHLORIDE, PRESERVATIVE FREE SCH ML: 5 INJECTION INTRAVENOUS at 08:03

## 2021-02-13 RX ADMIN — Medication SCH MCG: at 21:45

## 2021-02-13 RX ADMIN — CEFAZOLIN SCH: 330 INJECTION, POWDER, FOR SOLUTION INTRAMUSCULAR; INTRAVENOUS at 12:21

## 2021-02-13 RX ADMIN — AMMONIUM LACTATE SCH APPLIC: 12 LOTION TOPICAL at 08:03

## 2021-02-13 RX ADMIN — APIXABAN SCH MG: 5 TABLET, FILM COATED ORAL at 08:02

## 2021-02-13 RX ADMIN — IPRATROPIUM BROMIDE AND ALBUTEROL SULFATE SCH: .5; 3 SOLUTION RESPIRATORY (INHALATION) at 07:29

## 2021-02-13 RX ADMIN — APIXABAN SCH MG: 5 TABLET, FILM COATED ORAL at 21:45

## 2021-02-13 RX ADMIN — VORICONAZOLE SCH MLS/HR: 10 INJECTION, POWDER, LYOPHILIZED, FOR SOLUTION INTRAVENOUS at 21:51

## 2021-02-13 RX ADMIN — LEVOTHYROXINE SODIUM SCH MCG: 75 TABLET ORAL at 06:48

## 2021-02-13 RX ADMIN — IPRATROPIUM BROMIDE AND ALBUTEROL SULFATE SCH: .5; 3 SOLUTION RESPIRATORY (INHALATION) at 10:58

## 2021-02-13 RX ADMIN — VORICONAZOLE SCH MLS/HR: 10 INJECTION, POWDER, LYOPHILIZED, FOR SOLUTION INTRAVENOUS at 10:10

## 2021-02-13 RX ADMIN — IPRATROPIUM BROMIDE AND ALBUTEROL SULFATE SCH: .5; 3 SOLUTION RESPIRATORY (INHALATION) at 20:19

## 2021-02-13 NOTE — P.PN
Subjective


Progress Note Date: 02/13/21


Principal diagnosis: 





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 

species, acute hypoxic respiratory failure with sepsis on admission.  Radius his

tory of multiple admissions for pneumonia with empyema on the left and previous 

bilateral chest tube placement in June 2020 with cultures positive for strep 

pneumonia, rheumatoid arthritis with rheumatoid lung, hypertension, 

hypothyroidism, DVT, previous tobacco dependence, family history of lung cancer,

and recent hospitalization for hypotension, dehydration, acute kidney injury 

requiring dialysis





POD #12 left-sided thoracentesis by interventional radiology





POD #5 left Pleurx catheter placement with irrigation of pleural space and 

instillation of amphotericin B solution under thoracoscopic guidance





Extensive subcutaneous emphysema





Persistent atrial flutter with RVR, status post electrical cardioversion, status

post radiofrequency ablation





The patient is currently sitting up in a recliner on the cardiac stepdown unit 

in no acute distress.  Denies pain, states shortness of breath has continued to 

improve, appears comfortable.  Subcutaneous emphysema appears slightly better 

than previous.  Remains on IV Lasix and IV voriconazole.  Remains afebrile, 

currently in sinus rhythm and hemodynamically stable.  Left sided Pleurx 

catheter connected to continuous wall suction, intermittent air leak present.  

No new concerns, however patient frustrated because he wants to go home





Objective





- Vital Signs


Vital signs: 


                                   Vital Signs











Temp  98.0 F   02/13/21 04:00


 


Pulse  101 H  02/13/21 04:00


 


Resp  18   02/13/21 04:00


 


BP  110/60   02/13/21 04:00


 


Pulse Ox  91 L  02/13/21 04:00








                                 Intake & Output











 02/12/21 02/13/21 02/13/21





 18:59 06:59 18:59


 


Intake Total 1020  


 


Output Total 2185 580 


 


Balance -1165 -580 


 


Weight  102 kg 


 


Intake:   


 


  Oral 1020  


 


Output:   


 


  Chest Tube Drainage  130 


 


    Left Mid-Axillary Chest  130 


 


  Drainage 110 130 


 


    Left Chest 110 130 


 


  Urine 2075 320 


 


Other:   


 


  Voiding Method  Toilet 





  Urinal 


 


  # Voids  1 


 


  # Bowel Movements 1  














- Constitutional


General appearance: Present: cooperative, no acute distress





- Respiratory


Details: 





Lungs sounds diminished in the bases bilaterally, coarse on the left side.  

Respirations even, nonlabored.  Currently on 2 L nasal cannula with oxygen 

saturation in the 90s.  Able to achieve 4898-3816 mL on his incentive spirom

etry.  Strong cough.  Left sided Pleurx catheter present to continuous wall 

suction, 130 mL cloudy drainage overnight, 300 mL in the last 24 hours, 

intermittent air leak present.








- Cardiovascular


Details: 





S1, S2 present.  Regular rate and rhythm, sinus rhythm on telemetry with heart 

rate in the 90s-low 100s.  Palpable peripheral pulses bilaterally.  Bilateral 

lower extremity edema present.  No calf pain or tenderness noted.





- Gastrointestinal


Gastrointestinal Comment(s): 





Abdomen soft, nontender, nondistended.  Active bowel sounds present 4 

quadrants.  Tolerating diet.  Positive bowel movement 2/12








- Genitourinary


Genitourinary Comment(s): 





Continues to void








- Integumentary


Integumentary Comment(s): 





Skin is warm and dry with evidence of good perfusion





- Neurologic


Neurologic: Present: CNII-XII intact





- Musculoskeletal


Musculoskeletal: Present: gait normal, strength equal bilaterally





- Psychiatric


Psychiatric: Present: A&O x's 3, appropriate affect, intact judgment & insight





- Allied health notes


Allied health notes reviewed: nursing





- Labs


CBC & Chem 7: 


                                 02/12/21 07:45





                                 02/12/21 20:05


Labs: 


                  Abnormal Lab Results - Last 24 Hours (Table)











  02/12/21 02/12/21 02/12/21 Range/Units





  07:45 07:45 12:14 


 


WBC  15.8 H    (3.8-10.6)  k/uL


 


RBC  3.76 L    (4.30-5.90)  m/uL


 


Hgb  10.0 L    (13.0-17.5)  gm/dL


 


Hct  33.9 L    (39.0-53.0)  %


 


MCHC  29.5 L    (31.0-37.0)  g/dL


 


RDW  19.0 H    (11.5-15.5)  %


 


Neutrophils # (Manual)  11.06 H    (1.3-7.7)  k/uL


 


Metamyelocytes # (Man)  0.47 H    (0)  k/uL


 


Myelocytes # (Manual)  0.79 H    (0)  k/uL


 


Potassium   3.0 L   (3.5-5.1)  mmol/L


 


Carbon Dioxide   37 H   (22-30)  mmol/L


 


BUN   27 H   (9-20)  mg/dL


 


POC Glucose (mg/dL)    108 H  (75-99)  mg/dL


 


AST   209 H   (17-59)  U/L


 


ALT   193 H   (4-49)  U/L


 


Alkaline Phosphatase   425 H   ()  U/L


 


Albumin   3.0 L   (3.5-5.0)  g/dL














  02/12/21 02/12/21 02/12/21 Range/Units





  16:51 20:02 20:05 


 


WBC     (3.8-10.6)  k/uL


 


RBC     (4.30-5.90)  m/uL


 


Hgb     (13.0-17.5)  gm/dL


 


Hct     (39.0-53.0)  %


 


MCHC     (31.0-37.0)  g/dL


 


RDW     (11.5-15.5)  %


 


Neutrophils # (Manual)     (1.3-7.7)  k/uL


 


Metamyelocytes # (Man)     (0)  k/uL


 


Myelocytes # (Manual)     (0)  k/uL


 


Potassium    3.1 L  (3.5-5.1)  mmol/L


 


Carbon Dioxide     (22-30)  mmol/L


 


BUN     (9-20)  mg/dL


 


POC Glucose (mg/dL)  121 H  117 H   (75-99)  mg/dL


 


AST     (17-59)  U/L


 


ALT     (4-49)  U/L


 


Alkaline Phosphatase     ()  U/L


 


Albumin     (3.5-5.0)  g/dL














- Imaging and Cardiology


Chest x-ray: image reviewed





Assessment and Plan


Assessment: 





1.  Bilateral pneumonia,  left trapped lung with fungal empyema growing 

Aspergillus species, status post left-sided thoracentesis, status post Pleurx 

catheter placement with instillation of amphotericin B


2.  Acute hypoxic respiratory failure with sepsis on admission


3.  Multiple admissions for pneumonia with empyema on the left and previous 

bilateral chest tube placement in June 2020 with cultures positive for strep 

pneumonia


4.  Rheumatoid arthritis with rheumatoid lung


5.  Hypertension, currently hypotensive, requiring pressors on admission


6.  Hypothyroidism


7.  History of DVT


8.  Previous tobacco dependence


9.  Family history of lung cancer


10.  Recent hospitalization for hypotension, dehydration, acute kidney injury 

requiring dialysis


11.  Extensive subcutaneous emphysema


12.  Persistent atrial flutter with RVR, status post electrical cardioversion 

and radiofrequency ablation, currently sinus


Plan: 





1.  Continue Pleurx catheter with -20 cm continuous wall suction.  Will monitor 

for resolution of air leak and decreased subcu emphysema


2.  Daily chest x-rays


3.  Encourage incentive spirometry.  Bronchodilators per pulmonology


4.  Amiodarone, Eliquis per cardiology


5.  Continue voriconazole per infectious disease recommendations


6.  Increase activity as tolerated.  Suction tubing extended so patient may 

ambulate in the room, may remove suction for short times for patient to ambulate

in the hallway


7.  Management of other comorbidities per primary care service.


8.  Teaching done with patient and wife regarding Pleurx catheter care.  Will 

continue to reinforce while patient is hospitalized


9.  More recommendations to follow





Time with Patient: Greater than 30

## 2021-02-13 NOTE — XR
EXAMINATION TYPE: XR chest 1V portable

 

DATE OF EXAM: 2/13/2021

 

Comparison: 2/12/2021

 

Clinical History: 60-year-old male trapped lung

 

Findings:

Severe bilateral subcutaneous emphysema causes extensive limitation in assessment of the underlying l
ungs. Heart appears enlarged. Worsening consolidation throughout the right lung. No obvious pneumotho
rax though assessment is very limited due to the extensive subcutaneous emphysema.

 

 

Impression:

Severe bilateral subcutaneous emphysema persists and limits the exam. Worsening consolidation through
out the right lung. Underlying cardiomegaly. No definite pneumothorax though assessment is very limit
ed due to the severe subcutaneous emphysema.

## 2021-02-13 NOTE — P.PN
Subjective


Progress Note Date: 02/13/21








 





On today's evaluation of 02/13/2021, the patient is postop day #5 following 

insertion of a Pleurx catheter in the left lung and instillation of amphotericin

B.  The patient is doing well.  Continues to have tinnitus emphysema bilaterally

extending to his neck.  Nevertheless he is stable.  The chest x-ray from today 

showing stable subcutaneous emphysema.  There is some worsening of the 

consolidation of the right upper lobe.  Nevertheless, he is not having any fever

or chills or any other new symptoms or worsening in his oxygenation.  He remains

afebrile.  Is in sinus rhythm.  I noted the Pleurx catheter.  Air leak is 

intermittent and isn't attached to continuous wall suction.  He is a bit frust

rated.  Nevertheless he understands the situation that this will be probably 

somewhat chronic and the patient will ultimately go home with a Pleurx catheter 

in place.  The drainage is still being done continuously at this point in time. 

I'm also bit concerned about this hepatic dysfunction along with voriconazole.  

Repeat liver function test today shows an ALT of 171 and AST of 180 which is 

improved compared to yesterday.  Based on that, we have decided to continue the 

voriconazole for now..  He is using the incentive spirometer.





Objective





- Vital Signs


Vital signs: 


                                   Vital Signs











Temp  97.5 F L  02/13/21 08:00


 


Pulse  95   02/13/21 11:22


 


Resp  18   02/13/21 11:22


 


BP  99/66   02/13/21 11:22


 


Pulse Ox  94 L  02/13/21 11:22








                                 Intake & Output











 02/12/21 02/13/21 02/13/21





 18:59 06:59 18:59


 


Intake Total 1020  416


 


Output Total 2185 580 400


 


Balance -1165 -580 16


 


Weight  102 kg 


 


Intake:   


 


  Oral 1020  416


 


Output:   


 


  Chest Tube Drainage  130 100


 


    Left Mid-Axillary Chest  130 100


 


  Drainage 110 130 


 


    Left Chest 110 130 


 


  Urine 2075 320 300


 


Other:   


 


  Voiding Method  Toilet 





  Urinal 


 


  # Voids  1 


 


  # Bowel Movements 1  














- Exam











- Constitutional


General appearance: Present: cooperative, no acute distress





- Respiratory


Details: 





Lungs sounds diminished in the bases bilaterally, coarse on the left side.  

Respirations even, nonlabored.  Currently on 2 L nasal cannula with oxygen 

saturation in the 90s.  Able to achieve 6528-4839 mL on his incentive spiromet

ry.  Strong cough.  Left sided Pleurx catheter present to continuous wall 

suction, 130 mL cloudy drainage overnight, 300 mL in the last 24 hours, 

intermittent air leak present.








- Cardiovascular


Details: 





S1, S2 present.  Regular rate and rhythm, sinus rhythm on telemetry with heart 

rate in the 90s-low 100s.  Palpable peripheral pulses bilaterally.  Bilateral 

lower extremity edema present.  No calf pain or tenderness noted.





- Gastrointestinal


Gastrointestinal Comment(s): 





Abdomen soft, nontender, nondistended.  Active bowel sounds present 4 

quadrants.  Tolerating diet.  Positive bowel movement 2/12








- Genitourinary


Genitourinary Comment(s): 





Continues to void








- Integumentary


Integumentary Comment(s): 





Skin is warm and dry with evidence of good perfusion





- Neurologic


Neurologic: Present: CNII-XII intact





- Musculoskeletal


Musculoskeletal: Present: gait normal, strength equal bilaterally





- Psychiatric


Psychiatric: Present: A&O x's 3, appropriate affect, intact judgment & insight








- Labs


CBC & Chem 7: 


                                 02/13/21 06:47





                                 02/13/21 06:47


Labs: 


                  Abnormal Lab Results - Last 24 Hours (Table)











  02/12/21 02/12/21 02/12/21 Range/Units





  16:51 20:02 20:05 


 


WBC     (3.8-10.6)  k/uL


 


RBC     (4.30-5.90)  m/uL


 


Hgb     (13.0-17.5)  gm/dL


 


Hct     (39.0-53.0)  %


 


MCHC     (31.0-37.0)  g/dL


 


RDW     (11.5-15.5)  %


 


Neutrophils # (Manual)     (1.3-7.7)  k/uL


 


Metamyelocytes # (Man)     (0)  k/uL


 


Myelocytes # (Manual)     (0)  k/uL


 


Potassium    3.1 L  (3.5-5.1)  mmol/L


 


Carbon Dioxide     (22-30)  mmol/L


 


BUN     (9-20)  mg/dL


 


POC Glucose (mg/dL)  121 H  117 H   (75-99)  mg/dL


 


AST     (17-59)  U/L


 


ALT     (4-49)  U/L


 


Alkaline Phosphatase     ()  U/L


 


Total Protein     (6.3-8.2)  g/dL


 


Albumin     (3.5-5.0)  g/dL














  02/13/21 02/13/21 Range/Units





  06:47 06:47 


 


WBC  14.0 H   (3.8-10.6)  k/uL


 


RBC  3.80 L   (4.30-5.90)  m/uL


 


Hgb  10.0 L   (13.0-17.5)  gm/dL


 


Hct  34.2 L   (39.0-53.0)  %


 


MCHC  29.1 L   (31.0-37.0)  g/dL


 


RDW  19.3 H   (11.5-15.5)  %


 


Neutrophils # (Manual)  10.20 H   (1.3-7.7)  k/uL


 


Metamyelocytes # (Man)  0.14 H   (0)  k/uL


 


Myelocytes # (Manual)  0.14 H   (0)  k/uL


 


Potassium    (3.5-5.1)  mmol/L


 


Carbon Dioxide   36 H  (22-30)  mmol/L


 


BUN   25 H  (9-20)  mg/dL


 


POC Glucose (mg/dL)    (75-99)  mg/dL


 


AST   171 H  (17-59)  U/L


 


ALT   180 H  (4-49)  U/L


 


Alkaline Phosphatase   413 H  ()  U/L


 


Total Protein   5.7 L  (6.3-8.2)  g/dL


 


Albumin   2.6 L  (3.5-5.0)  g/dL














Assessment and Plan


Plan: 











1 Acute hypoxic respiratory failure secondary to sepsis, septic shock, related 

to pneumonia, with chronic loculated effusions, possible empyema.  Cultures 

positive for Aspergillus fumigatus.  This could be a fungal empyema/infection, 

possible bilateral although this was only confirmed on the left side.  The 

sputum is positive for Aspergillus fumigatus.  The pleural fluid is also 

positive.  The patient is currently on voriconazole.  Patient is postop day #5. 

He continues to have intermittent air leak.  Output from the Pleurx catheter was

noted.  He does have some stable subcutaneous emphysema bilaterally.  There is 

also some diabetic dysfunction and elevation of the liver function tests related

to voriconazole which is being monitored very closely.





2 Bilateral hydropneumothorax, possible empyema, status post ultrasound-guided 

left-sided thoracentesis on 02/01/2021 with 45 ML's of cloudy white fluid rem

jun, showing Aspergillus.  Remains on voriconazole.





3 bilateral subcutaneous emphysema





4 Recent pulmonary infections with history of streptococcal pneumonia, empyema 

requiring chest tube placement in July 2020.





5 Chronic loculated pneumothoraces, posterior, with air-fluid seen on CAT scan 

imaging on the chest.  Possibility of trapped lung, chronic scarring and 

possibility of empyema.





6 History of rheumatoid arthritis and rheumatoid lung disease.  Currently not 

receiving any immunosuppressive agents





7 Recent history of acute kidney injury





8 Hypertension





9 Hypothyroidism





10 Previous history of DVT.  





11 New-onset atrial fibrillation/flutter anticoagulated and the patient had 

failed cardioversion.  The patient is currently on Eliquis and amiodarone





12 bilateral subcutaneous emphysema along with a hydropneumothorax on the left 

with positive air leak through the Pleurx catheter.  He was currently attached 

to continuous suction





Plan:











Keep the Pleurx to suction and monitor the output


Continue voriconazole 400 mg every 12 hours.


Monitor the liver function tests


Daily chest x-rays


Amiodarone is being given at a dose of 100 mg  a day.  He is also on metoprolol 

25 mg by mouth 3 times a day.


The Pleurx catheter attached to suction and monitor the air leak and outputs.  

The patient is currently on continuous suction.


We'll continue to follow

## 2021-02-13 NOTE — PN
PROGRESS NOTE



DATE OF SERVICE:

02/13/2021



REASON FOR FOLLOWUP:

Aspergillosis.



INTERVAL HISTORY:

The patient is currently afebrile.  He is breathing comfortably.  Denies having any

chest pain.  Occasional cough.  No nausea, no vomiting.  No abdominal pain.  No

diarrhea.



PHYSICAL EXAMINATION:

Blood pressure 100/63, pulse 94. Temperature 98.7.  He is 95% on 2 L nasal cannula.

General description: Middle-aged male up in the chair in no distress.

Respiratory system: Unlabored breathing, decreased breath sounds in the bases. No

wheeze.

Heart S1, S2.  Regular rate and rhythm.

ABDOMEN:  Soft, no tenderness.



LABS:

Hemoglobin 10.4, white count 14,000.  BUN of 25, creatinine 0.74.



DIAGNOSTIC IMPRESSION AND PLAN:

Patient with Aspergillosis _____pneumonia with empyema, status post PleurX catheter

placement. The patient is currently covered with voriconazole to continue q12 hours,

transition to oral on discharge.  Continue supportive care.





MMODL / IJN: [id number] / Job#: [id number]

## 2021-02-13 NOTE — P.PN
Subjective


Progress Note Date: 02/13/21





This is a 60-year-old  male patient requesting my service with past 

medical history of rheumatoid arthritis on Arava that was diagnosed when he was 

37 year old initially was started on Methotrexate that he could not tolerate 

then placed on Embrel but he developed side effects and finally was tried on 

Humira injection but he developed neurologic sided effects and was hospitaized 

at [facility] for quiet some time, rheumatoid lung disease, 

previous history of loculated empyema with chest tube placement in July 2020, 

hypertension, hypothyroidism, history of DVT, remote history of tobacco use and 

dependence, was recently hospitalized at [facility] after he was 

admitted for an acute kidney injury with significant metabolic acidosis 

associated with the elevated creatinine and elevated BUN and hypotension at that

time he was seen and evaluated by pulmonary and critical care medicine, he had a

central line placed and at that time, he was placed on Levophed drip he was p

laced on IV anabiotic as well but the patient recovered very fast and he had an 

echocardiogram that showed normal LV function and segmental hypokinesia, he was 

supposed to follow-up with Dr. Santo in the office today, patient was seen in my

office 24 hours ago when he was complaining of increased shortness breath, at 

that time his oxygenation could not be checked because of his Case's and cold

extremities, he was sent for a chest x-ray that showed right lower lobe 

infiltrate as well as blood tests that showed a white count of 20,000 patient 

was feeling better he was started on oral antibiotic in the form of Levaquin 500

mg orally once every day, and that he was supposed to follow-up with me as an 

outpatient every week he went to see his cardiologist today and he had an 

episode when he was dizzy lightheaded and an episode of vomiting as well and he 

was sent to the emergency department for evaluation had a computed tomography of

the chest as well as abdomen and pelvis that showed a thick walled pleural 

cavities posteriorly in the lower lungs with left-sided air-fluid level that has

diminished in size from the prior computed tomography scan when he was in the 

hospital a few weeks back there is also associated compressive atelectasis and 

chronic consultation with multifocal areas of reticular nodular opacity in the 

upper lungs with a slight nodularity of the left upper lobe again this area of 

focal consultation has improved from the previous study that was done few weeks 

ago, patient was started on IV antibiotic with vancomycin and Zosyn as well as 

IV fluid, he was seen in consultation by pulmonary critical care in the 

emergency department as the patient blood pressure dropped and that he'll be 

transferred to the intensive care unit at this point in time I had long conve

rsation with the patient and his wife at the bedside and the patient may need to

have a chest tube placed for his possible right empyema, he did receive 3 L 

normal saline in the ER, and his blood pressure is marginal he may need to go on

 pressors if  not recovered.





1/30:patient is sitting up in chair feeling about the same , complains of 

increased pain in the righ elbow, was seen earlier by pulmonary and the plan was

to go for US-Guided left thoracenthesis due to to loculated left pleural effu

nathaniel and possible empyema, may need VATS, he denies any chest pain or shortness 

of breath, no abdominal pain nausea, vomiting or diarrhea, still have diarrhea 

negative for C.Diff, he seems to have accept to stay in the hospital this time 

as long as he needed to.





1/31: Patient sitting up in the recliner complaining of increased pain in his 

joints due to his rheumatoid arthritis with increased synovitis in both wrists 

both elbows and both shoulders he was started on hydrocortisone 100 mg IV push 

every 8 hours, to try to help with his blood pressure as well as his phonation, 

he is maintained on Norco 5/325 mg one tablet orally every 6 hours as needed for

pain control, patient denies any chest pain is less short of breath, he 

continues to have increased coughing with yellow from production of green phlegm

production, he had no fever or chills at this time he continues to be on 

Levothroid drip, patient has appeared to have increased swelling in both lower 

extremities as well as both ankles, he is maintained on IV antibiotic in the 

form of vancomycin as well as cefepime, infectious disease is following, patient

is scheduled to go for ultrasound guidance thoracentesis of the left loculated 

pleural effusion tomorrow morning.





2/1: Patient remains in the intensive care unit.  He has been afebrile, heart 

rate 100, blood pressure 95/62, pulse ox 97% on 2 L nasal cannula.  Hemoglobin 

8.8 and leukocytosis resolved.  Creatinine 0.76, electrolytes normal.  Sputum 

culture is positive for Aspergillus species.  Blood culture showing no growth. 

Patient underwent diagnostic thoracentesis with interventional radiology today 

with removal of 45 mL of cloudy white fluid.  Chest x-ray reveals no 

complications following left thoracentesis.  Fluid has been sent for culture and

cytology.  Consult in place for cardiothoracic surgery to evaluate for VATS with

decortication.





2/2: Patient remains in the intensive care unit.  He did receive 1 dose of IV 

Lasix this morning ordered by nephrology.  He is followed by cardiothoracic 

surgery was no plan for surgical intervention.  ID has recommended cefepime and 

vancomycin for now.  Expect antifungal agent ended as well.  Patient denies any 

significant shortness of breath.  He is comfortable sitting in a chair.  Patient

is having minimal cough and minimal sputum.  Patient is achieving 1000 ml on 

incentive spirometry.  Culture and cytology reports remain pending from 

thoracentesis. Repeat chest x-ray reveals patchy peripheral lung with lung zone 

and lower lung zone infiltrates persist unchanged.  Patient has been afebrile, 

heart rate in the 90s and low 100s, pulse ox 91 on room air.





2/3: Repeat chest x-ray reveals left-sided basilar loculated pneumothorax, 

stable.  Bilateral lung infiltrates.  Correlate for pneumonia and atelectasis.  

WBC 9, hemoglobin 9.5.  Potassium 3.4 and will be replaced.  Patient is 

continued on cefepime and vancomycin per Dr. Sheets's recommendations.  Patient 

in reaching 1000 on incentive spirometry.  Patient is scheduled to see Dr. Kaye tomorrow.  Cefepime and vancomycin had been discontinued by Dr. Sheets and

started on Voriconazole. 





2/4: A-Team was called this morning regarding elevated heart rate, EKG was 

atrial flutter with RVR and 150 bpm and patient was transferred to ICU as an 

overflow for the cardiac stepdown unit, started on Cardizem drip and consult 

with cardiology.  Heart rate was improved with Cardizem and patient also 

received amiodarone bolus 300 mg.  Patient denies any worsening shortness of 

breath, cough or congestion.  No chest pain or palpitations.  Patient is resting

comfortably.  09, hemoglobin 8.8, potassium 3.4 replaced.  Creatinine 0.89.





2/5: Patient remains in the intensive care unit.  He continues to be in atrial 

flutter with 21 conduction rate.  He is on heparin drip and amiodarone.  He is 

currently in and out of atrial flutter with RVR.  Cardiology is following 

closely and may consider cardioversion tomorrow. WBC 16.6, hgb 9.8, plt 374, 

sodium 143, potassium 3.9, creatinine 0.88.  He has been afebrile, heart rate 

has been at times marginal, pulse ox 90% on room air.  Patient denies having any

fever or chills, no shortness of breath, no cough.  No chest pain.  Patient is 

followed by cardiothoracic surgery and plan is for left-sided PleurX catheter 

placement which is scheduled for Monday.





2/8: Patient is scheduled for PleurX catheter placement this afternoon.  

Cardiology may schedule him for cardioversion.  Dr. Collado is planning on oral 

antimicrobials at the time of discharge.  Patient is bringing up quite a bit of 

sputum.  He has been afebrile, heart rate 68, blood pressure 100/67, pulse ox 

93% on 1 L nasal cannula.  WBC 21, hemoglobin 9.6, platelet count 325.  

Potassium 3, BUN 28 creatinine 0.8.





2/9: Patient is status post PleurX catheter.  Patient has been afebrile, heart 

rate 106, blood pressure 85/53.  Pulse ox 97% on 2 L.  Patient is known to be 

back in atrial fibrillation at rate of 122.  He did go for cardioversion today 

for atrial flutter.  Patient states he has a little pain in the side for which 

she is taking Norco.  Not bad today.  Patient has subcutaneous edema to 

bilateral chest.  He denies having any headache.  BUN 27 creatinine 0.79.  Blood

sugar .





2/10: Patient denies chest pain or shortness of breath.  He is reaching 1250 on 

IS.  PleurX draining small amount of sanguinous fluid.  Patient is in atrial 

flutter.  Dr. Pimentel is planning for EP study and ablation on Thursday and plan

to continue eliquis.  Patient is also on Lopressor 25 mg 3 times daily.  Patient

has been afebrile, heart rate 97, blood pressure 106/65, pulse ox 98% on room 

air.  Blood sugars running between 112 and 67.  Patient is covered with 

Voriconazole.  Lac-Hydrin added for bilateral legs.





2/11: Patient is status post successful atrial flutter ablation with Dr. Pimentel.  Cardiology has recommended amlodipine 100 mg twice daily for 1 month. 

Cardiac monitor is a sinus rhythm with PACs.  Metoprolol was decreased to 25 mg 

twice daily.  Patient is on eliquis 5 mg twice daily.  Patient has been 

afebrile, heart rate 64, blood pressure 121/77.  Potassium 2.3 and replaced.  

Repeat level will be obtained this evening.  Pleurx catheter remains in place 

connected to Pleur-evac with intermittent air leak.  He has minimal output.  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  He is planning on oral antimicrobial at the time of discharge.





2/12: Patient is resting in recliner. Patient's wife is at bedside. Patient 

states he walked in hallway and felt well but did have shortness of breath with 

activity. He continues to have subcutaneous emphysema. He continues to have air 

leak.  WBC is 15.8, hemoglobin 10, platelet count 293.  Potassium is 3 and will 

be replaced.  BUN 27 creatinine 0.82.  Patient has been afebrile, heart rate 79,

blood pressure 114/66, pulse ox 90% on room air.  Chest x-ray is stable 

findings.








2/13: Patient is sitting up in his recliner he is quite depressed because he 

stated the hospital for almost 2 weeks, he wanted to go home, he continues to 

have some leak in the chest tube on the left side, we will maintain the patient 

on voriconazole, and he has to take that for the next 4 weeks and possibly 

longer would leave that up to the discretion of infectious disease, I spoke with

his wife as well as himself about the prognosis and the patient may need to send

to the hospital until Monday , meanwhile continue with physical therapy 

evaluation, anticipating home health care with physical therapy at home.





Objective





- Vital Signs


Vital signs: 


                                   Vital Signs











Temp  97.5 F L  02/13/21 08:00


 


Pulse  95   02/13/21 11:22


 


Resp  18   02/13/21 11:22


 


BP  99/66   02/13/21 11:22


 


Pulse Ox  94 L  02/13/21 11:22








                                 Intake & Output











 02/12/21 02/13/21 02/13/21





 18:59 06:59 18:59


 


Intake Total 1020  416


 


Output Total 2187 580 400


 


Balance -1165 -580 16


 


Weight  102 kg 


 


Intake:   


 


  Oral 1020  416


 


Output:   


 


  Chest Tube Drainage  130 100


 


    Left Mid-Axillary Chest  130 100


 


  Drainage 110 130 


 


    Left Chest 110 130 


 


  Urine 2075 320 300


 


Other:   


 


  Voiding Method  Toilet 





  Urinal 


 


  # Voids  1 


 


  # Bowel Movements 1  














- Exam





- Exam





 Review of Systems 


Constitutional: Reports anorexia, Reports chronic pain, Reports fatigue, denies 

weakness, Reports weight loss


Eyes: denies blurred vision, denies bulging eye, denies decreased vision, denies

diplopia


Ears, nose, mouth and throat: Denies dysphagia, Denies neck lump, Denies sore 

throat


Respiratory: Reports dyspnea, with exertion Reports respiratory infections, 

Denies congestion, Denies cough with sputum, Denies home oxygen, Denies sleep 

apnea, Denies snoring, Denies wheezing


Cardiac: No chest pain.  No palpitations.  No lower extremity edema.  No 

syncopal episodes.


Gastrointestinal: Reports bloating, Reports change in bowel habits, Reports 

diarrhea, Reports early satiety, Reports indigestion, Reports loss of appetite, 

Reports nausea, Reports vomiting, Denies abdominal pain, Denies belching, Denies

heartburn, Denies hematemesis, Denies hematochezia, Denies melena


Genitourinary: Denies dysuria, Denies nocturia


Musculoskeletal: Denies myalgias 


Musculoskeletal: absent: ankle pain, ankle stiffness, ankle swelling, elbow 

pain, elbow stiffness, elbow swelling, foot pain, foot stiffness, foot swelling,

hand pain, hand stiffness, hand swelling, hip pain, hip stiffness, hip swelling,

knee pain, knee stiffness, knee swelling, shoulder pain, shoulder stiffness, 

shoulder swelling, wrist pain, wrist stiffness, wrist swelling


Integumentary: Denies pruritus, Denies rash


Neurological: Denies numbness, Denies weakness


Psychiatric: Denies anxiety, Denies depression


Endocrine: Denies fatigue, Denies weight change








Physical examination:


General: This is a 60-year-old  male.  Patient is resting in a recliner

and appears to be comfortable.





HEENT: head ia atraumatic normocephalic, Pupils were equal round reactive to 

light and accommodations , extra ocular muscle movement were intact, mucous 

membranes of the mouth are somewhat dry.





Neck: supple no JVP.





Chest: decreased breath sounds at he bases no expiratory wheezes no chest wall 

tendernes or intercostal retractions.  PleurX cath in place. Positive 

subcutaneous emphysema.





Heart: first heart sound is depressed, second heart sound is normal tachycardic 

and irregular there is HOOD 2/6 located at the left sternal border.





Abdomen: soft, mild tenderness to the left lower quadrant positive bowel sounds,

no guarding or rebound tenderness, no hepatosplenomegaly.





Extremities: there is 1 edema or calf tenderness DP+ 2 Bilaterally, there is 

what appears to be a charcot joint in the right foot form arch collapse.





Neurologic examination: patient is awake , alert and oriented X 3 CN II-XII are 

grossly intact, muscle power 4/5 in bilateral upper and lower extremities, deep 

tendon reflexes were normal.








- Labs


CBC & Chem 7: 


                                 02/13/21 06:47





                                 02/13/21 06:47


Labs: 


                  Abnormal Lab Results - Last 24 Hours (Table)











  02/12/21 02/12/21 02/12/21 Range/Units





  16:51 20:02 20:05 


 


WBC     (3.8-10.6)  k/uL


 


RBC     (4.30-5.90)  m/uL


 


Hgb     (13.0-17.5)  gm/dL


 


Hct     (39.0-53.0)  %


 


MCHC     (31.0-37.0)  g/dL


 


RDW     (11.5-15.5)  %


 


Neutrophils # (Manual)     (1.3-7.7)  k/uL


 


Metamyelocytes # (Man)     (0)  k/uL


 


Myelocytes # (Manual)     (0)  k/uL


 


Potassium    3.1 L  (3.5-5.1)  mmol/L


 


Carbon Dioxide     (22-30)  mmol/L


 


BUN     (9-20)  mg/dL


 


POC Glucose (mg/dL)  121 H  117 H   (75-99)  mg/dL


 


AST     (17-59)  U/L


 


ALT     (4-49)  U/L


 


Alkaline Phosphatase     ()  U/L


 


Total Protein     (6.3-8.2)  g/dL


 


Albumin     (3.5-5.0)  g/dL














  02/13/21 02/13/21 Range/Units





  06:47 06:47 


 


WBC  14.0 H   (3.8-10.6)  k/uL


 


RBC  3.80 L   (4.30-5.90)  m/uL


 


Hgb  10.0 L   (13.0-17.5)  gm/dL


 


Hct  34.2 L   (39.0-53.0)  %


 


MCHC  29.1 L   (31.0-37.0)  g/dL


 


RDW  19.3 H   (11.5-15.5)  %


 


Neutrophils # (Manual)  10.20 H   (1.3-7.7)  k/uL


 


Metamyelocytes # (Man)  0.14 H   (0)  k/uL


 


Myelocytes # (Manual)  0.14 H   (0)  k/uL


 


Potassium    (3.5-5.1)  mmol/L


 


Carbon Dioxide   36 H  (22-30)  mmol/L


 


BUN   25 H  (9-20)  mg/dL


 


POC Glucose (mg/dL)    (75-99)  mg/dL


 


AST   171 H  (17-59)  U/L


 


ALT   180 H  (4-49)  U/L


 


Alkaline Phosphatase   413 H  ()  U/L


 


Total Protein   5.7 L  (6.3-8.2)  g/dL


 


Albumin   2.6 L  (3.5-5.0)  g/dL














Assessment and Plan


Assessment: 





Assessment and Plan


Assessment: 





1.  Acute hypoxemic respiratory failure secondary to loculated left-sided 

pleural effusion and fungal empyema with what appears to be recurrent pneumonia.

 Continue Voriconazole 200 mg every 12 hours IV piggyback per Dr. Sheets with 

plan for oral at the time of discharge, interventional radiology performed 

ultrasound-guided thoracentesis of the left loculated pleural fluid for Gram 

stain and culture and cytology as well as LDH protein as well as pH for possible

empyema.  Culture positive for Aspergillus.  Cardiothoracic surgery status post 

Pleurx catheter insertion.





2.  Lactic acidosis.  Resolved, repeat lactic acid is back to normal.





3.  Sepsis with septic shock and was stable.  Patient has been weaned off 

Levothroid drip.





4.  Acute kidney injury due to poor oral intake of fluid as well as hypotension 

with acute tubular necrosis.  Patient is on IV fluid, continue to monitor the 

patient CMP continue to monitor urine output.  Nephrology is following.





5.  Leukocytosis secondary to severe sepsis secondary to empyema.  Patient is 

off antibiotic.





6.  Persistent atrial flutter with RVR.  Status post cardioversion, converted 

back.  Status post ablation completed by Dr. Pimentel.  Patient to continue 

eliquis.  Continued on amiodarone 100 mg daily for one month. Cardiology consult

appreciated.





7.  Rheumatoid arthritis and rheumatoid lung.  Arava and Xeljanz had been on 

hold since October.





8.  History of empyema with Streptococcus requiring chest tube.  





9.  Hypertension and hypertensive cardiovascular disease. Lasix 40 mg IVP daily.





10.  Hypothyroidism . we will continue with Synthroid 150 mcg orally daily.





11. DVT prophylaxis. Eliquis and SCDs.





12. GI prophylaxis. we will continue with Protonix 40 mg oral daily.








Discharge plan: home possibly early next week

## 2021-02-13 NOTE — P.PN
Subjective


Progress Note Date: 02/13/21


This is a pleasant 60-year-old gentleman who's been admitted to the hospital 

with acute hypoxic respiratory failure secondary to fungal pneumonia with 

empyema.  Patient underwent placement of a chest tube.  We've been following the

patient is a patient was found to be in atrial flutter with rapid ventricular 

response.  He underwent cardioversion on 02/09/2021 with Dr. Pimentel which 

unfortunately was only briefly successful.  He subsequently underwent successful

ablation on 02/11/2021 with Dr. Pimentel.  He is currently maintaining sinus 

mechanism.  He continues to be on amiodarone 100 mg by mouth daily which she 

will be on for one month.  He is anticoagulated.  Currently on a beta blocker.  

Upon examination the patient is resting comfortably in a chair.  His main 

complaint is of edema.  Patient appears to have subcutaneous emphysema but is 

quite generalized.  Patient complains of scrotal edema which also appears to be 

subcutaneous emphysema.  Patient continues to have a chest tube in place.








Objective





- Vital Signs


Vital signs: 


                                   Vital Signs











Temp  97.5 F L  02/13/21 08:00


 


Pulse  95   02/13/21 11:22


 


Resp  18   02/13/21 11:22


 


BP  99/66   02/13/21 11:22


 


Pulse Ox  94 L  02/13/21 11:22








                                 Intake & Output











 02/12/21 02/13/21 02/13/21





 18:59 06:59 18:59


 


Intake Total 1020  416


 


Output Total 2185 580 400


 


Balance -1165 -580 16


 


Weight  102 kg 


 


Intake:   


 


  Oral 1020  416


 


Output:   


 


  Chest Tube Drainage  130 100


 


    Left Mid-Axillary Chest  130 100


 


  Drainage 110 130 


 


    Left Chest 110 130 


 


  Urine 2075 320 300


 


Other:   


 


  Voiding Method  Toilet 





  Urinal 


 


  # Voids  1 


 


  # Bowel Movements 1  














- Exam


PHYSICAL EXAMINATION: 





HEENT: Head is atraumatic, normocephalic.  Pupils equal, round.  Neck is supple.

 There is no elevated jugular venous pressure.





HEART EXAMINATION: Heart sounds regular, S1 and S2 normal.  No murmur or gallop 

heard.





CHEST EXAMINATION: Lungs reveal scattered rhonchi although this is difficult to 

assess due to subcutaneous emphysema.  Left Pleurx chest tube noted.





ABDOMEN:  Soft, nontender.  Cutaneous emphysema noted down to the scrotum.


 


EXTREMITIES: Bilateral upper extremity edema and subcutaneous emphysema noted, 

bilateral ankle and pedal pitting edema noted left worse than right..





NEUROLOGIC patient is awake, alert and oriented x3.


 


.


 











- Labs


CBC & Chem 7: 


                                 02/13/21 06:47





                                 02/13/21 06:47


Labs: 


                  Abnormal Lab Results - Last 24 Hours (Table)











  02/12/21 02/12/21 02/12/21 Range/Units





  16:51 20:02 20:05 


 


WBC     (3.8-10.6)  k/uL


 


RBC     (4.30-5.90)  m/uL


 


Hgb     (13.0-17.5)  gm/dL


 


Hct     (39.0-53.0)  %


 


MCHC     (31.0-37.0)  g/dL


 


RDW     (11.5-15.5)  %


 


Neutrophils # (Manual)     (1.3-7.7)  k/uL


 


Metamyelocytes # (Man)     (0)  k/uL


 


Myelocytes # (Manual)     (0)  k/uL


 


Potassium    3.1 L  (3.5-5.1)  mmol/L


 


Carbon Dioxide     (22-30)  mmol/L


 


BUN     (9-20)  mg/dL


 


POC Glucose (mg/dL)  121 H  117 H   (75-99)  mg/dL


 


AST     (17-59)  U/L


 


ALT     (4-49)  U/L


 


Alkaline Phosphatase     ()  U/L


 


Total Protein     (6.3-8.2)  g/dL


 


Albumin     (3.5-5.0)  g/dL














  02/13/21 02/13/21 Range/Units





  06:47 06:47 


 


WBC  14.0 H   (3.8-10.6)  k/uL


 


RBC  3.80 L   (4.30-5.90)  m/uL


 


Hgb  10.0 L   (13.0-17.5)  gm/dL


 


Hct  34.2 L   (39.0-53.0)  %


 


MCHC  29.1 L   (31.0-37.0)  g/dL


 


RDW  19.3 H   (11.5-15.5)  %


 


Neutrophils # (Manual)  10.20 H   (1.3-7.7)  k/uL


 


Metamyelocytes # (Man)  0.14 H   (0)  k/uL


 


Myelocytes # (Manual)  0.14 H   (0)  k/uL


 


Potassium    (3.5-5.1)  mmol/L


 


Carbon Dioxide   36 H  (22-30)  mmol/L


 


BUN   25 H  (9-20)  mg/dL


 


POC Glucose (mg/dL)    (75-99)  mg/dL


 


AST   171 H  (17-59)  U/L


 


ALT   180 H  (4-49)  U/L


 


Alkaline Phosphatase   413 H  ()  U/L


 


Total Protein   5.7 L  (6.3-8.2)  g/dL


 


Albumin   2.6 L  (3.5-5.0)  g/dL














Assessment and Plan


Assessment: 


1 typical atrial flutter with rapid ventricular response, status post 

cardioversion and subsequent ablation, maintaining sinus mechanism


2 acute hypoxic respiratory failure


3 fungal pneumonia with empyema


4 hypertension


5 hyperlipidemia








Plan: 


From cardiology perspective medications reviewed and we'll continue the same.  

Continue amiodarone for one month.  Continue to monitor the patient telemetry.  

Pulmonary and cardiothoracic surgery continues to follow.  We will continue to 

follow the patient right further recommendations accordingly.





The above dictated assessment and findings were discussed with signing 

physician. The impression and plan of care have been directed as dictated. 

Anupama Rodrigues, Nurse Practitioner, acting as scribe for signing physician.

## 2021-02-14 LAB
ALBUMIN SERPL-MCNC: 2.4 G/DL (ref 3.5–5)
ALP SERPL-CCNC: 362 U/L (ref 38–126)
ALT SERPL-CCNC: 118 U/L (ref 4–49)
ANION GAP SERPL CALC-SCNC: 2 MMOL/L
AST SERPL-CCNC: 73 U/L (ref 17–59)
BUN SERPL-SCNC: 19 MG/DL (ref 9–20)
CALCIUM SPEC-MCNC: 8.4 MG/DL (ref 8.4–10.2)
CELLS COUNTED: 200
CHLORIDE SERPL-SCNC: 99 MMOL/L (ref 98–107)
CO2 SERPL-SCNC: 39 MMOL/L (ref 22–30)
EOSINOPHIL # BLD MANUAL: 0.76 K/UL (ref 0–0.7)
ERYTHROCYTE [DISTWIDTH] IN BLOOD BY AUTOMATED COUNT: 3.73 M/UL (ref 4.3–5.9)
ERYTHROCYTE [DISTWIDTH] IN BLOOD: 19.2 % (ref 11.5–15.5)
GLUCOSE SERPL-MCNC: 76 MG/DL (ref 74–99)
HCT VFR BLD AUTO: 33.6 % (ref 39–53)
HGB BLD-MCNC: 9.9 GM/DL (ref 13–17.5)
LYMPHOCYTES # BLD MANUAL: 0.88 K/UL (ref 1–4.8)
MAGNESIUM SPEC-SCNC: 1.9 MG/DL (ref 1.6–2.3)
MCH RBC QN AUTO: 26.7 PG (ref 25–35)
MCHC RBC AUTO-ENTMCNC: 29.6 G/DL (ref 31–37)
MCV RBC AUTO: 90.2 FL (ref 80–100)
METAMYELOCYTES # BLD: 0.13 K/UL
MONOCYTES # BLD MANUAL: 0.88 K/UL (ref 0–1)
MYELOCYTES # BLD MANUAL: 0.5 K/UL
NEUTROPHILS NFR BLD MANUAL: 77 %
NEUTS SEG # BLD MANUAL: 9.7 K/UL (ref 1.3–7.7)
PLATELET # BLD AUTO: 237 K/UL (ref 150–450)
POTASSIUM SERPL-SCNC: 3.3 MMOL/L (ref 3.5–5.1)
PROT SERPL-MCNC: 5.6 G/DL (ref 6.3–8.2)
SODIUM SERPL-SCNC: 140 MMOL/L (ref 137–145)
WBC # BLD AUTO: 12.6 K/UL (ref 3.8–10.6)

## 2021-02-14 RX ADMIN — SODIUM CHLORIDE, PRESERVATIVE FREE SCH ML: 5 INJECTION INTRAVENOUS at 21:45

## 2021-02-14 RX ADMIN — TRIAMCINOLONE ACETONIDE SCH APPLIC: 1 CREAM TOPICAL at 09:50

## 2021-02-14 RX ADMIN — POTASSIUM CHLORIDE SCH MEQ: 20 TABLET, EXTENDED RELEASE ORAL at 11:18

## 2021-02-14 RX ADMIN — APIXABAN SCH MG: 5 TABLET, FILM COATED ORAL at 08:43

## 2021-02-14 RX ADMIN — METOPROLOL TARTRATE SCH: 25 TABLET, FILM COATED ORAL at 10:08

## 2021-02-14 RX ADMIN — AMMONIUM LACTATE SCH APPLIC: 12 LOTION TOPICAL at 21:46

## 2021-02-14 RX ADMIN — ASPIRIN 81 MG CHEWABLE TABLET SCH MG: 81 TABLET CHEWABLE at 21:45

## 2021-02-14 RX ADMIN — IPRATROPIUM BROMIDE AND ALBUTEROL SULFATE SCH: .5; 3 SOLUTION RESPIRATORY (INHALATION) at 11:57

## 2021-02-14 RX ADMIN — Medication SCH MCG: at 21:45

## 2021-02-14 RX ADMIN — AMMONIUM LACTATE SCH APPLIC: 12 LOTION TOPICAL at 09:50

## 2021-02-14 RX ADMIN — POTASSIUM CHLORIDE SCH: 14.9 INJECTION, SOLUTION INTRAVENOUS at 09:50

## 2021-02-14 RX ADMIN — TRIAMCINOLONE ACETONIDE SCH APPLIC: 1 CREAM TOPICAL at 21:48

## 2021-02-14 RX ADMIN — OXYCODONE HYDROCHLORIDE AND ACETAMINOPHEN SCH MG: 500 TABLET ORAL at 21:44

## 2021-02-14 RX ADMIN — FUROSEMIDE SCH MG: 10 INJECTION, SOLUTION INTRAMUSCULAR; INTRAVENOUS at 08:43

## 2021-02-14 RX ADMIN — PANTOPRAZOLE SODIUM SCH MG: 40 TABLET, DELAYED RELEASE ORAL at 07:07

## 2021-02-14 RX ADMIN — IPRATROPIUM BROMIDE AND ALBUTEROL SULFATE SCH: .5; 3 SOLUTION RESPIRATORY (INHALATION) at 20:22

## 2021-02-14 RX ADMIN — APIXABAN SCH MG: 5 TABLET, FILM COATED ORAL at 21:45

## 2021-02-14 RX ADMIN — LEVOTHYROXINE SODIUM SCH MCG: 75 TABLET ORAL at 07:07

## 2021-02-14 RX ADMIN — METOPROLOL TARTRATE SCH MG: 25 TABLET, FILM COATED ORAL at 21:45

## 2021-02-14 RX ADMIN — CEFAZOLIN SCH: 330 INJECTION, POWDER, FOR SOLUTION INTRAMUSCULAR; INTRAVENOUS at 10:46

## 2021-02-14 RX ADMIN — SODIUM CHLORIDE, PRESERVATIVE FREE SCH ML: 5 INJECTION INTRAVENOUS at 08:43

## 2021-02-14 RX ADMIN — VORICONAZOLE SCH MLS/HR: 10 INJECTION, POWDER, LYOPHILIZED, FOR SOLUTION INTRAVENOUS at 08:43

## 2021-02-14 RX ADMIN — METOPROLOL TARTRATE SCH MG: 25 TABLET, FILM COATED ORAL at 09:50

## 2021-02-14 RX ADMIN — POTASSIUM CHLORIDE SCH MEQ: 20 TABLET, EXTENDED RELEASE ORAL at 09:50

## 2021-02-14 RX ADMIN — VORICONAZOLE SCH MLS/HR: 10 INJECTION, POWDER, LYOPHILIZED, FOR SOLUTION INTRAVENOUS at 21:46

## 2021-02-14 RX ADMIN — AMIODARONE HYDROCHLORIDE SCH MG: 100 TABLET ORAL at 08:43

## 2021-02-14 RX ADMIN — IPRATROPIUM BROMIDE AND ALBUTEROL SULFATE SCH: .5; 3 SOLUTION RESPIRATORY (INHALATION) at 08:28

## 2021-02-14 NOTE — P.PN
Subjective


Progress Note Date: 02/14/21


Principal diagnosis: 





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 

species, acute hypoxic respiratory failure with sepsis on admission.  Radius his

tory of multiple admissions for pneumonia with empyema on the left and previous 

bilateral chest tube placement in June 2020 with cultures positive for strep 

pneumonia, rheumatoid arthritis with rheumatoid lung, hypertension, 

hypothyroidism, DVT, previous tobacco dependence, family history of lung cancer,

and recent hospitalization for hypotension, dehydration, acute kidney injury 

requiring dialysis





POD #13 left-sided thoracentesis by interventional radiology





POD #6 left Pleurx catheter placement with irrigation of pleural space and 

instillation of amphotericin B solution under thoracoscopic guidance





Extensive subcutaneous emphysema





Persistent atrial flutter with RVR, status post electrical cardioversion, status

post radiofrequency ablation





The patient is currently sitting up in a recliner on the cardiac stepdown unit 

in no acute distress.  Denies pain, states shortness of breath has continued to 

improve, appears comfortable.  Subcutaneous emphysema appears slightly better 

than previous.  Remains on IV Lasix and IV voriconazole.  Remains afebrile, 

currently in sinus rhythm and hemodynamically stable although he did have an 

episode of hypotension last night which resolved without treatment.  Left sided 

Pleurx catheter connected to continuous wall suction, intermittent air leak 

present when patient is sitting up, no air leak present when he is laying flat. 

No new concerns, however patient frustrated because he wants to go home





Objective





- Vital Signs


Vital signs: 


                                   Vital Signs











Temp  97.2 F L  02/14/21 08:00


 


Pulse  101 H  02/14/21 08:00


 


Resp  18   02/14/21 08:00


 


BP  99/56   02/14/21 08:00


 


Pulse Ox  92 L  02/14/21 08:00








                                 Intake & Output











 02/13/21 02/14/21 02/14/21





 18:59 06:59 18:59


 


Intake Total 1009 10 246


 


Output Total 400 720 


 


Balance 609 -710 246


 


Weight  102.1 kg 


 


Intake:   


 


  IV  10 10


 


    Invasive Line 6  10 10


 


  Oral 1009  236


 


Output:   


 


  Chest Tube Drainage 100 40 


 


    Left Mid-Axillary Chest 100 40 


 


  Drainage  40 


 


    Left Chest  40 


 


  Urine 300 640 


 


Other:   


 


  Voiding Method  Toilet Toilet





  Urinal Urinal


 


  # Voids  1 














- Constitutional


General appearance: Present: cooperative, no acute distress





- Respiratory


Details: 





Lungs sounds diminished in the bases bilaterally, coarse on the left side.  

Respirations even, nonlabored.  Currently on 2 L nasal cannula with oxygen 

saturation in the 90s.  Able to achieve 1000 mL on his incentive spirometry.  St

iona cough.  Left sided Pleurx catheter present to continuous wall suction, 40 

mL cloudy drainage overnight, 160 mL in the last 24 hours, intermittent air leak

present when sitting up, no air leak when laying flat.








- Cardiovascular


Details: 





S1, S2 present.  Regular rate and rhythm, sinus rhythm on telemetry with heart 

rate in the 90s.  Palpable peripheral pulses bilaterally.  Bilateral lower 

extremity edema present.  No calf pain or tenderness noted.








- Gastrointestinal


Gastrointestinal Comment(s): 





Abdomen soft, nontender, nondistended.  Active bowel sounds present 4 

quadrants.  Tolerating diet.  Positive bowel movement 2/12





- Genitourinary


Genitourinary Comment(s): 





Continues to void





- Integumentary


Integumentary Comment(s): 





Skin is warm and dry with evidence of good perfusion





- Neurologic


Neurologic: Present: CNII-XII intact





- Musculoskeletal


Musculoskeletal: Present: gait normal, strength equal bilaterally





- Psychiatric


Psychiatric: Present: A&O x's 3, appropriate affect, intact judgment & insight





- Allied health notes


Allied health notes reviewed: nursing





- Labs


CBC & Chem 7: 


                                 02/13/21 06:47





                                 02/14/21 07:16


Labs: 


                  Abnormal Lab Results - Last 24 Hours (Table)











  02/13/21 02/13/21 02/14/21 Range/Units





  06:47 16:42 07:16 


 


Neutrophils # (Manual)  10.20 H    (1.3-7.7)  k/uL


 


Metamyelocytes # (Man)  0.14 H    (0)  k/uL


 


Myelocytes # (Manual)  0.14 H    (0)  k/uL


 


Potassium    3.3 L  (3.5-5.1)  mmol/L


 


Carbon Dioxide    39 H  (22-30)  mmol/L


 


POC Glucose (mg/dL)   104 H   (75-99)  mg/dL


 


AST    73 H  (17-59)  U/L


 


ALT    118 H  (4-49)  U/L


 


Alkaline Phosphatase    362 H  ()  U/L


 


Total Protein    5.6 L  (6.3-8.2)  g/dL


 


Albumin    2.4 L  (3.5-5.0)  g/dL














- Imaging and Cardiology


Chest x-ray: report reviewed, image reviewed





Assessment and Plan


Assessment: 





1.  Bilateral pneumonia,  left trapped lung with fungal empyema growing 

Aspergillus species, status post left-sided thoracentesis, status post Pleurx 

catheter placement with instillation of amphotericin B


2.  Acute hypoxic respiratory failure with sepsis on admission


3.  Multiple admissions for pneumonia with empyema on the left and previous 

bilateral chest tube placement in June 2020 with cultures positive for strep 

pneumonia


4.  Rheumatoid arthritis with rheumatoid lung


5.  Hypertension, currently hypotensive, requiring pressors on admission


6.  Hypothyroidism


7.  History of DVT


8.  Previous tobacco dependence


9.  Family history of lung cancer


10.  Recent hospitalization for hypotension, dehydration, acute kidney injury 

requiring dialysis


11.  Extensive subcutaneous emphysema


12.  Persistent atrial flutter with RVR, status post electrical cardioversion 

and radiofrequency ablation, currently sinus


Plan: 





1.  Continue Pleurx catheter with -20 cm continuous wall suction.  Will monitor 

for resolution of air leak and decreased subcu emphysema


2.  Daily chest x-rays


3.  Encourage incentive spirometry.  Bronchodilators per pulmonology


4.  Amiodarone, Eliquis per cardiology


5.  Continue voriconazole per infectious disease recommendations


6.  Increase activity as tolerated.  Suction tubing extended so patient may 

ambulate in the room, may remove suction for short times for patient to ambulate

in the hallway


7.  Management of other comorbidities per primary care service.


8.  Teaching done with patient and wife regarding Pleurx catheter care.  Will 

continue to reinforce while patient is hospitalized


9.  More recommendations to follow





Time with Patient: Greater than 30

## 2021-02-14 NOTE — P.PN
Subjective


Progress Note Date: 02/14/21





This is a 60-year-old  male patient requesting my service with past 

medical history of rheumatoid arthritis on Arava that was diagnosed when he was 

37 year old initially was started on Methotrexate that he could not tolerate 

then placed on Embrel but he developed side effects and finally was tried on 

Humira injection but he developed neurologic sided effects and was hospitaized 

at [facility] for quiet some time, rheumatoid lung disease, 

previous history of loculated empyema with chest tube placement in July 2020, 

hypertension, hypothyroidism, history of DVT, remote history of tobacco use and 

dependence, was recently hospitalized at [facility] after he was 

admitted for an acute kidney injury with significant metabolic acidosis 

associated with the elevated creatinine and elevated BUN and hypotension at that

time he was seen and evaluated by pulmonary and critical care medicine, he had a

central line placed and at that time, he was placed on Levophed drip he was p

laced on IV anabiotic as well but the patient recovered very fast and he had an 

echocardiogram that showed normal LV function and segmental hypokinesia, he was 

supposed to follow-up with Dr. Santo in the office today, patient was seen in my

office 24 hours ago when he was complaining of increased shortness breath, at 

that time his oxygenation could not be checked because of his Case's and cold

extremities, he was sent for a chest x-ray that showed right lower lobe 

infiltrate as well as blood tests that showed a white count of 20,000 patient 

was feeling better he was started on oral antibiotic in the form of Levaquin 500

mg orally once every day, and that he was supposed to follow-up with me as an 

outpatient every week he went to see his cardiologist today and he had an 

episode when he was dizzy lightheaded and an episode of vomiting as well and he 

was sent to the emergency department for evaluation had a computed tomography of

the chest as well as abdomen and pelvis that showed a thick walled pleural 

cavities posteriorly in the lower lungs with left-sided air-fluid level that has

diminished in size from the prior computed tomography scan when he was in the 

hospital a few weeks back there is also associated compressive atelectasis and 

chronic consultation with multifocal areas of reticular nodular opacity in the 

upper lungs with a slight nodularity of the left upper lobe again this area of 

focal consultation has improved from the previous study that was done few weeks 

ago, patient was started on IV antibiotic with vancomycin and Zosyn as well as 

IV fluid, he was seen in consultation by pulmonary critical care in the 

emergency department as the patient blood pressure dropped and that he'll be 

transferred to the intensive care unit at this point in time I had long conve

rsation with the patient and his wife at the bedside and the patient may need to

have a chest tube placed for his possible right empyema, he did receive 3 L 

normal saline in the ER, and his blood pressure is marginal he may need to go on

 pressors if  not recovered.





1/30:patient is sitting up in chair feeling about the same , complains of 

increased pain in the righ elbow, was seen earlier by pulmonary and the plan was

to go for US-Guided left thoracenthesis due to to loculated left pleural effu

nathaniel and possible empyema, may need VATS, he denies any chest pain or shortness 

of breath, no abdominal pain nausea, vomiting or diarrhea, still have diarrhea 

negative for C.Diff, he seems to have accept to stay in the hospital this time 

as long as he needed to.





1/31: Patient sitting up in the recliner complaining of increased pain in his 

joints due to his rheumatoid arthritis with increased synovitis in both wrists 

both elbows and both shoulders he was started on hydrocortisone 100 mg IV push 

every 8 hours, to try to help with his blood pressure as well as his phonation, 

he is maintained on Norco 5/325 mg one tablet orally every 6 hours as needed for

pain control, patient denies any chest pain is less short of breath, he 

continues to have increased coughing with yellow from production of green phlegm

production, he had no fever or chills at this time he continues to be on 

Levothroid drip, patient has appeared to have increased swelling in both lower 

extremities as well as both ankles, he is maintained on IV antibiotic in the 

form of vancomycin as well as cefepime, infectious disease is following, patient

is scheduled to go for ultrasound guidance thoracentesis of the left loculated 

pleural effusion tomorrow morning.





2/1: Patient remains in the intensive care unit.  He has been afebrile, heart 

rate 100, blood pressure 95/62, pulse ox 97% on 2 L nasal cannula.  Hemoglobin 

8.8 and leukocytosis resolved.  Creatinine 0.76, electrolytes normal.  Sputum 

culture is positive for Aspergillus species.  Blood culture showing no growth. 

Patient underwent diagnostic thoracentesis with interventional radiology today 

with removal of 45 mL of cloudy white fluid.  Chest x-ray reveals no 

complications following left thoracentesis.  Fluid has been sent for culture and

cytology.  Consult in place for cardiothoracic surgery to evaluate for VATS with

decortication.





2/2: Patient remains in the intensive care unit.  He did receive 1 dose of IV 

Lasix this morning ordered by nephrology.  He is followed by cardiothoracic 

surgery was no plan for surgical intervention.  ID has recommended cefepime and 

vancomycin for now.  Expect antifungal agent ended as well.  Patient denies any 

significant shortness of breath.  He is comfortable sitting in a chair.  Patient

is having minimal cough and minimal sputum.  Patient is achieving 1000 ml on 

incentive spirometry.  Culture and cytology reports remain pending from 

thoracentesis. Repeat chest x-ray reveals patchy peripheral lung with lung zone 

and lower lung zone infiltrates persist unchanged.  Patient has been afebrile, 

heart rate in the 90s and low 100s, pulse ox 91 on room air.





2/3: Repeat chest x-ray reveals left-sided basilar loculated pneumothorax, 

stable.  Bilateral lung infiltrates.  Correlate for pneumonia and atelectasis.  

WBC 9, hemoglobin 9.5.  Potassium 3.4 and will be replaced.  Patient is 

continued on cefepime and vancomycin per Dr. Sheets's recommendations.  Patient 

in reaching 1000 on incentive spirometry.  Patient is scheduled to see Dr. Kaye tomorrow.  Cefepime and vancomycin had been discontinued by Dr. Sheets and

started on Voriconazole. 





2/4: A-Team was called this morning regarding elevated heart rate, EKG was 

atrial flutter with RVR and 150 bpm and patient was transferred to ICU as an 

overflow for the cardiac stepdown unit, started on Cardizem drip and consult 

with cardiology.  Heart rate was improved with Cardizem and patient also 

received amiodarone bolus 300 mg.  Patient denies any worsening shortness of 

breath, cough or congestion.  No chest pain or palpitations.  Patient is resting

comfortably.  09, hemoglobin 8.8, potassium 3.4 replaced.  Creatinine 0.89.





2/5: Patient remains in the intensive care unit.  He continues to be in atrial 

flutter with 21 conduction rate.  He is on heparin drip and amiodarone.  He is 

currently in and out of atrial flutter with RVR.  Cardiology is following 

closely and may consider cardioversion tomorrow. WBC 16.6, hgb 9.8, plt 374, 

sodium 143, potassium 3.9, creatinine 0.88.  He has been afebrile, heart rate 

has been at times marginal, pulse ox 90% on room air.  Patient denies having any

fever or chills, no shortness of breath, no cough.  No chest pain.  Patient is 

followed by cardiothoracic surgery and plan is for left-sided PleurX catheter 

placement which is scheduled for Monday.





2/8: Patient is scheduled for PleurX catheter placement this afternoon.  

Cardiology may schedule him for cardioversion.  Dr. Collado is planning on oral 

antimicrobials at the time of discharge.  Patient is bringing up quite a bit of 

sputum.  He has been afebrile, heart rate 68, blood pressure 100/67, pulse ox 

93% on 1 L nasal cannula.  WBC 21, hemoglobin 9.6, platelet count 325.  

Potassium 3, BUN 28 creatinine 0.8.





2/9: Patient is status post PleurX catheter.  Patient has been afebrile, heart 

rate 106, blood pressure 85/53.  Pulse ox 97% on 2 L.  Patient is known to be 

back in atrial fibrillation at rate of 122.  He did go for cardioversion today 

for atrial flutter.  Patient states he has a little pain in the side for which 

she is taking Norco.  Not bad today.  Patient has subcutaneous edema to 

bilateral chest.  He denies having any headache.  BUN 27 creatinine 0.79.  Blood

sugar .





2/10: Patient denies chest pain or shortness of breath.  He is reaching 1250 on 

IS.  PleurX draining small amount of sanguinous fluid.  Patient is in atrial 

flutter.  Dr. Pimentel is planning for EP study and ablation on Thursday and plan

to continue eliquis.  Patient is also on Lopressor 25 mg 3 times daily.  Patient

has been afebrile, heart rate 97, blood pressure 106/65, pulse ox 98% on room 

air.  Blood sugars running between 112 and 67.  Patient is covered with 

Voriconazole.  Lac-Hydrin added for bilateral legs.





2/11: Patient is status post successful atrial flutter ablation with Dr. Pimentel.  Cardiology has recommended amlodipine 100 mg twice daily for 1 month. 

Cardiac monitor is a sinus rhythm with PACs.  Metoprolol was decreased to 25 mg 

twice daily.  Patient is on eliquis 5 mg twice daily.  Patient has been 

afebrile, heart rate 64, blood pressure 121/77.  Potassium 2.3 and replaced.  

Repeat level will be obtained this evening.  Pleurx catheter remains in place 

connected to Pleur-evac with intermittent air leak.  He has minimal output.  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  He is planning on oral antimicrobial at the time of discharge.





2/12: Patient is resting in recliner. Patient's wife is at bedside. Patient 

states he walked in hallway and felt well but did have shortness of breath with 

activity. He continues to have subcutaneous emphysema. He continues to have air 

leak.  WBC is 15.8, hemoglobin 10, platelet count 293.  Potassium is 3 and will 

be replaced.  BUN 27 creatinine 0.82.  Patient has been afebrile, heart rate 79,

blood pressure 114/66, pulse ox 90% on room air.  Chest x-ray is stable 

findings.








2/13: Patient is sitting up in his recliner he is quite depressed because he 

stated the hospital for almost 2 weeks, he wanted to go home, he continues to 

have some leak in the chest tube on the left side, we will maintain the patient 

on voriconazole, and he has to take that for the next 4 weeks and possibly 

longer would leave that up to the discretion of infectious disease, I spoke with

his wife as well as himself about the prognosis and the patient may need to send

to the hospital until Monday , meanwhile continue with physical therapy 

evaluation, anticipating home health care with physical therapy at home.





2/14: Patient is sitting in the recliner does not appear in acute distress, he 

continues to have the chest tube in the left side with minimal leakage, 

hopefully it will be removed in the next 24 hours and can be discharged home, he

is maintained on voriconazole for his Aspergillus fumigate as, continue with 

increased activity, continue to monitor the patient very closely, anticipate 

discharge in 1-2 days.





Objective





- Vital Signs


Vital signs: 


                                   Vital Signs











Temp  97.2 F L  02/14/21 08:00


 


Pulse  101 H  02/14/21 08:00


 


Resp  18   02/14/21 08:00


 


BP  99/56   02/14/21 08:00


 


Pulse Ox  92 L  02/14/21 08:00








                                 Intake & Output











 02/13/21 02/14/21 02/14/21





 18:59 06:59 18:59


 


Intake Total 1009 10 246


 


Output Total 400 720 


 


Balance 609 -710 246


 


Weight  102.1 kg 


 


Intake:   


 


  IV  10 10


 


    Invasive Line 6  10 10


 


  Oral 1009  236


 


Output:   


 


  Chest Tube Drainage 100 40 


 


    Left Mid-Axillary Chest 100 40 


 


  Drainage  40 


 


    Left Chest  40 


 


  Urine 300 640 


 


Other:   


 


  Voiding Method  Toilet Toilet





  Urinal Urinal


 


  # Voids  1 














- Exam





- Exam





 Review of Systems 


Constitutional: Reports anorexia, Reports chronic pain, Reports fatigue, denies 

weakness, Reports weight loss


Eyes: denies blurred vision, denies bulging eye, denies decreased vision, denies

diplopia


Ears, nose, mouth and throat: Denies dysphagia, Denies neck lump, Denies sore 

throat


Respiratory: Reports dyspnea, with exertion Reports respiratory infections, 

Denies congestion, Denies cough with sputum, Denies home oxygen, Denies sleep 

apnea, Denies snoring, Denies wheezing


Cardiac: No chest pain.  No palpitations.  No lower extremity edema.  No 

syncopal episodes.


Gastrointestinal: Reports bloating, Reports change in bowel habits, Reports 

diarrhea, Reports early satiety, Reports indigestion, Reports loss of appetite, 

Reports nausea, Reports vomiting, Denies abdominal pain, Denies belching, Denies

heartburn, Denies hematemesis, Denies hematochezia, Denies melena


Genitourinary: Denies dysuria, Denies nocturia


Musculoskeletal: Denies myalgias 


Musculoskeletal: absent: ankle pain, ankle stiffness, ankle swelling, elbow 

pain, elbow stiffness, elbow swelling, foot pain, foot stiffness, foot swelling,

hand pain, hand stiffness, hand swelling, hip pain, hip stiffness, hip swelling,

knee pain, knee stiffness, knee swelling, shoulder pain, shoulder stiffness, 

shoulder swelling, wrist pain, wrist stiffness, wrist swelling


Integumentary: Denies pruritus, Denies rash


Neurological: Denies numbness, Denies weakness


Psychiatric: Denies anxiety, Denies depression


Endocrine: Denies fatigue, Denies weight change








Physical examination:


General: This is a 60-year-old  male.  Patient is resting in a recliner

and appears to be comfortable.





HEENT: head ia atraumatic normocephalic, Pupils were equal round reactive to 

light and accommodations , extra ocular muscle movement were intact, mucous 

membranes of the mouth are somewhat dry.





Neck: supple no JVP.





Chest: decreased breath sounds at he bases no expiratory wheezes no chest wall 

tendernes or intercostal retractions.  PleurX cath in place. Positive 

subcutaneous emphysema.





Heart: first heart sound is depressed, second heart sound is normal tachycardic 

and irregular there is HOOD 2/6 located at the left sternal border.





Abdomen: soft, mild tenderness to the left lower quadrant positive bowel sounds,

no guarding or rebound tenderness, no hepatosplenomegaly.





Extremities: there is 1 edema or calf tenderness DP+ 2 Bilaterally, there is 

what appears to be a charcot joint in the right foot form arch collapse.





Neurologic examination: patient is awake , alert and oriented X 3 CN II-XII are 

grossly intact, muscle power 4/5 in bilateral upper and lower extremities, deep 

tendon reflexes were normal.








- Labs


CBC & Chem 7: 


                                 02/13/21 06:47





                                 02/14/21 07:16


Labs: 


                  Abnormal Lab Results - Last 24 Hours (Table)











  02/13/21 02/13/21 02/14/21 Range/Units





  06:47 16:42 07:16 


 


Neutrophils # (Manual)  10.20 H    (1.3-7.7)  k/uL


 


Metamyelocytes # (Man)  0.14 H    (0)  k/uL


 


Myelocytes # (Manual)  0.14 H    (0)  k/uL


 


Potassium    3.3 L  (3.5-5.1)  mmol/L


 


Carbon Dioxide    39 H  (22-30)  mmol/L


 


POC Glucose (mg/dL)   104 H   (75-99)  mg/dL


 


AST    73 H  (17-59)  U/L


 


ALT    118 H  (4-49)  U/L


 


Alkaline Phosphatase    362 H  ()  U/L


 


Total Protein    5.6 L  (6.3-8.2)  g/dL


 


Albumin    2.4 L  (3.5-5.0)  g/dL














Assessment and Plan


Assessment: 





Assessment and Plan


Assessment: 





1.  Acute hypoxemic respiratory failure secondary to loculated left-sided 

pleural effusion and fungal empyema with what appears to be recurrent pneumonia.

 Continue Voriconazole 200 mg every 12 hours IV piggyback per Dr. Sudeep with 

plan for oral at the time of discharge, interventional radiology performed 

ultrasound-guided thoracentesis of the left loculated pleural fluid for Gram 

stain and culture and cytology as well as LDH protein as well as pH for possible

empyema.  Culture positive for Aspergillus.  Cardiothoracic surgery status post 

Pleurx catheter insertion.





2.  Lactic acidosis.  Resolved, repeat lactic acid is back to normal.





3.  Sepsis with septic shock and was stable.  Patient has been weaned off 

Levothroid drip.





4.  Acute kidney injury due to poor oral intake of fluid as well as hypotension 

with acute tubular necrosis.  Patient is on IV fluid, continue to monitor the 

patient CMP continue to monitor urine output.  Nephrology is following.





5.  Leukocytosis secondary to severe sepsis secondary to empyema.  Patient is 

off antibiotic.





6.  Persistent atrial flutter with RVR.  Status post cardioversion, converted 

back.  Status post ablation completed by Dr. Pimentel.  Patient to continue 

eliquis.  Continued on amiodarone 100 mg daily for one month. Cardiology consult

appreciated.





7.  Rheumatoid arthritis and rheumatoid lung.  Arava and Xeljanz had been on 

hold since October.





8.  History of empyema with Streptococcus requiring chest tube.  





9.  Hypertension and hypertensive cardiovascular disease. Lasix 40 mg IVP daily.





10.  Hypothyroidism . we will continue with Synthroid 150 mcg orally daily.





11. DVT prophylaxis. Eliquis and SCDs.





12. GI prophylaxis. we will continue with Protonix 40 mg oral daily.








Discharge plan: home possibly early next week

## 2021-02-14 NOTE — PN
PROGRESS NOTE



DATE OF SERVICE:

02/14/2021



REASON FOR FOLLOWUP:

Aspergillosis and empyema.



INTERVAL HISTORY:

The patient is currently afebrile, has been breathing comfortably.  Denies having any

chest pain or shortness of breath.  Occasional cough.  No abdominal pain. No diarrhea.



PHYSICAL EXAMINATION:

Blood pressure 99/57, pulse of 103, temperature is 98, she is 91% on 2 L nasal cannula.

General description is a middle-aged male lying in bed in no distress.

Respiratory system: Unlabored breathing with decreased breath sounds in the base, with

no wheezes. Clear to auscultation anteriorly.

Heart: S1, S2.  Regular rate and rhythm.  Abdomen soft, no tenderness.



LABS:

Hemoglobin is 9.9, white count 12.6, BUN of 19, creatinine 0.68.



DIAGNOSTIC IMPRESSION AND PLAN:

Patient with Aspergillus pneumonia, empyema, status post PleurX catheter placement, now

with evidence of _____.



The patient is covered with voriconazole. However, the patient was also started on

Amiodarone by cardiology with drug interaction with these two medications, we will

discuss further with them as the patient needs long-term outpatient oral V-Fen.

Continue supportive care.





MMODL / IJN: [id number] / Job#: [id number]

## 2021-02-14 NOTE — XR
EXAMINATION TYPE: XR chest 1V portable

 

DATE OF EXAM: 2/14/2021

 

COMPARISON: 2/13/2020

 

HISTORY: Shortness of breath

 

TECHNIQUE: Single frontal view of the chest is obtained.

 

FINDINGS:  There is severe diffuse bilateral subcutaneous emphysema which limits assessment of the brii
ngs. There is bilateral areas of infiltrate and small effusion suspected left lower lobe pneumothorax
 measuring approximately 10-15%. Chest tube noted. Heart size stable.

 

IMPRESSION:  

1. Severe diffuse bilateral subcutaneous emphysema.

2. Suspect a left-sided pneumothorax along the costophrenic angle measuring 10-15%.

## 2021-02-14 NOTE — P.PN
Subjective


Progress Note Date: 02/14/21





60-year-old white male patient, with past medical history of rheumatoid 

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 

episodes of pneumonia and history of loculated empyema requiring chest tube 

placement back in July 2020 with pleural fluid cultures positive for 

streptococcal pneumonia.  Other medical history includes hypertension, 

hypothyroidism and previous history of DVT.  Patient had a recent 

hospitalization from January 18 through 01/21/2021 hypotension, dehydration re

lated to nausea vomiting diarrhea, acute kidney injury.  Patient received 

antibiotics in the form of Zosyn and Levaquin for possibility of pneumonia, he 

is chest x-ray showed cranial perihilar pulmonary infiltrates with increased 

interstitial changes at the lung bases and was thought to be related to a 

combination of rheumatoid lung and chronic scarring.  His last CT of the chest 

was on 12/29/2020 showing chronic changes to lower lungs, persistent irregular 

thick-walled pleural spaces in the posterior lung bases containing fluid and air

with adjacent anterior lung with chronic consolidation or atelectasis.  On 

01/29/2021 patient came into the emergency department from Dr. SARINA Santo's office

where he was being seen for a regular follow-up appointments.  He was noted to 

have low pulse ox 70s and was sent in to the emergency department for 

evaluation.  He has been having cough with yellow and sometimes blood-tinged 

sputum, appears amounts, denies any fever, COVID 19 was found to be negative, 

chest x-ray showed perihilar and basilar infiltrates without significant change 

from previous chest x-ray on 01/19/2021.  Lab data showed leukocytosis with 

white blood cell count of 19.1, hemoglobin of 12.1, INR 1.5, sodium is 135, 

potassium is 3.9, chloride is 102, CO2 is 19, creatinine is 1.32, which has 

actually improved since his discharge from the hospital on 01/21/2021 when he 

was at 1.84.  Lactic acid was elevated at 3.7, patient was given a total of 2 L 

in fluid boluses, troponin was negative at less than 0.012.  Urinalysis showed 

no evidence of infection.  CT chest showed thick-walled pleural cavities 

posteriorly in the lower lungs with left-sided air-fluid level with left-sided 

pleural fluid diminished most recent CT from 12/29/2020.  There is associated 

compressive atelectasis and/or chronic consolidation.  Persistent as it is lobe 

fissure, multifocal areas of reticular nodular opacity in the upper lungs remain

present with slight nodularity in the left upper lobe.  There is a focal 

consolidation improved from most recent study with some residual areas of 

scarring.  We will emergency department patient became hypotensive with a blood 

pressure in the 70s, slightly tachycardic remains in sinus mechanism, he is 

receiving his third liter bolus, his lactic acid did respond and improved he was

started on antibiotics in the form of Zosyn and vancomycin 





The patient is seen today January 30th 2021 in the intensive care unit.  He is 

currently sitting up in a chair at the bedside.  Awake and alert in no acute 

distress.  Maintaining O2 saturations in the 90s on 2 L/m per nasal cannula.  He

did require a small dose of norepinephrine currently on 0.05 mcg/kg/m.  He has 

lactated Ringer's at 100 MLS per hour.  He remains on vancomycin and cefepime.  

Chest x-ray continues to show the left lower lobe effusion.  White count 11.7.  

Hemoglobin 9.7.  Sodium 135.  Potassium 3.6.  Creatinine 1.00.  Blood cultures 

reveal no growth to date.





Reevaluated today on 1/31/2021, remains in the ICU, still on norepinephrine at 

0.04 mcg/kg/m.  On LR at 1 25 mL per hour.  On vancomycin and cefepime 

empirically, blood pressure remains marginal, went ahead and recommended 

Solu-Cortef 100 mg IV push every 8 hours and hopefully will be able to 

discontinue norepinephrine today.  His IV fluid is cut down to KVO.  Given 20 mg

of Lasix IV push, and I have recommended that we monitor the patient in the ICU 

for the next 24 hours.  Chest x-ray is basically the same, continues to show ch

ronic finding, difficult to exclude underlying pneumonia/empyema.  CT of the 

chest on admission was also reviewed, difficult to rule out underlying empyema. 

Clinically the patient is feeling better except for generally weak.  WBC count 

is 12.5 hemoglobin is 9.9.  Normal renal profile is normal blood cultures are 

negative so far.  Sputum cultures are also negative so far.





On 02/01/2021 patient seen in follow-up in the intensive care unit, he is awake 

and alert, oriented 3, sitting up in the recliner, currently on 2 L of oxygen 

his pulse ox is %, his been afebrile, hemodynamically he is stable, he is 

not on any vasopressor support.  His lactate Ringer's running at 20 ML per hour,

antibiotics include cefepime and vancomycin, blood pressure has been stable, so 

far blood and sputum cultures are negative, he denies chest pain, his breathing 

is comfortable.  Today's labs have been reviewed, showing white blood cell count

trending down, 10.2 today, hemoglobin is 8.8, electrolytes and renal profile are

unremarkable.  Serum cortisol level came back at 3, patient continues on stress 

doses of hydrocortisone 100 mg every 8 hours.  Patient has not been stable, 

we'll consult interventional radiology for ultrasound-guided left thoracentesis





On 02/02/2021 patient seen in follow-up in the intensive care unit, he is calm 

and comfortable, awake and alert, sitting up in the recliner, currently on room 

air with a pulse ox of 93-94%, his been afebrile, hemodynamically he is been 

stable, he is on IV LR at 20 ML per hour, no other drips.  He is on cefepime and

vancomycin for antibiotic coverage, yesterday he underwent left thoracentesis 

with removal of 43 mL of pleural fluid which was sent for cultures.  Tolerated 

procedure well.  His pleural fluid analysis revealed total fluid protein of 1.3 

g, and fluid LDH of greater than 4500 consistent with exudative fluid.  His 

microbiology data has been reviewed showing Aspergillus fumigate is in the 

sputum, his pleural fluid cultures are pending at this time, the cultures have 

shown no growth, hemodynamically has been stable, he is breathing easier, he 

still has cough mostly in the morning and the amount of secretions his bringing 

up is decreasing in amount.  Lung sounds revealed a few scattered wheezes, and a

few rhonchi at bilateral bases.  Is tolerating oral intake.  No abdominal pain, 

no nausea or vomiting, today's labs have been reviewed, white blood cell count 

is 11.2, hemoglobin is 9.1, sodium is 137, potassium is 4.0, chloride is 109, 

BUN is 15, creatinine 0.80.  No acute events overnight.  He is working on 

incentive spirometer, he is achieving 1000 today





On 02/03/2021 patient seen in follow-up on medical surgical floor.  He is awake 

and alert, in no acute distress, breathing comfortably, denies any chest pain, 

room air pulse ox is 94%, no fever or chills, today's chest x-ray shows left 

basilar loculated pneumothorax, stable in appearance, bilateral lung 

infiltrates.  His pleural fluid cultures so far showing Aspergillus species, as 

does his sputum culture.  Blood culture show no growth.  The cervix is follow

ing, current antibiotic coverage includes cefepime, vancomycin has been 

discontinued, she received 1 dose of IV Lasix per nephrology.  He has produced 

2500 in urine output, and he is in -1.6 L over the last 24 hours, however he 

still has significant lower extremity edema.





The patient is seen today 02/04/2021 in follow-up in the intensive care unit.  

He was transferred here earlier this morning after developing atrial 

fibrillation/flutter with a rapid ventricular response.  He was initiated on C

ardizem at 5 mg per hour.  He has 0.9 normal saline at 20 ML's per hour.  He is 

currently awake and alert in no acute distress.  No worsening shortness of 

breath, cough or congestion.  No chest pain or palpitations.  He sitting up in a

chair at the bedside.  Maintaining O2 saturations in the 90s on 2 L/m per nasal 

cannula. Chest x-ray does reveal loculated posterior basilar pneumothoraces, 

stable from previous exam.  There is diffuse increased lung markings.  Correlate

for pneumonia and atelectasis.  Pleural fluid cultures is positive for 

Aspergillus species.  White count 10.9.  Hemoglobin 8.8.  Sodium 139.  Potassium

3.4.  Creatinine 0.89.  He remains on bronchodilators, IV diuretics, IV Solu-

Cortef.  Continued on voriconazole.








The patient is seen today 02/06/2021 in follow-up on the selective care unit.  

He is currently sitting up in a recliner at the bedside.  Awake and alert in no 

acute distress.  He is currently on room air.  LR at 20 miles per hour.  Heparin

drip per weight base protocol.  White count 19.0.  Hemoglobin 9.1.  Sodium 142. 

Potassium 3.6.  Creatinine 0.81.  He remains on voriconazole.





The patient is seen today 02/07/2021 in follow-up on the selective care unit.  

He is currently sitting up in a chair at the bedside.  No worsening shortness of

breath, cough or congestion.  Chest x-ray reveals bilateral airspace infiltrates

right greater than left without significant improvement.  Sputum and pleural 

fluid cultures were both positive for Aspergillus fumigatus.  He remains on 

voriconazole.  White count 18.5.  Hemoglobin 10.1.  Sodium 141.  Potassium 3.3. 

Creatinine 0.8.  Atrial fibrillation.  He remains on a heparin drip.  Plan is 

for left-sided Pleurx catheter placement in a.m.








The patient is seen today 02/14/2021 in follow-up on the selective care unit.  

He is currently sitting up in a recliner.  Awake and alert in no acute distress.

 Maintaining O2 saturations in the low 90s on 2 L/m per nasal cannula.  He is 

afebrile.  Hemodynamically stable.  Pleural fluid cultures were positive for 

Aspergillus fumigatus.  White count 12.6.  Hemoglobin 9.9.  Neutrophils 9.7.  

Sodium 140.  Potassium 3.3.  Creatinine 0.68.  AST 73, . Chest x-ray co

ntinues to show severe diffuse bilateral subcutaneous emphysema.  Suspect a 

left-sided pneumothorax along the costophrenic angle measuring 10-15%.  Left-

sided Pleurx catheter remains in place.  Positive leak.  Remains on 

voriconazole, bronchodilators, IV diuretics.  Quite related with Eliquis.





Objective





- Vital Signs


Vital signs: 


                                   Vital Signs











Temp  97.2 F L  02/14/21 08:00


 


Pulse  101 H  02/14/21 08:00


 


Resp  18   02/14/21 08:00


 


BP  94/51   02/14/21 09:54


 


Pulse Ox  92 L  02/14/21 08:00








                                 Intake & Output











 02/13/21 02/14/21 02/14/21





 18:59 06:59 18:59


 


Intake Total 1009 10 246


 


Output Total 400 720 940


 


Balance 666 -581 -002


 


Weight  102.1 kg 


 


Intake:   


 


  IV  10 10


 


    Invasive Line 6  10 10


 


  Oral 1009  236


 


Output:   


 


  Chest Tube Drainage 100 40 40


 


    Left Mid-Axillary Chest 100 40 40


 


  Drainage  40 


 


    Left Chest  40 


 


  Urine 300 640 900


 


Other:   


 


  Voiding Method  Toilet Toilet





  Urinal Urinal


 


  # Voids  1 














- Exam





GENERAL EXAM: Alert, very pleasant 60-year-old gentleman, on 2 L nasal cannula, 

comfortable in no apparent distress.


HEAD: Normocephalic.


EYES: Normal reaction of pupils, equal size.


NOSE: Clear with pink turbinates.


THROAT: No erythema or exudates.


NECK: No masses, no JVD.


CHEST: Bilateral subcutaneous emphysema.  Left Pleurx catheter remains in place.


LUNGS: Equal air entry with few scattered rhonchi, crackles in the posterior 

bases.


CVS: S1 and S2 normal with no audible murmur, irregular rhythm.  Tachycardic.


ABDOMEN: No hepatosplenomegaly, normal bowel sounds, no guarding or rigidity.


SPINE: No scoliosis or deformity


SKIN: No rashes


CENTRAL NERVOUS SYSTEM: No focal deficits, tone is normal in all 4 extremities.


EXTREMITIES: There is 2+ peripheral edema.  No clubbing, no cyanosis.  

Peripheral pulses are intact.





- Labs


CBC & Chem 7: 


                                 02/14/21 07:16





                                 02/14/21 07:16


Labs: 


                  Abnormal Lab Results - Last 24 Hours (Table)











  02/13/21 02/14/21 02/14/21 Range/Units





  16:42 07:16 07:16 


 


WBC   12.6 H   (3.8-10.6)  k/uL


 


RBC   3.73 L   (4.30-5.90)  m/uL


 


Hgb   9.9 L   (13.0-17.5)  gm/dL


 


Hct   33.6 L   (39.0-53.0)  %


 


MCHC   29.6 L   (31.0-37.0)  g/dL


 


RDW   19.2 H   (11.5-15.5)  %


 


Neutrophils # (Manual)   9.70 H   (1.3-7.7)  k/uL


 


Lymphocytes # (Manual)   0.88 L   (1.0-4.8)  k/uL


 


Eosinophils # (Manual)   0.76 H   (0-0.7)  k/uL


 


Metamyelocytes # (Man)   0.13 H   (0)  k/uL


 


Myelocytes # (Manual)   0.50 H   (0)  k/uL


 


Potassium    3.3 L  (3.5-5.1)  mmol/L


 


Carbon Dioxide    39 H  (22-30)  mmol/L


 


POC Glucose (mg/dL)  104 H    (75-99)  mg/dL


 


AST    73 H  (17-59)  U/L


 


ALT    118 H  (4-49)  U/L


 


Alkaline Phosphatase    362 H  ()  U/L


 


Total Protein    5.6 L  (6.3-8.2)  g/dL


 


Albumin    2.4 L  (3.5-5.0)  g/dL














Assessment and Plan


Assessment: 





1 Acute hypoxic respiratory failure secondary to sepsis, septic shock, related 

to pneumonia, with chronic loculated effusions, possible empyema.  Cultures 

positive for Aspergillus fumigatus. Patient is postop day #6.  He continues to 

have intermittent air leak.  Output from the Pleurx catheter was noted.  He does

have some stable subcutaneous emphysema bilaterally.  There is also some 

diabetic dysfunction and elevation of the liver function tests related to 

voriconazole which is being monitored very closely





2 Bilateral hydropneumothorax, possible empyema, status post ultrasound-guided 

left-sided thoracentesis on 02/01/2021 with 45 ML's of cloudy white fluid 

removed, showing Aspergillus.  Remains on voriconazole.





3 Bilateral subcutaneous emphysema along with a hydropneumothorax on the left 

with positive air leak through the Pleurx catheter.  He was currently attached 

to continuous suction





4 Recent pulmonary infections with history of streptococcal pneumonia, empyema 

requiring chest tube placement in July 2020.





5 Chronic loculated pneumothoraces, posterior, with air-fluid seen on CAT scan 

imaging on the chest.  Possibility of trapped lung, chronic scarring and 

possibility of empyema.





6 History of rheumatoid arthritis and rheumatoid lung disease.  On Arava





7 Recent history of acute kidney injury





8 Hypertension





9 Hypothyroidism





10 Previous history of DVT.  





11 New-onset atrial fibrillation/flutter anticoagulated status post failed 

cardioversion.  Currently on Eliquis and amiodarone





Plan:





The patient was seen and evaluated by Dr. De La Rosa


Chest x-ray and labs reviewed


Significant stable bilateral subcutaneous emphysema


Pleurx catheter remains in place, currently to continuous suction


We'll continue to follow





I, the cosigning physician, performed a history & physical examination of the 

patient. Lungs sounds  bilateral scattered rhonchi and crackles in the bilateral

bases.  Maintaining good O2 saturations in the 90s on 2 L/m per nasal cannula.  

I discussed the assessment and plan of care with my nurse practitioner, Rupa Flood. I attest to the above note as dictated by her.

## 2021-02-14 NOTE — P.PN
Subjective


Progress Note Date: 02/14/21


This is a pleasant 60-year-old gentleman who's been admitted to the hospital 

with acute hypoxic respiratory failure secondary to fungal pneumonia with 

empyema.  Patient underwent placement of a chest tube.  We've been following the

patient is a patient was found to be in atrial flutter with rapid ventricular 

response.  He underwent cardioversion on 02/09/2021 with Dr. Pimentel which 

unfortunately was only briefly successful.  He subsequently underwent successful

ablation on 02/11/2021 with Dr. Pimentel.  He is currently maintaining sinus 

mechanism.  He continues to be on amiodarone 100 mg by mouth daily which she 

will be on for one month.  He is anticoagulated.  Currently on a beta blocker.  

Upon examination the patient is resting comfortably in a chair.  His main 

complaint is of edema.  Patient appears to have subcutaneous emphysema but is 

quite generalized.  Patient complains of scrotal edema which also appears to be 

subcutaneous emphysema.  Patient continues to have a chest tube in place.





2/14/21


Patient continues to maintain sinus mechanism.  Blood pressure is running on the

low side in the 80s to 90s systolic.  Continues to complain mostly of the edema.

 Chest tube remains in place.  Continues to be followed by cardiothoracic 

surgery.








Objective





- Vital Signs


Vital signs: 


                                   Vital Signs











Temp  98.3 F   02/14/21 12:00


 


Pulse  103 H  02/14/21 12:00


 


Resp  16   02/14/21 12:00


 


BP  96/52   02/14/21 12:00


 


Pulse Ox  91 L  02/14/21 12:00








                                 Intake & Output











 02/13/21 02/14/21 02/14/21





 18:59 06:59 18:59


 


Intake Total 1009 10 729


 


Output Total 400 720 940


 


Balance 609 -710 -211


 


Weight  102.1 kg 


 


Intake:   


 


  IV  10 20


 


    Invasive Line 6  10 20


 


  Oral 1009  709


 


Output:   


 


  Chest Tube Drainage 100 40 40


 


    Left Mid-Axillary Chest 100 40 40


 


  Drainage  40 


 


    Left Chest  40 


 


  Urine 300 640 900


 


Other:   


 


  Voiding Method  Toilet Toilet





  Urinal Urinal


 


  # Voids  1 














- Exam


PHYSICAL EXAMINATION: 





HEENT: Head is atraumatic, normocephalic.  Pupils equal, round.  Neck is supple.

 There is no elevated jugular venous pressure.





HEART EXAMINATION: Heart sounds regular, S1 and S2 normal.  No murmur or gallop 

heard.





CHEST EXAMINATION: Lungs reveal scattered rhonchi although some difficult in 

assessing due to subcutaneous emphysema.  Left Pleurx chest tube noted.





ABDOMEN:  Soft, nontender.  Cutaneous emphysema noted down to the scrotum.


 


EXTREMITIES: Bilateral upper extremity edema and subcutaneous emphysema noted, 

bilateral ankle and pedal pitting edema noted left worse than right..





NEUROLOGIC patient is awake, alert and oriented x3.


 


.


 











- Labs


CBC & Chem 7: 


                                 02/14/21 07:16





                                 02/14/21 07:16


Labs: 


                  Abnormal Lab Results - Last 24 Hours (Table)











  02/13/21 02/14/21 02/14/21 Range/Units





  16:42 07:16 07:16 


 


WBC   12.6 H   (3.8-10.6)  k/uL


 


RBC   3.73 L   (4.30-5.90)  m/uL


 


Hgb   9.9 L   (13.0-17.5)  gm/dL


 


Hct   33.6 L   (39.0-53.0)  %


 


MCHC   29.6 L   (31.0-37.0)  g/dL


 


RDW   19.2 H   (11.5-15.5)  %


 


Neutrophils # (Manual)   9.70 H   (1.3-7.7)  k/uL


 


Lymphocytes # (Manual)   0.88 L   (1.0-4.8)  k/uL


 


Eosinophils # (Manual)   0.76 H   (0-0.7)  k/uL


 


Metamyelocytes # (Man)   0.13 H   (0)  k/uL


 


Myelocytes # (Manual)   0.50 H   (0)  k/uL


 


Potassium    3.3 L  (3.5-5.1)  mmol/L


 


Carbon Dioxide    39 H  (22-30)  mmol/L


 


POC Glucose (mg/dL)  104 H    (75-99)  mg/dL


 


AST    73 H  (17-59)  U/L


 


ALT    118 H  (4-49)  U/L


 


Alkaline Phosphatase    362 H  ()  U/L


 


Total Protein    5.6 L  (6.3-8.2)  g/dL


 


Albumin    2.4 L  (3.5-5.0)  g/dL














Assessment and Plan


Assessment: 


1 typical atrial flutter with rapid ventricular response, status post 

cardioversion and subsequent ablation, maintaining sinus mechanism


2 acute hypoxic respiratory failure


3 fungal pneumonia with empyema


4 hypertension


5 hyperlipidemia








Plan: 


From cardiology perspective medications reviewed and due to hypotension we will 

decrease dose of the beta blocker.  Continue amiodarone for one month.  Continue

to monitor the patient telemetry.  Pulmonary and cardiothoracic surgery continue

to follow.  We will continue to follow the patient and provide further 

recommendations accordingly.





The above dictated assessment and findings were discussed with signing 

physician. The impression and plan of care have been directed as dictated. 

Anupama Rodrigues, Nurse Practitioner, acting as scribe for signing physician.

## 2021-02-15 LAB
ALBUMIN SERPL-MCNC: 2.4 G/DL (ref 3.5–5)
ALP SERPL-CCNC: 495 U/L (ref 38–126)
ALT SERPL-CCNC: 125 U/L (ref 4–49)
ANION GAP SERPL CALC-SCNC: 5 MMOL/L
AST SERPL-CCNC: 146 U/L (ref 17–59)
BUN SERPL-SCNC: 17 MG/DL (ref 9–20)
CALCIUM SPEC-MCNC: 8.4 MG/DL (ref 8.4–10.2)
CHLORIDE SERPL-SCNC: 99 MMOL/L (ref 98–107)
CO2 SERPL-SCNC: 35 MMOL/L (ref 22–30)
ERYTHROCYTE [DISTWIDTH] IN BLOOD BY AUTOMATED COUNT: 3.59 M/UL (ref 4.3–5.9)
ERYTHROCYTE [DISTWIDTH] IN BLOOD: 19.4 % (ref 11.5–15.5)
GLUCOSE SERPL-MCNC: 99 MG/DL (ref 74–99)
HCT VFR BLD AUTO: 32.3 % (ref 39–53)
HGB BLD-MCNC: 9.6 GM/DL (ref 13–17.5)
MAGNESIUM SPEC-SCNC: 1.9 MG/DL (ref 1.6–2.3)
MCH RBC QN AUTO: 26.6 PG (ref 25–35)
MCHC RBC AUTO-ENTMCNC: 29.6 G/DL (ref 31–37)
MCV RBC AUTO: 89.9 FL (ref 80–100)
PLATELET # BLD AUTO: 230 K/UL (ref 150–450)
POTASSIUM SERPL-SCNC: 3.6 MMOL/L (ref 3.5–5.1)
PROT SERPL-MCNC: 5.7 G/DL (ref 6.3–8.2)
SODIUM SERPL-SCNC: 139 MMOL/L (ref 137–145)
WBC # BLD AUTO: 13 K/UL (ref 3.8–10.6)

## 2021-02-15 RX ADMIN — CEFAZOLIN SCH: 330 INJECTION, POWDER, FOR SOLUTION INTRAMUSCULAR; INTRAVENOUS at 18:59

## 2021-02-15 RX ADMIN — TRIAMCINOLONE ACETONIDE SCH APPLIC: 1 CREAM TOPICAL at 10:41

## 2021-02-15 RX ADMIN — Medication SCH MCG: at 20:01

## 2021-02-15 RX ADMIN — IPRATROPIUM BROMIDE AND ALBUTEROL SULFATE SCH: .5; 3 SOLUTION RESPIRATORY (INHALATION) at 08:04

## 2021-02-15 RX ADMIN — POTASSIUM CHLORIDE SCH MLS/HR: 14.9 INJECTION, SOLUTION INTRAVENOUS at 10:42

## 2021-02-15 RX ADMIN — LEVOTHYROXINE SODIUM SCH MCG: 75 TABLET ORAL at 06:59

## 2021-02-15 RX ADMIN — SODIUM CHLORIDE, PRESERVATIVE FREE SCH ML: 5 INJECTION INTRAVENOUS at 20:02

## 2021-02-15 RX ADMIN — PANTOPRAZOLE SODIUM SCH MG: 40 TABLET, DELAYED RELEASE ORAL at 06:59

## 2021-02-15 RX ADMIN — AMMONIUM LACTATE SCH APPLIC: 12 LOTION TOPICAL at 10:41

## 2021-02-15 RX ADMIN — METOPROLOL TARTRATE SCH MG: 25 TABLET, FILM COATED ORAL at 10:40

## 2021-02-15 RX ADMIN — AMIODARONE HYDROCHLORIDE SCH MG: 100 TABLET ORAL at 10:41

## 2021-02-15 RX ADMIN — IPRATROPIUM BROMIDE AND ALBUTEROL SULFATE SCH: .5; 3 SOLUTION RESPIRATORY (INHALATION) at 19:42

## 2021-02-15 RX ADMIN — IPRATROPIUM BROMIDE AND ALBUTEROL SULFATE SCH: .5; 3 SOLUTION RESPIRATORY (INHALATION) at 11:18

## 2021-02-15 RX ADMIN — FUROSEMIDE SCH MG: 10 INJECTION, SOLUTION INTRAMUSCULAR; INTRAVENOUS at 10:40

## 2021-02-15 RX ADMIN — OXYCODONE HYDROCHLORIDE AND ACETAMINOPHEN SCH MG: 500 TABLET ORAL at 20:02

## 2021-02-15 RX ADMIN — APIXABAN SCH MG: 5 TABLET, FILM COATED ORAL at 20:02

## 2021-02-15 RX ADMIN — ASPIRIN 81 MG CHEWABLE TABLET SCH MG: 81 TABLET CHEWABLE at 20:02

## 2021-02-15 RX ADMIN — VORICONAZOLE SCH MLS/HR: 10 INJECTION, POWDER, LYOPHILIZED, FOR SOLUTION INTRAVENOUS at 13:20

## 2021-02-15 RX ADMIN — VORICONAZOLE SCH MLS/HR: 10 INJECTION, POWDER, LYOPHILIZED, FOR SOLUTION INTRAVENOUS at 20:37

## 2021-02-15 RX ADMIN — AMMONIUM LACTATE SCH APPLIC: 12 LOTION TOPICAL at 20:02

## 2021-02-15 RX ADMIN — METOPROLOL TARTRATE SCH MG: 25 TABLET, FILM COATED ORAL at 20:02

## 2021-02-15 RX ADMIN — TRIAMCINOLONE ACETONIDE SCH APPLIC: 1 CREAM TOPICAL at 20:02

## 2021-02-15 RX ADMIN — APIXABAN SCH MG: 5 TABLET, FILM COATED ORAL at 10:40

## 2021-02-15 RX ADMIN — SODIUM CHLORIDE, PRESERVATIVE FREE SCH ML: 5 INJECTION INTRAVENOUS at 10:41

## 2021-02-15 NOTE — P.PN
Subjective


Progress Note Date: 02/15/21





HISTORY OF PRESENT ILLNESS:  Patient examined this morning.  Patient is sitting 

up in the chair.  He denies chest pain or pressure.  He denies shortness of 

breath.  He remains in sinus mechanism on telemetry. Left pleurx chest tube 

remains in place with intermittent air leak. Patient is frustrated and wants to 

be discharged home.





PHYSICAL EXAM: 


VITAL SIGNS: Reviewed.


GENERAL: Well-developed in no acute distress. 


NECK: Supple. No JVD or thyromegaly


LUNGS: Respirations even and unlabored. Lungs diminished. Left Pleurx chest tube

noted.


HEART: Regular rate and rhythm.  S1 and S2 heard.


EXTREMITIES: Normal range of motion.  No clubbing or cyanosis.  Peripheral 

pulses intact.  1+ bilateral lower extremity edema





ASSESSMENT: 


Typical atrial flutter with RVR, s/p cardioversion and ablation


Acute hypoxic respiratory failure


Fungal pneumonia with empyema


Hypertension


Hyperlipidemia





PLAN: 


Continue Eliquis


Continue amiodarone 100 mg by mouth daily for one month then discontinue per Dr. Pimentel


Continue beta blocker. Dose was decreased yesterday.


Continue telemetry monitoring


Pulmonary and cardiothoracic surgery following


Further recommendations pending patient course





Nurse practitioner note has been reviewed by physician. Signing provider agrees 

with the documented findings, assessment, and plan of care. 








Objective





- Vital Signs


Vital signs: 


                                   Vital Signs











Temp  98.5 F   02/15/21 08:00


 


Pulse  98   02/15/21 08:00


 


Resp  17   02/15/21 04:00


 


BP  99/57   02/15/21 08:00


 


Pulse Ox  95   02/15/21 08:00








                                 Intake & Output











 02/14/21 02/15/21 02/15/21





 18:59 06:59 18:59


 


Intake Total 1509 30 590


 


Output Total 1540 900 20


 


Balance -31 -870 570


 


Weight  101.9 kg 


 


Intake:   


 


  IV 20 30 


 


    Invasive Line 6 20 30 


 


  Oral 1489  590


 


Output:   


 


  Chest Tube Drainage 40 10 10


 


    Left Mid-Axillary Chest 40 10 10


 


  Drainage  10 10


 


    Left Chest  10 10


 


  Urine 1500 880 


 


Other:   


 


  Voiding Method Toilet Toilet 





 Urinal Urinal 


 


  # Voids  1 














- Labs


CBC & Chem 7: 


                                 02/15/21 07:40





                                 02/15/21 07:40


Labs: 


                  Abnormal Lab Results - Last 24 Hours (Table)











  02/15/21 02/15/21 Range/Units





  07:40 07:40 


 


WBC  13.0 H   (3.8-10.6)  k/uL


 


RBC  3.59 L   (4.30-5.90)  m/uL


 


Hgb  9.6 L   (13.0-17.5)  gm/dL


 


Hct  32.3 L   (39.0-53.0)  %


 


MCHC  29.6 L   (31.0-37.0)  g/dL


 


RDW  19.4 H   (11.5-15.5)  %


 


Carbon Dioxide   35 H  (22-30)  mmol/L


 


Total Bilirubin   1.4 H  (0.2-1.3)  mg/dL


 


AST   146 H  (17-59)  U/L


 


ALT   125 H  (4-49)  U/L


 


Alkaline Phosphatase   495 H  ()  U/L


 


Total Protein   5.7 L  (6.3-8.2)  g/dL


 


Albumin   2.4 L  (3.5-5.0)  g/dL

## 2021-02-15 NOTE — P.PN
Subjective


Progress Note Date: 02/15/21


Principal diagnosis: 





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 

species, acute hypoxic respiratory failure with sepsis on admission.  Radius his

tory of multiple admissions for pneumonia with empyema on the left and previous 

bilateral chest tube placement in June 2020 with cultures positive for strep 

pneumonia, rheumatoid arthritis with rheumatoid lung, hypertension, 

hypothyroidism, DVT, previous tobacco dependence, family history of lung cancer,

and recent hospitalization for hypotension, dehydration, acute kidney injury 

requiring dialysis





POD #14 left-sided thoracentesis by interventional radiology





POD #7 left Pleurx catheter placement with irrigation of pleural space and 

instillation of amphotericin B solution under thoracoscopic guidance





Extensive subcutaneous emphysema





Persistent atrial flutter with RVR, status post electrical cardioversion, status

post radiofrequency ablation





The patient is currently sitting up in a recliner on the cardiac stepdown unit 

in no acute distress.  Denies pain, states shortness of breath has continued to 

improve, appears comfortable.  Subcutaneous emphysema continues to appear 

slightly better daily.  Remains on IV Lasix and IV voriconazole.  Remains 

afebrile, currently in sinus rhythm and hemodynamically stable.  Left sided 

Pleurx catheter continues to wall suction, intermittent air leak present when 

patient is sitting up and coughing, no air leak present otherwise.  Patient is 

getting very discouraged, would like to go home although does understand our 

concerns for safety with presence of air leak





Objective





- Vital Signs


Vital signs: 


                                   Vital Signs











Temp  98.5 F   02/15/21 08:00


 


Pulse  98   02/15/21 08:00


 


Resp  17   02/15/21 04:00


 


BP  99/57   02/15/21 08:00


 


Pulse Ox  95   02/15/21 08:00








                                 Intake & Output











 02/14/21 02/15/21 02/15/21





 18:59 06:59 18:59


 


Intake Total 1509 30 590


 


Output Total 1540 900 20


 


Balance -31 -870 570


 


Weight  101.9 kg 


 


Intake:   


 


  IV 20 30 


 


    Invasive Line 6 20 30 


 


  Oral 1489  590


 


Output:   


 


  Chest Tube Drainage 40 10 10


 


    Left Mid-Axillary Chest 40 10 10


 


  Drainage  10 10


 


    Left Chest  10 10


 


  Urine 1500 880 


 


Other:   


 


  Voiding Method Toilet Toilet 





 Urinal Urinal 


 


  # Voids  1 














- Constitutional


General appearance: Present: cooperative, no acute distress





- Respiratory


Details: 





Lungs sounds diminished in the bases bilaterally.  Respirations even, 

nonlabored.  Currently on 2 L nasal cannula with oxygen saturation in the 90s.  

Able to achieve 1000 mL on his incentive spirometry.  Strong cough.  Left sided 

Pleurx catheter present to continuous wall suction, 140 mL cloudy drainage in 

the last 24 hours, intermittent air leak present when sitting up and coughing, 

no air leak otherwise.  Subcutaneous emphysema to both arms, chest and back 

remains








- Cardiovascular


Details: 





S1, S2 present.  Regular rate and rhythm, sinus rhythm on telemetry with heart 

rate in the 90s.  Palpable peripheral pulses bilaterally.  Bilateral lower 

extremity edema present.  No calf pain or tenderness noted.








- Gastrointestinal


Gastrointestinal Comment(s): 





Abdomen soft, nontender, nondistended.  Active bowel sounds present 4 

quadrants.  Tolerating diet.  Positive bowel movement 2/12








- Genitourinary


Genitourinary Comment(s): 





Continues to void








- Integumentary


Integumentary Comment(s): 





Skin is warm and dry with evidence of good perfusion





- Neurologic


Neurologic: Present: CNII-XII intact





- Musculoskeletal


Musculoskeletal: Present: gait normal, strength equal bilaterally





- Psychiatric


Psychiatric: Present: A&O x's 3, appropriate affect, intact judgment & insight





- Allied health notes


Allied health notes reviewed: nursing





- Labs


CBC & Chem 7: 


                                 02/15/21 07:40





                                 02/15/21 07:40


Labs: 


                  Abnormal Lab Results - Last 24 Hours (Table)











  02/15/21 02/15/21 Range/Units





  07:40 07:40 


 


WBC  13.0 H   (3.8-10.6)  k/uL


 


RBC  3.59 L   (4.30-5.90)  m/uL


 


Hgb  9.6 L   (13.0-17.5)  gm/dL


 


Hct  32.3 L   (39.0-53.0)  %


 


MCHC  29.6 L   (31.0-37.0)  g/dL


 


RDW  19.4 H   (11.5-15.5)  %


 


Carbon Dioxide   35 H  (22-30)  mmol/L


 


Total Bilirubin   1.4 H  (0.2-1.3)  mg/dL


 


AST   146 H  (17-59)  U/L


 


ALT   125 H  (4-49)  U/L


 


Alkaline Phosphatase   495 H  ()  U/L


 


Total Protein   5.7 L  (6.3-8.2)  g/dL


 


Albumin   2.4 L  (3.5-5.0)  g/dL














- Imaging and Cardiology


Chest x-ray: report reviewed, image reviewed





Assessment and Plan


Assessment: 





1.  Bilateral pneumonia,  left trapped lung with fungal empyema growing 

Aspergillus species, status post left-sided thoracentesis, status post Pleurx 

catheter placement with instillation of amphotericin B


2.  Acute hypoxic respiratory failure with sepsis on admission


3.  Multiple admissions for pneumonia with empyema on the left and previous 

bilateral chest tube placement in June 2020 with cultures positive for strep 

pneumonia


4.  Rheumatoid arthritis with rheumatoid lung


5.  Hypertension, currently hypotensive, requiring pressors on admission


6.  Hypothyroidism


7.  History of DVT


8.  Previous tobacco dependence


9.  Family history of lung cancer


10.  Recent hospitalization for hypotension, dehydration, acute kidney injury 

requiring dialysis


11.  Extensive subcutaneous emphysema


12.  Persistent atrial flutter with RVR, status post electrical cardioversion 

and radiofrequency ablation, currently sinus


Plan: 





1.  Continue Pleurx catheter with -20 cm continuous wall suction.  Will monitor 

for resolution of air leak and decreased subcu emphysema


2.  Daily chest x-rays


3.  Encourage incentive spirometry.  Bronchodilators per pulmonology


4.  Amiodarone, Eliquis per cardiology


5.  Continue voriconazole per infectious disease recommendations


6.  Increase activity as tolerated.  Suction tubing extended so patient may 

ambulate in the room, may remove suction for short times for patient to ambulate

in the hallway


7.  Management of other comorbidities per primary care service.


8.  Teaching done with patient and wife regarding Pleurx catheter care.  Will 

continue to reinforce while patient is hospitalized


9.  More recommendations to follow





Time with Patient: Greater than 30

## 2021-02-15 NOTE — XR
EXAMINATION TYPE: XR chest 1V portable

 

DATE OF EXAM: 2/15/2021

 

Comparison: 2/14/2021 and 2/13/2021

 

Clinical History: 60-year-old male Trapped lung

 

Findings:

Continued diffuse severe bilateral subcutaneous emphysema. Patchy confluent airspace opacity througho
ut the right lung persists. Patchy opacity left mid and lower lung persists. There is a left-sided pl
eural catheter demonstrated and suspected stable loculated small left basilar pneumothorax measuring 
approximately 2 cm. Heart mildly enlarged.

 

 

IMPRESSION:

 

1. Overall stable exam allowing for limitations secondary to severe subcutaneous emphysema.

2. Left-sided pleural catheter remains in place with suspected stable 2 cm small left basilar pneumot
horax.

3. Airspace disease throughout the right lung is similar from 2/14/2021 but show some improvement fro
m 2/13/2021. Patchy airspace disease left lower lung is also unchanged from 2/14/2021.

## 2021-02-15 NOTE — P.PN
Subjective


Progress Note Date: 02/15/21


Principal diagnosis: 


 Pneumonia, hydropneumothorax, septic shock





60-year-old white male patient, with past medical history of rheumatoid 

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 

episodes of pneumonia and history of loculated empyema requiring chest tube 

placement back in July 2020 with pleural fluid cultures positive for 

streptococcal pneumonia.  Other medical history includes hypertension, 

hypothyroidism and previous history of DVT.  Patient had a recent 

hospitalization from January 18 through 01/21/2021 hypotension, dehydration 

related to nausea vomiting diarrhea, acute kidney injury.  Patient received 

antibiotics in the form of Zosyn and Levaquin for possibility of pneumonia, he 

is chest x-ray showed cranial perihilar pulmonary infiltrates with increased 

interstitial changes at the lung bases and was thought to be related to a combi

nation of rheumatoid lung and chronic scarring.  His last CT of the chest was on

12/29/2020 showing chronic changes to lower lungs, persistent irregular thick-

walled pleural spaces in the posterior lung bases containing fluid and air with 

adjacent anterior lung with chronic consolidation or atelectasis.  On 01/29/2021

patient came into the emergency department from Dr. SARINA Santo's office where he w

as being seen for a regular follow-up appointments.  He was noted to have low 

pulse ox 70s and was sent in to the emergency department for evaluation.  He has

been having cough with yellow and sometimes blood-tinged sputum, appears 

amounts, denies any fever, COVID 19 was found to be negative, chest x-ray showed

perihilar and basilar infiltrates without significant change from previous chest

x-ray on 01/19/2021.  Lab data showed leukocytosis with white blood cell count 

of 19.1, hemoglobin of 12.1, INR 1.5, sodium is 135, potassium is 3.9, chloride 

is 102, CO2 is 19, creatinine is 1.32, which has actually improved since his 

discharge from the hospital on 01/21/2021 when he was at 1.84.  Lactic acid was 

elevated at 3.7, patient was given a total of 2 L in fluid boluses, troponin was

negative at less than 0.012.  Urinalysis showed no evidence of infection.  CT 

chest showed thick-walled pleural cavities posteriorly in the lower lungs with 

left-sided air-fluid level with left-sided pleural fluid diminished most recent 

CT from 12/29/2020.  There is associated compressive atelectasis and/or chronic 

consolidation.  Persistent as it is lobe fissure, multifocal areas of reticular 

nodular opacity in the upper lungs remain present with slight nodularity in the 

left upper lobe.  There is a focal consolidation improved from most recent study

with some residual areas of scarring.  We will emergency department patient 

became hypotensive with a blood pressure in the 70s, slightly tachycardic r

emains in sinus mechanism, he is receiving his third liter bolus, his lactic 

acid did respond and improved he was started on antibiotics in the form of Zosyn

and vancomycin 





The patient is seen today January 30th 2021 in the intensive care unit.  He is 

currently sitting up in a chair at the bedside.  Awake and alert in no acute 

distress.  Maintaining O2 saturations in the 90s on 2 L/m per nasal cannula.  He

did require a small dose of norepinephrine currently on 0.05 mcg/kg/m.  He has 

lactated Ringer's at 100 MLS per hour.  He remains on vancomycin and cefepime.  

Chest x-ray continues to show the left lower lobe effusion.  White count 11.7.  

Hemoglobin 9.7.  Sodium 135.  Potassium 3.6.  Creatinine 1.00.  Blood cultures 

reveal no growth to date.





Reevaluated today on 1/31/2021, remains in the ICU, still on norepinephrine at 

0.04 mcg/kg/m.  On LR at 1 25 mL per hour.  On vancomycin and cefepime 

empirically, blood pressure remains marginal, went ahead and recommended Solu-

Cortef 100 mg IV push every 8 hours and hopefully will be able to discontinue 

norepinephrine today.  His IV fluid is cut down to KVO.  Given 20 mg of Lasix IV

push, and I have recommended that we monitor the patient in the ICU for the next

24 hours.  Chest x-ray is basically the same, continues to show chronic finding,

difficult to exclude underlying pneumonia/empyema.  CT of the chest on admission

was also reviewed, difficult to rule out underlying empyema.  Clinically the 

patient is feeling better except for generally weak.  WBC count is 12.5 

hemoglobin is 9.9.  Normal renal profile is normal blood cultures are negative 

so far.  Sputum cultures are also negative so far.





On 02/01/2021 patient seen in follow-up in the intensive care unit, he is awake 

and alert, oriented 3, sitting up in the recliner, currently on 2 L of oxygen 

his pulse ox is %, his been afebrile, hemodynamically he is stable, he is 

not on any vasopressor support.  His lactate Ringer's running at 20 ML per hour,

antibiotics include cefepime and vancomycin, blood pressure has been stable, so 

far blood and sputum cultures are negative, he denies chest pain, his breathing 

is comfortable.  Today's labs have been reviewed, showing white blood cell count

trending down, 10.2 today, hemoglobin is 8.8, electrolytes and renal profile are

unremarkable.  Serum cortisol level came back at 3, patient continues on stress 

doses of hydrocortisone 100 mg every 8 hours.  Patient has not been stable, 

we'll consult interventional radiology for ultrasound-guided left thoracentesis





On 02/02/2021 patient seen in follow-up in the intensive care unit, he is calm 

and comfortable, awake and alert, sitting up in the recliner, currently on room 

air with a pulse ox of 93-94%, his been afebrile, hemodynamically he is been 

stable, he is on IV LR at 20 ML per hour, no other drips.  He is on cefepime and

vancomycin for antibiotic coverage, yesterday he underwent left thoracentesis 

with removal of 43 mL of pleural fluid which was sent for cultures.  Tolerated 

procedure well.  His pleural fluid analysis revealed total fluid protein of 1.3 

g, and fluid LDH of greater than 4500 consistent with exudative fluid.  His 

microbiology data has been reviewed showing Aspergillus fumigate is in the 

sputum, his pleural fluid cultures are pending at this time, the cultures have 

shown no growth, hemodynamically has been stable, he is breathing easier, he 

still has cough mostly in the morning and the amount of secretions his bringing 

up is decreasing in amount.  Lung sounds revealed a few scattered wheezes, and a

few rhonchi at bilateral bases.  Is tolerating oral intake.  No abdominal pain, 

no nausea or vomiting, today's labs have been reviewed, white blood cell count 

is 11.2, hemoglobin is 9.1, sodium is 137, potassium is 4.0, chloride is 109, 

BUN is 15, creatinine 0.80.  No acute events overnight.  He is working on 

incentive spirometer, he is achieving 1000 today





On 02/03/2021 patient seen in follow-up on medical surgical floor.  He is awake 

and alert, in no acute distress, breathing comfortably, denies any chest pain, 

room air pulse ox is 94%, no fever or chills, today's chest x-ray shows left 

basilar loculated pneumothorax, stable in appearance, bilateral lung 

infiltrates.  His pleural fluid cultures so far showing Aspergillus species, as 

does his sputum culture.  Blood culture show no growth.  The cervix is 

following, current antibiotic coverage includes cefepime, vancomycin has been 

discontinued, she received 1 dose of IV Lasix per nephrology.  He has produced 

2500 in urine output, and he is in -1.6 L over the last 24 hours, however he 

still has significant lower extremity edema





On 02/05/2021 patient seen in follow-up in the intensive care unit, he is awake 

and alert, in no acute distress, on room air, his pulse ox is 93%, currently on 

amiodarone at 0.5 mg/m, heparin infusion per weight based protocol, 0.9 normal 

saline at a rate of 10 ML per hour, his heart rate is still poorly controlled, 

he remains in atrial flutter with a rate of 140 BPM.  He's had no fever or 

chills, he status post ultrasound-guided thoracentesis of the left pleural space

with removal of 45 mL of pleural fluid in the cultures were positive for 

Aspergillus.  Infectious disease service is following, and voriconazole was 

started.  Clinically patient denies any shortness of breath, no cough, no 

compressive chest pain, we discussed the case with the cardiothoracic surgery 

yesterday, and plan is to proceed with the placement of Pleurx catheter into the

left pleural space today.





On 02/10/2021 patient seen in follow-up.  Sitting up in the recliner, breathing 

comfortably, his on 2 L of oxygen pulse ox of 90%, he's been afebrile, cm

bcutaneous emphysema slightly worsened especially involving right upper chest 

area, left chest Pleurx catheter remains in place, connected to Pleur-evac to 

suction and there is some intermittent air leak still present, his chest x-ray 

shows severe bilateral septic hence emphysema, small left basilar 

hydropneumothorax, and bilateral patchy infiltrates right greater than left not 

significantly changed, patient remains on voriconazole, and his left chest 

pleural fluid fungal culture was positive for Aspergillus fumigatus.  Total 

fluid cytology was not sent however pathology lab still has the pleural fluid 

which we will request cytology. 





On 02/11/2021 patient seen in follow-up.  He is resting in bed, subcu emphysema 

in his bilateral upper chest seems to have slightly progressed since yesterday, 

no significant extension to his neck area, his voice is still left chest Pleurx 

catheter in place connected to Pleur-evac and there is intermittent air leak at 

times it is more continuous.  There has only been 85 mL of serosanguineous 

output in the last 24 hours, pleural fluid cytology was sent from the pleural 

fluid collected on 01/10/2021, results are pending.  Continues on voriconazole 

for evidence of Aspergillus fungal infection in the pleural space.  Today's labs

reviewed, showing blood vessel, 16, hemoglobin is 9.7, sodium is 143, potassium 

is 2.8, chloride is 100 CO2 is 27 BUN is 28 and creatinine 0.78, LFTs seems to 

have acutely increased, with AST up to 127, ALT is 120, and alkaline phosphatase

of 320.  No fever or chills, patient of breath but no acute distress, today's 

chest x-ray shows extensive subcutaneous emphysema, small pneumothoraces at the 

lung bases, in worsening-appearing infiltrate through the right lung base.





On 02/12/2021 patient seen in follow-up.  His left-sided Pleurx catheter remains

connected to Pleur-evac continuous wall suction, and the air leak is slightly 

better however it still consistent and intermittent in nature today, his 

subcutaneous emphysema remains stable, has not significantly increased.  Room 

air pulse ox is 90%, patient is working on the incentive spirometer, he is 

pulling [id number] on the today, he's been afebrile, he remains on voriconazole 

for antibiotic coverage, chest x-ray today shows a left basilar pleural catheter

unchanged in position, and extensive subcutaneous emphysema throughout the chest

wall and neck, there is no sizable pneumothorax, and there is diffuse infiltrate

throughout the right lung, left lower lobe.  Today's labs have been reviewed, 

when blood cell count is 15.8, hemoglobin is 10, sodium is 141, potassium is 

3.0, chloride is 99, CO2 is 37, B1 is 27, creatinine 0.82, AST is 209, ALT is 

193, alkaline phosphatase is 425, and his liver enzymes are trending up.  We 

will ask infectious disease to see if this could be related to the antibiotics





On 02/15/2021 patient seen in follow-up on selective care unit, left-sided 

Pleurx chest tube catheter remains in place, connected to Pleur-evac, and con

tinuous wall suction, there is a intermittent air leak still present, patient 

still has subcutaneous emphysema involving his upper chest, relatively stable in

appearance, ration is on 2 L of oxygen pulse ox is 97%, he's been afebrile, 

hemodynamic we his been stable, no worsening dyspnea, he is working on incentive

spirometer, his left-sided chest tube is putting out very small amount of 

output.  Remains on voriconazole for Aspergillus in the pleural fluid cultures. 

Clinically stable, status post electrical cardioversion for atrial flutter with 

RVR.  He is on diuretics, he is in -900 mL fluid balance, today's chest x-ray 

shows stable exam with severe subcutaneous emphysema, stable 2 cm small left 

basilar pneumothorax, airspace disease throughout the right lung with some 

improvement compared to 13 2021, left lower lung patchy airspace disease is 

unchanged.  Today's labs have been reviewed, showing white blood cell, 13.0, 

hemoglobin is 9.6, electrolytes unremarkable, with the exception of the CO2 of 

35, renal profile is unremarkable, liver enzymes show AST of 146 which has 

increased since yesterday, ALT of 125 slightly increased since yesterday, 

alkaline phosphatase at 495.  This could possibly be related to voriconazole or 

amiodarone, and we have asked infectious disease service to consider possible 

dose reduction of voriconazole, and this may be an additive effect together with

amiodarone











Objective





- Vital Signs


Vital signs: 


                                   Vital Signs











Temp  98.5 F   02/15/21 08:00


 


Pulse  98   02/15/21 12:00


 


Resp  17   02/15/21 04:00


 


BP  93/52   02/15/21 12:00


 


Pulse Ox  97   02/15/21 12:00








                                 Intake & Output











 02/14/21 02/15/21 02/15/21





 18:59 06:59 18:59


 


Intake Total 1509 30 830


 


Output Total 1540 900 620


 


Balance -31 -870 210


 


Weight  101.9 kg 101.9 kg


 


Intake:   


 


  IV 20 30 


 


    Invasive Line 6 20 30 


 


  Oral 1489  830


 


Output:   


 


  Chest Tube Drainage 40 10 10


 


    Left Mid-Axillary Chest 40 10 10


 


  Drainage  10 10


 


    Left Chest  10 10


 


  Urine 1500 880 600


 


Other:   


 


  Voiding Method Toilet Toilet 





 Urinal Urinal 


 


  # Voids  1 














- Exam


GENERAL EXAM: Alert, pleasant, 60-year-old male patient, on 2 L of oxygen with a

pulse ox of 94%, currently sitting up in chair at the bedside, comfortable in no

apparent distress.


HEAD: Normocephalic/atraumatic.


EENT: PERRLA, EOMI, nonicteric.  Moist mucous membranes, no neck masses, no JVD,

no stridor.


CHEST: No chest wall deformity.  Symmetrical expansion.  Left chest Pleurx 

catheter connected to Pleur-evac, to wall suction, with intermittent air leak 

present, serosanguineous output in the Pleur-evac.  There is extensive 

subcutaneous emphysema in his anterior bilateral upper chest, extending into his

neck and shoulders


LUNGS: Bibasilar rhonchi, and a few scattered wheezes


CVS: Regular rate and rhythm, normal S1 and S2, no gallops, no murmurs, no rubs


ABDOMEN: Soft, nontender.  No hepatosplenomegaly, normal bowel sounds, no 

guarding or rigidity.


EXTREMITIES: No clubbing, 1+ lower extremity edema, no cyanosis, 2+ pulses and 

upper and lower extremities.


MUSCULOSKELETAL: Muscle strength and tone normal.


SPINE: No scoliosis or deformity


SKIN: No rashes


CENTRAL NERVOUS SYSTEM: Alert and oriented -3.  No focal deficits, tone is 

normal in all 4 extremities.


PSYCHIATRIC: Alert and oriented -3.  Appropriate affect.  Intact judgment and 

insight.











- Labs


CBC & Chem 7: 


                                 02/15/21 07:40





                                 02/15/21 07:40


Labs: 


                  Abnormal Lab Results - Last 24 Hours (Table)











  02/15/21 02/15/21 Range/Units





  07:40 07:40 


 


WBC  13.0 H   (3.8-10.6)  k/uL


 


RBC  3.59 L   (4.30-5.90)  m/uL


 


Hgb  9.6 L   (13.0-17.5)  gm/dL


 


Hct  32.3 L   (39.0-53.0)  %


 


MCHC  29.6 L   (31.0-37.0)  g/dL


 


RDW  19.4 H   (11.5-15.5)  %


 


Carbon Dioxide   35 H  (22-30)  mmol/L


 


Total Bilirubin   1.4 H  (0.2-1.3)  mg/dL


 


AST   146 H  (17-59)  U/L


 


ALT   125 H  (4-49)  U/L


 


Alkaline Phosphatase   495 H  ()  U/L


 


Total Protein   5.7 L  (6.3-8.2)  g/dL


 


Albumin   2.4 L  (3.5-5.0)  g/dL














Assessment and Plan


Plan: 


 Assessment:





#1.  Acute hypoxic respiratory failure related to sepsis, septic shock,  related

to pneumonia, with chronic loculated effusions, rule out possibility of empyema.

COVID 19 was ruled out per PCR.  Pleural fluid culture positive for Aspergillus 

fumigators, patient is on voriconazole, ideally patient would benefit from 

surgical intervention including thoracoscopy/decortication however this could be

an extensive surgery for him in patient had the left chest Pleurx catheter 

inserted for drainage





Patient is post insertion of a left-sided Pleurx catheter and instillation of 

amphotericin B into the pleural space, the left lung was trapped with fungal 

empyema, patient has intermittent air leak, and there is a subcutaneous 

emphysema involving neck, upper chest





#2.  Bilateral hydropneumothorax, rule out possibility of empyema, status post 

ultrasound-guided left-sided thoracentesis on 02/01/2021 would removal of 45 mL 

of cloudy white fluid, which was exudative in nature, pleural fluid culture only

showing Aspergillus, antibiotic coverage is with cefepime and vancomycin, it 

service is following, CT surgery has no plans for surgical intervention at this 

time.  on 02/10/2021 patient is on voriconazole for evidence of Aspergillus 

fumigators in the pleural fluid





#3.  Recent admission to the hospital for hypotension, dehydration related to 

nausea vomiting diarrhea, acute kidney injury





#4.  Recurrent pulmonary infections, with history of streptococcal pneumonia and

empyema requiring chest tube placement in July 2020





#5.  Chronic loculated pneumothoraces, posterior, with air-fluid level seen on 

the CT imaging of the chest, the possibility of trapped lung, chronic scarring 

and possibility of empyema needs to be ruled out





#6.  Elevated d-dimer with no CT evidence for pulmonary embolism





#7.  History of rheumatoid arthritis and rheumatoid lung disease on Arava





#8.  Recent history of acute kidney injury, and admission lab work today shows 

improvement in his renal function





#9.  Hypertension





#10.  Hypothyroidism





#11.  Previous history of foot infections requiring antibiotics





#12.  Previous history of DVT





#13.  Transaminitis possibly related to antifungal antibiotics, will obtain 

follow-up labs





Plan:





Continue the Pleurx chest tube to continuous wall suction, still has the 

intermittent air leak which is decreasing, continue deep breathing and coughing,

no acute events overnight, ID service to address possible dose adjustment of 

voriconazole, continue monitoring LFTs, will follow


I performed a history & physical examination of the patient and discussed their 

management with my nurse practitioner, Lorena Bonilla.  I reviewed the nurse 

practitioner's note and agree with the documented findings and plan of care.  

Lung sounds are positive for diminished breath sounds.  The findings and the 

impression was discussed with the patient.  I attest to the documentation by the

nurse practitioner. 





Time with Patient: Less than 30

## 2021-02-15 NOTE — PN
PROGRESS NOTE



DATE OF SERVICE:

02/15/2021



REASON FOR FOLLOWUP:

Aspergillus pneumonia and empyema.



INTERVAL HISTORY:

Patient is currently afebrile.  The patient is breathing comfortably, slightly upset

about being stuck in the hospital for 16 days, but denies having any chest pain or

cough.  No abdominal pain.  No diarrhea.



PHYSICAL EXAMINATION:

Blood pressure 97/54 with a pulse of 98, temperature 98.5.

General description is a middle-aged male lying in bed in no distress.

Respiratory system: Unlabored breathing, decreased breath sounds in the bases. No

wheeze.

HEART:  S1, S2.  Regular rate and rhythm.

ABDOMEN:  Soft, no tenderness.



LABORATORY STUDIES:

Hemoglobin 9.6, white count 13,000, BUN of 17, creatinine 0.70.



DIAGNOSTIC IMPRESSION AND PLAN:

Patient with Aspergillus pneumonia, empyema, status post thoracotomy and PleurX

catheter placement, now with concern for a leak.  The patient is currently on V-Fen.

Liver enzymes mildly elevated. Amiodarone was discussed with cardiologist, recommending

to be discontinued to decrease risk of interaction between these two medications.

Monitor clinical course closely.





MMODL / IJN: [id number] / Job#: [id number]

## 2021-02-16 RX ADMIN — POTASSIUM CHLORIDE SCH: 14.9 INJECTION, SOLUTION INTRAVENOUS at 12:16

## 2021-02-16 RX ADMIN — IPRATROPIUM BROMIDE AND ALBUTEROL SULFATE SCH: .5; 3 SOLUTION RESPIRATORY (INHALATION) at 07:40

## 2021-02-16 RX ADMIN — LEVOTHYROXINE SODIUM SCH MCG: 75 TABLET ORAL at 05:42

## 2021-02-16 RX ADMIN — ASPIRIN 81 MG CHEWABLE TABLET SCH MG: 81 TABLET CHEWABLE at 19:56

## 2021-02-16 RX ADMIN — METOPROLOL TARTRATE SCH MG: 25 TABLET, FILM COATED ORAL at 08:36

## 2021-02-16 RX ADMIN — TRIAMCINOLONE ACETONIDE SCH APPLIC: 1 CREAM TOPICAL at 19:58

## 2021-02-16 RX ADMIN — SODIUM CHLORIDE, PRESERVATIVE FREE SCH ML: 5 INJECTION INTRAVENOUS at 08:40

## 2021-02-16 RX ADMIN — AMIODARONE HYDROCHLORIDE SCH MG: 100 TABLET ORAL at 08:36

## 2021-02-16 RX ADMIN — METOPROLOL TARTRATE SCH MG: 25 TABLET, FILM COATED ORAL at 19:56

## 2021-02-16 RX ADMIN — APIXABAN SCH MG: 5 TABLET, FILM COATED ORAL at 08:36

## 2021-02-16 RX ADMIN — AMMONIUM LACTATE SCH APPLIC: 12 LOTION TOPICAL at 08:36

## 2021-02-16 RX ADMIN — VORICONAZOLE SCH MLS/HR: 10 INJECTION, POWDER, LYOPHILIZED, FOR SOLUTION INTRAVENOUS at 12:16

## 2021-02-16 RX ADMIN — OXYCODONE HYDROCHLORIDE AND ACETAMINOPHEN SCH MG: 500 TABLET ORAL at 19:56

## 2021-02-16 RX ADMIN — Medication SCH MCG: at 19:56

## 2021-02-16 RX ADMIN — APIXABAN SCH MG: 5 TABLET, FILM COATED ORAL at 19:56

## 2021-02-16 RX ADMIN — IPRATROPIUM BROMIDE AND ALBUTEROL SULFATE SCH: .5; 3 SOLUTION RESPIRATORY (INHALATION) at 11:05

## 2021-02-16 RX ADMIN — IPRATROPIUM BROMIDE AND ALBUTEROL SULFATE SCH: .5; 3 SOLUTION RESPIRATORY (INHALATION) at 19:34

## 2021-02-16 RX ADMIN — SODIUM CHLORIDE, PRESERVATIVE FREE SCH ML: 5 INJECTION INTRAVENOUS at 19:56

## 2021-02-16 RX ADMIN — PANTOPRAZOLE SODIUM SCH MG: 40 TABLET, DELAYED RELEASE ORAL at 05:42

## 2021-02-16 RX ADMIN — CEFAZOLIN SCH: 330 INJECTION, POWDER, FOR SOLUTION INTRAMUSCULAR; INTRAVENOUS at 12:16

## 2021-02-16 RX ADMIN — AMMONIUM LACTATE SCH APPLIC: 12 LOTION TOPICAL at 19:58

## 2021-02-16 RX ADMIN — TRIAMCINOLONE ACETONIDE SCH APPLIC: 1 CREAM TOPICAL at 08:36

## 2021-02-16 NOTE — XR
EXAMINATION TYPE: XR chest 1V portable

 

DATE OF EXAM: 2/16/2021

 

COMPARISON: 2/15/2021

 

INDICATION: Trapped lung

 

TECHNIQUE: Single frontal view of the chest is obtained.

 

FINDINGS:  

The heart size is normal.  

The pulmonary vasculature is normal.  

There is right upper lobe infiltrate. Some mild peripheral infiltrate on the right may be present. Le
ft basilar peripheral infiltrate is present.

 

There is a chest tube on the left. Pneumothorax is not identified. Extensive subcutaneous emphysema h
owever limits evaluation for small pneumothorax. Minimal left apical pneumothorax could be considered
.  

 

 

IMPRESSION:  

1. No definite large pneumothorax. Small left apical pneumothorax could be considered.

2. Bilateral peripheral infiltrates in the right upper lobe infiltrate

## 2021-02-16 NOTE — P.PN
Subjective


Progress Note Date: 02/16/21


Principal diagnosis: 





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 

species, acute hypoxic respiratory failure with sepsis on admission.  Radius his

tory of multiple admissions for pneumonia with empyema on the left and previous 

bilateral chest tube placement in June 2020 with cultures positive for strep 

pneumonia, rheumatoid arthritis with rheumatoid lung, hypertension, 

hypothyroidism, DVT, previous tobacco dependence, family history of lung cancer,

and recent hospitalization for hypotension, dehydration, acute kidney injury 

requiring dialysis





POD #15 left-sided thoracentesis by interventional radiology





POD #8 left Pleurx catheter placement with irrigation of pleural space and 

instillation of amphotericin B solution under thoracoscopic guidance





Extensive subcutaneous emphysema





Persistent atrial flutter with RVR, status post electrical cardioversion, status

post radiofrequency ablation





The patient is currently sitting up in a recliner on the cardiac stepdown unit 

in no acute distress.  Denies pain, states shortness of breath has continued to 

improve, appears comfortable.  Remains afebrile, currently in sinus rhythm and 

hemodynamically stable.  Left sided Pleurx catheter continues to wall suction, 

intermittent air leak present.  Patient is getting very discouraged, would like 

to go home although does understand our concerns for safety with presence of air

leak.  States he has been ambulatory in hallway without difficulty





Objective





- Vital Signs


Vital signs: 


                                   Vital Signs











Temp  98.6 F   02/16/21 08:00


 


Pulse  96   02/16/21 08:00


 


Resp  19   02/16/21 04:00


 


BP  87/53   02/16/21 08:00


 


Pulse Ox  95   02/16/21 08:00








                                 Intake & Output











 02/15/21 02/16/21 02/16/21





 18:59 06:59 18:59


 


Intake Total 1070  472


 


Output Total 770 780 400


 


Balance 300 -780 72


 


Weight 101.9 kg 101.6 kg 


 


Intake:   


 


  Oral 1070  472


 


Output:   


 


  Chest Tube Drainage 10 100 


 


    Left Mid-Axillary Chest 10 100 


 


  Drainage 160  


 


    Left Chest 160  


 


  Urine 600 680 400


 


Other:   


 


  Voiding Method  Toilet 





  Urinal 














- Constitutional


General appearance: Present: cooperative, no acute distress





- Respiratory


Details: 





Lungs sounds diminished in the bases bilaterally.  Respirations even, 

nonlabored.  Currently on 2 L nasal cannula with oxygen saturation in the mid 

90s.  Able to achieve 1000 mL on his incentive spirometry.  Strong cough.  Left 

sided Pleurx catheter present to continuous wall suction, 300 mL cloudy drainage

in the last 24 hours, intermittent air leak present.  Subcutaneous emphysema to 

both arms, chest and back remains





- Cardiovascular


Details: 





S1, S2 present.  Regular rate and rhythm, sinus rhythm on telemetry with heart 

rate in the 90s.  Palpable peripheral pulses bilaterally.  Bilateral lower ext

remity edema present.  No calf pain or tenderness noted.





- Gastrointestinal


Gastrointestinal Comment(s): 





Abdomen soft, nontender, nondistended.  Active bowel sounds present 4 

quadrants.  Tolerating diet.  Positive bowel movement 2/12








- Genitourinary


Genitourinary Comment(s): 





Continues to void





- Integumentary


Integumentary Comment(s): 





Skin is warm and dry with evidence of good perfusion





- Neurologic


Neurologic: Present: CNII-XII intact





- Musculoskeletal


Musculoskeletal: Present: gait normal, strength equal bilaterally





- Psychiatric


Psychiatric: Present: A&O x's 3, appropriate affect, intact judgment & insight





- Allied health notes


Allied health notes reviewed: nursing





- Labs


CBC & Chem 7: 


                                 02/15/21 07:40





                                 02/15/21 07:40


Labs: 


                      Microbiology - Last 24 Hours (Table)











 02/01/21 11:30 Acid Fast Bacilli Smear - Final





 Pleural Fluid Acid Fast Bacilli Culture - Preliminary














- Imaging and Cardiology


Chest x-ray: report reviewed, image reviewed





Assessment and Plan


Assessment: 





1.  Bilateral pneumonia,  left trapped lung with fungal empyema growing 

Aspergillus species, status post left-sided thoracentesis, status post Pleurx 

catheter placement with instillation of amphotericin B


2.  Acute hypoxic respiratory failure with sepsis on admission


3.  Multiple admissions for pneumonia with empyema on the left and previous 

bilateral chest tube placement in June 2020 with cultures positive for strep 

pneumonia


4.  Rheumatoid arthritis with rheumatoid lung


5.  Hypertension, currently hypotensive, requiring pressors on admission


6.  Hypothyroidism


7.  History of DVT


8.  Previous tobacco dependence


9.  Family history of lung cancer


10.  Recent hospitalization for hypotension, dehydration, acute kidney injury 

requiring dialysis


11.  Extensive subcutaneous emphysema


12.  Persistent atrial flutter with RVR, status post electrical cardioversion 

and radiofrequency ablation, currently sinus


Plan: 





1.  Continue Pleurx catheter with -20 cm continuous wall suction.  Will monitor 

for resolution of air leak and decreased subcu emphysema


2.  Daily chest x-rays


3.  Encourage incentive spirometry.  Bronchodilators per pulmonology


4.  Amiodarone, Eliquis per cardiology


5.  Continue voriconazole per infectious disease recommendations


6.  Increase activity as tolerated.  Suction tubing extended so patient may 

ambulate in the room, may remove suction for short times for patient to ambulate

in the hallway


7.  Management of other comorbidities per primary care service.


8.  Teaching done with patient and wife regarding Pleurx catheter care.  Will 

continue to reinforce while patient is hospitalized


9.  More recommendations to follow





Time with Patient: Greater than 30

## 2021-02-16 NOTE — P.PN
Subjective


Progress Note Date: 02/15/21





This is a 60-year-old  male patient requesting my service with past 

medical history of rheumatoid arthritis on Arava that was diagnosed when he was 

37 year old initially was started on Methotrexate that he could not tolerate 

then placed on Embrel but he developed side effects and finally was tried on 

Humira injection but he developed neurologic sided effects and was hospitaized 

at [facility] for quiet some time, rheumatoid lung disease, 

previous history of loculated empyema with chest tube placement in July 2020, 

hypertension, hypothyroidism, history of DVT, remote history of tobacco use and 

dependence, was recently hospitalized at [facility] after he was 

admitted for an acute kidney injury with significant metabolic acidosis 

associated with the elevated creatinine and elevated BUN and hypotension at that

time he was seen and evaluated by pulmonary and critical care medicine, he had a

central line placed and at that time, he was placed on Levophed drip he was p

laced on IV anabiotic as well but the patient recovered very fast and he had an 

echocardiogram that showed normal LV function and segmental hypokinesia, he was 

supposed to follow-up with Dr. Santo in the office today, patient was seen in my

office 24 hours ago when he was complaining of increased shortness breath, at 

that time his oxygenation could not be checked because of his Case's and cold

extremities, he was sent for a chest x-ray that showed right lower lobe 

infiltrate as well as blood tests that showed a white count of 20,000 patient 

was feeling better he was started on oral antibiotic in the form of Levaquin 500

mg orally once every day, and that he was supposed to follow-up with me as an 

outpatient every week he went to see his cardiologist today and he had an 

episode when he was dizzy lightheaded and an episode of vomiting as well and he 

was sent to the emergency department for evaluation had a computed tomography of

the chest as well as abdomen and pelvis that showed a thick walled pleural 

cavities posteriorly in the lower lungs with left-sided air-fluid level that has

diminished in size from the prior computed tomography scan when he was in the 

hospital a few weeks back there is also associated compressive atelectasis and 

chronic consultation with multifocal areas of reticular nodular opacity in the 

upper lungs with a slight nodularity of the left upper lobe again this area of 

focal consultation has improved from the previous study that was done few weeks 

ago, patient was started on IV antibiotic with vancomycin and Zosyn as well as 

IV fluid, he was seen in consultation by pulmonary critical care in the 

emergency department as the patient blood pressure dropped and that he'll be 

transferred to the intensive care unit at this point in time I had long conve

rsation with the patient and his wife at the bedside and the patient may need to

have a chest tube placed for his possible right empyema, he did receive 3 L 

normal saline in the ER, and his blood pressure is marginal he may need to go on

 pressors if  not recovered.





1/30:patient is sitting up in chair feeling about the same , complains of 

increased pain in the righ elbow, was seen earlier by pulmonary and the plan was

to go for US-Guided left thoracenthesis due to to loculated left pleural effu

nathaniel and possible empyema, may need VATS, he denies any chest pain or shortness 

of breath, no abdominal pain nausea, vomiting or diarrhea, still have diarrhea 

negative for C.Diff, he seems to have accept to stay in the hospital this time 

as long as he needed to.





1/31: Patient sitting up in the recliner complaining of increased pain in his 

joints due to his rheumatoid arthritis with increased synovitis in both wrists 

both elbows and both shoulders he was started on hydrocortisone 100 mg IV push 

every 8 hours, to try to help with his blood pressure as well as his phonation, 

he is maintained on Norco 5/325 mg one tablet orally every 6 hours as needed for

pain control, patient denies any chest pain is less short of breath, he 

continues to have increased coughing with yellow from production of green phlegm

production, he had no fever or chills at this time he continues to be on 

Levothroid drip, patient has appeared to have increased swelling in both lower 

extremities as well as both ankles, he is maintained on IV antibiotic in the 

form of vancomycin as well as cefepime, infectious disease is following, patient

is scheduled to go for ultrasound guidance thoracentesis of the left loculated 

pleural effusion tomorrow morning.





2/1: Patient remains in the intensive care unit.  He has been afebrile, heart 

rate 100, blood pressure 95/62, pulse ox 97% on 2 L nasal cannula.  Hemoglobin 

8.8 and leukocytosis resolved.  Creatinine 0.76, electrolytes normal.  Sputum 

culture is positive for Aspergillus species.  Blood culture showing no growth. 

Patient underwent diagnostic thoracentesis with interventional radiology today 

with removal of 45 mL of cloudy white fluid.  Chest x-ray reveals no 

complications following left thoracentesis.  Fluid has been sent for culture and

cytology.  Consult in place for cardiothoracic surgery to evaluate for VATS with

decortication.





2/2: Patient remains in the intensive care unit.  He did receive 1 dose of IV 

Lasix this morning ordered by nephrology.  He is followed by cardiothoracic 

surgery was no plan for surgical intervention.  ID has recommended cefepime and 

vancomycin for now.  Expect antifungal agent ended as well.  Patient denies any 

significant shortness of breath.  He is comfortable sitting in a chair.  Patient

is having minimal cough and minimal sputum.  Patient is achieving 1000 ml on 

incentive spirometry.  Culture and cytology reports remain pending from 

thoracentesis. Repeat chest x-ray reveals patchy peripheral lung with lung zone 

and lower lung zone infiltrates persist unchanged.  Patient has been afebrile, 

heart rate in the 90s and low 100s, pulse ox 91 on room air.





2/3: Repeat chest x-ray reveals left-sided basilar loculated pneumothorax, 

stable.  Bilateral lung infiltrates.  Correlate for pneumonia and atelectasis.  

WBC 9, hemoglobin 9.5.  Potassium 3.4 and will be replaced.  Patient is 

continued on cefepime and vancomycin per Dr. Sheets's recommendations.  Patient 

in reaching 1000 on incentive spirometry.  Patient is scheduled to see Dr. Kaye tomorrow.  Cefepime and vancomycin had been discontinued by Dr. Sheets and

started on Voriconazole. 





2/4: A-Team was called this morning regarding elevated heart rate, EKG was 

atrial flutter with RVR and 150 bpm and patient was transferred to ICU as an 

overflow for the cardiac stepdown unit, started on Cardizem drip and consult 

with cardiology.  Heart rate was improved with Cardizem and patient also 

received amiodarone bolus 300 mg.  Patient denies any worsening shortness of 

breath, cough or congestion.  No chest pain or palpitations.  Patient is resting

comfortably.  09, hemoglobin 8.8, potassium 3.4 replaced.  Creatinine 0.89.





2/5: Patient remains in the intensive care unit.  He continues to be in atrial 

flutter with 21 conduction rate.  He is on heparin drip and amiodarone.  He is 

currently in and out of atrial flutter with RVR.  Cardiology is following 

closely and may consider cardioversion tomorrow. WBC 16.6, hgb 9.8, plt 374, 

sodium 143, potassium 3.9, creatinine 0.88.  He has been afebrile, heart rate 

has been at times marginal, pulse ox 90% on room air.  Patient denies having any

fever or chills, no shortness of breath, no cough.  No chest pain.  Patient is 

followed by cardiothoracic surgery and plan is for left-sided PleurX catheter 

placement which is scheduled for Monday.





2/8: Patient is scheduled for PleurX catheter placement this afternoon.  

Cardiology may schedule him for cardioversion.  Dr. Collado is planning on oral 

antimicrobials at the time of discharge.  Patient is bringing up quite a bit of 

sputum.  He has been afebrile, heart rate 68, blood pressure 100/67, pulse ox 

93% on 1 L nasal cannula.  WBC 21, hemoglobin 9.6, platelet count 325.  

Potassium 3, BUN 28 creatinine 0.8.





2/9: Patient is status post PleurX catheter.  Patient has been afebrile, heart 

rate 106, blood pressure 85/53.  Pulse ox 97% on 2 L.  Patient is known to be 

back in atrial fibrillation at rate of 122.  He did go for cardioversion today 

for atrial flutter.  Patient states he has a little pain in the side for which 

she is taking Norco.  Not bad today.  Patient has subcutaneous edema to 

bilateral chest.  He denies having any headache.  BUN 27 creatinine 0.79.  Blood

sugar .





2/10: Patient denies chest pain or shortness of breath.  He is reaching 1250 on 

IS.  PleurX draining small amount of sanguinous fluid.  Patient is in atrial 

flutter.  Dr. Pimenetl is planning for EP study and ablation on Thursday and plan

to continue eliquis.  Patient is also on Lopressor 25 mg 3 times daily.  Patient

has been afebrile, heart rate 97, blood pressure 106/65, pulse ox 98% on room 

air.  Blood sugars running between 112 and 67.  Patient is covered with 

Voriconazole.  Lac-Hydrin added for bilateral legs.





2/11: Patient is status post successful atrial flutter ablation with Dr. Pimentel.  Cardiology has recommended amlodipine 100 mg twice daily for 1 month. 

Cardiac monitor is a sinus rhythm with PACs.  Metoprolol was decreased to 25 mg 

twice daily.  Patient is on eliquis 5 mg twice daily.  Patient has been 

afebrile, heart rate 64, blood pressure 121/77.  Potassium 2.3 and replaced.  

Repeat level will be obtained this evening.  Pleurx catheter remains in place 

connected to Pleur-evac with intermittent air leak.  He has minimal output.  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  He is planning on oral antimicrobial at the time of discharge.





2/12: Patient is resting in recliner. Patient's wife is at bedside. Patient 

states he walked in hallway and felt well but did have shortness of breath with 

activity. He continues to have subcutaneous emphysema. He continues to have air 

leak.  WBC is 15.8, hemoglobin 10, platelet count 293.  Potassium is 3 and will 

be replaced.  BUN 27 creatinine 0.82.  Patient has been afebrile, heart rate 79,

blood pressure 114/66, pulse ox 90% on room air.  Chest x-ray is stable 

findings.





2/13: Patient is sitting up in his recliner he is quite depressed because he 

stated the hospital for almost 2 weeks, he wanted to go home, he continues to 

have some leak in the chest tube on the left side, we will maintain the patient 

on voriconazole, and he has to take that for the next 4 weeks and possibly 

longer would leave that up to the discretion of infectious disease, I spoke with

his wife as well as himself about the prognosis and the patient may need to send

to the hospital until Monday , meanwhile continue with physical therapy 

evaluation, anticipating home health care with physical therapy at home.





2/14: Patient is sitting in the recliner does not appear in acute distress, he 

continues to have the chest tube in the left side with minimal leakage, 

hopefully it will be removed in the next 24 hours and can be discharged home, he

is maintained on voriconazole for his Aspergillus fumigate as, continue with 

increased activity, continue to monitor the patient very closely, anticipate 

discharge in 1-2 days.





2/15: Patient remains with Pleurx catheter to wall suction with intermittent 

leak. He also continues to have subcutaneous emphysema not worsening. Patient is

very anxious to leave the hospital and go home.  Discussed dosing of Amiodarone 

with Dr. Pimentel. Amiodarone will be discontinued at the time of discharge.  

Patient is currently on Lasix 40 IVP daily. Lower extremity edema is improving. 

WBC 13.0, hemoglobin 9.6, electrolytes unremarkable, with the exception of the 

CO2 of 35, renal profile is unremarkable, liver enzymes show , alkaline 

phosphatase 495.  





Objective





- Vital Signs


Vital signs: 


                                   Vital Signs











Temp  98.5 F   02/15/21 08:00


 


Pulse  98   02/15/21 08:00


 


Resp  17   02/15/21 04:00


 


BP  99/57   02/15/21 08:00


 


Pulse Ox  95   02/15/21 08:00








                                 Intake & Output











 02/14/21 02/15/21 02/15/21





 18:59 06:59 18:59


 


Intake Total 1509 30 590


 


Output Total 1540 900 20


 


Balance -31 -870 570


 


Weight  101.9 kg 


 


Intake:   


 


  IV 20 30 


 


    Invasive Line 6 20 30 


 


  Oral 1489  590


 


Output:   


 


  Chest Tube Drainage 40 10 10


 


    Left Mid-Axillary Chest 40 10 10


 


  Drainage  10 10


 


    Left Chest  10 10


 


  Urine 1500 880 


 


Other:   


 


  Voiding Method Toilet Toilet 





 Urinal Urinal 


 


  # Voids  1 














- Exam





 Review of Systems 


Constitutional: Reports anorexia, Reports chronic pain, Reports fatigue, denies 

weakness, Reports weight loss


Eyes: denies blurred vision, denies bulging eye, denies decreased vision, denies

diplopia


Ears, nose, mouth and throat: Denies dysphagia, Denies sore throat


Respiratory: Reports dyspnea, with exertion Reports respiratory infections, 

Denies congestion, Denies cough with sputum, Denies home oxygen, Denies sleep 

apnea, Denies snoring, Denies wheezing


Cardiac: No chest pain.  No palpitations.  No lower extremity edema.  No 

syncopal episodes.


Gastrointestinal: Reports bloating, Reports change in bowel habits, Reports 

diarrhea, Reports early satiety, Reports indigestion, Reports loss of appetite, 

Reports nausea, Reports vomiting, Denies abdominal pain, Denies belching, Denies

heartburn, Denies hematemesis, Denies hematochezia, Denies melena


Genitourinary: Denies dysuria, Denies nocturia


Musculoskeletal: Denies myalgias 


Musculoskeletal: absent: ankle pain, ankle stiffness, ankle swelling, elbow 

pain, elbow stiffness, elbow swelling, foot pain, foot stiffness, foot swelling,

hand pain, hand stiffness, hand swelling, hip pain, hip stiffness, hip swelling,

knee pain, knee stiffness, knee swelling, shoulder pain, shoulder stiffness, 

shoulder swelling, wrist pain, wrist stiffness, wrist swelling


Integumentary: Denies pruritus, Denies rash


Neurological: Denies numbness, Denies weakness


Psychiatric: Denies anxiety, Denies depression


Endocrine: Denies fatigue, Denies weight change








Physical examination:


General: This is a 60-year-old  male.  Patient is resting in a recliner

and appears to be comfortable.





HEENT: head ia atraumatic normocephalic, Pupils were equal round reactive to 

light and accommodations , extra ocular muscle movement were intact, mucous 

membranes of the mouth are somewhat dry.





Neck: supple no JVP.





Chest: decreased breath sounds at he bases no expiratory wheezes no chest wall 

tendernes or intercostal retractions.  PleurX cath in place. Positive 

subcutaneous emphysema.





Heart: first heart sound is depressed, second heart sound is normal tachycardic 

and irregular there is HOOD 2/6 located at the left sternal border.





Abdomen: soft, mild tenderness to the left lower quadrant positive bowel sounds,

no guarding or rebound tenderness, no hepatosplenomegaly.





Extremities: there is 1+ edema or calf tenderness DP+ 2 Bilaterally, there is 

what appears to be a charcot joint in the right foot form arch collapse.





Neurologic examination: patient is awake , alert and oriented X 3 CN II-XII are 

grossly intact, muscle power 4/5 in bilateral upper and lower extremities, deep 

tendon reflexes were normal.











- Labs


CBC & Chem 7: 


                                 02/15/21 07:40





                                 02/15/21 07:40


Labs: 


                  Abnormal Lab Results - Last 24 Hours (Table)











  02/15/21 02/15/21 Range/Units





  07:40 07:40 


 


WBC  13.0 H   (3.8-10.6)  k/uL


 


RBC  3.59 L   (4.30-5.90)  m/uL


 


Hgb  9.6 L   (13.0-17.5)  gm/dL


 


Hct  32.3 L   (39.0-53.0)  %


 


MCHC  29.6 L   (31.0-37.0)  g/dL


 


RDW  19.4 H   (11.5-15.5)  %


 


Carbon Dioxide   35 H  (22-30)  mmol/L


 


Total Bilirubin   1.4 H  (0.2-1.3)  mg/dL


 


AST   146 H  (17-59)  U/L


 


ALT   125 H  (4-49)  U/L


 


Alkaline Phosphatase   495 H  ()  U/L


 


Total Protein   5.7 L  (6.3-8.2)  g/dL


 


Albumin   2.4 L  (3.5-5.0)  g/dL














Assessment and Plan


Assessment: 





1.  Acute hypoxemic respiratory failure secondary to loculated left-sided 

pleural effusion and fungal empyema with what appears to be recurrent pneumonia.

 Continue Voriconazole 200 mg every 12 hours IV piggyback per Dr. Sheets with 

plan for oral at the time of discharge. Patient will be off amiodarone at the 

time of discharge. 





2.  Lactic acidosis.  Resolved, repeat lactic acid is back to normal.





3.  Sepsis with septic shock and was stable.  Patient has been weaned off 

Levothroid drip.





4.  Acute kidney injury due to poor oral intake of fluid as well as hypotension 

with acute tubular necrosis.  Patient is on IV fluid, continue to monitor the 

patient CMP continue to monitor urine output.  Nephrology is following.





5.  Leukocytosis secondary to severe sepsis secondary to empyema.  Patient is 

off antibiotic.





6.  Persistent atrial flutter with RVR.  Status post cardioversion, converted 

back.  Status post ablation completed by Dr. Pimentel.  Patient to continue 

eliquis.  Continued on amiodarone 100 mg daily until discharge and then 

discontinue. Cardiology consult appreciated.





7.  Rheumatoid arthritis and rheumatoid lung.  Arava and Xeljanz had been on 

hold since October.





8.  History of empyema with Streptococcus requiring chest tube.  





9.  Hypertension and hypertensive cardiovascular disease. Lasix 40 mg IVP daily.





10.  Hypothyroidism . we will continue with Synthroid 150 mcg orally daily.





11. DVT prophylaxis. Eliquis and SCDs.





12. GI prophylaxis. we will continue with Protonix 40 mg oral daily.








Discharge plan: home mid week





Impression and plan of care have been directed as dictated by the signing 

physician.  Phyllis Wylie nurse practitioner acting as scribe for signing 

physician.

## 2021-02-16 NOTE — P.PN
Subjective


Progress Note Date: 02/16/21


Principal diagnosis: 


 Pneumonia, hydropneumothorax, septic shock





60-year-old white male patient, with past medical history of rheumatoid 

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 

episodes of pneumonia and history of loculated empyema requiring chest tube 

placement back in July 2020 with pleural fluid cultures positive for 

streptococcal pneumonia.  Other medical history includes hypertension, 

hypothyroidism and previous history of DVT.  Patient had a recent 

hospitalization from January 18 through 01/21/2021 hypotension, dehydration 

related to nausea vomiting diarrhea, acute kidney injury.  Patient received 

antibiotics in the form of Zosyn and Levaquin for possibility of pneumonia, he 

is chest x-ray showed cranial perihilar pulmonary infiltrates with increased 

interstitial changes at the lung bases and was thought to be related to a combi

nation of rheumatoid lung and chronic scarring.  His last CT of the chest was on

12/29/2020 showing chronic changes to lower lungs, persistent irregular thick-

walled pleural spaces in the posterior lung bases containing fluid and air with 

adjacent anterior lung with chronic consolidation or atelectasis.  On 01/29/2021

patient came into the emergency department from Dr. SARINA Santo's office where he w

as being seen for a regular follow-up appointments.  He was noted to have low 

pulse ox 70s and was sent in to the emergency department for evaluation.  He has

been having cough with yellow and sometimes blood-tinged sputum, appears 

amounts, denies any fever, COVID 19 was found to be negative, chest x-ray showed

perihilar and basilar infiltrates without significant change from previous chest

x-ray on 01/19/2021.  Lab data showed leukocytosis with white blood cell count 

of 19.1, hemoglobin of 12.1, INR 1.5, sodium is 135, potassium is 3.9, chloride 

is 102, CO2 is 19, creatinine is 1.32, which has actually improved since his 

discharge from the hospital on 01/21/2021 when he was at 1.84.  Lactic acid was 

elevated at 3.7, patient was given a total of 2 L in fluid boluses, troponin was

negative at less than 0.012.  Urinalysis showed no evidence of infection.  CT 

chest showed thick-walled pleural cavities posteriorly in the lower lungs with 

left-sided air-fluid level with left-sided pleural fluid diminished most recent 

CT from 12/29/2020.  There is associated compressive atelectasis and/or chronic 

consolidation.  Persistent as it is lobe fissure, multifocal areas of reticular 

nodular opacity in the upper lungs remain present with slight nodularity in the 

left upper lobe.  There is a focal consolidation improved from most recent study

with some residual areas of scarring.  We will emergency department patient 

became hypotensive with a blood pressure in the 70s, slightly tachycardic r

emains in sinus mechanism, he is receiving his third liter bolus, his lactic 

acid did respond and improved he was started on antibiotics in the form of Zosyn

and vancomycin 





The patient is seen today January 30th 2021 in the intensive care unit.  He is 

currently sitting up in a chair at the bedside.  Awake and alert in no acute 

distress.  Maintaining O2 saturations in the 90s on 2 L/m per nasal cannula.  He

did require a small dose of norepinephrine currently on 0.05 mcg/kg/m.  He has 

lactated Ringer's at 100 MLS per hour.  He remains on vancomycin and cefepime.  

Chest x-ray continues to show the left lower lobe effusion.  White count 11.7.  

Hemoglobin 9.7.  Sodium 135.  Potassium 3.6.  Creatinine 1.00.  Blood cultures 

reveal no growth to date.





Reevaluated today on 1/31/2021, remains in the ICU, still on norepinephrine at 

0.04 mcg/kg/m.  On LR at 1 25 mL per hour.  On vancomycin and cefepime 

empirically, blood pressure remains marginal, went ahead and recommended Solu-

Cortef 100 mg IV push every 8 hours and hopefully will be able to discontinue 

norepinephrine today.  His IV fluid is cut down to KVO.  Given 20 mg of Lasix IV

push, and I have recommended that we monitor the patient in the ICU for the next

24 hours.  Chest x-ray is basically the same, continues to show chronic finding,

difficult to exclude underlying pneumonia/empyema.  CT of the chest on admission

was also reviewed, difficult to rule out underlying empyema.  Clinically the 

patient is feeling better except for generally weak.  WBC count is 12.5 

hemoglobin is 9.9.  Normal renal profile is normal blood cultures are negative 

so far.  Sputum cultures are also negative so far.





On 02/01/2021 patient seen in follow-up in the intensive care unit, he is awake 

and alert, oriented 3, sitting up in the recliner, currently on 2 L of oxygen 

his pulse ox is %, his been afebrile, hemodynamically he is stable, he is 

not on any vasopressor support.  His lactate Ringer's running at 20 ML per hour,

antibiotics include cefepime and vancomycin, blood pressure has been stable, so 

far blood and sputum cultures are negative, he denies chest pain, his breathing 

is comfortable.  Today's labs have been reviewed, showing white blood cell count

trending down, 10.2 today, hemoglobin is 8.8, electrolytes and renal profile are

unremarkable.  Serum cortisol level came back at 3, patient continues on stress 

doses of hydrocortisone 100 mg every 8 hours.  Patient has not been stable, 

we'll consult interventional radiology for ultrasound-guided left thoracentesis





On 02/02/2021 patient seen in follow-up in the intensive care unit, he is calm 

and comfortable, awake and alert, sitting up in the recliner, currently on room 

air with a pulse ox of 93-94%, his been afebrile, hemodynamically he is been 

stable, he is on IV LR at 20 ML per hour, no other drips.  He is on cefepime and

vancomycin for antibiotic coverage, yesterday he underwent left thoracentesis 

with removal of 43 mL of pleural fluid which was sent for cultures.  Tolerated 

procedure well.  His pleural fluid analysis revealed total fluid protein of 1.3 

g, and fluid LDH of greater than 4500 consistent with exudative fluid.  His 

microbiology data has been reviewed showing Aspergillus fumigate is in the 

sputum, his pleural fluid cultures are pending at this time, the cultures have 

shown no growth, hemodynamically has been stable, he is breathing easier, he 

still has cough mostly in the morning and the amount of secretions his bringing 

up is decreasing in amount.  Lung sounds revealed a few scattered wheezes, and a

few rhonchi at bilateral bases.  Is tolerating oral intake.  No abdominal pain, 

no nausea or vomiting, today's labs have been reviewed, white blood cell count 

is 11.2, hemoglobin is 9.1, sodium is 137, potassium is 4.0, chloride is 109, 

BUN is 15, creatinine 0.80.  No acute events overnight.  He is working on 

incentive spirometer, he is achieving 1000 today





On 02/03/2021 patient seen in follow-up on medical surgical floor.  He is awake 

and alert, in no acute distress, breathing comfortably, denies any chest pain, 

room air pulse ox is 94%, no fever or chills, today's chest x-ray shows left 

basilar loculated pneumothorax, stable in appearance, bilateral lung 

infiltrates.  His pleural fluid cultures so far showing Aspergillus species, as 

does his sputum culture.  Blood culture show no growth.  The cervix is 

following, current antibiotic coverage includes cefepime, vancomycin has been 

discontinued, she received 1 dose of IV Lasix per nephrology.  He has produced 

2500 in urine output, and he is in -1.6 L over the last 24 hours, however he 

still has significant lower extremity edema





On 02/05/2021 patient seen in follow-up in the intensive care unit, he is awake 

and alert, in no acute distress, on room air, his pulse ox is 93%, currently on 

amiodarone at 0.5 mg/m, heparin infusion per weight based protocol, 0.9 normal 

saline at a rate of 10 ML per hour, his heart rate is still poorly controlled, 

he remains in atrial flutter with a rate of 140 BPM.  He's had no fever or 

chills, he status post ultrasound-guided thoracentesis of the left pleural space

with removal of 45 mL of pleural fluid in the cultures were positive for 

Aspergillus.  Infectious disease service is following, and voriconazole was 

started.  Clinically patient denies any shortness of breath, no cough, no 

compressive chest pain, we discussed the case with the cardiothoracic surgery 

yesterday, and plan is to proceed with the placement of Pleurx catheter into the

left pleural space today.





On 02/10/2021 patient seen in follow-up.  Sitting up in the recliner, breathing 

comfortably, his on 2 L of oxygen pulse ox of 90%, he's been afebrile, cm

bcutaneous emphysema slightly worsened especially involving right upper chest 

area, left chest Pleurx catheter remains in place, connected to Pleur-evac to 

suction and there is some intermittent air leak still present, his chest x-ray 

shows severe bilateral septic hence emphysema, small left basilar 

hydropneumothorax, and bilateral patchy infiltrates right greater than left not 

significantly changed, patient remains on voriconazole, and his left chest 

pleural fluid fungal culture was positive for Aspergillus fumigatus.  Total 

fluid cytology was not sent however pathology lab still has the pleural fluid 

which we will request cytology. 





On 02/11/2021 patient seen in follow-up.  He is resting in bed, subcu emphysema 

in his bilateral upper chest seems to have slightly progressed since yesterday, 

no significant extension to his neck area, his voice is still left chest Pleurx 

catheter in place connected to Pleur-evac and there is intermittent air leak at 

times it is more continuous.  There has only been 85 mL of serosanguineous 

output in the last 24 hours, pleural fluid cytology was sent from the pleural 

fluid collected on 01/10/2021, results are pending.  Continues on voriconazole 

for evidence of Aspergillus fungal infection in the pleural space.  Today's labs

reviewed, showing blood vessel, 16, hemoglobin is 9.7, sodium is 143, potassium 

is 2.8, chloride is 100 CO2 is 27 BUN is 28 and creatinine 0.78, LFTs seems to 

have acutely increased, with AST up to 127, ALT is 120, and alkaline phosphatase

of 320.  No fever or chills, patient of breath but no acute distress, today's 

chest x-ray shows extensive subcutaneous emphysema, small pneumothoraces at the 

lung bases, in worsening-appearing infiltrate through the right lung base.





On 02/12/2021 patient seen in follow-up.  His left-sided Pleurx catheter remains

connected to Pleur-evac continuous wall suction, and the air leak is slightly 

better however it still consistent and intermittent in nature today, his 

subcutaneous emphysema remains stable, has not significantly increased.  Room 

air pulse ox is 90%, patient is working on the incentive spirometer, he is 

pulling [id number] on the today, he's been afebrile, he remains on voriconazole 

for antibiotic coverage, chest x-ray today shows a left basilar pleural catheter

unchanged in position, and extensive subcutaneous emphysema throughout the chest

wall and neck, there is no sizable pneumothorax, and there is diffuse infiltrate

throughout the right lung, left lower lobe.  Today's labs have been reviewed, 

when blood cell count is 15.8, hemoglobin is 10, sodium is 141, potassium is 

3.0, chloride is 99, CO2 is 37, B1 is 27, creatinine 0.82, AST is 209, ALT is 

193, alkaline phosphatase is 425, and his liver enzymes are trending up.  We 

will ask infectious disease to see if this could be related to the antibiotics





On 02/15/2021 patient seen in follow-up on selective care unit, left-sided 

Pleurx chest tube catheter remains in place, connected to Pleur-evac, and con

tinuous wall suction, there is a intermittent air leak still present, patient 

still has subcutaneous emphysema involving his upper chest, relatively stable in

appearance, ration is on 2 L of oxygen pulse ox is 97%, he's been afebrile, 

hemodynamic we his been stable, no worsening dyspnea, he is working on incentive

spirometer, his left-sided chest tube is putting out very small amount of 

output.  Remains on voriconazole for Aspergillus in the pleural fluid cultures. 

Clinically stable, status post electrical cardioversion for atrial flutter with 

RVR.  He is on diuretics, he is in -900 mL fluid balance, today's chest x-ray 

shows stable exam with severe subcutaneous emphysema, stable 2 cm small left 

basilar pneumothorax, airspace disease throughout the right lung with some 

improvement compared to 13 2021, left lower lung patchy airspace disease is 

unchanged.  Today's labs have been reviewed, showing white blood cell, 13.0, 

hemoglobin is 9.6, electrolytes unremarkable, with the exception of the CO2 of 

35, renal profile is unremarkable, liver enzymes show AST of 146 which has 

increased since yesterday, ALT of 125 slightly increased since yesterday, 

alkaline phosphatase at 495.  This could possibly be related to voriconazole or 

amiodarone, and we have asked infectious disease service to consider possible 

dose reduction of voriconazole, and this may be an additive effect together with

amiodarone





On 02/16/2021 patient seen in follow-up on selective care unit, he is resting in

the recliner, on 2 L of oxygen his pulse ox of 95-97%, pretty comfortable, no 

acute distress, afebrile, hemodynamically stable, he is currently in sinus 

mechanism, amiodarone has been discontinued in view of possible additive effect 

and worsening LFTs in addition to voriconazole.  Patient has had no fever or 

chills, left sided thorax catheter remains in place, to Pleur-evac and wall 

suction, with slight intermittent air leak which seems to be lessening with each

day.  Still has stable subcutaneous emphysema involving his bilateral upper 

chest.  Today's labs have been reviewed, showing white blood cell count of 13, 

hemoglobin is 9.6, electrolytes are unremarkable with the exception of 

bicarbonate concentration which is currently at 35, improving, renal profile is 

within normal limits, today's LFTs are trending up, AST up to 146, ALT is 125, 

and alkaline phosphatase is 495.











Objective





- Vital Signs


Vital signs: 


                                   Vital Signs











Temp  98.6 F   02/16/21 08:00


 


Pulse  93   02/16/21 12:00


 


Resp  19   02/16/21 04:00


 


BP  90/53   02/16/21 12:00


 


Pulse Ox  97   02/16/21 12:00








                                 Intake & Output











 02/15/21 02/16/21 02/16/21





 18:59 06:59 18:59


 


Intake Total 1070  472


 


Output Total 770 780 400


 


Balance 300 -780 72


 


Weight 101.9 kg 101.6 kg 


 


Intake:   


 


  Oral 1070  472


 


Output:   


 


  Chest Tube Drainage 10 100 


 


    Left Mid-Axillary Chest 10 100 


 


  Drainage 160  


 


    Left Chest 160  


 


  Urine 600 680 400


 


Other:   


 


  Voiding Method  Toilet 





  Urinal 














- Exam


GENERAL EXAM: Alert, pleasant, 60-year-old male patient, on 2 L of oxygen with a

pulse ox of 94%, currently sitting up in chair at the bedside, comfortable in no

apparent distress.


HEAD: Normocephalic/atraumatic.


EENT: PERRLA, EOMI, nonicteric.  Moist mucous membranes, no neck masses, no JVD,

no stridor.


CHEST: No chest wall deformity.  Symmetrical expansion.  Left chest Pleurx 

catheter connected to Pleur-evac, to wall suction, with intermittent air leak 

present, serosanguineous output in the Pleur-evac.  There is extensive 

subcutaneous emphysema in his anterior bilateral upper chest, extending into his

neck and shoulders


LUNGS: Bibasilar rhonchi, and a few scattered wheezes


CVS: Regular rate and rhythm, normal S1 and S2, no gallops, no murmurs, no rubs


ABDOMEN: Soft, nontender.  No hepatosplenomegaly, normal bowel sounds, no 

guarding or rigidity.


EXTREMITIES: No clubbing, 1+ lower extremity edema, no cyanosis, 2+ pulses and 

upper and lower extremities.


MUSCULOSKELETAL: Muscle strength and tone normal.


SPINE: No scoliosis or deformity


SKIN: No rashes


CENTRAL NERVOUS SYSTEM: Alert and oriented -3.  No focal deficits, tone is 

normal in all 4 extremities.


PSYCHIATRIC: Alert and oriented -3.  Appropriate affect.  Intact judgment and 

insight.











- Labs


CBC & Chem 7: 


                                 02/15/21 07:40





                                 02/15/21 07:40


Labs: 


                      Microbiology - Last 24 Hours (Table)











 02/01/21 11:30 Acid Fast Bacilli Smear - Final





 Pleural Fluid Acid Fast Bacilli Culture - Preliminary














Assessment and Plan


Plan: 


 Assessment:





#1.  Acute hypoxic respiratory failure related to sepsis, septic shock,  related

to pneumonia, with chronic loculated effusions, rule out possibility of empyema.

COVID 19 was ruled out per PCR.  Pleural fluid culture positive for Aspergillus 

fumigators, patient is on voriconazole, ideally patient would benefit from 

surgical intervention including thoracoscopy/decortication however this could be

an extensive surgery for him in patient had the left chest Pleurx catheter 

inserted for drainage





Patient is post insertion of a left-sided Pleurx catheter and instillation of 

amphotericin B into the pleural space, the left lung was trapped with fungal 

empyema, patient has intermittent air leak, and there is a subcutaneous 

emphysema involving neck, upper chest





#2.  Bilateral hydropneumothorax, rule out possibility of empyema, status post 

ultrasound-guided left-sided thoracentesis on 02/01/2021 would removal of 45 mL 

of cloudy white fluid, which was exudative in nature, pleural fluid culture only

showing Aspergillus, antibiotic coverage is with cefepime and vancomycin, it 

service is following, CT surgery has no plans for surgical intervention at this 

time.  on 02/10/2021 patient is on voriconazole for evidence of Aspergillus 

fumigators in the pleural fluid





#3.  Recent admission to the hospital for hypotension, dehydration related to 

nausea vomiting diarrhea, acute kidney injury





#4.  Recurrent pulmonary infections, with history of streptococcal pneumonia and

empyema requiring chest tube placement in July 2020





#5.  Chronic loculated pneumothoraces, posterior, with air-fluid level seen on 

the CT imaging of the chest, the possibility of trapped lung, chronic scarring 

and possibility of empyema needs to be ruled out





#6.  Elevated d-dimer with no CT evidence for pulmonary embolism





#7.  History of rheumatoid arthritis and rheumatoid lung disease on Arava





#8.  Recent history of acute kidney injury, and admission lab work today shows 

improvement in his renal function





#9.  Hypertension





#10.  Hypothyroidism





#11.  Previous history of foot infections requiring antibiotics





#12.  Previous history of DVT





#13.  Transaminitis possibly related to antifungal antibiotics, will obtain 

follow-up labs





Plan:





Continue current medical treatment, still has a slight air leak which is 

decreasing every day, continue with continuous wall suction until resolution of 

the air leak.  Encourage deep breathing and coughing, amiodarone has been 

discontinued, patient is maintaining in sinus mechanism, continues on 

voriconazole for Aspergillus pneumonia.  We'll continue monitoring LFTs, we'll 

continue to follow along with CT surgery and ID service.


I performed a history & physical examination of the patient and discussed their 

management with my nurse practitioner, Lorena Bonilla.  I reviewed the nurse 

practitioner's note and agree with the documented findings and plan of care.  Fadia

ng sounds are positive for diminished breath sounds.  The findings and the 

impression was discussed with the patient.  I attest to the documentation by the

nurse practitioner. 





Time with Patient: Less than 30

## 2021-02-16 NOTE — PN
PROGRESS NOTE



DATE OF SERVICE:

02/16/2021



REASON FOR FOLLOWUP:

Aspergillus pneumonia and empyema.



INTERVAL HISTORY:

The patient is currently afebrile. The patient is breathing comfortably. The patient

denies having any chest pain or shortness of breath or cough.  No abdominal pain or

diarrhea.



PHYSICAL EXAMINATION:

Blood pressure is 93/49 with pulse of 93, temperature 98.6. He is 96% on 2 L nasal

cannula.

General description is a middle-aged male lying in bed in no distress.

RESPIRATORY SYSTEM: Unlabored breathing with decreased breath sounds at the base. No

wheeze.

HEART: S1, S2.  Regular rate and rhythm.

ABDOMEN: Soft. No tenderness.



LABS:

Hemoglobin 9.6, white count 13,000, BUN of 17, creatinine 0.70.



DIAGNOSTIC IMPRESSION AND PLAN:

Patient with aspergillus pneumonia and empyema in this patient who is status post

PleurX catheter placement, now with evidence of leak.  Waiting for leak to resolve and

removal of the chest tube before the patient goes home. He is covered with [employer]. That

will be transitioned to oral on discharge.  Continue with supportive care.





MMODL / IJN: [id number] / Job#: [id number]

## 2021-02-16 NOTE — P.PN
Subjective


Progress Note Date: 02/16/21





HISTORY OF PRESENT ILLNESS:  Patient examined this morning.  Patient is sitting 

up in the chair.  He denies chest pain or pressure.  He denies shortness of 

breath.  He remains in sinus mechanism on telemetry. Left pleurx chest tube 

remains in place with intermittent air leak. 





PHYSICAL EXAM: 


VITAL SIGNS: Reviewed.


GENERAL: Well-developed in no acute distress. 


NECK: Supple. No JVD or thyromegaly


LUNGS: Respirations even and unlabored. Lungs diminished. Left Pleurx chest tube

noted.


HEART: Regular rate and rhythm.  S1 and S2 heard.


EXTREMITIES: Normal range of motion.  No clubbing or cyanosis.  Peripheral 

pulses intact.  1+ bilateral lower extremity edema





ASSESSMENT: 


Typical atrial flutter with RVR, s/p cardioversion and ablation


Acute hypoxic respiratory failure


Fungal pneumonia with empyema


Hypertension


Hyperlipidemia





PLAN: 


Continue Eliquis


Continue amiodarone 100 mg by mouth daily for one month then discontinue per Dr. Pimentel


Continue beta blocker


Continue telemetry monitoring


Pulmonary and cardiothoracic surgery following


Patient is currently stable from a cardiac standpoint. We will sign off. Please 

reconsult if needed.





Nurse practitioner note has been reviewed by physician. Signing provider agrees 

with the documented findings, assessment, and plan of care. 








Objective





- Vital Signs


Vital signs: 


                                   Vital Signs











Temp  98.6 F   02/16/21 08:00


 


Pulse  93   02/16/21 12:00


 


Resp  19   02/16/21 04:00


 


BP  90/53   02/16/21 12:00


 


Pulse Ox  97   02/16/21 12:00








                                 Intake & Output











 02/15/21 02/16/21 02/16/21





 18:59 06:59 18:59


 


Intake Total 1070  472


 


Output Total 770 780 400


 


Balance 300 -780 72


 


Weight 101.9 kg 101.6 kg 


 


Intake:   


 


  Oral 1070  472


 


Output:   


 


  Chest Tube Drainage 10 100 


 


    Left Mid-Axillary Chest 10 100 


 


  Drainage 160  


 


    Left Chest 160  


 


  Urine 600 680 400


 


Other:   


 


  Voiding Method  Toilet 





  Urinal 














- Labs


CBC & Chem 7: 


                                 02/15/21 07:40





                                 02/15/21 07:40


Labs: 


                      Microbiology - Last 24 Hours (Table)











 02/01/21 11:30 Acid Fast Bacilli Smear - Final





 Pleural Fluid Acid Fast Bacilli Culture - Preliminary

## 2021-02-17 LAB
ALBUMIN SERPL-MCNC: 2.5 G/DL (ref 3.5–5)
ALP SERPL-CCNC: 513 U/L (ref 38–126)
ALT SERPL-CCNC: 85 U/L (ref 4–49)
ANION GAP SERPL CALC-SCNC: 4 MMOL/L
AST SERPL-CCNC: 106 U/L (ref 17–59)
BASOPHILS # BLD AUTO: 0 K/UL (ref 0–0.2)
BASOPHILS NFR BLD AUTO: 0 %
BUN SERPL-SCNC: 14 MG/DL (ref 9–20)
CALCIUM SPEC-MCNC: 8.2 MG/DL (ref 8.4–10.2)
CHLORIDE SERPL-SCNC: 98 MMOL/L (ref 98–107)
CO2 SERPL-SCNC: 36 MMOL/L (ref 22–30)
EOSINOPHIL # BLD AUTO: 0.3 K/UL (ref 0–0.7)
EOSINOPHIL NFR BLD AUTO: 3 %
ERYTHROCYTE [DISTWIDTH] IN BLOOD BY AUTOMATED COUNT: 3.48 M/UL (ref 4.3–5.9)
ERYTHROCYTE [DISTWIDTH] IN BLOOD: 18.8 % (ref 11.5–15.5)
GLUCOSE SERPL-MCNC: 85 MG/DL (ref 74–99)
HCT VFR BLD AUTO: 31.5 % (ref 39–53)
HGB BLD-MCNC: 9.3 GM/DL (ref 13–17.5)
LYMPHOCYTES # SPEC AUTO: 1.5 K/UL (ref 1–4.8)
LYMPHOCYTES NFR SPEC AUTO: 15 %
MCH RBC QN AUTO: 26.8 PG (ref 25–35)
MCHC RBC AUTO-ENTMCNC: 29.6 G/DL (ref 31–37)
MCV RBC AUTO: 90.5 FL (ref 80–100)
MONOCYTES # BLD AUTO: 0.7 K/UL (ref 0–1)
MONOCYTES NFR BLD AUTO: 7 %
NEUTROPHILS # BLD AUTO: 7.3 K/UL (ref 1.3–7.7)
NEUTROPHILS NFR BLD AUTO: 74 %
PLATELET # BLD AUTO: 232 K/UL (ref 150–450)
POTASSIUM SERPL-SCNC: 3.7 MMOL/L (ref 3.5–5.1)
PROT SERPL-MCNC: 5.8 G/DL (ref 6.3–8.2)
SODIUM SERPL-SCNC: 138 MMOL/L (ref 137–145)
WBC # BLD AUTO: 9.8 K/UL (ref 3.8–10.6)

## 2021-02-17 RX ADMIN — IPRATROPIUM BROMIDE AND ALBUTEROL SULFATE SCH: .5; 3 SOLUTION RESPIRATORY (INHALATION) at 19:50

## 2021-02-17 RX ADMIN — VORICONAZOLE SCH MG: 200 TABLET, FILM COATED ORAL at 20:31

## 2021-02-17 RX ADMIN — SODIUM CHLORIDE, PRESERVATIVE FREE SCH ML: 5 INJECTION INTRAVENOUS at 20:35

## 2021-02-17 RX ADMIN — METOPROLOL TARTRATE SCH MG: 25 TABLET, FILM COATED ORAL at 20:31

## 2021-02-17 RX ADMIN — ASPIRIN 81 MG CHEWABLE TABLET SCH MG: 81 TABLET CHEWABLE at 20:28

## 2021-02-17 RX ADMIN — PANTOPRAZOLE SODIUM SCH MG: 40 TABLET, DELAYED RELEASE ORAL at 06:34

## 2021-02-17 RX ADMIN — OXYCODONE HYDROCHLORIDE AND ACETAMINOPHEN SCH MG: 500 TABLET ORAL at 20:28

## 2021-02-17 RX ADMIN — APIXABAN SCH MG: 5 TABLET, FILM COATED ORAL at 08:52

## 2021-02-17 RX ADMIN — TRIAMCINOLONE ACETONIDE SCH APPLIC: 1 CREAM TOPICAL at 08:52

## 2021-02-17 RX ADMIN — LEVOTHYROXINE SODIUM SCH MCG: 75 TABLET ORAL at 06:34

## 2021-02-17 RX ADMIN — AMMONIUM LACTATE SCH APPLIC: 12 LOTION TOPICAL at 20:32

## 2021-02-17 RX ADMIN — IPRATROPIUM BROMIDE AND ALBUTEROL SULFATE SCH: .5; 3 SOLUTION RESPIRATORY (INHALATION) at 08:48

## 2021-02-17 RX ADMIN — HYDROCODONE BITARTRATE AND ACETAMINOPHEN PRN EACH: 5; 325 TABLET ORAL at 22:28

## 2021-02-17 RX ADMIN — SODIUM CHLORIDE, PRESERVATIVE FREE SCH ML: 5 INJECTION INTRAVENOUS at 09:51

## 2021-02-17 RX ADMIN — VORICONAZOLE SCH MG: 200 TABLET, FILM COATED ORAL at 09:51

## 2021-02-17 RX ADMIN — Medication SCH MCG: at 20:31

## 2021-02-17 RX ADMIN — AMMONIUM LACTATE SCH APPLIC: 12 LOTION TOPICAL at 08:52

## 2021-02-17 RX ADMIN — METOPROLOL TARTRATE SCH MG: 25 TABLET, FILM COATED ORAL at 08:56

## 2021-02-17 RX ADMIN — TRIAMCINOLONE ACETONIDE SCH APPLIC: 1 CREAM TOPICAL at 20:32

## 2021-02-17 RX ADMIN — IPRATROPIUM BROMIDE AND ALBUTEROL SULFATE SCH: .5; 3 SOLUTION RESPIRATORY (INHALATION) at 12:39

## 2021-02-17 RX ADMIN — POTASSIUM CHLORIDE SCH: 14.9 INJECTION, SOLUTION INTRAVENOUS at 08:52

## 2021-02-17 RX ADMIN — CEFAZOLIN SCH: 330 INJECTION, POWDER, FOR SOLUTION INTRAMUSCULAR; INTRAVENOUS at 18:13

## 2021-02-17 RX ADMIN — APIXABAN SCH MG: 5 TABLET, FILM COATED ORAL at 20:28

## 2021-02-17 NOTE — P.PN
Subjective


Progress Note Date: 02/17/21





60-year-old white male patient, with past medical history of rheumatoid 

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 

episodes of pneumonia and history of loculated empyema requiring chest tube 

placement back in July 2020 with pleural fluid cultures positive for 

streptococcal pneumonia.  Other medical history includes hypertension, 

hypothyroidism and previous history of DVT.  Patient had a recent 

hospitalization from January 18 through 01/21/2021 hypotension, dehydration re

lated to nausea vomiting diarrhea, acute kidney injury.  Patient received 

antibiotics in the form of Zosyn and Levaquin for possibility of pneumonia, he 

is chest x-ray showed cranial perihilar pulmonary infiltrates with increased 

interstitial changes at the lung bases and was thought to be related to a 

combination of rheumatoid lung and chronic scarring.  His last CT of the chest 

was on 12/29/2020 showing chronic changes to lower lungs, persistent irregular 

thick-walled pleural spaces in the posterior lung bases containing fluid and air

with adjacent anterior lung with chronic consolidation or atelectasis.  On 

01/29/2021 patient came into the emergency department from Dr. SARINA Santo's office

where he was being seen for a regular follow-up appointments.  He was noted to 

have low pulse ox 70s and was sent in to the emergency department for 

evaluation.  He has been having cough with yellow and sometimes blood-tinged 

sputum, appears amounts, denies any fever, COVID 19 was found to be negative, 

chest x-ray showed perihilar and basilar infiltrates without significant change 

from previous chest x-ray on 01/19/2021.  Lab data showed leukocytosis with 

white blood cell count of 19.1, hemoglobin of 12.1, INR 1.5, sodium is 135, 

potassium is 3.9, chloride is 102, CO2 is 19, creatinine is 1.32, which has 

actually improved since his discharge from the hospital on 01/21/2021 when he 

was at 1.84.  Lactic acid was elevated at 3.7, patient was given a total of 2 L 

in fluid boluses, troponin was negative at less than 0.012.  Urinalysis showed 

no evidence of infection.  CT chest showed thick-walled pleural cavities 

posteriorly in the lower lungs with left-sided air-fluid level with left-sided 

pleural fluid diminished most recent CT from 12/29/2020.  There is associated 

compressive atelectasis and/or chronic consolidation.  Persistent as it is lobe 

fissure, multifocal areas of reticular nodular opacity in the upper lungs remain

present with slight nodularity in the left upper lobe.  There is a focal 

consolidation improved from most recent study with some residual areas of 

scarring.  We will emergency department patient became hypotensive with a blood 

pressure in the 70s, slightly tachycardic remains in sinus mechanism, he is 

receiving his third liter bolus, his lactic acid did respond and improved he was

started on antibiotics in the form of Zosyn and vancomycin 





The patient is seen today January 30th 2021 in the intensive care unit.  He is 

currently sitting up in a chair at the bedside.  Awake and alert in no acute 

distress.  Maintaining O2 saturations in the 90s on 2 L/m per nasal cannula.  He

did require a small dose of norepinephrine currently on 0.05 mcg/kg/m.  He has 

lactated Ringer's at 100 MLS per hour.  He remains on vancomycin and cefepime.  

Chest x-ray continues to show the left lower lobe effusion.  White count 11.7.  

Hemoglobin 9.7.  Sodium 135.  Potassium 3.6.  Creatinine 1.00.  Blood cultures 

reveal no growth to date.





Reevaluated today on 1/31/2021, remains in the ICU, still on norepinephrine at 

0.04 mcg/kg/m.  On LR at 1 25 mL per hour.  On vancomycin and cefepime 

empirically, blood pressure remains marginal, went ahead and recommended 

Solu-Cortef 100 mg IV push every 8 hours and hopefully will be able to 

discontinue norepinephrine today.  His IV fluid is cut down to KVO.  Given 20 mg

of Lasix IV push, and I have recommended that we monitor the patient in the ICU 

for the next 24 hours.  Chest x-ray is basically the same, continues to show ch

ronic finding, difficult to exclude underlying pneumonia/empyema.  CT of the 

chest on admission was also reviewed, difficult to rule out underlying empyema. 

Clinically the patient is feeling better except for generally weak.  WBC count 

is 12.5 hemoglobin is 9.9.  Normal renal profile is normal blood cultures are 

negative so far.  Sputum cultures are also negative so far.





On 02/01/2021 patient seen in follow-up in the intensive care unit, he is awake 

and alert, oriented 3, sitting up in the recliner, currently on 2 L of oxygen 

his pulse ox is %, his been afebrile, hemodynamically he is stable, he is 

not on any vasopressor support.  His lactate Ringer's running at 20 ML per hour,

antibiotics include cefepime and vancomycin, blood pressure has been stable, so 

far blood and sputum cultures are negative, he denies chest pain, his breathing 

is comfortable.  Today's labs have been reviewed, showing white blood cell count

trending down, 10.2 today, hemoglobin is 8.8, electrolytes and renal profile are

unremarkable.  Serum cortisol level came back at 3, patient continues on stress 

doses of hydrocortisone 100 mg every 8 hours.  Patient has not been stable, 

we'll consult interventional radiology for ultrasound-guided left thoracentesis





On 02/02/2021 patient seen in follow-up in the intensive care unit, he is calm 

and comfortable, awake and alert, sitting up in the recliner, currently on room 

air with a pulse ox of 93-94%, his been afebrile, hemodynamically he is been 

stable, he is on IV LR at 20 ML per hour, no other drips.  He is on cefepime and

vancomycin for antibiotic coverage, yesterday he underwent left thoracentesis 

with removal of 43 mL of pleural fluid which was sent for cultures.  Tolerated 

procedure well.  His pleural fluid analysis revealed total fluid protein of 1.3 

g, and fluid LDH of greater than 4500 consistent with exudative fluid.  His 

microbiology data has been reviewed showing Aspergillus fumigate is in the 

sputum, his pleural fluid cultures are pending at this time, the cultures have 

shown no growth, hemodynamically has been stable, he is breathing easier, he 

still has cough mostly in the morning and the amount of secretions his bringing 

up is decreasing in amount.  Lung sounds revealed a few scattered wheezes, and a

few rhonchi at bilateral bases.  Is tolerating oral intake.  No abdominal pain, 

no nausea or vomiting, today's labs have been reviewed, white blood cell count 

is 11.2, hemoglobin is 9.1, sodium is 137, potassium is 4.0, chloride is 109, 

BUN is 15, creatinine 0.80.  No acute events overnight.  He is working on 

incentive spirometer, he is achieving 1000 today





On 02/03/2021 patient seen in follow-up on medical surgical floor.  He is awake 

and alert, in no acute distress, breathing comfortably, denies any chest pain, 

room air pulse ox is 94%, no fever or chills, today's chest x-ray shows left 

basilar loculated pneumothorax, stable in appearance, bilateral lung 

infiltrates.  His pleural fluid cultures so far showing Aspergillus species, as 

does his sputum culture.  Blood culture show no growth.  The cervix is follow

ing, current antibiotic coverage includes cefepime, vancomycin has been 

discontinued, she received 1 dose of IV Lasix per nephrology.  He has produced 

2500 in urine output, and he is in -1.6 L over the last 24 hours, however he 

still has significant lower extremity edema.





The patient is seen today 02/04/2021 in follow-up in the intensive care unit.  

He was transferred here earlier this morning after developing atrial 

fibrillation/flutter with a rapid ventricular response.  He was initiated on C

ardizem at 5 mg per hour.  He has 0.9 normal saline at 20 ML's per hour.  He is 

currently awake and alert in no acute distress.  No worsening shortness of 

breath, cough or congestion.  No chest pain or palpitations.  He sitting up in a

chair at the bedside.  Maintaining O2 saturations in the 90s on 2 L/m per nasal 

cannula. Chest x-ray does reveal loculated posterior basilar pneumothoraces, 

stable from previous exam.  There is diffuse increased lung markings.  Correlate

for pneumonia and atelectasis.  Pleural fluid cultures is positive for 

Aspergillus species.  White count 10.9.  Hemoglobin 8.8.  Sodium 139.  Potassium

3.4.  Creatinine 0.89.  He remains on bronchodilators, IV diuretics, IV Solu-

Cortef.  Continued on voriconazole.








The patient is seen today 02/06/2021 in follow-up on the selective care unit.  

He is currently sitting up in a recliner at the bedside.  Awake and alert in no 

acute distress.  He is currently on room air.  LR at 20 miles per hour.  Heparin

drip per weight base protocol.  White count 19.0.  Hemoglobin 9.1.  Sodium 142. 

Potassium 3.6.  Creatinine 0.81.  He remains on voriconazole.





The patient is seen today 02/07/2021 in follow-up on the selective care unit.  

He is currently sitting up in a chair at the bedside.  No worsening shortness of

breath, cough or congestion.  Chest x-ray reveals bilateral airspace infiltrates

right greater than left without significant improvement.  Sputum and pleural 

fluid cultures were both positive for Aspergillus fumigatus.  He remains on 

voriconazole.  White count 18.5.  Hemoglobin 10.1.  Sodium 141.  Potassium 3.3. 

Creatinine 0.8.  Atrial fibrillation.  He remains on a heparin drip.  Plan is 

for left-sided Pleurx catheter placement in a.m.








The patient is seen today 02/14/2021 in follow-up on the selective care unit.  

He is currently sitting up in a recliner.  Awake and alert in no acute distress.

 Maintaining O2 saturations in the low 90s on 2 L/m per nasal cannula.  He is 

afebrile.  Hemodynamically stable.  Pleural fluid cultures were positive for 

Aspergillus fumigatus.  White count 12.6.  Hemoglobin 9.9.  Neutrophils 9.7.  

Sodium 140.  Potassium 3.3.  Creatinine 0.68.  AST 73, . Chest x-ray co

ntinues to show severe diffuse bilateral subcutaneous emphysema.  Suspect a 

left-sided pneumothorax along the costophrenic angle measuring 10-15%.  Left-

sided Pleurx catheter remains in place.  Positive leak.  Remains on 

voriconazole, bronchodilators, IV diuretics.  Quite related with Eliquis.








The patient is seen today 02/17/2021 in follow-up on the selective care unit.  

He is currently resting comfortably in a recliner.  He is awake and alert in no 

acute distress.  Maintain good O2 saturations in the mid 90s on 2 L/m per nasal 

cannula.  He's been afebrile.  Chest x-ray continues to show extensive 

subcutaneous emphysema.  Underlying pleural parenchymal opacities persist.  Left

basilar pleural catheter unchanged.  Sputum and pleural fluid cultures positive 

for Aspergillus fumigatus.  He remains on voriconazole.  Remains on 

bronchodilators, anticoagulated with Eliquis.  White count 9.8.  Hemoglobin 9.3.

 Sodium 138.  Potassium 3.7.  Creatinine 0.72.  .  ALT 85.





Objective





- Vital Signs


Vital signs: 


                                   Vital Signs











Temp  98.4 F   02/17/21 07:50


 


Pulse  92   02/17/21 07:50


 


Resp  18   02/17/21 07:50


 


BP  95/58   02/17/21 08:55


 


Pulse Ox  95   02/17/21 07:50








                                 Intake & Output











 02/16/21 02/17/21 02/17/21





 18:59 06:59 18:59


 


Intake Total 722  1050


 


Output Total 850 350 430


 


Balance -128 -350 620


 


Weight  99.7 kg 


 


Intake:   


 


    10


 


    Invasive Line 6   10


 


    Voriconazole 400 mg In 250  





    Sodium Chloride 0.9% 250   





    ml @ 125 mls/hr IVPB   





    Q12HR [facility] Rx#:[id number]   


 


  Oral 472  1040


 


Output:   


 


  Chest Tube Drainage 50 100 


 


    Left Mid-Axillary Chest 50 100 


 


  Drainage   30


 


    Left Chest   30


 


  Urine 800 250 400


 


Other:   


 


  Voiding Method  Toilet 





  Urinal 














- Exam





GENERAL EXAM: Alert, very pleasant 60-year-old gentleman, on 2 L nasal cannula, 

comfortable in no apparent distress.


HEAD: Normocephalic.


EYES: Normal reaction of pupils, equal size.


NOSE: Clear with pink turbinates.


THROAT: No erythema or exudates.


NECK: No masses, no JVD.


CHEST: Bilateral subcutaneous emphysema.  Left Pleurx catheter remains in place.


LUNGS: Equal air entry with few scattered rhonchi, crackles in the posterior 

bases.


CVS: S1 and S2 normal with no audible murmur, irregular rhythm.  


ABDOMEN: No hepatosplenomegaly, normal bowel sounds, no guarding or rigidity.


SPINE: No scoliosis or deformity


SKIN: No rashes


CENTRAL NERVOUS SYSTEM: No focal deficits, tone is normal in all 4 extremities.


EXTREMITIES: There is 2+ peripheral edema.  No clubbing, no cyanosis.  

Peripheral pulses are intact.





- Labs


CBC & Chem 7: 


                                 02/17/21 07:40





                                 02/17/21 07:40


Labs: 


                  Abnormal Lab Results - Last 24 Hours (Table)











  02/17/21 02/17/21 Range/Units





  07:40 07:40 


 


RBC  3.48 L   (4.30-5.90)  m/uL


 


Hgb  9.3 L   (13.0-17.5)  gm/dL


 


Hct  31.5 L   (39.0-53.0)  %


 


MCHC  29.6 L   (31.0-37.0)  g/dL


 


RDW  18.8 H   (11.5-15.5)  %


 


Carbon Dioxide   36 H  (22-30)  mmol/L


 


Calcium   8.2 L  (8.4-10.2)  mg/dL


 


Total Bilirubin   1.4 H  (0.2-1.3)  mg/dL


 


AST   106 H  (17-59)  U/L


 


ALT   85 H  (4-49)  U/L


 


Alkaline Phosphatase   513 H  ()  U/L


 


Total Protein   5.8 L  (6.3-8.2)  g/dL


 


Albumin   2.5 L  (3.5-5.0)  g/dL














Assessment and Plan


Assessment: 





1 Acute hypoxic respiratory failure secondary to sepsis, septic shock, related 

to pneumonia, with chronic loculated effusions, possible empyema.  Cultures 

positive for Aspergillus fumigatus.  Left pleural chest tube remains in place. 

He continues to have intermittent air leak.  Output from the Pleurx catheter was

noted.  He does have some stable subcutaneous emphysema bilaterally.  There is 

also some diastolic dysfunction and elevation of the liver function tests 

related to voriconazole which is being monitored very closely





2 Bilateral hydropneumothorax, possible empyema, status post ultrasound-guided 

left-sided thoracentesis on 02/01/2021 with 45 ML's of cloudy white fluid 

removed, showing Aspergillus.  Remains on voriconazole.





3 Bilateral subcutaneous emphysema along with a hydropneumothorax on the left 

with positive air leak through the Pleurx catheter.  He was currently attached 

to continuous suction





4 Recent pulmonary infections with history of streptococcal pneumonia, empyema 

requiring chest tube placement in July 2020.





5 Chronic loculated pneumothoraces, posterior, with air-fluid seen on CAT scan 

imaging on the chest.  Possibility of trapped lung, chronic scarring and 

possibility of empyema.





6 History of rheumatoid arthritis and rheumatoid lung disease.  On Arava





7 Recent history of acute kidney injury





8 Hypertension





9 Hypothyroidism





10 Previous history of DVT.  





11 New-onset atrial fibrillation/flutter anticoagulated status post failed 

cardioversion.  Currently on Eliquis





Plan:





The patient was seen and evaluated by Dr. De La Rosa


Chest x-ray and labs reviewed


Significant stable bilateral subcutaneous emphysema


Pleurx catheter remains in place, currently on water seal


We'll continue to follow





I, the cosigning physician, performed a history & physical examination of the 

patient. Lungs sounds  bilateral scattered rhonchi and crackles in the bilateral

bases.  Maintaining good O2 saturations in the 90s on 2 L/m per nasal cannula.  

I discussed the assessment and plan of care with my nurse practitioner, Rupa Flood. I attest to the above note as dictated by her.

## 2021-02-17 NOTE — P.PN
Subjective


Progress Note Date: 02/16/21





This is a 60-year-old  male patient requesting my service with past 

medical history of rheumatoid arthritis on Arava that was diagnosed when he was 

37 year old initially was started on Methotrexate that he could not tolerate 

then placed on Embrel but he developed side effects and finally was tried on 

Humira injection but he developed neurologic sided effects and was hospitaized 

at [facility] for quiet some time, rheumatoid lung disease, 

previous history of loculated empyema with chest tube placement in July 2020, 

hypertension, hypothyroidism, history of DVT, remote history of tobacco use and 

dependence, was recently hospitalized at [facility] after he was 

admitted for an acute kidney injury with significant metabolic acidosis 

associated with the elevated creatinine and elevated BUN and hypotension at that

time he was seen and evaluated by pulmonary and critical care medicine, he had a

central line placed and at that time, he was placed on Levophed drip he was p

laced on IV anabiotic as well but the patient recovered very fast and he had an 

echocardiogram that showed normal LV function and segmental hypokinesia, he was 

supposed to follow-up with Dr. Santo in the office today, patient was seen in my

office 24 hours ago when he was complaining of increased shortness breath, at 

that time his oxygenation could not be checked because of his Case's and cold

extremities, he was sent for a chest x-ray that showed right lower lobe 

infiltrate as well as blood tests that showed a white count of 20,000 patient 

was feeling better he was started on oral antibiotic in the form of Levaquin 500

mg orally once every day, and that he was supposed to follow-up with me as an 

outpatient every week he went to see his cardiologist today and he had an 

episode when he was dizzy lightheaded and an episode of vomiting as well and he 

was sent to the emergency department for evaluation had a computed tomography of

the chest as well as abdomen and pelvis that showed a thick walled pleural 

cavities posteriorly in the lower lungs with left-sided air-fluid level that has

diminished in size from the prior computed tomography scan when he was in the 

hospital a few weeks back there is also associated compressive atelectasis and 

chronic consultation with multifocal areas of reticular nodular opacity in the 

upper lungs with a slight nodularity of the left upper lobe again this area of 

focal consultation has improved from the previous study that was done few weeks 

ago, patient was started on IV antibiotic with vancomycin and Zosyn as well as 

IV fluid, he was seen in consultation by pulmonary critical care in the 

emergency department as the patient blood pressure dropped and that he'll be 

transferred to the intensive care unit at this point in time I had long conve

rsation with the patient and his wife at the bedside and the patient may need to

have a chest tube placed for his possible right empyema, he did receive 3 L 

normal saline in the ER, and his blood pressure is marginal he may need to go on

 pressors if  not recovered.





1/30:patient is sitting up in chair feeling about the same , complains of 

increased pain in the righ elbow, was seen earlier by pulmonary and the plan was

to go for US-Guided left thoracenthesis due to to loculated left pleural effu

nathaniel and possible empyema, may need VATS, he denies any chest pain or shortness 

of breath, no abdominal pain nausea, vomiting or diarrhea, still have diarrhea 

negative for C.Diff, he seems to have accept to stay in the hospital this time 

as long as he needed to.





1/31: Patient sitting up in the recliner complaining of increased pain in his 

joints due to his rheumatoid arthritis with increased synovitis in both wrists 

both elbows and both shoulders he was started on hydrocortisone 100 mg IV push 

every 8 hours, to try to help with his blood pressure as well as his phonation, 

he is maintained on Norco 5/325 mg one tablet orally every 6 hours as needed for

pain control, patient denies any chest pain is less short of breath, he 

continues to have increased coughing with yellow from production of green phlegm

production, he had no fever or chills at this time he continues to be on 

Levothroid drip, patient has appeared to have increased swelling in both lower 

extremities as well as both ankles, he is maintained on IV antibiotic in the 

form of vancomycin as well as cefepime, infectious disease is following, patient

is scheduled to go for ultrasound guidance thoracentesis of the left loculated 

pleural effusion tomorrow morning.





2/1: Patient remains in the intensive care unit.  He has been afebrile, heart 

rate 100, blood pressure 95/62, pulse ox 97% on 2 L nasal cannula.  Hemoglobin 

8.8 and leukocytosis resolved.  Creatinine 0.76, electrolytes normal.  Sputum 

culture is positive for Aspergillus species.  Blood culture showing no growth. 

Patient underwent diagnostic thoracentesis with interventional radiology today 

with removal of 45 mL of cloudy white fluid.  Chest x-ray reveals no 

complications following left thoracentesis.  Fluid has been sent for culture and

cytology.  Consult in place for cardiothoracic surgery to evaluate for VATS with

decortication.





2/2: Patient remains in the intensive care unit.  He did receive 1 dose of IV 

Lasix this morning ordered by nephrology.  He is followed by cardiothoracic 

surgery was no plan for surgical intervention.  ID has recommended cefepime and 

vancomycin for now.  Expect antifungal agent ended as well.  Patient denies any 

significant shortness of breath.  He is comfortable sitting in a chair.  Patient

is having minimal cough and minimal sputum.  Patient is achieving 1000 ml on 

incentive spirometry.  Culture and cytology reports remain pending from 

thoracentesis. Repeat chest x-ray reveals patchy peripheral lung with lung zone 

and lower lung zone infiltrates persist unchanged.  Patient has been afebrile, 

heart rate in the 90s and low 100s, pulse ox 91 on room air.





2/3: Repeat chest x-ray reveals left-sided basilar loculated pneumothorax, 

stable.  Bilateral lung infiltrates.  Correlate for pneumonia and atelectasis.  

WBC 9, hemoglobin 9.5.  Potassium 3.4 and will be replaced.  Patient is 

continued on cefepime and vancomycin per Dr. Sheets's recommendations.  Patient 

in reaching 1000 on incentive spirometry.  Patient is scheduled to see Dr. Kaye tomorrow.  Cefepime and vancomycin had been discontinued by Dr. Sheets and

started on Voriconazole. 





2/4: A-Team was called this morning regarding elevated heart rate, EKG was 

atrial flutter with RVR and 150 bpm and patient was transferred to ICU as an 

overflow for the cardiac stepdown unit, started on Cardizem drip and consult 

with cardiology.  Heart rate was improved with Cardizem and patient also 

received amiodarone bolus 300 mg.  Patient denies any worsening shortness of 

breath, cough or congestion.  No chest pain or palpitations.  Patient is resting

comfortably.  09, hemoglobin 8.8, potassium 3.4 replaced.  Creatinine 0.89.





2/5: Patient remains in the intensive care unit.  He continues to be in atrial 

flutter with 21 conduction rate.  He is on heparin drip and amiodarone.  He is 

currently in and out of atrial flutter with RVR.  Cardiology is following 

closely and may consider cardioversion tomorrow. WBC 16.6, hgb 9.8, plt 374, 

sodium 143, potassium 3.9, creatinine 0.88.  He has been afebrile, heart rate 

has been at times marginal, pulse ox 90% on room air.  Patient denies having any

fever or chills, no shortness of breath, no cough.  No chest pain.  Patient is 

followed by cardiothoracic surgery and plan is for left-sided PleurX catheter 

placement which is scheduled for Monday.





2/8: Patient is scheduled for PleurX catheter placement this afternoon.  

Cardiology may schedule him for cardioversion.  Dr. Collado is planning on oral 

antimicrobials at the time of discharge.  Patient is bringing up quite a bit of 

sputum.  He has been afebrile, heart rate 68, blood pressure 100/67, pulse ox 

93% on 1 L nasal cannula.  WBC 21, hemoglobin 9.6, platelet count 325.  

Potassium 3, BUN 28 creatinine 0.8.





2/9: Patient is status post PleurX catheter.  Patient has been afebrile, heart 

rate 106, blood pressure 85/53.  Pulse ox 97% on 2 L.  Patient is known to be 

back in atrial fibrillation at rate of 122.  He did go for cardioversion today 

for atrial flutter.  Patient states he has a little pain in the side for which 

she is taking Norco.  Not bad today.  Patient has subcutaneous edema to 

bilateral chest.  He denies having any headache.  BUN 27 creatinine 0.79.  Blood

sugar .





2/10: Patient denies chest pain or shortness of breath.  He is reaching 1250 on 

IS.  PleurX draining small amount of sanguinous fluid.  Patient is in atrial 

flutter.  Dr. Pimentel is planning for EP study and ablation on Thursday and plan

to continue eliquis.  Patient is also on Lopressor 25 mg 3 times daily.  Patient

has been afebrile, heart rate 97, blood pressure 106/65, pulse ox 98% on room 

air.  Blood sugars running between 112 and 67.  Patient is covered with 

Voriconazole.  Lac-Hydrin added for bilateral legs.





2/11: Patient is status post successful atrial flutter ablation with Dr. Pimentel.  Cardiology has recommended amlodipine 100 mg twice daily for 1 month. 

Cardiac monitor is a sinus rhythm with PACs.  Metoprolol was decreased to 25 mg 

twice daily.  Patient is on eliquis 5 mg twice daily.  Patient has been 

afebrile, heart rate 64, blood pressure 121/77.  Potassium 2.3 and replaced.  

Repeat level will be obtained this evening.  Pleurx catheter remains in place 

connected to Pleur-evac with intermittent air leak.  He has minimal output.  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  He is planning on oral antimicrobial at the time of discharge.





2/12: Patient is resting in recliner. Patient's wife is at bedside. Patient 

states he walked in hallway and felt well but did have shortness of breath with 

activity. He continues to have subcutaneous emphysema. He continues to have air 

leak.  WBC is 15.8, hemoglobin 10, platelet count 293.  Potassium is 3 and will 

be replaced.  BUN 27 creatinine 0.82.  Patient has been afebrile, heart rate 79,

blood pressure 114/66, pulse ox 90% on room air.  Chest x-ray is stable 

findings.





2/13: Patient is sitting up in his recliner he is quite depressed because he 

stated the hospital for almost 2 weeks, he wanted to go home, he continues to 

have some leak in the chest tube on the left side, we will maintain the patient 

on voriconazole, and he has to take that for the next 4 weeks and possibly 

longer would leave that up to the discretion of infectious disease, I spoke with

his wife as well as himself about the prognosis and the patient may need to send

to the hospital until Monday , meanwhile continue with physical therapy 

evaluation, anticipating home health care with physical therapy at home.





2/14: Patient is sitting in the recliner does not appear in acute distress, he 

continues to have the chest tube in the left side with minimal leakage, 

hopefully it will be removed in the next 24 hours and can be discharged home, he

is maintained on voriconazole for his Aspergillus fumigate as, continue with 

increased activity, continue to monitor the patient very closely, anticipate 

discharge in 1-2 days.





2/15: Patient remains with Pleurx catheter to wall suction with intermittent 

leak. He also continues to have subcutaneous emphysema not worsening. Patient is

very anxious to leave the hospital and go home.  Discussed dosing of Amiodarone 

with Dr. Pimentel. Amiodarone will be discontinued at the time of discharge.  

Patient is currently on Lasix 40 IVP daily. Lower extremity edema is improving. 

WBC 13.0, hemoglobin 9.6, electrolytes unremarkable, with the exception of the 

CO2 of 35, renal profile is unremarkable, liver enzymes show , alkaline 

phosphatase 495.  





2/16: The chest x-ray reveals no definite large pneumothorax.  Small left apical

pneumothorax.  Could be considered bilateral peripheral infiltrate in right 

upper lobe.  Patient has been afebrile, heart rate 93, blood pressure 90/54, 

pulse ox 97% on 2 L nasal cannula.  Heart monitor sinus rhythm.  Repeat blood 

work ordered for tomorrow.  Pleurx catheter continues to have a slight 

intermittent air leak.  Patient continues to have subcutaneous emphysema without

worsening.





Objective





- Vital Signs


Vital signs: 


                                   Vital Signs











Temp  98.6 F   02/16/21 08:00


 


Pulse  93   02/16/21 12:00


 


Resp  19   02/16/21 04:00


 


BP  90/53   02/16/21 12:00


 


Pulse Ox  97   02/16/21 12:00








                                 Intake & Output











 02/15/21 02/16/21 02/16/21





 18:59 06:59 18:59


 


Intake Total 1070  472


 


Output Total 770 780 400


 


Balance 300 -780 72


 


Weight 101.9 kg 101.6 kg 


 


Intake:   


 


  Oral 1070  472


 


Output:   


 


  Chest Tube Drainage 10 100 


 


    Left Mid-Axillary Chest 10 100 


 


  Drainage 160  


 


    Left Chest 160  


 


  Urine 600 680 400


 


Other:   


 


  Voiding Method  Toilet 





  Urinal 














- Exam





 Review of Systems 


Constitutional: Reports anorexia, Reports chronic pain, Reports fatigue, denies 

weakness, Reports weight loss


Eyes: denies blurred vision, denies bulging eye, denies decreased vision, denies

diplopia


Ears, nose, mouth and throat: Denies dysphagia, Denies sore throat


Respiratory: Reports dyspnea, with exertion Reports respiratory infections, 

Denies congestion, Denies cough with sputum, Denies home oxygen, Denies sleep 

apnea, Denies snoring, Denies wheezing


Cardiac: No chest pain.  No palpitations.  No lower extremity edema.  No 

syncopal episodes.


Gastrointestinal: Reports bloating, Reports change in bowel habits, Reports 

diarrhea, Reports early satiety, Reports indigestion, Reports loss of appetite, 

Reports nausea, Reports vomiting, Denies abdominal pain, Denies belching, Denies

heartburn, Denies hematemesis, Denies hematochezia, Denies melena


Genitourinary: Denies dysuria, Denies nocturia


Musculoskeletal: Denies myalgias 


Musculoskeletal: absent: ankle pain, ankle stiffness, ankle swelling, elbow 

pain, elbow stiffness, elbow swelling, foot pain, foot stiffness, foot swelling,

hand pain, hand stiffness, hand swelling, hip pain, hip stiffness, hip swelling,

knee pain, knee stiffness, knee swelling, shoulder pain, shoulder stiffness, 

shoulder swelling, wrist pain, wrist stiffness, wrist swelling


Integumentary: Denies pruritus, Denies rash, subcutaneous emphysema


Neurological: Denies numbness, Denies weakness


Psychiatric: Denies anxiety, Denies depression


Endocrine: Denies fatigue, Denies weight change








Physical examination:


General: This is a 60-year-old  male.  Patient is resting in a recliner

and appears to be comfortable.





HEENT: head ia atraumatic normocephalic, Pupils were equal round reactive to 

light and accommodations , extra ocular muscle movement were intact, mucous 

membranes of the mouth are somewhat dry.





Neck: supple no JVP.





Chest: decreased breath sounds at he bases no expiratory wheezes no chest wall 

tendernes or intercostal retractions.  PleurX cath in place. Positive 

subcutaneous emphysema.





Heart: first heart sound is depressed, second heart sound is normal tachycardic 

and irregular there is HOOD 2/6 located at the left sternal border.





Abdomen: soft, mild tenderness to the left lower quadrant positive bowel sounds,

no guarding or rebound tenderness, no hepatosplenomegaly.





Extremities: there is 1+ edema or calf tenderness DP+ 2 Bilaterally, there is 

what appears to be a charcot joint in the right foot form arch collapse.





Neurologic examination: patient is awake , alert and oriented X 3 CN II-XII are 

grossly intact, muscle power 4/5 in bilateral upper and lower extremities, deep 

tendon reflexes were normal.











- Labs


CBC & Chem 7: 


                                 02/15/21 07:40





                                 02/15/21 07:40


Labs: 


                      Microbiology - Last 24 Hours (Table)











 02/01/21 11:30 Acid Fast Bacilli Smear - Final





 Pleural Fluid Acid Fast Bacilli Culture - Preliminary














Assessment and Plan


Assessment: 





1.  Acute hypoxemic respiratory failure secondary to loculated left-sided 

pleural effusion and fungal empyema with what appears to be recurrent pneumonia.

 Continue Voriconazole 200 mg every 12 hours IV piggyback per Dr. Sheets with 

plan for oral at the time of discharge. Patient will be off amiodarone at the 

time of discharge. 





2.  Lactic acidosis.  Resolved, repeat lactic acid is back to normal.





3.  Sepsis with septic shock and was stable.  Patient has been weaned off 

Levothroid drip.





4.  Acute kidney injury due to poor oral intake of fluid as well as hypotension 

with acute tubular necrosis.  Patient is on IV fluid, continue to monitor the 

patient CMP continue to monitor urine output.  Nephrology is following.





5.  Leukocytosis secondary to severe sepsis secondary to empyema.  Patient is 

off antibiotic.





6.  Persistent atrial flutter with RVR.  Status post cardioversion, converted 

back.  Status post ablation completed by Dr. Pimentel.  Patient to continue 

eliquis.  Continued on amiodarone 100 mg daily until discharge and then 

discontinue. Cardiology consult appreciated.





7.  Rheumatoid arthritis and rheumatoid lung.  Arava and Xeljanz had been on 

hold since October.





8.  History of empyema with Streptococcus requiring chest tube.  





9.  Hypertension and hypertensive cardiovascular disease. Lasix 40 mg IVP daily.





10.  Hypothyroidism . we will continue with Synthroid 150 mcg orally daily.





11.  Elevated liver function tests.  Repeat blood work will be ordered for chanel

rrow.





12.  DVT prophylaxis. Eliquis and SCDs.





13. GI prophylaxis. we will continue with Protonix 40 mg oral daily.








Discharge plan: home end of week





Impression and plan of care have been directed as dictated by the signing 

physician.  Phyllis Wylie nurse practitioner acting as scribe for signing 

physician.

## 2021-02-17 NOTE — P.PN
Progress Note - Text


Progress Note Date: 02/17/21





HISTORY OF PRESENT ILLNESS


This is a 60-year-old male treated for Aspergillus pneumonia and empyema.  

Patient has a left-sided Pleurx catheter in place with intermittent air leak.  

He is going to require home oxygen therapy.  7 afebrile, heart rate 93, blood 

pressure 96/56.  Pulse ox is 8789% at rest without oxygen.  He denies having any

chest pain or shortness of breath at rest.  He does have exertional dyspnea.  No

abdominal pain or diarrhea.  Patient is noted to have elevated liver function 

tests for which we will discontinue amiodarone as was previously discussed with 

Dr. Pimentel and transition antifungal to oral.





   


PHYSICAL EXAMINATION


Gen: This is a 60-year-old  male.  Patient is resting in bed and 

appears to be a recliner and appears to be in no acute distress.


HEENT: Head is atraumatic, normocephalic. Pupils equal, round. Sclerae is 

anicteric. 


NECK: Supple. No JVD.


LUNGS: Decreased at the bases. No wheezes or rhonchi.  No intercostal 

retractions.  Pleurx catheter in place.


HEART: Regular rate and rhythm. No murmur. 


ABDOMEN: Soft. Bowel sounds are present. No masses.  No tenderness.


EXTREMITIES: No pedal edema.  No calf tenderness.


NEUROLOGICAL: Patient is awake, alert and oriented x3. 








ASSESSMENT


Aspergillus pneumonia and empyema status post X catheter placement


Elevated liver function tests


Rheumatoid arthritis








PLAN


Discontinue amiodarone as discussed with Dr. Pimentel


Transition voriconazole to oral 400 mg twice daily


Prescription for for Voriconazole 400 mg twice daily for 3 month course has been

sent to his pharmacy.











The above dictated assessment and findings were discussed with Dr. Sheets.  The 

impression and plan of care have been directed as dictated.  Phyllis Wylie nurse 

practitioner acting as scribe for Dr. Sheets.

## 2021-02-17 NOTE — P.PN
Subjective


Progress Note Date: 02/17/21


Principal diagnosis: 





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 

species, acute hypoxic respiratory failure with sepsis on admission.  Radius his

tory of multiple admissions for pneumonia with empyema on the left and previous 

bilateral chest tube placement in June 2020 with cultures positive for strep 

pneumonia, rheumatoid arthritis with rheumatoid lung, hypertension, 

hypothyroidism, DVT, previous tobacco dependence, family history of lung cancer,

and recent hospitalization for hypotension, dehydration, acute kidney injury 

requiring dialysis





POD #16 left-sided thoracentesis by interventional radiology





POD #9 left Pleurx catheter placement with irrigation of pleural space and 

instillation of amphotericin B solution under thoracoscopic guidance





Extensive subcutaneous emphysema





Persistent atrial flutter with RVR, status post electrical cardioversion, status

post radiofrequency ablation





The patient is currently sitting up in a recliner on the cardiac stepdown unit 

in no acute distress.  Denies pain, states shortness of breath has continued to 

improve, appears comfortable.  Remains afebrile, currently in sinus rhythm and 

hemodynamically stable.  Left sided Pleurx catheter continues to wall suction, 

rare air leak seen this morning.  Patient is getting very discouraged, would 

like to go home although does understand our concerns for safety with presence 

of air leak.  States he has been ambulatory in hallway without difficulty





Objective





- Vital Signs


Vital signs: 


                                   Vital Signs











Temp  98.1 F   02/17/21 04:00


 


Pulse  93   02/17/21 04:00


 


Resp  16   02/17/21 04:00


 


BP  96/56   02/17/21 04:00


 


Pulse Ox  94 L  02/17/21 04:00








                                 Intake & Output











 02/16/21 02/17/21 02/17/21





 18:59 06:59 18:59


 


Intake Total 722  


 


Output Total 850 350 


 


Balance -128 -350 


 


Weight  99.7 kg 


 


Intake:   


 


    


 


    Voriconazole 400 mg In 250  





    Sodium Chloride 0.9% 250   





    ml @ 125 mls/hr IVPB   





    Q12HR [facility] Rx#:[id number]   


 


  Oral 472  


 


Output:   


 


  Chest Tube Drainage 50 100 


 


    Left Mid-Axillary Chest 50 100 


 


  Urine 800 250 


 


Other:   


 


  Voiding Method  Toilet 





  Urinal 














- Constitutional


General appearance: Present: cooperative, no acute distress





- Respiratory


Details: 





Lungs sounds diminished in the bases bilaterally.  Respirations even, 

nonlabored.  Currently on 2 L nasal cannula with oxygen saturation 94%.  Able to

achieve 750-1000 mL on his incentive spirometry.  Strong cough.  Left sided 

Pleurx catheter present to continuous wall suction, 100 mL yellow drainage 

overnight, 200 mL in the last 24 hours, rare air leak present.  Subcutaneous 

emphysema to both arms, chest and back remains








- Cardiovascular


Details: 





S1, S2 present.  Regular rate and rhythm, sinus rhythm on telemetry with heart 

rate in the 90s.  Palpable peripheral pulses bilaterally.  Bilateral lower 

extremity edema present.  No calf pain or tenderness noted.





- Gastrointestinal


Gastrointestinal Comment(s): 





Abdomen soft, nontender, nondistended.  Active bowel sounds present 4 

quadrants.  Tolerating diet.  Positive bowel movement 2/12








- Genitourinary


Genitourinary Comment(s): 





Continues to void





- Integumentary


Integumentary Comment(s): 





Skin is warm and dry with evidence of good perfusion








- Neurologic


Neurologic: Present: CNII-XII intact





- Musculoskeletal


Musculoskeletal: Present: gait normal, strength equal bilaterally





- Psychiatric


Psychiatric: Present: A&O x's 3, appropriate affect, intact judgment & insight





- Allied health notes


Allied health notes reviewed: nursing





- Labs


CBC & Chem 7: 


                                 02/17/21 07:40





                                 02/17/21 07:40


Labs: 


                  Abnormal Lab Results - Last 24 Hours (Table)











  02/17/21 02/17/21 Range/Units





  07:40 07:40 


 


RBC  3.48 L   (4.30-5.90)  m/uL


 


Hgb  9.3 L   (13.0-17.5)  gm/dL


 


Hct  31.5 L   (39.0-53.0)  %


 


MCHC  29.6 L   (31.0-37.0)  g/dL


 


RDW  18.8 H   (11.5-15.5)  %


 


Carbon Dioxide   36 H  (22-30)  mmol/L


 


Calcium   8.2 L  (8.4-10.2)  mg/dL


 


Total Bilirubin   1.4 H  (0.2-1.3)  mg/dL


 


AST   106 H  (17-59)  U/L


 


ALT   85 H  (4-49)  U/L


 


Alkaline Phosphatase   513 H  ()  U/L


 


Total Protein   5.8 L  (6.3-8.2)  g/dL


 


Albumin   2.5 L  (3.5-5.0)  g/dL














- Imaging and Cardiology


Chest x-ray: image reviewed





Assessment and Plan


Assessment: 





1.  Bilateral pneumonia,  left trapped lung with fungal empyema growing 

Aspergillus species, status post left-sided thoracentesis, status post Pleurx 

catheter placement with instillation of amphotericin B


2.  Acute hypoxic respiratory failure with sepsis on admission


3.  Multiple admissions for pneumonia with empyema on the left and previous 

bilateral chest tube placement in June 2020 with cultures positive for strep 

pneumonia


4.  Rheumatoid arthritis with rheumatoid lung


5.  Hypertension, currently hypotensive, requiring pressors on admission


6.  Hypothyroidism


7.  History of DVT


8.  Previous tobacco dependence


9.  Family history of lung cancer


10.  Recent hospitalization for hypotension, dehydration, acute kidney injury 

requiring dialysis


11.  Extensive subcutaneous emphysema, common occurrence after lung surgery


12.  Hydropneumothorax, expected given patient's history of rheumatoid lung with

trapped lung for quite some time


13.  Persistent atrial flutter with RVR, status post electrical cardioversion 

and radiofrequency ablation, currently sinus


Plan: 





1.  Pleurx catheter placed to trial waterseal.  Will monitor for worsening of 

subcutaneous emphysema, respiratory distress, and/or increasing pneumothorax


2.  Daily chest x-rays


3.  Encourage incentive spirometry.  Bronchodilators per pulmonology


4.  Amiodarone, Eliquis per cardiology


5.  Continue voriconazole per infectious disease recommendations


6.  Increase activity as tolerated, ambulate in hallway  


7.  Management of other comorbidities per primary care service.


8.  Teaching done with patient and wife regarding Pleurx catheter care.  Will 

continue to reinforce while patient is hospitalized


9.  More recommendations to follow





Time with Patient: Greater than 30

## 2021-02-17 NOTE — XR
EXAMINATION TYPE: XR chest 1V portable

 

DATE OF EXAM: 2/17/2021

 

HISTORY: Shortness of breath.

 

COMPARISON: 2/16/2021

 

TECHNIQUE: Single view of the chest is submitted.

 

FINDINGS:

Extensive subcutaneous emphysema is unchanged. Underlying pleural-parenchymal opacities persists. Lef
t basilar pleural catheter is unchanged in position. Overall stable chest.

 

The heart is stable.

 

Hilar and mediastinal structures are within normal limits.  

 

Degenerative changes are seen of the dorsal spine. 

 

 IMPRESSION: 

 

1.  Overall stable chest

## 2021-02-17 NOTE — P.PN
Subjective


Progress Note Date: 02/17/21





This is a 60-year-old  male patient requesting my service with past 

medical history of rheumatoid arthritis on Arava that was diagnosed when he was 

37 year old initially was started on Methotrexate that he could not tolerate 

then placed on Embrel but he developed side effects and finally was tried on 

Humira injection but he developed neurologic sided effects and was hospitaized 

at [facility] for quiet some time, rheumatoid lung disease, 

previous history of loculated empyema with chest tube placement in July 2020, 

hypertension, hypothyroidism, history of DVT, remote history of tobacco use and 

dependence, was recently hospitalized at [facility] after he was 

admitted for an acute kidney injury with significant metabolic acidosis 

associated with the elevated creatinine and elevated BUN and hypotension at that

time he was seen and evaluated by pulmonary and critical care medicine, he had a

central line placed and at that time, he was placed on Levophed drip he was p

laced on IV anabiotic as well but the patient recovered very fast and he had an 

echocardiogram that showed normal LV function and segmental hypokinesia, he was 

supposed to follow-up with Dr. Santo in the office today, patient was seen in my

office 24 hours ago when he was complaining of increased shortness breath, at 

that time his oxygenation could not be checked because of his Case's and cold

extremities, he was sent for a chest x-ray that showed right lower lobe 

infiltrate as well as blood tests that showed a white count of 20,000 patient 

was feeling better he was started on oral antibiotic in the form of Levaquin 500

mg orally once every day, and that he was supposed to follow-up with me as an 

outpatient every week he went to see his cardiologist today and he had an 

episode when he was dizzy lightheaded and an episode of vomiting as well and he 

was sent to the emergency department for evaluation had a computed tomography of

the chest as well as abdomen and pelvis that showed a thick walled pleural 

cavities posteriorly in the lower lungs with left-sided air-fluid level that has

diminished in size from the prior computed tomography scan when he was in the 

hospital a few weeks back there is also associated compressive atelectasis and 

chronic consultation with multifocal areas of reticular nodular opacity in the 

upper lungs with a slight nodularity of the left upper lobe again this area of 

focal consultation has improved from the previous study that was done few weeks 

ago, patient was started on IV antibiotic with vancomycin and Zosyn as well as 

IV fluid, he was seen in consultation by pulmonary critical care in the 

emergency department as the patient blood pressure dropped and that he'll be 

transferred to the intensive care unit at this point in time I had long conve

rsation with the patient and his wife at the bedside and the patient may need to

have a chest tube placed for his possible right empyema, he did receive 3 L 

normal saline in the ER, and his blood pressure is marginal he may need to go on

 pressors if  not recovered.





1/30:patient is sitting up in chair feeling about the same , complains of 

increased pain in the righ elbow, was seen earlier by pulmonary and the plan was

to go for US-Guided left thoracenthesis due to to loculated left pleural effu

nathaniel and possible empyema, may need VATS, he denies any chest pain or shortness 

of breath, no abdominal pain nausea, vomiting or diarrhea, still have diarrhea 

negative for C.Diff, he seems to have accept to stay in the hospital this time 

as long as he needed to.





1/31: Patient sitting up in the recliner complaining of increased pain in his 

joints due to his rheumatoid arthritis with increased synovitis in both wrists 

both elbows and both shoulders he was started on hydrocortisone 100 mg IV push 

every 8 hours, to try to help with his blood pressure as well as his phonation, 

he is maintained on Norco 5/325 mg one tablet orally every 6 hours as needed for

pain control, patient denies any chest pain is less short of breath, he 

continues to have increased coughing with yellow from production of green phlegm

production, he had no fever or chills at this time he continues to be on 

Levothroid drip, patient has appeared to have increased swelling in both lower 

extremities as well as both ankles, he is maintained on IV antibiotic in the 

form of vancomycin as well as cefepime, infectious disease is following, patient

is scheduled to go for ultrasound guidance thoracentesis of the left loculated 

pleural effusion tomorrow morning.





2/1: Patient remains in the intensive care unit.  He has been afebrile, heart 

rate 100, blood pressure 95/62, pulse ox 97% on 2 L nasal cannula.  Hemoglobin 

8.8 and leukocytosis resolved.  Creatinine 0.76, electrolytes normal.  Sputum 

culture is positive for Aspergillus species.  Blood culture showing no growth. 

Patient underwent diagnostic thoracentesis with interventional radiology today 

with removal of 45 mL of cloudy white fluid.  Chest x-ray reveals no 

complications following left thoracentesis.  Fluid has been sent for culture and

cytology.  Consult in place for cardiothoracic surgery to evaluate for VATS with

decortication.





2/2: Patient remains in the intensive care unit.  He did receive 1 dose of IV 

Lasix this morning ordered by nephrology.  He is followed by cardiothoracic 

surgery was no plan for surgical intervention.  ID has recommended cefepime and 

vancomycin for now.  Expect antifungal agent ended as well.  Patient denies any 

significant shortness of breath.  He is comfortable sitting in a chair.  Patient

is having minimal cough and minimal sputum.  Patient is achieving 1000 ml on 

incentive spirometry.  Culture and cytology reports remain pending from 

thoracentesis. Repeat chest x-ray reveals patchy peripheral lung with lung zone 

and lower lung zone infiltrates persist unchanged.  Patient has been afebrile, 

heart rate in the 90s and low 100s, pulse ox 91 on room air.





2/3: Repeat chest x-ray reveals left-sided basilar loculated pneumothorax, 

stable.  Bilateral lung infiltrates.  Correlate for pneumonia and atelectasis.  

WBC 9, hemoglobin 9.5.  Potassium 3.4 and will be replaced.  Patient is 

continued on cefepime and vancomycin per Dr. Sheets's recommendations.  Patient 

in reaching 1000 on incentive spirometry.  Patient is scheduled to see Dr. Kaye tomorrow.  Cefepime and vancomycin had been discontinued by Dr. Sheets and

started on Voriconazole. 





2/4: A-Team was called this morning regarding elevated heart rate, EKG was 

atrial flutter with RVR and 150 bpm and patient was transferred to ICU as an 

overflow for the cardiac stepdown unit, started on Cardizem drip and consult 

with cardiology.  Heart rate was improved with Cardizem and patient also 

received amiodarone bolus 300 mg.  Patient denies any worsening shortness of 

breath, cough or congestion.  No chest pain or palpitations.  Patient is resting

comfortably.  09, hemoglobin 8.8, potassium 3.4 replaced.  Creatinine 0.89.





2/5: Patient remains in the intensive care unit.  He continues to be in atrial 

flutter with 21 conduction rate.  He is on heparin drip and amiodarone.  He is 

currently in and out of atrial flutter with RVR.  Cardiology is following 

closely and may consider cardioversion tomorrow. WBC 16.6, hgb 9.8, plt 374, 

sodium 143, potassium 3.9, creatinine 0.88.  He has been afebrile, heart rate 

has been at times marginal, pulse ox 90% on room air.  Patient denies having any

fever or chills, no shortness of breath, no cough.  No chest pain.  Patient is 

followed by cardiothoracic surgery and plan is for left-sided PleurX catheter 

placement which is scheduled for Monday.





2/8: Patient is scheduled for PleurX catheter placement this afternoon.  

Cardiology may schedule him for cardioversion.  Dr. Collado is planning on oral 

antimicrobials at the time of discharge.  Patient is bringing up quite a bit of 

sputum.  He has been afebrile, heart rate 68, blood pressure 100/67, pulse ox 

93% on 1 L nasal cannula.  WBC 21, hemoglobin 9.6, platelet count 325.  

Potassium 3, BUN 28 creatinine 0.8.





2/9: Patient is status post PleurX catheter.  Patient has been afebrile, heart 

rate 106, blood pressure 85/53.  Pulse ox 97% on 2 L.  Patient is known to be 

back in atrial fibrillation at rate of 122.  He did go for cardioversion today 

for atrial flutter.  Patient states he has a little pain in the side for which 

she is taking Norco.  Not bad today.  Patient has subcutaneous edema to 

bilateral chest.  He denies having any headache.  BUN 27 creatinine 0.79.  Blood

sugar .





2/10: Patient denies chest pain or shortness of breath.  He is reaching 1250 on 

IS.  PleurX draining small amount of sanguinous fluid.  Patient is in atrial 

flutter.  Dr. Pimentel is planning for EP study and ablation on Thursday and plan

to continue eliquis.  Patient is also on Lopressor 25 mg 3 times daily.  Patient

has been afebrile, heart rate 97, blood pressure 106/65, pulse ox 98% on room 

air.  Blood sugars running between 112 and 67.  Patient is covered with 

Voriconazole.  Lac-Hydrin added for bilateral legs.





2/11: Patient is status post successful atrial flutter ablation with Dr. Pimentel.  Cardiology has recommended amlodipine 100 mg twice daily for 1 month. 

Cardiac monitor is a sinus rhythm with PACs.  Metoprolol was decreased to 25 mg 

twice daily.  Patient is on eliquis 5 mg twice daily.  Patient has been 

afebrile, heart rate 64, blood pressure 121/77.  Potassium 2.3 and replaced.  

Repeat level will be obtained this evening.  Pleurx catheter remains in place 

connected to Pleur-evac with intermittent air leak.  He has minimal output.  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  He is planning on oral antimicrobial at the time of discharge.





2/12: Patient is resting in recliner. Patient's wife is at bedside. Patient 

states he walked in hallway and felt well but did have shortness of breath with 

activity. He continues to have subcutaneous emphysema. He continues to have air 

leak.  WBC is 15.8, hemoglobin 10, platelet count 293.  Potassium is 3 and will 

be replaced.  BUN 27 creatinine 0.82.  Patient has been afebrile, heart rate 79,

blood pressure 114/66, pulse ox 90% on room air.  Chest x-ray is stable 

findings.





2/13: Patient is sitting up in his recliner he is quite depressed because he 

stated the hospital for almost 2 weeks, he wanted to go home, he continues to 

have some leak in the chest tube on the left side, we will maintain the patient 

on voriconazole, and he has to take that for the next 4 weeks and possibly 

longer would leave that up to the discretion of infectious disease, I spoke with

his wife as well as himself about the prognosis and the patient may need to send

to the hospital until Monday , meanwhile continue with physical therapy 

evaluation, anticipating home health care with physical therapy at home.





2/14: Patient is sitting in the recliner does not appear in acute distress, he 

continues to have the chest tube in the left side with minimal leakage, 

hopefully it will be removed in the next 24 hours and can be discharged home, he

is maintained on voriconazole for his Aspergillus fumigate as, continue with 

increased activity, continue to monitor the patient very closely, anticipate 

discharge in 1-2 days.





2/15: Patient remains with Pleurx catheter to wall suction with intermittent 

leak. He also continues to have subcutaneous emphysema not worsening. Patient is

very anxious to leave the hospital and go home.  Discussed dosing of Amiodarone 

with Dr. Pimentel. Amiodarone will be discontinued at the time of discharge.  

Patient is currently on Lasix 40 IVP daily. Lower extremity edema is improving. 

WBC 13.0, hemoglobin 9.6, electrolytes unremarkable, with the exception of the 

CO2 of 35, renal profile is unremarkable, liver enzymes show , alkaline 

phosphatase 495.  





2/16: The chest x-ray reveals no definite large pneumothorax.  Small left apical

pneumothorax.  Could be considered bilateral peripheral infiltrate in right 

upper lobe.  Patient has been afebrile, heart rate 93, blood pressure 90/54, 

pulse ox 97% on 2 L nasal cannula.  Heart monitor sinus rhythm.  Repeat blood 

work ordered for tomorrow.  Pleurx catheter continues to have a slight 

intermittent air leak.  Patient continues to have subcutaneous emphysema without

worsening.





2/17: Patient is currently off suction the time of evaluation.  Cardiothoracic 

surgery is planning for repeat chest x-ray tomorrow and possibly Pleurx 

catheter.  Patient is upset today and really wants to go home.  His pulse ox is 

8789% at rest without oxygen.   to attempt to set up home oxygen 

therapy.  He has been afebrile, heart rate 93, blood pressure 96/56.  

Leukocytosis has resolved with WBC of 9.8, hemoglobin 9.3.  Potassium 3.7, BUN 

14 and creatinine 0.77.  Total bilirubin 1.4.  , ALT 85, avoid 

phosphatase 513.  Discussed with Dr. Sheets and agreed to transition IV 

antifungal to oral and we will discontinue amiodarone.  Possible discharge home 

tomorrow.





Objective





- Vital Signs


Vital signs: 


                                   Vital Signs











Temp  98.1 F   02/17/21 04:00


 


Pulse  93   02/17/21 04:00


 


Resp  16   02/17/21 04:00


 


BP  96/56   02/17/21 04:00


 


Pulse Ox  94 L  02/17/21 04:00








                                 Intake & Output











 02/16/21 02/17/21 02/17/21





 18:59 06:59 18:59


 


Intake Total 722  


 


Output Total 850 350 


 


Balance -128 -350 


 


Weight  99.7 kg 


 


Intake:   


 


    


 


    Voriconazole 400 mg In 250  





    Sodium Chloride 0.9% 250   





    ml @ 125 mls/hr IVPB   





    Q12HR [facility] Rx#:[id number]   


 


  Oral 472  


 


Output:   


 


  Chest Tube Drainage 50 100 


 


    Left Mid-Axillary Chest 50 100 


 


  Urine 800 250 


 


Other:   


 


  Voiding Method  Toilet 





  Urinal 














- Exam





 Review of Systems 


Constitutional: Reports anorexia, Reports chronic pain, Reports fatigue, denies 

weakness, Reports weight loss


Eyes: denies blurred vision, denies bulging eye, denies decreased vision, denies

diplopia


Ears, nose, mouth and throat: Denies dysphagia, Denies sore throat


Respiratory: Reports dyspnea, with exertion Reports respiratory infections, 

Denies congestion, Denies cough with sputum, Denies home oxygen, Denies sleep 

apnea, Denies snoring, Denies wheezing


Cardiac: No chest pain.  No palpitations.  No lower extremity edema.  No 

syncopal episodes.


Gastrointestinal: Reports bloating, Reports change in bowel habits, Reports 

diarrhea, Reports early satiety, Reports indigestion, Reports loss of appetite, 

Reports nausea, Reports vomiting, Denies abdominal pain, Denies belching, Denies

heartburn, Denies hematemesis, Denies hematochezia, Denies melena


Genitourinary: Denies dysuria, Denies nocturia


Musculoskeletal: Denies myalgias 


Musculoskeletal: absent: ankle pain, ankle stiffness, ankle swelling, elbow 

pain, elbow stiffness, elbow swelling, foot pain, foot stiffness, foot swelling,

hand pain, hand stiffness, hand swelling, hip pain, hip stiffness, hip swelling,

knee pain, knee stiffness, knee swelling, shoulder pain, shoulder stiffness, 

shoulder swelling, wrist pain, wrist stiffness, wrist swelling


Integumentary: Denies pruritus, Denies rash, subcutaneous emphysema


Neurological: Denies numbness, Denies weakness


Psychiatric: Denies anxiety, Denies depression


Endocrine: Denies fatigue, Denies weight change








Physical examination:


General: This is a 60-year-old  male.  Patient is resting in a recliner

and appears to be comfortable.





HEENT: head ia atraumatic normocephalic, Pupils were equal round reactive to 

light and accommodations , extra ocular muscle movement were intact, mucous 

membranes of the mouth are somewhat dry.





Neck: supple no JVP.





Chest: decreased breath sounds at he bases no expiratory wheezes no chest wall 

tendernes or intercostal retractions.  PleurX cath in place. Positive 

subcutaneous emphysema.





Heart: first heart sound is depressed, second heart sound is normal tachycardic 

and irregular there is HOOD 2/6 located at the left sternal border.





Abdomen: soft, mild tenderness to the left lower quadrant positive bowel sounds,

no guarding or rebound tenderness, no hepatosplenomegaly.





Extremities: there is 1+ edema or calf tenderness DP+ 2 Bilaterally, there is 

what appears to be a charcot joint in the right foot form arch collapse.





Neurologic examination: patient is awake , alert and oriented X 3 CN II-XII are 

grossly intact, muscle power 4/5 in bilateral upper and lower extremities, deep 

tendon reflexes were normal.











- Labs


CBC & Chem 7: 


                                 02/17/21 07:40





                                 02/17/21 07:40





Assessment and Plan


Assessment: 





1.  Acute hypoxemic respiratory failure secondary to loculated left-sided 

pleural effusion and fungal empyema with what appears to be recurrent pneumonia.

 Continue Voriconazole transitioned to oral 400 mg twice daily per Dr. Sheets 

with plan for oral at the time of discharge.  Discontinue amiodarone





2.  Lactic acidosis.  Resolved, repeat lactic acid is back to normal.





3.  Sepsis with septic shock and was stable.  Patient has been weaned off 

Levothroid drip.





4.  Acute kidney injury due to poor oral intake of fluid as well as hypotension 

with acute tubular necrosis.  Patient is on IV fluid, continue to monitor the 

patient CMP continue to monitor urine output.  Nephrology is following.





5.  Leukocytosis secondary to severe sepsis secondary to empyema.  Patient is 

off antibiotic.





6.  Persistent atrial flutter with RVR.  Status post cardioversion, converted 

back.  Status post ablation completed by Dr. Pimentel.  Patient to continue 

eliquis.  Continued on amiodarone 100 mg daily until discharge and then 

discontinue. Cardiology consult appreciated.





7.  Rheumatoid arthritis and rheumatoid lung.  Arava and Xeljanz had been on 

hold since October.





8.  History of empyema with Streptococcus requiring chest tube.  





9.  Hypertension and hypertensive cardiovascular disease. Lasix 40 mg IVP daily.





10.  Hypothyroidism . we will continue with Synthroid 150 mcg orally daily.





11.  Elevated liver function tests.  Plan to discontinue amiodarone and 

transition voriconazole to oral.





12.  DVT prophylaxis. Eliquis and SCDs.





13. GI prophylaxis. we will continue with Protonix 40 mg oral daily.








Discharge plan: home on Thursday





Impression and plan of care have been directed as dictated by the signing 

physician.  Phyllis Wylie nurse practitioner acting as scribe for signing 

physician.

## 2021-02-18 RX ADMIN — SODIUM CHLORIDE, PRESERVATIVE FREE SCH ML: 5 INJECTION INTRAVENOUS at 08:18

## 2021-02-18 RX ADMIN — AMMONIUM LACTATE SCH APPLIC: 12 LOTION TOPICAL at 20:17

## 2021-02-18 RX ADMIN — IPRATROPIUM BROMIDE AND ALBUTEROL SULFATE SCH: .5; 3 SOLUTION RESPIRATORY (INHALATION) at 11:45

## 2021-02-18 RX ADMIN — TRIAMCINOLONE ACETONIDE SCH APPLIC: 1 CREAM TOPICAL at 20:17

## 2021-02-18 RX ADMIN — METOPROLOL TARTRATE SCH MG: 25 TABLET, FILM COATED ORAL at 08:18

## 2021-02-18 RX ADMIN — TRIAMCINOLONE ACETONIDE SCH APPLIC: 1 CREAM TOPICAL at 08:19

## 2021-02-18 RX ADMIN — VORICONAZOLE SCH MG: 200 TABLET, FILM COATED ORAL at 08:18

## 2021-02-18 RX ADMIN — APIXABAN SCH MG: 5 TABLET, FILM COATED ORAL at 20:16

## 2021-02-18 RX ADMIN — AMMONIUM LACTATE SCH APPLIC: 12 LOTION TOPICAL at 08:19

## 2021-02-18 RX ADMIN — POTASSIUM CHLORIDE SCH: 14.9 INJECTION, SOLUTION INTRAVENOUS at 08:19

## 2021-02-18 RX ADMIN — Medication SCH MCG: at 20:17

## 2021-02-18 RX ADMIN — HYDROCODONE BITARTRATE AND ACETAMINOPHEN PRN EACH: 5; 325 TABLET ORAL at 20:20

## 2021-02-18 RX ADMIN — SODIUM CHLORIDE, PRESERVATIVE FREE SCH ML: 5 INJECTION INTRAVENOUS at 20:17

## 2021-02-18 RX ADMIN — VORICONAZOLE SCH MG: 200 TABLET, FILM COATED ORAL at 20:17

## 2021-02-18 RX ADMIN — CEFAZOLIN SCH: 330 INJECTION, POWDER, FOR SOLUTION INTRAMUSCULAR; INTRAVENOUS at 13:40

## 2021-02-18 RX ADMIN — METOPROLOL TARTRATE SCH MG: 25 TABLET, FILM COATED ORAL at 20:17

## 2021-02-18 RX ADMIN — ASPIRIN 81 MG CHEWABLE TABLET SCH MG: 81 TABLET CHEWABLE at 20:17

## 2021-02-18 RX ADMIN — APIXABAN SCH MG: 5 TABLET, FILM COATED ORAL at 08:18

## 2021-02-18 RX ADMIN — PANTOPRAZOLE SODIUM SCH MG: 40 TABLET, DELAYED RELEASE ORAL at 06:22

## 2021-02-18 RX ADMIN — OXYCODONE HYDROCHLORIDE AND ACETAMINOPHEN SCH MG: 500 TABLET ORAL at 20:16

## 2021-02-18 RX ADMIN — IPRATROPIUM BROMIDE AND ALBUTEROL SULFATE SCH: .5; 3 SOLUTION RESPIRATORY (INHALATION) at 07:42

## 2021-02-18 RX ADMIN — LEVOTHYROXINE SODIUM SCH MCG: 75 TABLET ORAL at 06:21

## 2021-02-18 RX ADMIN — IPRATROPIUM BROMIDE AND ALBUTEROL SULFATE SCH: .5; 3 SOLUTION RESPIRATORY (INHALATION) at 19:32

## 2021-02-18 NOTE — PN
PROGRESS NOTE



DATE OF SERVICE:

02/18/2021



REASON FOR FOLLOWUP:

Aspergillosis with aspergillus pneumonia.



INTERVAL HISTORY:

The patient is currently afebrile. The patient is breathing comfortably.  He is feeling

slightly depressed because of continued air leaks, responding to chest tube.  Denies

any chest pain.  No abdominal pain. No diarrhea.



PHYSICAL EXAMINATION:

Blood pressure is 102/58 with pulse of 93, temperature 98.1. He is 97% on 2 L nasal

cannula.

General description is a middle-aged male lying in bed in no distress.

RESPIRATORY SYSTEM: Unlabored breathing, decreased breath sounds at the bases. No

wheeze.

HEART: S1, S2.  Regular rate and rhythm.

ABDOMEN:  Soft, no tenderness.



LABS:

Hemoglobin is 9.3, white count 9.8, creatinine 0.72. AST is down to 106, ALT is down to

85.



DIAGNOSTIC IMPRESSION AND PLAN:

Patient with aspergillus pneumonia and empyema, status post Port-A-Cath placement and

did have problem with  _____ leak. CT Surgery is following the patient.  Patient is

covered with [employer], that will be continued for a couple of weeks and a close outpatient

followup.





MMODL / IJN: [id number] / Job#: [id number]

## 2021-02-18 NOTE — P.PN
Subjective


Progress Note Date: 02/18/21





60-year-old white male patient, with past medical history of rheumatoid 

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 

episodes of pneumonia and history of loculated empyema requiring chest tube 

placement back in July 2020 with pleural fluid cultures positive for 

streptococcal pneumonia.  Other medical history includes hypertension, 

hypothyroidism and previous history of DVT.  Patient had a recent 

hospitalization from January 18 through 01/21/2021 hypotension, dehydration re

lated to nausea vomiting diarrhea, acute kidney injury.  Patient received 

antibiotics in the form of Zosyn and Levaquin for possibility of pneumonia, he 

is chest x-ray showed cranial perihilar pulmonary infiltrates with increased 

interstitial changes at the lung bases and was thought to be related to a 

combination of rheumatoid lung and chronic scarring.  His last CT of the chest 

was on 12/29/2020 showing chronic changes to lower lungs, persistent irregular 

thick-walled pleural spaces in the posterior lung bases containing fluid and air

with adjacent anterior lung with chronic consolidation or atelectasis.  On 

01/29/2021 patient came into the emergency department from Dr. SARINA Santo's office

where he was being seen for a regular follow-up appointments.  He was noted to 

have low pulse ox 70s and was sent in to the emergency department for 

evaluation.  He has been having cough with yellow and sometimes blood-tinged 

sputum, appears amounts, denies any fever, COVID 19 was found to be negative, 

chest x-ray showed perihilar and basilar infiltrates without significant change 

from previous chest x-ray on 01/19/2021.  Lab data showed leukocytosis with 

white blood cell count of 19.1, hemoglobin of 12.1, INR 1.5, sodium is 135, 

potassium is 3.9, chloride is 102, CO2 is 19, creatinine is 1.32, which has 

actually improved since his discharge from the hospital on 01/21/2021 when he 

was at 1.84.  Lactic acid was elevated at 3.7, patient was given a total of 2 L 

in fluid boluses, troponin was negative at less than 0.012.  Urinalysis showed 

no evidence of infection.  CT chest showed thick-walled pleural cavities 

posteriorly in the lower lungs with left-sided air-fluid level with left-sided 

pleural fluid diminished most recent CT from 12/29/2020.  There is associated 

compressive atelectasis and/or chronic consolidation.  Persistent as it is lobe 

fissure, multifocal areas of reticular nodular opacity in the upper lungs remain

present with slight nodularity in the left upper lobe.  There is a focal 

consolidation improved from most recent study with some residual areas of 

scarring.  We will emergency department patient became hypotensive with a blood 

pressure in the 70s, slightly tachycardic remains in sinus mechanism, he is 

receiving his third liter bolus, his lactic acid did respond and improved he was

started on antibiotics in the form of Zosyn and vancomycin 





The patient is seen today January 30th 2021 in the intensive care unit.  He is 

currently sitting up in a chair at the bedside.  Awake and alert in no acute 

distress.  Maintaining O2 saturations in the 90s on 2 L/m per nasal cannula.  He

did require a small dose of norepinephrine currently on 0.05 mcg/kg/m.  He has 

lactated Ringer's at 100 MLS per hour.  He remains on vancomycin and cefepime.  

Chest x-ray continues to show the left lower lobe effusion.  White count 11.7.  

Hemoglobin 9.7.  Sodium 135.  Potassium 3.6.  Creatinine 1.00.  Blood cultures 

reveal no growth to date.





Reevaluated today on 1/31/2021, remains in the ICU, still on norepinephrine at 

0.04 mcg/kg/m.  On LR at 1 25 mL per hour.  On vancomycin and cefepime 

empirically, blood pressure remains marginal, went ahead and recommended 

Solu-Cortef 100 mg IV push every 8 hours and hopefully will be able to 

discontinue norepinephrine today.  His IV fluid is cut down to KVO.  Given 20 mg

of Lasix IV push, and I have recommended that we monitor the patient in the ICU 

for the next 24 hours.  Chest x-ray is basically the same, continues to show ch

ronic finding, difficult to exclude underlying pneumonia/empyema.  CT of the 

chest on admission was also reviewed, difficult to rule out underlying empyema. 

Clinically the patient is feeling better except for generally weak.  WBC count 

is 12.5 hemoglobin is 9.9.  Normal renal profile is normal blood cultures are 

negative so far.  Sputum cultures are also negative so far.





On 02/01/2021 patient seen in follow-up in the intensive care unit, he is awake 

and alert, oriented 3, sitting up in the recliner, currently on 2 L of oxygen 

his pulse ox is %, his been afebrile, hemodynamically he is stable, he is 

not on any vasopressor support.  His lactate Ringer's running at 20 ML per hour,

antibiotics include cefepime and vancomycin, blood pressure has been stable, so 

far blood and sputum cultures are negative, he denies chest pain, his breathing 

is comfortable.  Today's labs have been reviewed, showing white blood cell count

trending down, 10.2 today, hemoglobin is 8.8, electrolytes and renal profile are

unremarkable.  Serum cortisol level came back at 3, patient continues on stress 

doses of hydrocortisone 100 mg every 8 hours.  Patient has not been stable, 

we'll consult interventional radiology for ultrasound-guided left thoracentesis





On 02/02/2021 patient seen in follow-up in the intensive care unit, he is calm 

and comfortable, awake and alert, sitting up in the recliner, currently on room 

air with a pulse ox of 93-94%, his been afebrile, hemodynamically he is been 

stable, he is on IV LR at 20 ML per hour, no other drips.  He is on cefepime and

vancomycin for antibiotic coverage, yesterday he underwent left thoracentesis 

with removal of 43 mL of pleural fluid which was sent for cultures.  Tolerated 

procedure well.  His pleural fluid analysis revealed total fluid protein of 1.3 

g, and fluid LDH of greater than 4500 consistent with exudative fluid.  His 

microbiology data has been reviewed showing Aspergillus fumigate is in the 

sputum, his pleural fluid cultures are pending at this time, the cultures have 

shown no growth, hemodynamically has been stable, he is breathing easier, he 

still has cough mostly in the morning and the amount of secretions his bringing 

up is decreasing in amount.  Lung sounds revealed a few scattered wheezes, and a

few rhonchi at bilateral bases.  Is tolerating oral intake.  No abdominal pain, 

no nausea or vomiting, today's labs have been reviewed, white blood cell count 

is 11.2, hemoglobin is 9.1, sodium is 137, potassium is 4.0, chloride is 109, 

BUN is 15, creatinine 0.80.  No acute events overnight.  He is working on 

incentive spirometer, he is achieving 1000 today





On 02/03/2021 patient seen in follow-up on medical surgical floor.  He is awake 

and alert, in no acute distress, breathing comfortably, denies any chest pain, 

room air pulse ox is 94%, no fever or chills, today's chest x-ray shows left 

basilar loculated pneumothorax, stable in appearance, bilateral lung 

infiltrates.  His pleural fluid cultures so far showing Aspergillus species, as 

does his sputum culture.  Blood culture show no growth.  The cervix is follow

ing, current antibiotic coverage includes cefepime, vancomycin has been 

discontinued, she received 1 dose of IV Lasix per nephrology.  He has produced 

2500 in urine output, and he is in -1.6 L over the last 24 hours, however he 

still has significant lower extremity edema.





The patient is seen today 02/04/2021 in follow-up in the intensive care unit.  

He was transferred here earlier this morning after developing atrial 

fibrillation/flutter with a rapid ventricular response.  He was initiated on C

ardizem at 5 mg per hour.  He has 0.9 normal saline at 20 ML's per hour.  He is 

currently awake and alert in no acute distress.  No worsening shortness of 

breath, cough or congestion.  No chest pain or palpitations.  He sitting up in a

chair at the bedside.  Maintaining O2 saturations in the 90s on 2 L/m per nasal 

cannula. Chest x-ray does reveal loculated posterior basilar pneumothoraces, 

stable from previous exam.  There is diffuse increased lung markings.  Correlate

for pneumonia and atelectasis.  Pleural fluid cultures is positive for 

Aspergillus species.  White count 10.9.  Hemoglobin 8.8.  Sodium 139.  Potassium

3.4.  Creatinine 0.89.  He remains on bronchodilators, IV diuretics, IV Solu-

Cortef.  Continued on voriconazole.








The patient is seen today 02/06/2021 in follow-up on the selective care unit.  

He is currently sitting up in a recliner at the bedside.  Awake and alert in no 

acute distress.  He is currently on room air.  LR at 20 miles per hour.  Heparin

drip per weight base protocol.  White count 19.0.  Hemoglobin 9.1.  Sodium 142. 

Potassium 3.6.  Creatinine 0.81.  He remains on voriconazole.





The patient is seen today 02/07/2021 in follow-up on the selective care unit.  

He is currently sitting up in a chair at the bedside.  No worsening shortness of

breath, cough or congestion.  Chest x-ray reveals bilateral airspace infiltrates

right greater than left without significant improvement.  Sputum and pleural 

fluid cultures were both positive for Aspergillus fumigatus.  He remains on 

voriconazole.  White count 18.5.  Hemoglobin 10.1.  Sodium 141.  Potassium 3.3. 

Creatinine 0.8.  Atrial fibrillation.  He remains on a heparin drip.  Plan is 

for left-sided Pleurx catheter placement in a.m.








The patient is seen today 02/14/2021 in follow-up on the selective care unit.  

He is currently sitting up in a recliner.  Awake and alert in no acute distress.

 Maintaining O2 saturations in the low 90s on 2 L/m per nasal cannula.  He is 

afebrile.  Hemodynamically stable.  Pleural fluid cultures were positive for 

Aspergillus fumigatus.  White count 12.6.  Hemoglobin 9.9.  Neutrophils 9.7.  

Sodium 140.  Potassium 3.3.  Creatinine 0.68.  AST 73, . Chest x-ray co

ntinues to show severe diffuse bilateral subcutaneous emphysema.  Suspect a 

left-sided pneumothorax along the costophrenic angle measuring 10-15%.  Left-

sided Pleurx catheter remains in place.  Positive leak.  Remains on 

voriconazole, bronchodilators, IV diuretics.  Quite related with Eliquis.








The patient is seen today 02/17/2021 in follow-up on the selective care unit.  

He is currently resting comfortably in a recliner.  He is awake and alert in no 

acute distress.  Maintain good O2 saturations in the mid 90s on 2 L/m per nasal 

cannula.  He's been afebrile.  Chest x-ray continues to show extensive 

subcutaneous emphysema.  Underlying pleural parenchymal opacities persist.  Left

basilar pleural catheter unchanged.  Sputum and pleural fluid cultures positive 

for Aspergillus fumigatus.  He remains on voriconazole.  Remains on 

bronchodilators, anticoagulated with Eliquis.  White count 9.8.  Hemoglobin 9.3.

 Sodium 138.  Potassium 3.7.  Creatinine 0.72.  .  ALT 85.





The patient is seen today 02/18/2021 in follow-up on the selective care unit.  

He is awake and alert in no acute distress.  Sitting up in a recliner.  Maintain

good O2 saturations in the mid 90s on 2 L/m per nasal cannula.  He's been 

afebrile. He remains on voriconazole.  Remains on bronchodilators, 

anticoagulated with Eliquis.  Chest x-ray reveals persistent extensive bilateral

subcutaneous emphysema.  Air-fluid levels no seen in the bilateral lower lungs 

suggesting underlying loculated hydropneumothorax is on both sides.  Pleural 

edge better seen in the left base with continued left-sided Pleurx catheter.  

Patchy airspace disease throughout the right lung persist.





Objective





- Vital Signs


Vital signs: 


                                   Vital Signs











Temp  98.1 F   02/18/21 07:56


 


Pulse  93   02/18/21 07:56


 


Resp  18   02/18/21 07:56


 


BP  102/58   02/18/21 08:32


 


Pulse Ox  97   02/18/21 07:56








                                 Intake & Output











 02/17/21 02/18/21 02/18/21





 18:59 06:59 18:59


 


Intake Total 1337 10 437


 


Output Total 1190 305 70


 


Balance 147 -295 367


 


Weight  99.3 kg 


 


Intake:   


 


  IV 20 10 


 


    0.9  10 


 


    Invasive Line 6 20  


 


  Oral 1317  437


 


Output:   


 


  Chest Tube Drainage 110 5 70


 


    Left Mid-Axillary Chest 110 5 70


 


  Drainage 30 0 0


 


    Left Chest 30 0 0


 


  Urine 1050 300 


 


Other:   


 


  Voiding Method  Toilet 





  Urinal 


 


  # Voids  1 














- Exam





GENERAL EXAM: Alert, very pleasant 60-year-old gentleman, on 2 L nasal cannula, 

comfortable in no apparent distress.


HEAD: Normocephalic.


EYES: Normal reaction of pupils, equal size.


NOSE: Clear with pink turbinates.


THROAT: No erythema or exudates.


NECK: No masses, no JVD.


CHEST: Bilateral subcutaneous emphysema.  Left Pleurx catheter remains in place.


LUNGS: Equal air entry with few scattered rhonchi, crackles in the posterior 

bases.


CVS: S1 and S2 normal with no audible murmur, irregular rhythm.  


ABDOMEN: No hepatosplenomegaly, normal bowel sounds, no guarding or rigidity.


SPINE: No scoliosis or deformity


SKIN: No rashes


CENTRAL NERVOUS SYSTEM: No focal deficits, tone is normal in all 4 extremities.


EXTREMITIES: There is 2+ peripheral edema.  No clubbing, no cyanosis.  

Peripheral pulses are intact.





- Labs


CBC & Chem 7: 


                                 02/17/21 07:40





                                 02/17/21 07:40





Assessment and Plan


Assessment: 





1 Acute hypoxic respiratory failure secondary to sepsis, septic shock, related 

to pneumonia, with chronic loculated effusions, possible empyema.  Cultures 

positive for Aspergillus fumigatus.  Left Pleurx catheter remains in place since

02/08/2021 . He continues to have intermittent air leak.  Output from the Pleurx

catheter was noted.  He does have some stable subcutaneous emphysema 

bilaterally.  There is also some diastolic dysfunction and elevation of the 

liver function tests related to voriconazole which is being monitored very 

closely





2 Bilateral hydropneumothorax, possible empyema, status post ultrasound-guided 

left-sided thoracentesis on 02/01/2021 with 45 ML's of cloudy white fluid 

removed, showing Aspergillus.  Remains on voriconazole.





3 Bilateral subcutaneous emphysema along with a hydropneumothorax on the left 

with positive air leak through the Pleurx catheter.  He was currently attached 

to continuous suction





4 Recent pulmonary infections with history of streptococcal pneumonia, empyema 

requiring chest tube placement in July 2020.





5 Chronic loculated pneumothoraces, posterior, with air-fluid seen on CAT scan 

imaging on the chest.  Possibility of trapped lung, chronic scarring and 

possibility of empyema.





6 History of rheumatoid arthritis and rheumatoid lung disease.  On Arava





7 Recent history of acute kidney injury





8 Hypertension





9 Hypothyroidism





10 Previous history of DVT.  





11 New-onset atrial fibrillation/flutter anticoagulated status post failed 

cardioversion.  Currently on Eliquis





Plan:





The patient was seen and evaluated by Dr. De La Rosa


Chest x-ray reviewed


Pleurx catheter remains in place


Home once cleared by CT services





I, the cosigning physician, performed a history & physical examination of the 

patient. Lungs sounds  bilateral scattered rhonchi and crackles in the bilateral

bases.  Maintaining good O2 saturations in the 90s on 2 L/m per nasal cannula.  

I discussed the assessment and plan of care with my nurse practitioner, Rupa Flood. I attest to the above note as dictated by her.

## 2021-02-18 NOTE — XR
EXAMINATION TYPE: XR chest 2V

 

DATE OF EXAM: 2/18/2021

 

COMPARISON: 2/17/2021

 

HISTORY: 60-year-old male trapped lung

 

TECHNIQUE:  PA and lateral views

 

FINDINGS:  

Continued extensive bilateral subcutaneous emphysema throughout. Heart borderline enlarged. A left-si
ded basilar pleural catheter is present. Extensive consolidation patchy and confluent consolidation t
hroughout the right lung persists. Air-fluid levels noted at both lung bases suggesting underlying hy
dropneumothoraces. Possible visualization of a pleural edge at the left base now. These air-fluid lev
els are not clearly seen previously.

 

 

IMPRESSION:  

 

1. Persistent extensive bilateral subcutaneous emphysema limiting the assessment.

2. Air-fluid levels are now seen at the bilateral lower lungs suggesting underlying loculated hydropn
eumothoraces on both sides. A pleural edge is now better seen at the left base with indwelling left-s
ided pleural catheter.

3. Patchy airspace disease throughout the right lung persists.

## 2021-02-18 NOTE — CDI
Documentation Clarification Form



Date: 02/18/2021 03:40:00 PM

From: Sangita Steinberg RN, CCDS

Phone: [phone]

MRN: [id number]

Admit Date: 01/29/2021 12:02:00 PM

Patient Name: Trevon Kaufman

Visit Number: [id number]

Discharge Date:  





ATTENTION: The Clinical Documentation Specialists (CDI) and [facility] Coding Staff 
appreciate your assistance in clarifying documentation. Please respond to the 
clarification below the line at the bottom and electronically sign. The CDI & 
[facility] Coding staff will review the response and follow-up if needed. Please note: 
Queries are made part of the Legal Health Record. If you have any questions, 
please contact the author of this message via ITS.



Dr. Arnold Kaye





Extensive subcutaneous emphysema is documented in chest x-ray and progress notes
starting on 2/9/21. Please render your opinion on this documentation.



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 
Aspergillus 

2/8 Post-Operative Diagnosis: Same

2/8 Procedure performed: Left pleurx catheter placement with irrigation of 
pleural space and instillation of amphotericin B



History/Risk Factors: Rheumatoid arthritis, rheumatoid lung disease, Pneumonia, 
chronic interstitial lung disease



Clinical Indicators:  60-year-old male present to ED on 1/29 with complaints of 
shortness of breath, hypoxia.

1/29 CT Scan: possible empyema 

1/29 CT angiography: chronic parenchymal changes from known rheumatoid lung 
disease 

2/8 CXR: Interval placement of left-sided pleural catheter w/o sizable 
pneumothorax. There is evidence of subcutaneous air along the left chest wall. 
Bilateral infiltrates are stable.

2/9 CXR Worsening bilateral subcutaneous emphysema 

2/10 CXR: Severe bilateral subcutaneous emphysema persist. Left-sided pleur X 
catheter was re-demonstrated with small left basilar hydrothorax. Bilateral 
patchy infiltrates right greater than left.

2/18 persistent extensive bilateral subcutaneous emphysema limiting the 
assessment. Air-fluid levels are now seen at the bilateral lower lungs 
suggesting underlying lobulated hydropneumothoraces on both sides. An indwelling
left-sided pleural catheter.



Treatment:  

2/1 Left Thoracentesis 

Pleurx catheter to waterseal 

Daily chest x-rays

Encourage incentive spirometry, Bronchodilators per pulmonology

Teaching done regarding Pleurx catheter call 





In order to accurately reflect this patients severity of illness, please 
clarify if the extensive subcutaneous emphysema: 



-is a complication of surgical procedure

-is an expected outcome of the surgical procedure

-is related to co-morbid condition(s) of (specify)

-Other  please specify

-Unable to determine



(Last Revision: February 2020)

___________________________________________________________________________

MTDD

## 2021-02-18 NOTE — P.PN
Subjective


Progress Note Date: 02/18/21


Principal diagnosis: 





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 

species, acute hypoxic respiratory failure with sepsis on admission.  Radius his

tory of multiple admissions for pneumonia with empyema on the left and previous 

bilateral chest tube placement in June 2020 with cultures positive for strep 

pneumonia, rheumatoid arthritis with rheumatoid lung, hypertension, 

hypothyroidism, DVT, previous tobacco dependence, family history of lung cancer,

and recent hospitalization for hypotension, dehydration, acute kidney injury 

requiring dialysis





POD #17 left-sided thoracentesis by interventional radiology





POD #10 left Pleurx catheter placement with irrigation of pleural space and 

instillation of amphotericin B solution under thoracoscopic guidance





Extensive subcutaneous emphysema





Persistent atrial flutter with RVR, status post electrical cardioversion, status

post radiofrequency ablation





The patient is currently sitting up in a recliner on the cardiac stepdown unit 

in no acute distress.  Denies pain, shortness of breath, appears comfortable.  

Currently in sinus rhythm and hemodynamically stable.  Left sided Pleurx 

catheter continues to waterseal, rare air leak seen this morning.  Patient is 

hopeful to have his Pleurx catheter capped and to be discharged to home today.  

States he has been ambulatory in [facility] without difficulty





Objective





- Vital Signs


Vital signs: 


                                   Vital Signs











Temp  98.0 F   02/18/21 03:44


 


Pulse  88   02/18/21 03:44


 


Resp  18   02/18/21 03:44


 


BP  83/53   02/18/21 03:44


 


Pulse Ox  96   02/18/21 03:44








                                 Intake & Output











 02/17/21 02/18/21 02/18/21





 18:59 06:59 18:59


 


Intake Total 1700 10 


 


Output Total 1190 305 


 


Balance 510 -295 


 


Weight  99.3 kg 


 


Intake:   


 


  IV 20 10 


 


    0.9  10 


 


    Invasive Line 6 20  


 


  Oral 1680  


 


Output:   


 


  Chest Tube Drainage 110 5 


 


    Left Mid-Axillary Chest 110 5 


 


  Drainage 30 0 


 


    Left Chest 30 0 


 


  Urine 1050 300 


 


Other:   


 


  Voiding Method  Toilet 





  Urinal 


 


  # Voids  1 














- Constitutional


General appearance: Present: cooperative, no acute distress





- Respiratory


Details: 





Lungs sounds diminished in the bases bilaterally.  Respirations even, 

nonlabored.  Currently on 2 L nasal cannula with oxygen saturation 96%.  Able to

achieve 750 mL on his incentive spirometry.  Strong cough.  Left sided Pleurx 

catheter present to waterseal, 5 mL yellow drainage overnight, 150 mL in the 

last 24 hours, rare air leak present.  Subcutaneous emphysema to both arms, 

chest and back remains, but does appear slightly better





- Cardiovascular


Details: 





S1, S2 present.  Regular rate and rhythm, sinus rhythm on telemetry with heart 

rate in the high 80s.  Palpable peripheral pulses bilaterally.  Bilateral lower 

extremity edema present.  No calf pain or tenderness noted.





- Gastrointestinal


Gastrointestinal Comment(s): 





Abdomen soft, nontender, nondistended.  Active bowel sounds present 4 quadran

ts.  Tolerating diet.  Positive bowel movement 2/12








- Genitourinary


Genitourinary Comment(s): 





Continues to void





- Integumentary


Integumentary Comment(s): 





Skin is warm and dry with evidence of good perfusion








- Neurologic


Neurologic: Present: CNII-XII intact





- Musculoskeletal


Musculoskeletal: Present: gait normal, strength equal bilaterally





- Psychiatric


Psychiatric: Present: A&O x's 3, appropriate affect, intact judgment & insight





- Allied health notes


Allied health notes reviewed: nursing





- Labs


CBC & Chem 7: 


                                 02/17/21 07:40





                                 02/17/21 07:40


Labs: 


                  Abnormal Lab Results - Last 24 Hours (Table)











  02/17/21 02/17/21 Range/Units





  07:40 07:40 


 


RBC  3.48 L   (4.30-5.90)  m/uL


 


Hgb  9.3 L   (13.0-17.5)  gm/dL


 


Hct  31.5 L   (39.0-53.0)  %


 


MCHC  29.6 L   (31.0-37.0)  g/dL


 


RDW  18.8 H   (11.5-15.5)  %


 


Carbon Dioxide   36 H  (22-30)  mmol/L


 


Calcium   8.2 L  (8.4-10.2)  mg/dL


 


Total Bilirubin   1.4 H  (0.2-1.3)  mg/dL


 


AST   106 H  (17-59)  U/L


 


ALT   85 H  (4-49)  U/L


 


Alkaline Phosphatase   513 H  ()  U/L


 


Total Protein   5.8 L  (6.3-8.2)  g/dL


 


Albumin   2.5 L  (3.5-5.0)  g/dL














- Imaging and Cardiology


Chest x-ray: image reviewed





Assessment and Plan


Assessment: 





1.  Bilateral pneumonia,  left trapped lung with fungal empyema growing 

Aspergillus species, status post left-sided thoracentesis, status post Pleurx 

catheter placement with instillation of amphotericin B


2.  Acute hypoxic respiratory failure with sepsis on admission


3.  Multiple admissions for pneumonia with empyema on the left and previous 

bilateral chest tube placement in June 2020 with cultures positive for strep 

pneumonia


4.  Rheumatoid arthritis with rheumatoid lung


5.  Hypertension, currently hypotensive, requiring pressors on admission


6.  Hypothyroidism


7.  History of DVT


8.  Previous tobacco dependence


9.  Family history of lung cancer


10.  Recent hospitalization for hypotension, dehydration, acute kidney injury 

requiring dialysis


11.  Extensive subcutaneous emphysema, common occurrence after lung surgery


12.  Hydropneumothorax, expected given patient's history of rheumatoid lung with

trapped lung for quite some time


13.  Persistent atrial flutter with RVR, status post electrical cardioversion 

and radiofrequency ablation, currently sinus


Plan: 





1.  Continue Pleurx catheter to waterseal, will discuss capping Pleurx with Dr. Kaye.  


2.  Daily chest x-rays


3.  Encourage incentive spirometry.  Bronchodilators per pulmonology


4.  Eliquis per cardiology


5.  Continue voriconazole per infectious disease recommendations, transitioned 

to oral


6.  Increase activity as tolerated, ambulate in hallway  


7.  Management of other comorbidities per primary care service.


8.  Teaching done with patient and wife regarding Pleurx catheter care.  Will 

continue to reinforce while patient is hospitalized


9.  More recommendations to follow





Time with Patient: Greater than 30

## 2021-02-19 VITALS — DIASTOLIC BLOOD PRESSURE: 51 MMHG | TEMPERATURE: 98.1 F | HEART RATE: 90 BPM | SYSTOLIC BLOOD PRESSURE: 87 MMHG

## 2021-02-19 VITALS — RESPIRATION RATE: 16 BRPM

## 2021-02-19 RX ADMIN — METOPROLOL TARTRATE SCH MG: 25 TABLET, FILM COATED ORAL at 08:23

## 2021-02-19 RX ADMIN — LEVOTHYROXINE SODIUM SCH MCG: 75 TABLET ORAL at 06:31

## 2021-02-19 RX ADMIN — SODIUM CHLORIDE, PRESERVATIVE FREE SCH ML: 5 INJECTION INTRAVENOUS at 08:23

## 2021-02-19 RX ADMIN — IPRATROPIUM BROMIDE AND ALBUTEROL SULFATE SCH: .5; 3 SOLUTION RESPIRATORY (INHALATION) at 11:14

## 2021-02-19 RX ADMIN — AMMONIUM LACTATE SCH APPLIC: 12 LOTION TOPICAL at 08:24

## 2021-02-19 RX ADMIN — TRIAMCINOLONE ACETONIDE SCH APPLIC: 1 CREAM TOPICAL at 08:25

## 2021-02-19 RX ADMIN — APIXABAN SCH MG: 5 TABLET, FILM COATED ORAL at 08:23

## 2021-02-19 RX ADMIN — PANTOPRAZOLE SODIUM SCH MG: 40 TABLET, DELAYED RELEASE ORAL at 06:31

## 2021-02-19 RX ADMIN — IPRATROPIUM BROMIDE AND ALBUTEROL SULFATE SCH: .5; 3 SOLUTION RESPIRATORY (INHALATION) at 08:07

## 2021-02-19 RX ADMIN — VORICONAZOLE SCH MG: 200 TABLET, FILM COATED ORAL at 08:23

## 2021-02-19 NOTE — P.PN
Subjective


Progress Note Date: 02/19/21





60-year-old white male patient, with past medical history of rheumatoid 

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 

episodes of pneumonia and history of loculated empyema requiring chest tube 

placement back in July 2020 with pleural fluid cultures positive for 

streptococcal pneumonia.  Other medical history includes hypertension, 

hypothyroidism and previous history of DVT.  Patient had a recent 

hospitalization from January 18 through 01/21/2021 hypotension, dehydration re

lated to nausea vomiting diarrhea, acute kidney injury.  Patient received 

antibiotics in the form of Zosyn and Levaquin for possibility of pneumonia, he 

is chest x-ray showed cranial perihilar pulmonary infiltrates with increased 

interstitial changes at the lung bases and was thought to be related to a 

combination of rheumatoid lung and chronic scarring.  His last CT of the chest 

was on 12/29/2020 showing chronic changes to lower lungs, persistent irregular 

thick-walled pleural spaces in the posterior lung bases containing fluid and air

with adjacent anterior lung with chronic consolidation or atelectasis.  On 

01/29/2021 patient came into the emergency department from Dr. SARINA Santo's office

where he was being seen for a regular follow-up appointments.  He was noted to 

have low pulse ox 70s and was sent in to the emergency department for 

evaluation.  He has been having cough with yellow and sometimes blood-tinged 

sputum, appears amounts, denies any fever, COVID 19 was found to be negative, 

chest x-ray showed perihilar and basilar infiltrates without significant change 

from previous chest x-ray on 01/19/2021.  Lab data showed leukocytosis with 

white blood cell count of 19.1, hemoglobin of 12.1, INR 1.5, sodium is 135, 

potassium is 3.9, chloride is 102, CO2 is 19, creatinine is 1.32, which has 

actually improved since his discharge from the hospital on 01/21/2021 when he 

was at 1.84.  Lactic acid was elevated at 3.7, patient was given a total of 2 L 

in fluid boluses, troponin was negative at less than 0.012.  Urinalysis showed 

no evidence of infection.  CT chest showed thick-walled pleural cavities 

posteriorly in the lower lungs with left-sided air-fluid level with left-sided 

pleural fluid diminished most recent CT from 12/29/2020.  There is associated 

compressive atelectasis and/or chronic consolidation.  Persistent as it is lobe 

fissure, multifocal areas of reticular nodular opacity in the upper lungs remain

present with slight nodularity in the left upper lobe.  There is a focal 

consolidation improved from most recent study with some residual areas of 

scarring.  We will emergency department patient became hypotensive with a blood 

pressure in the 70s, slightly tachycardic remains in sinus mechanism, he is 

receiving his third liter bolus, his lactic acid did respond and improved he was

started on antibiotics in the form of Zosyn and vancomycin 





The patient is seen today January 30th 2021 in the intensive care unit.  He is 

currently sitting up in a chair at the bedside.  Awake and alert in no acute 

distress.  Maintaining O2 saturations in the 90s on 2 L/m per nasal cannula.  He

did require a small dose of norepinephrine currently on 0.05 mcg/kg/m.  He has 

lactated Ringer's at 100 MLS per hour.  He remains on vancomycin and cefepime.  

Chest x-ray continues to show the left lower lobe effusion.  White count 11.7.  

Hemoglobin 9.7.  Sodium 135.  Potassium 3.6.  Creatinine 1.00.  Blood cultures 

reveal no growth to date.





Reevaluated today on 1/31/2021, remains in the ICU, still on norepinephrine at 

0.04 mcg/kg/m.  On LR at 1 25 mL per hour.  On vancomycin and cefepime 

empirically, blood pressure remains marginal, went ahead and recommended 

Solu-Cortef 100 mg IV push every 8 hours and hopefully will be able to 

discontinue norepinephrine today.  His IV fluid is cut down to KVO.  Given 20 mg

of Lasix IV push, and I have recommended that we monitor the patient in the ICU 

for the next 24 hours.  Chest x-ray is basically the same, continues to show ch

ronic finding, difficult to exclude underlying pneumonia/empyema.  CT of the 

chest on admission was also reviewed, difficult to rule out underlying empyema. 

Clinically the patient is feeling better except for generally weak.  WBC count 

is 12.5 hemoglobin is 9.9.  Normal renal profile is normal blood cultures are 

negative so far.  Sputum cultures are also negative so far.





On 02/01/2021 patient seen in follow-up in the intensive care unit, he is awake 

and alert, oriented 3, sitting up in the recliner, currently on 2 L of oxygen 

his pulse ox is %, his been afebrile, hemodynamically he is stable, he is 

not on any vasopressor support.  His lactate Ringer's running at 20 ML per hour,

antibiotics include cefepime and vancomycin, blood pressure has been stable, so 

far blood and sputum cultures are negative, he denies chest pain, his breathing 

is comfortable.  Today's labs have been reviewed, showing white blood cell count

trending down, 10.2 today, hemoglobin is 8.8, electrolytes and renal profile are

unremarkable.  Serum cortisol level came back at 3, patient continues on stress 

doses of hydrocortisone 100 mg every 8 hours.  Patient has not been stable, 

we'll consult interventional radiology for ultrasound-guided left thoracentesis





On 02/02/2021 patient seen in follow-up in the intensive care unit, he is calm 

and comfortable, awake and alert, sitting up in the recliner, currently on room 

air with a pulse ox of 93-94%, his been afebrile, hemodynamically he is been 

stable, he is on IV LR at 20 ML per hour, no other drips.  He is on cefepime and

vancomycin for antibiotic coverage, yesterday he underwent left thoracentesis 

with removal of 43 mL of pleural fluid which was sent for cultures.  Tolerated 

procedure well.  His pleural fluid analysis revealed total fluid protein of 1.3 

g, and fluid LDH of greater than 4500 consistent with exudative fluid.  His 

microbiology data has been reviewed showing Aspergillus fumigate is in the 

sputum, his pleural fluid cultures are pending at this time, the cultures have 

shown no growth, hemodynamically has been stable, he is breathing easier, he 

still has cough mostly in the morning and the amount of secretions his bringing 

up is decreasing in amount.  Lung sounds revealed a few scattered wheezes, and a

few rhonchi at bilateral bases.  Is tolerating oral intake.  No abdominal pain, 

no nausea or vomiting, today's labs have been reviewed, white blood cell count 

is 11.2, hemoglobin is 9.1, sodium is 137, potassium is 4.0, chloride is 109, 

BUN is 15, creatinine 0.80.  No acute events overnight.  He is working on 

incentive spirometer, he is achieving 1000 today





On 02/03/2021 patient seen in follow-up on medical surgical floor.  He is awake 

and alert, in no acute distress, breathing comfortably, denies any chest pain, 

room air pulse ox is 94%, no fever or chills, today's chest x-ray shows left 

basilar loculated pneumothorax, stable in appearance, bilateral lung 

infiltrates.  His pleural fluid cultures so far showing Aspergillus species, as 

does his sputum culture.  Blood culture show no growth.  The cervix is follow

ing, current antibiotic coverage includes cefepime, vancomycin has been 

discontinued, she received 1 dose of IV Lasix per nephrology.  He has produced 

2500 in urine output, and he is in -1.6 L over the last 24 hours, however he 

still has significant lower extremity edema.





The patient is seen today 02/04/2021 in follow-up in the intensive care unit.  

He was transferred here earlier this morning after developing atrial 

fibrillation/flutter with a rapid ventricular response.  He was initiated on C

ardizem at 5 mg per hour.  He has 0.9 normal saline at 20 ML's per hour.  He is 

currently awake and alert in no acute distress.  No worsening shortness of 

breath, cough or congestion.  No chest pain or palpitations.  He sitting up in a

chair at the bedside.  Maintaining O2 saturations in the 90s on 2 L/m per nasal 

cannula. Chest x-ray does reveal loculated posterior basilar pneumothoraces, 

stable from previous exam.  There is diffuse increased lung markings.  Correlate

for pneumonia and atelectasis.  Pleural fluid cultures is positive for 

Aspergillus species.  White count 10.9.  Hemoglobin 8.8.  Sodium 139.  Potassium

3.4.  Creatinine 0.89.  He remains on bronchodilators, IV diuretics, IV Solu-

Cortef.  Continued on voriconazole.








The patient is seen today 02/06/2021 in follow-up on the selective care unit.  

He is currently sitting up in a recliner at the bedside.  Awake and alert in no 

acute distress.  He is currently on room air.  LR at 20 miles per hour.  Heparin

drip per weight base protocol.  White count 19.0.  Hemoglobin 9.1.  Sodium 142. 

Potassium 3.6.  Creatinine 0.81.  He remains on voriconazole.





The patient is seen today 02/07/2021 in follow-up on the selective care unit.  

He is currently sitting up in a chair at the bedside.  No worsening shortness of

breath, cough or congestion.  Chest x-ray reveals bilateral airspace infiltrates

right greater than left without significant improvement.  Sputum and pleural 

fluid cultures were both positive for Aspergillus fumigatus.  He remains on 

voriconazole.  White count 18.5.  Hemoglobin 10.1.  Sodium 141.  Potassium 3.3. 

Creatinine 0.8.  Atrial fibrillation.  He remains on a heparin drip.  Plan is 

for left-sided Pleurx catheter placement in a.m.








The patient is seen today 02/14/2021 in follow-up on the selective care unit.  

He is currently sitting up in a recliner.  Awake and alert in no acute distress.

 Maintaining O2 saturations in the low 90s on 2 L/m per nasal cannula.  He is 

afebrile.  Hemodynamically stable.  Pleural fluid cultures were positive for 

Aspergillus fumigatus.  White count 12.6.  Hemoglobin 9.9.  Neutrophils 9.7.  

Sodium 140.  Potassium 3.3.  Creatinine 0.68.  AST 73, . Chest x-ray co

ntinues to show severe diffuse bilateral subcutaneous emphysema.  Suspect a 

left-sided pneumothorax along the costophrenic angle measuring 10-15%.  Left-

sided Pleurx catheter remains in place.  Positive leak.  Remains on 

voriconazole, bronchodilators, IV diuretics.  Quite related with Eliquis.








The patient is seen today 02/17/2021 in follow-up on the selective care unit.  

He is currently resting comfortably in a recliner.  He is awake and alert in no 

acute distress.  Maintain good O2 saturations in the mid 90s on 2 L/m per nasal 

cannula.  He's been afebrile.  Chest x-ray continues to show extensive 

subcutaneous emphysema.  Underlying pleural parenchymal opacities persist.  Left

basilar pleural catheter unchanged.  Sputum and pleural fluid cultures positive 

for Aspergillus fumigatus.  He remains on voriconazole.  Remains on 

bronchodilators, anticoagulated with Eliquis.  White count 9.8.  Hemoglobin 9.3.

 Sodium 138.  Potassium 3.7.  Creatinine 0.72.  .  ALT 85.





The patient is seen today 02/18/2021 in follow-up on the selective care unit.  

He is awake and alert in no acute distress.  Sitting up in a recliner.  Maintain

good O2 saturations in the mid 90s on 2 L/m per nasal cannula.  He's been 

afebrile. He remains on voriconazole.  Remains on bronchodilators, 

anticoagulated with Eliquis.  Chest x-ray reveals persistent extensive bilateral

subcutaneous emphysema.  Air-fluid levels no seen in the bilateral lower lungs 

suggesting underlying loculated hydropneumothorax is on both sides.  Pleural 

edge better seen in the left base with continued left-sided Pleurx catheter.  

Patchy airspace disease throughout the right lung persist.





The patient is seen today 02/19/2021 in follow-up on the selective care unit.  

He is currently sitting up in a recliner at the bedside.  Awake and alert in no 

acute distress.  Maintaining O2 saturations in the mid 90s on 2 L/m per nasal 

cannula.  He is afebrile.  Pleural fluid cultures positive for Aspergillus 

fumigatus.  He remains on voriconazole.  Chest x-ray reveals stable loculated 

left lateral base pneumothorax.  Bilateral air-fluid levels within the lung 

bases.  Subcutaneous emphysema stable. Left-sided Pleurx catheter remains in 

place.  Currently capped.





Objective





- Vital Signs


Vital signs: 


                                   Vital Signs











Temp  98.1 F   02/19/21 08:00


 


Pulse  90   02/19/21 08:00


 


Resp  16   02/19/21 12:00


 


BP  87/51   02/19/21 08:00


 


Pulse Ox  96   02/19/21 12:00








                                 Intake & Output











 02/18/21 02/19/21 02/19/21





 18:59 06:59 18:59


 


Intake Total 862 12 560


 


Output Total 470 400 


 


Balance 392 -388 560


 


Weight  101.9 kg 


 


Intake:   


 


  IV 10 12 


 


    0.9  12 


 


    Invasive Line 7 10  


 


  Oral 852  560


 


Output:   


 


  Chest Tube Drainage 70  


 


    Left Mid-Axillary Chest 70  


 


  Drainage 0  


 


    Left Chest 0  


 


  Urine 400 400 


 


Other:   


 


  Voiding Method  Toilet 





  Urinal 


 


  # Voids  1 














- Exam





GENERAL EXAM: Alert, very pleasant 60-year-old gentleman, on 2 L nasal cannula, 

comfortable in no apparent distress.


HEAD: Normocephalic.


EYES: Normal reaction of pupils, equal size.


NOSE: Clear with pink turbinates.


THROAT: No erythema or exudates.


NECK: No masses, no JVD.


CHEST: Bilateral subcutaneous emphysema.  Left Pleurx catheter remains in place.


LUNGS: Equal air entry with few scattered rhonchi, crackles in the posterior 

bases.


CVS: S1 and S2 normal with no audible murmur, irregular rhythm.  


ABDOMEN: No hepatosplenomegaly, normal bowel sounds, no guarding or rigidity.


SPINE: No scoliosis or deformity


SKIN: No rashes


CENTRAL NERVOUS SYSTEM: No focal deficits, tone is normal in all 4 extremities.


EXTREMITIES: There is 2+ peripheral edema.  No clubbing, no cyanosis.  

Peripheral pulses are intact.





- Labs


CBC & Chem 7: 


                                 02/17/21 07:40





                                 02/17/21 07:40





Assessment and Plan


Assessment: 





1 Acute hypoxic respiratory failure secondary to sepsis, septic shock, related 

to pneumonia, with chronic loculated effusions, possible empyema.  Cultures 

positive for Aspergillus fumigatus.  Left Pleurx catheter remains in place since

02/08/2021 . He continues to have intermittent air leak.  Output from the Pleurx

catheter was noted.  He does have some stable subcutaneous emphysema 

bilaterally.  There is also some diastolic dysfunction and elevation of the 

liver function tests related to voriconazole which is being monitored very 

closely





2 Bilateral hydropneumothorax, possible empyema, status post ultrasound-guided 

left-sided thoracentesis on 02/01/2021 with 45 ML's of cloudy white fluid re

moved, showing Aspergillus.  Remains on voriconazole.





3 Bilateral subcutaneous emphysema along with a hydropneumothorax on the left 

with positive air leak through the Pleurx catheter.  He was currently attached 

to continuous suction





4 Recent pulmonary infections with history of streptococcal pneumonia, empyema 

requiring chest tube placement in July 2020.





5 Chronic loculated pneumothoraces, posterior, with air-fluid seen on CAT scan 

imaging on the chest.  Possibility of trapped lung, chronic scarring and 

possibility of empyema.





6 History of rheumatoid arthritis and rheumatoid lung disease.  On Arava





7 Recent history of acute kidney injury





8 Hypertension





9 Hypothyroidism





10 Previous history of DVT.  





11 New-onset atrial fibrillation/flutter anticoagulated status post failed 

cardioversion.  Currently on Eliquis





Plan:





The patient was seen and evaluated by Dr. De La Rosa


Chest x-ray reviewed


Pleurx catheter remains in place


Home once cleared by CT services


Follow up in our office in 1-2 weeks' time





I, the cosigning physician, performed a history & physical examination of the 

patient. Lungs sounds  bilateral scattered rhonchi and crackles in the bilateral

bases.  Maintaining good O2 saturations in the 90s on 2 L/m per nasal cannula.  

I discussed the assessment and plan of care with my nurse practitioner, Rupa Flood. I attest to the above note as dictated by her.

## 2021-02-19 NOTE — P.PN
Subjective


Progress Note Date: 02/19/21


Principal diagnosis: 





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 

species, acute hypoxic respiratory failure with sepsis on admission.  Radius his

tory of multiple admissions for pneumonia with empyema on the left and previous 

bilateral chest tube placement in June 2020 with cultures positive for strep 

pneumonia, rheumatoid arthritis with rheumatoid lung, hypertension, 

hypothyroidism, DVT, previous tobacco dependence, family history of lung cancer,

and recent hospitalization for hypotension, dehydration, acute kidney injury 

requiring dialysis





POD #18 left-sided thoracentesis by interventional radiology





POD #11 left Pleurx catheter placement with irrigation of pleural space and 

instillation of amphotericin B solution under thoracoscopic guidance





Extensive subcutaneous emphysema





Persistent atrial flutter with RVR, status post electrical cardioversion, status

post radiofrequency ablation





The patient is currently sitting up in a recliner on the cardiac stepdown unit 

in no acute distress.  Denies pain, shortness of breath, appears comfortable.  

Currently in sinus rhythm and hemodynamically stable.  Left sided Pleurx 

catheter capped yesterday.  Patient denies any change in his condition since 

capping pleurx, no increased subcutaneous emphysema.  States he has been 

ambulatory in hallway without difficulty.  Wants to go home





Objective





- Vital Signs


Vital signs: 


                                   Vital Signs











Temp  98.2 F   02/19/21 04:00


 


Pulse  69   02/19/21 04:00


 


Resp  18   02/19/21 04:00


 


BP  82/51   02/19/21 04:00


 


Pulse Ox  96   02/19/21 04:00








                                 Intake & Output











 02/18/21 02/19/21 02/19/21





 18:59 06:59 18:59


 


Intake Total 862 12 


 


Output Total 470 400 


 


Balance 392 -388 


 


Weight  101.9 kg 


 


Intake:   


 


  IV 10 12 


 


    0.9  12 


 


    Invasive Line 7 10  


 


  Oral 852  


 


Output:   


 


  Chest Tube Drainage 70  


 


    Left Mid-Axillary Chest 70  


 


  Drainage 0  


 


    Left Chest 0  


 


  Urine 400 400 


 


Other:   


 


  Voiding Method  Toilet 





  Urinal 


 


  # Voids  1 














- Constitutional


General appearance: Present: cooperative, no acute distress





- Respiratory


Details: 





Lungs sounds diminished in the bases bilaterally.  Respirations even, 

nonlabored.  Currently on 2 L nasal cannula with oxygen saturation 96%.  Able to

achieve 750 mL on his incentive spirometry.  Strong cough.  Left sided Pleurx 

catheter present, capped.  Subcutaneous emphysema to both arms, chest and back 

remains, but does appear slightly better








- Cardiovascular


Details: 





S1, S2 present.  Regular rate and rhythm, sinus rhythm on telemetry with heart 

rate in the high 80-90s.  Palpable peripheral pulses bilaterally.  Bilateral 

lower extremity edema present.  No calf pain or tenderness noted.








- Gastrointestinal


Gastrointestinal Comment(s): 





Abdomen soft, nontender, nondistended.  Active bowel sounds present 4 

quadrants.  Tolerating diet.  Positive bowel movement 








- Genitourinary


Genitourinary Comment(s): 





Continues to void





- Integumentary


Integumentary Comment(s): 





Skin is warm and dry with evidence of good perfusion








- Neurologic


Neurologic: Present: CNII-XII intact





- Musculoskeletal


Musculoskeletal: Present: gait normal, strength equal bilaterally





- Psychiatric


Psychiatric: Present: A&O x's 3, appropriate affect, intact judgment & insight





- Allied health notes


Allied health notes reviewed: nursing





- Labs


CBC & Chem 7: 


                                 02/17/21 07:40





                                 02/17/21 07:40





- Imaging and Cardiology


Chest x-ray: report reviewed, image reviewed





Assessment and Plan


Assessment: 





1.  Bilateral pneumonia,  left trapped lung with fungal empyema growing 

Aspergillus species, status post left-sided thoracentesis, status post Pleurx 

catheter placement with instillation of amphotericin B


2.  Acute hypoxic respiratory failure with sepsis on admission


3.  Multiple admissions for pneumonia with empyema on the left and previous 

bilateral chest tube placement in June 2020 with cultures positive for strep 

pneumonia


4.  Rheumatoid arthritis with rheumatoid lung


5.  Hypertension, currently hypotensive, requiring pressors on admission


6.  Hypothyroidism


7.  History of DVT


8.  Previous tobacco dependence


9.  Family history of lung cancer


10.  Recent hospitalization for hypotension, dehydration, acute kidney injury 

requiring dialysis


11.  Extensive subcutaneous emphysema, related to co-morbid condition of 

rheumatoid lung, common occurrence after lung surgery


12.  Hydropneumothorax, expected given patient's history of rheumatoid lung with

trapped lung for quite some time


13.  Persistent atrial flutter with RVR, status post electrical cardioversion 

and radiofrequency ablation, currently sinus


Plan: 





1.  PleurX catheter capped yesterday 


2.  Encourage incentive spirometry.  Bronchodilators per pulmonology


3.  Eliquis per cardiology


4.  Continue voriconazole per infectious disease recommendations, transitioned 

to oral


5.  Increase activity as tolerated, ambulate in hallway  


6.  Management of other comorbidities per primary care service.


7.  Teaching done with patient and wife regarding Pleurx catheter care.  Will 

drain today with patient's wife for further teaching


8.  Patient may be discharged to home from our standpoint.  He should have his 

PleurX drained daily for at least the first week, then per symptoms.


Time with Patient: Greater than 30

## 2021-02-19 NOTE — PN
PROGRESS NOTE



DATE OF SERVICE:

02/19/2021



REASON FOR FOLLOWUP:

Aspergillus pneumonia and empyema.



INTERVAL HISTORY:

The patient is currently afebrile.  The patient is feeling better.  The patient's chest

tube has been discontinued.  Denies having any chest pain or shortness of breath.

Occasional cough.  No abdominal pain. No diarrhea.



PHYSICAL EXAMINATION:

Blood pressure is 87/51, pulse of 90, temperature 98.1. He is 96% on 2 L nasal cannula.

General description is a middle-aged male up in the chair in no distress.

RESPIRATORY SYSTEM: Unlabored breathing, decreased breath sounds in the bases. No

wheeze .

HEART: S1, S2.  Regular rate and rhythm.

ABDOMEN:  Soft, no tenderness.



LABS:

Hemoglobin 9.3, white count of 9.8.  BUN of 14, creatinine 0.72.



DIAGNOSTIC IMPRESSION AND PLAN:

Patient with aspergillus pneumonia with empyema, status post PleurX catheter placement.

The patient seemed to show overall improvement.  Currently on voriconazole for least at

_____depending on clinical response and close outpatient followup.





MMODL / IJN: [id number] / Job#: [id number]

## 2021-02-19 NOTE — P.DS
Providers


Date of admission: 


01/29/21 12:02





Expected date of discharge: 02/19/21


Attending physician: 


Lidya Myers





Consults: 





                                        





01/29/21 10:54


Consult Physician Routine 


   Consulting Provider: Jay De La Rosa


   Consult Reason/Comments: dyspnea


   Do you want consulting provider notified?: Yes





01/29/21 10:55


Consult Physician Routine 


   Consulting Provider: Ricardo Stacy


   Consult Reason/Comments: hospitalist request


   Do you want consulting provider notified?: Yes





01/29/21 11:07


Consult Physician Routine 


   Consulting Provider: Monika Sheets


   Consult Reason/Comments: SIRS


   Do you want consulting provider notified?: Yes





02/01/21 09:02


Consult Physician Routine 


   Consulting Provider: Shanita Venegas


   Consult Reason/Comments: recurrent pneumonia, donny hydropneumothorax


   Do you want consulting provider notified?: Yes











Primary care physician: 


Lidya Myers





[facility] Course: 





This is a 60-year-old  male patient requesting my service with past 

medical history of rheumatoid arthritis on Arava that was diagnosed when he was 

37 year old initially was started on Methotrexate that he could not tolerate 

then placed on Embrel but he developed side effects and finally was tried on 

Humira injection but he developed neurologic sided effects and was hospitaized 

at [facility] for quiet some time, rheumatoid lung disease, 

previous history of loculated empyema with chest tube placement in July 2020, 

hypertension, hypothyroidism, history of DVT, remote history of tobacco use and 

dependence, was recently hospitalized at [facility] after he was 

admitted for an acute kidney injury with significant metabolic acidosis 

associated with the elevated creatinine and elevated BUN and hypotension at that

time he was seen and evaluated by pulmonary and critical care medicine, he had a

central line placed and at that time, he was placed on Levophed drip he was 

placed on IV anabiotic as well but the patient recovered very fast and he had an

echocardiogram that showed normal LV function and segmental hypokinesia, he was 

supposed to follow-up with Dr. Santo in the office today, patient was seen in my

office 24 hours ago when he was complaining of increased shortness breath, at 

that time his oxygenation could not be checked because of his Case's and cold

extremities, he was sent for a chest x-ray that showed right lower lobe 

infiltrate as well as blood tests that showed a white count of 20,000 patient 

was feeling better he was started on oral antibiotic in the form of Levaquin 500

mg orally once every day, and that he was supposed to follow-up with me as an 

outpatient every week he went to see his cardiologist today and he had an 

episode when he was dizzy lightheaded and an episode of vomiting as well and he 

was sent to the emergency department for evaluation had a computed tomography of

the chest as well as abdomen and pelvis that showed a thick walled pleural 

cavities posteriorly in the lower lungs with left-sided air-fluid level that has

diminished in size from the prior computed tomography scan when he was in the 

hospital a few weeks back there is also associated compressive atelectasis and 

chronic consultation with multifocal areas of reticular nodular opacity in the 

upper lungs with a slight nodularity of the left upper lobe again this area of 

focal consultation has improved from the previous study that was done few weeks 

ago, patient was started on IV antibiotic with vancomycin and Zosyn as well as 

IV fluid, he was seen in consultation by pulmonary critical care in the 

emergency department as the patient blood pressure dropped and that he'll be 

transferred to the intensive care unit at this point in time I had long 

conversation with the patient and his wife at the bedside and the patient may 

need to have a chest tube placed for his possible right empyema, he did receive 

3 L normal saline in the ER, and his blood pressure is marginal he may need to 

go on  pressors if  not recovered.





1/30:patient is sitting up in chair feeling about the same , complains of 

increased pain in the righ elbow, was seen earlier by pulmonary and the plan was

to go for US-Guided left thoracenthesis due to to loculated left pleural 

effusion and possible empyema, may need VATS, he denies any chest pain or 

shortness of breath, no abdominal pain nausea, vomiting or diarrhea, still have 

diarrhea negative for C.Diff, he seems to have accept to stay in the hospital 

this time as long as he needed to.





1/31: Patient sitting up in the recliner complaining of increased pain in his 

joints due to his rheumatoid arthritis with increased synovitis in both wrists 

both elbows and both shoulders he was started on hydrocortisone 100 mg IV push 

every 8 hours, to try to help with his blood pressure as well as his phonation, 

he is maintained on Norco 5/325 mg one tablet orally every 6 hours as needed for

pain control, patient denies any chest pain is less short of breath, he 

continues to have increased coughing with yellow from production of green phlegm

production, he had no fever or chills at this time he continues to be on 

Levothroid drip, patient has appeared to have increased swelling in both lower 

extremities as well as both ankles, he is maintained on IV antibiotic in the 

form of vancomycin as well as cefepime, infectious disease is following, patient

is scheduled to go for ultrasound guidance thoracentesis of the left loculated 

pleural effusion tomorrow morning.





2/1: Patient remains in the intensive care unit.  He has been afebrile, heart 

rate 100, blood pressure 95/62, pulse ox 97% on 2 L nasal cannula.  Hemoglobin 

8.8 and leukocytosis resolved.  Creatinine 0.76, electrolytes normal.  Sputum 

culture is positive for Aspergillus species.  Blood culture showing no growth. 

Patient underwent diagnostic thoracentesis with interventional radiology today 

with removal of 45 mL of cloudy white fluid.  Chest x-ray reveals no 

complications following left thoracentesis.  Fluid has been sent for culture and

cytology.  Consult in place for cardiothoracic surgery to evaluate for VATS with

decortication.





2/2: Patient remains in the intensive care unit.  He did receive 1 dose of IV 

Lasix this morning ordered by nephrology.  He is followed by cardiothoracic 

surgery was no plan for surgical intervention.  ID has recommended cefepime and 

vancomycin for now.  Expect antifungal agent ended as well.  Patient denies any 

significant shortness of breath.  He is comfortable sitting in a chair.  Patient

is having minimal cough and minimal sputum.  Patient is achieving 1000 ml on 

incentive spirometry.  Culture and cytology reports remain pending from 

thoracentesis. Repeat chest x-ray reveals patchy peripheral lung with lung zone 

and lower lung zone infiltrates persist unchanged.  Patient has been afebrile, 

heart rate in the 90s and low 100s, pulse ox 91 on room air.





2/3: Repeat chest x-ray reveals left-sided basilar loculated pneumothorax, 

stable.  Bilateral lung infiltrates.  Correlate for pneumonia and atelectasis.  

WBC 9, hemoglobin 9.5.  Potassium 3.4 and will be replaced.  Patient is 

continued on cefepime and vancomycin per Dr. Sheets's recommendations.  Patient 

in reaching 1000 on incentive spirometry.  Patient is scheduled to see Dr. Kaye tomorrow.  Cefepime and vancomycin had been discontinued by Dr. Sheets and

started on Voriconazole. 





2/4: A-Team was called this morning regarding elevated heart rate, EKG was 

atrial flutter with RVR and 150 bpm and patient was transferred to ICU as an 

overflow for the cardiac stepdown unit, started on Cardizem drip and consult 

with cardiology.  Heart rate was improved with Cardizem and patient also 

received amiodarone bolus 300 mg.  Patient denies any worsening shortness of 

breath, cough or congestion.  No chest pain or palpitations.  Patient is resting

comfortably.  09, hemoglobin 8.8, potassium 3.4 replaced.  Creatinine 0.89.





2/5: Patient remains in the intensive care unit.  He continues to be in atrial 

flutter with 21 conduction rate.  He is on heparin drip and amiodarone.  He is 

currently in and out of atrial flutter with RVR.  Cardiology is following 

closely and may consider cardioversion tomorrow. WBC 16.6, hgb 9.8, plt 374, 

sodium 143, potassium 3.9, creatinine 0.88.  He has been afebrile, heart rate 

has been at times marginal, pulse ox 90% on room air.  Patient denies having any

fever or chills, no shortness of breath, no cough.  No chest pain.  Patient is 

followed by cardiothoracic surgery and plan is for left-sided PleurX catheter 

placement which is scheduled for Monday.





2/8: Patient is scheduled for PleurX catheter placement this afternoon.  

Cardiology may schedule him for cardioversion.  Dr. Collado is planning on oral 

antimicrobials at the time of discharge.  Patient is bringing up quite a bit of 

sputum.  He has been afebrile, heart rate 68, blood pressure 100/67, pulse ox 

93% on 1 L nasal cannula.  WBC 21, hemoglobin 9.6, platelet count 325.  

Potassium 3, BUN 28 creatinine 0.8.





2/9: Patient is status post PleurX catheter.  Patient has been afebrile, heart 

rate 106, blood pressure 85/53.  Pulse ox 97% on 2 L.  Patient is known to be 

back in atrial fibrillation at rate of 122.  He did go for cardioversion today 

for atrial flutter.  Patient states he has a little pain in the side for which 

she is taking Norco.  Not bad today.  Patient has subcutaneous edema to 

bilateral chest.  He denies having any headache.  BUN 27 creatinine 0.79.  Blood

sugar .





2/10: Patient denies chest pain or shortness of breath.  He is reaching 1250 on 

IS.  PleurX draining small amount of sanguinous fluid.  Patient is in atrial 

flutter.  Dr. Pimentel is planning for EP study and ablation on Thursday and plan

to continue eliquis.  Patient is also on Lopressor 25 mg 3 times daily.  Patient

has been afebrile, heart rate 97, blood pressure 106/65, pulse ox 98% on room 

air.  Blood sugars running between 112 and 67.  Patient is covered with 

Voriconazole.  Lac-Hydrin added for bilateral legs.





2/11: Patient is status post successful atrial flutter ablation with Dr. Pimentel.  Cardiology has recommended amlodipine 100 mg twice daily for 1 month. 

Cardiac monitor is a sinus rhythm with PACs.  Metoprolol was decreased to 25 mg 

twice daily.  Patient is on eliquis 5 mg twice daily.  Patient has been 

afebrile, heart rate 64, blood pressure 121/77.  Potassium 2.3 and replaced.  

Repeat level will be obtained this evening.  Pleurx catheter remains in place 

connected to Pleur-evac with intermittent air leak.  He has minimal output.  

Patient is continued on Advair, so for Aspergillus fungal infection managed by 

Dr. Sheets.  He is planning on oral antimicrobial at the time of discharge.





2/12: Patient is resting in recliner. Patient's wife is at bedside. Patient 

states he walked in hallway and felt well but did have shortness of breath with 

activity. He continues to have subcutaneous emphysema. He continues to have air 

leak.  WBC is 15.8, hemoglobin 10, platelet count 293.  Potassium is 3 and will 

be replaced.  BUN 27 creatinine 0.82.  Patient has been afebrile, heart rate 79,

blood pressure 114/66, pulse ox 90% on room air.  Chest x-ray is stable 

findings.





2/13: Patient is sitting up in his recliner he is quite depressed because he 

stated the hospital for almost 2 weeks, he wanted to go home, he continues to 

have some leak in the chest tube on the left side, we will maintain the patient 

on voriconazole, and he has to take that for the next 4 weeks and possibly 

longer would leave that up to the discretion of infectious disease, I spoke with

his wife as well as himself about the prognosis and the patient may need to send

to the hospital until Monday , meanwhile continue with physical therapy 

evaluation, anticipating home health care with physical therapy at home.





2/14: Patient is sitting in the recliner does not appear in acute distress, he 

continues to have the chest tube in the left side with minimal leakage, 

hopefully it will be removed in the next 24 hours and can be discharged home, he

is maintained on voriconazole for his Aspergillus fumigate as, continue with 

increased activity, continue to monitor the patient very closely, anticipate 

discharge in 1-2 days.





2/15: Patient remains with Pleurx catheter to wall suction with intermittent 

leak. He also continues to have subcutaneous emphysema not worsening. Patient is

very anxious to leave the hospital and go home.  Discussed dosing of Amiodarone 

with Dr. Pimentel. Amiodarone will be discontinued at the time of discharge.  

Patient is currently on Lasix 40 IVP daily. Lower extremity edema is improving. 

WBC 13.0, hemoglobin 9.6, electrolytes unremarkable, with the exception of the 

CO2 of 35, renal profile is unremarkable, liver enzymes show , alkaline 

phosphatase 495.  





2/16: The chest x-ray reveals no definite large pneumothorax.  Small left apical

pneumothorax.  Could be considered bilateral peripheral infiltrate in right 

upper lobe.  Patient has been afebrile, heart rate 93, blood pressure 90/54, 

pulse ox 97% on 2 L nasal cannula.  Heart monitor sinus rhythm.  Repeat blood 

work ordered for tomorrow.  Pleurx catheter continues to have a slight 

intermittent air leak.  Patient continues to have subcutaneous emphysema without

worsening.





2/17: Patient is currently off suction the time of evaluation.  Cardiothoracic 

surgery is planning for repeat chest x-ray tomorrow and possibly Pleurx 

catheter.  Patient is upset today and really wants to go home.  His pulse ox is 

8789% at rest without oxygen.   to attempt to set up home oxygen 

therapy.  He has been afebrile, heart rate 93, blood pressure 96/56.  

Leukocytosis has resolved with WBC of 9.8, hemoglobin 9.3.  Potassium 3.7, BUN 

14 and creatinine 0.77.  Total bilirubin 1.4.  , ALT 85, avoid 

phosphatase 513.  Discussed with Dr. Sheets and agreed to transition IV 

antifungal to oral and we will discontinue amiodarone.  Possible discharge home 

tomorrow.





2/18: Patient has been afebrile, HR 88, BP 83/53, PO 96% 2L n/c.  Monitor is a 

sinus rhythm.  Repeat chest x-ray reveals persistent extensive bilateral 

subcutaneous emphysema.  Air-fluid levels no seen in the bilateral lower lungs 

suggesting underlying loculated hydropneumothorax is on both sides.  Pleural 

edge better seen in the left base with continued left-sided Pleurx catheter.  

Patchy airspace disease throughout the right lung persist.  Pleurx catheter was 

capped yesterday afternoon.  He has had no increase of cutaneous emphysema.  

Patient is anxious to go home.  We're waiting for clearance from cardiothoracic 

surgery for discharge.





2/19: Has been afebrile, heart rate 90, blood pressure 87/51, pulse ox 96% on 2 

L.  Cardiac monitor is sinus rhythm.  Left-sided Pleurx catheter is capped.  No 

increase of today's emphysema.  Repeat chest x-ray reveals loculated left 

lateral base pneumothorax appears stable.  Bilateral air-fluid levels within the

lung bases. Patient has been cleared by cardiothoracic surgery.  Patient is to 

have his Pleurx drain daily for the first week and then her symptoms.  Home oxy

gen therapy has been arranged.  Patient will be discharged home today in stable 

condition.








Discharge Diagnoses:


1.  Acute hypoxemic respiratory failure secondary to loculated left-sided 

pleural effusion and fungal empyema with what appears to be recurrent pneumonia.




2.  Lactic acidosis.  


3.  Sepsis with septic shock.


4.  Acute kidney injury due to poor oral intake of fluid as well as hypotension 

with acute tubular necrosis. 


5.  Leukocytosis secondary to severe sepsis secondary to empyema. 


6.  Persistent atrial flutter with RVR.  Status post cardioversion, converted 

back.  Status post ablation completed by Dr. Pimentel.  


7.  Rheumatoid arthritis and rheumatoid lung. 


8.  History of empyema with Streptococcus requiring chest tube.  


9.  Hypertension and hypertensive cardiovascular disease. 


10.  Hypothyroidism. 


11.  Elevated liver function tests.  


12.  Chronic hypoxic respiratory failure.  Patient requires oxygen to manage 

pleural effusion and fungal empyema.








Discharge plan: home with [facility] care.





Impression and plan of care have been directed as dictated by the signing 

physician.  Phyllis Wylie nurse practitioner acting as scribe for signing p

hysician.


Patient Condition at Discharge: Critical





Plan - Discharge Summary


Discharge Rx Participant: No


New Discharge Prescriptions: 


New


   Apixaban [Eliquis] 5 mg PO BID #60 tab


   Ammonium Lactate Lotion [Lac-Hydrin 12% Lotion] 1 applic TOPICAL BID  applic


   Metoprolol Tartrate [Lopressor] 12.5 mg PO BID #60 tab


   Voriconazole [Vfend] 400 mg PO Q12HR #60 tab





Continue


   Levothyroxine Sodium [Synthroid] 150 mcg PO DAILY


   Albuterol Inhaler [Ventolin Hfa Inhaler] 2 puff INHALATION RT-QID PRN


     PRN Reason: Shortness Of Breath


   Cholecalciferol [Vitamin D3 (25 Mcg = 1000 Iu)] 2,000 unit PO HS


   Ipratropium-Albuterol Nebulize [Duoneb 0.5 mg-3 mg/3 ml Soln] 3 ml INHALATION

RT-QID PRN


     PRN Reason: Shortness Of Breath


   Ascorbic Acid [Vitamin C] 2,000 mg PO HS


   Aspirin 81 mg PO HS  chew


   Ondansetron [Zofran] 4 mg PO QID PRN


     PRN Reason: Nausea





Discontinued


   Metoprolol Succinate (ER) [Toprol XL] 25 mg PO DAILY #30 tab.er.24h


   Levofloxacin [Levaquin] 250 mg PO DAILY


Discharge Medication List





Levothyroxine Sodium [Synthroid] 150 mcg PO DAILY 03/29/17 [History]


Albuterol Inhaler [Ventolin Hfa Inhaler] 2 puff INHALATION RT-QID PRN 06/10/20 

[History]


Ascorbic Acid [Vitamin C] 2,000 mg PO HS 12/23/20 [History]


Cholecalciferol [Vitamin D3 (25 Mcg = 1000 Iu)] 2,000 unit PO HS 12/23/20 

[History]


Ipratropium-Albuterol Nebulize [Duoneb 0.5 mg-3 mg/3 ml Soln] 3 ml INHALATION 

RT-QID PRN 12/23/20 [History]


Aspirin 81 mg PO HS  chew 01/21/21 [Rx]


Ondansetron [Zofran] 4 mg PO QID PRN 01/29/21 [History]


Ammonium Lactate Lotion [Lac-Hydrin 12% Lotion] 1 applic TOPICAL BID  applic 

02/17/21 [Rx]


Apixaban [Eliquis] 5 mg PO BID #60 tab 02/17/21 [Rx]


Metoprolol Tartrate [Lopressor] 12.5 mg PO BID #60 tab 02/17/21 [Rx]


Voriconazole [Vfend] 400 mg PO Q12HR #60 tab 02/17/21 [Rx]








Follow up Appointment(s)/Referral(s): 


Otoniel Pimentel MD [STAFF PHYSICIAN] - 1 Week


[facility], [NON-STAFF] - 


Arnold Kaye MD [STAFF PHYSICIAN] - As Needed ( Once drainage is less than 50

mL three times in a row, notify the surgery office for possible removal.)


Lidya Myers MD [Primary Care Provider] - 1 Week


Activity/Diet/Wound Care/Special Instructions: 


1.  Home Care is ordered, they will obtain new bottles.


2.  May shower after 24 hours, no tub baths/hot tubs.


3.  Do not drain more than 1 liter or 1000 mL in 24 hours.


4.  New drainage bottle needed with each drainage.


5.  Drainage frequency dictated by patient symptoms, may be every day, every 

other day, weekly, or however often the patient is symptomatic. For the first 

week drainage should be every day regardless of amount drained.


6.  Please notify NP or office if temperature >101F, excessive pain at insertion

site, drainage consistency changes to cloudy or smells bad, catheter falls out, 

or anything else that concerns you.


7.  Contact surgery office with weekly drainage amounts.  May fax the amounts.


8.  Once drainage is less than 50 mL three times in a row, notify the surgery 

office for possible removal.





NP: Kamla [phone], David [phone]





 











Discharge Disposition: HOME WITH HOME HEALTH SERVICES

## 2021-02-19 NOTE — XR
EXAMINATION TYPE: XR chest 2V

 

DATE OF EXAM: 2/19/2021

 

COMPARISON: 2/18/2021

 

INDICATION: Trapped lung

 

TECHNIQUE:  Frontal and lateral views of the chest are obtained.

 

FINDINGS:  

The heart size is normal.  

The pulmonary vasculature is normal.

There is diffuse increased lung markings to the right upper lobe and right apex. Air-fluid levels are
 within the bilateral lung bases. The loculated pneumothorax at the left base appears stable. Extensi
ve subcutaneous emphysema remains present..

 

IMPRESSION:  

1. Loculated left lateral base pneumothorax appears stable.

2. Bilateral air-fluid levels within the lung bases

## 2021-02-19 NOTE — P.PN
Subjective


Progress Note Date: 02/18/21





This is a 60-year-old  male patient requesting my service with past 

medical history of rheumatoid arthritis on Arava that was diagnosed when he was 

37 year old initially was started on Methotrexate that he could not tolerate 

then placed on Embrel but he developed side effects and finally was tried on 

Humira injection but he developed neurologic sided effects and was hospitaized 

at [facility] for quiet some time, rheumatoid lung disease, 

previous history of loculated empyema with chest tube placement in July 2020, 

hypertension, hypothyroidism, history of DVT, remote history of tobacco use and 

dependence, was recently hospitalized at [facility] after he was 

admitted for an acute kidney injury with significant metabolic acidosis 

associated with the elevated creatinine and elevated BUN and hypotension at that

time he was seen and evaluated by pulmonary and critical care medicine, he had a

central line placed and at that time, he was placed on Levophed drip he was p

laced on IV anabiotic as well but the patient recovered very fast and he had an 

echocardiogram that showed normal LV function and segmental hypokinesia, he was 

supposed to follow-up with Dr. Santo in the office today, patient was seen in my

office 24 hours ago when he was complaining of increased shortness breath, at 

that time his oxygenation could not be checked because of his Case's and cold

extremities, he was sent for a chest x-ray that showed right lower lobe 

infiltrate as well as blood tests that showed a white count of 20,000 patient 

was feeling better he was started on oral antibiotic in the form of Levaquin 500

mg orally once every day, and that he was supposed to follow-up with me as an 

outpatient every week he went to see his cardiologist today and he had an 

episode when he was dizzy lightheaded and an episode of vomiting as well and he 

was sent to the emergency department for evaluation had a computed tomography of

the chest as well as abdomen and pelvis that showed a thick walled pleural 

cavities posteriorly in the lower lungs with left-sided air-fluid level that has

diminished in size from the prior computed tomography scan when he was in the 

hospital a few weeks back there is also associated compressive atelectasis and 

chronic consultation with multifocal areas of reticular nodular opacity in the 

upper lungs with a slight nodularity of the left upper lobe again this area of 

focal consultation has improved from the previous study that was done few weeks 

ago, patient was started on IV antibiotic with vancomycin and Zosyn as well as 

IV fluid, he was seen in consultation by pulmonary critical care in the 

emergency department as the patient blood pressure dropped and that he'll be 

transferred to the intensive care unit at this point in time I had long conve

rsation with the patient and his wife at the bedside and the patient may need to

have a chest tube placed for his possible right empyema, he did receive 3 L 

normal saline in the ER, and his blood pressure is marginal he may need to go on

 pressors if  not recovered.





1/30:patient is sitting up in chair feeling about the same , complains of 

increased pain in the righ elbow, was seen earlier by pulmonary and the plan was

to go for US-Guided left thoracenthesis due to to loculated left pleural effu

nathaniel and possible empyema, may need VATS, he denies any chest pain or shortness 

of breath, no abdominal pain nausea, vomiting or diarrhea, still have diarrhea 

negative for C.Diff, he seems to have accept to stay in the hospital this time 

as long as he needed to.





1/31: Patient sitting up in the recliner complaining of increased pain in his 

joints due to his rheumatoid arthritis with increased synovitis in both wrists 

both elbows and both shoulders he was started on hydrocortisone 100 mg IV push 

every 8 hours, to try to help with his blood pressure as well as his phonation, 

he is maintained on Norco 5/325 mg one tablet orally every 6 hours as needed for

pain control, patient denies any chest pain is less short of breath, he 

continues to have increased coughing with yellow from production of green phlegm

production, he had no fever or chills at this time he continues to be on 

Levothroid drip, patient has appeared to have increased swelling in both lower 

extremities as well as both ankles, he is maintained on IV antibiotic in the 

form of vancomycin as well as cefepime, infectious disease is following, patient

is scheduled to go for ultrasound guidance thoracentesis of the left loculated 

pleural effusion tomorrow morning.





2/1: Patient remains in the intensive care unit.  He has been afebrile, heart 

rate 100, blood pressure 95/62, pulse ox 97% on 2 L nasal cannula.  Hemoglobin 

8.8 and leukocytosis resolved.  Creatinine 0.76, electrolytes normal.  Sputum 

culture is positive for Aspergillus species.  Blood culture showing no growth. 

Patient underwent diagnostic thoracentesis with interventional radiology today 

with removal of 45 mL of cloudy white fluid.  Chest x-ray reveals no 

complications following left thoracentesis.  Fluid has been sent for culture and

cytology.  Consult in place for cardiothoracic surgery to evaluate for VATS with

decortication.





2/2: Patient remains in the intensive care unit.  He did receive 1 dose of IV 

Lasix this morning ordered by nephrology.  He is followed by cardiothoracic 

surgery was no plan for surgical intervention.  ID has recommended cefepime and 

vancomycin for now.  Expect antifungal agent ended as well.  Patient denies any 

significant shortness of breath.  He is comfortable sitting in a chair.  Patient

is having minimal cough and minimal sputum.  Patient is achieving 1000 ml on 

incentive spirometry.  Culture and cytology reports remain pending from 

thoracentesis. Repeat chest x-ray reveals patchy peripheral lung with lung zone 

and lower lung zone infiltrates persist unchanged.  Patient has been afebrile, 

heart rate in the 90s and low 100s, pulse ox 91 on room air.





2/3: Repeat chest x-ray reveals left-sided basilar loculated pneumothorax, 

stable.  Bilateral lung infiltrates.  Correlate for pneumonia and atelectasis.  

WBC 9, hemoglobin 9.5.  Potassium 3.4 and will be replaced.  Patient is 

continued on cefepime and vancomycin per Dr. Sheets's recommendations.  Patient 

in reaching 1000 on incentive spirometry.  Patient is scheduled to see Dr. Kaye tomorrow.  Cefepime and vancomycin had been discontinued by Dr. Sheets and

started on Voriconazole. 





2/4: A-Team was called this morning regarding elevated heart rate, EKG was 

atrial flutter with RVR and 150 bpm and patient was transferred to ICU as an 

overflow for the cardiac stepdown unit, started on Cardizem drip and consult 

with cardiology.  Heart rate was improved with Cardizem and patient also 

received amiodarone bolus 300 mg.  Patient denies any worsening shortness of 

breath, cough or congestion.  No chest pain or palpitations.  Patient is resting

comfortably.  09, hemoglobin 8.8, potassium 3.4 replaced.  Creatinine 0.89.





2/5: Patient remains in the intensive care unit.  He continues to be in atrial 

flutter with 21 conduction rate.  He is on heparin drip and amiodarone.  He is 

currently in and out of atrial flutter with RVR.  Cardiology is following 

closely and may consider cardioversion tomorrow. WBC 16.6, hgb 9.8, plt 374, 

sodium 143, potassium 3.9, creatinine 0.88.  He has been afebrile, heart rate 

has been at times marginal, pulse ox 90% on room air.  Patient denies having any

fever or chills, no shortness of breath, no cough.  No chest pain.  Patient is 

followed by cardiothoracic surgery and plan is for left-sided PleurX catheter 

placement which is scheduled for Monday.





2/8: Patient is scheduled for PleurX catheter placement this afternoon.  

Cardiology may schedule him for cardioversion.  Dr. Collado is planning on oral 

antimicrobials at the time of discharge.  Patient is bringing up quite a bit of 

sputum.  He has been afebrile, heart rate 68, blood pressure 100/67, pulse ox 

93% on 1 L nasal cannula.  WBC 21, hemoglobin 9.6, platelet count 325.  

Potassium 3, BUN 28 creatinine 0.8.





2/9: Patient is status post PleurX catheter.  Patient has been afebrile, heart 

rate 106, blood pressure 85/53.  Pulse ox 97% on 2 L.  Patient is known to be 

back in atrial fibrillation at rate of 122.  He did go for cardioversion today 

for atrial flutter.  Patient states he has a little pain in the side for which 

she is taking Norco.  Not bad today.  Patient has subcutaneous edema to 

bilateral chest.  He denies having any headache.  BUN 27 creatinine 0.79.  Blood

sugar .





2/10: Patient denies chest pain or shortness of breath.  He is reaching 1250 on 

IS.  PleurX draining small amount of sanguinous fluid.  Patient is in atrial 

flutter.  Dr. Pimentel is planning for EP study and ablation on Thursday and plan

to continue eliquis.  Patient is also on Lopressor 25 mg 3 times daily.  Patient

has been afebrile, heart rate 97, blood pressure 106/65, pulse ox 98% on room 

air.  Blood sugars running between 112 and 67.  Patient is covered with 

Voriconazole.  Lac-Hydrin added for bilateral legs.





2/11: Patient is status post successful atrial flutter ablation with Dr. Pimentel.  Cardiology has recommended amlodipine 100 mg twice daily for 1 month. 

Cardiac monitor is a sinus rhythm with PACs.  Metoprolol was decreased to 25 mg 

twice daily.  Patient is on eliquis 5 mg twice daily.  Patient has been 

afebrile, heart rate 64, blood pressure 121/77.  Potassium 2.3 and replaced.  

Repeat level will be obtained this evening.  Pleurx catheter remains in place 

connected to Pleur-evac with intermittent air leak.  He has minimal output.  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  He is planning on oral antimicrobial at the time of discharge.





2/12: Patient is resting in recliner. Patient's wife is at bedside. Patient 

states he walked in hallway and felt well but did have shortness of breath with 

activity. He continues to have subcutaneous emphysema. He continues to have air 

leak.  WBC is 15.8, hemoglobin 10, platelet count 293.  Potassium is 3 and will 

be replaced.  BUN 27 creatinine 0.82.  Patient has been afebrile, heart rate 79,

blood pressure 114/66, pulse ox 90% on room air.  Chest x-ray is stable 

findings.





2/13: Patient is sitting up in his recliner he is quite depressed because he 

stated the hospital for almost 2 weeks, he wanted to go home, he continues to 

have some leak in the chest tube on the left side, we will maintain the patient 

on voriconazole, and he has to take that for the next 4 weeks and possibly 

longer would leave that up to the discretion of infectious disease, I spoke with

his wife as well as himself about the prognosis and the patient may need to send

to the hospital until Monday , meanwhile continue with physical therapy 

evaluation, anticipating home health care with physical therapy at home.





2/14: Patient is sitting in the recliner does not appear in acute distress, he 

continues to have the chest tube in the left side with minimal leakage, 

hopefully it will be removed in the next 24 hours and can be discharged home, he

is maintained on voriconazole for his Aspergillus fumigate as, continue with 

increased activity, continue to monitor the patient very closely, anticipate 

discharge in 1-2 days.





2/15: Patient remains with Pleurx catheter to wall suction with intermittent 

leak. He also continues to have subcutaneous emphysema not worsening. Patient is

very anxious to leave the hospital and go home.  Discussed dosing of Amiodarone 

with Dr. Pimentel. Amiodarone will be discontinued at the time of discharge.  

Patient is currently on Lasix 40 IVP daily. Lower extremity edema is improving. 

WBC 13.0, hemoglobin 9.6, electrolytes unremarkable, with the exception of the 

CO2 of 35, renal profile is unremarkable, liver enzymes show , alkaline 

phosphatase 495.  





2/16: The chest x-ray reveals no definite large pneumothorax.  Small left apical

pneumothorax.  Could be considered bilateral peripheral infiltrate in right 

upper lobe.  Patient has been afebrile, heart rate 93, blood pressure 90/54, 

pulse ox 97% on 2 L nasal cannula.  Heart monitor sinus rhythm.  Repeat blood 

work ordered for tomorrow.  Pleurx catheter continues to have a slight 

intermittent air leak.  Patient continues to have subcutaneous emphysema without

worsening.





2/17: Patient is currently off suction the time of evaluation.  Cardiothoracic 

surgery is planning for repeat chest x-ray tomorrow and possibly Pleurx 

catheter.  Patient is upset today and really wants to go home.  His pulse ox is 

8789% at rest without oxygen.   to attempt to set up home oxygen 

therapy.  He has been afebrile, heart rate 93, blood pressure 96/56.  

Leukocytosis has resolved with WBC of 9.8, hemoglobin 9.3.  Potassium 3.7, BUN 

14 and creatinine 0.77.  Total bilirubin 1.4.  , ALT 85, avoid 

phosphatase 513.  Discussed with Dr. Sheets and agreed to transition IV 

antifungal to oral and we will discontinue amiodarone.  Possible discharge home 

tomorrow.





2/18: Patient has been afebrile, HR 88, BP 83/53, PO 96% 2L n/c.  Monitor is a 

sinus rhythm.  Repeat chest x-ray reveals persistent extensive bilateral 

subcutaneous emphysema.  Air-fluid levels no seen in the bilateral lower lungs 

suggesting underlying loculated hydropneumothorax is on both sides.  Pleural 

edge better seen in the left base with continued left-sided Pleurx catheter.  

Patchy airspace disease throughout the right lung persist.  Pleurx catheter was 

capped yesterday afternoon.  He has had no increase of cutaneous emphysema.  

Patient is anxious to go home.  We're waiting for clearance from cardiothoracic 

surgery for discharge.








Objective





- Vital Signs


Vital signs: 


                                   Vital Signs











Temp  98.0 F   02/18/21 03:44


 


Pulse  88   02/18/21 03:44


 


Resp  18   02/18/21 03:44


 


BP  83/53   02/18/21 03:44


 


Pulse Ox  96   02/18/21 03:44








                                 Intake & Output











 02/17/21 02/18/21 02/18/21





 18:59 06:59 18:59


 


Intake Total 1700 10 


 


Output Total 1190 305 


 


Balance 510 -295 


 


Weight  99.3 kg 


 


Intake:   


 


  IV 20 10 


 


    0.9  10 


 


    Invasive Line 6 20  


 


  Oral 1680  


 


Output:   


 


  Chest Tube Drainage 110 5 


 


    Left Mid-Axillary Chest 110 5 


 


  Drainage 30 0 


 


    Left Chest 30 0 


 


  Urine 1050 300 


 


Other:   


 


  Voiding Method  Toilet 





  Urinal 


 


  # Voids  1 














- Exam





 Review of Systems 


Constitutional: Reports anorexia, Reports chronic pain, Reports fatigue, denies 

weakness, Reports weight loss


Eyes: denies blurred vision, denies bulging eye, denies decreased vision


Ears, nose, mouth and throat: Denies dysphagia, Denies sore throat


Respiratory: Reports dyspnea, with exertion Reports respiratory infections, 

Denies congestion, Denies cough with sputum, Denies home oxygen, Denies sleep 

apnea, Denies snoring, Denies wheezing


Cardiac: No chest pain.  No palpitations.  No lower extremity edema.  No 

syncopal episodes.


Gastrointestinal: Reports bloating, Reports change in bowel habits, Reports d

iarrhea, Reports early satiety, Reports indigestion, Reports loss of appetite, 

Reports nausea, Reports vomiting, Denies abdominal pain, Denies belching, Denies

heartburn, Denies hematemesis, Denies hematochezia, Denies melena


Genitourinary: Denies dysuria, Denies nocturia


Musculoskeletal: Denies myalgias 


Musculoskeletal: absent: ankle pain, ankle stiffness, ankle swelling, elbow 

pain, elbow stiffness, elbow swelling, foot pain, foot stiffness, foot swelling,

hand pain, hand stiffness, hand swelling, hip pain, hip stiffness, hip swelling,

knee pain, knee stiffness, knee swelling, shoulder pain, shoulder stiffness, oscar

ulder swelling, wrist pain, wrist stiffness, wrist swelling


Integumentary: Denies pruritus, Denies rash, subcutaneous emphysema


Neurological: Denies numbness, Denies weakness


Psychiatric: Denies anxiety, Denies depression


Endocrine: Denies fatigue, Denies weight change








Physical examination:


General: This is a 60-year-old  male.  Patient is resting in a recliner

and appears to be comfortable.





HEENT: head ia atraumatic normocephalic, Pupils were equal round reactive to 

light and accommodations , extra ocular muscle movement were intact, mucous 

membranes of the mouth are somewhat dry.





Neck: supple no JVP.





Chest: decreased breath sounds at he bases no expiratory wheezes no chest wall 

tendernes or intercostal retractions.  PleurX cath in place,. Positive 

subcutaneous emphysema.





Heart: first heart sound is depressed, second heart sound is normal tachycardic 

and irregular there is HOOD 2/6 located at the left sternal border.





Abdomen: soft, mild tenderness to the left lower quadrant positive bowel sounds,

no guarding or rebound tenderness, no hepatosplenomegaly.





Extremities: there is 1+ edema or calf tenderness DP+ 2 Bilaterally, there is 

what appears to be a charcot joint in the right foot form arch collapse.





Neurologic examination: patient is awake , alert and oriented X 3 CN II-XII are 

grossly intact, muscle power 4/5 in bilateral upper and lower extremities, deep 

tendon reflexes were normal.











- Labs


CBC & Chem 7: 


                                 02/17/21 07:40





                                 02/17/21 07:40


Labs: 


                  Abnormal Lab Results - Last 24 Hours (Table)











  02/17/21 02/17/21 Range/Units





  07:40 07:40 


 


RBC  3.48 L   (4.30-5.90)  m/uL


 


Hgb  9.3 L   (13.0-17.5)  gm/dL


 


Hct  31.5 L   (39.0-53.0)  %


 


MCHC  29.6 L   (31.0-37.0)  g/dL


 


RDW  18.8 H   (11.5-15.5)  %


 


Carbon Dioxide   36 H  (22-30)  mmol/L


 


Calcium   8.2 L  (8.4-10.2)  mg/dL


 


Total Bilirubin   1.4 H  (0.2-1.3)  mg/dL


 


AST   106 H  (17-59)  U/L


 


ALT   85 H  (4-49)  U/L


 


Alkaline Phosphatase   513 H  ()  U/L


 


Total Protein   5.8 L  (6.3-8.2)  g/dL


 


Albumin   2.5 L  (3.5-5.0)  g/dL














Assessment and Plan


Assessment: 





1.  Acute hypoxemic respiratory failure secondary to loculated left-sided 

pleural effusion and fungal empyema with what appears to be recurrent pneumonia.

 Continue Voriconazole oral 400 mg twice daily per Dr. Sheets with plan for oral 

at the time of discharge.  Discontinue amiodarone





2.  Lactic acidosis.  Resolved, repeat lactic acid is back to normal.





3.  Sepsis with septic shock and was stable.  Patient has been weaned off 

Levothroid drip.





4.  Acute kidney injury due to poor oral intake of fluid as well as hypotension 

with acute tubular necrosis.  Patient is on IV fluid, continue to monitor the 

patient CMP continue to monitor urine output.  Nephrology is following.





5.  Leukocytosis secondary to severe sepsis secondary to empyema.  Patient is 

off antibiotic.





6.  Persistent atrial flutter with RVR.  Status post cardioversion, converted 

back.  Status post ablation completed by Dr. Pimentel.  Patient to continue 

eliquis.  Amiodarone has been discontinued. Cardiology consult appreciated.





7.  Rheumatoid arthritis and rheumatoid lung.  Arava and Xeljanz had been on 

hold since October.





8.  History of empyema with Streptococcus requiring chest tube.  





9.  Hypertension and hypertensive cardiovascular disease. Lasix 40 mg IVP daily.





10.  Hypothyroidism . we will continue with Synthroid 150 mcg orally daily.





11.  Elevated liver function tests.  Plan to discontinue amiodarone and transi

tion voriconazole to oral.





12.  DVT prophylaxis. Eliquis and SCDs.





13. GI prophylaxis. we will continue with Protonix 40 mg oral daily.








Discharge plan: home on Friday





Impression and plan of care have been directed as dictated by the signing 

physician.  Phyllis Wylie nurse practitioner acting as scribe for signing 

physician.

## 2021-02-22 NOTE — CDI
Documentation Clarification Form



Date: 2/22/21

From: Ayleen Kaur

Phone: If you have a question about this query, please contact Katelynn Nagy, 
 at [phone] between 8am and 5pm.

MRN: [id number]

Admit Date: 01/29/2021 12:02:00 PM

Patient Name: Trevon Kaufman

Visit Number: [id number]

Discharge Date:  02/19/2021 02:20:00 PM





ATTENTION: The Clinical Documentation Specialists (CDI) and [facility] Coding Staff 
appreciate your assistance in clarifying documentation. Please respond to the 
clarification below the line at the bottom and electronically sign. The CDI & 
[facility] Coding staff will review the response and follow-up if needed. Please note: 
Queries are made part of the Legal Health Record. If you have any questions, 
please contact the author of this message via ITS.



Dr. Otoniel Pimentel,



New onset atrial Fibrillation on Cardizem drip is documented in PNs starting 
with 2/4.

He underwent cardioversion and ablation of atrial flutter on 2/8 & 2/11.



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 
pneumonia, acute and chronic hypoxic respiratory failure, ATN, rheumatoid lung 
disease with rheumatoid arthritis

Clinical Indicators: 

EKG/telemetry: Atrial flutter with variable AV block, vent rate-145, QRS-92, 
QT/QTc- 356/552

Treatment: Cardizem drip, ablation, Eliquis 5 mg PO BID



In your professional opinion, can you please clarify the type of Atrial 
Fibrillation, if known?  



Chronic

Permanent

Paroxysmal

Persistent, longstanding

Persistent, other

Persistent, permanent

Other, please specify ________

Unable to determine

__________________________________________________________________________

MTDD

## 2022-10-25 NOTE — XR
EXAMINATION TYPE: XR chest 2V

 

DATE OF EXAM: 2/4/2021

 

COMPARISON: 2/3/2021

 

INDICATION: Effusion

 

TECHNIQUE:  Frontal and lateral views of the chest are obtained.

 

FINDINGS:  

The heart size is normal.  

The pulmonary vasculature is prominent.

Scattered bilateral lung infiltrates are present.

 

Best visualized in lateral projection are of posterior pneumothoraces. Air-fluid levels are present p
osteriorly..

 

IMPRESSION:  

1. Loculated posterior basilar pneumothoraces, stable from prior exam.

2. Diffuse increased lung markings. Correlate for pneumonia and atelectasis. The day before your surgery, you will receive a phone call from the surgery nurse, to let you know what time to arrive on the day of surgery. This call will usually be between 2-4 PM.  If you do not receive a phone call by 4 PM the day before your surgery, please call [phone] and let them know you have not received an arrival time. If your surgery is on Monday, your call will be on the Friday before your Monday surgery. The morning of surgery, you may take all your prescribed medications with a sip of water. Any exceptions to this would be listed below:       [facility] not eat or drink anything after midnight, the night before your surgery. This is extremely important for your safety. Take a bath (or shower) the night before your surgery and you may brush your teeth the morning of your surgery. You will be scheduled to arrive at the hospital 2 hours before your surgery, or follow your surgeon's instructions. Dress comfortably. Wear loose clothing that will be easy to remove and comfortable for your trip home. You may wear eyeglasses or contacts but bring your cases with you as they must be remove before your surgery. Hearing aids and dentures will need to be removed before your surgery. Do not wear any jewelry, including body jewelry. All jewelry will need to be removed prior to your surgery. Do not wear fingernail polish or make-up. It is best not to bring any valuables with you. If you are to stay in the hospital overnight, bring your robe, slippers and personal toiletries that you may need. POSTOPERATIVE GUIDELINES AFTER RECEIVING ANESTHESIA    If you are to go home after your surgery, you will need a responsible adult to drive you home. You will not be able to take public transportation after your discharge from the Operative Care Unit unless you are accompanied by a        responsible adult.     On returning home, be sure to follow your physician's orders regarding diet, activity and medications. Remember, surgery with general anesthesia or sedation may leave you sleepy, very tired and with a decreased appetite for 12 to 24 hours. If you develop any post-surgical complications or problems, call your surgeon or [facility] ([phone]). [address] [place] [address] for Surgery Patients-Revised 6-    Visitors for surgery patients are essential for the patient's emotional well-being and care       post operatively. 2.   Visitor Expectations and Limitations  3. One visitor allowed with patients in the preop/postop rooms. 4.  A second visitor may sit in the waiting area. 5.  No children under 13 allowed in the pre-post op areas unless they are the patient. 6.  Two people may be with an underage surgical/procedural patient in preop/postop        room. 7.  If you are admitted to the hospital post operatively, there are NO RESTRICTIONS on       the floor at this time. 8.  If you are admitted to ICU postoperatively, you may have one visitor in the room from        7A-7P. A second visitor may sit in the ICU waiting room.   There can be no overnight